# Patient Record
Sex: MALE | Race: ASIAN | NOT HISPANIC OR LATINO | URBAN - METROPOLITAN AREA
[De-identification: names, ages, dates, MRNs, and addresses within clinical notes are randomized per-mention and may not be internally consistent; named-entity substitution may affect disease eponyms.]

---

## 2022-07-15 ENCOUNTER — EMERGENCY (EMERGENCY)
Facility: HOSPITAL | Age: 67
LOS: 1 days | Discharge: HOME | End: 2022-07-15
Attending: STUDENT IN AN ORGANIZED HEALTH CARE EDUCATION/TRAINING PROGRAM | Admitting: EMERGENCY MEDICINE

## 2022-07-15 VITALS
TEMPERATURE: 98 F | OXYGEN SATURATION: 100 % | RESPIRATION RATE: 20 BRPM | SYSTOLIC BLOOD PRESSURE: 141 MMHG | DIASTOLIC BLOOD PRESSURE: 69 MMHG | HEART RATE: 87 BPM

## 2022-07-15 LAB
ALBUMIN SERPL ELPH-MCNC: 4.7 G/DL — SIGNIFICANT CHANGE UP (ref 3.3–5)
ALP SERPL-CCNC: 83 U/L — SIGNIFICANT CHANGE UP (ref 40–120)
ALT FLD-CCNC: 24 U/L — SIGNIFICANT CHANGE UP (ref 4–41)
ANION GAP SERPL CALC-SCNC: 15 MMOL/L — HIGH (ref 7–14)
APTT BLD: 28.5 SEC — SIGNIFICANT CHANGE UP (ref 27–36.3)
AST SERPL-CCNC: 27 U/L — SIGNIFICANT CHANGE UP (ref 4–40)
BASOPHILS # BLD AUTO: 0.03 K/UL — SIGNIFICANT CHANGE UP (ref 0–0.2)
BASOPHILS NFR BLD AUTO: 0.3 % — SIGNIFICANT CHANGE UP (ref 0–2)
BILIRUB SERPL-MCNC: 0.6 MG/DL — SIGNIFICANT CHANGE UP (ref 0.2–1.2)
BUN SERPL-MCNC: 18 MG/DL — SIGNIFICANT CHANGE UP (ref 7–23)
CALCIUM SERPL-MCNC: 9.2 MG/DL — SIGNIFICANT CHANGE UP (ref 8.4–10.5)
CHLORIDE SERPL-SCNC: 95 MMOL/L — LOW (ref 98–107)
CO2 SERPL-SCNC: 24 MMOL/L — SIGNIFICANT CHANGE UP (ref 22–31)
CREAT SERPL-MCNC: 0.91 MG/DL — SIGNIFICANT CHANGE UP (ref 0.5–1.3)
EGFR: 91 ML/MIN/1.73M2 — SIGNIFICANT CHANGE UP
EOSINOPHIL # BLD AUTO: 0.18 K/UL — SIGNIFICANT CHANGE UP (ref 0–0.5)
EOSINOPHIL NFR BLD AUTO: 2.1 % — SIGNIFICANT CHANGE UP (ref 0–6)
GLUCOSE SERPL-MCNC: 328 MG/DL — HIGH (ref 70–99)
HCT VFR BLD CALC: 27.3 % — LOW (ref 39–50)
HGB BLD-MCNC: 9 G/DL — LOW (ref 13–17)
IANC: 5.06 K/UL — SIGNIFICANT CHANGE UP (ref 1.8–7.4)
IMM GRANULOCYTES NFR BLD AUTO: 0.5 % — SIGNIFICANT CHANGE UP (ref 0–1.5)
INR BLD: 0.96 RATIO — SIGNIFICANT CHANGE UP (ref 0.88–1.16)
LYMPHOCYTES # BLD AUTO: 2.43 K/UL — SIGNIFICANT CHANGE UP (ref 1–3.3)
LYMPHOCYTES # BLD AUTO: 27.8 % — SIGNIFICANT CHANGE UP (ref 13–44)
MCHC RBC-ENTMCNC: 29 PG — SIGNIFICANT CHANGE UP (ref 27–34)
MCHC RBC-ENTMCNC: 33 GM/DL — SIGNIFICANT CHANGE UP (ref 32–36)
MCV RBC AUTO: 88.1 FL — SIGNIFICANT CHANGE UP (ref 80–100)
MONOCYTES # BLD AUTO: 1 K/UL — HIGH (ref 0–0.9)
MONOCYTES NFR BLD AUTO: 11.4 % — SIGNIFICANT CHANGE UP (ref 2–14)
NEUTROPHILS # BLD AUTO: 5.06 K/UL — SIGNIFICANT CHANGE UP (ref 1.8–7.4)
NEUTROPHILS NFR BLD AUTO: 57.9 % — SIGNIFICANT CHANGE UP (ref 43–77)
NRBC # BLD: 0 /100 WBCS — SIGNIFICANT CHANGE UP
NRBC # FLD: 0 K/UL — SIGNIFICANT CHANGE UP
PLATELET # BLD AUTO: 239 K/UL — SIGNIFICANT CHANGE UP (ref 150–400)
POTASSIUM SERPL-MCNC: 3.2 MMOL/L — LOW (ref 3.5–5.3)
POTASSIUM SERPL-SCNC: 3.2 MMOL/L — LOW (ref 3.5–5.3)
PROT SERPL-MCNC: 7.4 G/DL — SIGNIFICANT CHANGE UP (ref 6–8.3)
PROTHROM AB SERPL-ACNC: 11.1 SEC — SIGNIFICANT CHANGE UP (ref 10.5–13.4)
RBC # BLD: 3.1 M/UL — LOW (ref 4.2–5.8)
RBC # FLD: 14 % — SIGNIFICANT CHANGE UP (ref 10.3–14.5)
SODIUM SERPL-SCNC: 134 MMOL/L — LOW (ref 135–145)
TROPONIN T, HIGH SENSITIVITY RESULT: 24 NG/L — SIGNIFICANT CHANGE UP
WBC # BLD: 8.74 K/UL — SIGNIFICANT CHANGE UP (ref 3.8–10.5)
WBC # FLD AUTO: 8.74 K/UL — SIGNIFICANT CHANGE UP (ref 3.8–10.5)

## 2022-07-15 PROCEDURE — 99285 EMERGENCY DEPT VISIT HI MDM: CPT

## 2022-07-15 RX ORDER — SODIUM CHLORIDE 9 MG/ML
500 INJECTION INTRAMUSCULAR; INTRAVENOUS; SUBCUTANEOUS ONCE
Refills: 0 | Status: COMPLETED | OUTPATIENT
Start: 2022-07-15 | End: 2022-07-15

## 2022-07-15 RX ORDER — POTASSIUM CHLORIDE 20 MEQ
40 PACKET (EA) ORAL ONCE
Refills: 0 | Status: COMPLETED | OUTPATIENT
Start: 2022-07-15 | End: 2022-07-15

## 2022-07-15 RX ADMIN — SODIUM CHLORIDE 500 MILLILITER(S): 9 INJECTION INTRAMUSCULAR; INTRAVENOUS; SUBCUTANEOUS at 22:27

## 2022-07-15 RX ADMIN — Medication 40 MILLIEQUIVALENT(S): at 23:51

## 2022-07-15 NOTE — ED PROVIDER NOTE - MUSCULOSKELETAL, MLM
Spine appears normal, range of motion is not limited, no muscle or joint tenderness. Pulses intact in the right lower extremity.

## 2022-07-15 NOTE — ED ADULT NURSE NOTE - OBJECTIVE STATEMENT
Patient A&Ox4 primarily Occitan speaking, able to speak english, ambulatory c/o pain to the right groin s/p an angiogram procedure for PVD. Patient states that he developed swelling to the scrotum area that was not there prior to the procedure, which was done on 7/12/22. Surgical site intact, no drainage, or redness observed. Respirations even and unlabored. Patient denies numbness, tingling, weakness, fevers/chills, hematuria, or any other symptoms at this time. 18G IV placed to right antecubital, labs collected and sent. Stretcher in lowest position, wheels locked, appropriate side rails in place, call bell in reach.

## 2022-07-15 NOTE — ED ADULT TRIAGE NOTE - CHIEF COMPLAINT QUOTE
Pt had angiogram to check circulation left lower extremity. Pt states that the following day the nursed "pushed" the area and now he has pain. Denies any bleeding. Also c.o dizziness.

## 2022-07-15 NOTE — ED ADULT NURSE NOTE - NSFALLRSKHARMRISK_ED_ALL_ED
Agree with the barium swallow but if it does not show anything I would recommend GI evaluation with a Bravo capsule to look for LPR.   Thanks, Jennifer Smyht
yes

## 2022-07-15 NOTE — ED PROVIDER NOTE - OBJECTIVE STATEMENT
66 y/o M w/ PMHx of HTN, HLD, DM2 c/o pain to the right groin s/p an angiogram procedure for PVD. Pt states that there is currently a hernia to that side which was not there prior to the procedure, which was done on 7/12/22. Denies numbness, tingling, weakness, hematuria, or any other symptoms at this time.

## 2022-07-15 NOTE — ED PROVIDER NOTE - CLINICAL SUMMARY MEDICAL DECISION MAKING FREE TEXT BOX
66 yo M presenting with groin pain/swelling and ecchymosis s/p pvd angioplasty-- plan for labs, CTA LE r/o pseudoaneurysm. DIspo per findings

## 2022-07-15 NOTE — ED ADULT NURSE NOTE - NSICDXPASTMEDICALHX_GEN_ALL_CORE_FT
PAST MEDICAL HISTORY:  DM2 (diabetes mellitus, type 2)     HLD (hyperlipidemia)     HTN (hypertension)      Never

## 2022-07-15 NOTE — ED PROVIDER NOTE - DR. NAME
Requesting refill Levetiracetam  Last filled on 8/22/19  by Maricarmen Danielle  Quantity 60  Refills 2    Last appt 10/28/19 with Dr. Juan Manuel العلي  Next appt NA with MICHAEL     Tere Granados

## 2022-07-15 NOTE — ED PROVIDER NOTE - GENITOURINARY, MLM
Ecchymosis to the right groin and right upper thigh with dressing in place. Tenderness noted to the groin. Bilateral testicular swelling noted.

## 2022-07-15 NOTE — ED ADULT NURSE NOTE - INTERVENTIONS DEFINITIONS
[place] to call system/Call bell, personal items and telephone within reach/Instruct patient to call for assistance/Non-slip footwear when patient is off stretcher/Physically safe environment: no spills, clutter or unnecessary equipment/Stretcher in lowest position, wheels locked, appropriate side rails in place/Monitor gait and stability/Reinforce activity limits and safety measures with patient and family

## 2022-07-15 NOTE — ED PROVIDER NOTE - NSICDXPASTMEDICALHX_GEN_ALL_CORE_FT
PAST MEDICAL HISTORY:  DM2 (diabetes mellitus, type 2)     HLD (hyperlipidemia)     HTN (hypertension)

## 2022-07-15 NOTE — ED PROVIDER NOTE - PROGRESS NOTE DETAILS
ED Attending: Leo  Pt signed out to me from Dr. Granados to f/u imaging and reassess. No pseudoaneurysm noted on CTA. Does have mild scrotal edema/fluid which is apparent on exam too, but no significant TTP and pain is improving. No e/o local infection. Pt interested in DC, will f/u c PMD and .  All reviewed c pt and c his daughter at bedside.

## 2022-07-15 NOTE — ED PROVIDER NOTE - NSFOLLOWUPINSTRUCTIONS_ED_ALL_ED_FT
A cause for your symptoms was not found. You appear to have some swelling to the scrotum.  Please follow up with a UROLOGIST within 1 week, especially if pain doesn't go away.     You may take Tylenol 650 mg every 6 hours if needed.     Return to the ER if you have vomiting, worsening pain, fevers, or other concerning signs.

## 2022-07-16 VITALS
SYSTOLIC BLOOD PRESSURE: 124 MMHG | DIASTOLIC BLOOD PRESSURE: 52 MMHG | OXYGEN SATURATION: 100 % | RESPIRATION RATE: 16 BRPM | HEART RATE: 76 BPM

## 2022-07-16 LAB
APPEARANCE UR: CLEAR — SIGNIFICANT CHANGE UP
B-OH-BUTYR SERPL-SCNC: <0 MMOL/L — SIGNIFICANT CHANGE UP (ref 0–0.4)
BACTERIA # UR AUTO: NEGATIVE — SIGNIFICANT CHANGE UP
BASE EXCESS BLDV CALC-SCNC: 1.4 MMOL/L — SIGNIFICANT CHANGE UP (ref -2–3)
BILIRUB UR-MCNC: NEGATIVE — SIGNIFICANT CHANGE UP
BLD GP AB SCN SERPL QL: NEGATIVE — SIGNIFICANT CHANGE UP
BLOOD GAS VENOUS COMPREHENSIVE RESULT: SIGNIFICANT CHANGE UP
CHLORIDE BLDV-SCNC: 102 MMOL/L — SIGNIFICANT CHANGE UP (ref 96–108)
CO2 BLDV-SCNC: 28 MMOL/L — HIGH (ref 22–26)
COLOR SPEC: SIGNIFICANT CHANGE UP
DIFF PNL FLD: NEGATIVE — SIGNIFICANT CHANGE UP
GAS PNL BLDV: 137 MMOL/L — SIGNIFICANT CHANGE UP (ref 136–145)
GAS PNL BLDV: SIGNIFICANT CHANGE UP
GLUCOSE BLDV-MCNC: 297 MG/DL — HIGH (ref 70–99)
GLUCOSE UR QL: ABNORMAL
HCO3 BLDV-SCNC: 27 MMOL/L — SIGNIFICANT CHANGE UP (ref 22–29)
HCT VFR BLDA CALC: 25 % — LOW (ref 39–51)
HGB BLD CALC-MCNC: 8.2 G/DL — LOW (ref 13–17)
KETONES UR-MCNC: NEGATIVE — SIGNIFICANT CHANGE UP
LACTATE BLDV-MCNC: 1.8 MMOL/L — SIGNIFICANT CHANGE UP (ref 0.5–2)
LEUKOCYTE ESTERASE UR-ACNC: NEGATIVE — SIGNIFICANT CHANGE UP
NITRITE UR-MCNC: NEGATIVE — SIGNIFICANT CHANGE UP
PCO2 BLDV: 44 MMHG — SIGNIFICANT CHANGE UP (ref 42–55)
PH BLDV: 7.39 — SIGNIFICANT CHANGE UP (ref 7.32–7.43)
PH UR: 6.5 — SIGNIFICANT CHANGE UP (ref 5–8)
PO2 BLDV: 33 MMHG — SIGNIFICANT CHANGE UP
POTASSIUM BLDV-SCNC: 3 MMOL/L — LOW (ref 3.5–5.1)
PROT UR-MCNC: NEGATIVE — SIGNIFICANT CHANGE UP
RBC CASTS # UR COMP ASSIST: 0 /HPF — SIGNIFICANT CHANGE UP (ref 0–4)
RH IG SCN BLD-IMP: NEGATIVE — SIGNIFICANT CHANGE UP
SAO2 % BLDV: 57.4 % — SIGNIFICANT CHANGE UP
SP GR SPEC: 1.01 — SIGNIFICANT CHANGE UP (ref 1–1.05)
TROPONIN T, HIGH SENSITIVITY RESULT: 24 NG/L — SIGNIFICANT CHANGE UP
UROBILINOGEN FLD QL: SIGNIFICANT CHANGE UP
WBC UR QL: 1 /HPF — SIGNIFICANT CHANGE UP (ref 0–5)

## 2022-07-16 PROCEDURE — 74174 CTA ABD&PLVS W/CONTRAST: CPT | Mod: 26,MA

## 2022-07-16 NOTE — ED ADULT NURSE REASSESSMENT NOTE - NS ED NURSE REASSESS COMMENT FT1
Patient A&Ox4, resting in stretcher, respirations even and unlabored. Patient denies any complaints at this time, Vitals as noted. Awaiting CT. Stretcher in lowest position, wheels locked, appropriate side rails in place, call bell in reach. Daughter at bedside.

## 2022-07-20 PROBLEM — E78.5 HYPERLIPIDEMIA, UNSPECIFIED: Chronic | Status: ACTIVE | Noted: 2022-07-19

## 2022-07-20 PROBLEM — I10 ESSENTIAL (PRIMARY) HYPERTENSION: Chronic | Status: ACTIVE | Noted: 2022-07-19

## 2022-08-09 ENCOUNTER — APPOINTMENT (OUTPATIENT)
Dept: UROLOGY | Facility: CLINIC | Age: 67
End: 2022-08-09

## 2022-08-09 VITALS
BODY MASS INDEX: 25.07 KG/M2 | DIASTOLIC BLOOD PRESSURE: 69 MMHG | HEIGHT: 66 IN | TEMPERATURE: 98 F | SYSTOLIC BLOOD PRESSURE: 114 MMHG | RESPIRATION RATE: 15 BRPM | WEIGHT: 156 LBS | HEART RATE: 85 BPM

## 2022-08-09 DIAGNOSIS — E11.9 TYPE 2 DIABETES MELLITUS W/OUT COMPLICATIONS: ICD-10-CM

## 2022-08-09 DIAGNOSIS — Z86.79 PERSONAL HISTORY OF OTHER DISEASES OF THE CIRCULATORY SYSTEM: ICD-10-CM

## 2022-08-09 DIAGNOSIS — Z86.39 PERSONAL HISTORY OF OTHER ENDOCRINE, NUTRITIONAL AND METABOLIC DISEASE: ICD-10-CM

## 2022-08-09 DIAGNOSIS — Z87.891 PERSONAL HISTORY OF NICOTINE DEPENDENCE: ICD-10-CM

## 2022-08-09 DIAGNOSIS — I10 ESSENTIAL (PRIMARY) HYPERTENSION: ICD-10-CM

## 2022-08-09 DIAGNOSIS — Z78.9 OTHER SPECIFIED HEALTH STATUS: ICD-10-CM

## 2022-08-09 DIAGNOSIS — E78.00 PURE HYPERCHOLESTEROLEMIA, UNSPECIFIED: ICD-10-CM

## 2022-08-09 DIAGNOSIS — R10.30 LOWER ABDOMINAL PAIN, UNSPECIFIED: ICD-10-CM

## 2022-08-09 PROBLEM — Z00.00 ENCOUNTER FOR PREVENTIVE HEALTH EXAMINATION: Status: ACTIVE | Noted: 2022-08-09

## 2022-08-09 PROCEDURE — 99203 OFFICE O/P NEW LOW 30 MIN: CPT

## 2022-08-09 NOTE — PHYSICAL EXAM
[General Appearance - Well Developed] : well developed [General Appearance - Well Nourished] : well nourished [Normal Appearance] : normal appearance [Well Groomed] : well groomed [General Appearance - In No Acute Distress] : no acute distress [Edema] : no peripheral edema [Respiration, Rhythm And Depth] : normal respiratory rhythm and effort [Exaggerated Use Of Accessory Muscles For Inspiration] : no accessory muscle use [Abdomen Soft] : soft [Abdomen Tenderness] : non-tender [Costovertebral Angle Tenderness] : no ~M costovertebral angle tenderness [Urethral Meatus] : meatus normal [Urinary Bladder Findings] : the bladder was normal on palpation [Scrotum] : the scrotum was normal [Testes Mass (___cm)] : there were no testicular masses [No Prostate Nodules] : no prostate nodules [FreeTextEntry1] : no sweling mild induratin right groin [Normal Station and Gait] : the gait and station were normal for the patient's age [] : no rash [No Focal Deficits] : no focal deficits [Oriented To Time, Place, And Person] : oriented to person, place, and time [Affect] : the affect was normal [Mood] : the mood was normal [Not Anxious] : not anxious [No Palpable Adenopathy] : no palpable adenopathy

## 2022-08-09 NOTE — ASSESSMENT
[FreeTextEntry1] : er imaging reviewed\par resolving hematoma\par tylenol for pain \par f/u vascular

## 2022-08-09 NOTE — REVIEW OF SYSTEMS
[Eyesight Problems] : eyesight problems [Difficulty Walking] : difficulty walking [Negative] : Heme/Lymph

## 2022-08-09 NOTE — HISTORY OF PRESENT ILLNESS
[FreeTextEntry1] : \par - Chief Complaint: The patient is a 67y Male complaining of post op\par complication.\par - HPI Objective Statement: 68 y/o M w/ PMHx of HTN, HLD, DM2 c/o pain to the\par right groin s/p an angiogram procedure for PVD. Pt states that there is\par currently a hernia to that side which was not there prior to the procedure,\par which was done on 7/12/22. Denies numbness, tingling, weakness, hematuria, or\par any other symptoms at this time.\par ct show no aneurysm

## 2022-10-18 ENCOUNTER — INPATIENT (INPATIENT)
Facility: HOSPITAL | Age: 67
LOS: 6 days | Discharge: HOME HEALTH | End: 2022-10-25
Attending: STUDENT IN AN ORGANIZED HEALTH CARE EDUCATION/TRAINING PROGRAM | Admitting: STUDENT IN AN ORGANIZED HEALTH CARE EDUCATION/TRAINING PROGRAM

## 2022-10-18 VITALS
TEMPERATURE: 98 F | DIASTOLIC BLOOD PRESSURE: 49 MMHG | HEART RATE: 90 BPM | SYSTOLIC BLOOD PRESSURE: 146 MMHG | OXYGEN SATURATION: 99 % | RESPIRATION RATE: 18 BRPM

## 2022-10-18 LAB
ALBUMIN SERPL ELPH-MCNC: 4.4 G/DL — SIGNIFICANT CHANGE UP (ref 3.3–5)
ALP SERPL-CCNC: 86 U/L — SIGNIFICANT CHANGE UP (ref 40–120)
ALT FLD-CCNC: 24 U/L — SIGNIFICANT CHANGE UP (ref 4–41)
ANION GAP SERPL CALC-SCNC: 15 MMOL/L — HIGH (ref 7–14)
APTT BLD: 29.5 SEC — SIGNIFICANT CHANGE UP (ref 27–36.3)
AST SERPL-CCNC: 23 U/L — SIGNIFICANT CHANGE UP (ref 4–40)
BASOPHILS # BLD AUTO: 0.04 K/UL — SIGNIFICANT CHANGE UP (ref 0–0.2)
BASOPHILS NFR BLD AUTO: 0.5 % — SIGNIFICANT CHANGE UP (ref 0–2)
BILIRUB SERPL-MCNC: 0.2 MG/DL — SIGNIFICANT CHANGE UP (ref 0.2–1.2)
BLD GP AB SCN SERPL QL: NEGATIVE — SIGNIFICANT CHANGE UP
BUN SERPL-MCNC: 21 MG/DL — SIGNIFICANT CHANGE UP (ref 7–23)
CALCIUM SERPL-MCNC: 9.7 MG/DL — SIGNIFICANT CHANGE UP (ref 8.4–10.5)
CHLORIDE SERPL-SCNC: 97 MMOL/L — LOW (ref 98–107)
CO2 SERPL-SCNC: 23 MMOL/L — SIGNIFICANT CHANGE UP (ref 22–31)
CREAT SERPL-MCNC: 1.08 MG/DL — SIGNIFICANT CHANGE UP (ref 0.5–1.3)
EGFR: 74 ML/MIN/1.73M2 — SIGNIFICANT CHANGE UP
EOSINOPHIL # BLD AUTO: 0.23 K/UL — SIGNIFICANT CHANGE UP (ref 0–0.5)
EOSINOPHIL NFR BLD AUTO: 2.9 % — SIGNIFICANT CHANGE UP (ref 0–6)
FLUAV AG NPH QL: SIGNIFICANT CHANGE UP
FLUBV AG NPH QL: SIGNIFICANT CHANGE UP
GLUCOSE SERPL-MCNC: 364 MG/DL — HIGH (ref 70–99)
HCT VFR BLD CALC: 33.2 % — LOW (ref 39–50)
HGB BLD-MCNC: 10.6 G/DL — LOW (ref 13–17)
IANC: 4.31 K/UL — SIGNIFICANT CHANGE UP (ref 1.8–7.4)
IMM GRANULOCYTES NFR BLD AUTO: 0.4 % — SIGNIFICANT CHANGE UP (ref 0–0.9)
INR BLD: 0.95 RATIO — SIGNIFICANT CHANGE UP (ref 0.88–1.16)
LYMPHOCYTES # BLD AUTO: 2.41 K/UL — SIGNIFICANT CHANGE UP (ref 1–3.3)
LYMPHOCYTES # BLD AUTO: 30.5 % — SIGNIFICANT CHANGE UP (ref 13–44)
MCHC RBC-ENTMCNC: 26.4 PG — LOW (ref 27–34)
MCHC RBC-ENTMCNC: 31.9 GM/DL — LOW (ref 32–36)
MCV RBC AUTO: 82.6 FL — SIGNIFICANT CHANGE UP (ref 80–100)
MONOCYTES # BLD AUTO: 0.89 K/UL — SIGNIFICANT CHANGE UP (ref 0–0.9)
MONOCYTES NFR BLD AUTO: 11.3 % — SIGNIFICANT CHANGE UP (ref 2–14)
NEUTROPHILS # BLD AUTO: 4.31 K/UL — SIGNIFICANT CHANGE UP (ref 1.8–7.4)
NEUTROPHILS NFR BLD AUTO: 54.4 % — SIGNIFICANT CHANGE UP (ref 43–77)
NRBC # BLD: 0 /100 WBCS — SIGNIFICANT CHANGE UP (ref 0–0)
NRBC # FLD: 0 K/UL — SIGNIFICANT CHANGE UP (ref 0–0)
PLATELET # BLD AUTO: 324 K/UL — SIGNIFICANT CHANGE UP (ref 150–400)
POTASSIUM SERPL-MCNC: 3.5 MMOL/L — SIGNIFICANT CHANGE UP (ref 3.5–5.3)
POTASSIUM SERPL-SCNC: 3.5 MMOL/L — SIGNIFICANT CHANGE UP (ref 3.5–5.3)
PROT SERPL-MCNC: 7.1 G/DL — SIGNIFICANT CHANGE UP (ref 6–8.3)
PROTHROM AB SERPL-ACNC: 11 SEC — SIGNIFICANT CHANGE UP (ref 10.5–13.4)
RBC # BLD: 4.02 M/UL — LOW (ref 4.2–5.8)
RBC # FLD: 14.6 % — HIGH (ref 10.3–14.5)
RH IG SCN BLD-IMP: NEGATIVE — SIGNIFICANT CHANGE UP
RSV RNA NPH QL NAA+NON-PROBE: SIGNIFICANT CHANGE UP
SARS-COV-2 RNA SPEC QL NAA+PROBE: SIGNIFICANT CHANGE UP
SODIUM SERPL-SCNC: 135 MMOL/L — SIGNIFICANT CHANGE UP (ref 135–145)
WBC # BLD: 7.91 K/UL — SIGNIFICANT CHANGE UP (ref 3.8–10.5)
WBC # FLD AUTO: 7.91 K/UL — SIGNIFICANT CHANGE UP (ref 3.8–10.5)

## 2022-10-18 PROCEDURE — 99285 EMERGENCY DEPT VISIT HI MDM: CPT

## 2022-10-18 PROCEDURE — 93970 EXTREMITY STUDY: CPT | Mod: 26

## 2022-10-18 PROCEDURE — G1004: CPT

## 2022-10-18 PROCEDURE — 73706 CT ANGIO LWR EXTR W/O&W/DYE: CPT | Mod: 26,50,52,ME

## 2022-10-18 RX ORDER — ACETAMINOPHEN 500 MG
975 TABLET ORAL ONCE
Refills: 0 | Status: COMPLETED | OUTPATIENT
Start: 2022-10-18 | End: 2022-10-18

## 2022-10-18 RX ORDER — ACETAMINOPHEN 500 MG
650 TABLET ORAL ONCE
Refills: 0 | Status: COMPLETED | OUTPATIENT
Start: 2022-10-18 | End: 2022-10-18

## 2022-10-18 RX ORDER — INSULIN LISPRO 100/ML
3 VIAL (ML) SUBCUTANEOUS ONCE
Refills: 0 | Status: COMPLETED | OUTPATIENT
Start: 2022-10-18 | End: 2022-10-18

## 2022-10-18 RX ORDER — SODIUM CHLORIDE 9 MG/ML
1000 INJECTION INTRAMUSCULAR; INTRAVENOUS; SUBCUTANEOUS ONCE
Refills: 0 | Status: COMPLETED | OUTPATIENT
Start: 2022-10-18 | End: 2022-10-18

## 2022-10-18 RX ADMIN — Medication 650 MILLIGRAM(S): at 16:46

## 2022-10-18 RX ADMIN — Medication 975 MILLIGRAM(S): at 12:01

## 2022-10-18 RX ADMIN — Medication 3 UNIT(S): at 23:30

## 2022-10-18 RX ADMIN — SODIUM CHLORIDE 1000 MILLILITER(S): 9 INJECTION INTRAMUSCULAR; INTRAVENOUS; SUBCUTANEOUS at 12:02

## 2022-10-18 NOTE — ED PROVIDER NOTE - PHYSICAL EXAMINATION
CONSTITUTIONAL: well-appearing, in NAD  SKIN: Warm dry, normal skin turgor  HEAD: NCAT  EYES: EOMI, PERRLA, no scleral icterus, conjunctiva pink  ENT: normal pharynx with no erythema or exudates  NECK: Supple; non tender. Full ROM.  CARD: RRR, no murmurs.  RESP: clear to ausculation b/l. No crackles or wheezing.  ABD: soft, non-tender, non-distended, no rebound or guarding.  MSK: Full ROM, no bony tenderness, b/l le warm,   NEURO: normal motor. diminished le sensation R>L  PSYCH: Cooperative, appropriate. CONSTITUTIONAL: well-appearing, in NAD  SKIN: Warm dry, normal skin turgor  HEAD: NCAT  EYES: EOMI, PERRLA, no scleral icterus, conjunctiva pink  ENT: normal pharynx with no erythema or exudates  NECK: Supple; non tender. Full ROM.  CARD: RRR, no murmurs.  RESP: clear to ausculation b/l. No crackles or wheezing.  ABD: soft, non-tender, non-distended, no rebound or guarding.  MSK: Full ROM, no bony tenderness, b/l le warm, pulses diminished but faintly palpable  NEURO: normal motor. diminished le sensation R>L  PSYCH: Cooperative, appropriate.

## 2022-10-18 NOTE — ED PROVIDER NOTE - CLINICAL SUMMARY MEDICAL DECISION MAKING FREE TEXT BOX
b/l le pain w/ hx of pad, likely caludication but w/ recent instrumentation will get ct angiogram le b/l, pain control, labs, possible vascular consultation

## 2022-10-18 NOTE — ED PROVIDER NOTE - OBJECTIVE STATEMENT
Hx of poor circulation, htn presents to the ed for b/l le pain, had angiography and ?angioplasty done 3 months ago in [facility] presnts for b/l le pain worse w/ walking around, leg cramping. Pt states they had a hematoma which improved after last angiography, pt denies any trauma, no long travel, no cp or sob. Pt has been taking Tylenol last yesterday with minimal relief. Hx of poor circulation, htn presents to the ed for b/l le pain, had angiography and ?angioplasty done 3 months ago in [facility] presents for b/l le pain worse w/ walking around, leg cramping. Pt states they had a hematoma which improved after last angiography, pt denies any trauma, no long travel, no cp or sob. Pt has been taking Tylenol last yesterday with minimal relief.

## 2022-10-18 NOTE — CONSULT NOTE ADULT - ASSESSMENT
ASSESSMENT/PLAN:  67y M PMH HTN, HLD, DM2 PAD s/p prior BLE angiograms presents complaining of worsening bilateral lower extremity pain. CT with evidence of right external iliac stenosis and focal dissection. Vascular Surgery consulted for further evaluation. Patient with evidence of flow distal to area of concern. Ambulatory at baseline, without rest pain.     - Medicine admission for optimization prior to any potential procedure  - Endocrine consult for hyperglycemia  - Pain control  - Imaging to be reviewed by attending     Patient discussed with vascular fellows, on behalf of attending Dr. Trujillo.     Amanda Clayton MD  PGY3 Consult Resident  C Team Surgery (Vascular Surgery)  [id number]   ASSESSMENT/PLAN:  67y M PMH HTN, HLD, DM2 PAD s/p prior BLE angiograms presents complaining of worsening bilateral lower extremity pain. CT with evidence of right external iliac stenosis and focal dissection. Vascular Surgery consulted for further evaluation. Patient with evidence of flow distal to area of concern. Ambulatory at baseline, without rest pain.     - Medicine admission for optimization prior to any potential procedure  - Endocrine consult for hyperglycemia  - Pain control  - F/u BRITTNEY/PVRs, ordered  - Imaging to be reviewed by attending     Patient discussed with vascular fellows, on behalf of attending Dr. Trujillo.     Amanda Clayton MD  PGY3 Consult Resident  C Team Surgery (Vascular Surgery)  [id number]   ASSESSMENT/PLAN:  67y M PMH HTN, HLD, DM2 PAD s/p prior BLE angiograms presents complaining of worsening bilateral lower extremity pain, worse on the left. CT with evidence of right external iliac stenosis and focal dissection. Vascular Surgery consulted for further evaluation. Patient with evidence of flow distal to area of concern. Ambulatory at baseline, without rest pain.     - Medicine admission for optimization prior to any potential procedure  - Endocrine consult for hyperglycemia  - Pain control  - F/u BRITTNEY/PVRs, ordered  - Imaging to be reviewed by attending     Patient discussed with vascular fellows, on behalf of attending Dr. Trujillo.     Amanda Clayton MD  PGY3 Consult Resident  C Team Surgery (Vascular Surgery)  [id number]

## 2022-10-18 NOTE — ED ADULT TRIAGE NOTE - CHIEF COMPLAINT QUOTE
Pt c/o 2 weeks of bilateral toes numbness, as well as pain to calves and shins. Pt states he had 2 angiograms through groin, second in July. Pt also reports occasional chest pain, not at this time.

## 2022-10-18 NOTE — ED PROVIDER NOTE - NS ED ROS FT
Constitutional:  (-) fever, (-) chills, (-) lethargy  Eyes:  (-) eye pain (-) visual changes  ENMT: (-) nasal discharge, (-) sore throat. (-) neck pain or stiffness  Cardiac: (-) chest pain (-) palpitations  Respiratory:  (-) cough (-) respiratory distress.   GI:  (-) nausea (-) vomiting (-) diarrhea (-) abdominal pain.  :  (-) dysuria (-) frequency (-) burning.  MS:  (-) back pain (-) joint pain.  Neuro:  (-) headache (+) numbness (-) tingling (-) focal weakness + b/l le pain   Skin:  (-) rash  Except as documented in the HPI,  all other systems are negative

## 2022-10-18 NOTE — ED PROVIDER NOTE - ATTENDING CONTRIBUTION TO CARE
Dr. Rizo: 66 yo male with PVD and HTN, s/p LE ?angioplasty at [facility] 3 months ago, in ED with pain to both legs with ambulation, no pain at rest.  Pain in both legs, worse in calves, no associated CP/SOB or other new complaints.  On exam pt chronically-ill appearing but in NAD, heart RRR, lungs CTAB, abd NTND, extremities without swelling but both calves mild TTP, strength grossly intact in all extremities and skin without rash, but plantar surface of all toes on left foot with mild erythema.  Both LEs warm and with equally decreased, faintly palpable DP pulses.

## 2022-10-18 NOTE — ED PROVIDER NOTE - PROGRESS NOTE DETAILS
Gaurav Sauceda, DO PGY-3: CT findings showing multiple areas of near occlusion/occlusion of multiple arteries including focal L external iliac dissection, vascular consulted

## 2022-10-18 NOTE — ED ADULT NURSE NOTE - OBJECTIVE STATEMENT
Pt is a 68 y/o Male A&Ox4, ambulatory with a hx DM, HTN, HLD, possible Stroke and Angiogram through groin x 2. Pt presents to the ED with c/o increasing b/l leg pain post last angiogram in June. Pt reports chronic Pt is a 66 y/o Male A&Ox4, ambulatory with a hx DM, HTN, HLD, possible Stroke and Angiogram through groin x 2. Pt presents to the ED with c/o increasing b/l leg pain post last angiogram in June. Pt reports chronic b/l leg pain but it has been worsening over the past 2 weeks Pt is a 66 y/o Male A&Ox4, ambulatory with a hx DM, HTN, HLD, possible Stroke and Angiogram through groin x 2. Pt presents to the ED with c/o increasing b/l leg pain and toe numbness post last angiogram in June. Pt reports chronic b/l leg pain but it has been worsening over the past 2 weeks. Pt reports not being able to walk far distances without stopping as a result of the pain. Pt endorses chest pain occasionally. Pt's daughter at bedside states pt had a complication post 2nd angiogram resulting in swollen and painful genitals. Pt's daughter states she did not feel comfortable taking pt back to the doctor who performed the angiogram due to those complications. Pt denies abdominal pain, dizziness, SOB, fever, cough, chills or any other symptoms. Respirations even and unlabored, chest rise equal b/l. Skin warm, dry and intact. 20g IVL placed in LAC. Labs collected and sent. Medication administered as ordered. Safety maintained, will continue to monitor. Pt is a 68 y/o Male A&Ox4, ambulatory with a hx DM, HTN, HLD, possible Stroke and Angiogram through groin x 2. Pt presents to the ED with c/o increasing b/l leg pain and toe numbness post last angiogram in June. Pt reports chronic b/l leg pain but it has been worsening over the past 2 weeks. Pt reports not being able to walk far distances without stopping as a result of the pain. Pt endorses chest pain occasionally. Pt speaks English but is primarily Tamaz[place] speaking. Pt's daughter at bedside states pt had a complication post 2nd angiogram resulting in swollen and painful genitals. Pt's daughter states she did not feel comfortable taking pt back to the doctor who performed the angiogram due to those complications. Pt denies abdominal pain, dizziness, SOB, fever, cough, chills or any other symptoms. Respirations even and unlabored, chest rise equal b/l. Skin warm, dry and intact. 20g IVL placed in LAC. Labs collected and sent. Medication administered as ordered. Safety maintained, will continue to monitor.

## 2022-10-18 NOTE — CONSULT NOTE ADULT - SUBJECTIVE AND OBJECTIVE BOX
VASCULAR SURGERY CONSULT NOTE  HPI: 67y M PMH HTN, HLD, DM2 PAD s/p prior BLE angiograms presents complaining of worsening bilateral lower extremity pain. Patient endorses chronic LLE pain that has worsened and spread to R leg over the past 2 weeks. Also reports new numbness on bilateral toes over the same time period, L worse than R. Reports he is only able to stand for a few minutes and walk a couple steps before pain begins. Describes pain as a cramping of the "muscle and bone" below his knees. Endorses intermittent chest pain, denies rest pain. Patient states he had two LLE angiograms this year, in 04/2022 and 07/2022. He is unsure of the exact procedures but he believes he had a balloon angioplasty.     In ED, patient AFVSS. Labs notable for glucose 364. CT with evidence of right external iliac stenosis and focal dissection. Vascular Surgery consulted for further evaluation.       PAST MEDICAL & SURGICAL HISTORY:  HTN (hypertension)      HLD (hyperlipidemia)      DM2 (diabetes mellitus, type 2)    PAD    Home Medications: Losartan, Atenolol-chlorthalidone, ASA, Plavix, Pioglitazone, Metformin-sitagliptin, Atorvastatin      Allergies    Advil (Rash)    Intolerances      SOCIAL HISTORY:  Former smoke, endorses social EtOH      Physical Exam:  General: NAD, resting comfortably  HEENT: NC/AT  Pulmonary: normal resp effort on RA  Cardiovascular: RRR  Abdominal: soft, ND/NT  Extremities: WWP. Well healing scabs on dorsum of bilateral feet, no open wounds  Neuro: A/O x 3  Vasc: Palpable femoral pulses bilaterally. R palpable DP pulse and dopplerable R PT signal. L dopplerable DP signal, no L PT signal      Vital Signs Last 24 Hrs  T(C): 36.4 (18 Oct 2022 15:02), Max: 36.7 (18 Oct 2022 11:35)  T(F): 97.6 (18 Oct 2022 15:02), Max: 98 (18 Oct 2022 11:35)  HR: 76 (18 Oct 2022 15:02) (76 - 90)  BP: 153/69 (18 Oct 2022 15:02) (120/59 - 153/69)  BP(mean): --  RR: 18 (18 Oct 2022 15:02) (18 - 18)  SpO2: 100% (18 Oct 2022 15:02) (99% - 100%)    Parameters below as of 18 Oct 2022 15:02  Patient On (Oxygen Delivery Method): room air      I&O's Summary      LABS:                        10.6   7.91  )-----------( 324      ( 18 Oct 2022 12:00 )             33.2     10-18    135  |  97<L>  |  21  ----------------------------<  364<H>  3.5   |  23  |  1.08    Ca    9.7      18 Oct 2022 12:00    TPro  7.1  /  Alb  4.4  /  TBili  0.2  /  DBili  x   /  AST  23  /  ALT  24  /  AlkPhos  86  10-18    PT/INR - ( 18 Oct 2022 12:00 )   PT: 11.0 sec;   INR: 0.95 ratio         PTT - ( 18 Oct 2022 12:00 )  PTT:29.5 sec    CAPILLARY BLOOD GLUCOSE      POCT Blood Glucose.: 215 mg/dL (18 Oct 2022 16:56)  POCT Blood Glucose.: 373 mg/dL (18 Oct 2022 11:08)    LIVER FUNCTIONS - ( 18 Oct 2022 12:00 )  Alb: 4.4 g/dL / Pro: 7.1 g/dL / ALK PHOS: 86 U/L / ALT: 24 U/L / AST: 23 U/L / GGT: x               RADIOLOGY & ADDITIONAL STUDIES:  < from: CT Angio Lower Extremity w/ IV Cont, Bilateral (10.18.22 @ 14:40) >  FINDINGS:    CENTRAL ARTERIAL SYSTEM:    Atherosclerotic calcifications. No abdominal aortic aneurysm. Celiac   axis, SMA, and RICARDA are patent.    Moderate narrowing of the right common iliac artery.  Near occlusive narrowing of the right external iliac artery with   dissection flap along its distal portion. The left common iliac artery,   external iliac artery, and bilateral internal iliac arteries are patent.    RIGHT LOWER EXTREMITY:    Common femoral artery: No occlusion or significant stenosis.  Superficial femoral artery: Moderate to severe narrowing of the distal   right SFA..  Profunda femoris artery: No occlusion or significant stenosis.  Popliteal artery: Patent. No occlusion or significant stenosis.  Anterior tibial artery: No occlusion.  Visualized to the level of the   ankle.  Posterior tibial artery: No occlusion.  Visualized to the level of the   ankle.  Peroneal artery: No occlusion.  Visualized to the level of the ankle.    LEFT LOWER EXTREMITY:    Common femoral artery: No occlusion or significant stenosis.  Superficial femoral artery: Moderate to severe narrowing in the distal   SFA.  Profunda femoris artery: No occlusion or significant stenosis.  Poplitealartery: Occluded with distal reconstitution.  Anterior tibial artery: No occlusion.  Visualized to the level of the   ankle.  Posterior tibial artery: Nonopacified along its midportion with distal   reconstitution.  Peroneal artery: No occlusion.  Visualized to the level of the ankle.    ADDITIONAL FINDINGS: Cholelithiasis.    IMPRESSION:  *  Near occlusive narrowing of the right external iliac artery with focal   dissection in its distal portion.  *  Moderate to severe narrowing of the bilateralSFAs.  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  *  Two-vessel runoff to the left foot.    < end of copied text >    < from: US Duplex Venous Lower Ext Complete, Bilateral (10.18.22 @ 12:41) >  FINDINGS:    RIGHT:  Normal compressibility of the RIGHT common femoral, femoral and popliteal   veins.  Doppler examination shows normal spontaneous and phasic flow.  No RIGHT calf vein thrombosis is detected.    LEFT:  Normal compressibility of the LEFT common femoral, femoral and popliteal   veins.  Doppler examination shows normal spontaneous and phasic flow.  No LEFT calf vein thrombosis is detected.    IMPRESSION:  No evidence of deep venous thrombosis in either lower extremity.    < end of copied text >

## 2022-10-19 DIAGNOSIS — M79.606 PAIN IN LEG, UNSPECIFIED: ICD-10-CM

## 2022-10-19 DIAGNOSIS — E87.6 HYPOKALEMIA: ICD-10-CM

## 2022-10-19 DIAGNOSIS — R93.89 ABNORMAL FINDINGS ON DIAGNOSTIC IMAGING OF OTHER SPECIFIED BODY STRUCTURES: Chronic | ICD-10-CM

## 2022-10-19 DIAGNOSIS — I10 ESSENTIAL (PRIMARY) HYPERTENSION: ICD-10-CM

## 2022-10-19 DIAGNOSIS — E78.5 HYPERLIPIDEMIA, UNSPECIFIED: ICD-10-CM

## 2022-10-19 DIAGNOSIS — R63.8 OTHER SYMPTOMS AND SIGNS CONCERNING FOOD AND FLUID INTAKE: ICD-10-CM

## 2022-10-19 DIAGNOSIS — I73.9 PERIPHERAL VASCULAR DISEASE, UNSPECIFIED: ICD-10-CM

## 2022-10-19 DIAGNOSIS — E11.9 TYPE 2 DIABETES MELLITUS WITHOUT COMPLICATIONS: ICD-10-CM

## 2022-10-19 LAB
A1C WITH ESTIMATED AVERAGE GLUCOSE RESULT: 10.4 % — HIGH (ref 4–5.6)
ALBUMIN SERPL ELPH-MCNC: 4.4 G/DL — SIGNIFICANT CHANGE UP (ref 3.3–5)
ALP SERPL-CCNC: 72 U/L — SIGNIFICANT CHANGE UP (ref 40–120)
ALT FLD-CCNC: 23 U/L — SIGNIFICANT CHANGE UP (ref 4–41)
ANION GAP SERPL CALC-SCNC: 13 MMOL/L — SIGNIFICANT CHANGE UP (ref 7–14)
APTT BLD: 29.8 SEC — SIGNIFICANT CHANGE UP (ref 27–36.3)
AST SERPL-CCNC: 24 U/L — SIGNIFICANT CHANGE UP (ref 4–40)
BASOPHILS # BLD AUTO: 0.04 K/UL — SIGNIFICANT CHANGE UP (ref 0–0.2)
BASOPHILS NFR BLD AUTO: 0.5 % — SIGNIFICANT CHANGE UP (ref 0–2)
BILIRUB SERPL-MCNC: 0.3 MG/DL — SIGNIFICANT CHANGE UP (ref 0.2–1.2)
BUN SERPL-MCNC: 13 MG/DL — SIGNIFICANT CHANGE UP (ref 7–23)
CALCIUM SERPL-MCNC: 9.5 MG/DL — SIGNIFICANT CHANGE UP (ref 8.4–10.5)
CHLORIDE SERPL-SCNC: 99 MMOL/L — SIGNIFICANT CHANGE UP (ref 98–107)
CO2 SERPL-SCNC: 25 MMOL/L — SIGNIFICANT CHANGE UP (ref 22–31)
CREAT SERPL-MCNC: 0.83 MG/DL — SIGNIFICANT CHANGE UP (ref 0.5–1.3)
EGFR: 95 ML/MIN/1.73M2 — SIGNIFICANT CHANGE UP
EOSINOPHIL # BLD AUTO: 0.25 K/UL — SIGNIFICANT CHANGE UP (ref 0–0.5)
EOSINOPHIL NFR BLD AUTO: 3.1 % — SIGNIFICANT CHANGE UP (ref 0–6)
ESTIMATED AVERAGE GLUCOSE: 252 — SIGNIFICANT CHANGE UP
GLUCOSE BLDC GLUCOMTR-MCNC: 166 MG/DL — HIGH (ref 70–99)
GLUCOSE BLDC GLUCOMTR-MCNC: 176 MG/DL — HIGH (ref 70–99)
GLUCOSE BLDC GLUCOMTR-MCNC: 219 MG/DL — HIGH (ref 70–99)
GLUCOSE BLDC GLUCOMTR-MCNC: 238 MG/DL — HIGH (ref 70–99)
GLUCOSE BLDC GLUCOMTR-MCNC: 298 MG/DL — HIGH (ref 70–99)
GLUCOSE SERPL-MCNC: 193 MG/DL — HIGH (ref 70–99)
HCT VFR BLD CALC: 31.8 % — LOW (ref 39–50)
HGB BLD-MCNC: 10.3 G/DL — LOW (ref 13–17)
IANC: 4.89 K/UL — SIGNIFICANT CHANGE UP (ref 1.8–7.4)
IMM GRANULOCYTES NFR BLD AUTO: 0.4 % — SIGNIFICANT CHANGE UP (ref 0–0.9)
INR BLD: 1.03 RATIO — SIGNIFICANT CHANGE UP (ref 0.88–1.16)
LYMPHOCYTES # BLD AUTO: 2.14 K/UL — SIGNIFICANT CHANGE UP (ref 1–3.3)
LYMPHOCYTES # BLD AUTO: 26.5 % — SIGNIFICANT CHANGE UP (ref 13–44)
MAGNESIUM SERPL-MCNC: 1.6 MG/DL — SIGNIFICANT CHANGE UP (ref 1.6–2.6)
MCHC RBC-ENTMCNC: 26.1 PG — LOW (ref 27–34)
MCHC RBC-ENTMCNC: 32.4 GM/DL — SIGNIFICANT CHANGE UP (ref 32–36)
MCV RBC AUTO: 80.5 FL — SIGNIFICANT CHANGE UP (ref 80–100)
MONOCYTES # BLD AUTO: 0.74 K/UL — SIGNIFICANT CHANGE UP (ref 0–0.9)
MONOCYTES NFR BLD AUTO: 9.1 % — SIGNIFICANT CHANGE UP (ref 2–14)
NEUTROPHILS # BLD AUTO: 4.89 K/UL — SIGNIFICANT CHANGE UP (ref 1.8–7.4)
NEUTROPHILS NFR BLD AUTO: 60.4 % — SIGNIFICANT CHANGE UP (ref 43–77)
NRBC # BLD: 0 /100 WBCS — SIGNIFICANT CHANGE UP (ref 0–0)
NRBC # FLD: 0 K/UL — SIGNIFICANT CHANGE UP (ref 0–0)
PHOSPHATE SERPL-MCNC: 3.9 MG/DL — SIGNIFICANT CHANGE UP (ref 2.5–4.5)
PLATELET # BLD AUTO: 301 K/UL — SIGNIFICANT CHANGE UP (ref 150–400)
POTASSIUM SERPL-MCNC: 3.4 MMOL/L — LOW (ref 3.5–5.3)
POTASSIUM SERPL-SCNC: 3.4 MMOL/L — LOW (ref 3.5–5.3)
PROT SERPL-MCNC: 7.3 G/DL — SIGNIFICANT CHANGE UP (ref 6–8.3)
PROTHROM AB SERPL-ACNC: 12 SEC — SIGNIFICANT CHANGE UP (ref 10.5–13.4)
RBC # BLD: 3.95 M/UL — LOW (ref 4.2–5.8)
RBC # FLD: 14.4 % — SIGNIFICANT CHANGE UP (ref 10.3–14.5)
SODIUM SERPL-SCNC: 137 MMOL/L — SIGNIFICANT CHANGE UP (ref 135–145)
WBC # BLD: 8.09 K/UL — SIGNIFICANT CHANGE UP (ref 3.8–10.5)
WBC # FLD AUTO: 8.09 K/UL — SIGNIFICANT CHANGE UP (ref 3.8–10.5)

## 2022-10-19 PROCEDURE — 12345: CPT | Mod: NC

## 2022-10-19 PROCEDURE — 99223 1ST HOSP IP/OBS HIGH 75: CPT

## 2022-10-19 PROCEDURE — 99222 1ST HOSP IP/OBS MODERATE 55: CPT

## 2022-10-19 PROCEDURE — 93923 UPR/LXTR ART STDY 3+ LVLS: CPT | Mod: 26

## 2022-10-19 PROCEDURE — 99254 IP/OBS CNSLTJ NEW/EST MOD 60: CPT

## 2022-10-19 RX ORDER — INSULIN LISPRO 100/ML
VIAL (ML) SUBCUTANEOUS EVERY 6 HOURS
Refills: 0 | Status: DISCONTINUED | OUTPATIENT
Start: 2022-10-19 | End: 2022-10-19

## 2022-10-19 RX ORDER — DEXTROSE 50 % IN WATER 50 %
25 SYRINGE (ML) INTRAVENOUS ONCE
Refills: 0 | Status: DISCONTINUED | OUTPATIENT
Start: 2022-10-19 | End: 2022-10-25

## 2022-10-19 RX ORDER — ACETAMINOPHEN 500 MG
650 TABLET ORAL EVERY 6 HOURS
Refills: 0 | Status: DISCONTINUED | OUTPATIENT
Start: 2022-10-19 | End: 2022-10-25

## 2022-10-19 RX ORDER — DEXTROSE 50 % IN WATER 50 %
15 SYRINGE (ML) INTRAVENOUS ONCE
Refills: 0 | Status: DISCONTINUED | OUTPATIENT
Start: 2022-10-19 | End: 2022-10-25

## 2022-10-19 RX ORDER — ATORVASTATIN CALCIUM 80 MG/1
40 TABLET, FILM COATED ORAL AT BEDTIME
Refills: 0 | Status: DISCONTINUED | OUTPATIENT
Start: 2022-10-19 | End: 2022-10-25

## 2022-10-19 RX ORDER — INSULIN LISPRO 100/ML
VIAL (ML) SUBCUTANEOUS AT BEDTIME
Refills: 0 | Status: DISCONTINUED | OUTPATIENT
Start: 2022-10-19 | End: 2022-10-25

## 2022-10-19 RX ORDER — HEPARIN SODIUM 5000 [USP'U]/ML
1200 INJECTION INTRAVENOUS; SUBCUTANEOUS
Qty: 25000 | Refills: 0 | Status: DISCONTINUED | OUTPATIENT
Start: 2022-10-19 | End: 2022-10-20

## 2022-10-19 RX ORDER — TADALAFIL 10 MG/1
1 TABLET, FILM COATED ORAL
Qty: 0 | Refills: 0 | DISCHARGE

## 2022-10-19 RX ORDER — SODIUM CHLORIDE 9 MG/ML
1000 INJECTION, SOLUTION INTRAVENOUS
Refills: 0 | Status: DISCONTINUED | OUTPATIENT
Start: 2022-10-19 | End: 2022-10-25

## 2022-10-19 RX ORDER — INSULIN LISPRO 100/ML
3 VIAL (ML) SUBCUTANEOUS
Refills: 0 | Status: DISCONTINUED | OUTPATIENT
Start: 2022-10-19 | End: 2022-10-19

## 2022-10-19 RX ORDER — POTASSIUM CHLORIDE 20 MEQ
40 PACKET (EA) ORAL ONCE
Refills: 0 | Status: COMPLETED | OUTPATIENT
Start: 2022-10-19 | End: 2022-10-19

## 2022-10-19 RX ORDER — AMLODIPINE BESYLATE 2.5 MG/1
5 TABLET ORAL DAILY
Refills: 0 | Status: DISCONTINUED | OUTPATIENT
Start: 2022-10-19 | End: 2022-10-25

## 2022-10-19 RX ORDER — ASPIRIN/CALCIUM CARB/MAGNESIUM 324 MG
81 TABLET ORAL DAILY
Refills: 0 | Status: DISCONTINUED | OUTPATIENT
Start: 2022-10-19 | End: 2022-10-25

## 2022-10-19 RX ORDER — INSULIN LISPRO 100/ML
VIAL (ML) SUBCUTANEOUS
Refills: 0 | Status: DISCONTINUED | OUTPATIENT
Start: 2022-10-19 | End: 2022-10-25

## 2022-10-19 RX ORDER — PIOGLITAZONE HYDROCHLORIDE 15 MG/1
1 TABLET ORAL
Qty: 0 | Refills: 0 | DISCHARGE

## 2022-10-19 RX ORDER — HEPARIN SODIUM 5000 [USP'U]/ML
5000 INJECTION INTRAVENOUS; SUBCUTANEOUS EVERY 12 HOURS
Refills: 0 | Status: DISCONTINUED | OUTPATIENT
Start: 2022-10-19 | End: 2022-10-19

## 2022-10-19 RX ORDER — INSULIN GLARGINE 100 [IU]/ML
15 INJECTION, SOLUTION SUBCUTANEOUS EVERY MORNING
Refills: 0 | Status: DISCONTINUED | OUTPATIENT
Start: 2022-10-20 | End: 2022-10-20

## 2022-10-19 RX ORDER — DEXTROSE 50 % IN WATER 50 %
12.5 SYRINGE (ML) INTRAVENOUS ONCE
Refills: 0 | Status: DISCONTINUED | OUTPATIENT
Start: 2022-10-19 | End: 2022-10-25

## 2022-10-19 RX ORDER — INSULIN LISPRO 100/ML
5 VIAL (ML) SUBCUTANEOUS
Refills: 0 | Status: DISCONTINUED | OUTPATIENT
Start: 2022-10-19 | End: 2022-10-20

## 2022-10-19 RX ORDER — GLUCAGON INJECTION, SOLUTION 0.5 MG/.1ML
1 INJECTION, SOLUTION SUBCUTANEOUS ONCE
Refills: 0 | Status: DISCONTINUED | OUTPATIENT
Start: 2022-10-19 | End: 2022-10-25

## 2022-10-19 RX ORDER — ATENOLOL 25 MG/1
50 TABLET ORAL DAILY
Refills: 0 | Status: DISCONTINUED | OUTPATIENT
Start: 2022-10-19 | End: 2022-10-25

## 2022-10-19 RX ORDER — INSULIN GLARGINE 100 [IU]/ML
8 INJECTION, SOLUTION SUBCUTANEOUS EVERY MORNING
Refills: 0 | Status: DISCONTINUED | OUTPATIENT
Start: 2022-10-19 | End: 2022-10-19

## 2022-10-19 RX ORDER — CLOPIDOGREL BISULFATE 75 MG/1
75 TABLET, FILM COATED ORAL DAILY
Refills: 0 | Status: DISCONTINUED | OUTPATIENT
Start: 2022-10-19 | End: 2022-10-24

## 2022-10-19 RX ADMIN — ATORVASTATIN CALCIUM 40 MILLIGRAM(S): 80 TABLET, FILM COATED ORAL at 22:06

## 2022-10-19 RX ADMIN — Medication 4: at 09:13

## 2022-10-19 RX ADMIN — CLOPIDOGREL BISULFATE 75 MILLIGRAM(S): 75 TABLET, FILM COATED ORAL at 09:14

## 2022-10-19 RX ADMIN — Medication 40 MILLIEQUIVALENT(S): at 14:10

## 2022-10-19 RX ADMIN — Medication 2: at 18:00

## 2022-10-19 RX ADMIN — Medication 3 UNIT(S): at 09:13

## 2022-10-19 RX ADMIN — Medication 81 MILLIGRAM(S): at 10:46

## 2022-10-19 RX ADMIN — INSULIN GLARGINE 8 UNIT(S): 100 INJECTION, SOLUTION SUBCUTANEOUS at 09:16

## 2022-10-19 RX ADMIN — HEPARIN SODIUM 12 UNIT(S)/HR: 5000 INJECTION INTRAVENOUS; SUBCUTANEOUS at 17:20

## 2022-10-19 RX ADMIN — Medication 3 UNIT(S): at 13:49

## 2022-10-19 RX ADMIN — AMLODIPINE BESYLATE 5 MILLIGRAM(S): 2.5 TABLET ORAL at 06:13

## 2022-10-19 RX ADMIN — Medication 5 UNIT(S): at 18:37

## 2022-10-19 RX ADMIN — ATENOLOL 50 MILLIGRAM(S): 25 TABLET ORAL at 06:13

## 2022-10-19 RX ADMIN — HEPARIN SODIUM 5000 UNIT(S): 5000 INJECTION INTRAVENOUS; SUBCUTANEOUS at 06:13

## 2022-10-19 RX ADMIN — Medication 6: at 13:49

## 2022-10-19 RX ADMIN — HEPARIN SODIUM 12 UNIT(S)/HR: 5000 INJECTION INTRAVENOUS; SUBCUTANEOUS at 20:13

## 2022-10-19 NOTE — PATIENT PROFILE ADULT - ...
Name: Sunny Haddad  Date of visit: 5/5/2020    Chief Complaint   Patient presents with   • Nausea   • Follow-up     ER Follow up.        HPI: Sunny Haddad IS A 41 year old female who presents for a telephone visit today due to COVID 19 pandemic. She has verbally consented to a phone visit and is taking this call from her home. She got sick on 4/28/2020 with fever up to 101 °F.  She also had lot of body aches and pains and headaches.  Fever lasted for about 3 days.  She continues to feel sick so went to emergency room on 5/3/2020.  They did not test her for COVID-19 so she was referred to physicians immediate care on 5/3/2020.  Kovic test was done and results are pending.  She was prescribed meloxicam for her aches and pains.  It is upsetting her stomach and she has nausea.    At this time she does not have any shortness of breath.  She has mild dry cough, headaches on and off, body aches and pains, mild nausea.  She works in a factory.  She does not recall any particular exposure to COVID-19.    Past Medical History:   Diagnosis Date   • Allergic rhinitis, unspecified    • Hyperlipidemia    • Pain in unspecified wrist    • Premenstrual tension syndrome    • Vitamin D deficiency, unspecified        Outpatient Medications Marked as Taking for the 5/5/20 encounter (Office Visit) with Angela Arcinigea MD   Medication Sig Dispense Refill   • Acetaminophen (TYLENOL PO) TAKE 2 CAPS EVERY 6 HOURS         ALLERGIES:  No Known Allergies    PHYSICAL EXAM:  Pt is alert and oriented and does not appear to have any labored breathing. Speech is fluent.      ASSESSMENT/PLAN:  Her symptoms are highly suspicious for COVID-19 infection.  COVID-19 test is collected 2 days ago at urgent care locations, results are still pending.  I advised her to self quarantine at home through May 17, 2020.  She should stay hydrated.  She can take Tylenol as needed for body aches and pains.  She should stop her meloxicam as it is causing stomach  upset and nausea.  I will add omeprazole 20 mg daily.    Patient to call me if she develops high fever, respiratory distress, productive cough, dizziness.  All could be related questions answered.  Patient verbalized understanding of management plan.    Diagnoses and all orders for this visit:  Suspected COVID-19 virus infection  Nausea  Other orders  -     omeprazole (PRILOSEC) 20 MG capsule; Take 1 capsule by mouth daily.      Return in about 2 weeks (around 5/19/2020), or if symptoms worsen or fail to improve.    Time spent on visit: 20 min    Angela Arciniega MD   19-Oct-2022 20:12:08

## 2022-10-19 NOTE — PATIENT PROFILE ADULT - FALL HARM RISK - HARM RISK INTERVENTIONS

## 2022-10-19 NOTE — CONSULT NOTE ADULT - SUBJECTIVE AND OBJECTIVE BOX
Reason For Consult: DM    HPI:  Patient is a 67 year old male hx of HTN, HLD, DM2, and PAD s/p angiogram and stent in May 2022 who is presenting due to b/l LE pain. The pain is worse with ambulation. He states that is is on the calf towards the distal end. He has had this happen for several weeks and was planning on getting BRITTNEY checks b/l LE w/ his vascular.  He denies cp or palpitations, nausea, vomiting, diarrhea, headaches, visual changes. He is able to walk several blocks without cp or palpitations, though he does get LE pain, vascular surgery following given concern for dissection and PAD.      endocrine called for DM assitance  pt has a hx of DM  he is on Janumet and actos  he follows with his PCP   he was told he needed insulin but did not want to start it   does not check [facility]  he has no family hx of DM  diet is high in rice and roti  reports hx of stroke  a1c 10.4 with glucose in 300s             (19 Oct 2022 06:03)      PAST MEDICAL & SURGICAL HISTORY:  HTN (hypertension)      HLD (hyperlipidemia)      DM2 (diabetes mellitus, type 2)      Abnormal angiogram          FAMILY HISTORY:  No significant family history (Father)        Social History:  lives with family    Outpatient Medications:  Home Medications:   * Patient Currently Takes Medications as of 19-Oct-2022 05:51 documented in Structured Notes  · 	Aspirin Enteric Coated 81 mg oral delayed release tablet: Last Dose Taken:  , 1 tab(s) orally once a day  · 	atenolol-chlorthalidone 50 mg-25 mg oral tablet: Last Dose Taken:  , 1 tab(s) orally once a day  · 	Lipitor 20 mg oral tablet: Last Dose Taken:  , 1 tab(s) orally once a day  · 	Vitamin B12 1000 mcg oral tablet: Last Dose Taken:  , 1 tab(s) orally once a day  · 	Janumet 50 mg-1000 mg oral tablet: Last Dose Taken:  , 1 tab(s) orally 2 times a day  · 	losartan 50 mg oral tablet: Last Dose Taken:  , 1 tab(s) orally once a day  · 	Plavix 75 mg oral tablet: Last Dose Taken:  , 1 tab(s) orally once a day    MEDICATIONS  (STANDING):  amLODIPine   Tablet 5 milliGRAM(s) Oral daily  aspirin enteric coated 81 milliGRAM(s) Oral daily  ATENolol  Tablet 50 milliGRAM(s) Oral daily  atorvastatin 40 milliGRAM(s) Oral at bedtime  clopidogrel Tablet 75 milliGRAM(s) Oral daily  dextrose 5%. 1000 milliLiter(s) (100 mL/Hr) IV Continuous <Continuous>  dextrose 5%. 1000 milliLiter(s) (50 mL/Hr) IV Continuous <Continuous>  dextrose 50% Injectable 25 Gram(s) IV Push once  dextrose 50% Injectable 12.5 Gram(s) IV Push once  dextrose 50% Injectable 25 Gram(s) IV Push once  glucagon  Injectable 1 milliGRAM(s) IntraMuscular once  heparin  Infusion 1200 Unit(s)/Hr (12 mL/Hr) IV Continuous <Continuous>  insulin glargine Injectable (LANTUS) 8 Unit(s) SubCutaneous every morning  insulin lispro (ADMELOG) corrective regimen sliding scale   SubCutaneous three times a day before meals  insulin lispro (ADMELOG) corrective regimen sliding scale   SubCutaneous at bedtime  insulin lispro Injectable (ADMELOG) 3 Unit(s) SubCutaneous three times a day before meals    MEDICATIONS  (PRN):  acetaminophen     Tablet .. 650 milliGRAM(s) Oral every 6 hours PRN Temp greater or equal to 38C (100.4F), Mild Pain (1 - 3)  dextrose Oral Gel 15 Gram(s) Oral once PRN Blood Glucose LESS THAN 70 milliGRAM(s)/deciliter      Allergies    Advil (Rash)    Intolerances      Review of Systems:  Constitutional: No fever  Eyes: No blurry vision  Neuro: No tremors  HEENT: No pain  Cardiovascular: No chest pain, palpitations  Respiratory: No SOB, no cough  GI: No nausea, vomiting, abdominal pain  : No dysuria  Skin: no rash  Psych: no depression  Endocrine: no polyuria, polydipsia  Hem/lymph: no swelling  Osteoporosis: no fractures    ALL OTHER SYSTEMS REVIEWED AND NEGATIVE      PHYSICAL EXAM:  VITALS: T(C): 36.7 (10-19-22 @ 16:34)  T(F): 98 (10-19-22 @ 16:34), Max: 98.2 (10-18-22 @ 22:41)  HR: 84 (10-19-22 @ 16:34) (69 - 84)  BP: 147/59 (10-19-22 @ 16:34) (130/53 - 150/66)  RR:  (15 - 18)  SpO2:  (99% - 100%)  Wt(kg): --  GENERAL: NAD, well-groomed, well-developed  EYES: No proptosis, no lid lag, anicteric  HEENT:  Atraumatic, Normocephalic, moist mucous membranes  THYROID: Normal size, no palpable nodules  RESPIRATORY: Clear to auscultation bilaterally; No rales, rhonchi, wheezing, or rubs  CARDIOVASCULAR: Regular rate and rhythm; No murmurs; no peripheral edema  GI: Soft, nontender, non distended, normal bowel sounds  SKIN: Dry, intact, No rashes or lesions  NEURO: sensation intact, extraocular movements intact, no tremor, normal reflexes  PSYCH: Alert and oriented x 3, normal affect, normal mood    POCT Blood Glucose.: 298 mg/dL (10-19-22 @ 13:06)  POCT Blood Glucose.: 219 mg/dL (10-19-22 @ 09:09)  POCT Blood Glucose.: 238 mg/dL (10-19-22 @ 02:27)  POCT Blood Glucose.: 343 mg/dL (10-18-22 @ 23:30)  POCT Blood Glucose.: 349 mg/dL (10-18-22 @ 22:59)  POCT Blood Glucose.: 215 mg/dL (10-18-22 @ 16:56)  POCT Blood Glucose.: 373 mg/dL (10-18-22 @ 11:08)                            10.3   8.09  )-----------( 301      ( 19 Oct 2022 07:04 )             31.8       10-19    137  |  99  |  13  ----------------------------<  193<H>  3.4<L>   |  25  |  0.83    eGFR: 95    Ca    9.5      10-19  Mg     1.60     10-19  Phos  3.9     10-19    TPro  7.3  /  Alb  4.4  /  TBili  0.3  /  DBili  x   /  AST  24  /  ALT  23  /  AlkPhos  72  10-19      Thyroid Function Tests:              Radiology:

## 2022-10-19 NOTE — PROGRESS NOTE ADULT - PROBLEM SELECTOR PLAN 1
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  RCRI 2, ECG nonischemic, no previous complications with anesthesia in the past.   risk moderate to high based on factors  Patient is medically optimized for the procedure at this time       Management as per primary team  Continue Aspirin and statin. would continue Plavix   Pending BRITTNEY/PVRs

## 2022-10-19 NOTE — PROGRESS NOTE ADULT - PROBLEM SELECTOR PLAN 4
Held ARB and chlorthalidone in anticipation of procedure.   Started on Amlodipine on admission     Continue Amlodipine for now   Continue Atenolol   Monitor BP and uptitrate as needed

## 2022-10-19 NOTE — PROGRESS NOTE ADULT - SUBJECTIVE AND OBJECTIVE BOX
VASCULAR SURGERY PROGRESS NOTE  HPI: 67y M PMH HTN, HLD, DM2 PAD s/p prior BLE angiograms presents complaining of worsening bilateral lower extremity pain. Patient endorses chronic LLE pain that has worsened and spread to R leg over the past 2 weeks. Also reports new numbness on bilateral toes over the same time period, L worse than R. Reports he is only able to stand for a few minutes and walk a couple steps before pain begins. Describes pain as a cramping of the "muscle and bone" below his knees. Endorses intermittent chest pain, denies rest pain. Patient states he had two LLE angiograms this year, in 04/2022 and 07/2022. He is unsure of the exact procedures but he believes he had a balloon angioplasty.     In ED, patient AFVSS. Labs notable for glucose 364. CT with evidence of right external iliac stenosis and focal dissection. Vascular Surgery consulted for further evaluation.       PAST MEDICAL & SURGICAL HISTORY:  HTN (hypertension)      HLD (hyperlipidemia)      DM2 (diabetes mellitus, type 2)    PAD    Home Medications: Losartan, Atenolol-chlorthalidone, ASA, Plavix, Pioglitazone, Metformin-sitagliptin, Atorvastatin      Allergies    Advil (Rash)    Intolerances      SOCIAL HISTORY:  Former smoke, endorses social EtOH      Physical Exam:  General: NAD, resting comfortably  HEENT: NC/AT  Pulmonary: normal resp effort on RA  Cardiovascular: RRR  Abdominal: soft, ND/NT  Extremities: WWP. Well healing scabs on dorsum of bilateral feet, no open wounds  Neuro: A/O x 3  Vasc: Palpable femoral pulses bilaterally. R palpable DP pulse and dopplerable R PT signal. L dopplerable DP signal, no L PT signal      Vital Signs Last 24 Hrs  T(C): 36.4 (18 Oct 2022 15:02), Max: 36.7 (18 Oct 2022 11:35)  T(F): 97.6 (18 Oct 2022 15:02), Max: 98 (18 Oct 2022 11:35)  HR: 76 (18 Oct 2022 15:02) (76 - 90)  BP: 153/69 (18 Oct 2022 15:02) (120/59 - 153/69)  BP(mean): --  RR: 18 (18 Oct 2022 15:02) (18 - 18)  SpO2: 100% (18 Oct 2022 15:02) (99% - 100%)    Parameters below as of 18 Oct 2022 15:02  Patient On (Oxygen Delivery Method): room air      I&O's Summary      LABS:                        10.6   7.91  )-----------( 324      ( 18 Oct 2022 12:00 )             33.2     10-18    135  |  97<L>  |  21  ----------------------------<  364<H>  3.5   |  23  |  1.08    Ca    9.7      18 Oct 2022 12:00    TPro  7.1  /  Alb  4.4  /  TBili  0.2  /  DBili  x   /  AST  23  /  ALT  24  /  AlkPhos  86  10-18    PT/INR - ( 18 Oct 2022 12:00 )   PT: 11.0 sec;   INR: 0.95 ratio         PTT - ( 18 Oct 2022 12:00 )  PTT:29.5 sec    CAPILLARY BLOOD GLUCOSE      POCT Blood Glucose.: 215 mg/dL (18 Oct 2022 16:56)  POCT Blood Glucose.: 373 mg/dL (18 Oct 2022 11:08)    LIVER FUNCTIONS - ( 18 Oct 2022 12:00 )  Alb: 4.4 g/dL / Pro: 7.1 g/dL / ALK PHOS: 86 U/L / ALT: 24 U/L / AST: 23 U/L / GGT: x               RADIOLOGY & ADDITIONAL STUDIES:  < from: CT Angio Lower Extremity w/ IV Cont, Bilateral (10.18.22 @ 14:40) >  FINDINGS:    CENTRAL ARTERIAL SYSTEM:    Atherosclerotic calcifications. No abdominal aortic aneurysm. Celiac   axis, SMA, and RICARDA are patent.    Moderate narrowing of the right common iliac artery.  Near occlusive narrowing of the right external iliac artery with   dissection flap along its distal portion. The left common iliac artery,   external iliac artery, and bilateral internal iliac arteries are patent.    RIGHT LOWER EXTREMITY:    Common femoral artery: No occlusion or significant stenosis.  Superficial femoral artery: Moderate to severe narrowing of the distal   right SFA..  Profunda femoris artery: No occlusion or significant stenosis.  Popliteal artery: Patent. No occlusion or significant stenosis.  Anterior tibial artery: No occlusion.  Visualized to the level of the   ankle.  Posterior tibial artery: No occlusion.  Visualized to the level of the   ankle.  Peroneal artery: No occlusion.  Visualized to the level of the ankle.    LEFT LOWER EXTREMITY:    Common femoral artery: No occlusion or significant stenosis.  Superficial femoral artery: Moderate to severe narrowing in the distal   SFA.  Profunda femoris artery: No occlusion or significant stenosis.  Poplitealartery: Occluded with distal reconstitution.  Anterior tibial artery: No occlusion.  Visualized to the level of the   ankle.  Posterior tibial artery: Nonopacified along its midportion with distal   reconstitution.  Peroneal artery: No occlusion.  Visualized to the level of the ankle.    ADDITIONAL FINDINGS: Cholelithiasis.    IMPRESSION:  *  Near occlusive narrowing of the right external iliac artery with focal   dissection in its distal portion.  *  Moderate to severe narrowing of the bilateralSFAs.  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  *  Two-vessel runoff to the left foot.    < end of copied text >    < from: US Duplex Venous Lower Ext Complete, Bilateral (10.18.22 @ 12:41) >  FINDINGS:    RIGHT:  Normal compressibility of the RIGHT common femoral, femoral and popliteal   veins.  Doppler examination shows normal spontaneous and phasic flow.  No RIGHT calf vein thrombosis is detected.    LEFT:  Normal compressibility of the LEFT common femoral, femoral and popliteal   veins.  Doppler examination shows normal spontaneous and phasic flow.  No LEFT calf vein thrombosis is detected.    IMPRESSION:  No evidence of deep venous thrombosis in either lower extremity.    < end of copied text >

## 2022-10-19 NOTE — H&P ADULT - PROBLEM SELECTOR PLAN 1
-CT LE iv con shows near occlusive narrowing of right ext iliac, and focal dissection in distal pt, moderate/severe narrowing of b/l SFAs, 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  -may possibly need angiogram/stent/repair, vascular team is following  -reached out to vascular to see if any contraindications to asp/plavix will order if none  -increase lipitor to 40 mg  -obtain BRITTNEY/PVRs,  -RCRI 2  -EKG nonischemic, procedure risk moderate to high based on factors  -mets>4 no allergic rx to anesthetics prior   -pt is medically optimized for procedure  -okay for diet pending BRITTNEY/PVR for today per vascular

## 2022-10-19 NOTE — ED ADULT NURSE REASSESSMENT NOTE - NS ED NURSE REASSESS COMMENT FT1
Pt resting comfortably with no complaints at this time. Vascular surgery at bedside for evaluation. Plan of care TBD. Pt placed on tele monitor, VS as noted. Safety maintained, will continue to monitor.
Report received from day RNKeith. A&Ox4. Appears comfortable resting in bed. Denies CP, SOB, nausea, vomiting, headache, dizziness, lightheadedness, fever or chills. Respirations even and unlabored. No apparent distress noted. Pt speaking in full and complete sentences. Bed in lowest position, wheels locked, side rails up, safety maintained. Awaiting VA duplex.
Report given to ESSU2 MAC Olivia. Pt speaking in full and complete sentences. Respirations even and unlabored. No apparent distress noted. Denies CP, SOB, headache, lightheadedness, dizziness, nausea, vomiting, fever or chills. NSR on bedside cardiac monitor. Safety maintained.

## 2022-10-19 NOTE — H&P ADULT - NSHPLABSRESULTS_GEN_ALL_CORE
.  LABS:                         10.6   7.91  )-----------( 324      ( 18 Oct 2022 12:00 )             33.2     10-18    135  |  97<L>  |  21  ----------------------------<  364<H>  3.5   |  23  |  1.08    Ca    9.7      18 Oct 2022 12:00    TPro  7.1  /  Alb  4.4  /  TBili  0.2  /  DBili  x   /  AST  23  /  ALT  24  /  AlkPhos  86  10-18    PT/INR - ( 18 Oct 2022 12:00 )   PT: 11.0 sec;   INR: 0.95 ratio         PTT - ( 18 Oct 2022 12:00 )  PTT:29.5 sec              RADIOLOGY, EKG & ADDITIONAL TESTS: Reviewed.

## 2022-10-19 NOTE — PROGRESS NOTE ADULT - ASSESSMENT
ASSESSMENT/PLAN:  67y M PMH HTN, HLD, DM2 PAD s/p prior BLE angiograms presents complaining of worsening bilateral lower extremity pain. CT with evidence of right external iliac stenosis and focal dissection. Vascular Surgery consulted for further evaluation. Patient with evidence of flow distal to area of concern. Ambulatory at baseline, without rest pain.     - Medicine optimization prior to any potential procedure  - Endocrine consult for hyperglycemia  - Pain control  - F/u BRITTNEY/PVRs, ordered        Patient discussed with vascular fellows, on behalf of attending Dr. Trujillo.     C Team Surgery (Vascular Surgery)  [phone]

## 2022-10-19 NOTE — PROGRESS NOTE ADULT - ASSESSMENT
Patient is a 67 year old male hx of HTN, HLD, DM2, and PAD s/p angiogram and stent in May 2022 who is presenting due to b/l LE pain.   admitted for further evaluation

## 2022-10-19 NOTE — H&P ADULT - ASSESSMENT
Patient is a 67 year old male hx of HTN, HLD, DM2, and PAD s/p angiogram and stent in May 2022 who is presenting due to b/l LE pain.

## 2022-10-19 NOTE — H&P ADULT - NSHPPHYSICALEXAM_GEN_ALL_CORE
PHYSICAL EXAM:  GENERAL: NAD, well-developed  HEAD:  Atraumatic, Normocephalic  EYES: EOMI, PERRLA, conjunctiva and sclera clear  NECK: Supple, No JVD  CHEST/LUNG: Clear to auscultation bilaterally; No wheeze  HEART: Regular rate and rhythm; No murmurs, rubs, or gallops  ABDOMEN: Soft, Nontender, Nondistended; Bowel sounds present  EXTREMITIES:  2+ radial pulses, diminished DP pulses, No clubbing, cyanosis, or edema  PSYCH: AAOx3  NEUROLOGY: non-focal  SKIN: No rashes or lesions

## 2022-10-19 NOTE — PHARMACOTHERAPY INTERVENTION NOTE - COMMENTS
Medication history is complete. Medication list updated in Outpatient Medication Record (OMR) and confirmed with outpatient pharmacy (Dispensed in Sept/Oct 2022 x 90 day supply).   Pharmacy also noted following were dispensed to pt on 9/28/22:   - Topicals: Voltaren 1% Gel, Bacitracin ointment & Halobetasol 0.5% ointment  - Ofloxacin 0.3% and Prednisolone 1% ophthalmic solutions   Medication history is complete. Medication list updated in Outpatient Medication Record (OMR) and confirmed with outpatient pharmacy (Dispensed in Sept/Oct 2022 x 90 day supply).   Pharmacy also noted following were dispensed to pt on 9/28/22:   - Topicals: Voltaren 1% Gel, Bacitracin ointment & Halobetasol 0.05% ointment  - Ofloxacin 0.3% and Prednisolone 1% ophthalmic solutions

## 2022-10-19 NOTE — H&P ADULT - HISTORY OF PRESENT ILLNESS
INCOMPLETE Patient is a 67 year old male hx of HTN, HLD, DM2, and PAD s/p angiogram and stent in May 2022 who is presenting due to b/l LE pain. The pain is worse with ambulation. He states that is is on the calf towards the distal end. He has had this happen for several weeks and was planning on getting BRITTNEY checks b/l LE w/ his vascular.  He denies cp or palpitations, nausea, vomiting, diarrhea, headaches, visual changes. He is able to walk several blocks without cp or palpitations, though he does get LE pain.     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   EKG no acute ST elevation or pathologic Q waves. Qtc and IL intervals wnl  troponin wnl, vascular was consulted in ED.

## 2022-10-19 NOTE — H&P ADULT - PROBLEM SELECTOR PLAN 3
-c/w atenolol 50 mg daily  -hold losartan in case of procedure, switch to amlodipine 5 mg daily for now  -can uptitrate as needed  -pt not fully sure of medication list, email sent to clinical pharmacy.

## 2022-10-19 NOTE — H&P ADULT - PROBLEM SELECTOR PLAN 2
-A1C of 10.4 endocrine consulted  -start lantus 8 units in am, and lispro 3 units premeal  -moderate sliding scale  -CC diet

## 2022-10-19 NOTE — PROGRESS NOTE ADULT - SUBJECTIVE AND OBJECTIVE BOX
Patient is a 67y old  Male who presents with a chief complaint of LE pain (19 Oct 2022 17:30)      SUBJECTIVE / OVERNIGHT EVENTS:    No events overnight. This AM, patient without n/v/d/cp/sob.  Patient reports pain in his legs have worsened and he cannot walk as a result of pain. Otherwise denies any acute complaints at this time.     MEDICATIONS  (STANDING):  amLODIPine   Tablet 5 milliGRAM(s) Oral daily  aspirin enteric coated 81 milliGRAM(s) Oral daily  ATENolol  Tablet 50 milliGRAM(s) Oral daily  atorvastatin 40 milliGRAM(s) Oral at bedtime  clopidogrel Tablet 75 milliGRAM(s) Oral daily  dextrose 5%. 1000 milliLiter(s) (100 mL/Hr) IV Continuous <Continuous>  dextrose 5%. 1000 milliLiter(s) (50 mL/Hr) IV Continuous <Continuous>  dextrose 50% Injectable 25 Gram(s) IV Push once  dextrose 50% Injectable 12.5 Gram(s) IV Push once  dextrose 50% Injectable 25 Gram(s) IV Push once  glucagon  Injectable 1 milliGRAM(s) IntraMuscular once  heparin  Infusion 1200 Unit(s)/Hr (12 mL/Hr) IV Continuous <Continuous>  insulin glargine Injectable (LANTUS) 8 Unit(s) SubCutaneous every morning  insulin lispro (ADMELOG) corrective regimen sliding scale   SubCutaneous three times a day before meals  insulin lispro (ADMELOG) corrective regimen sliding scale   SubCutaneous at bedtime  insulin lispro Injectable (ADMELOG) 3 Unit(s) SubCutaneous three times a day before meals    MEDICATIONS  (PRN):  acetaminophen     Tablet .. 650 milliGRAM(s) Oral every 6 hours PRN Temp greater or equal to 38C (100.4F), Mild Pain (1 - 3)  dextrose Oral Gel 15 Gram(s) Oral once PRN Blood Glucose LESS THAN 70 milliGRAM(s)/deciliter      PHYSICAL EXAM:  T(C): 36.7 (10-19-22 @ 16:34), Max: 36.8 (10-18-22 @ 22:41)  HR: 84 (10-19-22 @ 16:34) (69 - 84)  BP: 147/59 (10-19-22 @ 16:34) (130/53 - 150/66)  RR: 16 (10-19-22 @ 16:34) (15 - 18)  SpO2: 99% (10-19-22 @ 16:34) (99% - 100%)  I&O's Summary    GENERAL: NAD, well-developed  HEAD:  Atraumatic, Normocephalic, MMM  CHEST/LUNG: No use of accessory muscles, CTAB, breathing non-labored  COR: RR, no mrcg  ABD: Soft, ND/NT, +BS  PSYCH: AAOx3  NEUROLOGY: CN II-XII grossly intact, moving all extremities  SKIN: No rashes or lesions  EXT:  diminished DP pulses, no edema noted B/L     LABS:  CAPILLARY BLOOD GLUCOSE      POCT Blood Glucose.: 166 mg/dL (19 Oct 2022 17:38)  POCT Blood Glucose.: 298 mg/dL (19 Oct 2022 13:06)  POCT Blood Glucose.: 219 mg/dL (19 Oct 2022 09:09)  POCT Blood Glucose.: 238 mg/dL (19 Oct 2022 02:27)  POCT Blood Glucose.: 343 mg/dL (18 Oct 2022 23:30)  POCT Blood Glucose.: 349 mg/dL (18 Oct 2022 22:59)                          10.3   8.09  )-----------( 301      ( 19 Oct 2022 07:04 )             31.8     10-19    137  |  99  |  13  ----------------------------<  193<H>  3.4<L>   |  25  |  0.83    Ca    9.5      19 Oct 2022 07:04  Phos  3.9     10-19  Mg     1.60     10-19    TPro  7.3  /  Alb  4.4  /  TBili  0.3  /  DBili  x   /  AST  24  /  ALT  23  /  AlkPhos  72  10-19    PT/INR - ( 19 Oct 2022 07:04 )   PT: 12.0 sec;   INR: 1.03 ratio         PTT - ( 19 Oct 2022 07:04 )  PTT:29.8 sec            RADIOLOGY & ADDITIONAL TESTS:    Imaging Reviewed -     Consultant(s) Notes Reviewed -       Care Discussed with Consultants/Other Providers -

## 2022-10-19 NOTE — PROGRESS NOTE ADULT - PROBLEM SELECTOR PLAN 3
A1C of 10.4  on admission    Continue Lantus and Lispro pre-meal with ISS   Endo consulted   carb controlled diet

## 2022-10-19 NOTE — CONSULT NOTE ADULT - ASSESSMENT
Patient is a 67 year old male hx of HTN, HLD, DM2, and PAD s/p angiogram and stent in May 2022 who is presenting due to b/l LE pain. vascular surgery following given concern for dissection and PAD.  endocrine called today for DM and a1c 10.4  home meds:  Janumet and actos       1. DM  pt has recieved Lantus 8 today and is still 300s  reccomend we can give NPH 5 units tonight and increase Lantus to 15  units tomorrow am   ademelog 5-5-5 premeals  Low dose ISS premeals and low dose scale bedtime    While inpatient  BG target 100-180 mg/dl, remains above goal  - Hypoglycemia protocol in place   - Carb Consistent Diet   - Nutrition consult   - Provider to RN for diabetes/insulin pen teaching       inform endocrine of hypoglycemia or persistent hyperglycemia episodes as changes in pts insulin regimen will need to be made.   notify endocrine if any plans to be NPO/diet changes as this will also affect insulin regimen.      dc planning  suspect pt will need atleast basal insulin vs basal bolus based on needs      2. HTN  goal BP in DM <130/80  on norvasc  outpt mc/cr ratio      3. HLD  on lipitor 40mg   outpt lipid panel          Page Faye MD  Attending Physician   Department of Endocrinology, Diabetes and Metabolism     Pager  [phone] [please provide 10 digit call back number]  SATHYA 9-5  [email or web address]  Nights and weekends: [phone]  Please note that this patient may be followed by a different provider tomorrow.   If no answer or after hours, please contact [phone].  For final dc reccomendations, please call [phone] or page the endocrine fellow on call. Patient is a 67 year old male hx of HTN, HLD, DM2, and PAD s/p angiogram and stent in May 2022 who is presenting due to b/l LE pain. vascular surgery following given concern for dissection and PAD.  endocrine called today for DM and a1c 10.4  home meds:  Janumet and actos       1. DM  pt has recieved Lantus 8 today and is still 300s  reccomend we can give NPH 5 units tonight and increase Lantus to 15  units tomorrow am   ademelog 5-5-5 premeals  Low dose ISS premeals and low dose scale bedtime        While inpatient  BG target 100-180 mg/dl, remains above goal  - Hypoglycemia protocol in place   - Carb Consistent Diet   - Nutrition consult   - Provider to RN for diabetes/insulin pen teaching       inform endocrine of hypoglycemia or persistent hyperglycemia episodes as changes in pts insulin regimen will need to be made.   notify endocrine if any plans to be NPO/diet changes as this will also affect insulin regimen.      dc planning  suspect pt will need atleast basal insulin vs basal bolus based on needs      2. HTN  goal BP in DM <130/80  on norvasc  outpt mc/cr ratio      3. HLD  on lipitor 40mg   outpt lipid panel      4. Low K  *K is low - make sure to follow labs* and replete K as needed  insulin can lower K so please replete and follow K accordingly         jeffw vascular team     Page Faye MD  Attending Physician   Department of Endocrinology, Diabetes and Metabolism     Pager  [phone] [please provide 10 digit call back number]  LICLOVER 9-5  [email or web address]  Nights and weekends: [phone]  Please note that this patient may be followed by a different provider tomorrow.   If no answer or after hours, please contact [phone].  For final dc reccomendations, please call [phone] or page the endocrine fellow on call.

## 2022-10-20 LAB
ANION GAP SERPL CALC-SCNC: 13 MMOL/L — SIGNIFICANT CHANGE UP (ref 7–14)
APTT BLD: 115.3 SEC — SIGNIFICANT CHANGE UP (ref 27–36.3)
APTT BLD: 79.8 SEC — HIGH (ref 27–36.3)
APTT BLD: 93.6 SEC — HIGH (ref 27–36.3)
APTT BLD: 99.6 SEC — HIGH (ref 27–36.3)
BUN SERPL-MCNC: 16 MG/DL — SIGNIFICANT CHANGE UP (ref 7–23)
CALCIUM SERPL-MCNC: 9.8 MG/DL — SIGNIFICANT CHANGE UP (ref 8.4–10.5)
CHLORIDE SERPL-SCNC: 99 MMOL/L — SIGNIFICANT CHANGE UP (ref 98–107)
CO2 SERPL-SCNC: 25 MMOL/L — SIGNIFICANT CHANGE UP (ref 22–31)
CREAT SERPL-MCNC: 0.79 MG/DL — SIGNIFICANT CHANGE UP (ref 0.5–1.3)
EGFR: 96 ML/MIN/1.73M2 — SIGNIFICANT CHANGE UP
GLUCOSE BLDC GLUCOMTR-MCNC: 205 MG/DL — HIGH (ref 70–99)
GLUCOSE BLDC GLUCOMTR-MCNC: 234 MG/DL — HIGH (ref 70–99)
GLUCOSE BLDC GLUCOMTR-MCNC: 242 MG/DL — HIGH (ref 70–99)
GLUCOSE BLDC GLUCOMTR-MCNC: 266 MG/DL — HIGH (ref 70–99)
GLUCOSE BLDC GLUCOMTR-MCNC: 294 MG/DL — HIGH (ref 70–99)
GLUCOSE SERPL-MCNC: 197 MG/DL — HIGH (ref 70–99)
HCT VFR BLD CALC: 32.9 % — LOW (ref 39–50)
HCV AB S/CO SERPL IA: 0.09 S/CO — SIGNIFICANT CHANGE UP (ref 0–0.99)
HCV AB SERPL-IMP: SIGNIFICANT CHANGE UP
HGB BLD-MCNC: 10.5 G/DL — LOW (ref 13–17)
MCHC RBC-ENTMCNC: 25.9 PG — LOW (ref 27–34)
MCHC RBC-ENTMCNC: 31.9 GM/DL — LOW (ref 32–36)
MCV RBC AUTO: 81 FL — SIGNIFICANT CHANGE UP (ref 80–100)
NRBC # BLD: 0 /100 WBCS — SIGNIFICANT CHANGE UP (ref 0–0)
NRBC # FLD: 0 K/UL — SIGNIFICANT CHANGE UP (ref 0–0)
PLATELET # BLD AUTO: 344 K/UL — SIGNIFICANT CHANGE UP (ref 150–400)
POTASSIUM SERPL-MCNC: 3.5 MMOL/L — SIGNIFICANT CHANGE UP (ref 3.5–5.3)
POTASSIUM SERPL-SCNC: 3.5 MMOL/L — SIGNIFICANT CHANGE UP (ref 3.5–5.3)
RBC # BLD: 4.06 M/UL — LOW (ref 4.2–5.8)
RBC # FLD: 14.6 % — HIGH (ref 10.3–14.5)
SODIUM SERPL-SCNC: 137 MMOL/L — SIGNIFICANT CHANGE UP (ref 135–145)
WBC # BLD: 8.73 K/UL — SIGNIFICANT CHANGE UP (ref 3.8–10.5)
WBC # FLD AUTO: 8.73 K/UL — SIGNIFICANT CHANGE UP (ref 3.8–10.5)

## 2022-10-20 PROCEDURE — 99232 SBSQ HOSP IP/OBS MODERATE 35: CPT

## 2022-10-20 PROCEDURE — 99233 SBSQ HOSP IP/OBS HIGH 50: CPT

## 2022-10-20 RX ORDER — INSULIN GLARGINE 100 [IU]/ML
20 INJECTION, SOLUTION SUBCUTANEOUS
Refills: 0 | Status: DISCONTINUED | OUTPATIENT
Start: 2022-10-21 | End: 2022-10-21

## 2022-10-20 RX ORDER — INSULIN LISPRO 100/ML
7 VIAL (ML) SUBCUTANEOUS
Refills: 0 | Status: DISCONTINUED | OUTPATIENT
Start: 2022-10-20 | End: 2022-10-22

## 2022-10-20 RX ORDER — HEPARIN SODIUM 5000 [USP'U]/ML
1100 INJECTION INTRAVENOUS; SUBCUTANEOUS
Qty: 25000 | Refills: 0 | Status: DISCONTINUED | OUTPATIENT
Start: 2022-10-20 | End: 2022-10-25

## 2022-10-20 RX ORDER — PETROLATUM,WHITE
1 JELLY (GRAM) TOPICAL
Refills: 0 | Status: DISCONTINUED | OUTPATIENT
Start: 2022-10-20 | End: 2022-10-25

## 2022-10-20 RX ADMIN — Medication 81 MILLIGRAM(S): at 11:55

## 2022-10-20 RX ADMIN — CLOPIDOGREL BISULFATE 75 MILLIGRAM(S): 75 TABLET, FILM COATED ORAL at 11:56

## 2022-10-20 RX ADMIN — HEPARIN SODIUM 11 UNIT(S)/HR: 5000 INJECTION INTRAVENOUS; SUBCUTANEOUS at 20:08

## 2022-10-20 RX ADMIN — Medication 6: at 13:17

## 2022-10-20 RX ADMIN — HEPARIN SODIUM 12 UNIT(S)/HR: 5000 INJECTION INTRAVENOUS; SUBCUTANEOUS at 00:29

## 2022-10-20 RX ADMIN — AMLODIPINE BESYLATE 5 MILLIGRAM(S): 2.5 TABLET ORAL at 05:55

## 2022-10-20 RX ADMIN — Medication 5 UNIT(S): at 13:16

## 2022-10-20 RX ADMIN — HEPARIN SODIUM 11 UNIT(S)/HR: 5000 INJECTION INTRAVENOUS; SUBCUTANEOUS at 09:02

## 2022-10-20 RX ADMIN — INSULIN GLARGINE 15 UNIT(S): 100 INJECTION, SOLUTION SUBCUTANEOUS at 08:22

## 2022-10-20 RX ADMIN — HEPARIN SODIUM 12 UNIT(S)/HR: 5000 INJECTION INTRAVENOUS; SUBCUTANEOUS at 08:15

## 2022-10-20 RX ADMIN — Medication 0: at 21:37

## 2022-10-20 RX ADMIN — Medication 4: at 08:22

## 2022-10-20 RX ADMIN — Medication 4: at 17:54

## 2022-10-20 RX ADMIN — Medication 7 UNIT(S): at 17:54

## 2022-10-20 RX ADMIN — Medication 5 UNIT(S): at 08:21

## 2022-10-20 RX ADMIN — ATENOLOL 50 MILLIGRAM(S): 25 TABLET ORAL at 05:55

## 2022-10-20 RX ADMIN — ATORVASTATIN CALCIUM 40 MILLIGRAM(S): 80 TABLET, FILM COATED ORAL at 21:37

## 2022-10-20 RX ADMIN — HEPARIN SODIUM 11 UNIT(S)/HR: 5000 INJECTION INTRAVENOUS; SUBCUTANEOUS at 23:04

## 2022-10-20 RX ADMIN — HEPARIN SODIUM 11 UNIT(S)/HR: 5000 INJECTION INTRAVENOUS; SUBCUTANEOUS at 14:48

## 2022-10-20 NOTE — PROGRESS NOTE ADULT - ASSESSMENT
Patient is a 67 year old male hx of HTN, HLD, DM2, and PAD s/p angiogram and stent in May 2022 who is presenting due to b/l LE pain. vascular surgery following given concern for dissection and PAD.  endocrine called today for DM and a1c 10.4  home meds:  Janumet and actos? (for second med using sporadically and unsure what name is to have family bring in)    1. DM2 - HbA1c 10.4%  Inpatient plan:  BG target 100-180 mg/dl  above goal  Increase Lantus to 20 units qAM  Increase Ademelog to 7/7/7 units premeal TID  moderate dose correction premeals and moderate dose scale bedtime  - Carb Consistent Diet   - Nutrition consult   - Provider to RN for diabetes/insulin pen teaching   [place] planning:  Add basal insulin pen dose TBD, check insurance preference plus oral agents Janumet and conform other home med.  New to insulin pen at [place].  BD carol pen needles  ensure has glucometer and supplies  Follow up at [facility], will contact office for appointments [phone].      2. HTN  goal BP in DM <130/80  on norvasc  outpt mc/cr ratio      3. HLD  on lipitor 40mg   outpt lipid panel      4. Hypokalemia  insulin can lower K so please replete and follow K accordingly     Mary Beht Mccarthy MD  Division of Endocrinology  Pager: [phone]    If after 6PM or before 9AM, or on weekends/holidays, please call endocrine answering service for assistance ([phone]).  For nonurgent matters email [email or web address] for assistance. Patient is a 67 year old male hx of HTN, HLD, DM2, and PAD s/p angiogram and stent in May 2022 who is presenting due to b/l LE pain. vascular surgery following given concern for dissection and PAD.  endocrine called today for DM and a1c 10.4  home meds:  Janumet 50-1000mg BID and actos? (for second med using sporadically and unsure what name is to have family bring in)    1. DM2 - HbA1c 10.4%  Inpatient plan:  BG target 100-180 mg/dl  above goal  Increase Lantus to 20 units qAM  Increase Ademelog to 7/7/7 units premeal TID  moderate dose correction premeals and moderate dose scale bedtime  - Carb Consistent Diet   - Nutrition consult   - Provider to RN for diabetes/insulin pen teaching   [place] planning:  Add basal insulin pen dose TBD, check insurance preference plus oral agents Janumet 50-1000mg BID and conform other home med.  New to insulin pen at [place].  BD carol pen needles  ensure has glucometer and supplies  Follow up at [facility], will contact office for appointments [phone].      2. HTN  goal BP in DM <130/80  on norvasc  outpt mc/cr ratio      3. HLD  on lipitor 40mg   outpt lipid panel      4. Hypokalemia  insulin can lower K so please replete and follow K accordingly     Mary Beth Mccarthy MD  Division of Endocrinology  Pager: [phone]    If after 6PM or before 9AM, or on weekends/holidays, please call endocrine answering service for assistance ([phone]).  For nonurgent matters email [email or web address] for assistance.

## 2022-10-20 NOTE — PROGRESS NOTE ADULT - SUBJECTIVE AND OBJECTIVE BOX
Patient is a 67y old  Male who presents with a chief complaint of LE pain (20 Oct 2022 16:19)      SUBJECTIVE / OVERNIGHT EVENTS:    No events overnight. This AM, patient without n/v/d/cp/sob.      MEDICATIONS  (STANDING):  amLODIPine   Tablet 5 milliGRAM(s) Oral daily  aspirin enteric coated 81 milliGRAM(s) Oral daily  ATENolol  Tablet 50 milliGRAM(s) Oral daily  atorvastatin 40 milliGRAM(s) Oral at bedtime  clopidogrel Tablet 75 milliGRAM(s) Oral daily  dextrose 5%. 1000 milliLiter(s) (100 mL/Hr) IV Continuous <Continuous>  dextrose 5%. 1000 milliLiter(s) (50 mL/Hr) IV Continuous <Continuous>  dextrose 50% Injectable 25 Gram(s) IV Push once  dextrose 50% Injectable 12.5 Gram(s) IV Push once  dextrose 50% Injectable 25 Gram(s) IV Push once  glucagon  Injectable 1 milliGRAM(s) IntraMuscular once  heparin  Infusion 1100 Unit(s)/Hr (11 mL/Hr) IV Continuous <Continuous>  insulin glargine Injectable (LANTUS) 15 Unit(s) SubCutaneous every morning  insulin lispro (ADMELOG) corrective regimen sliding scale   SubCutaneous three times a day before meals  insulin lispro (ADMELOG) corrective regimen sliding scale   SubCutaneous at bedtime  insulin lispro Injectable (ADMELOG) 5 Unit(s) SubCutaneous three times a day before meals    MEDICATIONS  (PRN):  acetaminophen     Tablet .. 650 milliGRAM(s) Oral every 6 hours PRN Temp greater or equal to 38C (100.4F), Mild Pain (1 - 3)  dextrose Oral Gel 15 Gram(s) Oral once PRN Blood Glucose LESS THAN 70 milliGRAM(s)/deciliter      PHYSICAL EXAM:  T(C): 36.9 (10-20-22 @ 15:07), Max: 37 (10-20-22 @ 05:54)  HR: 80 (10-20-22 @ 15:07) (75 - 84)  BP: 134/60 (10-20-22 @ 15:07) (134/60 - 150/60)  RR: 17 (10-20-22 @ 15:07) (16 - 18)  SpO2: 98% (10-20-22 @ 15:07) (98% - 100%)  I&O's Summary    GENERAL: NAD, well-developed  HEAD:  Atraumatic, Normocephalic, MMM  CHEST/LUNG: No use of accessory muscles, CTAB, breathing non-labored  COR: RR, no mrcg  ABD: Soft, ND/NT, +BS  PSYCH: AAOx3  NEUROLOGY: CN II-XII grossly intact, moving all extremities  SKIN: No rashes or lesions  EXT: wwp, no cce    LABS:  CAPILLARY BLOOD GLUCOSE      POCT Blood Glucose.: 294 mg/dL (20 Oct 2022 13:13)  POCT Blood Glucose.: 266 mg/dL (20 Oct 2022 12:07)  POCT Blood Glucose.: 234 mg/dL (20 Oct 2022 07:49)  POCT Blood Glucose.: 176 mg/dL (19 Oct 2022 21:58)  POCT Blood Glucose.: 166 mg/dL (19 Oct 2022 17:38)                          10.5   8.73  )-----------( 344      ( 20 Oct 2022 06:30 )             32.9     10-20    137  |  99  |  16  ----------------------------<  197<H>  3.5   |  25  |  0.79    Ca    9.8      20 Oct 2022 06:30  Phos  3.9     10-19  Mg     1.60     10-19    TPro  7.3  /  Alb  4.4  /  TBili  0.3  /  DBili  x   /  AST  24  /  ALT  23  /  AlkPhos  72  10-19    PT/INR - ( 19 Oct 2022 07:04 )   PT: 12.0 sec;   INR: 1.03 ratio         PTT - ( 20 Oct 2022 15:52 )  PTT:93.6 sec            RADIOLOGY & ADDITIONAL TESTS:    Telemetry Personally Reviewed -     Imaging Personally Reviewed -     Imaging Reviewed -     Consultant(s) Notes Reviewed -       Care Discussed with Consultants/Other Providers -  Patient is a 67y old  Male who presents with a chief complaint of LE pain (20 Oct 2022 16:19)      SUBJECTIVE / OVERNIGHT EVENTS:    No events overnight. This AM, patient without n/v/d/cp/sob.  He reports feeling well and states he has no pain as long as he stays in bed. denies any other complaints at this time       MEDICATIONS  (STANDING):  amLODIPine   Tablet 5 milliGRAM(s) Oral daily  aspirin enteric coated 81 milliGRAM(s) Oral daily  ATENolol  Tablet 50 milliGRAM(s) Oral daily  atorvastatin 40 milliGRAM(s) Oral at bedtime  clopidogrel Tablet 75 milliGRAM(s) Oral daily  dextrose 5%. 1000 milliLiter(s) (100 mL/Hr) IV Continuous <Continuous>  dextrose 5%. 1000 milliLiter(s) (50 mL/Hr) IV Continuous <Continuous>  dextrose 50% Injectable 25 Gram(s) IV Push once  dextrose 50% Injectable 12.5 Gram(s) IV Push once  dextrose 50% Injectable 25 Gram(s) IV Push once  glucagon  Injectable 1 milliGRAM(s) IntraMuscular once  heparin  Infusion 1100 Unit(s)/Hr (11 mL/Hr) IV Continuous <Continuous>  insulin glargine Injectable (LANTUS) 15 Unit(s) SubCutaneous every morning  insulin lispro (ADMELOG) corrective regimen sliding scale   SubCutaneous three times a day before meals  insulin lispro (ADMELOG) corrective regimen sliding scale   SubCutaneous at bedtime  insulin lispro Injectable (ADMELOG) 5 Unit(s) SubCutaneous three times a day before meals    MEDICATIONS  (PRN):  acetaminophen     Tablet .. 650 milliGRAM(s) Oral every 6 hours PRN Temp greater or equal to 38C (100.4F), Mild Pain (1 - 3)  dextrose Oral Gel 15 Gram(s) Oral once PRN Blood Glucose LESS THAN 70 milliGRAM(s)/deciliter      PHYSICAL EXAM:  T(C): 36.9 (10-20-22 @ 15:07), Max: 37 (10-20-22 @ 05:54)  HR: 80 (10-20-22 @ 15:07) (75 - 84)  BP: 134/60 (10-20-22 @ 15:07) (134/60 - 150/60)  RR: 17 (10-20-22 @ 15:07) (16 - 18)  SpO2: 98% (10-20-22 @ 15:07) (98% - 100%)  I&O's Summary    GENERAL: NAD, well-developed  HEAD:  Atraumatic, Normocephalic, MMM  CHEST/LUNG: No use of accessory muscles, CTAB, breathing non-labored  COR: RR, no mrcg  ABD: Soft, ND/NT, +BS  PSYCH: AAOx3  NEUROLOGY: CN II-XII grossly intact, moving all extremities  SKIN: No rashes or lesions  EXT:  diminished DP pulses, no edema noted B/L     LABS:  CAPILLARY BLOOD GLUCOSE      POCT Blood Glucose.: 294 mg/dL (20 Oct 2022 13:13)  POCT Blood Glucose.: 266 mg/dL (20 Oct 2022 12:07)  POCT Blood Glucose.: 234 mg/dL (20 Oct 2022 07:49)  POCT Blood Glucose.: 176 mg/dL (19 Oct 2022 21:58)  POCT Blood Glucose.: 166 mg/dL (19 Oct 2022 17:38)                          10.5   8.73  )-----------( 344      ( 20 Oct 2022 06:30 )             32.9     10-20    137  |  99  |  16  ----------------------------<  197<H>  3.5   |  25  |  0.79    Ca    9.8      20 Oct 2022 06:30  Phos  3.9     10-19  Mg     1.60     10-19    TPro  7.3  /  Alb  4.4  /  TBili  0.3  /  DBili  x   /  AST  24  /  ALT  23  /  AlkPhos  72  10-19    PT/INR - ( 19 Oct 2022 07:04 )   PT: 12.0 sec;   INR: 1.03 ratio         PTT - ( 20 Oct 2022 15:52 )  PTT:93.6 sec            RADIOLOGY & ADDITIONAL TESTS:    Telemetry Personally Reviewed -     Imaging Personally Reviewed -     Imaging Reviewed -     Consultant(s) Notes Reviewed -       Care Discussed with Consultants/Other Providers -

## 2022-10-20 NOTE — PROVIDER CONTACT NOTE (CHANGE IN STATUS NOTIFICATION) - ACTION/TREATMENT ORDERED:
will order aquaphor, continue heparin drip at 11cc/hr, next aptt with AM labs
per MD continue infusion at rate based off aptt

## 2022-10-20 NOTE — PROVIDER CONTACT NOTE (CHANGE IN STATUS NOTIFICATION) - SITUATION
pts aptt 99.6, called MD to report result, pts heparin drip is patient specific and titrated by MD
pt having psorasis flareup above eyebrows, pts aptt resulted at 73.6

## 2022-10-20 NOTE — PROGRESS NOTE ADULT - SUBJECTIVE AND OBJECTIVE BOX
Chief Complaint: DM2    History: tolerating po  no hypoglycemia events or symptoms    MEDICATIONS  (STANDING):  amLODIPine   Tablet 5 milliGRAM(s) Oral daily  aspirin enteric coated 81 milliGRAM(s) Oral daily  ATENolol  Tablet 50 milliGRAM(s) Oral daily  atorvastatin 40 milliGRAM(s) Oral at bedtime  clopidogrel Tablet 75 milliGRAM(s) Oral daily  dextrose 5%. 1000 milliLiter(s) (100 mL/Hr) IV Continuous <Continuous>  dextrose 5%. 1000 milliLiter(s) (50 mL/Hr) IV Continuous <Continuous>  dextrose 50% Injectable 25 Gram(s) IV Push once  dextrose 50% Injectable 12.5 Gram(s) IV Push once  dextrose 50% Injectable 25 Gram(s) IV Push once  glucagon  Injectable 1 milliGRAM(s) IntraMuscular once  heparin  Infusion 1100 Unit(s)/Hr (11 mL/Hr) IV Continuous <Continuous>  insulin glargine Injectable (LANTUS) 15 Unit(s) SubCutaneous every morning  insulin lispro (ADMELOG) corrective regimen sliding scale   SubCutaneous three times a day before meals  insulin lispro (ADMELOG) corrective regimen sliding scale   SubCutaneous at bedtime  insulin lispro Injectable (ADMELOG) 5 Unit(s) SubCutaneous three times a day before meals    MEDICATIONS  (PRN):  acetaminophen     Tablet .. 650 milliGRAM(s) Oral every 6 hours PRN Temp greater or equal to 38C (100.4F), Mild Pain (1 - 3)  dextrose Oral Gel 15 Gram(s) Oral once PRN Blood Glucose LESS THAN 70 milliGRAM(s)/deciliter      Allergies    Advil (Rash)    Intolerances      Review of Systems:  ALL OTHER SYSTEMS REVIEWED AND NEGATIVE      PHYSICAL EXAM:  VITALS: T(C): 36.9 (10-20-22 @ 15:07)  T(F): 98.5 (10-20-22 @ 15:07), Max: 98.6 (10-20-22 @ 05:54)  HR: 80 (10-20-22 @ 15:07) (75 - 84)  BP: 134/60 (10-20-22 @ 15:07) (134/60 - 150/60)  RR:  (16 - 18)  SpO2:  (98% - 100%)  Wt(kg): --  GENERAL: NAD, well-groomed, well-developed  EYES: No proptosis, no lid lag, anicteric  HEENT:  Atraumatic, Normocephalic, moist mucous membranes  RESPIRATORY: nonlabored respirations, no wheezing  PSYCH: Alert and oriented x 3, normal affect, normal mood    CAPILLARY BLOOD GLUCOSE      POCT Blood Glucose.: 294 mg/dL (20 Oct 2022 13:13)  POCT Blood Glucose.: 266 mg/dL (20 Oct 2022 12:07)  POCT Blood Glucose.: 234 mg/dL (20 Oct 2022 07:49)  POCT Blood Glucose.: 176 mg/dL (19 Oct 2022 21:58)  POCT Blood Glucose.: 166 mg/dL (19 Oct 2022 17:38)      10-20    137  |  99  |  16  ----------------------------<  197<H>  3.5   |  25  |  0.79    eGFR: 96    Ca    9.8      10-20  Mg     1.60     10-19  Phos  3.9     10-19    TPro  7.3  /  Alb  4.4  /  TBili  0.3  /  DBili  x   /  AST  24  /  ALT  23  /  AlkPhos  72  10-19      A1C with Estimated Average Glucose Result: 10.4 % (10-19-22 @ 03:40)      Thyroid Function Tests:

## 2022-10-20 NOTE — PROGRESS NOTE ADULT - ASSESSMENT
67M w/ PMH of HTN, HLD, DM2, and PAD s/p angiogram and stent in May 2022 who is presenting due to b/l LE pain.       - pain control PRN   - await BRITTNEY/PVRs,  - angiogram planning   - continue home meds   - ISS   - DVT ppx         C Team Surgery   [id number]

## 2022-10-20 NOTE — PROGRESS NOTE ADULT - PROBLEM SELECTOR PLAN 3
A1C of 10.4  on admission    Continue Lantus and Lispro pre-meal with ISS   Endo consulted   carb controlled diet A1C of 10.4  on admission    Increased Lantus 20 u and Lispro pre-meal 7 with ISS   Endo following  carb controlled diet

## 2022-10-20 NOTE — PROGRESS NOTE ADULT - SUBJECTIVE AND OBJECTIVE BOX
VASCULAR SURGERY PROGRESS NOTE    CC:   Hospital Day #  Post-Op Day #    Procedure:    Events of past 24 hours:    ROS otherwise negative except per subjective and HPI      Vital Signs Last 24 Hrs  T(C): 36.8 (19 Oct 2022 21:05), Max: 36.8 (19 Oct 2022 03:55)  T(F): 98.2 (19 Oct 2022 21:05), Max: 98.2 (19 Oct 2022 03:55)  HR: 75 (19 Oct 2022 21:05) (69 - 84)  BP: 147/59 (19 Oct 2022 21:05) (130/53 - 150/66)  BP(mean): 114 (19 Oct 2022 16:34) (114 - 114)  RR: 17 (19 Oct 2022 21:05) (15 - 18)  SpO2: 100% (19 Oct 2022 21:05) (99% - 100%)    Parameters below as of 19 Oct 2022 21:05  Patient On (Oxygen Delivery Method): room air        Pain (0-10):            Pain Control Adequate: [] YES [] N    I&O's Detail      PHYSICAL EXAM    Appearance: Normal	  HEENT:   Normal oral mucosa, PERRL, EOMI	  Neck: Supple, - JVD; Carotid Bruit   Cardiovascular: Normal S1 S2, No JVD, No murmurs,   Respiratory: Lungs clear to auscultation/Decreased Breath Sounds/No Rales, Rhonchi, Wheezing	  Gastrointestinal:  Soft, Non-tender, + BS	  Skin: No rashes, No ecchymoses, No cyanosis  Extremities: Normal range of motion, No clubbing, cyanosis or edema  Neurologic: Non-focal  Psychiatry: A & O x 3, Mood & affect appropriate    PULSES:  Femoral:  Popliteal:  Dorsal Pedal:  Posterior Tibial:  Capillary:      MEDICATIONS:   MEDICATIONS  (STANDING):  amLODIPine   Tablet 5 milliGRAM(s) Oral daily  aspirin enteric coated 81 milliGRAM(s) Oral daily  ATENolol  Tablet 50 milliGRAM(s) Oral daily  atorvastatin 40 milliGRAM(s) Oral at bedtime  clopidogrel Tablet 75 milliGRAM(s) Oral daily  dextrose 5%. 1000 milliLiter(s) (100 mL/Hr) IV Continuous <Continuous>  dextrose 5%. 1000 milliLiter(s) (50 mL/Hr) IV Continuous <Continuous>  dextrose 50% Injectable 25 Gram(s) IV Push once  dextrose 50% Injectable 12.5 Gram(s) IV Push once  dextrose 50% Injectable 25 Gram(s) IV Push once  glucagon  Injectable 1 milliGRAM(s) IntraMuscular once  heparin  Infusion 1200 Unit(s)/Hr (12 mL/Hr) IV Continuous <Continuous>  insulin glargine Injectable (LANTUS) 15 Unit(s) SubCutaneous every morning  insulin lispro (ADMELOG) corrective regimen sliding scale   SubCutaneous three times a day before meals  insulin lispro (ADMELOG) corrective regimen sliding scale   SubCutaneous at bedtime  insulin lispro Injectable (ADMELOG) 5 Unit(s) SubCutaneous three times a day before meals    MEDICATIONS  (PRN):  acetaminophen     Tablet .. 650 milliGRAM(s) Oral every 6 hours PRN Temp greater or equal to 38C (100.4F), Mild Pain (1 - 3)  dextrose Oral Gel 15 Gram(s) Oral once PRN Blood Glucose LESS THAN 70 milliGRAM(s)/deciliter      LAB/STUDIES:                        10.3   8.09  )-----------( 301      ( 19 Oct 2022 07:04 )             31.8     10-19    137  |  99  |  13  ----------------------------<  193<H>  3.4<L>   |  25  |  0.83    Ca    9.5      19 Oct 2022 07:04  Phos  3.9     10-19  Mg     1.60     10-19    TPro  7.3  /  Alb  4.4  /  TBili  0.3  /  DBili  x   /  AST  24  /  ALT  23  /  AlkPhos  72  10-19    PT/INR - ( 19 Oct 2022 07:04 )   PT: 12.0 sec;   INR: 1.03 ratio         PTT - ( 19 Oct 2022 23:50 )  PTT:99.6 sec  LIVER FUNCTIONS - ( 19 Oct 2022 07:04 )  Alb: 4.4 g/dL / Pro: 7.3 g/dL / ALK PHOS: 72 U/L / ALT: 23 U/L / AST: 24 U/L / GGT: x                             IMAGING:       VASCULAR SURGERY PROGRESS NOTE    Patient seen and examined on am rounds. Pain unchanged since admission. No new symptoms, no overnight events.     ROS otherwise negative except per subjective and HPI      Vital Signs Last 24 Hrs  T(C): 37 (20 Oct 2022 05:54), Max: 37 (20 Oct 2022 05:54)  T(F): 98.6 (20 Oct 2022 05:54), Max: 98.6 (20 Oct 2022 05:54)  HR: 82 (20 Oct 2022 05:54) (75 - 84)  BP: 150/60 (20 Oct 2022 05:54) (137/67 - 150/60)  BP(mean): 114 (19 Oct 2022 16:34) (114 - 114)  RR: 18 (20 Oct 2022 05:54) (15 - 18)  SpO2: 100% (20 Oct 2022 05:54) (99% - 100%)    Parameters below as of 20 Oct 2022 05:54  Patient On (Oxygen Delivery Method): room air    I&O's Summary      PHYSICAL EXAM    Appearance: NAD  Cardiovascular: Normal S1 S2, No JVD, No murmurs,   Respiratory: Lungs clear to auscultation/Decreased Breath Sounds/No Rales, Rhonchi, Wheezing	  Gastrointestinal:  Soft, Non-tender, + BS	  Skin: No rashes, No ecchymoses, No cyanosis  Extremities: Normal range of motion, No clubbing, cyanosis or edema.  Neurologic: Non-focal, sensation intact.     PULSES:  Dorsal Pedal: LEFT - signal, RIGHT - palpable.   Posterior Tibial: No signal       MEDICATIONS:   MEDICATIONS  (STANDING):  amLODIPine   Tablet 5 milliGRAM(s) Oral daily  aspirin enteric coated 81 milliGRAM(s) Oral daily  ATENolol  Tablet 50 milliGRAM(s) Oral daily  atorvastatin 40 milliGRAM(s) Oral at bedtime  clopidogrel Tablet 75 milliGRAM(s) Oral daily  dextrose 5%. 1000 milliLiter(s) (100 mL/Hr) IV Continuous <Continuous>  dextrose 5%. 1000 milliLiter(s) (50 mL/Hr) IV Continuous <Continuous>  dextrose 50% Injectable 25 Gram(s) IV Push once  dextrose 50% Injectable 12.5 Gram(s) IV Push once  dextrose 50% Injectable 25 Gram(s) IV Push once  glucagon  Injectable 1 milliGRAM(s) IntraMuscular once  heparin  Infusion 1200 Unit(s)/Hr (12 mL/Hr) IV Continuous <Continuous>  insulin glargine Injectable (LANTUS) 15 Unit(s) SubCutaneous every morning  insulin lispro (ADMELOG) corrective regimen sliding scale   SubCutaneous three times a day before meals  insulin lispro (ADMELOG) corrective regimen sliding scale   SubCutaneous at bedtime  insulin lispro Injectable (ADMELOG) 5 Unit(s) SubCutaneous three times a day before meals    MEDICATIONS  (PRN):  acetaminophen     Tablet .. 650 milliGRAM(s) Oral every 6 hours PRN Temp greater or equal to 38C (100.4F), Mild Pain (1 - 3)  dextrose Oral Gel 15 Gram(s) Oral once PRN Blood Glucose LESS THAN 70 milliGRAM(s)/deciliter      LAB/STUDIES:                                   10.5   8.73  )-----------( 344      ( 20 Oct 2022 06:30 )             32.9     10-20    137  |  99  |  16  ----------------------------<  197<H>  3.5   |  25  |  0.79    Ca    9.8      20 Oct 2022 06:30  Phos  3.9     10-19  Mg     1.60     10-19    TPro  7.3  /  Alb  4.4  /  TBili  0.3  /  DBili  x   /  AST  24  /  ALT  23  /  AlkPhos  72  10-19          IMAGING:    CT LE iv con shows near occlusive narrowing of right ext iliac, and focal dissection in distal pt, moderate/severe narrowing of b/l SFAs, 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

## 2022-10-20 NOTE — PROGRESS NOTE ADULT - PROBLEM SELECTOR PLAN 1
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  RCRI 2, ECG nonischemic, no previous complications with anesthesia in the past.   risk moderate to high based on factors  Patient is medically optimized for the procedure at this time       Management as per primary team  Continue Aspirin and statin. 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  severe, disabling claudication in LLE, moderate claudication in RLE with nearly occluded R EIA    RCRI 2, ECG nonischemic, no previous complications with anesthesia in the past.   risk moderate to high based on factors  Patient is medically optimized for the procedure at this time       Management as per primary team  Continue Aspirin and statin. would continue Plavix   Started on heparin gtt  As per Vascular team: plan for bilateral lower extremity angiogram, possible revascularization next week

## 2022-10-21 LAB
ANION GAP SERPL CALC-SCNC: 12 MMOL/L — SIGNIFICANT CHANGE UP (ref 7–14)
APTT BLD: 76.3 SEC — HIGH (ref 27–36.3)
APTT BLD: 86.2 SEC — HIGH (ref 27–36.3)
BUN SERPL-MCNC: 15 MG/DL — SIGNIFICANT CHANGE UP (ref 7–23)
CALCIUM SERPL-MCNC: 9.2 MG/DL — SIGNIFICANT CHANGE UP (ref 8.4–10.5)
CHLORIDE SERPL-SCNC: 100 MMOL/L — SIGNIFICANT CHANGE UP (ref 98–107)
CO2 SERPL-SCNC: 25 MMOL/L — SIGNIFICANT CHANGE UP (ref 22–31)
CREAT SERPL-MCNC: 0.76 MG/DL — SIGNIFICANT CHANGE UP (ref 0.5–1.3)
EGFR: 97 ML/MIN/1.73M2 — SIGNIFICANT CHANGE UP
GLUCOSE BLDC GLUCOMTR-MCNC: 150 MG/DL — HIGH (ref 70–99)
GLUCOSE BLDC GLUCOMTR-MCNC: 219 MG/DL — HIGH (ref 70–99)
GLUCOSE BLDC GLUCOMTR-MCNC: 224 MG/DL — HIGH (ref 70–99)
GLUCOSE BLDC GLUCOMTR-MCNC: 291 MG/DL — HIGH (ref 70–99)
GLUCOSE SERPL-MCNC: 217 MG/DL — HIGH (ref 70–99)
HCT VFR BLD CALC: 32.8 % — LOW (ref 39–50)
HGB BLD-MCNC: 10.4 G/DL — LOW (ref 13–17)
MAGNESIUM SERPL-MCNC: 1.9 MG/DL — SIGNIFICANT CHANGE UP (ref 1.6–2.6)
MCHC RBC-ENTMCNC: 26.1 PG — LOW (ref 27–34)
MCHC RBC-ENTMCNC: 31.7 GM/DL — LOW (ref 32–36)
MCV RBC AUTO: 82.2 FL — SIGNIFICANT CHANGE UP (ref 80–100)
NRBC # BLD: 0 /100 WBCS — SIGNIFICANT CHANGE UP (ref 0–0)
NRBC # FLD: 0 K/UL — SIGNIFICANT CHANGE UP (ref 0–0)
PHOSPHATE SERPL-MCNC: 3.2 MG/DL — SIGNIFICANT CHANGE UP (ref 2.5–4.5)
PLATELET # BLD AUTO: 331 K/UL — SIGNIFICANT CHANGE UP (ref 150–400)
POTASSIUM SERPL-MCNC: 3.5 MMOL/L — SIGNIFICANT CHANGE UP (ref 3.5–5.3)
POTASSIUM SERPL-SCNC: 3.5 MMOL/L — SIGNIFICANT CHANGE UP (ref 3.5–5.3)
RBC # BLD: 3.99 M/UL — LOW (ref 4.2–5.8)
RBC # FLD: 14.7 % — HIGH (ref 10.3–14.5)
SODIUM SERPL-SCNC: 137 MMOL/L — SIGNIFICANT CHANGE UP (ref 135–145)
WBC # BLD: 7.76 K/UL — SIGNIFICANT CHANGE UP (ref 3.8–10.5)
WBC # FLD AUTO: 7.76 K/UL — SIGNIFICANT CHANGE UP (ref 3.8–10.5)

## 2022-10-21 PROCEDURE — 99232 SBSQ HOSP IP/OBS MODERATE 35: CPT

## 2022-10-21 PROCEDURE — 99233 SBSQ HOSP IP/OBS HIGH 50: CPT

## 2022-10-21 RX ORDER — INSULIN GLARGINE 100 [IU]/ML
24 INJECTION, SOLUTION SUBCUTANEOUS
Refills: 0 | Status: DISCONTINUED | OUTPATIENT
Start: 2022-10-22 | End: 2022-10-23

## 2022-10-21 RX ADMIN — Medication 2: at 22:08

## 2022-10-21 RX ADMIN — HEPARIN SODIUM 11 UNIT(S)/HR: 5000 INJECTION INTRAVENOUS; SUBCUTANEOUS at 20:23

## 2022-10-21 RX ADMIN — Medication 7 UNIT(S): at 08:36

## 2022-10-21 RX ADMIN — Medication 7 UNIT(S): at 12:47

## 2022-10-21 RX ADMIN — Medication 1 APPLICATION(S): at 05:29

## 2022-10-21 RX ADMIN — CLOPIDOGREL BISULFATE 75 MILLIGRAM(S): 75 TABLET, FILM COATED ORAL at 12:46

## 2022-10-21 RX ADMIN — ATORVASTATIN CALCIUM 40 MILLIGRAM(S): 80 TABLET, FILM COATED ORAL at 22:09

## 2022-10-21 RX ADMIN — AMLODIPINE BESYLATE 5 MILLIGRAM(S): 2.5 TABLET ORAL at 05:29

## 2022-10-21 RX ADMIN — HEPARIN SODIUM 11 UNIT(S)/HR: 5000 INJECTION INTRAVENOUS; SUBCUTANEOUS at 08:29

## 2022-10-21 RX ADMIN — HEPARIN SODIUM 11 UNIT(S)/HR: 5000 INJECTION INTRAVENOUS; SUBCUTANEOUS at 19:59

## 2022-10-21 RX ADMIN — Medication 4: at 08:35

## 2022-10-21 RX ADMIN — Medication 81 MILLIGRAM(S): at 12:46

## 2022-10-21 RX ADMIN — ATENOLOL 50 MILLIGRAM(S): 25 TABLET ORAL at 05:29

## 2022-10-21 RX ADMIN — HEPARIN SODIUM 11 UNIT(S)/HR: 5000 INJECTION INTRAVENOUS; SUBCUTANEOUS at 09:11

## 2022-10-21 RX ADMIN — INSULIN GLARGINE 20 UNIT(S): 100 INJECTION, SOLUTION SUBCUTANEOUS at 08:36

## 2022-10-21 RX ADMIN — Medication 4: at 12:46

## 2022-10-21 RX ADMIN — Medication 7 UNIT(S): at 18:09

## 2022-10-21 NOTE — DIETITIAN INITIAL EVALUATION ADULT - NS FNS DIET ORDER
Diet, DASH/TLC:   Sodium & Cholesterol Restricted  Consistent Carbohydrate {No Snacks} (CSTCHO) (10-19-22 @ 05:56)

## 2022-10-21 NOTE — PROGRESS NOTE ADULT - SUBJECTIVE AND OBJECTIVE BOX
Chief Complaint: DM2    History: tolerating po  no hypoglycemia events or symptoms    MEDICATIONS  (STANDING):  amLODIPine   Tablet 5 milliGRAM(s) Oral daily  aspirin enteric coated 81 milliGRAM(s) Oral daily  ATENolol  Tablet 50 milliGRAM(s) Oral daily  atorvastatin 40 milliGRAM(s) Oral at bedtime  clopidogrel Tablet 75 milliGRAM(s) Oral daily  dextrose 5%. 1000 milliLiter(s) (100 mL/Hr) IV Continuous <Continuous>  dextrose 5%. 1000 milliLiter(s) (50 mL/Hr) IV Continuous <Continuous>  dextrose 50% Injectable 25 Gram(s) IV Push once  dextrose 50% Injectable 12.5 Gram(s) IV Push once  dextrose 50% Injectable 25 Gram(s) IV Push once  glucagon  Injectable 1 milliGRAM(s) IntraMuscular once  heparin  Infusion 1100 Unit(s)/Hr (11 mL/Hr) IV Continuous <Continuous>  insulin glargine Injectable (LANTUS) 20 Unit(s) SubCutaneous <User Schedule>  insulin lispro (ADMELOG) corrective regimen sliding scale   SubCutaneous three times a day before meals  insulin lispro (ADMELOG) corrective regimen sliding scale   SubCutaneous at bedtime  insulin lispro Injectable (ADMELOG) 7 Unit(s) SubCutaneous three times a day before meals    MEDICATIONS  (PRN):  acetaminophen     Tablet .. 650 milliGRAM(s) Oral every 6 hours PRN Temp greater or equal to 38C (100.4F), Mild Pain (1 - 3)  AQUAPHOR (petrolatum Ointment) 1 Application(s) Topical two times a day PRN for dry skin  dextrose Oral Gel 15 Gram(s) Oral once PRN Blood Glucose LESS THAN 70 milliGRAM(s)/deciliter      Allergies    Advil (Rash)    Intolerances      Review of Systems:    ALL OTHER SYSTEMS REVIEWED AND NEGATIVE      PHYSICAL EXAM:  VITALS: T(C): 36.8 (10-21-22 @ 14:33)  T(F): 98.2 (10-21-22 @ 14:33), Max: 98.6 (10-21-22 @ 05:28)  HR: 74 (10-21-22 @ 14:33) (74 - 84)  BP: 124/61 (10-21-22 @ 14:33) (118/76 - 156/82)  RR:  (16 - 18)  SpO2:  (98% - 100%)  Wt(kg): --  GENERAL: NAD, well-groomed, well-developed  EYES: No proptosis, no lid lag, anicteric  HEENT:  Atraumatic, Normocephalic, moist mucous membranes  RESPIRATORY: nonlabored respirations, no wheezing  PSYCH: Alert and oriented x 3, normal affect, normal mood    CAPILLARY BLOOD GLUCOSE      POCT Blood Glucose.: 150 mg/dL (21 Oct 2022 17:19)  POCT Blood Glucose.: 224 mg/dL (21 Oct 2022 12:31)  POCT Blood Glucose.: 219 mg/dL (21 Oct 2022 07:47)  POCT Blood Glucose.: 242 mg/dL (20 Oct 2022 21:32)      10-21    137  |  100  |  15  ----------------------------<  217<H>  3.5   |  25  |  0.76    eGFR: 97    Ca    9.2      10-21  Mg     1.90     10-21  Phos  3.2     10-21    TPro  7.3  /  Alb  4.4  /  TBili  0.3  /  DBili  x   /  AST  24  /  ALT  23  /  AlkPhos  72  10-19      A1C with Estimated Average Glucose Result: 10.4 % (10-19-22 @ 03:40)      Thyroid Function Tests:

## 2022-10-21 NOTE — PROGRESS NOTE ADULT - SUBJECTIVE AND OBJECTIVE BOX
Patient is a 67y old  Male who presents with a chief complaint of LE pain (21 Oct 2022 17:42)      SUBJECTIVE / OVERNIGHT EVENTS:    No events overnight. This AM, patient without n/v/d/cp/sob.      MEDICATIONS  (STANDING):  amLODIPine   Tablet 5 milliGRAM(s) Oral daily  aspirin enteric coated 81 milliGRAM(s) Oral daily  ATENolol  Tablet 50 milliGRAM(s) Oral daily  atorvastatin 40 milliGRAM(s) Oral at bedtime  clopidogrel Tablet 75 milliGRAM(s) Oral daily  dextrose 5%. 1000 milliLiter(s) (100 mL/Hr) IV Continuous <Continuous>  dextrose 5%. 1000 milliLiter(s) (50 mL/Hr) IV Continuous <Continuous>  dextrose 50% Injectable 25 Gram(s) IV Push once  dextrose 50% Injectable 12.5 Gram(s) IV Push once  dextrose 50% Injectable 25 Gram(s) IV Push once  glucagon  Injectable 1 milliGRAM(s) IntraMuscular once  heparin  Infusion 1100 Unit(s)/Hr (11 mL/Hr) IV Continuous <Continuous>  insulin lispro (ADMELOG) corrective regimen sliding scale   SubCutaneous three times a day before meals  insulin lispro (ADMELOG) corrective regimen sliding scale   SubCutaneous at bedtime  insulin lispro Injectable (ADMELOG) 7 Unit(s) SubCutaneous three times a day before meals    MEDICATIONS  (PRN):  acetaminophen     Tablet .. 650 milliGRAM(s) Oral every 6 hours PRN Temp greater or equal to 38C (100.4F), Mild Pain (1 - 3)  AQUAPHOR (petrolatum Ointment) 1 Application(s) Topical two times a day PRN for dry skin  dextrose Oral Gel 15 Gram(s) Oral once PRN Blood Glucose LESS THAN 70 milliGRAM(s)/deciliter      PHYSICAL EXAM:  T(C): 36.8 (10-21-22 @ 14:33), Max: 37 (10-21-22 @ 05:28)  HR: 74 (10-21-22 @ 14:33) (74 - 84)  BP: 124/61 (10-21-22 @ 14:33) (118/76 - 156/82)  RR: 16 (10-21-22 @ 14:33) (16 - 18)  SpO2: 100% (10-21-22 @ 14:33) (98% - 100%)  I&O's Summary    GENERAL: NAD, well-developed  HEAD:  Atraumatic, Normocephalic, MMM  CHEST/LUNG: No use of accessory muscles, CTAB, breathing non-labored  COR: RR, no mrcg  ABD: Soft, ND/NT, +BS  PSYCH: AAOx3  NEUROLOGY: CN II-XII grossly intact, moving all extremities  SKIN: No rashes or lesions  EXT: wwp, no cce    LABS:  CAPILLARY BLOOD GLUCOSE      POCT Blood Glucose.: 150 mg/dL (21 Oct 2022 17:19)  POCT Blood Glucose.: 224 mg/dL (21 Oct 2022 12:31)  POCT Blood Glucose.: 219 mg/dL (21 Oct 2022 07:47)  POCT Blood Glucose.: 242 mg/dL (20 Oct 2022 21:32)                          10.4   7.76  )-----------( 331      ( 21 Oct 2022 06:08 )             32.8     10-21    137  |  100  |  15  ----------------------------<  217<H>  3.5   |  25  |  0.76    Ca    9.2      21 Oct 2022 06:08  Phos  3.2     10-21  Mg     1.90     10-21      PTT - ( 21 Oct 2022 06:08 )  PTT:86.2 sec            RADIOLOGY & ADDITIONAL TESTS:    Telemetry Personally Reviewed -     Imaging Personally Reviewed -     Imaging Reviewed -     Consultant(s) Notes Reviewed -       Care Discussed with Consultants/Other Providers -  Patient is a 67y old  Male who presents with a chief complaint of LE pain (21 Oct 2022 17:42)      SUBJECTIVE / OVERNIGHT EVENTS:    No events overnight. This AM, patient without n/v/d/cp/sob.  Patient reports feeling well and states he only has pain with activity.   No other concerns at this time.     MEDICATIONS  (STANDING):  amLODIPine   Tablet 5 milliGRAM(s) Oral daily  aspirin enteric coated 81 milliGRAM(s) Oral daily  ATENolol  Tablet 50 milliGRAM(s) Oral daily  atorvastatin 40 milliGRAM(s) Oral at bedtime  clopidogrel Tablet 75 milliGRAM(s) Oral daily  dextrose 5%. 1000 milliLiter(s) (100 mL/Hr) IV Continuous <Continuous>  dextrose 5%. 1000 milliLiter(s) (50 mL/Hr) IV Continuous <Continuous>  dextrose 50% Injectable 25 Gram(s) IV Push once  dextrose 50% Injectable 12.5 Gram(s) IV Push once  dextrose 50% Injectable 25 Gram(s) IV Push once  glucagon  Injectable 1 milliGRAM(s) IntraMuscular once  heparin  Infusion 1100 Unit(s)/Hr (11 mL/Hr) IV Continuous <Continuous>  insulin lispro (ADMELOG) corrective regimen sliding scale   SubCutaneous three times a day before meals  insulin lispro (ADMELOG) corrective regimen sliding scale   SubCutaneous at bedtime  insulin lispro Injectable (ADMELOG) 7 Unit(s) SubCutaneous three times a day before meals    MEDICATIONS  (PRN):  acetaminophen     Tablet .. 650 milliGRAM(s) Oral every 6 hours PRN Temp greater or equal to 38C (100.4F), Mild Pain (1 - 3)  AQUAPHOR (petrolatum Ointment) 1 Application(s) Topical two times a day PRN for dry skin  dextrose Oral Gel 15 Gram(s) Oral once PRN Blood Glucose LESS THAN 70 milliGRAM(s)/deciliter      PHYSICAL EXAM:  T(C): 36.8 (10-21-22 @ 14:33), Max: 37 (10-21-22 @ 05:28)  HR: 74 (10-21-22 @ 14:33) (74 - 84)  BP: 124/61 (10-21-22 @ 14:33) (118/76 - 156/82)  RR: 16 (10-21-22 @ 14:33) (16 - 18)  SpO2: 100% (10-21-22 @ 14:33) (98% - 100%)  I&O's Summary    GENERAL: NAD, well-developed  HEAD:  Atraumatic, Normocephalic, MMM  CHEST/LUNG: No use of accessory muscles, CTAB, breathing non-labored  COR: RR, no mrcg  ABD: Soft, ND/NT, +BS  PSYCH: AAOx3  NEUROLOGY: CN II-XII grossly intact, moving all extremities  SKIN: No rashes or lesions  EXT:  diminished DP pulses, no edema noted B/L     LABS:  CAPILLARY BLOOD GLUCOSE      POCT Blood Glucose.: 150 mg/dL (21 Oct 2022 17:19)  POCT Blood Glucose.: 224 mg/dL (21 Oct 2022 12:31)  POCT Blood Glucose.: 219 mg/dL (21 Oct 2022 07:47)  POCT Blood Glucose.: 242 mg/dL (20 Oct 2022 21:32)                          10.4   7.76  )-----------( 331      ( 21 Oct 2022 06:08 )             32.8     10-21    137  |  100  |  15  ----------------------------<  217<H>  3.5   |  25  |  0.76    Ca    9.2      21 Oct 2022 06:08  Phos  3.2     10-21  Mg     1.90     10-21      PTT - ( 21 Oct 2022 06:08 )  PTT:86.2 sec            RADIOLOGY & ADDITIONAL TESTS:    Telemetry Personally Reviewed - NSR with HR 70s    Imaging Reviewed -     Consultant(s) Notes Reviewed -       Care Discussed with Consultants/Other Providers -

## 2022-10-21 NOTE — PROGRESS NOTE ADULT - SUBJECTIVE AND OBJECTIVE BOX
VASCULAR SURGERY PROGRESS NOTE    CC:   Hospital Day #  Post-Op Day #    Procedure:    Events of past 24 hours:    ROS otherwise negative except per subjective and HPI      Vital Signs Last 24 Hrs  T(C): 36.9 (20 Oct 2022 21:35), Max: 37 (20 Oct 2022 05:54)  T(F): 98.4 (20 Oct 2022 21:35), Max: 98.6 (20 Oct 2022 05:54)  HR: 84 (20 Oct 2022 21:35) (80 - 84)  BP: 118/76 (20 Oct 2022 21:35) (118/76 - 150/60)  BP(mean): --  RR: 18 (20 Oct 2022 21:35) (17 - 18)  SpO2: 100% (20 Oct 2022 21:35) (98% - 100%)    Parameters below as of 20 Oct 2022 21:35  Patient On (Oxygen Delivery Method): room air        Pain (0-10):            Pain Control Adequate: [] YES [] N    I&O's Detail      PHYSICAL EXAM    Appearance: Normal	  HEENT:   Normal oral mucosa, PERRL, EOMI	  Neck: Supple, - JVD; Carotid Bruit   Cardiovascular: Normal S1 S2, No JVD, No murmurs,   Respiratory: Lungs clear to auscultation/Decreased Breath Sounds/No Rales, Rhonchi, Wheezing	  Gastrointestinal:  Soft, Non-tender, + BS	  Skin: No rashes, No ecchymoses, No cyanosis  Extremities: Normal range of motion, No clubbing, cyanosis or edema  Neurologic: Non-focal  Psychiatry: A & O x 3, Mood & affect appropriate    PULSES:  Femoral:  Popliteal:  Dorsal Pedal:  Posterior Tibial:  Capillary:      MEDICATIONS:   MEDICATIONS  (STANDING):  amLODIPine   Tablet 5 milliGRAM(s) Oral daily  aspirin enteric coated 81 milliGRAM(s) Oral daily  ATENolol  Tablet 50 milliGRAM(s) Oral daily  atorvastatin 40 milliGRAM(s) Oral at bedtime  clopidogrel Tablet 75 milliGRAM(s) Oral daily  dextrose 5%. 1000 milliLiter(s) (100 mL/Hr) IV Continuous <Continuous>  dextrose 5%. 1000 milliLiter(s) (50 mL/Hr) IV Continuous <Continuous>  dextrose 50% Injectable 25 Gram(s) IV Push once  dextrose 50% Injectable 12.5 Gram(s) IV Push once  dextrose 50% Injectable 25 Gram(s) IV Push once  glucagon  Injectable 1 milliGRAM(s) IntraMuscular once  heparin  Infusion 1100 Unit(s)/Hr (11 mL/Hr) IV Continuous <Continuous>  insulin glargine Injectable (LANTUS) 20 Unit(s) SubCutaneous <User Schedule>  insulin lispro (ADMELOG) corrective regimen sliding scale   SubCutaneous three times a day before meals  insulin lispro (ADMELOG) corrective regimen sliding scale   SubCutaneous at bedtime  insulin lispro Injectable (ADMELOG) 7 Unit(s) SubCutaneous three times a day before meals    MEDICATIONS  (PRN):  acetaminophen     Tablet .. 650 milliGRAM(s) Oral every 6 hours PRN Temp greater or equal to 38C (100.4F), Mild Pain (1 - 3)  AQUAPHOR (petrolatum Ointment) 1 Application(s) Topical two times a day PRN for dry skin  dextrose Oral Gel 15 Gram(s) Oral once PRN Blood Glucose LESS THAN 70 milliGRAM(s)/deciliter      LAB/STUDIES:                        10.5   8.73  )-----------( 344      ( 20 Oct 2022 06:30 )             32.9     10-20    137  |  99  |  16  ----------------------------<  197<H>  3.5   |  25  |  0.79    Ca    9.8      20 Oct 2022 06:30  Phos  3.9     10-19  Mg     1.60     10-19    TPro  7.3  /  Alb  4.4  /  TBili  0.3  /  DBili  x   /  AST  24  /  ALT  23  /  AlkPhos  72  10-19    PT/INR - ( 19 Oct 2022 07:04 )   PT: 12.0 sec;   INR: 1.03 ratio         PTT - ( 20 Oct 2022 22:00 )  PTT:79.8 sec  LIVER FUNCTIONS - ( 19 Oct 2022 07:04 )  Alb: 4.4 g/dL / Pro: 7.3 g/dL / ALK PHOS: 72 U/L / ALT: 23 U/L / AST: 24 U/L / GGT: x                             IMAGING:       VASCULAR SURGERY PROGRESS NOTE    Patient seen and examined on am rounds. Pain somewhat improved since admission. No new symptoms, no overnight events.     ROS otherwise negative except per subjective and HPI      Vital Signs Last 24 Hrs  T(C): 36.7 (21 Oct 2022 09:57), Max: 37 (21 Oct 2022 05:28)  T(F): 98 (21 Oct 2022 09:57), Max: 98.6 (21 Oct 2022 05:28)  HR: 78 (21 Oct 2022 09:57) (76 - 84)  BP: 133/66 (21 Oct 2022 09:57) (118/76 - 156/82)  BP(mean): --  RR: 18 (21 Oct 2022 09:57) (17 - 18)  SpO2: 98% (21 Oct 2022 09:57) (98% - 100%)    Parameters below as of 21 Oct 2022 09:57  Patient On (Oxygen Delivery Method): room air    I&O's Summary      PHYSICAL EXAM    Appearance: NAD  Cardiovascular: Normal S1 S2, No JVD, No murmurs,   Respiratory: Lungs clear to auscultation/Decreased Breath Sounds/No Rales, Rhonchi, Wheezing	  Gastrointestinal:  Soft, Non-tender, + BS	  Skin: No rashes, No ecchymoses, No cyanosis  Extremities: Normal range of motion, No clubbing, cyanosis or edema.  Neurologic: Non-focal, sensation intact.     PULSES:  Dorsal Pedal: LEFT - signal, RIGHT - palpable.   Posterior Tibial: No signal       MEDICATIONS:   MEDICATIONS  (STANDING):  amLODIPine   Tablet 5 milliGRAM(s) Oral daily  aspirin enteric coated 81 milliGRAM(s) Oral daily  ATENolol  Tablet 50 milliGRAM(s) Oral daily  atorvastatin 40 milliGRAM(s) Oral at bedtime  clopidogrel Tablet 75 milliGRAM(s) Oral daily  dextrose 5%. 1000 milliLiter(s) (100 mL/Hr) IV Continuous <Continuous>  dextrose 5%. 1000 milliLiter(s) (50 mL/Hr) IV Continuous <Continuous>  dextrose 50% Injectable 25 Gram(s) IV Push once  dextrose 50% Injectable 12.5 Gram(s) IV Push once  dextrose 50% Injectable 25 Gram(s) IV Push once  glucagon  Injectable 1 milliGRAM(s) IntraMuscular once  heparin  Infusion 1200 Unit(s)/Hr (12 mL/Hr) IV Continuous <Continuous>  insulin glargine Injectable (LANTUS) 15 Unit(s) SubCutaneous every morning  insulin lispro (ADMELOG) corrective regimen sliding scale   SubCutaneous three times a day before meals  insulin lispro (ADMELOG) corrective regimen sliding scale   SubCutaneous at bedtime  insulin lispro Injectable (ADMELOG) 5 Unit(s) SubCutaneous three times a day before meals    MEDICATIONS  (PRN):  acetaminophen     Tablet .. 650 milliGRAM(s) Oral every 6 hours PRN Temp greater or equal to 38C (100.4F), Mild Pain (1 - 3)  dextrose Oral Gel 15 Gram(s) Oral once PRN Blood Glucose LESS THAN 70 milliGRAM(s)/deciliter      LAB/STUDIES:                          10.4   7.76  )-----------( 331      ( 21 Oct 2022 06:08 )             32.8     10-21    137  |  100  |  15  ----------------------------<  217<H>  3.5   |  25  |  0.76    Ca    9.2      21 Oct 2022 06:08  Phos  3.2     10-21  Mg     1.90     10-21                          IMAGING:    CT LE iv con shows near occlusive narrowing of right ext iliac, and focal dissection in distal pt, moderate/severe narrowing of b/l SFAs, 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

## 2022-10-21 NOTE — DIETITIAN INITIAL EVALUATION ADULT - PERTINENT MEDS FT
MEDICATIONS  (STANDING):  amLODIPine   Tablet 5 milliGRAM(s) Oral daily  aspirin enteric coated 81 milliGRAM(s) Oral daily  ATENolol  Tablet 50 milliGRAM(s) Oral daily  atorvastatin 40 milliGRAM(s) Oral at bedtime  clopidogrel Tablet 75 milliGRAM(s) Oral daily  dextrose 5%. 1000 milliLiter(s) (100 mL/Hr) IV Continuous <Continuous>  dextrose 5%. 1000 milliLiter(s) (50 mL/Hr) IV Continuous <Continuous>  dextrose 50% Injectable 25 Gram(s) IV Push once  dextrose 50% Injectable 12.5 Gram(s) IV Push once  dextrose 50% Injectable 25 Gram(s) IV Push once  glucagon  Injectable 1 milliGRAM(s) IntraMuscular once  heparin  Infusion 1100 Unit(s)/Hr (11 mL/Hr) IV Continuous <Continuous>  insulin glargine Injectable (LANTUS) 20 Unit(s) SubCutaneous <User Schedule>  insulin lispro (ADMELOG) corrective regimen sliding scale   SubCutaneous three times a day before meals  insulin lispro (ADMELOG) corrective regimen sliding scale   SubCutaneous at bedtime  insulin lispro Injectable (ADMELOG) 7 Unit(s) SubCutaneous three times a day before meals    MEDICATIONS  (PRN):  acetaminophen     Tablet .. 650 milliGRAM(s) Oral every 6 hours PRN Temp greater or equal to 38C (100.4F), Mild Pain (1 - 3)  AQUAPHOR (petrolatum Ointment) 1 Application(s) Topical two times a day PRN for dry skin  dextrose Oral Gel 15 Gram(s) Oral once PRN Blood Glucose LESS THAN 70 milliGRAM(s)/deciliter

## 2022-10-21 NOTE — PROGRESS NOTE ADULT - ASSESSMENT
67M w/ PMH of HTN, HLD, DM2, and PAD s/p angiogram and stent in May 2022 who is presenting due to b/l LE pain.       - pain control PRN   - angiogram planning for Monday  - continue Hep gtt.   - continue home meds   - ISS   - DVT ppx         C Team Surgery   [id number]

## 2022-10-21 NOTE — PROGRESS NOTE ADULT - PROBLEM SELECTOR PLAN 2
K 3.4 this morning     Replete as needed to keep K>4 and Mg>2 K 3.4 yesterday-> 3.5 this am    Replete to keep K>4 and Mg>2

## 2022-10-21 NOTE — PROGRESS NOTE ADULT - ASSESSMENT
Patient is a 67 year old male hx of HTN, HLD, DM2, and PAD s/p angiogram and stent in May 2022 who is presenting due to b/l LE pain. vascular surgery following given concern for dissection and PAD.  endocrine called today for DM and a1c 10.4  home meds:  Janumet 50-1000mg BID and pioglitazone 30mg daily    1. DM2 - HbA1c 10.4%  Inpatient plan:  BG target 100-180 mg/dl  above goal  Increase Lantus to 24 units q8AM  Continue Ademelog 7/7/7 units premeal TID  moderate dose correction premeals and moderate dose scale bedtime  - Carb Consistent Diet   - Nutrition consult   - Provider to RN for diabetes/insulin pen teaching   RI planning:  Add basal insulin pen dose TBD, check insurance preference plus resume oral agents Janumet 50-1000mg BID and Pioglitazone 30mg daily  New to insulin pen at ID.  BD carol pen needles  ensure has glucometer and supplies  Follow up at [facility], appointments are set [phone].  diabetes educator 12/23/22 at 11:30AM at [address] [facility] [address]  Dr. Stacey Mejia 2/8/23 at 11:20AM at [address]      2. HTN  goal BP in DM <130/80  on norvasc  outpt mc/cr ratio      3. HLD  on lipitor 40mg   outpt lipid panel      4. Hypokalemia  insulin can lower K so please replete and follow K accordingly     Mary Beth Mccarthy MD  Division of Endocrinology  Pager: [phone]    If after 6PM or before 9AM, or on weekends/holidays, please call endocrine answering service for assistance ([phone]).  For nonurgent matters email [email or web address] for assistance.

## 2022-10-21 NOTE — DIETITIAN INITIAL EVALUATION ADULT - OTHER INFO
67 year old male hx of HTN, HLD, DM2, and PAD s/p angiogram and stent in May 2022 who is presenting due to b/l LE pain.     Pt seen and reports 75% intake of meals with No GI distress (nausea/vomiting/diarrhea/constipation.) at present. reports stable weights in past 3 months. Encouraged pt to fill out menu to Ensure Plus 350 kcal/ 13 gm protein per 237ml. food preferences. Noted skin intact, no edema per nursing flow sheet. Labs reviewed. A1c- 10.4% (10/19/22). RD provided the patient with extensive verbal and written DM diet education; including, carb counting, label reading, meal planning, pre-prandial and post-prandial finger stick goals, and HbA1c goal. Patient was also made aware of the physiological implications of poor glycemic control. Also discussed Heart Healthy diet recommendations. Importance of having consistent carbohydrate with protein at each meals emphasized.

## 2022-10-21 NOTE — PROGRESS NOTE ADULT - PROBLEM SELECTOR PLAN 1
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  severe, disabling claudication in LLE, moderate claudication in RLE with nearly occluded R EIA    RCRI 2, ECG nonischemic, no previous complications with anesthesia in the past.   risk moderate to high based on factors  Patient is medically optimized for the procedure at this time       Management as per primary team  Continue Aspirin and statin. 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  severe, disabling claudication in LLE, moderate claudication in RLE with nearly occluded R EIA    RCRI 2, ECG nonischemic, no previous complications with anesthesia in the past.   louie periop: 2%, mets>4   procedure risk moderate to high based on factors    Patient is medically optimized for the procedure on Monday      Primary management as per vascular team  Continue Aspirin and statin. would continue Plavix   Continue heparin gtt  As per Vascular team: plan for bilateral lower extremity angiogram, possible revascularization on Monday

## 2022-10-21 NOTE — DIETITIAN INITIAL EVALUATION ADULT - PERTINENT LABORATORY DATA
10-21    137  |  100  |  15  ----------------------------<  217<H>  3.5   |  25  |  0.76    Ca    9.2      21 Oct 2022 06:08  Phos  3.2     10-21  Mg     1.90     10-21    POCT Blood Glucose.: 224 mg/dL (10-21-22 @ 12:31)  A1C with Estimated Average Glucose Result: 10.4 % (10-19-22 @ 03:40)

## 2022-10-21 NOTE — PROGRESS NOTE ADULT - PROBLEM SELECTOR PLAN 4
Held ARB and chlorthalidone in anticipation of procedure.   Started on Amlodipine on admission     Continue Amlodipine for now   Continue Atenolol   Monitor BP and uptitrate as needed Held ARB and chlorthalidone in anticipation of procedure.   Started on Amlodipine on admission   BP stable    Continue Amlodipine   Continue Atenolol   Monitor BP and up-titrate as needed

## 2022-10-21 NOTE — PROGRESS NOTE ADULT - PROBLEM SELECTOR PLAN 3
A1C of 10.4  on admission    Increased Lantus 20 u and Lispro pre-meal 7 with ISS   Endo following  carb controlled diet A1C of 10.4  on admission    Increased Lantus 24u in am, and Lispro pre-meal 7 with ISS   Endo following  carb controlled diet

## 2022-10-21 NOTE — DIETITIAN INITIAL EVALUATION ADULT - CONTINUE CURRENT NUTRITION CARE PLAN
yes Implemented All Universal Safety Interventions:  [place] to call system. Call bell, personal items and telephone within reach. Instruct patient to call for assistance. Room bathroom lighting operational. Non-slip footwear when patient is off stretcher. Physically safe environment: no spills, clutter or unnecessary equipment. Stretcher in lowest position, wheels locked, appropriate side rails in place.

## 2022-10-22 LAB
ANION GAP SERPL CALC-SCNC: 12 MMOL/L — SIGNIFICANT CHANGE UP (ref 7–14)
APTT BLD: 91.3 SEC — HIGH (ref 27–36.3)
BLD GP AB SCN SERPL QL: NEGATIVE — SIGNIFICANT CHANGE UP
BUN SERPL-MCNC: 15 MG/DL — SIGNIFICANT CHANGE UP (ref 7–23)
CALCIUM SERPL-MCNC: 9.2 MG/DL — SIGNIFICANT CHANGE UP (ref 8.4–10.5)
CHLORIDE SERPL-SCNC: 104 MMOL/L — SIGNIFICANT CHANGE UP (ref 98–107)
CO2 SERPL-SCNC: 23 MMOL/L — SIGNIFICANT CHANGE UP (ref 22–31)
CREAT SERPL-MCNC: 0.8 MG/DL — SIGNIFICANT CHANGE UP (ref 0.5–1.3)
EGFR: 96 ML/MIN/1.73M2 — SIGNIFICANT CHANGE UP
GLUCOSE BLDC GLUCOMTR-MCNC: 166 MG/DL — HIGH (ref 70–99)
GLUCOSE BLDC GLUCOMTR-MCNC: 199 MG/DL — HIGH (ref 70–99)
GLUCOSE BLDC GLUCOMTR-MCNC: 199 MG/DL — HIGH (ref 70–99)
GLUCOSE BLDC GLUCOMTR-MCNC: 247 MG/DL — HIGH (ref 70–99)
GLUCOSE BLDC GLUCOMTR-MCNC: 317 MG/DL — HIGH (ref 70–99)
GLUCOSE SERPL-MCNC: 155 MG/DL — HIGH (ref 70–99)
HCT VFR BLD CALC: 32.7 % — LOW (ref 39–50)
HGB BLD-MCNC: 10.5 G/DL — LOW (ref 13–17)
INR BLD: 1.05 RATIO — SIGNIFICANT CHANGE UP (ref 0.88–1.16)
MAGNESIUM SERPL-MCNC: 2 MG/DL — SIGNIFICANT CHANGE UP (ref 1.6–2.6)
MCHC RBC-ENTMCNC: 26.2 PG — LOW (ref 27–34)
MCHC RBC-ENTMCNC: 32.1 GM/DL — SIGNIFICANT CHANGE UP (ref 32–36)
MCV RBC AUTO: 81.5 FL — SIGNIFICANT CHANGE UP (ref 80–100)
NRBC # BLD: 0 /100 WBCS — SIGNIFICANT CHANGE UP (ref 0–0)
NRBC # FLD: 0 K/UL — SIGNIFICANT CHANGE UP (ref 0–0)
PHOSPHATE SERPL-MCNC: 3.5 MG/DL — SIGNIFICANT CHANGE UP (ref 2.5–4.5)
PLATELET # BLD AUTO: 339 K/UL — SIGNIFICANT CHANGE UP (ref 150–400)
POTASSIUM SERPL-MCNC: 3.5 MMOL/L — SIGNIFICANT CHANGE UP (ref 3.5–5.3)
POTASSIUM SERPL-SCNC: 3.5 MMOL/L — SIGNIFICANT CHANGE UP (ref 3.5–5.3)
PROTHROM AB SERPL-ACNC: 12.2 SEC — SIGNIFICANT CHANGE UP (ref 10.5–13.4)
RBC # BLD: 4.01 M/UL — LOW (ref 4.2–5.8)
RBC # FLD: 14.7 % — HIGH (ref 10.3–14.5)
RH IG SCN BLD-IMP: NEGATIVE — SIGNIFICANT CHANGE UP
SODIUM SERPL-SCNC: 139 MMOL/L — SIGNIFICANT CHANGE UP (ref 135–145)
WBC # BLD: 8.57 K/UL — SIGNIFICANT CHANGE UP (ref 3.8–10.5)
WBC # FLD AUTO: 8.57 K/UL — SIGNIFICANT CHANGE UP (ref 3.8–10.5)

## 2022-10-22 PROCEDURE — 93306 TTE W/DOPPLER COMPLETE: CPT | Mod: 26

## 2022-10-22 PROCEDURE — 99232 SBSQ HOSP IP/OBS MODERATE 35: CPT

## 2022-10-22 PROCEDURE — 99232 SBSQ HOSP IP/OBS MODERATE 35: CPT | Mod: 57

## 2022-10-22 RX ORDER — INSULIN LISPRO 100/ML
9 VIAL (ML) SUBCUTANEOUS
Refills: 0 | Status: DISCONTINUED | OUTPATIENT
Start: 2022-10-22 | End: 2022-10-23

## 2022-10-22 RX ORDER — POTASSIUM CHLORIDE 20 MEQ
20 PACKET (EA) ORAL
Refills: 0 | Status: COMPLETED | OUTPATIENT
Start: 2022-10-22 | End: 2022-10-22

## 2022-10-22 RX ADMIN — Medication 81 MILLIGRAM(S): at 12:11

## 2022-10-22 RX ADMIN — Medication 2: at 17:57

## 2022-10-22 RX ADMIN — HEPARIN SODIUM 11 UNIT(S)/HR: 5000 INJECTION INTRAVENOUS; SUBCUTANEOUS at 09:22

## 2022-10-22 RX ADMIN — Medication 20 MILLIEQUIVALENT(S): at 17:54

## 2022-10-22 RX ADMIN — Medication 7 UNIT(S): at 12:12

## 2022-10-22 RX ADMIN — AMLODIPINE BESYLATE 5 MILLIGRAM(S): 2.5 TABLET ORAL at 06:09

## 2022-10-22 RX ADMIN — ATORVASTATIN CALCIUM 40 MILLIGRAM(S): 80 TABLET, FILM COATED ORAL at 21:19

## 2022-10-22 RX ADMIN — Medication 9 UNIT(S): at 17:57

## 2022-10-22 RX ADMIN — Medication 4: at 12:11

## 2022-10-22 RX ADMIN — Medication 4: at 22:07

## 2022-10-22 RX ADMIN — ATENOLOL 50 MILLIGRAM(S): 25 TABLET ORAL at 06:09

## 2022-10-22 RX ADMIN — Medication 20 MILLIEQUIVALENT(S): at 16:21

## 2022-10-22 RX ADMIN — HEPARIN SODIUM 11 UNIT(S)/HR: 5000 INJECTION INTRAVENOUS; SUBCUTANEOUS at 20:46

## 2022-10-22 RX ADMIN — CLOPIDOGREL BISULFATE 75 MILLIGRAM(S): 75 TABLET, FILM COATED ORAL at 12:11

## 2022-10-22 RX ADMIN — Medication 2: at 09:16

## 2022-10-22 RX ADMIN — INSULIN GLARGINE 24 UNIT(S): 100 INJECTION, SOLUTION SUBCUTANEOUS at 09:16

## 2022-10-22 RX ADMIN — Medication 7 UNIT(S): at 09:17

## 2022-10-22 NOTE — PROGRESS NOTE ADULT - SUBJECTIVE AND OBJECTIVE BOX
PROGRESS NOTE:     Patient is a 67y old  Male who presents with a chief complaint of LE pain (22 Oct 2022 01:51)      SUBJECTIVE / OVERNIGHT EVENTS: Still c/o leg pain.     ADDITIONAL REVIEW OF SYSTEMS:    MEDICATIONS  (STANDING):  amLODIPine   Tablet 5 milliGRAM(s) Oral daily  aspirin enteric coated 81 milliGRAM(s) Oral daily  ATENolol  Tablet 50 milliGRAM(s) Oral daily  atorvastatin 40 milliGRAM(s) Oral at bedtime  clopidogrel Tablet 75 milliGRAM(s) Oral daily  dextrose 5%. 1000 milliLiter(s) (100 mL/Hr) IV Continuous <Continuous>  dextrose 5%. 1000 milliLiter(s) (50 mL/Hr) IV Continuous <Continuous>  dextrose 50% Injectable 25 Gram(s) IV Push once  dextrose 50% Injectable 12.5 Gram(s) IV Push once  dextrose 50% Injectable 25 Gram(s) IV Push once  glucagon  Injectable 1 milliGRAM(s) IntraMuscular once  heparin  Infusion 1100 Unit(s)/Hr (11 mL/Hr) IV Continuous <Continuous>  insulin glargine Injectable (LANTUS) 24 Unit(s) SubCutaneous <User Schedule>  insulin lispro (ADMELOG) corrective regimen sliding scale   SubCutaneous three times a day before meals  insulin lispro (ADMELOG) corrective regimen sliding scale   SubCutaneous at bedtime  insulin lispro Injectable (ADMELOG) 7 Unit(s) SubCutaneous three times a day before meals    MEDICATIONS  (PRN):  acetaminophen     Tablet .. 650 milliGRAM(s) Oral every 6 hours PRN Temp greater or equal to 38C (100.4F), Mild Pain (1 - 3)  AQUAPHOR (petrolatum Ointment) 1 Application(s) Topical two times a day PRN for dry skin  dextrose Oral Gel 15 Gram(s) Oral once PRN Blood Glucose LESS THAN 70 milliGRAM(s)/deciliter      CAPILLARY BLOOD GLUCOSE      POCT Blood Glucose.: 247 mg/dL (22 Oct 2022 11:34)  POCT Blood Glucose.: 199 mg/dL (22 Oct 2022 09:07)  POCT Blood Glucose.: 166 mg/dL (22 Oct 2022 07:58)  POCT Blood Glucose.: 291 mg/dL (21 Oct 2022 21:26)  POCT Blood Glucose.: 150 mg/dL (21 Oct 2022 17:19)  POCT Blood Glucose.: 224 mg/dL (21 Oct 2022 12:31)    I&O's Summary      PHYSICAL EXAM:  Vital Signs Last 24 Hrs  T(C): 36.9 (22 Oct 2022 05:59), Max: 36.9 (22 Oct 2022 05:59)  T(F): 98.5 (22 Oct 2022 05:59), Max: 98.5 (22 Oct 2022 05:59)  HR: 82 (22 Oct 2022 05:59) (72 - 82)  BP: 120/60 (22 Oct 2022 05:59) (120/60 - 132/68)  BP(mean): --  RR: 18 (22 Oct 2022 05:59) (16 - 18)  SpO2: 99% (22 Oct 2022 05:59) (97% - 100%)    Parameters below as of 22 Oct 2022 05:59  Patient On (Oxygen Delivery Method): room air      GENERAL: NAD, well-developed  HEAD:  Atraumatic, Normocephalic, MMM  CHEST/LUNG: No use of accessory muscles, CTAB, breathing non-labored  COR: RR, no mrcg  ABD: Soft, ND/NT, +BS  PSYCH: AAOx3  NEUROLOGY: CN II-XII grossly intact, moving all extremities  SKIN: No rashes or lesions  EXT:  diminished DP pulses, no edema noted B/L     LABS:                        10.5   8.57  )-----------( 339      ( 22 Oct 2022 06:00 )             32.7     10-22    139  |  104  |  15  ----------------------------<  155<H>  3.5   |  23  |  0.80    Ca    9.2      22 Oct 2022 06:00  Phos  3.5     10-22  Mg     2.00     10-22      PT/INR - ( 22 Oct 2022 06:00 )   PT: 12.2 sec;   INR: 1.05 ratio         PTT - ( 22 Oct 2022 06:00 )  PTT:91.3 sec            RADIOLOGY & ADDITIONAL TESTS:  Results Reviewed:   Imaging Personally Reviewed:  Electrocardiogram Personally Reviewed:    COORDINATION OF CARE:  Care Discussed with Consultants/Other Providers [Y/N]:  Prior or Outpatient Records Reviewed [Y/N]:

## 2022-10-22 NOTE — PROGRESS NOTE ADULT - SUBJECTIVE AND OBJECTIVE BOX
Chief Complaint: Type 2 DM     History: Pt seen at bedside. Pt tolerating oral diet. Pt denies nausea and vomiting/any signs of hypoglycemia. Pt reports an adequate appetite.     MEDICATIONS  (STANDING):  amLODIPine   Tablet 5 milliGRAM(s) Oral daily  aspirin enteric coated 81 milliGRAM(s) Oral daily  ATENolol  Tablet 50 milliGRAM(s) Oral daily  atorvastatin 40 milliGRAM(s) Oral at bedtime  clopidogrel Tablet 75 milliGRAM(s) Oral daily  dextrose 5%. 1000 milliLiter(s) (100 mL/Hr) IV Continuous <Continuous>  dextrose 5%. 1000 milliLiter(s) (50 mL/Hr) IV Continuous <Continuous>  dextrose 50% Injectable 25 Gram(s) IV Push once  dextrose 50% Injectable 12.5 Gram(s) IV Push once  dextrose 50% Injectable 25 Gram(s) IV Push once  glucagon  Injectable 1 milliGRAM(s) IntraMuscular once  heparin  Infusion 1100 Unit(s)/Hr (11 mL/Hr) IV Continuous <Continuous>  insulin glargine Injectable (LANTUS) 24 Unit(s) SubCutaneous <User Schedule>  insulin lispro (ADMELOG) corrective regimen sliding scale   SubCutaneous three times a day before meals  insulin lispro (ADMELOG) corrective regimen sliding scale   SubCutaneous at bedtime  insulin lispro Injectable (ADMELOG) 9 Unit(s) SubCutaneous three times a day before meals    MEDICATIONS  (PRN):  acetaminophen     Tablet .. 650 milliGRAM(s) Oral every 6 hours PRN Temp greater or equal to 38C (100.4F), Mild Pain (1 - 3)  AQUAPHOR (petrolatum Ointment) 1 Application(s) Topical two times a day PRN for dry skin  dextrose Oral Gel 15 Gram(s) Oral once PRN Blood Glucose LESS THAN 70 milliGRAM(s)/deciliter      Allergies: Advil (Rash)    Review of Systems:  Respiratory: No SOB, no cough  GI: No nausea, vomiting, abdominal pain  Endocrine: no polyuria, polydipsia      PHYSICAL EXAM:  VITALS: T(C): 36.8 (10-22-22 @ 17:12)  T(F): 98.2 (10-22-22 @ 17:12), Max: 98.7 (10-22-22 @ 10:05)  HR: 74 (10-22-22 @ 17:12) (74 - 82)  BP: 135/74 (10-22-22 @ 17:12) (117/67 - 141/66)  RR:  (16 - 21)  SpO2:  (96% - 100%)  Wt(kg): --  GENERAL: NAD, well-groomed, well-developed  RESPIRATORY: No labored breathing   GI: Soft, nontender, non distended  PSYCH: Alert and oriented x 3, normal affect, normal mood      CAPILLARY BLOOD GLUCOSE  POCT Blood Glucose.: 199 mg/dL (22 Oct 2022 16:51)  POCT Blood Glucose.: 247 mg/dL (22 Oct 2022 11:34)  POCT Blood Glucose.: 199 mg/dL (22 Oct 2022 09:07)  POCT Blood Glucose.: 166 mg/dL (22 Oct 2022 07:58)  POCT Blood Glucose.: 291 mg/dL (21 Oct 2022 21:26)    A1C with Estimated Average Glucose (10.19.22 @ 03:40)    A1C with Estimated Average Glucose Result: 10.4      10-22    139  |  104  |  15  ----------------------------<  155<H>  3.5   |  23  |  0.80    eGFR: 96    Ca    9.2      10-22  Mg     2.00     10-22  Phos  3.5     10-22      Diet, DASH/TLC:   Sodium & Cholesterol Restricted  Consistent Carbohydrate No Snacks (CSTCHO) (10-19-22 @ 05:56) [Active]

## 2022-10-22 NOTE — PROGRESS NOTE ADULT - PROBLEM SELECTOR PLAN 3
Held ARB and chlorthalidone in anticipation of procedure.   Started on Amlodipine on admission     Continue Amlodipine and Atenolol   Monitor BP and up-titrate as needed

## 2022-10-22 NOTE — PROGRESS NOTE ADULT - PROBLEM SELECTOR PLAN 2
Uncontrolled DM2   A1C of 10.4  on admission  Continue Lantus 24 units in AM   Continue Lispro pre-meal 7 with ISS   Endo following  carb controlled diet

## 2022-10-22 NOTE — PROGRESS NOTE ADULT - SUBJECTIVE AND OBJECTIVE BOX
VASCULAR SURGERY PROGRESS NOTE    CC:   Hospital Day #  Post-Op Day #    Procedure:    Events of past 24 hours:    ROS otherwise negative except per subjective and HPI      Vital Signs Last 24 Hrs  T(C): 36.8 (21 Oct 2022 22:11), Max: 37 (21 Oct 2022 05:28)  T(F): 98.2 (21 Oct 2022 22:11), Max: 98.6 (21 Oct 2022 05:28)  HR: 78 (21 Oct 2022 22:11) (72 - 78)  BP: 129/72 (21 Oct 2022 22:11) (124/61 - 156/82)  BP(mean): --  RR: 17 (21 Oct 2022 22:11) (16 - 18)  SpO2: 98% (21 Oct 2022 22:11) (97% - 100%)    Parameters below as of 21 Oct 2022 22:11  Patient On (Oxygen Delivery Method): room air        Pain (0-10):            Pain Control Adequate: [] YES [] N    I&O's Detail      PHYSICAL EXAM    Appearance: Normal	  HEENT:   Normal oral mucosa, PERRL, EOMI	  Neck: Supple, - JVD; Carotid Bruit   Cardiovascular: Normal S1 S2, No JVD, No murmurs,   Respiratory: Lungs clear to auscultation/Decreased Breath Sounds/No Rales, Rhonchi, Wheezing	  Gastrointestinal:  Soft, Non-tender, + BS	  Skin: No rashes, No ecchymoses, No cyanosis  Extremities: Normal range of motion, No clubbing, cyanosis or edema  Neurologic: Non-focal  Psychiatry: A & O x 3, Mood & affect appropriate    PULSES:  Femoral:  Popliteal:  Dorsal Pedal:  Posterior Tibial:  Capillary:      MEDICATIONS:   MEDICATIONS  (STANDING):  amLODIPine   Tablet 5 milliGRAM(s) Oral daily  aspirin enteric coated 81 milliGRAM(s) Oral daily  ATENolol  Tablet 50 milliGRAM(s) Oral daily  atorvastatin 40 milliGRAM(s) Oral at bedtime  clopidogrel Tablet 75 milliGRAM(s) Oral daily  dextrose 5%. 1000 milliLiter(s) (100 mL/Hr) IV Continuous <Continuous>  dextrose 5%. 1000 milliLiter(s) (50 mL/Hr) IV Continuous <Continuous>  dextrose 50% Injectable 25 Gram(s) IV Push once  dextrose 50% Injectable 12.5 Gram(s) IV Push once  dextrose 50% Injectable 25 Gram(s) IV Push once  glucagon  Injectable 1 milliGRAM(s) IntraMuscular once  heparin  Infusion 1100 Unit(s)/Hr (11 mL/Hr) IV Continuous <Continuous>  insulin glargine Injectable (LANTUS) 24 Unit(s) SubCutaneous <User Schedule>  insulin lispro (ADMELOG) corrective regimen sliding scale   SubCutaneous three times a day before meals  insulin lispro (ADMELOG) corrective regimen sliding scale   SubCutaneous at bedtime  insulin lispro Injectable (ADMELOG) 7 Unit(s) SubCutaneous three times a day before meals    MEDICATIONS  (PRN):  acetaminophen     Tablet .. 650 milliGRAM(s) Oral every 6 hours PRN Temp greater or equal to 38C (100.4F), Mild Pain (1 - 3)  AQUAPHOR (petrolatum Ointment) 1 Application(s) Topical two times a day PRN for dry skin  dextrose Oral Gel 15 Gram(s) Oral once PRN Blood Glucose LESS THAN 70 milliGRAM(s)/deciliter      LAB/STUDIES:                        10.4   7.76  )-----------( 331      ( 21 Oct 2022 06:08 )             32.8     10-21    137  |  100  |  15  ----------------------------<  217<H>  3.5   |  25  |  0.76    Ca    9.2      21 Oct 2022 06:08  Phos  3.2     10-21  Mg     1.90     10-21      PTT - ( 21 Oct 2022 18:51 )  PTT:76.3 sec                      IMAGING:       VASCULAR SURGERY PROGRESS NOTE        Procedure: Plan for LLE angio monday    Patient seen and evaluated on morning rounds. Pain controlled, only hurts in the leg when standing or moving. Denies fevers, chills, chest pain, dyspnea, headache, nausea, emesis.      Events of past 24 hours:    ROS otherwise negative except per subjective and HPI      Vital Signs Last 24 Hrs  T(C): 36.8 (21 Oct 2022 22:11), Max: 37 (21 Oct 2022 05:28)  T(F): 98.2 (21 Oct 2022 22:11), Max: 98.6 (21 Oct 2022 05:28)  HR: 78 (21 Oct 2022 22:11) (72 - 78)  BP: 129/72 (21 Oct 2022 22:11) (124/61 - 156/82)  BP(mean): --  RR: 17 (21 Oct 2022 22:11) (16 - 18)  SpO2: 98% (21 Oct 2022 22:11) (97% - 100%)    Parameters below as of 21 Oct 2022 22:11  Patient On (Oxygen Delivery Method): room air        Pain (0-10):            Pain Control Adequate: [] YES [] N    I&O's Detail      PHYSICAL EXAM  Appearance: NAD  Cardiovascular: Normal S1 S2, No JVD, No murmurs,   Respiratory: Lungs clear to auscultation/Decreased Breath Sounds/No Rales, Rhonchi, Wheezing	  Gastrointestinal:  Soft, Non-tender, + BS	  Skin: No rashes, No ecchymoses, No cyanosis  Extremities: Normal range of motion, No clubbing, cyanosis or edema.  Neurologic: Non-focal, sensation intact.     PULSES:  Dorsal Pedal: LEFT - signal, RIGHT - palpable.   Posterior Tibial: No signal     MEDICATIONS:   MEDICATIONS  (STANDING):  amLODIPine   Tablet 5 milliGRAM(s) Oral daily  aspirin enteric coated 81 milliGRAM(s) Oral daily  ATENolol  Tablet 50 milliGRAM(s) Oral daily  atorvastatin 40 milliGRAM(s) Oral at bedtime  clopidogrel Tablet 75 milliGRAM(s) Oral daily  dextrose 5%. 1000 milliLiter(s) (100 mL/Hr) IV Continuous <Continuous>  dextrose 5%. 1000 milliLiter(s) (50 mL/Hr) IV Continuous <Continuous>  dextrose 50% Injectable 25 Gram(s) IV Push once  dextrose 50% Injectable 12.5 Gram(s) IV Push once  dextrose 50% Injectable 25 Gram(s) IV Push once  glucagon  Injectable 1 milliGRAM(s) IntraMuscular once  heparin  Infusion 1100 Unit(s)/Hr (11 mL/Hr) IV Continuous <Continuous>  insulin glargine Injectable (LANTUS) 24 Unit(s) SubCutaneous <User Schedule>  insulin lispro (ADMELOG) corrective regimen sliding scale   SubCutaneous three times a day before meals  insulin lispro (ADMELOG) corrective regimen sliding scale   SubCutaneous at bedtime  insulin lispro Injectable (ADMELOG) 7 Unit(s) SubCutaneous three times a day before meals    MEDICATIONS  (PRN):  acetaminophen     Tablet .. 650 milliGRAM(s) Oral every 6 hours PRN Temp greater or equal to 38C (100.4F), Mild Pain (1 - 3)  AQUAPHOR (petrolatum Ointment) 1 Application(s) Topical two times a day PRN for dry skin  dextrose Oral Gel 15 Gram(s) Oral once PRN Blood Glucose LESS THAN 70 milliGRAM(s)/deciliter      LAB/STUDIES:                        10.4   7.76  )-----------( 331      ( 21 Oct 2022 06:08 )             32.8     10-21    137  |  100  |  15  ----------------------------<  217<H>  3.5   |  25  |  0.76    Ca    9.2      21 Oct 2022 06:08  Phos  3.2     10-21  Mg     1.90     10-21      PTT - ( 21 Oct 2022 18:51 )  PTT:76.3 sec                      IMAGING:

## 2022-10-22 NOTE — PROGRESS NOTE ADULT - PROBLEM SELECTOR PLAN 1
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 Severe, disabling claudication in LLE, moderate claudication in RLE with nearly occluded R EIA  Continue Aspirin and statin, would continue Plavix   Continue heparin gtt and monitor PTT  As per Vascular team: plan for bilateral lower extremity angiogram, possible revascularization on Monday

## 2022-10-22 NOTE — PROGRESS NOTE ADULT - ASSESSMENT
67M w/ PMH of HTN, HLD, DM2, and PAD s/p angiogram and stent in May 2022 who is presenting due to b/l LE pain.       - pain control PRN   - angiogram planning for Monday  - Patient consent obtained for procedure  - F/u echo results today  - Medicine documented clearance for procedure  - continue Hep gtt.   - continue home meds   - ISS   - DVT ppx         C Team Surgery   [id number]

## 2022-10-22 NOTE — PROGRESS NOTE ADULT - ASSESSMENT
Patient is a 67 year old male hx of HTN, HLD, DM2, and PAD s/p angiogram and stent in May 2022 who is presenting due to b/l LE pain. vascular surgery following given concern for dissection and PAD.  endocrine called today for DM and a1c 10.4  home meds:  Janumet 50-1000mg BID and pioglitazone 30mg daily    1. DM2 - HbA1c 10.4%  Inpatient plan:  BG target 100-180 mg/dl; fasting stable; above goal pre-prandial   above goal  Increase Lantus to 24 units q8AM  Increase Admelog 9 units premeal TID (please hold if npo/not eating_  Continue moderate dose correction premeals and moderate dose scale bedtime  - Carb Consistent Diet   - Nutrition consult   - Provider to RN for diabetes/insulin pen teaching   [place] planning:  Add basal insulin pen dose TBD, check insurance preference plus resume oral agents Janumet 50-1000mg BID and Pioglitazone 30mg daily  New to insulin pen at [place].  BD carol pen needles  ensure has glucometer and supplies  Please send Lantus solostar pen as test script to check insurance coverage.  Ok to send with current doses and update prior to d/c    If Lantus not covered- can try alternating with one of following   tresiba/basaglar/toujeo/Levemir  Ensure patient has working glucometer, test strips, lancets, alcohol pads, and BD carol pen needles  Please also prescribe glucose tabs, Baqsimi nasal spray or glucagon emergency kit for hypoglycemia risk   Follow up at [facility], appointments are set [phone].  diabetes educator 12/23/22 at 11:30AM at [address]  Dr. Stacey Mejia 2/8/23 at 11:20AM at [address]      2. HTN  goal BP in DM <130/80  on norvasc  outpt mc/cr ratio      3. HLD  on lipitor 40mg   outpt lipid panel      4. Hypokalemia  insulin can lower K so please replete and follow K accordingly     Tati Baeza  Nurse Practitioner  Division of Endocrinology & Diabetes  In house pager #[phone]    If before 9AM or after 6PM, or on weekends/holidays, please call endocrine answering service for assistance ([phone]).For nonurgent matters email [email or web address] for assistance.

## 2022-10-23 ENCOUNTER — TRANSCRIPTION ENCOUNTER (OUTPATIENT)
Age: 67
End: 2022-10-23

## 2022-10-23 LAB
ANION GAP SERPL CALC-SCNC: 12 MMOL/L — SIGNIFICANT CHANGE UP (ref 7–14)
BUN SERPL-MCNC: 12 MG/DL — SIGNIFICANT CHANGE UP (ref 7–23)
CALCIUM SERPL-MCNC: 9.2 MG/DL — SIGNIFICANT CHANGE UP (ref 8.4–10.5)
CHLORIDE SERPL-SCNC: 101 MMOL/L — SIGNIFICANT CHANGE UP (ref 98–107)
CO2 SERPL-SCNC: 22 MMOL/L — SIGNIFICANT CHANGE UP (ref 22–31)
CREAT SERPL-MCNC: 0.84 MG/DL — SIGNIFICANT CHANGE UP (ref 0.5–1.3)
EGFR: 94 ML/MIN/1.73M2 — SIGNIFICANT CHANGE UP
GLUCOSE BLDC GLUCOMTR-MCNC: 175 MG/DL — HIGH (ref 70–99)
GLUCOSE BLDC GLUCOMTR-MCNC: 179 MG/DL — HIGH (ref 70–99)
GLUCOSE BLDC GLUCOMTR-MCNC: 271 MG/DL — HIGH (ref 70–99)
GLUCOSE BLDC GLUCOMTR-MCNC: 314 MG/DL — HIGH (ref 70–99)
GLUCOSE SERPL-MCNC: 159 MG/DL — HIGH (ref 70–99)
HCT VFR BLD CALC: 31.2 % — LOW (ref 39–50)
HGB BLD-MCNC: 9.9 G/DL — LOW (ref 13–17)
MAGNESIUM SERPL-MCNC: 2.2 MG/DL — SIGNIFICANT CHANGE UP (ref 1.6–2.6)
MCHC RBC-ENTMCNC: 26.1 PG — LOW (ref 27–34)
MCHC RBC-ENTMCNC: 31.7 GM/DL — LOW (ref 32–36)
MCV RBC AUTO: 82.1 FL — SIGNIFICANT CHANGE UP (ref 80–100)
NRBC # BLD: 0 /100 WBCS — SIGNIFICANT CHANGE UP (ref 0–0)
NRBC # FLD: 0 K/UL — SIGNIFICANT CHANGE UP (ref 0–0)
PHOSPHATE SERPL-MCNC: 3.3 MG/DL — SIGNIFICANT CHANGE UP (ref 2.5–4.5)
PLATELET # BLD AUTO: 311 K/UL — SIGNIFICANT CHANGE UP (ref 150–400)
POTASSIUM SERPL-MCNC: 3.7 MMOL/L — SIGNIFICANT CHANGE UP (ref 3.5–5.3)
POTASSIUM SERPL-SCNC: 3.7 MMOL/L — SIGNIFICANT CHANGE UP (ref 3.5–5.3)
RBC # BLD: 3.8 M/UL — LOW (ref 4.2–5.8)
RBC # FLD: 14.9 % — HIGH (ref 10.3–14.5)
SARS-COV-2 RNA SPEC QL NAA+PROBE: SIGNIFICANT CHANGE UP
SODIUM SERPL-SCNC: 135 MMOL/L — SIGNIFICANT CHANGE UP (ref 135–145)
WBC # BLD: 9.17 K/UL — SIGNIFICANT CHANGE UP (ref 3.8–10.5)
WBC # FLD AUTO: 9.17 K/UL — SIGNIFICANT CHANGE UP (ref 3.8–10.5)

## 2022-10-23 PROCEDURE — 99233 SBSQ HOSP IP/OBS HIGH 50: CPT

## 2022-10-23 PROCEDURE — 99232 SBSQ HOSP IP/OBS MODERATE 35: CPT | Mod: 57

## 2022-10-23 PROCEDURE — 99223 1ST HOSP IP/OBS HIGH 75: CPT

## 2022-10-23 PROCEDURE — 99232 SBSQ HOSP IP/OBS MODERATE 35: CPT

## 2022-10-23 RX ORDER — INSULIN GLARGINE 100 [IU]/ML
20 INJECTION, SOLUTION SUBCUTANEOUS
Refills: 0 | Status: DISCONTINUED | OUTPATIENT
Start: 2022-10-24 | End: 2022-10-24

## 2022-10-23 RX ORDER — INSULIN LISPRO 100/ML
11 VIAL (ML) SUBCUTANEOUS
Refills: 0 | Status: DISCONTINUED | OUTPATIENT
Start: 2022-10-23 | End: 2022-10-25

## 2022-10-23 RX ORDER — POTASSIUM CHLORIDE 20 MEQ
20 PACKET (EA) ORAL ONCE
Refills: 0 | Status: COMPLETED | OUTPATIENT
Start: 2022-10-23 | End: 2022-10-23

## 2022-10-23 RX ORDER — SODIUM CHLORIDE 9 MG/ML
1000 INJECTION, SOLUTION INTRAVENOUS
Refills: 0 | Status: DISCONTINUED | OUTPATIENT
Start: 2022-10-23 | End: 2022-10-25

## 2022-10-23 RX ADMIN — Medication 6: at 12:05

## 2022-10-23 RX ADMIN — Medication 11 UNIT(S): at 17:10

## 2022-10-23 RX ADMIN — HEPARIN SODIUM 11 UNIT(S)/HR: 5000 INJECTION INTRAVENOUS; SUBCUTANEOUS at 20:15

## 2022-10-23 RX ADMIN — AMLODIPINE BESYLATE 5 MILLIGRAM(S): 2.5 TABLET ORAL at 05:33

## 2022-10-23 RX ADMIN — Medication 650 MILLIGRAM(S): at 18:21

## 2022-10-23 RX ADMIN — HEPARIN SODIUM 11 UNIT(S)/HR: 5000 INJECTION INTRAVENOUS; SUBCUTANEOUS at 08:27

## 2022-10-23 RX ADMIN — Medication 2: at 17:10

## 2022-10-23 RX ADMIN — CLOPIDOGREL BISULFATE 75 MILLIGRAM(S): 75 TABLET, FILM COATED ORAL at 11:41

## 2022-10-23 RX ADMIN — Medication 650 MILLIGRAM(S): at 19:21

## 2022-10-23 RX ADMIN — HEPARIN SODIUM 11 UNIT(S)/HR: 5000 INJECTION INTRAVENOUS; SUBCUTANEOUS at 18:20

## 2022-10-23 RX ADMIN — Medication 81 MILLIGRAM(S): at 11:40

## 2022-10-23 RX ADMIN — INSULIN GLARGINE 24 UNIT(S): 100 INJECTION, SOLUTION SUBCUTANEOUS at 08:05

## 2022-10-23 RX ADMIN — Medication 650 MILLIGRAM(S): at 05:33

## 2022-10-23 RX ADMIN — ATENOLOL 50 MILLIGRAM(S): 25 TABLET ORAL at 05:33

## 2022-10-23 RX ADMIN — Medication 9 UNIT(S): at 12:06

## 2022-10-23 RX ADMIN — Medication 9 UNIT(S): at 08:04

## 2022-10-23 RX ADMIN — ATORVASTATIN CALCIUM 40 MILLIGRAM(S): 80 TABLET, FILM COATED ORAL at 21:59

## 2022-10-23 RX ADMIN — Medication 2: at 08:04

## 2022-10-23 RX ADMIN — Medication 4: at 21:59

## 2022-10-23 RX ADMIN — Medication 20 MILLIEQUIVALENT(S): at 13:56

## 2022-10-23 NOTE — PROGRESS NOTE ADULT - SUBJECTIVE AND OBJECTIVE BOX
Chief Complaint:     History:    MEDICATIONS  (STANDING):  amLODIPine   Tablet 5 milliGRAM(s) Oral daily  aspirin enteric coated 81 milliGRAM(s) Oral daily  ATENolol  Tablet 50 milliGRAM(s) Oral daily  atorvastatin 40 milliGRAM(s) Oral at bedtime  clopidogrel Tablet 75 milliGRAM(s) Oral daily  dextrose 5% + sodium chloride 0.9%. 1000 milliLiter(s) (110 mL/Hr) IV Continuous <Continuous>  dextrose 5%. 1000 milliLiter(s) (100 mL/Hr) IV Continuous <Continuous>  dextrose 5%. 1000 milliLiter(s) (50 mL/Hr) IV Continuous <Continuous>  dextrose 50% Injectable 25 Gram(s) IV Push once  dextrose 50% Injectable 12.5 Gram(s) IV Push once  dextrose 50% Injectable 25 Gram(s) IV Push once  glucagon  Injectable 1 milliGRAM(s) IntraMuscular once  heparin  Infusion 1100 Unit(s)/Hr (11 mL/Hr) IV Continuous <Continuous>  insulin glargine Injectable (LANTUS) 24 Unit(s) SubCutaneous <User Schedule>  insulin lispro (ADMELOG) corrective regimen sliding scale   SubCutaneous three times a day before meals  insulin lispro (ADMELOG) corrective regimen sliding scale   SubCutaneous at bedtime  insulin lispro Injectable (ADMELOG) 11 Unit(s) SubCutaneous three times a day before meals    MEDICATIONS  (PRN):  acetaminophen     Tablet .. 650 milliGRAM(s) Oral every 6 hours PRN Temp greater or equal to 38C (100.4F), Mild Pain (1 - 3)  AQUAPHOR (petrolatum Ointment) 1 Application(s) Topical two times a day PRN for dry skin  dextrose Oral Gel 15 Gram(s) Oral once PRN Blood Glucose LESS THAN 70 milliGRAM(s)/deciliter      Allergies    Advil (Rash)    Intolerances      Review of Systems:  Constitutional: No fever  Eyes: No blurry vision  Neuro: No tremors  HEENT: No pain  Cardiovascular: No chest pain, palpitations  Respiratory: No SOB, no cough  GI: No nausea, vomiting, abdominal pain  : No dysuria  Skin: no rash  Psych: no depression  Endocrine: no polyuria, polydipsia  Hem/lymph: no swelling  Osteoporosis: no fractures    ALL OTHER SYSTEMS REVIEWED AND NEGATIVE    UNABLE TO OBTAIN    PHYSICAL EXAM:  VITALS: T(C): 37 (10-23-22 @ 09:30)  T(F): 98.6 (10-23-22 @ 09:30), Max: 98.6 (10-23-22 @ 09:30)  HR: 93 (10-23-22 @ 09:30) (77 - 93)  BP: 127/87 (10-23-22 @ 09:30) (127/87 - 148/66)  RR:  (16 - 20)  SpO2:  (98% - 100%)  Wt(kg): --  GENERAL: NAD, well-groomed, well-developed  EYES: No proptosis, no lid lag, anicteric  HEENT:  Atraumatic, Normocephalic, moist mucous membranes  THYROID: Normal size, no palpable nodules  RESPIRATORY: Clear to auscultation bilaterally; No rales, rhonchi, wheezing  CARDIOVASCULAR: Regular rate and rhythm; No murmurs; no peripheral edema  GI: Soft, nontender, non distended  SKIN: Dry, intact, No rashes or lesions  MUSCULOSKELETAL: Full range of motion, normal strength  NEURO: extraocular movements intact, no tremor  PSYCH: Alert and oriented x 3, normal affect, normal mood      CAPILLARY BLOOD GLUCOSE  POCT Blood Glucose.: 175 mg/dL (23 Oct 2022 17:02)  POCT Blood Glucose.: 271 mg/dL (23 Oct 2022 11:50)  POCT Blood Glucose.: 179 mg/dL (23 Oct 2022 07:58)  POCT Blood Glucose.: 317 mg/dL (22 Oct 2022 21:20)      10-23    135  |  101  |  12  ----------------------------<  159<H>  3.7   |  22  |  0.84    eGFR: 94    Ca    9.2      10-23  Mg     2.20     10-23  Phos  3.3     10-23            Thyroid Function Tests:                       Chief Complaint: Type 2 DM     History: Pt seen at bedside. Pt tolerating oral diet. Pt denies nausea and vomiting/any signs of hypoglycemia. Pt reports an adequate appetite.     MEDICATIONS  (STANDING):  amLODIPine   Tablet 5 milliGRAM(s) Oral daily  aspirin enteric coated 81 milliGRAM(s) Oral daily  ATENolol  Tablet 50 milliGRAM(s) Oral daily  atorvastatin 40 milliGRAM(s) Oral at bedtime  clopidogrel Tablet 75 milliGRAM(s) Oral daily  dextrose 5% + sodium chloride 0.9%. 1000 milliLiter(s) (110 mL/Hr) IV Continuous <Continuous>  dextrose 5%. 1000 milliLiter(s) (100 mL/Hr) IV Continuous <Continuous>  dextrose 5%. 1000 milliLiter(s) (50 mL/Hr) IV Continuous <Continuous>  dextrose 50% Injectable 25 Gram(s) IV Push once  dextrose 50% Injectable 12.5 Gram(s) IV Push once  dextrose 50% Injectable 25 Gram(s) IV Push once  glucagon  Injectable 1 milliGRAM(s) IntraMuscular once  heparin  Infusion 1100 Unit(s)/Hr (11 mL/Hr) IV Continuous <Continuous>  insulin glargine Injectable (LANTUS) 24 Unit(s) SubCutaneous <User Schedule>  insulin lispro (ADMELOG) corrective regimen sliding scale   SubCutaneous three times a day before meals  insulin lispro (ADMELOG) corrective regimen sliding scale   SubCutaneous at bedtime  insulin lispro Injectable (ADMELOG) 11 Unit(s) SubCutaneous three times a day before meals    MEDICATIONS  (PRN):  acetaminophen     Tablet .. 650 milliGRAM(s) Oral every 6 hours PRN Temp greater or equal to 38C (100.4F), Mild Pain (1 - 3)  AQUAPHOR (petrolatum Ointment) 1 Application(s) Topical two times a day PRN for dry skin  dextrose Oral Gel 15 Gram(s) Oral once PRN Blood Glucose LESS THAN 70 milliGRAM(s)/deciliter      Allergies: Advil (Rash)      Review of Systems:  Respiratory: No SOB, no cough  GI: No nausea, vomiting, abdominal pain  Endocrine: no polyuria, polydipsia    PHYSICAL EXAM:  VITALS: T(C): 37 (10-23-22 @ 09:30)  T(F): 98.6 (10-23-22 @ 09:30), Max: 98.6 (10-23-22 @ 09:30)  HR: 93 (10-23-22 @ 09:30) (77 - 93)  BP: 127/87 (10-23-22 @ 09:30) (127/87 - 148/66)  RR:  (16 - 20)  SpO2:  (98% - 100%)  Wt(kg): --  GENERAL: NAD, well-groomed, well-developed  RESPIRATORY: No labored breathing   PSYCH: Alert and oriented x 3, normal affect, normal mood      CAPILLARY BLOOD GLUCOSE  POCT Blood Glucose.: 175 mg/dL (23 Oct 2022 17:02)  POCT Blood Glucose.: 271 mg/dL (23 Oct 2022 11:50)  POCT Blood Glucose.: 179 mg/dL (23 Oct 2022 07:58)  POCT Blood Glucose.: 317 mg/dL (22 Oct 2022 21:20)      10-23    135  |  101  |  12  ----------------------------<  159<H>  3.7   |  22  |  0.84    eGFR: 94    Ca    9.2      10-23  Mg     2.20     10-23  Phos  3.3     10-23        Diet, NPO after Midnight:      NPO Start Date: 23-Oct-2022,   NPO Start Time: 23:59 (10-23-22 @ 06:55) [Active]  Diet, DASH/TLC:   Sodium & Cholesterol Restricted  Consistent Carbohydrate No Snacks (CSTCHO) (10-19-22 @ 05:56) [Active]

## 2022-10-23 NOTE — PROGRESS NOTE ADULT - ASSESSMENT
67M w/ PMH of HTN, HLD, DM2, and PAD s/p angiogram and stent in May 2022 who is presenting due to b/l LE pain.       - pain control PRN   - angiogram planning for Monday  - Patient consent obtained for procedure  - F/u echo results today  - Medicine documented clearance for procedure  - continue Hep gtt.   - continue home meds   - ISS   - DVT ppx         C Team Surgery   [id number] 67M w/ PMH of HTN, HLD, DM2, and PAD s/p angiogram and stent in May 2022 who is presenting due to b/l LE pain.       - Pre-op for Monday 10/24  - pain control PRN   - angiogram planning for Monday  - Patient consent obtained for procedure  - Echo results normal   - Medicine documented clearance for procedure  - continue Hep gtt, hold on call to OR  - continue home meds   - ISS   - DVT ppx         C Team Surgery   [id number] 67M w/ PMH of HTN, HLD, DM2, and PAD s/p angiogram and stent in May 2022 who is presenting due to b/l LE pain.       - Pre-op for Monday 10/24  - pain control PRN   - angiogram planning for Monday  - Patient consent obtained for procedure  - Echo results normal   - Medicine documented clearance for procedure  - Cardiology contacted 10/23 confirmed will evaluate patient today  - continue Hep gtt, hold on call to OR  - continue home meds   - ISS   - DVT ppx         C Team Surgery   [id number] 67M w/ PMH of HTN, HLD, DM2, and PAD s/p angiogram and stent in May 2022 who is presenting due to b/l LE pain.       - Pre-op for Monday 10/24  - pain control PRN   - Patient consent obtained for procedure  - Medicine documented clearance for procedure  - Cardiology contacted 10/23 confirmed will evaluate patient today, echo results nonsignificant  - continue Hep gtt, hold on call to OR  - continue home meds   - ISS   - DVT ppx         C Team Surgery   [id number]

## 2022-10-23 NOTE — PROGRESS NOTE ADULT - PROBLEM SELECTOR PLAN 2
Uncontrolled DM2   A1C of 10.4  on admission  Continue Lantus 24 units in AM   Continue Lispro pre-meal 9 with ISS   Endo following  carb controlled diet

## 2022-10-23 NOTE — CONSULT NOTE ADULT - SUBJECTIVE AND OBJECTIVE BOX
Patient seen and evaluated at bedside    Reason for consult:    HPI:  Patient is a 67 year old male hx of HTN, HLD, DM2, and PAD s/p angiogram and stent in May 2022 who is presenting due to b/l LE pain. The pain is worse with ambulation. He states that is is on the calf towards the distal end. He has had this happen for several weeks and was planning on getting BRITTNEY checks b/l LE w/ his vascular.  He denies cp or palpitations, nausea, vomiting, diarrhea, headaches, visual changes. He is able to walk several blocks without cp or palpitations, though he does get LE pain.     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   EKG no acute ST elevation or pathologic Q waves. Qtc and HI intervals wnl  troponin wnl, vascular was consulted in ED.   (19 Oct 2022 06:03)    cardiology consulted for preop risk stratification/optimization prior to planned angiogram +/- stent.     PMHx:   HTN (hypertension)    HLD (hyperlipidemia)    DM2 (diabetes mellitus, type 2)        PSHx:   Abnormal angiogram        Allergies:  Advil (Rash)      Home Meds:    Current Medications:   acetaminophen     Tablet .. 650 milliGRAM(s) Oral every 6 hours PRN  amLODIPine   Tablet 5 milliGRAM(s) Oral daily  AQUAPHOR (petrolatum Ointment) 1 Application(s) Topical two times a day PRN  aspirin enteric coated 81 milliGRAM(s) Oral daily  ATENolol  Tablet 50 milliGRAM(s) Oral daily  atorvastatin 40 milliGRAM(s) Oral at bedtime  clopidogrel Tablet 75 milliGRAM(s) Oral daily  dextrose 5% + sodium chloride 0.9%. 1000 milliLiter(s) IV Continuous <Continuous>  dextrose 5%. 1000 milliLiter(s) IV Continuous <Continuous>  dextrose 5%. 1000 milliLiter(s) IV Continuous <Continuous>  dextrose 50% Injectable 25 Gram(s) IV Push once  dextrose 50% Injectable 12.5 Gram(s) IV Push once  dextrose 50% Injectable 25 Gram(s) IV Push once  dextrose Oral Gel 15 Gram(s) Oral once PRN  glucagon  Injectable 1 milliGRAM(s) IntraMuscular once  heparin  Infusion 1100 Unit(s)/Hr IV Continuous <Continuous>  insulin glargine Injectable (LANTUS) 24 Unit(s) SubCutaneous <User Schedule>  insulin lispro (ADMELOG) corrective regimen sliding scale   SubCutaneous three times a day before meals  insulin lispro (ADMELOG) corrective regimen sliding scale   SubCutaneous at bedtime  insulin lispro Injectable (ADMELOG) 9 Unit(s) SubCutaneous three times a day before meals      FAMILY HISTORY:  No significant family history (Father)        Social History:  Smoking History:  Alcohol Use:  Drug Use:    Review of Systems:  REVIEW OF SYSTEMS:  CONSTITUTIONAL: No weakness, fevers or chills  EYES/ENT: No visual changes;  No dysphagia  NECK: No pain or stiffness  RESPIRATORY: No cough, wheezing, hemoptysis; No shortness of breath  CARDIOVASCULAR: No chest pain or palpitations; No lower extremity edema  GASTROINTESTINAL: No abdominal or epigastric pain. No nausea, vomiting, or hematemesis; No diarrhea or constipation. No melena or hematochezia.  BACK: No back pain  GENITOURINARY: No dysuria, frequency or hematuria  NEUROLOGICAL: No numbness or weakness  SKIN: No itching, burning, rashes, or lesions   All other review of systems is negative unless indicated above.    [ ] All other systems negative  [ ] Unable to assess ROS due to    Physical Exam:  T(F): 98.1 (10-23), Max: 98.2 (10-22)  HR: 78 (10-23) (74 - 81)  BP: 133/65 (10-23) (129/63 - 148/66)  RR: 18 (10-23)  SpO2: 100% (10-23)  GENERAL: No acute distress, well-developed  HEAD:  Atraumatic, Normocephalic  ENT: EOMI, PERRLA, conjunctiva and sclera clear, Neck supple, No JVD, moist mucosa  CHEST/LUNG: Clear to auscultation bilaterally; No wheeze, equal breath sounds bilaterally   BACK: No spinal tenderness  HEART: Regular rate and rhythm; No murmurs, rubs, or gallops  ABDOMEN: Soft, Nontender, Nondistended; Bowel sounds present  EXTREMITIES:  No clubbing, cyanosis, or edema  PSYCH: Nl behavior, nl affect  NEUROLOGY: AAOx3, non-focal, cranial nerves intact  SKIN: Normal color, No rashes or lesions  LINES:    Cardiovascular Diagnostic Testing:    CXR: Personally reviewed    Labs: Personally reviewed                        9.9 9.17  )-----------( 311      ( 23 Oct 2022 06:40 )             31.2     10-23    135  |  101  |  12  ----------------------------<  159<H>  3.7   |  22  |  0.84    Ca    9.2      23 Oct 2022 06:40  Phos  3.3     10-23  Mg     2.20     10-23      PT/INR - ( 22 Oct 2022 06:00 )   PT: 12.2 sec;   INR: 1.05 ratio         PTT - ( 22 Oct 2022 06:00 )  PTT:91.3 sec         Patient seen and evaluated at bedside    Reason for consult:    HPI:  Patient is a 67 year old male hx of HTN, HLD, DM2, and PAD s/p angiogram and stent in May 2022 who is presenting due to b/l LE pain. The pain is worse with ambulation. He states that is is on the calf towards the distal end. He has had this happen for several weeks and was planning on getting BRITTNEY checks b/l LE w/ his vascular.  He denies cp or palpitations, nausea, vomiting, diarrhea, headaches, visual changes. He is able to walk several blocks without cp or palpitations, though he does get LE pain.     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   EKG no acute ST elevation or pathologic Q waves. Qtc and [place] intervals wnl  troponin wnl, vascular was consulted in ED.   (19 Oct 2022 06:03)    cardiology consulted for preop risk stratification/optimization prior to planned angiogram +/- stent. At bedside on interview, denies fevers, chills, chest pain, dyspnea, headache, nausea, emesis.     PMHx:   HTN (hypertension)    HLD (hyperlipidemia)    DM2 (diabetes mellitus, type 2)        PSHx:   Abnormal angiogram        Allergies:  Advil (Rash)      Home Meds:    Current Medications:   acetaminophen     Tablet .. 650 milliGRAM(s) Oral every 6 hours PRN  amLODIPine   Tablet 5 milliGRAM(s) Oral daily  AQUAPHOR (petrolatum Ointment) 1 Application(s) Topical two times a day PRN  aspirin enteric coated 81 milliGRAM(s) Oral daily  ATENolol  Tablet 50 milliGRAM(s) Oral daily  atorvastatin 40 milliGRAM(s) Oral at bedtime  clopidogrel Tablet 75 milliGRAM(s) Oral daily  dextrose 5% + sodium chloride 0.9%. 1000 milliLiter(s) IV Continuous <Continuous>  dextrose 5%. 1000 milliLiter(s) IV Continuous <Continuous>  dextrose 5%. 1000 milliLiter(s) IV Continuous <Continuous>  dextrose 50% Injectable 25 Gram(s) IV Push once  dextrose 50% Injectable 12.5 Gram(s) IV Push once  dextrose 50% Injectable 25 Gram(s) IV Push once  dextrose Oral Gel 15 Gram(s) Oral once PRN  glucagon  Injectable 1 milliGRAM(s) IntraMuscular once  heparin  Infusion 1100 Unit(s)/Hr IV Continuous <Continuous>  insulin glargine Injectable (LANTUS) 24 Unit(s) SubCutaneous <User Schedule>  insulin lispro (ADMELOG) corrective regimen sliding scale   SubCutaneous three times a day before meals  insulin lispro (ADMELOG) corrective regimen sliding scale   SubCutaneous at bedtime  insulin lispro Injectable (ADMELOG) 9 Unit(s) SubCutaneous three times a day before meals      FAMILY HISTORY:  No significant family history (Father)        Social History:  Smoking History:  Alcohol Use:  Drug Use:    Review of Systems:  REVIEW OF SYSTEMS:  CONSTITUTIONAL: No weakness, fevers or chills  EYES/ENT: No visual changes;  No dysphagia  NECK: No pain or stiffness  RESPIRATORY: No cough, wheezing, hemoptysis; No shortness of breath  CARDIOVASCULAR: No chest pain or palpitations; No lower extremity edema  GASTROINTESTINAL: No abdominal or epigastric pain. No nausea, vomiting, or hematemesis; No diarrhea or constipation. No melena or hematochezia.  BACK: No back pain  GENITOURINARY: No dysuria, frequency or hematuria  NEUROLOGICAL: No numbness or weakness  SKIN: No itching, burning, rashes, or lesions   All other review of systems is negative unless indicated above.    [ ] All other systems negative  [ ] Unable to assess ROS due to    Physical Exam:  T(F): 98.1 (10-23), Max: 98.2 (10-22)  HR: 78 (10-23) (74 - 81)  BP: 133/65 (10-23) (129/63 - 148/66)  RR: 18 (10-23)  SpO2: 100% (10-23)  GENERAL: No acute distress, well-developed  HEAD:  Atraumatic, Normocephalic  ENT: EOMI, PERRLA, conjunctiva and sclera clear, Neck supple, No JVD, moist mucosa  CHEST/LUNG: Clear to auscultation bilaterally; No wheeze, equal breath sounds bilaterally   BACK: No spinal tenderness  HEART: Regular rate and rhythm; No murmurs, rubs, or gallops  ABDOMEN: Soft, Nontender, Nondistended; Bowel sounds present  EXTREMITIES:  No clubbing, cyanosis, or edema  PSYCH: Nl behavior, nl affect  NEUROLOGY: AAOx3, non-focal, cranial nerves intact  SKIN: Normal color, No rashes or lesions  LINES:    Cardiovascular Diagnostic Testing:    CXR: Personally reviewed    Labs: Personally reviewed                        9.9    9.17  )-----------( 311      ( 23 Oct 2022 06:40 )             31.2     10-23    135  |  101  |  12  ----------------------------<  159<H>  3.7   |  22  |  0.84    Ca    9.2      23 Oct 2022 06:40  Phos  3.3     10-23  Mg     2.20     10-23      PT/INR - ( 22 Oct 2022 06:00 )   PT: 12.2 sec;   INR: 1.05 ratio         PTT - ( 22 Oct 2022 06:00 )  PTT:91.3 sec    DIMENSIONS:  Dimensions:     Normal Values:  LA:     3.6 cm    2.0 - 4.0 cm  Ao:     2.8 cm    2.0 - 3.8 cm  SEPTUM: 1.2 cm    0.6 - 1.2 cm  PWT:    1.1 cm    0.6 - 1.1 cm  LVIDd:  4.7 cm    3.0 - 5.6 cm  LVIDs:  3.4 cm    1.8 - 4.0 cm  Derived Variables:  LVMI: 114 g/m2  RWT: 0.46  Fractional short: 28 %  Ejection Fraction (Modified Ward Rule): 53 %  Peak Velocity (m/sec): AoV=1.1  ------------------------------------------------------------------------  OBSERVATIONS:  Mitral Valve: Mitral annular calcification. Minimal mitral  regurgitation.  Aortic Root: Normal aortic root.  Aortic Valve: Normal trileaflet aortic valve. Peak  transaorticvalve gradient equals 5 mm Hg. Peak left  ventricular outflow tract gradient equals 2.3 mm Hg.  Left Atrium: Normal left atrium.  LA volume index = 19  cc/m2.  Left Ventricle: Normal left ventricular systolic function.  No segmental wall motion abnormalities. Normal left  ventricular internal dimensions and wall thicknesses. Mild  diastolic dysfunction (Stage I).  Right Heart: Normal right atrium. Normal right ventricular  size and function. Normal tricuspid valve. Mild tricuspid  regurgitation. Normal pulmonic valve.  Pericardium/PleuraNormal pericardium with trace  circumferential pericardial effusion.  Hemodynamic: Estimated right ventricular systolic pressure  equals 32 mm Hg, assuming right atrial pressure equals 10  mm Hg, consistent with normal pulmonary pressures.  ------------------------------------------------------------------------  CONCLUSIONS:  1. Normal left ventricular systolic function. No segmental  wall motion abnormalities.  2. Mild diastolic dysfunction (Stage I).  3. Normal right ventricular size and function.  4. Normal tricuspid valve. Mild tricuspid regurgitation.  5. Estimated pulmonary artery systolic pressure equals 32  mm Hg, assuming right atrial pressure equals 10  mm Hg,  consistent with normal pulmonary pressures.  *** No previous Echo exam.

## 2022-10-23 NOTE — PROGRESS NOTE ADULT - SUBJECTIVE AND OBJECTIVE BOX
PROGRESS NOTE:     Patient is a 67y old  Male who presents with a chief complaint of LE pain (23 Oct 2022 12:14)      SUBJECTIVE / OVERNIGHT EVENTS: Pain in legs, no other complaints.     ADDITIONAL REVIEW OF SYSTEMS:    MEDICATIONS  (STANDING):  amLODIPine   Tablet 5 milliGRAM(s) Oral daily  aspirin enteric coated 81 milliGRAM(s) Oral daily  ATENolol  Tablet 50 milliGRAM(s) Oral daily  atorvastatin 40 milliGRAM(s) Oral at bedtime  clopidogrel Tablet 75 milliGRAM(s) Oral daily  dextrose 5% + sodium chloride 0.9%. 1000 milliLiter(s) (110 mL/Hr) IV Continuous <Continuous>  dextrose 5%. 1000 milliLiter(s) (100 mL/Hr) IV Continuous <Continuous>  dextrose 5%. 1000 milliLiter(s) (50 mL/Hr) IV Continuous <Continuous>  dextrose 50% Injectable 25 Gram(s) IV Push once  dextrose 50% Injectable 12.5 Gram(s) IV Push once  dextrose 50% Injectable 25 Gram(s) IV Push once  glucagon  Injectable 1 milliGRAM(s) IntraMuscular once  heparin  Infusion 1100 Unit(s)/Hr (11 mL/Hr) IV Continuous <Continuous>  insulin glargine Injectable (LANTUS) 24 Unit(s) SubCutaneous <User Schedule>  insulin lispro (ADMELOG) corrective regimen sliding scale   SubCutaneous three times a day before meals  insulin lispro (ADMELOG) corrective regimen sliding scale   SubCutaneous at bedtime  insulin lispro Injectable (ADMELOG) 9 Unit(s) SubCutaneous three times a day before meals    MEDICATIONS  (PRN):  acetaminophen     Tablet .. 650 milliGRAM(s) Oral every 6 hours PRN Temp greater or equal to 38C (100.4F), Mild Pain (1 - 3)  AQUAPHOR (petrolatum Ointment) 1 Application(s) Topical two times a day PRN for dry skin  dextrose Oral Gel 15 Gram(s) Oral once PRN Blood Glucose LESS THAN 70 milliGRAM(s)/deciliter      CAPILLARY BLOOD GLUCOSE      POCT Blood Glucose.: 271 mg/dL (23 Oct 2022 11:50)  POCT Blood Glucose.: 179 mg/dL (23 Oct 2022 07:58)  POCT Blood Glucose.: 317 mg/dL (22 Oct 2022 21:20)  POCT Blood Glucose.: 199 mg/dL (22 Oct 2022 16:51)    I&O's Summary      PHYSICAL EXAM:  Vital Signs Last 24 Hrs  T(C): 37 (23 Oct 2022 09:30), Max: 37 (23 Oct 2022 09:30)  T(F): 98.6 (23 Oct 2022 09:30), Max: 98.6 (23 Oct 2022 09:30)  HR: 93 (23 Oct 2022 09:30) (74 - 93)  BP: 127/87 (23 Oct 2022 09:30) (127/87 - 148/66)  BP(mean): --  RR: 18 (23 Oct 2022 09:30) (16 - 21)  SpO2: 100% (23 Oct 2022 09:30) (96% - 100%)    Parameters below as of 23 Oct 2022 09:30  Patient On (Oxygen Delivery Method): room air      GENERAL: NAD, well-developed  HEAD:  Atraumatic, Normocephalic, MMM  CHEST/LUNG: No use of accessory muscles, CTAB, breathing non-labored  COR: RR, no mrcg  ABD: Soft, ND/NT, +BS  PSYCH: AAOx3  NEUROLOGY: CN II-XII grossly intact, moving all extremities  SKIN: No rashes or lesions  EXT:  diminished DP pulses, no edema noted B/L       LABS:                        9.9    9.17  )-----------( 311      ( 23 Oct 2022 06:40 )             31.2     10-23    135  |  101  |  12  ----------------------------<  159<H>  3.7   |  22  |  0.84    Ca    9.2      23 Oct 2022 06:40  Phos  3.3     10-23  Mg     2.20     10-23      PT/INR - ( 22 Oct 2022 06:00 )   PT: 12.2 sec;   INR: 1.05 ratio         PTT - ( 22 Oct 2022 06:00 )  PTT:91.3 sec            RADIOLOGY & ADDITIONAL TESTS:  Results Reviewed:   Imaging Personally Reviewed:  Electrocardiogram Personally Reviewed:  TTE:  1. Normal left ventricular systolic function. No segmental  wall motion abnormalities.  2. Mild diastolic dysfunction (Stage I).  3. Normal right ventricular size and function.  4. Normal tricuspid valve. Mild tricuspid regurgitation.  5. Estimated pulmonary artery systolic pressure equals 32  mm Hg, assuming right atrial pressure equals 10  mm Hg,  consistent with normal pulmonary pressures.    COORDINATION OF CARE:  Care Discussed with Consultants/Other Providers [Y/N]:  Prior or Outpatient Records Reviewed [Y/N]:

## 2022-10-23 NOTE — PROGRESS NOTE ADULT - ASSESSMENT
Patient is a 67 year old male hx of HTN, HLD, DM2, and PAD s/p angiogram and stent in May 2022 who is presenting due to b/l LE pain. vascular surgery following given concern for dissection and PAD.  endocrine called today for DM and a1c 10.4  home meds:  Janumet 50-1000mg BID and pioglitazone 30mg daily    1. DM2 - HbA1c 10.4%  Inpatient plan:  BG target 100-180 mg/dl; fasting stable; above goal pre-prandial   above goal  Increase Lantus to 24 units q8AM  Increase Admelog 9 units premeal TID (please hold if npo/not eating_  Continue moderate dose correction premeals and moderate dose scale bedtime  - Carb Consistent Diet   - Nutrition consult   - Provider to RN for diabetes/insulin pen teaching   [place] planning:  Add basal insulin pen dose TBD, check insurance preference plus resume oral agents Janumet 50-1000mg BID and Pioglitazone 30mg daily  New to insulin pen at [place].  BD carol pen needles  ensure has glucometer and supplies  Please send Lantus solostar pen as test script to check insurance coverage.  Ok to send with current doses and update prior to d/c    If Lantus not covered- can try alternating with one of following   tresiba/basaglar/toujeo/Levemir  Ensure patient has working glucometer, test strips, lancets, alcohol pads, and BD carol pen needles  Please also prescribe glucose tabs, Baqsimi nasal spray or glucagon emergency kit for hypoglycemia risk   Follow up at [facility], appointments are set [phone].  diabetes educator 12/23/22 at 11:30AM at [address]  Dr. Stacey Mejia 2/8/23 at 11:20AM at [address] [facility] [address]      2. HTN  goal BP in DM <130/80  on norvasc  outpt mc/cr ratio      3. HLD  on lipitor 40mg   outpt lipid panel      4. Hypokalemia  insulin can lower K so please replete and follow K accordingly     Tati Baeza  Nurse Practitioner  Division of Endocrinology & Diabetes  In house pager #[phone]    If before 9AM or after 6PM, or on weekends/holidays, please call endocrine answering service for assistance ([phone]).For nonurgent matters email [email or web address] for assistance.  Patient is a 67 year old male hx of HTN, HLD, DM2, and PAD s/p angiogram and stent in May 2022 who is presenting due to b/l LE pain. vascular surgery following given concern for dissection and PAD.  endocrine called today for DM and a1c 10.4  home meds:  Janumet 50-1000mg BID and pioglitazone 30mg daily    1. DM2 - HbA1c 10.4%  Inpatient plan:  BG target 100-180 mg/dl; fasting stable; above goal pre-prandial   Decrease Lantus 20 units q8AM (this dose was reduced for NPO); 1st dose on 10/24  Increase Admelog to 11 units premeal TID (please hold if npo/not eating_  Continue moderate dose correction premeals and moderate dose scale bedtime  - Carb Consistent Diet   - Nutrition consult   - Provider to RN for diabetes/insulin pen teaching   MD planning:  Add basal insulin pen dose TBD, check insurance preference plus resume oral agents Janumet 50-1000mg BID and Pioglitazone 30mg daily  New to insulin pen at [place].  BD carol pen needles  ensure has glucometer and supplies  Please send Lantus solostar pen as test script to check insurance coverage.  Ok to send with current doses and update prior to d/c    If Lantus not covered- can try alternating with one of following   tresiba/basaglar/toujeo/Levemir  Ensure patient has working glucometer, test strips, lancets, alcohol pads, and BD carol pen needles  Please also prescribe glucose tabs, Baqsimi nasal spray or glucagon emergency kit for hypoglycemia risk   Follow up at [facility], appointments are set [phone].  diabetes educator 12/23/22 at 11:30AM at [address] [facility] [address]  Dr. Stacey Mejia 2/8/23 at 11:20AM at [address] [facility] [address]      2. HTN  goal BP in DM <130/80  on norvasc  outpt mc/cr ratio      3. HLD  on lipitor 40mg   outpt lipid panel      4. Hypokalemia  insulin can lower K so please replete and follow K accordingly     Tati Baeza  Nurse Practitioner  Division of Endocrinology & Diabetes  In house pager #[phone]    If before 9AM or after 6PM, or on weekends/holidays, please call endocrine answering service for assistance ([phone]).For nonurgent matters email [email or web address] for assistance.  Patient is a 67 year old male hx of HTN, HLD, DM2, and PAD s/p angiogram and stent in May 2022 who is presenting due to b/l LE pain. vascular surgery following given concern for dissection and PAD.  endocrine called today for DM and a1c 10.4  home meds:  Janumet 50-1000mg BID and pioglitazone 30mg daily    1. DM2 - HbA1c 10.4%  Inpatient plan:  BG target 100-180 mg/dl; fasting stable; above goal pre-prandial ; pt states he hd a biscuit and coffee at prior to bedtime FS. Advised pt to avoid high carb snacks.  May need bedtime snack coverage will monitor glucose pattern   Decrease Lantus 20 units q8AM (this dose was reduced for NPO); 1st dose on 10/24  Increase Admelog to 11 units premeal TID (please hold if npo/not eating  Continue moderate dose correction premeals and moderate dose scale bedtime  - Carb Consistent Diet   - Nutrition consult   - Provider to RN for diabetes/insulin pen teaching   [place] planning:  Add basal insulin pen dose TBD, check insurance preference plus resume oral agents Janumet 50-1000mg BID and Pioglitazone 30mg daily  New to insulin pen at [place].  BD carol pen needles  ensure has glucometer and supplies  Please send Lantus solostar pen as test script to check insurance coverage.  Ok to send with current doses and update prior to d/c    If Lantus not covered- can try alternating with one of following   tresiba/basaglar/toujeo/Levemir  Ensure patient has working glucometer, test strips, lancets, alcohol pads, and BD carol pen needles  Please also prescribe glucose tabs, Baqsimi nasal spray or glucagon emergency kit for hypoglycemia risk   Follow up at [facility], appointments are set [phone].  diabetes educator 12/23/22 at 11:30AM at [address] [facility] [address]  Dr. Stacey Mejia 2/8/23 at 11:20AM at [address] [facility] [address]      2. HTN  goal BP in DM <130/80  on norvasc  outpt mc/cr ratio      3. HLD  on lipitor 40mg   outpt lipid panel      4. Hypokalemia  insulin can lower K so please replete and follow K accordingly     Tati Baeza  Nurse Practitioner  Division of Endocrinology & Diabetes  In house pager #[phone]    If before 9AM or after 6PM, or on weekends/holidays, please call endocrine answering service for assistance ([phone]).For nonurgent matters email [email or web address] for assistance.

## 2022-10-23 NOTE — PROGRESS NOTE ADULT - ATTENDING COMMENTS
PAD  LLE disabling claudication  RLE moderate claudication  acute on chronic progression of disease bilaterally  continue therapeutic heparin infusion  OR Monday for bilateral lower extremity angiogram, possible intervention
bilateral claudication  severe, disabling claudication in LLE, moderate claudication in RLE with nearly occluded R EIA  will plan for bilateral lower extremity angiogram, possible revascularization next week
pain improved on heparin infusion  bilateral lower extremity angiograms, possible revascularization on Monday in OR  appreciate medical and cardiac risk stratification and optimization
PAD  CLTI of LLE  bilateral LE angiogram tomorrow, possible revascularization  NPO at midnight

## 2022-10-23 NOTE — CONSULT NOTE ADULT - ATTENDING COMMENTS
66 yo M with PAD  RLE moderate claudication, [place] 2; LLE severe, disabling claudication, [place] 3  recent bilateral lower extremity angiograms - will obtain reports  CTA reviewed - 99% right external iliac artery stenosis, chronic disease in LLE  transfer to vascular surgery service  will plan for bilateral lower extremity angiogram, possible revascularization early next week
Patient seen and examined during afternoon rounds.  Assessment and recommendations were reviewed with the Cardiology Fellow, and as outlined above.  From the cardiac perspective, the patient has been deemed to be medically optimized at this time and can proceed with vascular procedures as outlined above, without the need for further cardiac testing or risk stratification.

## 2022-10-23 NOTE — PROGRESS NOTE ADULT - SUBJECTIVE AND OBJECTIVE BOX
Overnight:    Subjective:    amLODIPine   Tablet 5  aspirin enteric coated 81  ATENolol  Tablet 50  clopidogrel Tablet 75  heparin  Infusion 1100      Allergies    Advil (Rash)    Intolerances        Vital Signs Last 24 Hrs  T(C): 36.7 (23 Oct 2022 01:40), Max: 37.1 (22 Oct 2022 10:05)  T(F): 98 (23 Oct 2022 01:40), Max: 98.7 (22 Oct 2022 10:05)  HR: 77 (23 Oct 2022 01:40) (74 - 82)  BP: 148/66 (23 Oct 2022 01:40) (117/67 - 148/66)  BP(mean): --  RR: 16 (23 Oct 2022 01:40) (16 - 21)  SpO2: 98% (23 Oct 2022 01:40) (96% - 100%)    Parameters below as of 23 Oct 2022 01:40  Patient On (Oxygen Delivery Method): room air      I&O's Summary      Physical Exam:  General:  Pulmonary:  Cardiovascular:  Abdominal:  Extremities:  Pulses:   Right:                                                                          Left:  FEM [ ]2+ [ ]1+ [ ]doppler                                             FEM [ ]2+ [ ]1+ [ ]doppler    POP [ ]2+ [ ]1+ [ ]doppler                                             POP [ ]2+ [ ]1+ [ ]doppler    DP [ ]2+ [ ]1+ [ ]doppler                                                DP [ ]2+ [ ]1+ [ ]doppler  PT[ ]2+ [ ]1+ [ ]doppler                                                  PT [ ]2+ [ ]1+ [ ]doppler      LABS:                        10.5   8.57  )-----------( 339      ( 22 Oct 2022 06:00 )             32.7     10-22    139  |  104  |  15  ----------------------------<  155<H>  3.5   |  23  |  0.80    Ca    9.2      22 Oct 2022 06:00  Phos  3.5     10-22  Mg     2.00     10-22      PT/INR - ( 22 Oct 2022 06:00 )   PT: 12.2 sec;   INR: 1.05 ratio         PTT - ( 22 Oct 2022 06:00 )  PTT:91.3 sec    Radiology and Additional Studies: Overnight: NAEON    Subjective: PAtient feeling well, awaiting procedure on monday. Denies fevers, chills, chest pain, dyspnea, headache, nausea, emesis.     amLODIPine   Tablet 5  aspirin enteric coated 81  ATENolol  Tablet 50  clopidogrel Tablet 75  heparin  Infusion 1100      Allergies    Advil (Rash)    Intolerances        Vital Signs Last 24 Hrs  T(C): 36.7 (23 Oct 2022 01:40), Max: 37.1 (22 Oct 2022 10:05)  T(F): 98 (23 Oct 2022 01:40), Max: 98.7 (22 Oct 2022 10:05)  HR: 77 (23 Oct 2022 01:40) (74 - 82)  BP: 148/66 (23 Oct 2022 01:40) (117/67 - 148/66)  BP(mean): --  RR: 16 (23 Oct 2022 01:40) (16 - 21)  SpO2: 98% (23 Oct 2022 01:40) (96% - 100%)    Parameters below as of 23 Oct 2022 01:40  Patient On (Oxygen Delivery Method): room air      I&O's Summary      PHYSICAL EXAM  Appearance: NAD  Cardiovascular: Normal S1 S2, No JVD, No murmurs,   Respiratory: Lungs clear to auscultation  Gastrointestinal:  Soft, Non-tender, + BS	  Skin: No rashes, No ecchymoses, No cyanosis  Extremities: Normal range of motion, No clubbing, cyanosis or edema.  Neurologic: Non-focal, sensation intact.    PULSES:  Dorsal Pedal: LEFT - signal, RIGHT - palpable.   Posterior Tibial: No signal       LABS:                        10.5   8.57  )-----------( 339      ( 22 Oct 2022 06:00 )             32.7     10-22    139  |  104  |  15  ----------------------------<  155<H>  3.5   |  23  |  0.80    Ca    9.2      22 Oct 2022 06:00  Phos  3.5     10-22  Mg     2.00     10-22      PT/INR - ( 22 Oct 2022 06:00 )   PT: 12.2 sec;   INR: 1.05 ratio         PTT - ( 22 Oct 2022 06:00 )  PTT:91.3 sec    Radiology and Additional Studies:

## 2022-10-23 NOTE — CONSULT NOTE ADULT - ASSESSMENT
Patient is a 67 year old male hx of HTN, HLD, DM2, and PAD s/p angiogram and stent in May 2022 who is presenting due to b/l LE pain. 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 Severe, disabling claudication in LLE, moderate claudication in RLE with nearly occluded R EIA    Recommendations:  - Continue Aspirin and statin, would continue Plavix   - Continue heparin gtt and monitor PTT  - As per Vascular team: plan for bilateral lower extremity angiogram, possible revascularization on Monday. Patient is a 67 year old male hx of HTN, HLD, DM2, and PAD s/p angiogram and stent in May 2022 who is presenting due to b/l LE pain. 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 Severe, disabling claudication in LLE, moderate claudication in RLE with nearly occluded R EIA. Per Vascular team: plan for bilateral lower extremity angiogram, possible revascularization on Monday, cardiology asked to comment on preop risk stratification. TTE wnl as above, no other significant CAD or other history other than noted above.    Recommendations:  - Continue Aspirin and statin, would continue Plavix   - Continue heparin gtt and monitor PTT  - No signs of volume overload, uncontrolled arrhythmias, valvular disease, or acute ischemic heart disease requiring further workup or medical/interventional management at this time  - Patient is optimized from a cardiac perspective for OR and may proceed as planned.

## 2022-10-24 LAB
ANION GAP SERPL CALC-SCNC: 10 MMOL/L — SIGNIFICANT CHANGE UP (ref 7–14)
APTT BLD: 200 SEC — CRITICAL HIGH (ref 27–36.3)
APTT BLD: 68.7 SEC — HIGH (ref 27–36.3)
APTT BLD: 99 SEC — HIGH (ref 27–36.3)
BLD GP AB SCN SERPL QL: NEGATIVE — SIGNIFICANT CHANGE UP
BUN SERPL-MCNC: 11 MG/DL — SIGNIFICANT CHANGE UP (ref 7–23)
CALCIUM SERPL-MCNC: 9.1 MG/DL — SIGNIFICANT CHANGE UP (ref 8.4–10.5)
CHLORIDE SERPL-SCNC: 104 MMOL/L — SIGNIFICANT CHANGE UP (ref 98–107)
CO2 SERPL-SCNC: 22 MMOL/L — SIGNIFICANT CHANGE UP (ref 22–31)
CREAT SERPL-MCNC: 0.76 MG/DL — SIGNIFICANT CHANGE UP (ref 0.5–1.3)
EGFR: 97 ML/MIN/1.73M2 — SIGNIFICANT CHANGE UP
GLUCOSE BLDC GLUCOMTR-MCNC: 100 MG/DL — HIGH (ref 70–99)
GLUCOSE BLDC GLUCOMTR-MCNC: 118 MG/DL — HIGH (ref 70–99)
GLUCOSE BLDC GLUCOMTR-MCNC: 135 MG/DL — HIGH (ref 70–99)
GLUCOSE BLDC GLUCOMTR-MCNC: 150 MG/DL — HIGH (ref 70–99)
GLUCOSE BLDC GLUCOMTR-MCNC: 159 MG/DL — HIGH (ref 70–99)
GLUCOSE BLDC GLUCOMTR-MCNC: 181 MG/DL — HIGH (ref 70–99)
GLUCOSE BLDC GLUCOMTR-MCNC: 182 MG/DL — HIGH (ref 70–99)
GLUCOSE BLDC GLUCOMTR-MCNC: 96 MG/DL — SIGNIFICANT CHANGE UP (ref 70–99)
GLUCOSE SERPL-MCNC: 224 MG/DL — HIGH (ref 70–99)
HCT VFR BLD CALC: 29.9 % — LOW (ref 39–50)
HGB BLD-MCNC: 9.5 G/DL — LOW (ref 13–17)
INR BLD: 1.16 RATIO — SIGNIFICANT CHANGE UP (ref 0.88–1.16)
MAGNESIUM SERPL-MCNC: 2 MG/DL — SIGNIFICANT CHANGE UP (ref 1.6–2.6)
MCHC RBC-ENTMCNC: 26.2 PG — LOW (ref 27–34)
MCHC RBC-ENTMCNC: 31.8 GM/DL — LOW (ref 32–36)
MCV RBC AUTO: 82.6 FL — SIGNIFICANT CHANGE UP (ref 80–100)
NRBC # BLD: 0 /100 WBCS — SIGNIFICANT CHANGE UP (ref 0–0)
NRBC # FLD: 0 K/UL — SIGNIFICANT CHANGE UP (ref 0–0)
PHOSPHATE SERPL-MCNC: 3.1 MG/DL — SIGNIFICANT CHANGE UP (ref 2.5–4.5)
PLATELET # BLD AUTO: 309 K/UL — SIGNIFICANT CHANGE UP (ref 150–400)
POTASSIUM SERPL-MCNC: 3.8 MMOL/L — SIGNIFICANT CHANGE UP (ref 3.5–5.3)
POTASSIUM SERPL-SCNC: 3.8 MMOL/L — SIGNIFICANT CHANGE UP (ref 3.5–5.3)
PROTHROM AB SERPL-ACNC: 13.5 SEC — HIGH (ref 10.5–13.4)
RBC # BLD: 3.62 M/UL — LOW (ref 4.2–5.8)
RBC # FLD: 15 % — HIGH (ref 10.3–14.5)
RH IG SCN BLD-IMP: NEGATIVE — SIGNIFICANT CHANGE UP
SODIUM SERPL-SCNC: 136 MMOL/L — SIGNIFICANT CHANGE UP (ref 135–145)
WBC # BLD: 7.37 K/UL — SIGNIFICANT CHANGE UP (ref 3.8–10.5)
WBC # FLD AUTO: 7.37 K/UL — SIGNIFICANT CHANGE UP (ref 3.8–10.5)

## 2022-10-24 PROCEDURE — 99232 SBSQ HOSP IP/OBS MODERATE 35: CPT

## 2022-10-24 PROCEDURE — 76937 US GUIDE VASCULAR ACCESS: CPT | Mod: 26,RT

## 2022-10-24 PROCEDURE — 99233 SBSQ HOSP IP/OBS HIGH 50: CPT

## 2022-10-24 PROCEDURE — 75625 CONTRAST EXAM ABDOMINL AORTA: CPT | Mod: 26

## 2022-10-24 PROCEDURE — 36245 INS CATH ABD/L-EXT ART 1ST: CPT | Mod: RT

## 2022-10-24 PROCEDURE — 75710 ARTERY X-RAYS ARM/LEG: CPT | Mod: 26,RT

## 2022-10-24 DEVICE — KIT STIFFEN MICRO INTRODUCER: Type: IMPLANTABLE DEVICE | Site: BILATERAL | Status: FUNCTIONAL

## 2022-10-24 DEVICE — GUIDEWIRE RADIFOCUS GLIDEWIRE STANDARD ANGLED TIP 0.035" X 260CM: Type: IMPLANTABLE DEVICE | Site: BILATERAL | Status: FUNCTIONAL

## 2022-10-24 DEVICE — SHEATH INTRODUCER TERUMO PINNACLE PERIPHERAL 5FR X 10CM X 0.035" MINI WIRE: Type: IMPLANTABLE DEVICE | Site: BILATERAL | Status: FUNCTIONAL

## 2022-10-24 DEVICE — GUIDEWIRE GLIDEWIRE ANGLED TIP 0.035" X 180CM STIFF: Type: IMPLANTABLE DEVICE | Site: BILATERAL | Status: FUNCTIONAL

## 2022-10-24 DEVICE — IMPLANTABLE DEVICE: Type: IMPLANTABLE DEVICE | Site: BILATERAL | Status: FUNCTIONAL

## 2022-10-24 DEVICE — CATH QUICK CROSS .035X135CM: Type: IMPLANTABLE DEVICE | Site: BILATERAL | Status: FUNCTIONAL

## 2022-10-24 RX ORDER — INSULIN GLARGINE 100 [IU]/ML
24 INJECTION, SOLUTION SUBCUTANEOUS
Refills: 0 | Status: DISCONTINUED | OUTPATIENT
Start: 2022-10-25 | End: 2022-10-25

## 2022-10-24 RX ADMIN — AMLODIPINE BESYLATE 5 MILLIGRAM(S): 2.5 TABLET ORAL at 05:51

## 2022-10-24 RX ADMIN — HEPARIN SODIUM 11 UNIT(S)/HR: 5000 INJECTION INTRAVENOUS; SUBCUTANEOUS at 21:35

## 2022-10-24 RX ADMIN — Medication 650 MILLIGRAM(S): at 22:16

## 2022-10-24 RX ADMIN — Medication 81 MILLIGRAM(S): at 11:57

## 2022-10-24 RX ADMIN — ATORVASTATIN CALCIUM 40 MILLIGRAM(S): 80 TABLET, FILM COATED ORAL at 21:37

## 2022-10-24 RX ADMIN — Medication 2: at 07:34

## 2022-10-24 RX ADMIN — SODIUM CHLORIDE 110 MILLILITER(S): 9 INJECTION, SOLUTION INTRAVENOUS at 00:00

## 2022-10-24 RX ADMIN — SODIUM CHLORIDE 110 MILLILITER(S): 9 INJECTION, SOLUTION INTRAVENOUS at 22:21

## 2022-10-24 RX ADMIN — INSULIN GLARGINE 20 UNIT(S): 100 INJECTION, SOLUTION SUBCUTANEOUS at 07:34

## 2022-10-24 RX ADMIN — ATENOLOL 50 MILLIGRAM(S): 25 TABLET ORAL at 05:52

## 2022-10-24 NOTE — CHART NOTE - NSCHARTNOTEFT_GEN_A_CORE
Surgery Post op Note    Procedure: RLE angiogram    Subjective: Patient seen and evaluated at the bedside. Not complaining of pain. No longer flat. Restarted on heparin drip. L access site is soft, no sign of hematoma.    Objective:   Vital Signs Last 24 Hrs  T(C): 36.6 (24 Oct 2022 20:24), Max: 36.6 (24 Oct 2022 05:32)  T(F): 97.8 (24 Oct 2022 20:24), Max: 97.9 (24 Oct 2022 10:40)  HR: 76 (24 Oct 2022 20:24) (68 - 85)  BP: 149/70 (24 Oct 2022 20:24) (104/60 - 153/78)  BP(mean): 84 (24 Oct 2022 20:00) (56 - 84)  RR: 17 (24 Oct 2022 20:24) (10 - 20)  SpO2: 98% (24 Oct 2022 20:24) (93% - 100%)    Parameters below as of 24 Oct 2022 20:24  Patient On (Oxygen Delivery Method): room air      I&O's Summary    23 Oct 2022 07:01  -  24 Oct 2022 07:00  --------------------------------------------------------  IN: 0 mL / OUT: 850 mL / NET: -850 mL    24 Oct 2022 07:01  -  24 Oct 2022 23:52  --------------------------------------------------------  IN: 270 mL / OUT: 0 mL / NET: 270 mL      I&O's Detail    23 Oct 2022 07:01  -  24 Oct 2022 07:00  --------------------------------------------------------  IN:  Total IN: 0 mL    OUT:    Voided (mL): 850 mL  Total OUT: 850 mL    Total NET: -850 mL      24 Oct 2022 07:01  -  24 Oct 2022 23:52  --------------------------------------------------------  IN:    dextrose 5% + sodium chloride 0.9%: 220 mL    Oral Fluid: 50 mL  Total IN: 270 mL    OUT:  Total OUT: 0 mL    Total NET: 270 mL            Labs:                        9.5    7.37  )-----------( 309      ( 24 Oct 2022 02:05 )             29.9     10-24    136  |  104  |  11  ----------------------------<  224<H>  3.8   |  22  |  0.76    Ca    9.1      24 Oct 2022 02:05  Phos  3.1     10-24  Mg     2.00     10-24      PT/INR - ( 24 Oct 2022 02:05 )   PT: 13.5 sec;   INR: 1.16 ratio         PTT - ( 24 Oct 2022 10:20 )  PTT:99.0 sec      MEDICATIONS  (STANDING):  amLODIPine   Tablet 5 milliGRAM(s) Oral daily  aspirin enteric coated 81 milliGRAM(s) Oral daily  ATENolol  Tablet 50 milliGRAM(s) Oral daily  atorvastatin 40 milliGRAM(s) Oral at bedtime  dextrose 5% + sodium chloride 0.9%. 1000 milliLiter(s) (110 mL/Hr) IV Continuous <Continuous>  dextrose 5%. 1000 milliLiter(s) (100 mL/Hr) IV Continuous <Continuous>  dextrose 5%. 1000 milliLiter(s) (50 mL/Hr) IV Continuous <Continuous>  dextrose 50% Injectable 25 Gram(s) IV Push once  dextrose 50% Injectable 12.5 Gram(s) IV Push once  dextrose 50% Injectable 25 Gram(s) IV Push once  glucagon  Injectable 1 milliGRAM(s) IntraMuscular once  heparin  Infusion 1100 Unit(s)/Hr (11 mL/Hr) IV Continuous <Continuous>  insulin lispro (ADMELOG) corrective regimen sliding scale   SubCutaneous three times a day before meals  insulin lispro (ADMELOG) corrective regimen sliding scale   SubCutaneous at bedtime  insulin lispro Injectable (ADMELOG) 11 Unit(s) SubCutaneous three times a day before meals    MEDICATIONS  (PRN):  acetaminophen     Tablet .. 650 milliGRAM(s) Oral every 6 hours PRN Temp greater or equal to 38C (100.4F), Mild Pain (1 - 3)  AQUAPHOR (petrolatum Ointment) 1 Application(s) Topical two times a day PRN for dry skin  dextrose Oral Gel 15 Gram(s) Oral once PRN Blood Glucose LESS THAN 70 milliGRAM(s)/deciliter        Physical Exam:  General: well developed, well nourished, NAD  HEENT: ncat, trachea midline  Respiratory: respirations non labored  Gastrointestinal: soft, nontender, nondistended  Extremities: FROM, warm.  Neurological: non-focal    Assessment/Plan:   -recheck PTT in 6 hours

## 2022-10-24 NOTE — PROGRESS NOTE ADULT - SUBJECTIVE AND OBJECTIVE BOX
· Subjective and Objective:   Overnight: NAEON    Subjective: PAtient feeling well, awaiting procedure on monday. Denies fevers, chills, chest pain, dyspnea, headache, nausea, emesis.     amLODIPine   Tablet 5  aspirin enteric coated 81  ATENolol  Tablet 50  clopidogrel Tablet 75  heparin  Infusion 1100      Allergies    Advil (Rash)    Intolerances        Vital Signs Last 24 Hrs  T(C): 36.7 (23 Oct 2022 01:40), Max: 37.1 (22 Oct 2022 10:05)  T(F): 98 (23 Oct 2022 01:40), Max: 98.7 (22 Oct 2022 10:05)  HR: 77 (23 Oct 2022 01:40) (74 - 82)  BP: 148/66 (23 Oct 2022 01:40) (117/67 - 148/66)  BP(mean): --  RR: 16 (23 Oct 2022 01:40) (16 - 21)  SpO2: 98% (23 Oct 2022 01:40) (96% - 100%)    Parameters below as of 23 Oct 2022 01:40  Patient On (Oxygen Delivery Method): room air      I&O's Summary      PHYSICAL EXAM  Appearance: NAD  Cardiovascular: Normal S1 S2, No JVD, No murmurs,   Respiratory: Lungs clear to auscultation  Gastrointestinal:  Soft, Non-tender, + BS	  Skin: No rashes, No ecchymoses, No cyanosis  Extremities: Normal range of motion, No clubbing, cyanosis or edema.  Neurologic: Non-focal, sensation intact.    PULSES:  Dorsal Pedal: LEFT - signal, RIGHT - palpable.   Posterior Tibial: No signal       LABS:                        10.5   8.57  )-----------( 339      ( 22 Oct 2022 06:00 )             32.7     10-22    139  |  104  |  15  ----------------------------<  155<H>  3.5   |  23  |  0.80    Ca    9.2      22 Oct 2022 06:00  Phos  3.5     10-22  Mg     2.00     10-22      PT/INR - ( 22 Oct 2022 06:00 )   PT: 12.2 sec;   INR: 1.05 ratio         PTT - ( 22 Oct 2022 06:00 )  PTT:91.3 sec    Radiology and Additional Studies:    Assessment and Plan:   · Assessment	  67M w/ PMH of HTN, HLD, DM2, and PAD s/p angiogram and stent in May 2022 who is presenting due to b/l LE pain.       - Pre-op for Monday 10/24  - pain control PRN   - Patient consent obtained for procedure  - Medicine documented clearance for procedure  - Cardiology contacted 10/23 confirmed will evaluate patient today, echo results nonsignificant  - continue Hep gtt, hold on call to OR  - continue home meds   - ISS   - DVT ppx         C Team Surgery   [id number]   Subjective: Patient feeling well, awaiting procedure on monday. Admits to mild pain well controlled by medications.       amLODIPine   Tablet 5  aspirin enteric coated 81  ATENolol  Tablet 50  clopidogrel Tablet 75  heparin  Infusion 1100      Allergies    Advil (Rash)    Intolerances        Vital Signs Last 24 Hrs  T(C): 36.7 (23 Oct 2022 01:40), Max: 37.1 (22 Oct 2022 10:05)  T(F): 98 (23 Oct 2022 01:40), Max: 98.7 (22 Oct 2022 10:05)  HR: 77 (23 Oct 2022 01:40) (74 - 82)  BP: 148/66 (23 Oct 2022 01:40) (117/67 - 148/66)  BP(mean): --  RR: 16 (23 Oct 2022 01:40) (16 - 21)  SpO2: 98% (23 Oct 2022 01:40) (96% - 100%)    Parameters below as of 23 Oct 2022 01:40  Patient On (Oxygen Delivery Method): room air      I&O's Summary      PHYSICAL EXAM  Appearance: NAD  Respiratory: nonlabored respirations   Skin: No rashes, No ecchymoses, No cyanosis  Extremities: Normal range of motion, No clubbing, cyanosis or edema.  Neurologic: Non-focal, sensation intact.    PULSES:  Dorsal Pedal: LEFT - signal, RIGHT - palpable.   Posterior Tibial: No signal         LABS:  cret                        9.5    7.37  )-----------( 309      ( 24 Oct 2022 02:05 )             29.9     10-24    136  |  104  |  11  ----------------------------<  224<H>  3.8   |  22  |  0.76    Ca    9.1      24 Oct 2022 02:05  Phos  3.1     10-24  Mg     2.00     10-24      PT/INR - ( 24 Oct 2022 02:05 )   PT: 13.5 sec;   INR: 1.16 ratio         PTT - ( 24 Oct 2022 10:20 )  PTT:99.0 sec

## 2022-10-24 NOTE — PROGRESS NOTE ADULT - ASSESSMENT
67M w/ PMH of HTN, HLD, DM2, and PAD s/p angiogram and stent in May 2022 who is presenting due to b/l LE pain.       Plan:  - planning for OR today   - pain control PRN   - continue Hep gtt, hold on call to OR  - continue home meds   - ISS   - DVT ppx         C Team Surgery   [id number]

## 2022-10-24 NOTE — PROGRESS NOTE ADULT - SUBJECTIVE AND OBJECTIVE BOX
Chief Complaint: DM2 - HbA1c 10.4%    History: Pt seen at bedside. Pt tolerating oral diet. Pt denies nausea and vomiting/any signs of hypoglycemia. Pt denies nausea and vomiting/any signs of hypoglycemia    MEDICATIONS  (STANDING):  amLODIPine   Tablet 5 milliGRAM(s) Oral daily  aspirin enteric coated 81 milliGRAM(s) Oral daily  ATENolol  Tablet 50 milliGRAM(s) Oral daily  atorvastatin 40 milliGRAM(s) Oral at bedtime  dextrose 5% + sodium chloride 0.9%. 1000 milliLiter(s) (110 mL/Hr) IV Continuous <Continuous>  dextrose 5%. 1000 milliLiter(s) (100 mL/Hr) IV Continuous <Continuous>  dextrose 5%. 1000 milliLiter(s) (50 mL/Hr) IV Continuous <Continuous>  dextrose 50% Injectable 25 Gram(s) IV Push once  dextrose 50% Injectable 12.5 Gram(s) IV Push once  dextrose 50% Injectable 25 Gram(s) IV Push once  glucagon  Injectable 1 milliGRAM(s) IntraMuscular once  heparin  Infusion 1100 Unit(s)/Hr (11 mL/Hr) IV Continuous <Continuous>  insulin glargine Injectable (LANTUS) 20 Unit(s) SubCutaneous <User Schedule>  insulin lispro (ADMELOG) corrective regimen sliding scale   SubCutaneous three times a day before meals  insulin lispro (ADMELOG) corrective regimen sliding scale   SubCutaneous at bedtime  insulin lispro Injectable (ADMELOG) 11 Unit(s) SubCutaneous three times a day before meals    MEDICATIONS  (PRN):  acetaminophen     Tablet .. 650 milliGRAM(s) Oral every 6 hours PRN Temp greater or equal to 38C (100.4F), Mild Pain (1 - 3)  AQUAPHOR (petrolatum Ointment) 1 Application(s) Topical two times a day PRN for dry skin  dextrose Oral Gel 15 Gram(s) Oral once PRN Blood Glucose LESS THAN 70 milliGRAM(s)/deciliter      Allergies: Advil (Rash)      Review of Systems:  HEENT: No pain  Cardiovascular: No chest pain, palpitations  Respiratory: No SOB, no cough  GI: No nausea, vomiting, abdominal pain  Endocrine: no polyuria, polydipsia      PHYSICAL EXAM:  VITALS: T(C): 36.3 (10-24-22 @ 18:20)  T(F): 97.4 (10-24-22 @ 18:20), Max: 98.3 (10-23-22 @ 21:41)  HR: 82 (10-24-22 @ 18:45) (68 - 85)  BP: 114/51 (10-24-22 @ 18:45) (104/60 - 153/78)  RR:  (15 - 20)  SpO2:  (93% - 100%)  Wt(kg): --  GENERAL: NAD, well-groomed, well-developed  RESPIRATORY: No labored breathing   GI: Soft, nontender, non distended  PSYCH: Alert and oriented x 3, normal affect, normal mood      CAPILLARY BLOOD GLUCOSE  POCT Blood Glucose.: 118 mg/dL (24 Oct 2022 18:28)  POCT Blood Glucose.: 135 mg/dL (24 Oct 2022 11:33)  POCT Blood Glucose.: 182 mg/dL (24 Oct 2022 08:20)  POCT Blood Glucose.: 181 mg/dL (24 Oct 2022 06:59)  POCT Blood Glucose.: 150 mg/dL (24 Oct 2022 02:49)  POCT Blood Glucose.: 314 mg/dL (23 Oct 2022 21:41)    A1C with Estimated Average Glucose (10.19.22 @ 03:40)    A1C with Estimated Average Glucose Result: 10.4      10-24    136  |  104  |  11  ----------------------------<  224<H>  3.8   |  22  |  0.76    eGFR: 97    Ca    9.1      10-24  Mg     2.00     10-24  Phos  3.1     10-24      Diet, DASH/TLC:   Sodium & Cholesterol Restricted  Consistent Carbohydrate No Snacks (CSTCHO) (10-19-22 @ 05:56) [Active]

## 2022-10-24 NOTE — PROGRESS NOTE ADULT - PROBLEM SELECTOR PLAN 2
Uncontrolled DM2   A1C of 10.4  on admission  Continue Lantus 24 units in AM   Continue Lispro pre-meal 9 with ISS   Endo following  carb controlled diet Uncontrolled DM2   A1C of 10.4  on admission  Continue Lantus 24 units in AM (decreased to 20U qhs as NPO)  Lispro increased to 11U TID  C/w MISS [place] following  C/w carb controlled diet

## 2022-10-24 NOTE — PROGRESS NOTE ADULT - PROBLEM SELECTOR PLAN 3
Held ARB and chlorthalidone in anticipation of procedure.   Started on Amlodipine on admission     Continue Amlodipine and Atenolol   Monitor BP and up-titrate as needed Held ARB and chlorthalidone in anticipation of procedure  C/w Amlodipine 5mg QD (started on this admission)  C/w atenolol 50mg QD (to be continued mele-operatively)  BP well controlled

## 2022-10-24 NOTE — PROVIDER CONTACT NOTE (CRITICAL VALUE NOTIFICATION) - SITUATION
Notifcation from lab for critical lab result: aptt greater than 200 Notification from lab for critical lab result: aptt greater than 200

## 2022-10-24 NOTE — PROGRESS NOTE ADULT - PROBLEM SELECTOR PLAN 1
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 Severe, disabling claudication in LLE, moderate claudication in RLE with nearly occluded R EIA  Continue Aspirin and statin, would continue Plavix   Continue heparin gtt and monitor PTT  As per Vascular team: plan for bilateral lower extremity angiogram, possible revascularization on Monday CT LE: near occlusive narrowing of right ext iliac, and focal dissection in distal pt, moderate/severe narrowing of b/l SFAs, 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 Severe, disabling claudication in LLE, moderate claudication in RLE with nearly occluded R EIA  Continue Aspirin and statin, would continue Plavix   Continue heparin gtt and monitor PTT  As per Vascular team: plan for bilateral lower extremity angiogram and possible revascularization today

## 2022-10-24 NOTE — PROVIDER CONTACT NOTE (OTHER) - BACKGROUND
Heparin drip running at 11ml/hr. Received critical value notification from lab aptt greater than 200. Provider ordered to hold heparin drip and redraw aptt.

## 2022-10-24 NOTE — PROGRESS NOTE ADULT - ASSESSMENT
Patient is a 67 year old male hx of HTN, HLD, DM2, and PAD s/p angiogram and stent in May 2022 who is presenting due to b/l LE pain. vascular surgery following given concern for dissection and PAD.  endocrine called today for DM and a1c 10.4  home meds:  Janumet 50-1000mg BID and pioglitazone 30mg daily    1. DM2 - HbA1c 10.4%  Inpatient plan:  BG target 100-180 mg/dl; fasting stable; above goal pre-prandial ; pt states he hd a biscuit and coffee at prior to bedtime FS. Advised pt to avoid high carb snacks.  May need bedtime snack coverage will monitor glucose pattern   Continue Lantus 24 units q8AM on 10/25 (ordered); received Lantus 20 units sq am today for NPO)  Contineu Admelog 11 units premeal TID (please hold if npo/not eating  Continue moderate dose correction premeals and moderate dose scale bedtime  - Carb Consistent Diet   - Nutrition consult   - Provider to RN for diabetes/insulin pen teaching   [place] planning:  Add basal insulin pen dose TBD, check insurance preference plus resume oral agents Janumet 50-1000mg BID and Pioglitazone 30mg daily  New to insulin pen at [place].  BD carol pen needles  ensure has glucometer and supplies  Please send Lantus solostar pen as test script to check insurance coverage.  Ok to send with current doses and update prior to d/c    If Lantus not covered- can try alternating with one of following   tresiba/basaglar/toujeo/Levemir  Ensure patient has working glucometer, test strips, lancets, alcohol pads, and BD carol pen needles  Please also prescribe glucose tabs, Baqsimi nasal spray or glucagon emergency kit for hypoglycemia risk   Follow up at [facility] Endocrin[facility], appointments are set [phone].  diabetes educator 12/23/22 at 11:30AM at [address] [facility] [address]  Dr. Stacey Mejia 2/8/23 at 11:20AM at [address] [facility] [address]  Reviewed HbA1c, BG targets, hypoglycemia recognition and treatment, (basal/bolus) insulin pen plan for discharge, consistent carbohydrate diet and importance of followup. Patient performed successful insulin PEN return demonstration with NP using sample insulin PEN/injection pad      2. HTN  goal BP in DM <130/80  on norvasc  outpt mc/cr ratio      3. HLD  on lipitor 40mg   outpt lipid panel      4. Hypokalemia  insulin can lower K so please replete and follow K accordingly     Tati Baeza  Nurse Practitioner  Division of Endocrinology & Diabetes  In house pager #[phone]    If before 9AM or after 6PM, or on weekends/holidays, please call endocrine answering service for assistance ([phone]).For nonurgent matters email [email or web address] for assistance.  Patient is a 67 year old male hx of HTN, HLD, DM2, and PAD s/p angiogram and stent in May 2022 who is presenting due to b/l LE pain. vascular surgery following given concern for dissection and PAD.  endocrine called today for DM and a1c 10.4  home meds:  Janumet 50-1000mg BID and pioglitazone 30mg daily    1. DM2 - HbA1c 10.4%  Inpatient plan:  BG target 100-180 mg/dl; glucose stable today. Pt  having food from home prior to bedtime. Advised pt to avoid high carb snacks.  Pt NPO today. May need bedtime snack coverage once eating  will monitor glucose pattern.  Continue Lantus 24 units q8AM on 10/25 (ordered); received Lantus 20 units sq am today for NPO)  Continue Admelog 11 units premeal TID (please hold if npo/not eating  Continue moderate dose correction premeals and moderate dose scale bedtime  - Carb Consistent Diet   - Nutrition consult   - Provider to RN for diabetes/insulin pen teaching   -Dextrose IVF ordered while NPO. Please dc once pt is eating  DC planning:  Add basal insulin pen dose TBD, check insurance preference plus resume oral agents Janumet 50-1000mg BID and Pioglitazone 30mg daily  New to insulin pen at [place].  BD carol pen needles  ensure has glucometer and supplies  Please send Lantus solostar pen as test script to check insurance coverage.  Ok to send with current doses and update prior to d/c    If Lantus not covered- can try alternating with one of following   tresiba/basaglar/toujeo/Levemir  Ensure patient has working glucometer, test strips, lancets, alcohol pads, and BD carol pen needles  Please also prescribe glucose tabs, Baqsimi nasal spray or glucagon emergency kit for hypoglycemia risk   Follow up at [facility] Endocrinology, appointments are set [phone].  diabetes educator 12/23/22 at 11:30AM at [address] [facility] [address]  Dr. Stacey Mejia 2/8/23 at 11:20AM at [address] [facility] [address]  Reviewed HbA1c, BG targets, hypoglycemia recognition and treatment, (basal/bolus) insulin pen plan for discharge, consistent carbohydrate diet and importance of followup. Patient performed successful insulin PEN return demonstration with NP using sample insulin PEN/injection pad      2. HTN  goal BP in DM <130/80  on norvasc  outpt mc/cr ratio      3. HLD  on lipitor 40mg   outpt lipid panel      4. Hypokalemia  insulin can lower K so please replete and follow K accordingly     Tati Baeza  Nurse Practitioner  Division of Endocrinology & Diabetes  In house pager #[phone]    If before 9AM or after 6PM, or on weekends/holidays, please call endocrine answering service for assistance ([phone]).For nonurgent matters email [email or web address] for assistance.

## 2022-10-24 NOTE — PROGRESS NOTE ADULT - SUBJECTIVE AND OBJECTIVE BOX
Patient is a 67y old  Male who presents with a chief complaint of LE pain (24 Oct 2022 02:57)      INTERVAL HPI/OVERNIGHT EVENTS:  Seen by me this morning, doing ok, awaiting surgery today. Pain on L>R unchanged.    Review of Systems: 12 point review of systems otherwise negative    MEDICATIONS  (STANDING):  amLODIPine   Tablet 5 milliGRAM(s) Oral daily  aspirin enteric coated 81 milliGRAM(s) Oral daily  ATENolol  Tablet 50 milliGRAM(s) Oral daily  atorvastatin 40 milliGRAM(s) Oral at bedtime  dextrose 5% + sodium chloride 0.9%. 1000 milliLiter(s) (110 mL/Hr) IV Continuous <Continuous>  dextrose 5%. 1000 milliLiter(s) (100 mL/Hr) IV Continuous <Continuous>  dextrose 5%. 1000 milliLiter(s) (50 mL/Hr) IV Continuous <Continuous>  dextrose 50% Injectable 25 Gram(s) IV Push once  dextrose 50% Injectable 12.5 Gram(s) IV Push once  dextrose 50% Injectable 25 Gram(s) IV Push once  glucagon  Injectable 1 milliGRAM(s) IntraMuscular once  heparin  Infusion 1100 Unit(s)/Hr (11 mL/Hr) IV Continuous <Continuous>  insulin glargine Injectable (LANTUS) 20 Unit(s) SubCutaneous <User Schedule>  insulin lispro (ADMELOG) corrective regimen sliding scale   SubCutaneous three times a day before meals  insulin lispro (ADMELOG) corrective regimen sliding scale   SubCutaneous at bedtime  insulin lispro Injectable (ADMELOG) 11 Unit(s) SubCutaneous three times a day before meals    MEDICATIONS  (PRN):  acetaminophen     Tablet .. 650 milliGRAM(s) Oral every 6 hours PRN Temp greater or equal to 38C (100.4F), Mild Pain (1 - 3)  AQUAPHOR (petrolatum Ointment) 1 Application(s) Topical two times a day PRN for dry skin  dextrose Oral Gel 15 Gram(s) Oral once PRN Blood Glucose LESS THAN 70 milliGRAM(s)/deciliter      Allergies    Advil (Rash)    Intolerances          Vital Signs Last 24 Hrs  T(C): 36.6 (24 Oct 2022 15:59), Max: 36.8 (23 Oct 2022 17:24)  T(F): 97.9 (24 Oct 2022 15:59), Max: 98.3 (23 Oct 2022 21:41)  HR: 69 (24 Oct 2022 15:59) (68 - 77)  BP: 138/48 (24 Oct 2022 15:59) (116/61 - 153/78)  BP(mean): --  RR: 16 (24 Oct 2022 15:59) (16 - 19)  SpO2: 99% (24 Oct 2022 15:59) (98% - 100%)    Parameters below as of 24 Oct 2022 10:40  Patient On (Oxygen Delivery Method): room air      CAPILLARY BLOOD GLUCOSE      POCT Blood Glucose.: 135 mg/dL (24 Oct 2022 11:33)  POCT Blood Glucose.: 182 mg/dL (24 Oct 2022 08:20)  POCT Blood Glucose.: 181 mg/dL (24 Oct 2022 06:59)  POCT Blood Glucose.: 150 mg/dL (24 Oct 2022 02:49)  POCT Blood Glucose.: 314 mg/dL (23 Oct 2022 21:41)  POCT Blood Glucose.: 175 mg/dL (23 Oct 2022 17:02)      10-23 @ 07:01  -  10-24 @ 07:00  --------------------------------------------------------  IN: 0 mL / OUT: 850 mL / NET: -850 mL        Physical Exam:    Daily Height in cm: 167.64 (24 Oct 2022 15:59)    Daily   General:  Well appearing, NAD, not cachetic  HEENT:  Nonicteric, PERRLA  CV:  RRR, no murmur, no JVD  Lungs:  CTA B/L, no wheezes, rales, rhonchi  Abdomen:  Soft, non-tender, no distended, positive BS  Extremities:  2+ pulses, no c/c, no edema  Skin:  Warm and dry, no rashes  :  No melendez  Neuro:  AAOx3, non-focal, CN II-XII grossly intact  No Restraints    LABS:                        9.5    7.37  )-----------( 309      ( 24 Oct 2022 02:05 )             29.9     10-24    136  |  104  |  11  ----------------------------<  224<H>  3.8   |  22  |  0.76    Ca    9.1      24 Oct 2022 02:05  Phos  3.1     10-24  Mg     2.00     10-24      PT/INR - ( 24 Oct 2022 02:05 )   PT: 13.5 sec;   INR: 1.16 ratio         PTT - ( 24 Oct 2022 10:20 )  PTT:99.0 sec        RADIOLOGY & ADDITIONAL TESTS:  Reviewed by me       Patient is a 67y old  Male who presents with a chief complaint of LE pain (24 Oct 2022 02:57)      INTERVAL HPI/OVERNIGHT EVENTS:  Seen by me this morning, doing ok, awaiting surgery today. Pain on L>R unchanged.    Review of Systems: 12 point review of systems otherwise negative    MEDICATIONS  (STANDING):  amLODIPine   Tablet 5 milliGRAM(s) Oral daily  aspirin enteric coated 81 milliGRAM(s) Oral daily  ATENolol  Tablet 50 milliGRAM(s) Oral daily  atorvastatin 40 milliGRAM(s) Oral at bedtime  dextrose 5% + sodium chloride 0.9%. 1000 milliLiter(s) (110 mL/Hr) IV Continuous <Continuous>  dextrose 5%. 1000 milliLiter(s) (100 mL/Hr) IV Continuous <Continuous>  dextrose 5%. 1000 milliLiter(s) (50 mL/Hr) IV Continuous <Continuous>  dextrose 50% Injectable 25 Gram(s) IV Push once  dextrose 50% Injectable 12.5 Gram(s) IV Push once  dextrose 50% Injectable 25 Gram(s) IV Push once  glucagon  Injectable 1 milliGRAM(s) IntraMuscular once  heparin  Infusion 1100 Unit(s)/Hr (11 mL/Hr) IV Continuous <Continuous>  insulin glargine Injectable (LANTUS) 20 Unit(s) SubCutaneous <User Schedule>  insulin lispro (ADMELOG) corrective regimen sliding scale   SubCutaneous three times a day before meals  insulin lispro (ADMELOG) corrective regimen sliding scale   SubCutaneous at bedtime  insulin lispro Injectable (ADMELOG) 11 Unit(s) SubCutaneous three times a day before meals    MEDICATIONS  (PRN):  acetaminophen     Tablet .. 650 milliGRAM(s) Oral every 6 hours PRN Temp greater or equal to 38C (100.4F), Mild Pain (1 - 3)  AQUAPHOR (petrolatum Ointment) 1 Application(s) Topical two times a day PRN for dry skin  dextrose Oral Gel 15 Gram(s) Oral once PRN Blood Glucose LESS THAN 70 milliGRAM(s)/deciliter      Allergies    Advil (Rash)    Intolerances          Vital Signs Last 24 Hrs  T(C): 36.6 (24 Oct 2022 15:59), Max: 36.8 (23 Oct 2022 17:24)  T(F): 97.9 (24 Oct 2022 15:59), Max: 98.3 (23 Oct 2022 21:41)  HR: 69 (24 Oct 2022 15:59) (68 - 77)  BP: 138/48 (24 Oct 2022 15:59) (116/61 - 153/78)  BP(mean): --  RR: 16 (24 Oct 2022 15:59) (16 - 19)  SpO2: 99% (24 Oct 2022 15:59) (98% - 100%)    Parameters below as of 24 Oct 2022 10:40  Patient On (Oxygen Delivery Method): room air      CAPILLARY BLOOD GLUCOSE      POCT Blood Glucose.: 135 mg/dL (24 Oct 2022 11:33)  POCT Blood Glucose.: 182 mg/dL (24 Oct 2022 08:20)  POCT Blood Glucose.: 181 mg/dL (24 Oct 2022 06:59)  POCT Blood Glucose.: 150 mg/dL (24 Oct 2022 02:49)  POCT Blood Glucose.: 314 mg/dL (23 Oct 2022 21:41)  POCT Blood Glucose.: 175 mg/dL (23 Oct 2022 17:02)      10-23 @ 07:01  -  10-24 @ 07:00  --------------------------------------------------------  IN: 0 mL / OUT: 850 mL / NET: -850 mL        Physical Exam:    Daily Height in cm: 167.64 (24 Oct 2022 15:59)    Daily   General:  Well appearing, NAD, not cachetic  HEENT:  Nonicteric, PERRLA  CV:  RRR, no murmur, no JVD  Lungs:  CTA B/L, no wheezes, rales, rhonchi  Abdomen:  Soft, non-tender, no distended, positive BS  Extremities:  no c/c, no edema  Skin:  Warm and dry, no rashes  :  No melendez  Neuro:  AAOx3, non-focal, CN II-XII grossly intact  No Restraints    LABS:                        9.5    7.37  )-----------( 309      ( 24 Oct 2022 02:05 )             29.9     10-24    136  |  104  |  11  ----------------------------<  224<H>  3.8   |  22  |  0.76    Ca    9.1      24 Oct 2022 02:05  Phos  3.1     10-24  Mg     2.00     10-24      PT/INR - ( 24 Oct 2022 02:05 )   PT: 13.5 sec;   INR: 1.16 ratio         PTT - ( 24 Oct 2022 10:20 )  PTT:99.0 sec        RADIOLOGY & ADDITIONAL TESTS:  Reviewed by me

## 2022-10-25 ENCOUNTER — TRANSCRIPTION ENCOUNTER (OUTPATIENT)
Age: 67
End: 2022-10-25

## 2022-10-25 VITALS
HEART RATE: 78 BPM | RESPIRATION RATE: 18 BRPM | DIASTOLIC BLOOD PRESSURE: 59 MMHG | SYSTOLIC BLOOD PRESSURE: 130 MMHG | OXYGEN SATURATION: 99 % | TEMPERATURE: 98 F

## 2022-10-25 LAB
ANION GAP SERPL CALC-SCNC: 12 MMOL/L — SIGNIFICANT CHANGE UP (ref 7–14)
APTT BLD: 102.9 SEC — HIGH (ref 27–36.3)
APTT BLD: 65.2 SEC — HIGH (ref 27–36.3)
BUN SERPL-MCNC: 10 MG/DL — SIGNIFICANT CHANGE UP (ref 7–23)
CALCIUM SERPL-MCNC: 9.2 MG/DL — SIGNIFICANT CHANGE UP (ref 8.4–10.5)
CHLORIDE SERPL-SCNC: 103 MMOL/L — SIGNIFICANT CHANGE UP (ref 98–107)
CO2 SERPL-SCNC: 21 MMOL/L — LOW (ref 22–31)
CREAT SERPL-MCNC: 0.77 MG/DL — SIGNIFICANT CHANGE UP (ref 0.5–1.3)
EGFR: 97 ML/MIN/1.73M2 — SIGNIFICANT CHANGE UP
GLUCOSE BLDC GLUCOMTR-MCNC: 149 MG/DL — HIGH (ref 70–99)
GLUCOSE BLDC GLUCOMTR-MCNC: 222 MG/DL — HIGH (ref 70–99)
GLUCOSE BLDC GLUCOMTR-MCNC: 269 MG/DL — HIGH (ref 70–99)
GLUCOSE SERPL-MCNC: 222 MG/DL — HIGH (ref 70–99)
HCT VFR BLD CALC: 28.4 % — LOW (ref 39–50)
HGB BLD-MCNC: 9 G/DL — LOW (ref 13–17)
MAGNESIUM SERPL-MCNC: 1.9 MG/DL — SIGNIFICANT CHANGE UP (ref 1.6–2.6)
MCHC RBC-ENTMCNC: 25.9 PG — LOW (ref 27–34)
MCHC RBC-ENTMCNC: 31.7 GM/DL — LOW (ref 32–36)
MCV RBC AUTO: 81.6 FL — SIGNIFICANT CHANGE UP (ref 80–100)
NRBC # BLD: 0 /100 WBCS — SIGNIFICANT CHANGE UP (ref 0–0)
NRBC # FLD: 0 K/UL — SIGNIFICANT CHANGE UP (ref 0–0)
PHOSPHATE SERPL-MCNC: 3.2 MG/DL — SIGNIFICANT CHANGE UP (ref 2.5–4.5)
PLATELET # BLD AUTO: 288 K/UL — SIGNIFICANT CHANGE UP (ref 150–400)
POTASSIUM SERPL-MCNC: 4 MMOL/L — SIGNIFICANT CHANGE UP (ref 3.5–5.3)
POTASSIUM SERPL-SCNC: 4 MMOL/L — SIGNIFICANT CHANGE UP (ref 3.5–5.3)
RBC # BLD: 3.48 M/UL — LOW (ref 4.2–5.8)
RBC # FLD: 15 % — HIGH (ref 10.3–14.5)
SODIUM SERPL-SCNC: 136 MMOL/L — SIGNIFICANT CHANGE UP (ref 135–145)
WBC # BLD: 11.75 K/UL — HIGH (ref 3.8–10.5)
WBC # FLD AUTO: 11.75 K/UL — HIGH (ref 3.8–10.5)

## 2022-10-25 PROCEDURE — 99232 SBSQ HOSP IP/OBS MODERATE 35: CPT

## 2022-10-25 PROCEDURE — 99233 SBSQ HOSP IP/OBS HIGH 50: CPT

## 2022-10-25 RX ORDER — HEPARIN SODIUM 5000 [USP'U]/ML
1000 INJECTION INTRAVENOUS; SUBCUTANEOUS
Qty: 25000 | Refills: 0 | Status: DISCONTINUED | OUTPATIENT
Start: 2022-10-25 | End: 2022-10-25

## 2022-10-25 RX ORDER — CLOPIDOGREL BISULFATE 75 MG/1
1 TABLET, FILM COATED ORAL
Qty: 0 | Refills: 0 | DISCHARGE

## 2022-10-25 RX ORDER — APIXABAN 2.5 MG/1
1 TABLET, FILM COATED ORAL
Qty: 60 | Refills: 0
Start: 2022-10-25 | End: 2022-11-23

## 2022-10-25 RX ORDER — ENOXAPARIN SODIUM 100 MG/ML
1 INJECTION SUBCUTANEOUS
Qty: 1 | Refills: 0
Start: 2022-10-25 | End: 2022-10-25

## 2022-10-25 RX ORDER — INSULIN GLARGINE 100 [IU]/ML
24 INJECTION, SOLUTION SUBCUTANEOUS
Qty: 140 | Refills: 0
Start: 2022-10-25 | End: 2022-11-21

## 2022-10-25 RX ORDER — APIXABAN 2.5 MG/1
10 TABLET, FILM COATED ORAL ONCE
Refills: 0 | Status: COMPLETED | OUTPATIENT
Start: 2022-10-25 | End: 2022-10-25

## 2022-10-25 RX ORDER — ISOPROPYL ALCOHOL, BENZOCAINE .7; .06 ML/ML; ML/ML
1 SWAB TOPICAL
Qty: 100 | Refills: 1
Start: 2022-10-25 | End: 2022-12-13

## 2022-10-25 RX ORDER — AMLODIPINE BESYLATE 2.5 MG/1
1 TABLET ORAL
Qty: 30 | Refills: 0
Start: 2022-10-25 | End: 2022-11-23

## 2022-10-25 RX ORDER — ACETAMINOPHEN 500 MG
2 TABLET ORAL
Qty: 0 | Refills: 0 | DISCHARGE
Start: 2022-10-25 | End: 2022-11-01

## 2022-10-25 RX ADMIN — HEPARIN SODIUM 10 UNIT(S)/HR: 5000 INJECTION INTRAVENOUS; SUBCUTANEOUS at 08:18

## 2022-10-25 RX ADMIN — Medication 6: at 12:07

## 2022-10-25 RX ADMIN — AMLODIPINE BESYLATE 5 MILLIGRAM(S): 2.5 TABLET ORAL at 05:23

## 2022-10-25 RX ADMIN — Medication 4: at 08:25

## 2022-10-25 RX ADMIN — Medication 81 MILLIGRAM(S): at 12:07

## 2022-10-25 RX ADMIN — HEPARIN SODIUM 10 UNIT(S)/HR: 5000 INJECTION INTRAVENOUS; SUBCUTANEOUS at 04:35

## 2022-10-25 RX ADMIN — ATENOLOL 50 MILLIGRAM(S): 25 TABLET ORAL at 05:23

## 2022-10-25 RX ADMIN — Medication 11 UNIT(S): at 12:07

## 2022-10-25 RX ADMIN — Medication 11 UNIT(S): at 08:25

## 2022-10-25 RX ADMIN — APIXABAN 10 MILLIGRAM(S): 2.5 TABLET, FILM COATED ORAL at 17:26

## 2022-10-25 RX ADMIN — INSULIN GLARGINE 24 UNIT(S): 100 INJECTION, SOLUTION SUBCUTANEOUS at 08:26

## 2022-10-25 NOTE — PROGRESS NOTE ADULT - PROBLEM SELECTOR PLAN 5
DVT ppx: heparin gtt-to be changed to eliquis      Discussed with Vascular  Planned for discharge home today  Vascular, Endocrine and PMD f/up as outpatient

## 2022-10-25 NOTE — PROGRESS NOTE ADULT - SUBJECTIVE AND OBJECTIVE BOX
Chief Complaint: DM2 - HbA1c 10.4%    History: Pt seen at bedside. Pt tolerating oral diet. Pt denies nausea and vomiting/any signs of hypoglycemia. Pt denies nausea and vomiting/any signs of hypoglycemia    MEDICATIONS  (STANDING):  amLODIPine   Tablet 5 milliGRAM(s) Oral daily  aspirin enteric coated 81 milliGRAM(s) Oral daily  ATENolol  Tablet 50 milliGRAM(s) Oral daily  atorvastatin 40 milliGRAM(s) Oral at bedtime  dextrose 5% + sodium chloride 0.9%. 1000 milliLiter(s) (110 mL/Hr) IV Continuous <Continuous>  dextrose 5%. 1000 milliLiter(s) (100 mL/Hr) IV Continuous <Continuous>  dextrose 5%. 1000 milliLiter(s) (50 mL/Hr) IV Continuous <Continuous>  dextrose 50% Injectable 25 Gram(s) IV Push once  dextrose 50% Injectable 12.5 Gram(s) IV Push once  dextrose 50% Injectable 25 Gram(s) IV Push once  glucagon  Injectable 1 milliGRAM(s) IntraMuscular once  heparin  Infusion 1100 Unit(s)/Hr (11 mL/Hr) IV Continuous <Continuous>  insulin glargine Injectable (LANTUS) 20 Unit(s) SubCutaneous <User Schedule>  insulin lispro (ADMELOG) corrective regimen sliding scale   SubCutaneous three times a day before meals  insulin lispro (ADMELOG) corrective regimen sliding scale   SubCutaneous at bedtime  insulin lispro Injectable (ADMELOG) 11 Unit(s) SubCutaneous three times a day before meals    MEDICATIONS  (PRN):  acetaminophen     Tablet .. 650 milliGRAM(s) Oral every 6 hours PRN Temp greater or equal to 38C (100.4F), Mild Pain (1 - 3)  AQUAPHOR (petrolatum Ointment) 1 Application(s) Topical two times a day PRN for dry skin  dextrose Oral Gel 15 Gram(s) Oral once PRN Blood Glucose LESS THAN 70 milliGRAM(s)/deciliter      Allergies: Advil (Rash)      Review of Systems:  HEENT: No pain  Cardiovascular: No chest pain, palpitations  Respiratory: No SOB, no cough  GI: No nausea, vomiting, abdominal pain  Endocrine: no polyuria, polydipsia      PHYSICAL EXAM:  GENERAL: NAD, well-groomed, well-developed  RESPIRATORY: No labored breathing   GI: Soft, nontender, non distended  PSYCH: Alert and oriented x 3, normal affect, normal mood      CAPILLARY BLOOD GLUCOSE      POCT Blood Glucose.: 269 mg/dL (25 Oct 2022 11:57)  POCT Blood Glucose.: 222 mg/dL (25 Oct 2022 07:52)  POCT Blood Glucose.: 159 mg/dL (24 Oct 2022 22:52)  POCT Blood Glucose.: 96 mg/dL (24 Oct 2022 21:12)  POCT Blood Glucose.: 100 mg/dL (24 Oct 2022 19:53)  POCT Blood Glucose.: 118 mg/dL (24 Oct 2022 18:28)    LABS:                        9.0    11.75 )-----------( 288      ( 25 Oct 2022 03:01 )             28.4     25 Oct 2022 03:01    136    |  103    |  10     ----------------------------<  222    4.0     |  21     |  0.77     Ca    9.2        25 Oct 2022 03:01  Phos  3.2       25 Oct 2022 03:01  Mg     1.90      25 Oct 2022 03:01      PT/INR - ( 24 Oct 2022 02:05 )   PT: 13.5 sec;   INR: 1.16 ratio         PTT - ( 25 Oct 2022 10:03 )  PTT:65.2 sec

## 2022-10-25 NOTE — PROGRESS NOTE ADULT - SUBJECTIVE AND OBJECTIVE BOX
Subjective: Patient feeling well, awaiting procedure on monday. Admits to mild pain well controlled by medications.       amLODIPine   Tablet 5  aspirin enteric coated 81  ATENolol  Tablet 50  clopidogrel Tablet 75  heparin  Infusion 1100      Allergies    Advil (Rash)    Intolerances        Vital Signs Last 24 Hrs  T(C): 36.7 (23 Oct 2022 01:40), Max: 37.1 (22 Oct 2022 10:05)  T(F): 98 (23 Oct 2022 01:40), Max: 98.7 (22 Oct 2022 10:05)  HR: 77 (23 Oct 2022 01:40) (74 - 82)  BP: 148/66 (23 Oct 2022 01:40) (117/67 - 148/66)  BP(mean): --  RR: 16 (23 Oct 2022 01:40) (16 - 21)  SpO2: 98% (23 Oct 2022 01:40) (96% - 100%)    Parameters below as of 23 Oct 2022 01:40  Patient On (Oxygen Delivery Method): room air      I&O's Summary      PHYSICAL EXAM  Appearance: NAD  Respiratory: nonlabored respirations   Skin: No rashes, No ecchymoses, No cyanosis  Extremities: Normal range of motion, No clubbing, cyanosis or edema.  Neurologic: Non-focal, sensation intact.    PULSES:  Dorsal Pedal: LEFT - signal, RIGHT - palpable.   Posterior Tibial: No signal         LABS:  cret                        9.5    7.37  )-----------( 309      ( 24 Oct 2022 02:05 )             29.9     10-24    136  |  104  |  11  ----------------------------<  224<H>  3.8   |  22  |  0.76    Ca    9.1      24 Oct 2022 02:05  Phos  3.1     10-24  Mg     2.00     10-24      PT/INR - ( 24 Oct 2022 02:05 )   PT: 13.5 sec;   INR: 1.16 ratio         PTT - ( 24 Oct 2022 10:20 )  PTT:99.0 sec         Subjective: Patient feeling well, awaiting procedure on monday. Admits to mild pain well controlled by medications.     OBJECTIVE:  Vital Signs Last 24 Hrs  T(C): 36.8 (25 Oct 2022 05:15), Max: 36.8 (25 Oct 2022 05:15)  T(F): 98.2 (25 Oct 2022 05:15), Max: 98.2 (25 Oct 2022 05:15)  HR: 80 (25 Oct 2022 05:15) (68 - 85)  BP: 137/56 (25 Oct 2022 05:15) (104/60 - 149/70)  BP(mean): 84 (24 Oct 2022 20:00) (56 - 84)  RR: 16 (25 Oct 2022 05:15) (10 - 20)  SpO2: 97% (25 Oct 2022 05:15) (93% - 100%)    Parameters below as of 25 Oct 2022 05:15  Patient On (Oxygen Delivery Method): room air      10-24-22 @ 07:01  -  10-25-22 @ 07:00  --------------------------------------------------------  IN: 270 mL / OUT: 500 mL / NET: -230 mL      PHYSICAL EXAM  Appearance: NAD  Respiratory: nonlabored respirations   Skin: No rashes, No ecchymoses, No cyanosis  Extremities: Normal range of motion, No clubbing, cyanosis or edema.  Groin access C/d/i  Neurologic: Non-focal, sensation intact.    PULSES:  Dorsal Pedal: LEFT - signal, RIGHT - palpable.   Posterior Tibial: No signal     LABS:                        9.0    11.75 )-----------( 288      ( 25 Oct 2022 03:01 )             28.4       10-25    136  |  103  |  10  ----------------------------<  222<H>  4.0   |  21<L>  |  0.77    Ca    9.2      25 Oct 2022 03:01  Phos  3.2     10-25  Mg     1.90     10-25

## 2022-10-25 NOTE — PROGRESS NOTE ADULT - ASSESSMENT
Patient is a 67 year old male hx of HTN, HLD, DM2, and PAD s/p angiogram and stent in May 2022 who is presenting due to b/l LE pain. vascular surgery following given concern for dissection and PAD.  endocrine called today for DM and a1c 10.4  home meds:  Janumet 50-1000mg BID and pioglitazone 30mg daily    1. DM2 - HbA1c 10.4%  Inpatient plan:  BG target 100-180 mg/dl; glucose stable today. Pt  having food from home prior to bedtime. Advised pt to avoid high carb snacks.    BG noted to be in the 200s this AM, likely elevated in the setting of getting decreased Lantus dose the day prior as he was NPO.   Resume patient's regimen- Lantus 24 units q8AM  Continue Admelog 11 units premeal TID (please hold if npo/not eating  Continue moderate dose correction premeals and moderate dose scale bedtime  - Carb Consistent Diet   - Nutrition consult   - Provider to RN for diabetes/insulin pen teaching   -Dextrose IVF ordered while NPO. Please dc once pt is eating  DC planning:  Add basal insulin pen dose TBD, check insurance preference plus resume oral agents Janumet 50-1000mg BID and Pioglitazone 30mg daily  New to insulin pen at [place].  BD carol pen needles  ensure has glucometer and supplies  Please send Lantus solostar pen as test script to check insurance coverage.  Ok to send with current doses and update prior to d/c    If Lantus not covered- can try alternating with one of following   tresiba/basaglar/toujeo/Levemir  Ensure patient has working glucometer, test strips, lancets, alcohol pads, and BD carol pen needles  Please also prescribe glucose tabs, Baqsimi nasal spray or glucagon emergency kit for hypoglycemia risk   Follow up at [facility] Endocrinology, appointments are set [phone].  diabetes educator 12/23/22 at 11:30AM at [address] [facility] [address]  Dr. Stacey Mejia 2/8/23 at 11:20AM at [address] [facility] [address]  Reviewed HbA1c, BG targets, hypoglycemia recognition and treatment, (basal/bolus) insulin pen plan for discharge, consistent carbohydrate diet and importance of followup. Patient performed successful insulin PEN return demonstration with NP using sample insulin PEN/injection pad      2. HTN  goal BP in DM <130/80  on norvasc  outpt /cr ratio      3. HLD  on lipitor 40mg   outpt lipid panel    Stacey Mejia,    Attending Physician   Department of Endocrinology, Diabetes and Metabolism     If before 9AM or after 5PM, or on weekends/holidays, please call the Endocrine answering service for assistance ([phone]).  For nonurgent matters, please email [email or web address] for assistance.

## 2022-10-25 NOTE — PROGRESS NOTE ADULT - PROBLEM SELECTOR PROBLEM 4
HLD (hyperlipidemia)
Hypokalemia
HLD (hyperlipidemia)
Hypokalemia
Hypokalemia
HTN (hypertension)
Hypokalemia
Hypokalemia
HLD (hyperlipidemia)
HLD (hyperlipidemia)
Hypokalemia
HTN (hypertension)
HTN (hypertension)

## 2022-10-25 NOTE — DISCHARGE NOTE PROVIDER - NSDCMRMEDTOKEN_GEN_ALL_CORE_FT
Aspirin Enteric Coated 81 mg oral delayed release tablet: 1 tab(s) orally once a day  atenolol-chlorthalidone 50 mg-25 mg oral tablet: 1 tab(s) orally once a day  Eliquis 2.5 mg oral tablet: 1 tab(s) orally 2 times a day   Janumet 50 mg-1000 mg oral tablet: 1 tab(s) orally 2 times a day  Lipitor 20 mg oral tablet: 1 tab(s) orally once a day  losartan 50 mg oral tablet: 1 tab(s) orally once a day  pioglitazone 30 mg oral tablet: 1 tab(s) orally once a day  Plavix 75 mg oral tablet: 1 tab(s) orally once a day  Vitamin B12 1000 mcg oral tablet: 1 tab(s) orally once a day   acetaminophen 325 mg oral tablet: 2 tab(s) orally every 6 hours as needed for pain.   amLODIPine 5 mg oral tablet: 1 tab(s) orally once a day  Aspirin Enteric Coated 81 mg oral delayed release tablet: 1 tab(s) orally once a day  atenolol-chlorthalidone 50 mg-25 mg oral tablet: 1 tab(s) orally once a day  Eliquis 2.5 mg oral tablet: 1 tab(s) orally 2 times a day   Janumet 50 mg-1000 mg oral tablet: 1 tab(s) orally 2 times a day  Lipitor 20 mg oral tablet: 1 tab(s) orally once a day  losartan 50 mg oral tablet: 1 tab(s) orally once a day  pioglitazone 30 mg oral tablet: 1 tab(s) orally once a day  Vitamin B12 1000 mcg oral tablet: 1 tab(s) orally once a day   acetaminophen 325 mg oral tablet: 2 tab(s) orally every 6 hours as needed for pain.   amLODIPine 5 mg oral tablet: 1 tab(s) orally once a day  Aspirin Enteric Coated 81 mg oral delayed release tablet: 1 tab(s) orally once a day  atenolol-chlorthalidone 50 mg-25 mg oral tablet: 1 tab(s) orally once a day  Eliquis Starter Pack for Treatment of DVT and PE 5 mg oral tablet: Take as directed on package  Janumet 50 mg-1000 mg oral tablet: 1 tab(s) orally 2 times a day  Lipitor 20 mg oral tablet: 1 tab(s) orally once a day  losartan 50 mg oral tablet: 1 tab(s) orally once a day  pioglitazone 30 mg oral tablet: 1 tab(s) orally once a day  Vitamin B12 1000 mcg oral tablet: 1 tab(s) orally once a day   acetaminophen 325 mg oral tablet: 2 tab(s) orally every 6 hours as needed for pain.   alcohol swabs : Apply topically to affected area 4 times a day   amLODIPine 5 mg oral tablet: 1 tab(s) orally once a day  Aspirin Enteric Coated 81 mg oral delayed release tablet: 1 tab(s) orally once a day  atenolol-chlorthalidone 50 mg-25 mg oral tablet: 1 tab(s) orally once a day  Eliquis Starter Pack for Treatment of DVT and PE 5 mg oral tablet: Take as directed on package  glucometer (per patient&#x27;s insurance): Test blood sugars four times a day. Dispense #1 glucometer.  glucose tablets: Follow instructions on bottle when sugar is low.  Insulin Pen Needles, 4mm: 1 application subcutaneously 4 times a day. ** Use with insulin pen **   Janumet 50 mg-1000 mg oral tablet: 1 tab(s) orally 2 times a day  lancets: 1 application subcutaneously 4 times a day   Lantus Solostar Pen 100 units/mL subcutaneous solution: 1 each subcutaneous once a day   Lipitor 20 mg oral tablet: 1 tab(s) orally once a day  losartan 50 mg oral tablet: 1 tab(s) orally once a day  pioglitazone 30 mg oral tablet: 1 tab(s) orally once a day  test strips (per patient&#x27;s insurance): 1 application subcutaneously 4 times a day. ** Compatible with patient&#x27;s glucometer **  Vitamin B12 1000 mcg oral tablet: 1 tab(s) orally once a day   acetaminophen 325 mg oral tablet: 2 tab(s) orally every 6 hours as needed for pain.   alcohol swabs : Apply topically to affected area 4 times a day   amLODIPine 5 mg oral tablet: 1 tab(s) orally once a day  Aspirin Enteric Coated 81 mg oral delayed release tablet: 1 tab(s) orally once a day  atenolol-chlorthalidone 50 mg-25 mg oral tablet: 1 tab(s) orally once a day  Eliquis Starter Pack for Treatment of DVT and PE 5 mg oral tablet: Take as directed on package  glucometer (per patient&#x27;s insurance): Test blood sugars four times a day. Dispense #1 glucometer.  glucose tablets: Follow instructions on bottle when sugar is low.  insulin glargine 100 units/mL subcutaneous solution: 24 unit(s) subcutaneous once a day at 8:00 in the morning  Insulin Pen Needles, 4mm: 1 application subcutaneously 4 times a day. ** Use with insulin pen **   Janumet 50 mg-1000 mg oral tablet: 1 tab(s) orally 2 times a day  lancets: 1 application subcutaneously 4 times a day   Lipitor 20 mg oral tablet: 1 tab(s) orally once a day  losartan 50 mg oral tablet: 1 tab(s) orally once a day  pioglitazone 30 mg oral tablet: 1 tab(s) orally once a day  test strips (per patient&#x27;s insurance): 1 application subcutaneously 4 times a day. ** Compatible with patient&#x27;s glucometer **  Vitamin B12 1000 mcg oral tablet: 1 tab(s) orally once a day

## 2022-10-25 NOTE — PROGRESS NOTE ADULT - ASSESSMENT
67M w/ PMH of HTN, HLD, DM2, and PAD s/p angiogram and stent in May 2022 who is presenting due to b/l LE pain.       Plan:  - planning for OR today   - pain control PRN   - continue Hep gtt, hold on call to OR  - continue home meds   - ISS   - DVT ppx         C Team Surgery   [id number]  67M w/ PMH of HTN, HLD, DM2, and PAD s/p angiogram and stent in May 2022 who is POD 1 from RLE diagnostic iliofemoral angiogram    Plan:  - FU repeat aPTT at 1030am  - on heparin gtt, will discharge on DOAC  - Cont ASA  - pain control PRN   - continue home meds   - ISS   - Dispo planning: follow up with Dr. Trujillo outpatient      C Team Surgery   [id number]

## 2022-10-25 NOTE — DISCHARGE NOTE PROVIDER - NSDCFUSCHEDAPPT_GEN_ALL_CORE_FT
[facility]  [facility] [address] [facility]  Scheduled Appointment: 12/23/2022

## 2022-10-25 NOTE — PROGRESS NOTE ADULT - PROBLEM SELECTOR PROBLEM 5
HLD (hyperlipidemia)
HLD (hyperlipidemia)
Nutrition, metabolism, and development symptoms
HLD (hyperlipidemia)

## 2022-10-25 NOTE — PROGRESS NOTE ADULT - SUBJECTIVE AND OBJECTIVE BOX
Patient is a 67y old  Male who presents with a chief complaint of LE pain (25 Oct 2022 12:15)      INTERVAL HPI/OVERNIGHT EVENTS:  Diagnostic iliofemoral angiogram was done yesterday with no signals at the end of the case  Seen by me this morning, doing well, no complaints besides some pain on LE's L>R.     Review of Systems: 12 point review of systems otherwise negative    MEDICATIONS  (STANDING):  amLODIPine   Tablet 5 milliGRAM(s) Oral daily  apixaban 10 milliGRAM(s) Oral once  aspirin enteric coated 81 milliGRAM(s) Oral daily  ATENolol  Tablet 50 milliGRAM(s) Oral daily  atorvastatin 40 milliGRAM(s) Oral at bedtime  dextrose 5%. 1000 milliLiter(s) (100 mL/Hr) IV Continuous <Continuous>  dextrose 5%. 1000 milliLiter(s) (50 mL/Hr) IV Continuous <Continuous>  dextrose 50% Injectable 25 Gram(s) IV Push once  dextrose 50% Injectable 12.5 Gram(s) IV Push once  dextrose 50% Injectable 25 Gram(s) IV Push once  glucagon  Injectable 1 milliGRAM(s) IntraMuscular once  insulin glargine Injectable (LANTUS) 24 Unit(s) SubCutaneous <User Schedule>  insulin lispro (ADMELOG) corrective regimen sliding scale   SubCutaneous three times a day before meals  insulin lispro (ADMELOG) corrective regimen sliding scale   SubCutaneous at bedtime  insulin lispro Injectable (ADMELOG) 11 Unit(s) SubCutaneous three times a day before meals    MEDICATIONS  (PRN):  acetaminophen     Tablet .. 650 milliGRAM(s) Oral every 6 hours PRN Temp greater or equal to 38C (100.4F), Mild Pain (1 - 3)  AQUAPHOR (petrolatum Ointment) 1 Application(s) Topical two times a day PRN for dry skin  dextrose Oral Gel 15 Gram(s) Oral once PRN Blood Glucose LESS THAN 70 milliGRAM(s)/deciliter      Allergies    Advil (Rash)    Intolerances          Vital Signs Last 24 Hrs  T(C): 37.1 (25 Oct 2022 13:15), Max: 37.1 (25 Oct 2022 13:15)  T(F): 98.8 (25 Oct 2022 13:15), Max: 98.8 (25 Oct 2022 13:15)  HR: 87 (25 Oct 2022 13:15) (71 - 87)  BP: 114/53 (25 Oct 2022 13:15) (104/60 - 149/70)  BP(mean): 71 (25 Oct 2022 13:15) (56 - 84)  RR: 18 (25 Oct 2022 13:15) (10 - 20)  SpO2: 100% (25 Oct 2022 13:15) (93% - 100%)    Parameters below as of 25 Oct 2022 13:15  Patient On (Oxygen Delivery Method): room air      CAPILLARY BLOOD GLUCOSE      POCT Blood Glucose.: 269 mg/dL (25 Oct 2022 11:57)  POCT Blood Glucose.: 222 mg/dL (25 Oct 2022 07:52)  POCT Blood Glucose.: 159 mg/dL (24 Oct 2022 22:52)  POCT Blood Glucose.: 96 mg/dL (24 Oct 2022 21:12)  POCT Blood Glucose.: 100 mg/dL (24 Oct 2022 19:53)  POCT Blood Glucose.: 118 mg/dL (24 Oct 2022 18:28)      10-24 @ 07:01  -  10-25 @ 07:00  --------------------------------------------------------  IN: 270 mL / OUT: 500 mL / NET: -230 mL    10-25 @ 07:01  -  10-25 @ 17:02  --------------------------------------------------------  IN: 0 mL / OUT: 900 mL / NET: -900 mL        Physical Exam:    Daily     Daily   General:  Well appearing, NAD, not cachetic  HEENT:  Nonicteric, PERRLA  CV:  RRR, no murmur, no JVD  Lungs:  CTA B/L, no wheezes, rales, rhonchi  Abdomen:  Soft, non-tender, no distended, positive BS  Extremities:  no c/c, no edema  Skin:  Warm and dry, no rashes  :  No melendez  Neuro:  AAOx3, non-focal, CN II-XII grossly intact  No Restraints    LABS:                        9.0    11.75 )-----------( 288      ( 25 Oct 2022 03:01 )             28.4     10-25    136  |  103  |  10  ----------------------------<  222<H>  4.0   |  21<L>  |  0.77    Ca    9.2      25 Oct 2022 03:01  Phos  3.2     10-25  Mg     1.90     10-25      PT/INR - ( 24 Oct 2022 02:05 )   PT: 13.5 sec;   INR: 1.16 ratio         PTT - ( 25 Oct 2022 10:03 )  PTT:65.2 sec        RADIOLOGY & ADDITIONAL TESTS:  Reviewed by me

## 2022-10-25 NOTE — PROGRESS NOTE ADULT - PROBLEM SELECTOR PROBLEM 3
DM2 (diabetes mellitus, type 2)
HLD (hyperlipidemia)
HTN (hypertension)
HTN (hypertension)
DM2 (diabetes mellitus, type 2)
HTN (hypertension)
HTN (hypertension)
DM2 (diabetes mellitus, type 2)

## 2022-10-25 NOTE — PROGRESS NOTE ADULT - PROBLEM SELECTOR PLAN 3
Held ARB and chlorthalidone in anticipation of procedure (ok to hold them on discharge)  C/w Amlodipine 5mg QD (started on this admission)-can be continued on discharge  C/w atenolol 50mg QD (to be continued mele-operatively)  BP well controlled

## 2022-10-25 NOTE — DISCHARGE NOTE NURSING/CASE MANAGEMENT/SOCIAL WORK - PATIENT PORTAL LINK FT
You can access the FollowMyHealth Patient Portal offered by [facility] by registering at the following website: [email or web address]. By joining [employer]’s FollowMyHealth portal, you will also be able to view your health information using other applications (apps) compatible with our system.

## 2022-10-25 NOTE — PROGRESS NOTE ADULT - REASON FOR ADMISSION
LE pain

## 2022-10-25 NOTE — DISCHARGE NOTE PROVIDER - HOSPITAL COURSE
67y M PMH HTN, HLD, DM2 PAD s/p prior BLE angiograms presented to [facility] on 10/18 complaining of worsening bilateral lower extremity pain. Patient endorsed chronic LLE pain that has worsened and spread to R leg over the past 2 weeks. Also reports new numbness on bilateral toes over the same time period, L worse than R. Reports he is only able to stand for a few minutes and walk a couple steps before pain begins. Describes pain as a cramping of the "muscle and bone" below his knees. Endorses intermittent chest pain, denies rest pain. Patient states he had two LLE angiograms this year, in 04/2022 and 07/2022. He is unsure of the exact procedures but he believes he had a balloon angioplasty.     In ED, patient AFVSS. Labs notable for glucose 364. CT with evidence of right external iliac stenosis and focal dissection. Vascular Surgery consulted for further evaluation.    67y M PMH HTN, HLD, DM2 PAD s/p prior BLE angiograms presented to [facility] on 10/18 complaining of worsening bilateral lower extremity pain. Patient endorsed chronic LLE pain that has worsened and spread to R leg over the past 2 weeks. Also reports new numbness on bilateral toes over the same time period, L worse than R.   CT with evidence of right external iliac stenosis and focal dissection. Vascular Surgery consulted for further evaluation.    67y M PMH HTN, HLD, DM2 PAD s/p prior BLE angiograms presented to [facility] on 10/18 complaining of worsening bilateral lower extremity pain. Patient endorsed chronic LLE pain that has worsened and spread to R leg over the past 2 weeks. Also reports new numbness on bilateral toes over the same time period, L worse than R.   CT with evidence of right external iliac stenosis and focal dissection. Vascular Surgery consulted for further evaluation.   Patient was seen and medically optimized by cardiology and hospitalist for angiogram.  10/24, patient underwent RUE diagnostic iliofemoral angiogram with no signals at the end of the case. Patient's pain improved from the prior to angiogram.  Decision made by attending to start patient on Eliquis, discharge, and follow up outpatient for further intervention planning. 67y M PMH HTN, HLD, DM2 PAD s/p prior BLE angiograms presented to [facility] on 10/18 complaining of worsening bilateral lower extremity pain. Patient endorsed chronic LLE pain that has worsened and spread to R leg over the past 2 weeks. Also reports new numbness on bilateral toes over the same time period, L worse than R.   CT with evidence of right external iliac stenosis and focal dissection. Vascular Surgery consulted for further evaluation.   Patient was seen and medically optimized by cardiology and hospitalist for angiogram.  10/24, patient underwent RUE diagnostic iliofemoral angiogram with no signals at the end of the case. Patient's pain improved from the prior to angiogram.  Decision made by attending to start patient on Eliquis (full anticoagulation), discharge, and follow up outpatient for further intervention planning. 67y M PMH HTN, HLD, DM2 PAD s/p prior BLE angiograms presented to [facility] on 10/18 complaining of worsening bilateral lower extremity pain. Patient endorsed chronic LLE pain that has worsened and spread to R leg over the past 2 weeks. Also reports new numbness on bilateral toes over the same time period, L worse than R.   CT with evidence of right external iliac stenosis and focal dissection. Vascular Surgery consulted for further evaluation.   Patient was seen and medically optimized by cardiology and hospitalist for angiogram. Patient seen by endocrine and to be discharged with insulin.  10/24, patient underwent RUE diagnostic iliofemoral angiogram with no signals at the end of the case. Patient's pain improved from the prior to angiogram.  Decision made by attending to start patient on Eliquis (full anticoagulation), discharge, and follow up outpatient for further intervention planning. 67y M PMH HTN, HLD, DM2 PAD s/p prior BLE angiograms presented to [facility] on 10/18 complaining of worsening bilateral lower extremity pain. Patient endorsed chronic LLE pain that has worsened and spread to R leg over the past 2 weeks. Also reports new numbness on bilateral toes over the same time period, L worse than R.   CT with evidence of right external iliac stenosis and focal dissection. Vascular Surgery consulted for further evaluation.   Patient was seen and medically optimized by cardiology and hospitalist for angiogram. Patient seen by endocrine and recommended to be discharged with insulin. Patient and family received insulin teaching from nurse.   10/24, patient underwent RUE diagnostic iliofemoral angiogram with no signals at the end of the case. Patient's pain improved from the prior to angiogram.  Decision made by attending to start patient on Eliquis (full anticoagulation), discharge, and follow up outpatient for further intervention planning.

## 2022-10-25 NOTE — DISCHARGE NOTE PROVIDER - CARE PROVIDER_API CALL
Mahad Trujillo (MD)  Surgery  Vascular  [address], [address] [place] [address]  Phone: [phone]  Fax: [phone]  Follow Up Time: 1 week

## 2022-10-25 NOTE — PROGRESS NOTE ADULT - PROBLEM SELECTOR PROBLEM 2
HTN (hypertension)
DM2 (diabetes mellitus, type 2)
HTN (hypertension)
DM2 (diabetes mellitus, type 2)
Hypokalemia
HTN (hypertension)
Hypokalemia
DM2 (diabetes mellitus, type 2)
DM2 (diabetes mellitus, type 2)
Hypokalemia

## 2022-10-25 NOTE — PROGRESS NOTE ADULT - PROBLEM SELECTOR PROBLEM 1
Peripheral arterial disease
DM2 (diabetes mellitus, type 2)
Peripheral arterial disease
Peripheral arterial disease
DM2 (diabetes mellitus, type 2)
DM2 (diabetes mellitus, type 2)
Peripheral arterial disease

## 2022-10-25 NOTE — PROGRESS NOTE ADULT - PROBLEM SELECTOR PLAN 2
Uncontrolled DM2   A1C of 10.4  on admission  Continue Lantus 24 units in AM  C/w Lispro 11U TID  C/w MISS Herron following  C/w carb controlled diet  Outpatient follow up

## 2022-10-25 NOTE — DISCHARGE NOTE PROVIDER - NSDCFUADDINST_GEN_ALL_CORE_FT
WOUND CARE:  Keep wound clean and dry. Do not scrub or rub incisions. Do not use lotion or powder on inscisions.  BATHING: Please do not submerge wound underwater. You may shower and/or sponge bathe.  ACTIVITY: No heavy lifting or straining. Otherwise, you may return to your usual level of physical activity. If you are taking narcotic pain medication (such as Percocet) DO NOT drive a car, operate machinery or make important decisions.  DIET: Return to your usual diet.  NOTIFY YOUR SURGEON IF: You have any bleeding that does not stop, any pus draining from your wound(s), any fever (over 100.4 F) or chills, persistent nausea/vomiting, persistent diarrhea, or if your pain is not controlled on your discharge pain medications.  FOLLOW-UP: Please follow up with your primary care physician in one week regarding your hospitalization

## 2022-11-09 ENCOUNTER — APPOINTMENT (OUTPATIENT)
Dept: VASCULAR SURGERY | Facility: CLINIC | Age: 67
End: 2022-11-09

## 2022-11-09 VITALS
SYSTOLIC BLOOD PRESSURE: 129 MMHG | BODY MASS INDEX: 24.75 KG/M2 | DIASTOLIC BLOOD PRESSURE: 70 MMHG | TEMPERATURE: 96.5 F | WEIGHT: 154 LBS | HEART RATE: 87 BPM | HEIGHT: 66 IN

## 2022-11-09 PROCEDURE — 99213 OFFICE O/P EST LOW 20 MIN: CPT

## 2022-11-09 PROCEDURE — 99072 ADDL SUPL MATRL&STAF TM PHE: CPT

## 2022-11-09 RX ORDER — BLOOD-GLUCOSE METER
W/DEVICE KIT MISCELLANEOUS
Qty: 1 | Refills: 0 | Status: ACTIVE | COMMUNITY
Start: 2022-10-25

## 2022-11-09 RX ORDER — PREDNISOLONE ACETATE 10 MG/ML
1 SUSPENSION/ DROPS OPHTHALMIC
Qty: 15 | Refills: 0 | Status: ACTIVE | COMMUNITY
Start: 2022-09-28

## 2022-11-09 RX ORDER — ATENOLOL AND CHLORTHALIDONE 50; 25 MG/1; MG/1
50-25 TABLET ORAL
Qty: 30 | Refills: 0 | Status: ACTIVE | COMMUNITY
Start: 2022-10-10

## 2022-11-09 RX ORDER — BACITRACIN 500 [IU]/G
500 OINTMENT TOPICAL
Qty: 30 | Refills: 0 | Status: ACTIVE | COMMUNITY
Start: 2022-09-28

## 2022-11-09 RX ORDER — INSULIN GLARGINE-YFGN 100 [IU]/ML
100 INJECTION, SOLUTION SUBCUTANEOUS
Qty: 15 | Refills: 0 | Status: ACTIVE | COMMUNITY
Start: 2022-10-25

## 2022-11-09 RX ORDER — LANCETS 28 GAUGE
EACH MISCELLANEOUS
Qty: 100 | Refills: 0 | Status: ACTIVE | COMMUNITY
Start: 2022-10-25

## 2022-11-09 RX ORDER — PEN NEEDLE, DIABETIC 29 G X1/2"
32G X 4 MM NEEDLE, DISPOSABLE MISCELLANEOUS
Qty: 100 | Refills: 0 | Status: ACTIVE | COMMUNITY
Start: 2022-10-25

## 2022-11-09 RX ORDER — OFLOXACIN 3 MG/ML
0.3 SOLUTION/ DROPS OPHTHALMIC
Qty: 10 | Refills: 0 | Status: ACTIVE | COMMUNITY
Start: 2022-09-28

## 2022-11-09 RX ORDER — PIOGLITAZONE HYDROCHLORIDE 30 MG/1
30 TABLET ORAL
Qty: 30 | Refills: 0 | Status: ACTIVE | COMMUNITY
Start: 2022-10-10

## 2022-11-09 RX ORDER — ISOPROPYL ALCOHOL 70 ML/100ML
70 SWAB TOPICAL
Qty: 100 | Refills: 0 | Status: ACTIVE | COMMUNITY
Start: 2022-10-25

## 2022-11-09 RX ORDER — BLOOD SUGAR DIAGNOSTIC
STRIP MISCELLANEOUS
Qty: 100 | Refills: 0 | Status: ACTIVE | COMMUNITY
Start: 2022-10-25

## 2022-11-09 RX ORDER — HALOBETASOL PROPIONATE 0.5 MG/G
0.05 OINTMENT TOPICAL
Qty: 50 | Refills: 0 | Status: ACTIVE | COMMUNITY
Start: 2022-09-28

## 2022-11-09 RX ORDER — ATORVASTATIN CALCIUM 20 MG/1
20 TABLET, FILM COATED ORAL
Qty: 90 | Refills: 0 | Status: ACTIVE | COMMUNITY
Start: 2022-09-08

## 2022-11-09 RX ORDER — AMLODIPINE BESYLATE 5 MG/1
5 TABLET ORAL
Qty: 30 | Refills: 0 | Status: ACTIVE | COMMUNITY
Start: 2022-10-25

## 2022-11-09 RX ORDER — APIXABAN 5 MG (74)
5 KIT ORAL
Qty: 74 | Refills: 0 | Status: ACTIVE | COMMUNITY
Start: 2022-10-25

## 2022-11-09 RX ORDER — DEXTROSE 4 G
4 TABLET,CHEWABLE ORAL
Qty: 30 | Refills: 0 | Status: ACTIVE | COMMUNITY
Start: 2022-10-25

## 2022-11-09 RX ORDER — DICLOFENAC SODIUM 1% 10 MG/G
1 GEL TOPICAL
Qty: 100 | Refills: 0 | Status: ACTIVE | COMMUNITY
Start: 2022-09-28

## 2022-11-09 RX ORDER — KETOROLAC TROMETHAMINE 5 MG/ML
0.5 SOLUTION OPHTHALMIC
Qty: 5 | Refills: 0 | Status: ACTIVE | COMMUNITY
Start: 2022-09-08

## 2022-11-09 RX ORDER — SITAGLIPTIN AND METFORMIN HYDROCHLORIDE 50; 1000 MG/1; MG/1
50-1000 TABLET, FILM COATED ORAL
Qty: 180 | Refills: 0 | Status: ACTIVE | COMMUNITY
Start: 2022-09-08

## 2022-11-09 RX ORDER — LOSARTAN POTASSIUM 50 MG/1
50 TABLET, FILM COATED ORAL
Qty: 30 | Refills: 0 | Status: ACTIVE | COMMUNITY
Start: 2022-10-10

## 2022-11-09 NOTE — HISTORY OF PRESENT ILLNESS
[FreeTextEntry1] : 67 year old male with history of HTN, HLD, DM, PAD, and aortoiliac occlusive disease who presents s/p diagnostic right pelvic angiography. He previously underwent outpatient angiography complicated by right iliac artery dissection.  [de-identified] : He has worsening rest pain of bilateral lower extremities. He denies any other complaints.

## 2022-11-09 NOTE — ASSESSMENT
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

## 2022-11-09 NOTE — PHYSICAL EXAM
[Respiratory Effort] : normal respiratory effort [Normal Rate and Rhythm] : normal rate and rhythm [1+] : right 1+ [2+] : left 2+ [0] : left 0 [Ankle Swelling (On Exam)] : not present [Varicose Veins Of Lower Extremities] : not present [] : not present [Abdomen Tenderness] : ~T ~M No abdominal tenderness [Skin Ulcer] : no ulcer [Alert] : alert [Oriented to Person] : oriented to person [Oriented to Place] : oriented to place [Oriented to Time] : oriented to time [Calm] : calm [de-identified] : appears stated age [de-identified] : normocephalic, atraumatic [de-identified] : supple

## 2022-11-28 ENCOUNTER — OUTPATIENT (OUTPATIENT)
Dept: OUTPATIENT SERVICES | Facility: HOSPITAL | Age: 67
LOS: 1 days | Discharge: HOME | End: 2022-11-28

## 2022-11-28 ENCOUNTER — APPOINTMENT (OUTPATIENT)
Dept: VASCULAR SURGERY | Facility: HOSPITAL | Age: 67
End: 2022-11-28

## 2022-11-28 VITALS — HEIGHT: 66 IN | WEIGHT: 154.1 LBS

## 2022-11-28 DIAGNOSIS — I73.9 PERIPHERAL VASCULAR DISEASE, UNSPECIFIED: ICD-10-CM

## 2022-11-28 DIAGNOSIS — Z90.49 ACQUIRED ABSENCE OF OTHER SPECIFIED PARTS OF DIGESTIVE TRACT: Chronic | ICD-10-CM

## 2022-11-28 DIAGNOSIS — R93.89 ABNORMAL FINDINGS ON DIAGNOSTIC IMAGING OF OTHER SPECIFIED BODY STRUCTURES: Chronic | ICD-10-CM

## 2022-11-28 LAB
ALBUMIN SERPL ELPH-MCNC: 5 G/DL — SIGNIFICANT CHANGE UP (ref 3.3–5)
ALP SERPL-CCNC: 83 U/L — SIGNIFICANT CHANGE UP (ref 40–120)
ALT FLD-CCNC: 16 U/L — SIGNIFICANT CHANGE UP (ref 4–41)
ANION GAP SERPL CALC-SCNC: 14 MMOL/L — SIGNIFICANT CHANGE UP (ref 7–14)
AST SERPL-CCNC: 19 U/L — SIGNIFICANT CHANGE UP (ref 4–40)
BILIRUB SERPL-MCNC: 0.2 MG/DL — SIGNIFICANT CHANGE UP (ref 0.2–1.2)
BUN SERPL-MCNC: 14 MG/DL — SIGNIFICANT CHANGE UP (ref 7–23)
CALCIUM SERPL-MCNC: 9.9 MG/DL — SIGNIFICANT CHANGE UP (ref 8.4–10.5)
CHLORIDE SERPL-SCNC: 97 MMOL/L — LOW (ref 98–107)
CO2 SERPL-SCNC: 24 MMOL/L — SIGNIFICANT CHANGE UP (ref 22–31)
CREAT SERPL-MCNC: 0.95 MG/DL — SIGNIFICANT CHANGE UP (ref 0.5–1.3)
EGFR: 87 ML/MIN/1.73M2 — SIGNIFICANT CHANGE UP
GLUCOSE BLDC GLUCOMTR-MCNC: 144 MG/DL — HIGH (ref 70–99)
GLUCOSE SERPL-MCNC: 163 MG/DL — HIGH (ref 70–99)
HCT VFR BLD CALC: 31.9 % — LOW (ref 39–50)
HGB BLD-MCNC: 9.9 G/DL — LOW (ref 13–17)
MAGNESIUM SERPL-MCNC: 1.7 MG/DL — SIGNIFICANT CHANGE UP (ref 1.6–2.6)
MCHC RBC-ENTMCNC: 24.8 PG — LOW (ref 27–34)
MCHC RBC-ENTMCNC: 31 GM/DL — LOW (ref 32–36)
MCV RBC AUTO: 79.9 FL — LOW (ref 80–100)
NRBC # BLD: 0 /100 WBCS — SIGNIFICANT CHANGE UP (ref 0–0)
NRBC # FLD: 0 K/UL — SIGNIFICANT CHANGE UP (ref 0–0)
PHOSPHATE SERPL-MCNC: 3.4 MG/DL — SIGNIFICANT CHANGE UP (ref 2.5–4.5)
PLATELET # BLD AUTO: 362 K/UL — SIGNIFICANT CHANGE UP (ref 150–400)
POTASSIUM SERPL-MCNC: 3.3 MMOL/L — LOW (ref 3.5–5.3)
POTASSIUM SERPL-SCNC: 3.3 MMOL/L — LOW (ref 3.5–5.3)
PROT SERPL-MCNC: 8.1 G/DL — SIGNIFICANT CHANGE UP (ref 6–8.3)
RBC # BLD: 3.99 M/UL — LOW (ref 4.2–5.8)
RBC # FLD: 15.9 % — HIGH (ref 10.3–14.5)
SODIUM SERPL-SCNC: 135 MMOL/L — SIGNIFICANT CHANGE UP (ref 135–145)
WBC # BLD: 11.64 K/UL — HIGH (ref 3.8–10.5)
WBC # FLD AUTO: 11.64 K/UL — HIGH (ref 3.8–10.5)

## 2022-11-28 PROCEDURE — 37211 THROMBOLYTIC ART THERAPY: CPT | Mod: RT

## 2022-11-28 PROCEDURE — 76937 US GUIDE VASCULAR ACCESS: CPT | Mod: 26,LT

## 2022-11-28 PROCEDURE — 99152 MOD SED SAME PHYS/QHP 5/>YRS: CPT

## 2022-11-28 PROCEDURE — 75630 X-RAY AORTA LEG ARTERIES: CPT | Mod: 26

## 2022-11-28 PROCEDURE — 37221: CPT | Mod: RT

## 2022-11-28 RX ORDER — SODIUM CHLORIDE 9 MG/ML
1000 INJECTION INTRAMUSCULAR; INTRAVENOUS; SUBCUTANEOUS
Refills: 0 | Status: DISCONTINUED | OUTPATIENT
Start: 2022-11-28 | End: 2022-12-12

## 2022-11-28 RX ORDER — SITAGLIPTIN AND METFORMIN HYDROCHLORIDE 500; 50 MG/1; MG/1
1 TABLET, FILM COATED ORAL
Qty: 0 | Refills: 0 | DISCHARGE

## 2022-11-28 RX ORDER — LOSARTAN POTASSIUM 100 MG/1
50 TABLET, FILM COATED ORAL ONCE
Refills: 0 | Status: COMPLETED | OUTPATIENT
Start: 2022-11-28 | End: 2022-11-28

## 2022-11-28 RX ORDER — PIOGLITAZONE HYDROCHLORIDE 15 MG/1
1 TABLET ORAL
Qty: 0 | Refills: 0 | DISCHARGE

## 2022-11-28 RX ORDER — SODIUM CHLORIDE 9 MG/ML
250 INJECTION INTRAMUSCULAR; INTRAVENOUS; SUBCUTANEOUS ONCE
Refills: 0 | Status: COMPLETED | OUTPATIENT
Start: 2022-11-28 | End: 2022-11-28

## 2022-11-28 RX ORDER — ACETAMINOPHEN 500 MG
650 TABLET ORAL ONCE
Refills: 0 | Status: COMPLETED | OUTPATIENT
Start: 2022-11-28 | End: 2022-11-28

## 2022-11-28 RX ORDER — ATENOLOL 25 MG/1
50 TABLET ORAL ONCE
Refills: 0 | Status: COMPLETED | OUTPATIENT
Start: 2022-11-28 | End: 2022-11-28

## 2022-11-28 RX ORDER — OXYCODONE HYDROCHLORIDE 5 MG/1
5 TABLET ORAL ONCE
Refills: 0 | Status: DISCONTINUED | OUTPATIENT
Start: 2022-11-28 | End: 2022-11-28

## 2022-11-28 RX ORDER — ASPIRIN/CALCIUM CARB/MAGNESIUM 324 MG
1 TABLET ORAL
Qty: 0 | Refills: 0 | DISCHARGE

## 2022-11-28 RX ORDER — ATENOLOL AND CHLORTHALIDONE 50; 25 MG/1; MG/1
1 TABLET ORAL
Qty: 0 | Refills: 0 | DISCHARGE

## 2022-11-28 RX ORDER — SODIUM CHLORIDE 9 MG/ML
500 INJECTION INTRAMUSCULAR; INTRAVENOUS; SUBCUTANEOUS
Refills: 0 | Status: DISCONTINUED | OUTPATIENT
Start: 2022-11-28 | End: 2022-12-12

## 2022-11-28 RX ORDER — LOSARTAN POTASSIUM 100 MG/1
1 TABLET, FILM COATED ORAL
Qty: 0 | Refills: 0 | DISCHARGE

## 2022-11-28 RX ORDER — POTASSIUM CHLORIDE 20 MEQ
40 PACKET (EA) ORAL ONCE
Refills: 0 | Status: COMPLETED | OUTPATIENT
Start: 2022-11-28 | End: 2022-11-28

## 2022-11-28 RX ORDER — ATORVASTATIN CALCIUM 80 MG/1
1 TABLET, FILM COATED ORAL
Qty: 0 | Refills: 0 | DISCHARGE

## 2022-11-28 RX ORDER — SODIUM CHLORIDE 9 MG/ML
3 INJECTION INTRAMUSCULAR; INTRAVENOUS; SUBCUTANEOUS EVERY 8 HOURS
Refills: 0 | Status: DISCONTINUED | OUTPATIENT
Start: 2022-11-28 | End: 2022-12-12

## 2022-11-28 RX ORDER — APIXABAN 2.5 MG/1
1 TABLET, FILM COATED ORAL
Qty: 0 | Refills: 0 | DISCHARGE

## 2022-11-28 RX ORDER — PREGABALIN 225 MG/1
1 CAPSULE ORAL
Qty: 0 | Refills: 0 | DISCHARGE

## 2022-11-28 RX ADMIN — OXYCODONE HYDROCHLORIDE 5 MILLIGRAM(S): 5 TABLET ORAL at 14:25

## 2022-11-28 RX ADMIN — SODIUM CHLORIDE 125 MILLILITER(S): 9 INJECTION INTRAMUSCULAR; INTRAVENOUS; SUBCUTANEOUS at 10:57

## 2022-11-28 RX ADMIN — SODIUM CHLORIDE 750 MILLILITER(S): 9 INJECTION INTRAMUSCULAR; INTRAVENOUS; SUBCUTANEOUS at 07:10

## 2022-11-28 RX ADMIN — LOSARTAN POTASSIUM 50 MILLIGRAM(S): 100 TABLET, FILM COATED ORAL at 15:55

## 2022-11-28 RX ADMIN — Medication 650 MILLIGRAM(S): at 12:28

## 2022-11-28 RX ADMIN — SODIUM CHLORIDE 3 MILLILITER(S): 9 INJECTION INTRAMUSCULAR; INTRAVENOUS; SUBCUTANEOUS at 13:36

## 2022-11-28 RX ADMIN — Medication 650 MILLIGRAM(S): at 13:00

## 2022-11-28 RX ADMIN — ATENOLOL 50 MILLIGRAM(S): 25 TABLET ORAL at 15:55

## 2022-11-28 RX ADMIN — SODIUM CHLORIDE 75 MILLILITER(S): 9 INJECTION INTRAMUSCULAR; INTRAVENOUS; SUBCUTANEOUS at 07:38

## 2022-11-28 RX ADMIN — OXYCODONE HYDROCHLORIDE 5 MILLIGRAM(S): 5 TABLET ORAL at 13:40

## 2022-11-28 RX ADMIN — Medication 40 MILLIEQUIVALENT(S): at 11:32

## 2022-11-28 NOTE — H&P CARDIOLOGY - MS EXT PE MLT D E PC
Casarez


   PA 











Macario Murcia May 20, 2020 18:24 no clubbing/no cyanosis/no pedal edema

## 2022-11-28 NOTE — H&P CARDIOLOGY - HISTORY OF PRESENT ILLNESS
66 y/o male with a PMHx of HTN, HLD, DM and PAD presents for bilateral peripheral angiogram. Pt has been experiencing intermittent claudication in bilateral lower extremities for over six months. Pt describes his pain as a "cramping" and "squeezing" muscular pain radiating from his knees to his toes. Pt states the pain is made worse with 2-3 minutes of any type of minimal activity, even just standing up, with symptoms improved at rest. Pt rates the pain as a 9/10 at its worst. Pt takes Tylenol occasionally for the pain which provides mild relief. Pt does not have any open wounds. Pt underwent a peripheral angiogram six months ago at [facility] but no interventions were performed. Pt came to [facility] in October 2022 for claudication and was seen by vascular. Pt underwent CT angiogram of the lower extremities, which revealed near occlusive narrowing of the right external iliac artery with focal dissection in its distal portion, moderate to severe narrowing of the bilateral SFAs, left popliteal artery occlusion with distal reconstitution and two-vessel runoff to the left foot. Pt subsequently underwent diagnostic peripheral catheterization and was then started on Eliquis and referred for outpatient follow up. Pt now presents for elective peripheral angiogram with possible intervention. Pt denies fever, chills, recent travel, headache, dizziness, visual deficits, chest pain, shortness of breath, orthopnea, palpitations, abdominal pain, N/V/D/C, hematochezia, melena, dysuria, hematuria, LOC, syncope, peripheral edema.     Vascular surgeon: Dr. Mahad CHIU status: PCR negative 11/23/2022 66 y/o male with a PMHx of HTN, HLD, DM and PAD on Eliquis (last dose 11/26 PM) presents for bilateral peripheral angiogram. Pt has been experiencing intermittent claudication in bilateral lower extremities for over six months. Pt describes his pain as a "cramping" and "squeezing" muscular pain radiating from his knees to his toes. Pt states the pain is made worse with 2-3 minutes of any type of minimal activity, even just standing up, with symptoms improved at rest. Pt rates the pain as a 9/10 at its worst. Pt takes Tylenol occasionally for the pain which provides mild relief. Pt does not have any open wounds. Pt underwent a peripheral angiogram six months ago at [facility] but no interventions were performed. Pt came to [facility] in October 2022 for claudication and was seen by vascular. Pt underwent CT angiogram of the lower extremities, which revealed near occlusive narrowing of the right external iliac artery with focal dissection in its distal portion, moderate to severe narrowing of the bilateral SFAs, left popliteal artery occlusion with distal reconstitution and two-vessel runoff to the left foot. Pt subsequently underwent diagnostic peripheral catheterization and was then started on Eliquis and referred for outpatient follow up. Pt now presents for elective peripheral angiogram with possible intervention. Pt denies fever, chills, recent travel, headache, dizziness, visual deficits, chest pain, shortness of breath, orthopnea, palpitations, abdominal pain, N/V/D/C, hematochezia, melena, dysuria, hematuria, LOC, syncope, peripheral edema.     Vascular surgeon: Dr. Mahad CHIU status: PCR negative 11/23/2022

## 2022-11-28 NOTE — H&P CARDIOLOGY - NSICDXPASTMEDICALHX_GEN_ALL_CORE_FT
PAST MEDICAL HISTORY:  DM (diabetes mellitus)     HLD (hyperlipidemia)     HTN (hypertension)     PAD (peripheral artery disease)     Tobacco abuse Former

## 2022-11-28 NOTE — H&P CARDIOLOGY - OTHER
October 2022 - CT peripheral angiogram: Near occlusive narrowing of the right external iliac artery with focal dissection in its distal portion. Moderate to severe narrowing of the bilateral SFAs. Left popliteal artery occlusion with distal reconstitution. Two-vessel runoff to the left foot.

## 2022-11-28 NOTE — CHART NOTE - NSCHARTNOTEFT_GEN_A_CORE
Procedure: Left radial access angio with right external iliac artery stent  Surgeon: Ronnie    S: Pt has no complaints. Denies CP, SOB, BETH, calf tenderness. Pain controlled with medication. Patient reports persistent left anterior shin pain, which he has experienced since his first vascular surgery at an OSH.       O:  T(C): --  T(F): --  HR: --  BP: --  RR: --  SpO2: --  Wt(kg): --                        9.9    11.64 )-----------( 362      ( 28 Nov 2022 07:00 )             31.9     11-28    135  |  97<L>  |  14  ----------------------------<  163<H>  3.3<L>   |  24  |  0.95    Ca    9.9      28 Nov 2022 07:00  Phos  3.4     11-28  Mg     1.70     11-28    TPro  8.1  /  Alb  5.0  /  TBili  0.2  /  DBili  x   /  AST  19  /  ALT  16  /  AlkPhos  83  11-28      Gen: NAD, resting comfortably in bed  Pulm: Nonlabored breathing, no respiratory distress  Abd: soft, NT/ND  Extrem: WWP, no calf edema, SCDs in place DP/PT easily dopplerable on the right, more difficult to hear on the left but still able to be heard in very specific locations      A/P: 67yMale s/p above procedure  Diet: regular  IVF: for 4 hours post procedure  DVT ppx: Can restart AC tonight   Dispo plan: PACU to home

## 2022-11-29 PROBLEM — Z72.0 TOBACCO USE: Chronic | Status: ACTIVE | Noted: 2022-11-28

## 2022-11-29 PROBLEM — E11.9 TYPE 2 DIABETES MELLITUS WITHOUT COMPLICATIONS: Chronic | Status: ACTIVE | Noted: 2022-11-28

## 2022-11-29 PROBLEM — I73.9 PERIPHERAL VASCULAR DISEASE, UNSPECIFIED: Chronic | Status: ACTIVE | Noted: 2022-11-28

## 2022-12-12 ENCOUNTER — OUTPATIENT (OUTPATIENT)
Dept: OUTPATIENT SERVICES | Facility: HOSPITAL | Age: 67
LOS: 1 days | Discharge: HOME | End: 2022-12-12

## 2022-12-12 ENCOUNTER — APPOINTMENT (OUTPATIENT)
Dept: VASCULAR SURGERY | Facility: HOSPITAL | Age: 67
End: 2022-12-12

## 2022-12-12 DIAGNOSIS — Z90.49 ACQUIRED ABSENCE OF OTHER SPECIFIED PARTS OF DIGESTIVE TRACT: Chronic | ICD-10-CM

## 2022-12-12 DIAGNOSIS — Z95.820 PERIPHERAL VASCULAR ANGIOPLASTY STATUS WITH IMPLANTS AND GRAFTS: Chronic | ICD-10-CM

## 2022-12-12 DIAGNOSIS — I73.9 PERIPHERAL VASCULAR DISEASE, UNSPECIFIED: ICD-10-CM

## 2022-12-12 LAB
ANION GAP SERPL CALC-SCNC: 17 MMOL/L — HIGH (ref 7–14)
BUN SERPL-MCNC: 17 MG/DL — SIGNIFICANT CHANGE UP (ref 7–23)
CALCIUM SERPL-MCNC: 9.9 MG/DL — SIGNIFICANT CHANGE UP (ref 8.4–10.5)
CHLORIDE SERPL-SCNC: 88 MMOL/L — LOW (ref 98–107)
CO2 SERPL-SCNC: 22 MMOL/L — SIGNIFICANT CHANGE UP (ref 22–31)
CREAT SERPL-MCNC: 1.07 MG/DL — SIGNIFICANT CHANGE UP (ref 0.5–1.3)
EGFR: 75 ML/MIN/1.73M2 — SIGNIFICANT CHANGE UP
GLUCOSE BLDC GLUCOMTR-MCNC: 137 MG/DL — HIGH (ref 70–99)
GLUCOSE BLDC GLUCOMTR-MCNC: 166 MG/DL — HIGH (ref 70–99)
GLUCOSE SERPL-MCNC: 141 MG/DL — HIGH (ref 70–99)
HCT VFR BLD CALC: 26.1 % — LOW (ref 39–50)
HGB BLD-MCNC: 8.3 G/DL — LOW (ref 13–17)
MCHC RBC-ENTMCNC: 24.1 PG — LOW (ref 27–34)
MCHC RBC-ENTMCNC: 31.8 GM/DL — LOW (ref 32–36)
MCV RBC AUTO: 75.9 FL — LOW (ref 80–100)
NRBC # BLD: 0 /100 WBCS — SIGNIFICANT CHANGE UP (ref 0–0)
NRBC # FLD: 0 K/UL — SIGNIFICANT CHANGE UP (ref 0–0)
PLATELET # BLD AUTO: 529 K/UL — HIGH (ref 150–400)
POTASSIUM SERPL-MCNC: 3.2 MMOL/L — LOW (ref 3.5–5.3)
POTASSIUM SERPL-SCNC: 3.2 MMOL/L — LOW (ref 3.5–5.3)
RBC # BLD: 3.44 M/UL — LOW (ref 4.2–5.8)
RBC # FLD: 15.9 % — HIGH (ref 10.3–14.5)
SODIUM SERPL-SCNC: 127 MMOL/L — LOW (ref 135–145)
WBC # BLD: 11.94 K/UL — HIGH (ref 3.8–10.5)
WBC # FLD AUTO: 11.94 K/UL — HIGH (ref 3.8–10.5)

## 2022-12-12 PROCEDURE — 75710 ARTERY X-RAYS ARM/LEG: CPT | Mod: 26,59

## 2022-12-12 PROCEDURE — 37225: CPT | Mod: LT

## 2022-12-12 PROCEDURE — 99152 MOD SED SAME PHYS/QHP 5/>YRS: CPT

## 2022-12-12 PROCEDURE — 37186 SEC ART THROMBECTOMY ADD-ON: CPT | Mod: LT,59

## 2022-12-12 RX ORDER — SODIUM CHLORIDE 9 MG/ML
1000 INJECTION INTRAMUSCULAR; INTRAVENOUS; SUBCUTANEOUS
Refills: 0 | Status: DISCONTINUED | OUTPATIENT
Start: 2022-12-12 | End: 2022-12-26

## 2022-12-12 RX ORDER — SODIUM CHLORIDE 9 MG/ML
3 INJECTION INTRAMUSCULAR; INTRAVENOUS; SUBCUTANEOUS EVERY 8 HOURS
Refills: 0 | Status: DISCONTINUED | OUTPATIENT
Start: 2022-12-12 | End: 2022-12-26

## 2022-12-12 RX ORDER — ASPIRIN/CALCIUM CARB/MAGNESIUM 324 MG
81 TABLET ORAL ONCE
Refills: 0 | Status: DISCONTINUED | OUTPATIENT
Start: 2022-12-12 | End: 2022-12-12

## 2022-12-12 RX ORDER — ACETAMINOPHEN 500 MG
650 TABLET ORAL ONCE
Refills: 0 | Status: COMPLETED | OUTPATIENT
Start: 2022-12-12 | End: 2022-12-12

## 2022-12-12 RX ORDER — ASPIRIN/CALCIUM CARB/MAGNESIUM 324 MG
81 TABLET ORAL DAILY
Refills: 0 | Status: DISCONTINUED | OUTPATIENT
Start: 2022-12-12 | End: 2022-12-12

## 2022-12-12 RX ADMIN — Medication 650 MILLIGRAM(S): at 16:27

## 2022-12-12 RX ADMIN — SODIUM CHLORIDE 75 MILLILITER(S): 9 INJECTION INTRAMUSCULAR; INTRAVENOUS; SUBCUTANEOUS at 13:29

## 2022-12-12 NOTE — H&P CARDIOLOGY - REVIEW OF SYSTEMS
The patient denies chest pain, SOB, palpitations, dizziness, presyncope,  headache, visual disturbances, CVA, PE, DVT, RENE, abdominal pain, N/V/D/C, hematochezia, melena, dysuria, hematuria, fever, chills, ulcers, b/l lower extremities edema.

## 2022-12-12 NOTE — H&P CARDIOLOGY - NSICDXPASTSURGICALHX_GEN_ALL_CORE_FT
PAST SURGICAL HISTORY:  S/P appendectomy     S/P peripheral artery angioplasty with stent placement

## 2022-12-12 NOTE — H&P CARDIOLOGY - HISTORY OF PRESENT ILLNESS
66 y/o male with a PMHx of HTN, HLD, DM and PAD on Eliquis (last dose 11/26 PM) presented for left LE angiogram. Pt has been experiencing intermittent claudication in bilateral lower extremities for over six months. Pt describes his pain as a "cramping" and "squeezing" muscular pain radiating from his knees to his toes. Pt states the pain is made worse with 2-3 minutes of any type of minimal activity, even just standing up, with symptoms improved at rest. Pt underwent a peripheral angiogram 2 weeks ago and Right External Iliac Balloon angioplasty was performed with improving in symptoms.  Pt now presents for elective left peripheral angiogram with possible intervention. Pt denies fever, chills, recent travel, headache, dizziness, visual deficits, chest pain, shortness of breath, orthopnea, palpitations, abdominal pain, N/V/D/C, hematochezia, melena, dysuria, hematuria, LOC, syncope, peripheral edema.

## 2022-12-13 RX ORDER — CILOSTAZOL 100 MG/1
100 TABLET ORAL TWICE DAILY
Qty: 180 | Refills: 3 | Status: ACTIVE | COMMUNITY
Start: 2022-12-13 | End: 1900-01-01

## 2022-12-23 ENCOUNTER — APPOINTMENT (OUTPATIENT)
Dept: ENDOCRINOLOGY | Facility: CLINIC | Age: 67
End: 2022-12-23

## 2022-12-28 ENCOUNTER — APPOINTMENT (OUTPATIENT)
Dept: VASCULAR SURGERY | Facility: CLINIC | Age: 67
End: 2022-12-28
Payer: COMMERCIAL

## 2022-12-28 VITALS
BODY MASS INDEX: 24.75 KG/M2 | HEIGHT: 66 IN | SYSTOLIC BLOOD PRESSURE: 112 MMHG | WEIGHT: 154 LBS | HEART RATE: 106 BPM | DIASTOLIC BLOOD PRESSURE: 69 MMHG | TEMPERATURE: 98.2 F

## 2022-12-28 PROCEDURE — 99072 ADDL SUPL MATRL&STAF TM PHE: CPT

## 2022-12-28 PROCEDURE — 99213 OFFICE O/P EST LOW 20 MIN: CPT

## 2022-12-28 PROCEDURE — 93923 UPR/LXTR ART STDY 3+ LVLS: CPT

## 2022-12-30 RX ORDER — INSULIN GLARGINE 100 [IU]/ML
100 INJECTION, SOLUTION SUBCUTANEOUS
Qty: 9 | Refills: 0 | Status: ACTIVE | COMMUNITY
Start: 2022-12-26

## 2022-12-30 NOTE — ASSESSMENT
[FreeTextEntry1] : Problem #1 peripheral arterial disease\par - s/p revascularization with excellent outcome\par - recommend podiatry/neurology for peripheral diabetic neuropathy\par - follow up in 3 months for PAD surveillance

## 2022-12-30 NOTE — HISTORY OF PRESENT ILLNESS
[FreeTextEntry1] : 67 year old male with history of HTN, HLD, DM, PAD, and aortoiliac occlusive disease who presents s/p diagnostic right pelvic angiography. He previously underwent outpatient angiography complicated by right iliac artery dissection.  [de-identified] : s/p bilateral lower extremity angiogram with revascularization\par denies right leg pain\par complains of burning pain in plantar aspect of left foot

## 2022-12-30 NOTE — PHYSICAL EXAM
[Respiratory Effort] : normal respiratory effort [Normal Rate and Rhythm] : normal rate and rhythm [2+] : left 2+ [1+] : left 1+ [Ankle Swelling (On Exam)] : not present [Varicose Veins Of Lower Extremities] : not present [] : not present [Abdomen Tenderness] : ~T ~M No abdominal tenderness [Skin Ulcer] : no ulcer [Alert] : alert [Oriented to Person] : oriented to person [Oriented to Place] : oriented to place [Oriented to Time] : oriented to time [Calm] : calm [de-identified] : appears stated age [de-identified] : normocephalic, atraumatic [de-identified] : supple

## 2023-02-08 ENCOUNTER — APPOINTMENT (OUTPATIENT)
Dept: ENDOCRINOLOGY | Facility: CLINIC | Age: 68
End: 2023-02-08

## 2023-04-04 ENCOUNTER — EMERGENCY (EMERGENCY)
Facility: HOSPITAL | Age: 68
LOS: 1 days | Discharge: HOME | End: 2023-04-04
Attending: EMERGENCY MEDICINE | Admitting: EMERGENCY MEDICINE
Payer: COMMERCIAL

## 2023-04-04 VITALS
TEMPERATURE: 99 F | SYSTOLIC BLOOD PRESSURE: 122 MMHG | RESPIRATION RATE: 17 BRPM | OXYGEN SATURATION: 100 % | DIASTOLIC BLOOD PRESSURE: 55 MMHG | HEART RATE: 82 BPM

## 2023-04-04 DIAGNOSIS — Z90.49 ACQUIRED ABSENCE OF OTHER SPECIFIED PARTS OF DIGESTIVE TRACT: Chronic | ICD-10-CM

## 2023-04-04 DIAGNOSIS — Z95.820 PERIPHERAL VASCULAR ANGIOPLASTY STATUS WITH IMPLANTS AND GRAFTS: Chronic | ICD-10-CM

## 2023-04-04 LAB
ALBUMIN SERPL ELPH-MCNC: 4.4 G/DL — SIGNIFICANT CHANGE UP (ref 3.3–5)
ALP SERPL-CCNC: 75 U/L — SIGNIFICANT CHANGE UP (ref 40–120)
ALT FLD-CCNC: 8 U/L — SIGNIFICANT CHANGE UP (ref 4–41)
ANION GAP SERPL CALC-SCNC: 13 MMOL/L — SIGNIFICANT CHANGE UP (ref 7–14)
APTT BLD: 32.5 SEC — SIGNIFICANT CHANGE UP (ref 27–36.3)
AST SERPL-CCNC: 15 U/L — SIGNIFICANT CHANGE UP (ref 4–40)
BASOPHILS # BLD AUTO: 0.04 K/UL — SIGNIFICANT CHANGE UP (ref 0–0.2)
BASOPHILS NFR BLD AUTO: 0.4 % — SIGNIFICANT CHANGE UP (ref 0–2)
BILIRUB SERPL-MCNC: <0.2 MG/DL — SIGNIFICANT CHANGE UP (ref 0.2–1.2)
BLD GP AB SCN SERPL QL: NEGATIVE — SIGNIFICANT CHANGE UP
BLOOD GAS VENOUS COMPREHENSIVE RESULT: SIGNIFICANT CHANGE UP
BUN SERPL-MCNC: 20 MG/DL — SIGNIFICANT CHANGE UP (ref 7–23)
CALCIUM SERPL-MCNC: 9.4 MG/DL — SIGNIFICANT CHANGE UP (ref 8.4–10.5)
CHLORIDE SERPL-SCNC: 99 MMOL/L — SIGNIFICANT CHANGE UP (ref 98–107)
CO2 SERPL-SCNC: 24 MMOL/L — SIGNIFICANT CHANGE UP (ref 22–31)
CREAT SERPL-MCNC: 0.83 MG/DL — SIGNIFICANT CHANGE UP (ref 0.5–1.3)
EGFR: 94 ML/MIN/1.73M2 — SIGNIFICANT CHANGE UP
EOSINOPHIL # BLD AUTO: 0.17 K/UL — SIGNIFICANT CHANGE UP (ref 0–0.5)
EOSINOPHIL NFR BLD AUTO: 1.9 % — SIGNIFICANT CHANGE UP (ref 0–6)
FLUAV AG NPH QL: SIGNIFICANT CHANGE UP
FLUBV AG NPH QL: SIGNIFICANT CHANGE UP
GLUCOSE SERPL-MCNC: 208 MG/DL — HIGH (ref 70–99)
HCT VFR BLD CALC: 24 % — LOW (ref 39–50)
HGB BLD-MCNC: 6.3 G/DL — CRITICAL LOW (ref 13–17)
IANC: 5.88 K/UL — SIGNIFICANT CHANGE UP (ref 1.8–7.4)
IMM GRANULOCYTES NFR BLD AUTO: 0.2 % — SIGNIFICANT CHANGE UP (ref 0–0.9)
INR BLD: 1.16 RATIO — SIGNIFICANT CHANGE UP (ref 0.88–1.16)
IRON SATN MFR SERPL: 14 UG/DL — LOW (ref 45–165)
IRON SATN MFR SERPL: 3 % — LOW (ref 14–50)
LYMPHOCYTES # BLD AUTO: 2.04 K/UL — SIGNIFICANT CHANGE UP (ref 1–3.3)
LYMPHOCYTES # BLD AUTO: 22.7 % — SIGNIFICANT CHANGE UP (ref 13–44)
MCHC RBC-ENTMCNC: 18.3 PG — LOW (ref 27–34)
MCHC RBC-ENTMCNC: 26.3 GM/DL — LOW (ref 32–36)
MCV RBC AUTO: 69.8 FL — LOW (ref 80–100)
MONOCYTES # BLD AUTO: 0.82 K/UL — SIGNIFICANT CHANGE UP (ref 0–0.9)
MONOCYTES NFR BLD AUTO: 9.1 % — SIGNIFICANT CHANGE UP (ref 2–14)
NEUTROPHILS # BLD AUTO: 5.88 K/UL — SIGNIFICANT CHANGE UP (ref 1.8–7.4)
NEUTROPHILS NFR BLD AUTO: 65.7 % — SIGNIFICANT CHANGE UP (ref 43–77)
NRBC # BLD: 0 /100 WBCS — SIGNIFICANT CHANGE UP (ref 0–0)
NRBC # FLD: 0 K/UL — SIGNIFICANT CHANGE UP (ref 0–0)
OB PNL STL: NEGATIVE — SIGNIFICANT CHANGE UP
PLATELET # BLD AUTO: 341 K/UL — SIGNIFICANT CHANGE UP (ref 150–400)
POTASSIUM SERPL-MCNC: 3.4 MMOL/L — LOW (ref 3.5–5.3)
POTASSIUM SERPL-SCNC: 3.4 MMOL/L — LOW (ref 3.5–5.3)
PROT SERPL-MCNC: 7.3 G/DL — SIGNIFICANT CHANGE UP (ref 6–8.3)
PROTHROM AB SERPL-ACNC: 13.5 SEC — HIGH (ref 10.5–13.4)
RBC # BLD: 3.44 M/UL — LOW (ref 4.2–5.8)
RBC # FLD: 20 % — HIGH (ref 10.3–14.5)
RH IG SCN BLD-IMP: NEGATIVE — SIGNIFICANT CHANGE UP
RSV RNA NPH QL NAA+NON-PROBE: SIGNIFICANT CHANGE UP
SARS-COV-2 RNA SPEC QL NAA+PROBE: SIGNIFICANT CHANGE UP
SODIUM SERPL-SCNC: 136 MMOL/L — SIGNIFICANT CHANGE UP (ref 135–145)
TIBC SERPL-MCNC: 451 UG/DL — HIGH (ref 220–430)
UIBC SERPL-MCNC: 437 UG/DL — HIGH (ref 110–370)
VIT B12 SERPL-MCNC: 683 PG/ML — SIGNIFICANT CHANGE UP (ref 200–900)
WBC # BLD: 8.97 K/UL — SIGNIFICANT CHANGE UP (ref 3.8–10.5)
WBC # FLD AUTO: 8.97 K/UL — SIGNIFICANT CHANGE UP (ref 3.8–10.5)

## 2023-04-04 PROCEDURE — 99223 1ST HOSP IP/OBS HIGH 75: CPT

## 2023-04-04 RX ORDER — IRON SUCROSE 20 MG/ML
300 INJECTION, SOLUTION INTRAVENOUS ONCE
Refills: 0 | Status: COMPLETED | OUTPATIENT
Start: 2023-04-04 | End: 2023-04-05

## 2023-04-04 NOTE — ED ADULT TRIAGE NOTE - CHIEF COMPLAINT QUOTE
Pt had blood work done last week sent in today by his primary MD for low hemoglobin. reports intermittent dizziness x 1 week and constipation x 2 weeks. pt denies any other complaints. in no distress. hx. DM2, HTN, HTN, stroke

## 2023-04-04 NOTE — ED ADULT NURSE NOTE - OBJECTIVE STATEMENT
pt to room 16, a&ox4 - reports being sent by PCP for further eval of low hemoglobin of 7.4. pt presents pale in color, endorsing dizziness. denies any noticeable bleeding, recent falls/traumas, dark or bloody stool, hematuria. takes Eliquis and aspirin daily. denies chest pain, sob, n/v/d. neuro intact. respirations even and nonlabored. safety and comfort maintained. 18g IV placed in LAC, labs drawn and sent. NAD at this time.

## 2023-04-04 NOTE — ED PROVIDER NOTE - CLINICAL SUMMARY MEDICAL DECISION MAKING FREE TEXT BOX
69 Y/O M PMH DM HTN CVA on Eliquis and ASA was referred to the ED by his PCP MD Cheikh De Los Santos  for anemia. Pt had a CBC from 3/31 and was found to have a Hg of 7.4. Pt denies fever, chills, nightsweats, dark or bloody stool, recent trauma or hematuria. Plan is labs to confirm anemia and eval for iron deficiency, B12 deficiency or other acute abnormality, Occult blood performed to eval for GI bleeding.

## 2023-04-04 NOTE — ED PROVIDER NOTE - OBJECTIVE STATEMENT
69 Y/O M PMH DM HTN CVA on Eliquis and ASA was referred to the ED by his PCP MD Cheikh De Los Santos  for anemia. Pt had a CBC from 3/31 and was found to have a Hg of 7.4. Pt denies fever, chills, nightsweats, dark or bloody stool, recent trauma or hematuria. Pt states he has been somewhat constipated for the past 2 weeks. Pt denies any other sx or acute complaints.

## 2023-04-04 NOTE — ED PROVIDER NOTE - ATTENDING APP SHARED VISIT CONTRIBUTION OF CARE
Patient from PMD referred for anemia unknown etiology in setting of AC   plan  cbc  guiac   if confirmed will tranfuse and admit CDU for repeat CBC and further consultant input/admission if warranted at that time

## 2023-04-04 NOTE — ED PROVIDER NOTE - PHYSICAL EXAMINATION
HEENT: + Conjunctival pallor bilaterally. Sclera is not injected bilaterally.    Rectal: BOBBI Guajardo chaperoned by MAC Loera. No blood, melena or hemorrhoids noted, no stool in the vault.

## 2023-04-05 VITALS
DIASTOLIC BLOOD PRESSURE: 63 MMHG | HEART RATE: 80 BPM | SYSTOLIC BLOOD PRESSURE: 124 MMHG | OXYGEN SATURATION: 100 % | TEMPERATURE: 99 F | RESPIRATION RATE: 17 BRPM

## 2023-04-05 LAB
HCT VFR BLD CALC: 28.5 % — LOW (ref 39–50)
HGB BLD-MCNC: 8.5 G/DL — LOW (ref 13–17)
MCHC RBC-ENTMCNC: 21.5 PG — LOW (ref 27–34)
MCHC RBC-ENTMCNC: 29.8 GM/DL — LOW (ref 32–36)
MCV RBC AUTO: 72.2 FL — LOW (ref 80–100)
NRBC # BLD: 0 /100 WBCS — SIGNIFICANT CHANGE UP (ref 0–0)
NRBC # FLD: 0 K/UL — SIGNIFICANT CHANGE UP (ref 0–0)
PLATELET # BLD AUTO: 279 K/UL — SIGNIFICANT CHANGE UP (ref 150–400)
RBC # BLD: 3.95 M/UL — LOW (ref 4.2–5.8)
RBC # FLD: 21.2 % — HIGH (ref 10.3–14.5)
WBC # BLD: 8.15 K/UL — SIGNIFICANT CHANGE UP (ref 3.8–10.5)
WBC # FLD AUTO: 8.15 K/UL — SIGNIFICANT CHANGE UP (ref 3.8–10.5)

## 2023-04-05 PROCEDURE — 99238 HOSP IP/OBS DSCHRG MGMT 30/<: CPT

## 2023-04-05 RX ORDER — INSULIN GLARGINE 100 [IU]/ML
24 INJECTION, SOLUTION SUBCUTANEOUS ONCE
Refills: 0 | Status: COMPLETED | OUTPATIENT
Start: 2023-04-05 | End: 2023-04-05

## 2023-04-05 RX ORDER — LOSARTAN POTASSIUM 100 MG/1
50 TABLET, FILM COATED ORAL DAILY
Refills: 0 | Status: DISCONTINUED | OUTPATIENT
Start: 2023-04-05 | End: 2023-04-08

## 2023-04-05 RX ORDER — DEXTROSE 50 % IN WATER 50 %
25 SYRINGE (ML) INTRAVENOUS ONCE
Refills: 0 | Status: DISCONTINUED | OUTPATIENT
Start: 2023-04-05 | End: 2023-04-08

## 2023-04-05 RX ORDER — ATORVASTATIN CALCIUM 80 MG/1
20 TABLET, FILM COATED ORAL AT BEDTIME
Refills: 0 | Status: DISCONTINUED | OUTPATIENT
Start: 2023-04-05 | End: 2023-04-08

## 2023-04-05 RX ORDER — DEXTROSE 50 % IN WATER 50 %
12.5 SYRINGE (ML) INTRAVENOUS ONCE
Refills: 0 | Status: DISCONTINUED | OUTPATIENT
Start: 2023-04-05 | End: 2023-04-08

## 2023-04-05 RX ORDER — ASPIRIN/CALCIUM CARB/MAGNESIUM 324 MG
81 TABLET ORAL DAILY
Refills: 0 | Status: DISCONTINUED | OUTPATIENT
Start: 2023-04-05 | End: 2023-04-08

## 2023-04-05 RX ORDER — APIXABAN 2.5 MG/1
5 TABLET, FILM COATED ORAL EVERY 12 HOURS
Refills: 0 | Status: DISCONTINUED | OUTPATIENT
Start: 2023-04-05 | End: 2023-04-08

## 2023-04-05 RX ORDER — INSULIN LISPRO 100/ML
VIAL (ML) SUBCUTANEOUS AT BEDTIME
Refills: 0 | Status: DISCONTINUED | OUTPATIENT
Start: 2023-04-05 | End: 2023-04-08

## 2023-04-05 RX ORDER — SODIUM CHLORIDE 9 MG/ML
1000 INJECTION, SOLUTION INTRAVENOUS
Refills: 0 | Status: DISCONTINUED | OUTPATIENT
Start: 2023-04-05 | End: 2023-04-08

## 2023-04-05 RX ORDER — ATENOLOL 25 MG/1
50 TABLET ORAL DAILY
Refills: 0 | Status: DISCONTINUED | OUTPATIENT
Start: 2023-04-05 | End: 2023-04-08

## 2023-04-05 RX ORDER — ATENOLOL 25 MG/1
50 TABLET ORAL DAILY
Refills: 0 | Status: DISCONTINUED | OUTPATIENT
Start: 2023-04-05 | End: 2023-04-05

## 2023-04-05 RX ORDER — GLUCAGON INJECTION, SOLUTION 0.5 MG/.1ML
1 INJECTION, SOLUTION SUBCUTANEOUS ONCE
Refills: 0 | Status: DISCONTINUED | OUTPATIENT
Start: 2023-04-05 | End: 2023-04-08

## 2023-04-05 RX ORDER — DEXTROSE 50 % IN WATER 50 %
15 SYRINGE (ML) INTRAVENOUS ONCE
Refills: 0 | Status: DISCONTINUED | OUTPATIENT
Start: 2023-04-05 | End: 2023-04-08

## 2023-04-05 RX ORDER — INSULIN LISPRO 100/ML
VIAL (ML) SUBCUTANEOUS
Refills: 0 | Status: DISCONTINUED | OUTPATIENT
Start: 2023-04-05 | End: 2023-04-08

## 2023-04-05 RX ADMIN — ATENOLOL 50 MILLIGRAM(S): 25 TABLET ORAL at 06:46

## 2023-04-05 RX ADMIN — APIXABAN 5 MILLIGRAM(S): 2.5 TABLET, FILM COATED ORAL at 06:47

## 2023-04-05 RX ADMIN — Medication 81 MILLIGRAM(S): at 12:02

## 2023-04-05 RX ADMIN — LOSARTAN POTASSIUM 50 MILLIGRAM(S): 100 TABLET, FILM COATED ORAL at 06:46

## 2023-04-05 RX ADMIN — IRON SUCROSE 176.67 MILLIGRAM(S): 20 INJECTION, SOLUTION INTRAVENOUS at 03:38

## 2023-04-05 RX ADMIN — INSULIN GLARGINE 24 UNIT(S): 100 INJECTION, SOLUTION SUBCUTANEOUS at 03:38

## 2023-04-05 NOTE — ED ADULT NURSE REASSESSMENT NOTE - NS ED NURSE REASSESS COMMENT FT1
Break RN note- Patient resting quietly in bed, breathing even and nonlabored. No acute distress. No s/sx of blood transfusion reaction present. No complaints at this time. Patient appears comfortable. Safety maintained. Patient stable upon exiting the room.

## 2023-04-05 NOTE — ED CDU PROVIDER SUBSEQUENT DAY NOTE - CLINICAL SUMMARY MEDICAL DECISION MAKING FREE TEXT BOX
69 yo M with PMH of IDDM T2, HTN, CVA on Eliquis and Aspirin, p/w anemia w/ Hb of 7.4 outpatient. In ED, Hb 6.3, Low iron. occult negative. EKG nonischemic. Pt consented for blood transfusion. Pt ordered for pRBC 2units, Iron sucrose IVPB. Plan: CDU for blood transfusion, repeat hb post transfusion and reassessment.

## 2023-04-05 NOTE — ED CDU PROVIDER DISPOSITION NOTE - NSFOLLOWUPINSTRUCTIONS_ED_ALL_ED_FT
Advance activity as tolerated.  Continue all previously prescribed medications as directed unless otherwise instructed.  Follow up with your primary care physician in 48-72 hours- bring copies of your results.  Return to the ER for worsening or persistent symptoms, and/or ANY NEW OR CONCERNING SYMPTOMS. If you have issues obtaining follow up, please call: [phone] to obtain a doctor or specialist who takes your insurance in your area.  You may call [phone] to make an appointment with the internal medicine clinic.

## 2023-04-05 NOTE — ED CDU PROVIDER DISPOSITION NOTE - PATIENT PORTAL LINK FT
You can access the FollowMyHealth Patient Portal offered by [facility] by registering at the following website: [email or web address]. By joining [employer]’s FollowMyHealth portal, you will also be able to view your health information using other applications (apps) compatible with our system.

## 2023-04-05 NOTE — ED CDU PROVIDER SUBSEQUENT DAY NOTE - HISTORY
69 yo M with PMH of IDDM T2, HTN, CVA on Eliquis and Aspirin, p/w anemia w/ Hb of 7.4 outpatient. In ED, Hb 6.3, Low iron. occult negative. EKG nonischemic. Pt consented for blood transfusion. Pt ordered for pRBC 2units, Iron sucrose IVPB. Pt sent to CDU for blood transfusion, repeat hb and reassessment.

## 2023-04-05 NOTE — ED CDU PROVIDER INITIAL DAY NOTE - OBJECTIVE STATEMENT
69 Y/O M PMH DM HTN CVA on Eliquis and ASA was referred to the ED by his PCP MD Cheikh De Los Santos  for anemia. Pt had a CBC from 3/31 and was found to have a Hg of 7.4. Pt denies fever, chills, nightsweats, dark or bloody stool, recent trauma or hematuria. Pt states he has been somewhat constipated for the past 2 weeks. Pt denies any other sx or acute complaints.    CDU PA Rahel: agrees w/ above. 67 yo M with PMH of IDDM T2, HTN, CVA on Eliquis and Aspirin, p/w anemia w/ Hb of 7.4 outpatient.

## 2023-04-05 NOTE — ED CDU PROVIDER INITIAL DAY NOTE - CLINICAL SUMMARY MEDICAL DECISION MAKING FREE TEXT BOX
67 yo M with PMH of IDDM T2, HTN, CVA on Eliquis and Aspirin, p/w anemia w/ Hb of 7.4 outpatient. In ED, Hb 6.3, Low iron. occult negative. EKG nonischemic. Pt consented for blood transfusion. Pt ordered for pRBC 2units, Iron sucrose IVPB. Pt sent to CDU for blood transfusion, repeat hb and reassessment.

## 2023-04-05 NOTE — ED CDU PROVIDER DISPOSITION NOTE - CLINICAL COURSE
69 Y/O M PMH DM HTN CVA on Eliquis and ASA was referred to the ED by his PCP MD Cheikh De Los Santos  for anemia. Pt had a CBC from 3/31 and was found to have a Hg of 7.4. Pt denies fever, chills, nightsweats, dark or bloody stool, recent trauma or hematuria.   In ED, Hb 6.3, Low iron. occult negative. EKG nonischemic. Pt consented for blood transfusion. Pt ordered for pRBC 2units, Iron sucrose IVPB. Pt sent to CDU for blood transfusion, repeat hb and reassessment.   in the morning, pt feels fine with no complaint. repeat cbc showed hg 8.5. pt stable for discharge.

## 2023-04-05 NOTE — ED CDU PROVIDER SUBSEQUENT DAY NOTE - ATTENDING APP SHARED VISIT CONTRIBUTION OF CARE
CDU MD VELASQUEZ:  I performed a face to face bedside interview with patient regarding history of present illness, review of symptoms and past medical history. I completed an independent physical exam.  I have discussed patient's plan of care with PA.   I agree with note as stated above, having amended the EMR as needed to reflect my findings. I have discussed the assessment and plan of care.  This includes during the time I functioned as the attending physician for this patient.

## 2023-04-05 NOTE — ED CDU PROVIDER INITIAL DAY NOTE - ATTENDING APP SHARED VISIT CONTRIBUTION OF CARE
I have made the decision to admit this patient to the CDU as documented in my Provider note I have reviewed the note  written by the CDU Physician Assistant, on that visit day. I have supervised and participated as necessary in the performance of procedures indicated for patient management and was available at all phases of the patient´s visit when needed.    Please see the  provider note for the details of the decision to admit  Vital Signs Stable     PE unchanged at time of admission  Patient admitted for iron deficiency unclear focus on AC requiring PRBcs and iron infusion   Will reassess

## 2023-04-12 ENCOUNTER — APPOINTMENT (OUTPATIENT)
Dept: VASCULAR SURGERY | Facility: CLINIC | Age: 68
End: 2023-04-12
Payer: COMMERCIAL

## 2023-04-12 VITALS — DIASTOLIC BLOOD PRESSURE: 68 MMHG | HEART RATE: 75 BPM | TEMPERATURE: 97.7 F | SYSTOLIC BLOOD PRESSURE: 104 MMHG

## 2023-04-12 DIAGNOSIS — E11.42 TYPE 2 DIABETES MELLITUS WITH DIABETIC POLYNEUROPATHY: ICD-10-CM

## 2023-04-12 PROCEDURE — 99213 OFFICE O/P EST LOW 20 MIN: CPT

## 2023-04-12 PROCEDURE — 99072 ADDL SUPL MATRL&STAF TM PHE: CPT

## 2023-04-12 NOTE — PHYSICAL EXAM
[Respiratory Effort] : normal respiratory effort [Normal Rate and Rhythm] : normal rate and rhythm [2+] : left 2+ [1+] : right 1+ [0] : left 0 [Ankle Swelling (On Exam)] : not present [Varicose Veins Of Lower Extremities] : not present [] : not present [Abdomen Tenderness] : ~T ~M No abdominal tenderness [Skin Ulcer] : no ulcer [Alert] : alert [Oriented to Person] : oriented to person [Oriented to Place] : oriented to place [Oriented to Time] : oriented to time [Calm] : calm [de-identified] : appears stated age [de-identified] : normocephalic, atraumatic [de-identified] : supple

## 2023-04-12 NOTE — HISTORY OF PRESENT ILLNESS
[FreeTextEntry1] : 67 year old male with history of HTN, HLD, DM, PAD, and aortoiliac occlusive disease who presents s/p diagnostic right pelvic angiography. He previously underwent outpatient angiography complicated by right iliac artery dissection.  [de-identified] : s/p bilateral lower extremity angiogram with revascularization\par denies rest pain, complains of one block intermittent claudication\par complains of burning pain in plantar aspect of both feet from time to time

## 2023-04-12 NOTE — ASSESSMENT
[FreeTextEntry1] : Problem #1 peripheral arterial disease\par - 1 block intermittent claudication of left lower extremity\par - continue cilostazol 100mg PO BID\par - encourage daily walking for at least 30 minutes\par - worsening ABIs but no rest pain\par - will follow up in 3 months for ongoing PAD surveillance\par - instructed to call me sooner if claudication becomes disabling or he develops rest pain

## 2023-07-19 ENCOUNTER — APPOINTMENT (OUTPATIENT)
Dept: VASCULAR SURGERY | Facility: CLINIC | Age: 68
End: 2023-07-19
Payer: COMMERCIAL

## 2023-07-19 ENCOUNTER — EMERGENCY (EMERGENCY)
Facility: HOSPITAL | Age: 68
LOS: 1 days | Discharge: HOME | End: 2023-07-19
Attending: EMERGENCY MEDICINE | Admitting: EMERGENCY MEDICINE
Payer: COMMERCIAL

## 2023-07-19 VITALS
WEIGHT: 154 LBS | SYSTOLIC BLOOD PRESSURE: 127 MMHG | HEIGHT: 66 IN | BODY MASS INDEX: 24.75 KG/M2 | HEART RATE: 80 BPM | DIASTOLIC BLOOD PRESSURE: 69 MMHG | TEMPERATURE: 98 F

## 2023-07-19 VITALS
DIASTOLIC BLOOD PRESSURE: 67 MMHG | RESPIRATION RATE: 18 BRPM | OXYGEN SATURATION: 100 % | SYSTOLIC BLOOD PRESSURE: 119 MMHG | TEMPERATURE: 98 F | HEART RATE: 87 BPM

## 2023-07-19 VITALS
OXYGEN SATURATION: 100 % | RESPIRATION RATE: 20 BRPM | DIASTOLIC BLOOD PRESSURE: 63 MMHG | HEART RATE: 81 BPM | SYSTOLIC BLOOD PRESSURE: 145 MMHG | TEMPERATURE: 98 F

## 2023-07-19 DIAGNOSIS — Z90.49 ACQUIRED ABSENCE OF OTHER SPECIFIED PARTS OF DIGESTIVE TRACT: Chronic | ICD-10-CM

## 2023-07-19 DIAGNOSIS — Z95.820 PERIPHERAL VASCULAR ANGIOPLASTY STATUS WITH IMPLANTS AND GRAFTS: Chronic | ICD-10-CM

## 2023-07-19 LAB
ALBUMIN SERPL ELPH-MCNC: 4.6 G/DL — SIGNIFICANT CHANGE UP (ref 3.3–5)
ALP SERPL-CCNC: 62 U/L — SIGNIFICANT CHANGE UP (ref 40–120)
ALT FLD-CCNC: 8 U/L — SIGNIFICANT CHANGE UP (ref 4–41)
ANION GAP SERPL CALC-SCNC: 14 MMOL/L — SIGNIFICANT CHANGE UP (ref 7–14)
AST SERPL-CCNC: 13 U/L — SIGNIFICANT CHANGE UP (ref 4–40)
BASOPHILS # BLD AUTO: 0.03 K/UL — SIGNIFICANT CHANGE UP (ref 0–0.2)
BASOPHILS NFR BLD AUTO: 0.5 % — SIGNIFICANT CHANGE UP (ref 0–2)
BILIRUB SERPL-MCNC: 0.2 MG/DL — SIGNIFICANT CHANGE UP (ref 0.2–1.2)
BLD GP AB SCN SERPL QL: NEGATIVE — SIGNIFICANT CHANGE UP
BUN SERPL-MCNC: 14 MG/DL — SIGNIFICANT CHANGE UP (ref 7–23)
CALCIUM SERPL-MCNC: 9.8 MG/DL — SIGNIFICANT CHANGE UP (ref 8.4–10.5)
CHLORIDE SERPL-SCNC: 97 MMOL/L — LOW (ref 98–107)
CO2 SERPL-SCNC: 25 MMOL/L — SIGNIFICANT CHANGE UP (ref 22–31)
CREAT SERPL-MCNC: 0.97 MG/DL — SIGNIFICANT CHANGE UP (ref 0.5–1.3)
EGFR: 84 ML/MIN/1.73M2 — SIGNIFICANT CHANGE UP
EOSINOPHIL # BLD AUTO: 0.11 K/UL — SIGNIFICANT CHANGE UP (ref 0–0.5)
EOSINOPHIL NFR BLD AUTO: 1.8 % — SIGNIFICANT CHANGE UP (ref 0–6)
GLUCOSE SERPL-MCNC: 119 MG/DL — HIGH (ref 70–99)
HCT VFR BLD CALC: 24.7 % — LOW (ref 39–50)
HGB BLD-MCNC: 7.1 G/DL — LOW (ref 13–17)
IANC: 3.74 K/UL — SIGNIFICANT CHANGE UP (ref 1.8–7.4)
IMM GRANULOCYTES NFR BLD AUTO: 0.2 % — SIGNIFICANT CHANGE UP (ref 0–0.9)
LYMPHOCYTES # BLD AUTO: 1.71 K/UL — SIGNIFICANT CHANGE UP (ref 1–3.3)
LYMPHOCYTES # BLD AUTO: 27.2 % — SIGNIFICANT CHANGE UP (ref 13–44)
MCHC RBC-ENTMCNC: 20.8 PG — LOW (ref 27–34)
MCHC RBC-ENTMCNC: 28.7 GM/DL — LOW (ref 32–36)
MCV RBC AUTO: 72.2 FL — LOW (ref 80–100)
MONOCYTES # BLD AUTO: 0.68 K/UL — SIGNIFICANT CHANGE UP (ref 0–0.9)
MONOCYTES NFR BLD AUTO: 10.8 % — SIGNIFICANT CHANGE UP (ref 2–14)
NEUTROPHILS # BLD AUTO: 3.74 K/UL — SIGNIFICANT CHANGE UP (ref 1.8–7.4)
NEUTROPHILS NFR BLD AUTO: 59.5 % — SIGNIFICANT CHANGE UP (ref 43–77)
NRBC # BLD: 0 /100 WBCS — SIGNIFICANT CHANGE UP (ref 0–0)
NRBC # FLD: 0 K/UL — SIGNIFICANT CHANGE UP (ref 0–0)
PLATELET # BLD AUTO: 329 K/UL — SIGNIFICANT CHANGE UP (ref 150–400)
POTASSIUM SERPL-MCNC: 3.7 MMOL/L — SIGNIFICANT CHANGE UP (ref 3.5–5.3)
POTASSIUM SERPL-SCNC: 3.7 MMOL/L — SIGNIFICANT CHANGE UP (ref 3.5–5.3)
PROT SERPL-MCNC: 7.5 G/DL — SIGNIFICANT CHANGE UP (ref 6–8.3)
RBC # BLD: 3.42 M/UL — LOW (ref 4.2–5.8)
RBC # FLD: 18.6 % — HIGH (ref 10.3–14.5)
RH IG SCN BLD-IMP: NEGATIVE — SIGNIFICANT CHANGE UP
SODIUM SERPL-SCNC: 136 MMOL/L — SIGNIFICANT CHANGE UP (ref 135–145)
WBC # BLD: 6.28 K/UL — SIGNIFICANT CHANGE UP (ref 3.8–10.5)
WBC # FLD AUTO: 6.28 K/UL — SIGNIFICANT CHANGE UP (ref 3.8–10.5)

## 2023-07-19 PROCEDURE — 99285 EMERGENCY DEPT VISIT HI MDM: CPT

## 2023-07-19 PROCEDURE — 99213 OFFICE O/P EST LOW 20 MIN: CPT

## 2023-07-19 PROCEDURE — 93923 UPR/LXTR ART STDY 3+ LVLS: CPT

## 2023-07-19 PROCEDURE — 93010 ELECTROCARDIOGRAM REPORT: CPT

## 2023-07-19 NOTE — ED PROVIDER NOTE - ATTENDING CONTRIBUTION TO CARE
show 67 y/o M with history diabetes, hypertension, PAD, iron deficiency anemia presents from PCP with anemia. He denies any symptoms including lightheadedness, shortness of breath, abdominal pain, chest pain, n/v/d, blood in stool/black tarry stool, leg pain or swelling, dysuria, polyuria. He presented to the hospital on 04/05 for the same complaint, hgb was 6.3 with low iron. He received two units of RBCs and hgb improved to 8.5.

## 2023-07-19 NOTE — PHYSICAL EXAM
[Respiratory Effort] : normal respiratory effort [Normal Rate and Rhythm] : normal rate and rhythm [2+] : left 2+ [1+] : right 1+ [0] : left 0 [Ankle Swelling (On Exam)] : not present [Varicose Veins Of Lower Extremities] : not present [] : not present [Abdomen Tenderness] : ~T ~M No abdominal tenderness [Skin Ulcer] : no ulcer [Alert] : alert [Oriented to Person] : oriented to person [Oriented to Place] : oriented to place [Oriented to Time] : oriented to time [Calm] : calm [de-identified] : appears stated age [de-identified] : normocephalic, atraumatic [de-identified] : supple

## 2023-07-19 NOTE — ED PROVIDER NOTE - PHYSICAL EXAMINATION
General: Alert and Orientated x 3. No apparent distress.  Head: Normocephalic and atraumatic.  Eyes: PERRLA with EOMI, + conjunctival pallor  Neck: Supple. Trachea midline.   Cardiac: Palpable pulses in all extremities, Normal S1 and S2 w/ RRR. No murmurs appreciated.   Pulmonary: CTA bilaterally. No increased WOB. No wheezes or crackles.  Abdominal: Soft, non-tender. (+) bowel sounds appreciated in all 4 quadrants.   Neurologic: Numbness/tingling in bilateral LE, decreased sensation in BL feet.   Musculoskeletal: Strength appropriate in all 4 extremities for age with no limited ROM.  Skin: Color appropriate for race. Intact, warm, and well-perfused. No ulcers, redness  Psychiatric: Appropriate mood and affect. No apparent risk to self or others.

## 2023-07-19 NOTE — ED PROVIDER NOTE - CARE PROVIDER_API CALL
MD Magali [facility]  Internal Medicine  [address] [facility], [place] [address]  Phone: [phone]  Fax: [phone]  Established Patient  Follow Up Time: 1-3 Days

## 2023-07-19 NOTE — HISTORY OF PRESENT ILLNESS
[FreeTextEntry1] : 67 year old male with history of HTN, HLD, DM, PAD, and aortoiliac occlusive disease who presents s/p diagnostic right pelvic angiography. He previously underwent outpatient angiography complicated by right iliac artery dissection. \par \par 04/12/23 - s/p bilateral lower extremity angiogram with revascularization\par denies rest pain, complains of one block intermittent claudication\par complains of burning pain in plantar aspect of both feet from time to time [de-identified] : Stable intermittent one block claudication of left lower extremity\par no symptoms in right leg

## 2023-07-19 NOTE — ED PROVIDER NOTE - CLINICAL SUMMARY MEDICAL DECISION MAKING FREE TEXT BOX
69 y/o male with hx diabetes, HTN, PAD, iron deficiency anemia presents from PCP for anemia. He is asymptomatic, physical exam remarkable for conjunctival pallor. We will draw labs to see level of anemia, consider pRBC transfusion. He had same presentation 3 months ago, occult stool negative for blood.     Source of anemia currently unknown.    CT? Rectal? where is blood coming from? 69 y/o male with hx diabetes, HTN, PAD, iron deficiency anemia presents from PCP for anemia. He is asymptomatic, physical exam remarkable for conjunctival pallor. We will draw labs to see level of anemia, consider pRBC transfusion. He had same presentation 3 months ago, occult stool negative for blood. Source of anemia remains unknown, differential includes anemia chronic disease, occult GI bleed, nutritional deficiency.        CT? Rectal? where is blood coming from? 69 y/o male with hx diabetes, HTN, PAD, iron deficiency anemia presents from PCP for anemia. He is asymptomatic, physical exam remarkable for conjunctival pallor. Denies n/v/d, blood in stool, tarry stools, or hematuria. Will draw labs to see degree of anemia, consider pRBC transfusion. He had same presentation 3 months ago, occult stool negative for blood. Source of anemia remains unknown, likely differential includes anemia chronic disease vs occult GI bleed.     Hgb 7.1, down from 8.5 3 months ago. Due to lack of symptoms, minor drop over several months, no concern for any active significant source of bleeding. Will transfuse 1u pRBC, recommend follow up with primary care physician for workup of cause.

## 2023-07-19 NOTE — ED PROVIDER NOTE - NSICDXPASTMEDICALHX_GEN_ALL_CORE_FT
PAST MEDICAL HISTORY:  DM (diabetes mellitus)     H/O iron deficiency     HLD (hyperlipidemia)     HTN (hypertension)     PAD (peripheral artery disease)     Tobacco abuse Former

## 2023-07-19 NOTE — ED ADULT NURSE NOTE - OBJECTIVE STATEMENT
Patient received 6A, a&ox4, ambulatory. Pt sent by PCP for low hgb/hct (7.3- 27.1). hx anemia (with transfusions), HTN, DM, PAD. Pt states "I got every 3 months for blood checks." Pt denies cp, sob, n/v, headache, dizziness, fever/chills. Breathing even, unlabored. Skin color within define limits of race and confirmed by pt and pts wife. Pulses equal bilateral. Pending Md order. Safety maintained.

## 2023-07-19 NOTE — ED ADULT NURSE REASSESSMENT NOTE - NS ED NURSE REASSESS COMMENT FT1
Blood transfusion started at this time. Patient RR equal and unlabored. Placed on cardiac monitor. NSR noted on monitor. VS stable. Educated about signs and symptoms of blood transfusion reaction. Consent in chart. 2 Type and Screens in MAR. 2 RN check complete. Care plan continued. Comfort measures provided. Safety Maintained.

## 2023-07-19 NOTE — ED ADULT TRIAGE NOTE - CHIEF COMPLAINT QUOTE
Pt was sent in for possible blood transfusion low hemoglobin 7.3- 27.1. Pt denies sob or chest pain. Pt co feeling fatigue

## 2023-07-19 NOTE — ED ADULT NURSE REASSESSMENT NOTE - NS ED NURSE REASSESS COMMENT FT1
Report received from Break MAC Pederson. Pt a&ox4, resting comfortably in stretcher. Pt denies cp, sob, n/v, headache, dizziness. Breathing even, unlabored. 1 unit of PRBC running to 20g IV to right ac with no signs of redness, swelling, or irritation. Safety maintained.

## 2023-07-19 NOTE — ED PROVIDER NOTE - OBJECTIVE STATEMENT
67 y/o M with history diabetes, hypertension, PAD, anemia presents from PCP with anemia. He denies any symptoms including lightheadedness, shortness of breath, abdominal pain, chest pain, n/v/d, blood in stool/black tarry stool, leg pain or swelling, dysuria, polyuria. 69 y/o M with history diabetes, hypertension, PAD, iron deficiency anemia presents from PCP with anemia. He denies any symptoms including lightheadedness, shortness of breath, abdominal pain, chest pain, n/v/d, blood in stool/black tarry stool, leg pain or swelling, dysuria, polyuria. He presented to the hospital on 04/05 for the same complaint, hgb was 6.3 with low iron. He received two units of RBCs and hgb improved to 8.5.

## 2023-07-19 NOTE — ED ADULT NURSE NOTE - NSFALLUNIVINTERV_ED_ALL_ED
Bed/Stretcher in lowest position, wheels locked, appropriate side rails in place/Call bell, personal items and telephone in reach/Instruct patient to call for assistance before getting out of bed/chair/stretcher/Non-slip footwear applied when patient is off stretcher/[place] to call system/Physically safe environment - no spills, clutter or unnecessary equipment/Purposeful proactive rounding/Room/bathroom lighting operational, light cord in reach

## 2023-07-19 NOTE — ED PROVIDER NOTE - PATIENT PORTAL LINK FT
You can access the FollowMyHealth Patient Portal offered by [facility] by registering at the following website: [email or web address]. By joining [facility]’s FollowMyHealth portal, you will also be able to view your health information using other applications (apps) compatible with our system.

## 2023-07-19 NOTE — ASSESSMENT
[FreeTextEntry1] : Problem #1 peripheral arterial disease\par - 1 block intermittent claudication of left lower extremity\par - continue cilostazol 100mg PO BID\par - encourage daily walking for at least 30 minutes\par - worsening ABIs but no rest pain\par - will follow up in 3 months for ongoing PAD surveillance\par - instructed to call me sooner if claudication becomes disabling or he develops rest pain. \par

## 2023-07-19 NOTE — ED ADULT NURSE REASSESSMENT NOTE - NS ED NURSE REASSESS COMMENT FT1
1 unit of PRBC complete. Pt a&ox4, denying cp, sob, n/v, headache, dizziness, fever/chills. Breathing even, unlabored. Safety maintained. 1 unit of PRBC completd over 3hrs, MD Bettencourt aware. Pt a&ox4, denying cp, sob, n/v, headache, dizziness, fever/chills. Breathing even, unlabored. Safety maintained. Plan to dc home.

## 2023-07-19 NOTE — DATA REVIEWED
[FreeTextEntry1] : R BRITTNEY .82\par R TBI .50 (toe pressure 60)\par \par L BRITTNEY 0.00\par L TBI 0.00

## 2023-07-19 NOTE — ED PROVIDER NOTE - NSFOLLOWUPINSTRUCTIONS_ED_ALL_ED_FT
Anemia is a condition in which the concentration of red blood cells or hemoglobin in the blood is below normal. Hemoglobin is a substance in red blood cells that carries oxygen to the tissues of the body. Anemia results in not enough oxygen reaching these tissues which can cause symptoms such as weakness, dizziness/lightheadedness, shortness of breath, chest pain, paleness, or nausea. The cause of your anemia may or may not be determined immediately. If your hemoglobin was dangerously low, you may have received a blood transfusion. Usually reactions to transfusions occur immediately but monitor yourself for any fevers, rash, or shortness of breath.    SEEK IMMEDIATE MEDICAL CARE IF YOU HAVE ANY OF THE FOLLOWING SYMPTOMS: extreme weakness/chest pain/shortness of breath, black or bloody stools, vomiting blood, fainting, fever, or any signs of dehydration.    Follow up with primary care physician regarding treatment of iron deficiency anemia.

## 2023-10-22 ENCOUNTER — INPATIENT (INPATIENT)
Facility: HOSPITAL | Age: 68
LOS: 7 days | Discharge: HOME HEALTH | End: 2023-10-30
Attending: INTERNAL MEDICINE | Admitting: INTERNAL MEDICINE
Payer: COMMERCIAL

## 2023-10-22 VITALS
HEART RATE: 136 BPM | TEMPERATURE: 101 F | OXYGEN SATURATION: 100 % | SYSTOLIC BLOOD PRESSURE: 164 MMHG | DIASTOLIC BLOOD PRESSURE: 87 MMHG | RESPIRATION RATE: 18 BRPM

## 2023-10-22 DIAGNOSIS — A41.9 SEPSIS, UNSPECIFIED ORGANISM: ICD-10-CM

## 2023-10-22 DIAGNOSIS — I73.9 PERIPHERAL VASCULAR DISEASE, UNSPECIFIED: ICD-10-CM

## 2023-10-22 DIAGNOSIS — I31.39 OTHER PERICARDIAL EFFUSION (NONINFLAMMATORY): ICD-10-CM

## 2023-10-22 DIAGNOSIS — Z95.820 PERIPHERAL VASCULAR ANGIOPLASTY STATUS WITH IMPLANTS AND GRAFTS: Chronic | ICD-10-CM

## 2023-10-22 DIAGNOSIS — E11.9 TYPE 2 DIABETES MELLITUS WITHOUT COMPLICATIONS: ICD-10-CM

## 2023-10-22 DIAGNOSIS — E78.5 HYPERLIPIDEMIA, UNSPECIFIED: ICD-10-CM

## 2023-10-22 DIAGNOSIS — I10 ESSENTIAL (PRIMARY) HYPERTENSION: ICD-10-CM

## 2023-10-22 DIAGNOSIS — Z90.49 ACQUIRED ABSENCE OF OTHER SPECIFIED PARTS OF DIGESTIVE TRACT: Chronic | ICD-10-CM

## 2023-10-22 DIAGNOSIS — Z29.9 ENCOUNTER FOR PROPHYLACTIC MEASURES, UNSPECIFIED: ICD-10-CM

## 2023-10-22 PROBLEM — Z86.39 PERSONAL HISTORY OF OTHER ENDOCRINE, NUTRITIONAL AND METABOLIC DISEASE: Chronic | Status: ACTIVE | Noted: 2023-07-19

## 2023-10-22 LAB
ALBUMIN SERPL ELPH-MCNC: 4.3 G/DL — SIGNIFICANT CHANGE UP (ref 3.3–5)
ALBUMIN SERPL ELPH-MCNC: 4.3 G/DL — SIGNIFICANT CHANGE UP (ref 3.3–5)
ALBUMIN SERPL ELPH-MCNC: 4.5 G/DL — SIGNIFICANT CHANGE UP (ref 3.3–5)
ALBUMIN SERPL ELPH-MCNC: 4.5 G/DL — SIGNIFICANT CHANGE UP (ref 3.3–5)
ALP SERPL-CCNC: 112 U/L — SIGNIFICANT CHANGE UP (ref 40–120)
ALP SERPL-CCNC: 112 U/L — SIGNIFICANT CHANGE UP (ref 40–120)
ALP SERPL-CCNC: 96 U/L — SIGNIFICANT CHANGE UP (ref 40–120)
ALP SERPL-CCNC: 96 U/L — SIGNIFICANT CHANGE UP (ref 40–120)
ALT FLD-CCNC: 11 U/L — SIGNIFICANT CHANGE UP (ref 4–41)
ALT FLD-CCNC: 11 U/L — SIGNIFICANT CHANGE UP (ref 4–41)
ALT FLD-CCNC: 12 U/L — SIGNIFICANT CHANGE UP (ref 4–41)
ALT FLD-CCNC: 12 U/L — SIGNIFICANT CHANGE UP (ref 4–41)
ANION GAP SERPL CALC-SCNC: 13 MMOL/L — SIGNIFICANT CHANGE UP (ref 7–14)
ANION GAP SERPL CALC-SCNC: 13 MMOL/L — SIGNIFICANT CHANGE UP (ref 7–14)
ANION GAP SERPL CALC-SCNC: 15 MMOL/L — HIGH (ref 7–14)
ANION GAP SERPL CALC-SCNC: 15 MMOL/L — HIGH (ref 7–14)
APPEARANCE UR: ABNORMAL
APPEARANCE UR: ABNORMAL
APTT BLD: 31.8 SEC — SIGNIFICANT CHANGE UP (ref 24.5–35.6)
APTT BLD: 31.8 SEC — SIGNIFICANT CHANGE UP (ref 24.5–35.6)
AST SERPL-CCNC: 15 U/L — SIGNIFICANT CHANGE UP (ref 4–40)
AST SERPL-CCNC: 15 U/L — SIGNIFICANT CHANGE UP (ref 4–40)
AST SERPL-CCNC: 16 U/L — SIGNIFICANT CHANGE UP (ref 4–40)
AST SERPL-CCNC: 16 U/L — SIGNIFICANT CHANGE UP (ref 4–40)
BACTERIA # UR AUTO: ABNORMAL /HPF
BACTERIA # UR AUTO: ABNORMAL /HPF
BASE EXCESS BLDV CALC-SCNC: -0.5 MMOL/L — SIGNIFICANT CHANGE UP (ref -2–3)
BASE EXCESS BLDV CALC-SCNC: -0.5 MMOL/L — SIGNIFICANT CHANGE UP (ref -2–3)
BASOPHILS # BLD AUTO: 0.03 K/UL — SIGNIFICANT CHANGE UP (ref 0–0.2)
BASOPHILS # BLD AUTO: 0.03 K/UL — SIGNIFICANT CHANGE UP (ref 0–0.2)
BASOPHILS # BLD AUTO: 0.04 K/UL — SIGNIFICANT CHANGE UP (ref 0–0.2)
BASOPHILS # BLD AUTO: 0.04 K/UL — SIGNIFICANT CHANGE UP (ref 0–0.2)
BASOPHILS NFR BLD AUTO: 0.1 % — SIGNIFICANT CHANGE UP (ref 0–2)
BASOPHILS NFR BLD AUTO: 0.1 % — SIGNIFICANT CHANGE UP (ref 0–2)
BASOPHILS NFR BLD AUTO: 0.2 % — SIGNIFICANT CHANGE UP (ref 0–2)
BASOPHILS NFR BLD AUTO: 0.2 % — SIGNIFICANT CHANGE UP (ref 0–2)
BILIRUB SERPL-MCNC: 0.6 MG/DL — SIGNIFICANT CHANGE UP (ref 0.2–1.2)
BILIRUB SERPL-MCNC: 0.6 MG/DL — SIGNIFICANT CHANGE UP (ref 0.2–1.2)
BILIRUB SERPL-MCNC: 0.7 MG/DL — SIGNIFICANT CHANGE UP (ref 0.2–1.2)
BILIRUB SERPL-MCNC: 0.7 MG/DL — SIGNIFICANT CHANGE UP (ref 0.2–1.2)
BILIRUB UR-MCNC: NEGATIVE — SIGNIFICANT CHANGE UP
BILIRUB UR-MCNC: NEGATIVE — SIGNIFICANT CHANGE UP
BLOOD GAS VENOUS COMPREHENSIVE RESULT: SIGNIFICANT CHANGE UP
BLOOD GAS VENOUS COMPREHENSIVE RESULT: SIGNIFICANT CHANGE UP
BUN SERPL-MCNC: 10 MG/DL — SIGNIFICANT CHANGE UP (ref 7–23)
CALCIUM SERPL-MCNC: 9.7 MG/DL — SIGNIFICANT CHANGE UP (ref 8.4–10.5)
CALCIUM SERPL-MCNC: 9.7 MG/DL — SIGNIFICANT CHANGE UP (ref 8.4–10.5)
CALCIUM SERPL-MCNC: 9.8 MG/DL — SIGNIFICANT CHANGE UP (ref 8.4–10.5)
CALCIUM SERPL-MCNC: 9.8 MG/DL — SIGNIFICANT CHANGE UP (ref 8.4–10.5)
CAST: 10 /LPF — HIGH (ref 0–4)
CAST: 10 /LPF — HIGH (ref 0–4)
CHLORIDE BLDV-SCNC: 99 MMOL/L — SIGNIFICANT CHANGE UP (ref 96–108)
CHLORIDE BLDV-SCNC: 99 MMOL/L — SIGNIFICANT CHANGE UP (ref 96–108)
CHLORIDE SERPL-SCNC: 97 MMOL/L — LOW (ref 98–107)
CHLORIDE SERPL-SCNC: 97 MMOL/L — LOW (ref 98–107)
CHLORIDE SERPL-SCNC: 98 MMOL/L — SIGNIFICANT CHANGE UP (ref 98–107)
CHLORIDE SERPL-SCNC: 98 MMOL/L — SIGNIFICANT CHANGE UP (ref 98–107)
CK MB BLD-MCNC: 1.7 % — SIGNIFICANT CHANGE UP (ref 0–2.5)
CK MB BLD-MCNC: 1.7 % — SIGNIFICANT CHANGE UP (ref 0–2.5)
CK MB CFR SERPL CALC: 1.9 NG/ML — SIGNIFICANT CHANGE UP
CK MB CFR SERPL CALC: 1.9 NG/ML — SIGNIFICANT CHANGE UP
CK SERPL-CCNC: 111 U/L — SIGNIFICANT CHANGE UP (ref 30–200)
CK SERPL-CCNC: 111 U/L — SIGNIFICANT CHANGE UP (ref 30–200)
CO2 BLDV-SCNC: 25.4 MMOL/L — SIGNIFICANT CHANGE UP (ref 22–26)
CO2 BLDV-SCNC: 25.4 MMOL/L — SIGNIFICANT CHANGE UP (ref 22–26)
CO2 SERPL-SCNC: 22 MMOL/L — SIGNIFICANT CHANGE UP (ref 22–31)
COARSE GRAN CASTS #/AREA URNS AUTO: PRESENT
COARSE GRAN CASTS #/AREA URNS AUTO: PRESENT
COLOR SPEC: YELLOW — SIGNIFICANT CHANGE UP
COLOR SPEC: YELLOW — SIGNIFICANT CHANGE UP
CREAT SERPL-MCNC: 0.91 MG/DL — SIGNIFICANT CHANGE UP (ref 0.5–1.3)
CREAT SERPL-MCNC: 0.91 MG/DL — SIGNIFICANT CHANGE UP (ref 0.5–1.3)
CREAT SERPL-MCNC: 1.11 MG/DL — SIGNIFICANT CHANGE UP (ref 0.5–1.3)
CREAT SERPL-MCNC: 1.11 MG/DL — SIGNIFICANT CHANGE UP (ref 0.5–1.3)
DIFF PNL FLD: ABNORMAL
DIFF PNL FLD: ABNORMAL
EGFR: 71 ML/MIN/1.73M2 — SIGNIFICANT CHANGE UP
EGFR: 71 ML/MIN/1.73M2 — SIGNIFICANT CHANGE UP
EGFR: 91 ML/MIN/1.73M2 — SIGNIFICANT CHANGE UP
EGFR: 91 ML/MIN/1.73M2 — SIGNIFICANT CHANGE UP
EOSINOPHIL # BLD AUTO: 0 K/UL — SIGNIFICANT CHANGE UP (ref 0–0.5)
EOSINOPHIL # BLD AUTO: 0 K/UL — SIGNIFICANT CHANGE UP (ref 0–0.5)
EOSINOPHIL # BLD AUTO: 0.04 K/UL — SIGNIFICANT CHANGE UP (ref 0–0.5)
EOSINOPHIL # BLD AUTO: 0.04 K/UL — SIGNIFICANT CHANGE UP (ref 0–0.5)
EOSINOPHIL NFR BLD AUTO: 0 % — SIGNIFICANT CHANGE UP (ref 0–6)
EOSINOPHIL NFR BLD AUTO: 0 % — SIGNIFICANT CHANGE UP (ref 0–6)
EOSINOPHIL NFR BLD AUTO: 0.2 % — SIGNIFICANT CHANGE UP (ref 0–6)
EOSINOPHIL NFR BLD AUTO: 0.2 % — SIGNIFICANT CHANGE UP (ref 0–6)
GAS PNL BLDV: 132 MMOL/L — LOW (ref 136–145)
GAS PNL BLDV: 132 MMOL/L — LOW (ref 136–145)
GLUCOSE BLDC GLUCOMTR-MCNC: 284 MG/DL — HIGH (ref 70–99)
GLUCOSE BLDC GLUCOMTR-MCNC: 284 MG/DL — HIGH (ref 70–99)
GLUCOSE BLDV-MCNC: 264 MG/DL — HIGH (ref 70–99)
GLUCOSE BLDV-MCNC: 264 MG/DL — HIGH (ref 70–99)
GLUCOSE SERPL-MCNC: 252 MG/DL — HIGH (ref 70–99)
GLUCOSE SERPL-MCNC: 252 MG/DL — HIGH (ref 70–99)
GLUCOSE SERPL-MCNC: 291 MG/DL — HIGH (ref 70–99)
GLUCOSE SERPL-MCNC: 291 MG/DL — HIGH (ref 70–99)
GLUCOSE UR QL: 250 MG/DL
GLUCOSE UR QL: 250 MG/DL
HCO3 BLDV-SCNC: 24 MMOL/L — SIGNIFICANT CHANGE UP (ref 22–29)
HCO3 BLDV-SCNC: 24 MMOL/L — SIGNIFICANT CHANGE UP (ref 22–29)
HCT VFR BLD CALC: 32.6 % — LOW (ref 39–50)
HCT VFR BLD CALC: 32.6 % — LOW (ref 39–50)
HCT VFR BLD CALC: 33.3 % — LOW (ref 39–50)
HCT VFR BLD CALC: 33.3 % — LOW (ref 39–50)
HCT VFR BLDA CALC: 30 % — LOW (ref 39–51)
HCT VFR BLDA CALC: 30 % — LOW (ref 39–51)
HGB BLD CALC-MCNC: 10 G/DL — LOW (ref 12.6–17.4)
HGB BLD CALC-MCNC: 10 G/DL — LOW (ref 12.6–17.4)
HGB BLD-MCNC: 10.2 G/DL — LOW (ref 13–17)
HGB BLD-MCNC: 10.2 G/DL — LOW (ref 13–17)
HGB BLD-MCNC: 9.7 G/DL — LOW (ref 13–17)
HGB BLD-MCNC: 9.7 G/DL — LOW (ref 13–17)
IANC: 16.52 K/UL — HIGH (ref 1.8–7.4)
IANC: 16.52 K/UL — HIGH (ref 1.8–7.4)
IANC: 17.68 K/UL — HIGH (ref 1.8–7.4)
IANC: 17.68 K/UL — HIGH (ref 1.8–7.4)
IMM GRANULOCYTES NFR BLD AUTO: 0.6 % — SIGNIFICANT CHANGE UP (ref 0–0.9)
IMM GRANULOCYTES NFR BLD AUTO: 0.6 % — SIGNIFICANT CHANGE UP (ref 0–0.9)
IMM GRANULOCYTES NFR BLD AUTO: 0.8 % — SIGNIFICANT CHANGE UP (ref 0–0.9)
IMM GRANULOCYTES NFR BLD AUTO: 0.8 % — SIGNIFICANT CHANGE UP (ref 0–0.9)
INR BLD: 1.28 RATIO — HIGH (ref 0.85–1.18)
INR BLD: 1.28 RATIO — HIGH (ref 0.85–1.18)
KETONES UR-MCNC: ABNORMAL MG/DL
KETONES UR-MCNC: ABNORMAL MG/DL
LACTATE BLDV-MCNC: 1.8 MMOL/L — SIGNIFICANT CHANGE UP (ref 0.5–2)
LACTATE BLDV-MCNC: 1.8 MMOL/L — SIGNIFICANT CHANGE UP (ref 0.5–2)
LEUKOCYTE ESTERASE UR-ACNC: ABNORMAL
LEUKOCYTE ESTERASE UR-ACNC: ABNORMAL
LYMPHOCYTES # BLD AUTO: 0.65 K/UL — LOW (ref 1–3.3)
LYMPHOCYTES # BLD AUTO: 0.65 K/UL — LOW (ref 1–3.3)
LYMPHOCYTES # BLD AUTO: 0.95 K/UL — LOW (ref 1–3.3)
LYMPHOCYTES # BLD AUTO: 0.95 K/UL — LOW (ref 1–3.3)
LYMPHOCYTES # BLD AUTO: 3.5 % — LOW (ref 13–44)
LYMPHOCYTES # BLD AUTO: 3.5 % — LOW (ref 13–44)
LYMPHOCYTES # BLD AUTO: 4.7 % — LOW (ref 13–44)
LYMPHOCYTES # BLD AUTO: 4.7 % — LOW (ref 13–44)
MAGNESIUM SERPL-MCNC: 1.9 MG/DL — SIGNIFICANT CHANGE UP (ref 1.6–2.6)
MAGNESIUM SERPL-MCNC: 1.9 MG/DL — SIGNIFICANT CHANGE UP (ref 1.6–2.6)
MCHC RBC-ENTMCNC: 21.8 PG — LOW (ref 27–34)
MCHC RBC-ENTMCNC: 21.8 PG — LOW (ref 27–34)
MCHC RBC-ENTMCNC: 22.3 PG — LOW (ref 27–34)
MCHC RBC-ENTMCNC: 22.3 PG — LOW (ref 27–34)
MCHC RBC-ENTMCNC: 29.8 GM/DL — LOW (ref 32–36)
MCHC RBC-ENTMCNC: 29.8 GM/DL — LOW (ref 32–36)
MCHC RBC-ENTMCNC: 30.6 GM/DL — LOW (ref 32–36)
MCHC RBC-ENTMCNC: 30.6 GM/DL — LOW (ref 32–36)
MCV RBC AUTO: 72.7 FL — LOW (ref 80–100)
MCV RBC AUTO: 72.7 FL — LOW (ref 80–100)
MCV RBC AUTO: 73.4 FL — LOW (ref 80–100)
MCV RBC AUTO: 73.4 FL — LOW (ref 80–100)
MONOCYTES # BLD AUTO: 1.19 K/UL — HIGH (ref 0–0.9)
MONOCYTES # BLD AUTO: 1.19 K/UL — HIGH (ref 0–0.9)
MONOCYTES # BLD AUTO: 1.39 K/UL — HIGH (ref 0–0.9)
MONOCYTES # BLD AUTO: 1.39 K/UL — HIGH (ref 0–0.9)
MONOCYTES NFR BLD AUTO: 6.4 % — SIGNIFICANT CHANGE UP (ref 2–14)
MONOCYTES NFR BLD AUTO: 6.4 % — SIGNIFICANT CHANGE UP (ref 2–14)
MONOCYTES NFR BLD AUTO: 6.9 % — SIGNIFICANT CHANGE UP (ref 2–14)
MONOCYTES NFR BLD AUTO: 6.9 % — SIGNIFICANT CHANGE UP (ref 2–14)
NEUTROPHILS # BLD AUTO: 16.52 K/UL — HIGH (ref 1.8–7.4)
NEUTROPHILS # BLD AUTO: 16.52 K/UL — HIGH (ref 1.8–7.4)
NEUTROPHILS # BLD AUTO: 17.68 K/UL — HIGH (ref 1.8–7.4)
NEUTROPHILS # BLD AUTO: 17.68 K/UL — HIGH (ref 1.8–7.4)
NEUTROPHILS NFR BLD AUTO: 87.7 % — HIGH (ref 43–77)
NEUTROPHILS NFR BLD AUTO: 87.7 % — HIGH (ref 43–77)
NEUTROPHILS NFR BLD AUTO: 88.9 % — HIGH (ref 43–77)
NEUTROPHILS NFR BLD AUTO: 88.9 % — HIGH (ref 43–77)
NITRITE UR-MCNC: NEGATIVE — SIGNIFICANT CHANGE UP
NITRITE UR-MCNC: NEGATIVE — SIGNIFICANT CHANGE UP
NRBC # BLD: 0 /100 WBCS — SIGNIFICANT CHANGE UP (ref 0–0)
NRBC # FLD: 0 K/UL — SIGNIFICANT CHANGE UP (ref 0–0)
PCO2 BLDV: 39 MMHG — LOW (ref 42–55)
PCO2 BLDV: 39 MMHG — LOW (ref 42–55)
PH BLDV: 7.4 — SIGNIFICANT CHANGE UP (ref 7.32–7.43)
PH BLDV: 7.4 — SIGNIFICANT CHANGE UP (ref 7.32–7.43)
PH UR: 5.5 — SIGNIFICANT CHANGE UP (ref 5–8)
PH UR: 5.5 — SIGNIFICANT CHANGE UP (ref 5–8)
PLATELET # BLD AUTO: 276 K/UL — SIGNIFICANT CHANGE UP (ref 150–400)
PLATELET # BLD AUTO: 276 K/UL — SIGNIFICANT CHANGE UP (ref 150–400)
PLATELET # BLD AUTO: 293 K/UL — SIGNIFICANT CHANGE UP (ref 150–400)
PLATELET # BLD AUTO: 293 K/UL — SIGNIFICANT CHANGE UP (ref 150–400)
PO2 BLDV: 27 MMHG — SIGNIFICANT CHANGE UP (ref 25–45)
PO2 BLDV: 27 MMHG — SIGNIFICANT CHANGE UP (ref 25–45)
POTASSIUM BLDV-SCNC: 3.9 MMOL/L — SIGNIFICANT CHANGE UP (ref 3.5–5.1)
POTASSIUM BLDV-SCNC: 3.9 MMOL/L — SIGNIFICANT CHANGE UP (ref 3.5–5.1)
POTASSIUM SERPL-MCNC: 3.7 MMOL/L — SIGNIFICANT CHANGE UP (ref 3.5–5.3)
POTASSIUM SERPL-MCNC: 3.7 MMOL/L — SIGNIFICANT CHANGE UP (ref 3.5–5.3)
POTASSIUM SERPL-MCNC: 4 MMOL/L — SIGNIFICANT CHANGE UP (ref 3.5–5.3)
POTASSIUM SERPL-MCNC: 4 MMOL/L — SIGNIFICANT CHANGE UP (ref 3.5–5.3)
POTASSIUM SERPL-SCNC: 3.7 MMOL/L — SIGNIFICANT CHANGE UP (ref 3.5–5.3)
POTASSIUM SERPL-SCNC: 3.7 MMOL/L — SIGNIFICANT CHANGE UP (ref 3.5–5.3)
POTASSIUM SERPL-SCNC: 4 MMOL/L — SIGNIFICANT CHANGE UP (ref 3.5–5.3)
POTASSIUM SERPL-SCNC: 4 MMOL/L — SIGNIFICANT CHANGE UP (ref 3.5–5.3)
PROT SERPL-MCNC: 7.9 G/DL — SIGNIFICANT CHANGE UP (ref 6–8.3)
PROT SERPL-MCNC: 7.9 G/DL — SIGNIFICANT CHANGE UP (ref 6–8.3)
PROT SERPL-MCNC: 8.5 G/DL — HIGH (ref 6–8.3)
PROT SERPL-MCNC: 8.5 G/DL — HIGH (ref 6–8.3)
PROT UR-MCNC: 300 MG/DL
PROT UR-MCNC: 300 MG/DL
PROTHROM AB SERPL-ACNC: 14.2 SEC — HIGH (ref 9.5–13)
PROTHROM AB SERPL-ACNC: 14.2 SEC — HIGH (ref 9.5–13)
RBC # BLD: 4.44 M/UL — SIGNIFICANT CHANGE UP (ref 4.2–5.8)
RBC # BLD: 4.44 M/UL — SIGNIFICANT CHANGE UP (ref 4.2–5.8)
RBC # BLD: 4.58 M/UL — SIGNIFICANT CHANGE UP (ref 4.2–5.8)
RBC # BLD: 4.58 M/UL — SIGNIFICANT CHANGE UP (ref 4.2–5.8)
RBC # FLD: 19.9 % — HIGH (ref 10.3–14.5)
RBC # FLD: 19.9 % — HIGH (ref 10.3–14.5)
RBC # FLD: 20 % — HIGH (ref 10.3–14.5)
RBC # FLD: 20 % — HIGH (ref 10.3–14.5)
RBC CASTS # UR COMP ASSIST: 4 /HPF — SIGNIFICANT CHANGE UP (ref 0–4)
RBC CASTS # UR COMP ASSIST: 4 /HPF — SIGNIFICANT CHANGE UP (ref 0–4)
REVIEW: SIGNIFICANT CHANGE UP
REVIEW: SIGNIFICANT CHANGE UP
SAO2 % BLDV: 37.5 % — LOW (ref 67–88)
SAO2 % BLDV: 37.5 % — LOW (ref 67–88)
SODIUM SERPL-SCNC: 133 MMOL/L — LOW (ref 135–145)
SODIUM SERPL-SCNC: 133 MMOL/L — LOW (ref 135–145)
SODIUM SERPL-SCNC: 134 MMOL/L — LOW (ref 135–145)
SODIUM SERPL-SCNC: 134 MMOL/L — LOW (ref 135–145)
SP GR SPEC: 1.03 — SIGNIFICANT CHANGE UP (ref 1–1.03)
SP GR SPEC: 1.03 — SIGNIFICANT CHANGE UP (ref 1–1.03)
SQUAMOUS # UR AUTO: 2 /HPF — SIGNIFICANT CHANGE UP (ref 0–5)
SQUAMOUS # UR AUTO: 2 /HPF — SIGNIFICANT CHANGE UP (ref 0–5)
TROPONIN T, HIGH SENSITIVITY RESULT: 72 NG/L — CRITICAL HIGH
TROPONIN T, HIGH SENSITIVITY RESULT: 72 NG/L — CRITICAL HIGH
UROBILINOGEN FLD QL: 0.2 MG/DL — SIGNIFICANT CHANGE UP (ref 0.2–1)
UROBILINOGEN FLD QL: 0.2 MG/DL — SIGNIFICANT CHANGE UP (ref 0.2–1)
WBC # BLD: 18.58 K/UL — HIGH (ref 3.8–10.5)
WBC # BLD: 18.58 K/UL — HIGH (ref 3.8–10.5)
WBC # BLD: 20.18 K/UL — HIGH (ref 3.8–10.5)
WBC # BLD: 20.18 K/UL — HIGH (ref 3.8–10.5)
WBC # FLD AUTO: 18.58 K/UL — HIGH (ref 3.8–10.5)
WBC # FLD AUTO: 18.58 K/UL — HIGH (ref 3.8–10.5)
WBC # FLD AUTO: 20.18 K/UL — HIGH (ref 3.8–10.5)
WBC # FLD AUTO: 20.18 K/UL — HIGH (ref 3.8–10.5)
WBC UR QL: 560 /HPF — HIGH (ref 0–5)
WBC UR QL: 560 /HPF — HIGH (ref 0–5)

## 2023-10-22 PROCEDURE — 76775 US EXAM ABDO BACK WALL LIM: CPT | Mod: 26

## 2023-10-22 PROCEDURE — 99223 1ST HOSP IP/OBS HIGH 75: CPT | Mod: GC

## 2023-10-22 PROCEDURE — 74176 CT ABD & PELVIS W/O CONTRAST: CPT | Mod: 26,MA

## 2023-10-22 PROCEDURE — 99254 IP/OBS CNSLTJ NEW/EST MOD 60: CPT

## 2023-10-22 PROCEDURE — 99285 EMERGENCY DEPT VISIT HI MDM: CPT

## 2023-10-22 PROCEDURE — 71046 X-RAY EXAM CHEST 2 VIEWS: CPT | Mod: 26

## 2023-10-22 RX ORDER — INSULIN GLARGINE 100 [IU]/ML
24 INJECTION, SOLUTION SUBCUTANEOUS
Qty: 0 | Refills: 0 | DISCHARGE

## 2023-10-22 RX ORDER — PIOGLITAZONE HYDROCHLORIDE 15 MG/1
1 TABLET ORAL
Qty: 0 | Refills: 0 | DISCHARGE

## 2023-10-22 RX ORDER — ACETAMINOPHEN 500 MG
650 TABLET ORAL EVERY 6 HOURS
Refills: 0 | Status: DISCONTINUED | OUTPATIENT
Start: 2023-10-22 | End: 2023-10-30

## 2023-10-22 RX ORDER — INSULIN LISPRO 100/ML
VIAL (ML) SUBCUTANEOUS
Refills: 0 | Status: DISCONTINUED | OUTPATIENT
Start: 2023-10-22 | End: 2023-10-30

## 2023-10-22 RX ORDER — ASPIRIN/CALCIUM CARB/MAGNESIUM 324 MG
1 TABLET ORAL
Qty: 0 | Refills: 0 | DISCHARGE

## 2023-10-22 RX ORDER — LANOLIN ALCOHOL/MO/W.PET/CERES
3 CREAM (GRAM) TOPICAL AT BEDTIME
Refills: 0 | Status: DISCONTINUED | OUTPATIENT
Start: 2023-10-22 | End: 2023-10-30

## 2023-10-22 RX ORDER — SITAGLIPTIN AND METFORMIN HYDROCHLORIDE 500; 50 MG/1; MG/1
1 TABLET, FILM COATED ORAL
Qty: 0 | Refills: 0 | DISCHARGE

## 2023-10-22 RX ORDER — SODIUM CHLORIDE 9 MG/ML
1000 INJECTION, SOLUTION INTRAVENOUS ONCE
Refills: 0 | Status: COMPLETED | OUTPATIENT
Start: 2023-10-22 | End: 2023-10-22

## 2023-10-22 RX ORDER — PIPERACILLIN AND TAZOBACTAM 4; .5 G/20ML; G/20ML
3.38 INJECTION, POWDER, LYOPHILIZED, FOR SOLUTION INTRAVENOUS EVERY 8 HOURS
Refills: 0 | Status: DISCONTINUED | OUTPATIENT
Start: 2023-10-22 | End: 2023-10-23

## 2023-10-22 RX ORDER — LOSARTAN POTASSIUM 100 MG/1
1 TABLET, FILM COATED ORAL
Qty: 0 | Refills: 0 | DISCHARGE

## 2023-10-22 RX ORDER — ATENOLOL AND CHLORTHALIDONE 50; 25 MG/1; MG/1
1 TABLET ORAL
Qty: 0 | Refills: 0 | DISCHARGE

## 2023-10-22 RX ORDER — DEXTROSE 50 % IN WATER 50 %
25 SYRINGE (ML) INTRAVENOUS ONCE
Refills: 0 | Status: DISCONTINUED | OUTPATIENT
Start: 2023-10-22 | End: 2023-10-30

## 2023-10-22 RX ORDER — DEXTROSE 50 % IN WATER 50 %
15 SYRINGE (ML) INTRAVENOUS ONCE
Refills: 0 | Status: DISCONTINUED | OUTPATIENT
Start: 2023-10-22 | End: 2023-10-30

## 2023-10-22 RX ORDER — DEXTROSE 50 % IN WATER 50 %
12.5 SYRINGE (ML) INTRAVENOUS ONCE
Refills: 0 | Status: DISCONTINUED | OUTPATIENT
Start: 2023-10-22 | End: 2023-10-30

## 2023-10-22 RX ORDER — INSULIN LISPRO 100/ML
VIAL (ML) SUBCUTANEOUS AT BEDTIME
Refills: 0 | Status: DISCONTINUED | OUTPATIENT
Start: 2023-10-22 | End: 2023-10-30

## 2023-10-22 RX ORDER — PIPERACILLIN AND TAZOBACTAM 4; .5 G/20ML; G/20ML
3.38 INJECTION, POWDER, LYOPHILIZED, FOR SOLUTION INTRAVENOUS ONCE
Refills: 0 | Status: COMPLETED | OUTPATIENT
Start: 2023-10-22 | End: 2023-10-22

## 2023-10-22 RX ORDER — GLUCAGON INJECTION, SOLUTION 0.5 MG/.1ML
1 INJECTION, SOLUTION SUBCUTANEOUS ONCE
Refills: 0 | Status: DISCONTINUED | OUTPATIENT
Start: 2023-10-22 | End: 2023-10-30

## 2023-10-22 RX ORDER — APIXABAN 2.5 MG/1
1 TABLET, FILM COATED ORAL
Qty: 0 | Refills: 0 | DISCHARGE

## 2023-10-22 RX ORDER — SODIUM CHLORIDE 9 MG/ML
1000 INJECTION, SOLUTION INTRAVENOUS
Refills: 0 | Status: DISCONTINUED | OUTPATIENT
Start: 2023-10-22 | End: 2023-10-30

## 2023-10-22 RX ORDER — ACETAMINOPHEN 500 MG
1000 TABLET ORAL ONCE
Refills: 0 | Status: COMPLETED | OUTPATIENT
Start: 2023-10-22 | End: 2023-10-22

## 2023-10-22 RX ORDER — AMLODIPINE BESYLATE 2.5 MG/1
1 TABLET ORAL
Qty: 0 | Refills: 0 | DISCHARGE

## 2023-10-22 RX ADMIN — Medication 400 MILLIGRAM(S): at 14:39

## 2023-10-22 RX ADMIN — PIPERACILLIN AND TAZOBACTAM 25 GRAM(S): 4; .5 INJECTION, POWDER, LYOPHILIZED, FOR SOLUTION INTRAVENOUS at 22:18

## 2023-10-22 RX ADMIN — PIPERACILLIN AND TAZOBACTAM 200 GRAM(S): 4; .5 INJECTION, POWDER, LYOPHILIZED, FOR SOLUTION INTRAVENOUS at 13:20

## 2023-10-22 RX ADMIN — SODIUM CHLORIDE 1000 MILLILITER(S): 9 INJECTION, SOLUTION INTRAVENOUS at 14:00

## 2023-10-22 RX ADMIN — Medication 1000 MILLIGRAM(S): at 16:59

## 2023-10-22 RX ADMIN — Medication 1: at 22:18

## 2023-10-22 NOTE — H&P ADULT - NSHPLABSRESULTS_GEN_ALL_CORE
10.2   20.18 )-----------( 293      ( 22 Oct 2023 22:52 )             33.3       10-22    134<L>  |  97<L>  |  10  ----------------------------<  291<H>  3.7   |  22  |  1.11    Ca    9.8      22 Oct 2023 22:52  Mg     1.90     10-22    TPro  8.5<H>  /  Alb  4.5  /  TBili  0.6  /  DBili  x   /  AST  16  /  ALT  12  /  AlkPhos  112  10-22              Urinalysis Basic - ( 22 Oct 2023 22:52 )    Color: x / Appearance: x / SG: x / pH: x  Gluc: 291 mg/dL / Ketone: x  / Bili: x / Urobili: x   Blood: x / Protein: x / Nitrite: x   Leuk Esterase: x / RBC: x / WBC x   Sq Epi: x / Non Sq Epi: x / Bacteria: x        PT/INR - ( 22 Oct 2023 11:57 )   PT: 14.2 sec;   INR: 1.28 ratio         PTT - ( 22 Oct 2023 11:57 )  PTT:31.8 sec    Lactate Trend      CARDIAC MARKERS ( 22 Oct 2023 22:52 )  x     / x     / 111 U/L / x     / 1.9 ng/mL  CARDIAC MARKERS ( 22 Oct 2023 11:57 )  x     / x     / 113 U/L / x     / 1.9 ng/mL        CAPILLARY BLOOD GLUCOSE      POCT Blood Glucose.: 284 mg/dL (22 Oct 2023 22:07) Labs reviewed by me:    10.2   20.18 )-----------( 293      ( 22 Oct 2023 22:52 )             33.3       10-22    134<L>  |  97<L>  |  10  ----------------------------<  291<H>  3.7   |  22  |  1.11    Ca    9.8      22 Oct 2023 22:52  Mg     1.90     10-22    TPro  8.5<H>  /  Alb  4.5  /  TBili  0.6  /  DBili  x   /  AST  16  /  ALT  12  /  AlkPhos  112  10-22              Urinalysis Basic - ( 22 Oct 2023 22:52 )    Color: x / Appearance: x / SG: x / pH: x  Gluc: 291 mg/dL / Ketone: x  / Bili: x / Urobili: x   Blood: x / Protein: x / Nitrite: x   Leuk Esterase: x / RBC: x / WBC x   Sq Epi: x / Non Sq Epi: x / Bacteria: x        PT/INR - ( 22 Oct 2023 11:57 )   PT: 14.2 sec;   INR: 1.28 ratio         PTT - ( 22 Oct 2023 11:57 )  PTT:31.8 sec    Lactate Trend      CARDIAC MARKERS ( 22 Oct 2023 22:52 )  x     / x     / 111 U/L / x     / 1.9 ng/mL  CARDIAC MARKERS ( 22 Oct 2023 11:57 )  x     / x     / 113 U/L / x     / 1.9 ng/mL        CAPILLARY BLOOD GLUCOSE      POCT Blood Glucose.: 284 mg/dL (22 Oct 2023 22:07)    Imaging reviewed by me:  CT A/P:  IMPRESSION:    Moderate volume pericardial effusion    Bladder wall thickening may represent cystitis versus changes of chronic   bladder outlet obstruction given the enlarged prostate. Correlate with   urinalysis. Lower pelvic inflammation may be due to cystitis versus   prostatitis.    Punctate nonobstructing left upper pole calculi. No hydronephrosis    CXR: no focal consolidations    EKG my interpretation: sinus tachycardia @ 126bpm, no STD or GIULIA, incomplete RBBB

## 2023-10-22 NOTE — CONSULT NOTE ADULT - ASSESSMENT
Patient is a 68 year old male with PMH of PVD on eliquis, diabetes, HTN, HLD, hemorrhoidal banding surgery 3 days ago presents to the ER with dysuria, uriary frequency, and concern for urine retention after the procedure. Patient reports the pain is moderate to severe.  Cardiology called to evaluate moderate pericardial effusion found on CT scan of abd and pelvis.    PLAN:  Moderate Pericardial effusion:  -Patient without signs of tamponade on physical exam, he is 98% on room air, no complaints of chest pain or shortness of breath, respirations are non-labored  -SIRS criteria: leukocytosis, fever, tachycardia  -possible sepsis with urinary source vs. recent hemorrhoidal banding as source??  -would monitor on telemetry  -EKG no evidence of ischemia  -ECHO in 10/2022 reviewed, normal LV function  -CPK is normal 113, troponin is 65, mildly elevated   -hemodynamically, his blood pressure is currently stable and HR is 120s in the setting of fever  - obtain an ECHO in the am to further evaluate pericardial effusion  -can continue eliquis 5 BID for his Peripheral artery disease with  lower extremity stent  -infectious work up in progress, UA is positive  -given antibiotics in the ER and IVF Lactate RIngers bolus    HTN:  BP is currently stable  takes amlodopine, losartan, atenolol at home  -would be cautious with resuming medications in setting of acute infection and possible sepsis    This consult has been reviewed with Dr. Ramirez Attending and cardiology fellow Dr. Andrew Merlos

## 2023-10-22 NOTE — H&P ADULT - TIME BILLING
I had a face to face encounter with this patient. I spent 80 total minutes on the bedside interview and examination, collateral from daughter and wife, coordination of care, counseling, chart review, order placement and documentation for this patient.

## 2023-10-22 NOTE — CONSULT NOTE ADULT - NS ATTEND AMEND GEN_ALL_CORE FT
Follow up TTE to evaluate size and hemodynamic effects of incidental pericardial effusion   Differential includes: autoimmune vs infectious vs malignancy  TSH normal  Consider CT chest for lung cancer screening - longstanding daily smoking history.   Consider sending ESR, CRP, anti-RA, QuantiFeron Follow up TTE to evaluate size and hemodynamic effects of incidental pericardial effusion   Differential includes: autoimmune vs infectious vs malignancy vs hemorrhagic  TSH normal  Consider CT chest for lung cancer screening - longstanding daily smoking history.   Consider sending ESR, CRP, anti-RA, QuantiFeron

## 2023-10-22 NOTE — H&P ADULT - BIRTH SEX
Weigh yourself daily: contact your healthcare provider with weight gain of 3 lbs in a day or 5 lbs in a week.  Monitor for signs of fluid overload such as shortness of breath or swelling.   
Male

## 2023-10-22 NOTE — H&P ADULT - PROBLEM SELECTOR PLAN 3
Can C/W Eliquis 5mg BID Can C/W Eliquis 2.5mg BID Can C/W Eliquis 2.5mg BID, Cilostazol 50 BID Can C/W Cilostazol 50 BID    Hold Eliquis; iso recent GI banding  Discuss with outpatient GI regarding When to restart AC Trace pericardial effusion on TTE 10/22/22  Mildly elevated trop but EKG nonischemic and no CP. Not likely ACS due to acute plaque rupture. More likely type 2 demand ischemia. CKMB normal  No Tamponade Physiology appreciated; Hypertensive, normal heart sounds, no JVD appreciated    Plan  Patient evaluated by cards; no tamponade physiology  Ordered TTE for the AM

## 2023-10-22 NOTE — ED PROVIDER NOTE - CLINICAL SUMMARY MEDICAL DECISION MAKING FREE TEXT BOX
Impression:  68-year-old male pmhx of peripheral vascular disease on Eliquis, diabetes, hypertension, hyperlipidemia, recent hemmorhoid banding procedure comes to ED w/ concerns for dysuria, urinary frequency, and reported concerns for urinary retention. Their symptoms and exam findings are concerning for UTI, dehydration.    Ordered labs, imaging, medications for diagnosis, management, and treatment.

## 2023-10-22 NOTE — ED ADULT NURSE REASSESSMENT NOTE - NS ED NURSE REASSESS COMMENT FT1
Report received by day RN Shira. Appears resting comfortably in stretcher HOB elevated. O2 saturation is 99% on room air. No acute distress noted. NSR on bedside cardiac monitor. Family at bedside. Bed in lowest position, in vision of nurses station in spot 1B, wheels locked, fall precautions in effect, safety maintained. Awaiting bed assignment as per admission order.
patient at baseline mental status, appears to be resting in bed comfortably, no complaints noted at this time. Breathing even and unlabored, pallor/diaphoresis not noted. IV site clean dry and intact.
Pt awake and c/o mild rectal discomfort. Vital signs obtained and charted in flowsheet. Elevated blood pressure, heart rate and temperature noted. Blaze Vivar at bedside and made aware of pt's pain and vital signs. Sinus tachy on bedside cardiac monitor. Respirations even and unlabored. No acute distress noted. Denies CP, SOB, nausea, vomiting, headache, lightheadedness, dizziness. Family at bedside. Bed in lowest position, safety maintained.

## 2023-10-22 NOTE — ED PROVIDER NOTE - OBJECTIVE STATEMENT
68-year-old male pmhx of peripheral vascular disease on Eliquis, diabetes, hypertension, hyperlipidemia, recent hemorrhoid banding procedure comes to ED w/ concerns for dysuria, urinary frequency, and reported concern for urinary retention after procedure. Their pain/symptom is moderate to severe, constant, non mediating with rest. Denies trauma, falls, fever, headache, shortness of breath, cough, rhinorrhea, congestion, chest pain, abdominal pain, nausea, vomiting, diarrhea, melena, hematochezia, dysuria, hematuria, urinary frequency, skin changes, p.o. issues, issues stooling, issues with ambulation.    Prescribed Amoxicillin TID 500mg.    Dr. Ian Bearden, GI doc [phone]  Dr. Cheikh De Los Santos, PCP private

## 2023-10-22 NOTE — H&P ADULT - PROBLEM SELECTOR PLAN 2
Trace pericardial effusion on TTE 10/22/22  Mildly elevated trop; consider in the setting of sepsis; T2    Plan  Patient evaluated by cards; no tamponade physiology  Ordered TTE for the AM Trace pericardial effusion on TTE 10/22/22  Mildly elevated trop; consider in the setting of sepsis; T2  No Tamponade Physiology appreciated; Hypertensive, normal heart sounds, no JVD appreciated    Plan  Patient evaluated by cards; no tamponade physiology  Ordered TTE for the AM Leukocytosis; Tachycardic; Febrile  Patient reported complaints of dysuria and polyuria;   Mildly elevated Trop 65--72  S/P Zosyn in the ED  1L NS in the ED  Concern for proastatitis. Pelvic inflammation on CT A/P. EDDY deferred due to in hallway and recent banding procedure.  Given sepsis; start broad; may transition to Levaquin pending cultures    Plan  BCx 10/22 -   UCx 10/22 -   Vanc 10/23- (considering recent procedure)  Zosyn 10/22 -  F/U MRSA Swab

## 2023-10-22 NOTE — H&P ADULT - ASSESSMENT
68M with PVD ( eliquis ),  68M with PVD (eliquis), T2DM, HTN, HLD presents with 1 day history of Dysuria found to be septic likely in the setting of UTI with incidental finding of moderate pericardial effusion with stable vital signs, pending TTE.

## 2023-10-22 NOTE — CONSULT NOTE ADULT - SUBJECTIVE AND OBJECTIVE BOX
Date of Admission:  10/22/23  CHIEF COMPLAINT:  sent from PCP office s/p hemorrhoidal banding surgery 3 days ago and urinary retention    HISTORY OF PRESENT ILLNESS:  Patient is a 68 year old male with PMH of PVD on eliquis, diabetes, HTN, HLD, hemorrhoidal banding surgery 3 days ago presents to the ER with dysuria, uriary frequency, and concern for urine retention after the procedure Patient reports the pain is moderate to severe     Allergy to   Advil (Rash)    Home Medications:  amLODIPine 5 mg oral tablet: 1 tab(s) orally once a day (12 Dec 2022 08:03)  Aspirin Enteric Coated 81 mg oral delayed release tablet: 1 tab(s) orally once a day   (12 Dec 2022 08:03)  atenolol-chlorthalidone 50 mg-25 mg oral tablet: 1 tab(s) orally once a day (12 Dec 2022 08:03)  atorvastatin 20 mg oral tablet: 1 tab(s) orally once a day (at bedtime) (12 Dec 2022 08:03)  Eliquis 5 mg oral tablet: 1 tab(s) orally 2 times a day  - You may resume Eliquis this evening, November 28, 2022, at 8:00PM. (12 Dec 2022 08:03)  Janumet 50 mg-1000 mg oral tablet: 1 tab(s) orally 2 times a day  - Do not take Metformin for 3 days. You may resume Metformin on Thursday, December, 15  2022. (12 Dec 2022 08:03)  Lantus Solostar Pen 100 units/mL subcutaneous solution: 24 unit(s) subcutaneous once a day (at bedtime) (12 Dec 2022 08:03)  losartan 50 mg oral tablet: 1 tab(s) orally once a day (at bedtime) (12 Dec 2022 08:03)  pioglitazone 30 mg oral tablet: 1 tab(s) orally once a day (12 Dec 2022 08:03)  Vitamin B12 1000 mcg oral tablet: 1 tab(s) orally once a day (12 Dec 2022 08:03)    MEDICATIONS:                  PAST MEDICAL & SURGICAL HISTORY:  HTN (hypertension)  HLD (hyperlipidemia)  DM (diabetes mellitus)  PAD (peripheral artery disease)  Tobacco abuse  Former  H/O iron deficiency  S/P appendectomy  S/P peripheral artery angioplasty with stent placement    Social History:    REVIEW OF SYSTEMS:    CONSTITUTIONAL: endorses fever and chills  EYES/ENT: No visual changes;  No vertigo or throat pain   NECK: No pain or stiffness  RESPIRATORY: No cough, wheezing, hemoptysis; No shortness of breath  CARDIOVASCULAR: No chest pain or palpitations  GASTROINTESTINAL: No abdominal or epigastric pain. No nausea, vomiting, or hematemesis; No diarrhea or constipation. No melena or hematochezia.  GENITOURINARY: endorese dysuria and urine retention  NEUROLOGICAL: No numbness or weakness  SKIN: No itching, rashes        T(C): 39.4 (10-22-23 @ 14:26), Max: 39.4 (10-22-23 @ 14:26)  HR: 132 (10-22-23 @ 14:26) (126 - 136)  BP: 167/105 (10-22-23 @ 14:26) (155/75 - 167/105)  RR: 20 (10-22-23 @ 14:26) (18 - 20)  SpO2: 100% (10-22-23 @ 14:26) (100% - 100%)  Wt(kg): --  I&O's Summary      Physical Exam:  General: NAD  Cardiovascular: Normal S1 S2, No JVD, No murmurs, No edema  Respiratory: Lungs clear to auscultation	  Gastrointestinal:  Soft, Non-tender, + BS	  Skin: warm and dry, No rashes, No ecchymoses, No cyanosis	  Extremities:  No clubbing, cyanosis or edema  Vascular: Peripheral pulses palpable 2+ bilaterally    LABS:	   	    CBC Full  -  ( 22 Oct 2023 11:57 )  WBC Count : 18.58 K/uL  Hemoglobin : 9.7 g/dL  Hematocrit : 32.6 %  Platelet Count - Automated : 276 K/uL  Mean Cell Volume : 73.4 fL  Mean Cell Hemoglobin : 21.8 pg  Mean Cell Hemoglobin Concentration : 29.8 gm/dL  Auto Neutrophil # : 16.52 K/uL  Auto Lymphocyte # : 0.65 K/uL  Auto Monocyte # : 1.19 K/uL  Auto Eosinophil # : 0.04 K/uL  Auto Basophil # : 0.04 K/uL  Auto Neutrophil % : 88.9 %  Auto Lymphocyte % : 3.5 %  Auto Monocyte % : 6.4 %  Auto Eosinophil % : 0.2 %  Auto Basophil % : 0.2 %    10-22    133<L>  |  98  |  10  ----------------------------<  252<H>  4.0   |  22  |  0.91    Ca    9.7      22 Oct 2023 11:57  Mg     1.90     10-22    TPro  7.9  /  Alb  4.3  /  TBili  0.7  /  DBili  x   /  AST  15  /  ALT  11  /  AlkPhos  96  10-22  < from: CT Abdomen and Pelvis No Cont (10.22.23 @ 13:52) >    ACC: [id number] EXAM:  CT ABDOMEN AND PELVIS   ORDERED BY: KATTY BLACKWELL     PROCEDURE DATE:  10/22/2023          INTERPRETATION:  CLINICAL INFORMATION: Kidney stone. Dysuria, urinary   frequency.    COMPARISON: CT abdomen and pelvis from 7/15/2022.    CONTRAST/COMPLICATIONS:  IV Contrast: None.  Oral Contrast: None.  Complications: None reported.    PROCEDURE:  CT of the Abdomen and Pelvis was performed.  Sagittal and coronal reformats were performed.    FINDINGS: Evaluation is less sensitive without IV contrast.  LOWER CHEST: Moderate volume pericardial effusion. Coronary artery   calcification.  Mild dependent atelectasis.    LIVER: Unremarkable as imaged  BILE DUCTS: Normal caliber.  GALLBLADDER: Decompressed. Cholelithiasis.  SPLEEN: Within normal size limits.  PANCREAS: Unremarkable as imaged  ADRENALS: Within normal limits.  KIDNEYS/URETERS: Vascular calcifications at both renal italia. Punctate   nonobstructing left upper pole calculi. No hydronephrosis    BLADDER: Minimallydistended. Diffuse wall thickening. Lower pelvic   inflammation  REPRODUCTIVE ORGANS: Prostate is enlarged.    BOWEL: No bowel obstruction. Appendectomy  PERITONEUM: No ascites.  VESSELS: Atherosclerotic changes. Right external iliac artery stent.  RETROPERITONEUM/LYMPH NODES: No lymphadenopathy.  ABDOMINAL WALL: No significant acute abnormality.  BONES: Multilevel degenerative changes throughout the spine.    IMPRESSION:    Moderate volume pericardial effusion    Bladder wall thickening may represent cystitis versus changes of chronic   bladder outlet obstruction given the enlarged prostate. Correlate with   urinalysis. Lower pelvic inflammation may be due to cystitis versus   prostatitis.    Punctate nonobstructing left upper pole calculi. No hydronephrosis        --- End of Report ---          MARTINE TAYLOR MD; Resident Radiologist  This document has been electronically signed.  LYNNE LANTIGUA MD; Attending Radiologist  This document has been electronically signed. Oct 22 2023  2:52PM    < end of copied text >       Date of Admission:  10/22/23  CHIEF COMPLAINT:  sent from PCP office s/p hemorrhoidal banding surgery 3 days ago and urinary retention    HISTORY OF PRESENT ILLNESS:  Patient is a 68 year old male with PMH of PVD on eliquis, diabetes, HTN, HLD, hemorrhoidal banding surgery 3 days ago presents to the ER with dysuria, uriary frequency, and concern for urine retention after the procedure. Patient reports the pain is moderate to severe. In the ER, CT scan of abdomen and pelvis done showing moderate volume pericardial effusion. Bladder wall thickening may represent cystitis versus changes of chronic bladder outlet obstruction given the enlarged prostate. Correlate with urinalysis. Lower pelvic inflammation may be due to cystitis versus prostatitis. Punctate nonobstructing left upper pole calculi. No hydronephrosis.    Cardiology called for moderate pericardial effusion on CT scan of abd/pelvis.        Allergy to   Advil (Rash)    Home Medications:  amLODIPine 5 mg oral tablet: 1 tab(s) orally once a day  Aspirin Enteric Coated 81 mg oral delayed release tablet: 1 tab(s) orally once a day  atenolol-chlorthalidone 50 mg-25 mg oral tablet: 1 tab(s) orally once a day   atorvastatin 20 mg oral tablet: 1 tab(s) orally once a day   Eliquis 5 mg oral tablet: 1 tab(s) orally 2 times a day  Janumet 50 mg-1000 mg oral tablet: 1 tab(s) orally 2 times a day  Lantus Solostar Pen 100 units/mL subcutaneous solution: 24 unit(s) subcutaneous once a day (at bedtime) (12 Dec 2022 08:03)  losartan 50 mg oral tablet: 1 tab(s) orally once a day (at bedtime) (12 Dec 2022 08:03)  pioglitazone 30 mg oral tablet: 1 tab(s) orally once a day (12 Dec 2022 08:03)  Vitamin B12 1000 mcg oral tablet: 1 tab(s) orally once a day (12 Dec 2022 08:03)  Metformin      PAST MEDICAL & SURGICAL HISTORY:  HTN (hypertension)  HLD (hyperlipidemia)  DM (diabetes mellitus)  PAD (peripheral artery disease)  Tobacco abuse  Former  H/O iron deficiency  S/P appendectomy  S/P peripheral artery angioplasty with stent placement    Social History:    REVIEW OF SYSTEMS:    CONSTITUTIONAL: endorses fever and chills  EYES/ENT: No visual changes;  No vertigo or throat pain   NECK: No pain or stiffness  RESPIRATORY: No cough, wheezing, hemoptysis; No shortness of breath  CARDIOVASCULAR: No chest pain or palpitations  GASTROINTESTINAL: No abdominal or epigastric pain. No nausea, vomiting, or hematemesis; No diarrhea or constipation. No melena or hematochezia.  GENITOURINARY: endorese dysuria and urine retention  NEUROLOGICAL: No numbness or weakness  SKIN: No itching, rashes        T(C): 39.4 (10-22-23 @ 14:26), Max: 39.4 (10-22-23 @ 14:26)  HR: 132 (10-22-23 @ 14:26) (126 - 136)  BP: 167/105 (10-22-23 @ 14:26) (155/75 - 167/105)  RR: 20 (10-22-23 @ 14:26) (18 - 20)  SpO2: 100% (10-22-23 @ 14:26) (100% - 100%)  Wt(kg): --  I&O's Summary      Physical Exam:  General: sick, unwell  Cardiovascular: +S1 +S2, rapid heart rate  Respiratory: Lungs clear to auscultation	  Gastrointestinal:  Soft, Non-tender, + BS	  Skin: warm and dry, No rashes, No ecchymoses, No cyanosis	  Extremities:  No clubbing, cyanosis or edema  Vascular: Peripheral pulses palpable 2+ bilaterally  SKIN: diaphoresis, palor    LABS:	   	    CBC Full  -  ( 22 Oct 2023 11:57 )  WBC Count : 18.58 K/uL  Hemoglobin : 9.7 g/dL  Hematocrit : 32.6 %  Platelet Count - Automated : 276 K/uL  Mean Cell Volume : 73.4 fL  Mean Cell Hemoglobin : 21.8 pg  Mean Cell Hemoglobin Concentration : 29.8 gm/dL  Auto Neutrophil # : 16.52 K/uL  Auto Lymphocyte # : 0.65 K/uL  Auto Monocyte # : 1.19 K/uL  Auto Eosinophil # : 0.04 K/uL  Auto Basophil # : 0.04 K/uL  Auto Neutrophil % : 88.9 %  Auto Lymphocyte % : 3.5 %  Auto Monocyte % : 6.4 %  Auto Eosinophil % : 0.2 %  Auto Basophil % : 0.2 %    10-22    133<L>  |  98  |  10  ----------------------------<  252<H>  4.0   |  22  |  0.91    Ca    9.7      22 Oct 2023 11:57  Mg     1.90     10-22    TPro  7.9  /  Alb  4.3  /  TBili  0.7  /  DBili  x   /  AST  15  /  ALT  11  /  AlkPhos  96  10-22  < from: CT Abdomen and Pelvis No Cont (10.22.23 @ 13:52) >    ACC: [id number] EXAM:  CT ABDOMEN AND PELVIS   ORDERED BY: KATTY BLACKWELL     PROCEDURE DATE:  10/22/2023          INTERPRETATION:  CLINICAL INFORMATION: Kidney stone. Dysuria, urinary   frequency.    COMPARISON: CT abdomen and pelvis from 7/15/2022.    CONTRAST/COMPLICATIONS:  IV Contrast: None.  Oral Contrast: None.  Complications: None reported.    PROCEDURE:  CT of the Abdomen and Pelvis was performed.  Sagittal and coronal reformats were performed.    FINDINGS: Evaluation is less sensitive without IV contrast.  LOWER CHEST: Moderate volume pericardial effusion. Coronary artery   calcification.  Mild dependent atelectasis.    LIVER: Unremarkable as imaged  BILE DUCTS: Normal caliber.  GALLBLADDER: Decompressed. Cholelithiasis.  SPLEEN: Within normal size limits.  PANCREAS: Unremarkable as imaged  ADRENALS: Within normal limits.  KIDNEYS/URETERS: Vascular calcifications at both renal it[place]. Punctate   nonobstructing left upper pole calculi. No hydronephrosis    BLADDER: Minimallydistended. Diffuse wall thickening. Lower pelvic   inflammation  REPRODUCTIVE ORGANS: Prostate is enlarged.    BOWEL: No bowel obstruction. Appendectomy  PERITONEUM: No ascites.  VESSELS: Atherosclerotic changes. Right external iliac artery stent.  RETROPERITONEUM/LYMPH NODES: No lymphadenopathy.  ABDOMINAL WALL: No significant acute abnormality.  BONES: Multilevel degenerative changes throughout the spine.    IMPRESSION:    Moderate volume pericardial effusion    Bladder wall thickening may represent cystitis versus changes of chronic   bladder outlet obstruction given the enlarged prostate. Correlate with   urinalysis. Lower pelvic inflammation may be due to cystitis versus   prostatitis.    Punctate nonobstructing left upper pole calculi. No hydronephrosis        --- End of Report ---          MARTINE TAYLOR MD; Resident Radiologist  This document has been electronically signed.  LYNNE LANTIGUA MD; Attending Radiologist  This document has been electronically signed. Oct 22 2023  2:52PM    < end of copied text >    < from: Transthoracic Echocardiogram (10.22.22 @ 09:56) >    Patient name: COLBY KENNEDY  YOB: 1955   Age: 67 (M)   MR#: [id number]  Study Date: 10/22/2022  [facility]  Study quality: Technically good  Referring Physician: Mahad Trujillo MD  Blood Pressure: 120/60 mmHg  Height: 168 cm  Weight: 66 kg  BSA: 1.8 m2  ------------------------------------------------------------------------  PROCEDURE: Transthoracic echocardiogram with 2-D, M-Mode  and complete spectral and color flow Doppler.  INDICATION: Edema, unspecified (R60.9)  ------------------------------------------------------------------------  DIMENSIONS:  Dimensions:     Normal Values:  LA:     3.6 cm    2.0 - 4.0 cm  Ao:     2.8 cm    2.0 - 3.8 cm  SEPTUM: 1.2 cm    0.6 - 1.2 cm  PWT:    1.1 cm    0.6 - 1.1 cm  LVIDd:  4.7 cm    3.0 - 5.6 cm  LVIDs:  3.4 cm    1.8 - 4.0 cm  Derived Variables:  LVMI: 114 g/m2  RWT: 0.46  Fractional short: 28 %  Ejection Fraction (Modified Ward Rule): 53 %  Peak Velocity (m/sec): AoV=1.1  ------------------------------------------------------------------------  OBSERVATIONS:  Mitral Valve: Mitral annular calcification. Minimal mitral  regurgitation.  Aortic Root: Normal aortic root.  Aortic Valve: Normal trileaflet aortic valve. Peak  transaorticvalve gradient equals 5 mm Hg. Peak left  ventricular outflow tract gradient equals 2.3 mm Hg.  Left Atrium: Normal left atrium.  LA volume index = 19  cc/m2.  Left Ventricle: Normal left ventricular systolic function.  No segmental wall motion abnormalities. Normal left  ventricular internal dimensions and wall thicknesses. Mild  diastolic dysfunction (Stage I).  Right Heart: Normal right atrium. Normal right ventricular  size and function. Normal tricuspid valve. Mild tricuspid  regurgitation. Normal pulmonic valve.  Pericardium/PleuraNormal pericardium with trace  circumferential pericardial effusion.  Hemodynamic: Estimated right ventricular systolic pressure  equals 32 mm Hg, assuming right atrial pressure equals 10  mm Hg, consistent with normal pulmonary pressures.  ------------------------------------------------------------------------  CONCLUSIONS:  1. Normal left ventricular systolic function. No segmental  wall motion abnormalities.  2. Mild diastolic dysfunction (Stage I).  3. Normal right ventricular size and function.  4. Normal tricuspid valve. Mild tricuspid regurgitation.  5. Estimated pulmonary artery systolic pressure equals 32  mm Hg, assuming right atrial pressure equals 10  mm Hg,  consistent with normal pulmonary pressures.  *** No previous Echo exam.  ------------------------------------------------------------------------  Confirmed on  10/22/2022 - 16:56:40 by Sandra Ramirez MD  ------------------------------------------------------------------------    < end of copied text >     Date of Admission:  10/22/23  CHIEF COMPLAINT:  sent from PCP office s/p hemorrhoidal banding surgery 3 days ago and urinary retention    HISTORY OF PRESENT ILLNESS:  Patient is a 68 year old male with PMH of PVD on eliquis, diabetes, HTN, HLD, hemorrhoidal banding surgery 3 days ago presents to the ER with dysuria, uriary frequency, and concern for urine retention after the procedure. Patient reports the pain is moderate to severe. In the ER, CT scan of abdomen and pelvis done showing moderate volume pericardial effusion. Bladder wall thickening may represent cystitis versus changes of chronic bladder outlet obstruction given the enlarged prostate. Correlate with urinalysis. Lower pelvic inflammation may be due to cystitis versus prostatitis. Punctate nonobstructing left upper pole calculi. No hydronephrosis.    Cardiology called for moderate pericardial effusion on CT scan of abd/pelvis.        Allergy to   Advil (Rash)    Home Medications:  amLODIPine 5 mg oral tablet: 1 tab(s) orally once a day  Aspirin Enteric Coated 81 mg oral delayed release tablet: 1 tab(s) orally once a day  atenolol-chlorthalidone 50 mg-25 mg oral tablet: 1 tab(s) orally once a day   atorvastatin 20 mg oral tablet: 1 tab(s) orally once a day   Eliquis 5 mg oral tablet: 1 tab(s) orally 2 times a day  Janumet 50 mg-1000 mg oral tablet: 1 tab(s) orally 2 times a day  Lantus Solostar Pen 100 units/mL subcutaneous solution: 24 unit(s) subcutaneous once a day (at bedtime) (12 Dec 2022 08:03)  losartan 50 mg oral tablet: 1 tab(s) orally once a day (at bedtime) (12 Dec 2022 08:03)  pioglitazone 30 mg oral tablet: 1 tab(s) orally once a day (12 Dec 2022 08:03)  Vitamin B12 1000 mcg oral tablet: 1 tab(s) orally once a day (12 Dec 2022 08:03)  Metformin      PAST MEDICAL & SURGICAL HISTORY:  HTN (hypertension)  HLD (hyperlipidemia)  DM (diabetes mellitus)  PAD (peripheral artery disease)  Tobacco abuse  Former  H/O iron deficiency  S/P appendectomy  S/P peripheral artery angioplasty with stent placement    Social History:    REVIEW OF SYSTEMS:    CONSTITUTIONAL: endorses fever and chills  EYES/ENT: No visual changes;  No vertigo or throat pain   NECK: No pain or stiffness  RESPIRATORY: No cough, wheezing, hemoptysis; No shortness of breath  CARDIOVASCULAR: No chest pain or palpitations  GASTROINTESTINAL: No abdominal or epigastric pain. No nausea, vomiting, or hematemesis; No diarrhea or constipation. No melena or hematochezia.  GENITOURINARY: endorese dysuria and urine retention  NEUROLOGICAL: No numbness or weakness  SKIN: No itching, rashes        T(C): 39.4 (10-22-23 @ 14:26), Max: 39.4 (10-22-23 @ 14:26)  HR: 132 (10-22-23 @ 14:26) (126 - 136)  BP: 167/105 (10-22-23 @ 14:26) (155/75 - 167/105)  RR: 20 (10-22-23 @ 14:26) (18 - 20)  SpO2: 100% (10-22-23 @ 14:26) (100% - 100%)  Wt(kg): --  I&O's Summary      Physical Exam:  General: sick, unwell  Cardiovascular: +S1 +S2, rapid heart rate  Respiratory: Lungs clear to auscultation	  Gastrointestinal:  Soft, Non-tender, + BS	  Skin: warm and dry, No rashes, No ecchymoses, No cyanosis	  Extremities:  No clubbing, cyanosis or edema  Vascular: Peripheral pulses palpable 2+ bilaterally  SKIN: diaphoresis, palor    LABS:	   	    CBC Full  -  ( 22 Oct 2023 11:57 )  WBC Count : 18.58 K/uL  Hemoglobin : 9.7 g/dL  Hematocrit : 32.6 %  Platelet Count - Automated : 276 K/uL  Mean Cell Volume : 73.4 fL  Mean Cell Hemoglobin : 21.8 pg  Mean Cell Hemoglobin Concentration : 29.8 gm/dL  Auto Neutrophil # : 16.52 K/uL  Auto Lymphocyte # : 0.65 K/uL  Auto Monocyte # : 1.19 K/uL  Auto Eosinophil # : 0.04 K/uL  Auto Basophil # : 0.04 K/uL  Auto Neutrophil % : 88.9 %  Auto Lymphocyte % : 3.5 %  Auto Monocyte % : 6.4 %  Auto Eosinophil % : 0.2 %  Auto Basophil % : 0.2 %    10-22    133<L>  |  98  |  10  ----------------------------<  252<H>  4.0   |  22  |  0.91    Ca    9.7      22 Oct 2023 11:57  Mg     1.90     10-22    TPro  7.9  /  Alb  4.3  /  TBili  0.7  /  DBili  x   /  AST  15  /  ALT  11  /  AlkPhos  96  10-22  < from: CT Abdomen and Pelvis No Cont (10.22.23 @ 13:52) >    ACC: [id number] EXAM:  CT ABDOMEN AND PELVIS   ORDERED BY: KATTY BLACKWELL     PROCEDURE DATE:  10/22/2023          INTERPRETATION:  CLINICAL INFORMATION: Kidney stone. Dysuria, urinary   frequency.    COMPARISON: CT abdomen and pelvis from 7/15/2022.    CONTRAST/COMPLICATIONS:  IV Contrast: None.  Oral Contrast: None.  Complications: None reported.    PROCEDURE:  CT of the Abdomen and Pelvis was performed.  Sagittal and coronal reformats were performed.    FINDINGS: Evaluation is less sensitive without IV contrast.  LOWER CHEST: Moderate volume pericardial effusion. Coronary artery   calcification.  Mild dependent atelectasis.    LIVER: Unremarkable as imaged  BILE DUCTS: Normal caliber.  GALLBLADDER: Decompressed. Cholelithiasis.  SPLEEN: Within normal size limits.  PANCREAS: Unremarkable as imaged  ADRENALS: Within normal limits.  KIDNEYS/URETERS: Vascular calcifications at both renal it[place]. Punctate   nonobstructing left upper pole calculi. No hydronephrosis    BLADDER: Minimallydistended. Diffuse wall thickening. Lower pelvic   inflammation  REPRODUCTIVE ORGANS: Prostate is enlarged.    BOWEL: No bowel obstruction. Appendectomy  PERITONEUM: No ascites.  VESSELS: Atherosclerotic changes. Right external iliac artery stent.  RETROPERITONEUM/LYMPH NODES: No lymphadenopathy.  ABDOMINAL WALL: No significant acute abnormality.  BONES: Multilevel degenerative changes throughout the spine.    IMPRESSION:    Moderate volume pericardial effusion    Bladder wall thickening may represent cystitis versus changes of chronic   bladder outlet obstruction given the enlarged prostate. Correlate with   urinalysis. Lower pelvic inflammation may be due to cystitis versus   prostatitis.    Punctate nonobstructing left upper pole calculi. No hydronephrosis        --- End of Report ---          MARTINE TAYLOR MD; Resident Radiologist  This document has been electronically signed.  LYNNE LANTIGUA MD; Attending Radiologist  This document has been electronically signed. Oct 22 2023  2:52PM    < end of copied text >    < from: Transthoracic Echocardiogram (10.22.22 @ 09:56) >    Patient name: COLBY KENNEDY  YOB: 1955   Age: 67 (M)   MR#: [id number]  Study Date: 10/22/2022  [facility]  Study quality: Technically good  Referring Physician: Mahad Trujillo MD  Blood Pressure: 120/60 mmHg  Height: 168 cm  Weight: 66 kg  BSA: 1.8 m2  ------------------------------------------------------------------------  PROCEDURE: Transthoracic echocardiogram with 2-D, M-Mode  and complete spectral and color flow Doppler.  INDICATION: Edema, unspecified (R60.9)  ------------------------------------------------------------------------  DIMENSIONS:  Dimensions:     Normal Values:  LA:     3.6 cm    2.0 - 4.0 cm  Ao:     2.8 cm    2.0 - 3.8 cm  SEPTUM: 1.2 cm    0.6 - 1.2 cm  PWT:    1.1 cm    0.6 - 1.1 cm  LVIDd:  4.7 cm    3.0 - 5.6 cm  LVIDs:  3.4 cm    1.8 - 4.0 cm  Derived Variables:  LVMI: 114 g/m2  RWT: 0.46  Fractional short: 28 %  Ejection Fraction (Modified Ward Rule): 53 %  Peak Velocity (m/sec): AoV=1.1  ------------------------------------------------------------------------  OBSERVATIONS:  Mitral Valve: Mitral annular calcification. Minimal mitral  regurgitation.  Aortic Root: Normal aortic root.  Aortic Valve: Normal trileaflet aortic valve. Peak  transaorticvalve gradient equals 5 mm Hg. Peak left  ventricular outflow tract gradient equals 2.3 mm Hg.  Left Atrium: Normal left atrium.  LA volume index = 19  cc/m2.  Left Ventricle: Normal left ventricular systolic function.  No segmental wall motion abnormalities. Normal left  ventricular internal dimensions and wall thicknesses. Mild  diastolic dysfunction (Stage I).  Right Heart: Normal right atrium. Normal right ventricular  size and function. Normal tricuspid valve. Mild tricuspid  regurgitation. Normal pulmonic valve.  Pericardium/PleuraNormal pericardium with trace  circumferential pericardial effusion.  Hemodynamic: Estimated right ventricular systolic pressure  equals 32 mm Hg, assuming right atrial pressure equals 10  mm Hg, consistent with normal pulmonary pressures.  ------------------------------------------------------------------------  CONCLUSIONS:  1. Normal left ventricular systolic function. No segmental  wall motion abnormalities.  2. Mild diastolic dysfunction (Stage I).  3. Normal right ventricular size and function.  4. Normal tricuspid valve. Mild tricuspid regurgitation.  5. Estimated pulmonary artery systolic pressure equals 32  mm Hg, assuming right atrial pressure equals 10  mm Hg,  consistent with normal pulmonary pressures.  *** No previous Echo exam.  ------------------------------------------------------------------------  Confirmed on  10/22/2022 - 16:56:40 by Sandra Ramirez MD  ------------------------------------------------------------------------    < end of copied text >

## 2023-10-22 NOTE — ED PROVIDER NOTE - PROGRESS NOTE DETAILS
Cardiology consulted to see for moderate volume pericardial effusion seen on CT imaging. Will evaluate as soon as possible.

## 2023-10-22 NOTE — H&P ADULT - HISTORY OF PRESENT ILLNESS
68M 68M with T2DM, HTN/HLD, PVD on eliquis, and 50 PY smoking history presents with 1 day history of dysuria/polyuria     Patient was recently seen at [facility] for hemorrhoid banding and reports the postop course was uncomplicated. He was comlaining for urinary retention immediately after the procedure. This morning at around 6am; he woke with new suprapubic pain. He went to the bathroom much more frequently and endorsed a burning pain whenever he urinated. He was started on IV Zosyn    Patient denies any recent sick contacts, any new sexual partners.     ED Course 100.7, 164/57, 136 BMP, 18/min, 100% RA  Ofirmev x1, 1U Lispro, ZOsyn, 1L LR  CT A/P showing Diffuse Bladder thickening with enlarged prostate, Mod. Pericardial effusion noted 68M with T2DM, HTN/HLD, PVD on eliquis, and 50 PY smoking history presents with 1 day history of dysuria/polyuria     Patient was recently seen at OSH for hemorrhoid banding on Thursday and went home the same day. He reports he was given a PPI/antacid medication, a 3 day course of amoxicillin, and some stool softener. He missed his last dose of amoxicillin the day prior to his symptoms onset, but otherwise, reports he had no acute events in the post-op period.  This morning at around 6am; he woke with new suprapubic pain. He went to the bathroom much more frequently and endorsed a burning pain whenever he urinated and his daughter took his temperature which came back at 102 and they called his surgeon who told him to go to the ED.    ED Course 100.7, 164/57, 136 BMP, 18/min, 100% RA  Ofirmev x1, 1U Lispro, ZOsyn, 1L LR  CT A/P showing Diffuse Bladder thickening with enlarged prostate, Mod. Pericardial effusion noted 68M with T2DM, HTN/HLD, PVD on eliquis, and 50 PY smoking history presents with 1 day history of dysuria/polyuria.    Patient was recently seen at OSH for hemorrhoid banding on Thursday by Dr. Ina Bearden ([phone]) and went home the same day. He reports he was given a PPI/antacid medication, a 3 day course of amoxicillin, and some stool softener. He missed his last dose of amoxicillin the day prior to his symptoms onset, but otherwise, reports he had no acute events in the post-op period.  This morning at around 6am; he woke with new suprapubic pain. He went to the bathroom much more frequently and endorsed a burning pain whenever he urinated and his daughter took his temperature which came back at 102 and they called his surgeon who told him to go to the ED. He denies CP, SOB, palpitations, LH, dizziness, LOC, abdominal pain, vomiting, diarrhea.    ED Course 100.7, 164/57, 136 BMP, 18/min, 100% RA  Ofirmev x1, 1U Lispro, Zosyn, 1L LR  CT A/P showing Diffuse Bladder thickening with enlarged prostate, Mod. Pericardial effusion noted

## 2023-10-22 NOTE — ED ADULT NURSE NOTE - NSFALLRISKINTERV_ED_ALL_ED
Assistance OOB with selected safe patient handling equipment if applicable/Assistance with ambulation/Communicate fall risk and risk factors to all staff, patient, and family/Monitor gait and stability/Provide patient with walking aids/Provide visual cue: yellow wristband, yellow gown, etc/Reinforce activity limits and safety measures with patient and family/Use of alarms - bed, stretcher, chair and/or video monitoring/Call bell, personal items and telephone in reach/Instruct patient to call for assistance before getting out of bed/chair/stretcher/Non-slip footwear applied when patient is off stretcher/[place] to call system/Physically safe environment - no spills, clutter or unnecessary equipment/Purposeful Proactive Rounding/Room/bathroom lighting operational, light cord in reach

## 2023-10-22 NOTE — H&P ADULT - PROBLEM SELECTOR PLAN 5
Amlodipine 5mg qd  Atenolol/Chlorthalidone  Losartan 50 qd    Plan Losartan 50 qd    Plan  Hold in the setting of sepsis Losartan 50 qd  Atenolol    Plan  Hold in the setting of sepsis Patient was told to stop Lantus at home; has been off for 1 week    Insulin LSS: monitor glucose levels; titrate regiment as needed.  Lantus 16U qhs

## 2023-10-22 NOTE — H&P ADULT - PROBLEM SELECTOR PLAN 4
Lantus 24 qd at home    Start with lantus 19qd Patient was told to stop Lantus at home    Insulin LSS: monitor glucose levels; titrate regiment as needed. Patient was told to stop Lantus at home; has been off for 1 week    Insulin LSS: monitor glucose levels; titrate regiment as needed. Patient was told to stop Lantus at home; has been off for 1 week    Insulin LSS: monitor glucose levels; titrate regiment as needed.  Lantus 16U qdaily Can C/W Cilostazol 50 BID    Hold Eliquis; iso recent GI banding  Discuss with outpatient GI Dr. Ian Bearden ([phone]) when to restart AC as discharge instructions state patient should contact him prior to resuming. Patient has been off eliquis for ~1 week

## 2023-10-22 NOTE — H&P ADULT - NSHPPHYSICALEXAM_GEN_ALL_CORE
T(C): 37.3 (10-22-23 @ 21:47), Max: 39.4 (10-22-23 @ 14:26)  HR: 106 (10-22-23 @ 21:47) (106 - 136)  BP: 174/69 (10-22-23 @ 21:47) (129/59 - 174/69)  RR: 17 (10-22-23 @ 21:47) (17 - 20)  SpO2: 100% (10-22-23 @ 21:47) (97% - 100%)    CONSTITUTIONAL: Well groomed, no apparent distress  EYES: PERRLA and symmetric, EOMI, No conjunctival or scleral injection, non-icteric  ENMT: Oral mucosa with moist membranes. Normal dentition; no pharyngeal injection or exudates             NECK: Supple, symmetric and without tracheal deviation   RESP: No respiratory distress, no use of accessory muscles; CTA b/l, no WRR  CV: RRR, +S1S2, no MRG; no JVD; no peripheral edema  GI: Soft, NT, ND, no rebound, no guarding; no palpable masses; no hepatosplenomegaly; no hernia palpated  LYMPH: No cervical LAD or tenderness; no axillary LAD or tenderness; no inguinal LAD or tenderness  MSK: Normal gait; No digital clubbing or cyanosis; examination of the (head/neck/spine/ribs/pelvis, RUE, LUE, RLE, LLE) without misalignment,            Normal ROM without pain, no spinal tenderness, normal muscle strength/tone  SKIN: No rashes or ulcers noted; no subcutaneous nodules or induration palpable  NEURO: CN II-XII intact; normal reflexes in upper and lower extremities, sensation intact in upper and lower extremities b/l to light touch   PSYCH: Appropriate insight/judgment; A+O x 3, mood and affect appropriate, recent/remote memory intact T(C): 37.3 (10-22-23 @ 21:47), Max: 39.4 (10-22-23 @ 14:26)  HR: 106 (10-22-23 @ 21:47) (106 - 136)  BP: 174/69 (10-22-23 @ 21:47) (129/59 - 174/69)  RR: 17 (10-22-23 @ 21:47) (17 - 20)  SpO2: 100% (10-22-23 @ 21:47) (97% - 100%)    CONSTITUTIONAL: Well groomed, no apparent distress  EYES: PERRLA and symmetric, EOMI, No conjunctival or scleral injection, non-icteric  ENMT: Oral mucosa with moist membranes. Normal dentition; no pharyngeal injection or exudates  NECK: Supple, symmetric and without tracheal deviation   RESP: No respiratory distress, no use of accessory muscles; CTA b/l, no WRR  CV: RRR, +S1S2, no MRG; no JVD; no peripheral edema  GI: Soft, NT, ND, no rebound, no guarding; no palpable masses; no hepatosplenomegaly; no hernia palpated  LYMPH: No cervical LAD or tenderness; no axillary LAD or tenderness; no inguinal LAD or tenderness  MSK: 5/5 UE STrength, LE Strength  SKIN: No rashes or ulcers noted; no subcutaneous nodules or induration palpable  NEURO: CN II-XII intact;  PSYCH: Appropriate insight/judgment; A+O x 3, mood and affect appropriate, recent/remote memory intact

## 2023-10-22 NOTE — ED ADULT TRIAGE NOTE - CHIEF COMPLAINT QUOTE
pt sent to ED by primary care doctor, pt s/p hemorroid banding surgery 3 days ago.  since this morning, pt has had urinary retention.  Hx:  DM, HTN, HLD, PVD, [place]

## 2023-10-22 NOTE — ED ADULT NURSE NOTE - AS PAIN REST
Review of Systems:  	•	CONSTITUTIONAL - no fever, no diaphoresis, no chills  	•	SKIN - no rash  	•	HEMATOLOGIC - no bleeding, no bruising  	•	EYES - no eye pain, no blurry vision  	•	ENT - no change in hearing, no sore throat, no ear pain or tinnitus  	•	RESPIRATORY - no shortness of breath, no cough  	•	CARDIAC - no chest pain, no palpitations  	•	GI - no abd pain, no nausea, no vomiting, no diarrhea, no constipation  	•	GENITO-URINARY - no discharge, no dysuria; no hematuria, no increased urinary frequency  	•	MUSCULOSKELETAL - no joint paint, no swelling, no redness  	•	NEUROLOGIC - no weakness, no headache, no paresthesias, no LOC  	•	PSYCH - no anxiety, non suicidal, non homicidal, no hallucination, no depression 0 (no pain/absence of nonverbal indicators of pain)

## 2023-10-22 NOTE — H&P ADULT - NSHPREVIEWOFSYSTEMS_GEN_ALL_CORE
REVIEW OF SYSTEMS:  CONSTITUTIONAL: No weakness, fevers or chills  EYES/ENT: No visual changes;  No vertigo or throat pain   NECK: No pain or stiffness  RESPIRATORY: No cough, wheezing, hemoptysis; No shortness of breath  CARDIOVASCULAR: No chest pain or palpitations  GASTROINTESTINAL: recent hemorrhoid surgery; no acute pain/discomfort  GENITOURINARY: Frequent urination; painful; resolved in the ED after the antibiotics  NEUROLOGICAL: No numbness or weakness  SKIN: No itching, rashes

## 2023-10-22 NOTE — H&P ADULT - PROBLEM SELECTOR PLAN 1
Leukocytosis; Tachycardic; Febrile  Patient reported complaints of dysuria and polyuria;   Mildly elevated Trop 65  S/P Vanc/Zosyn in the ED    Plan  BCx 10/22 -   UCx 10/22 -   Vanc 10/22-   Zosyn 10/22 - Leukocytosis; Tachycardic; Febrile  Patient reported complaints of dysuria and polyuria;   Mildly elevated Trop 65  S/P Vanc/Zosyn in the ED    Plan  BCx 10/22 -   UCx 10/22 -   Vanc 10/23-   Zosyn 10/22 - Leukocytosis; Tachycardic; Febrile  Patient reported complaints of dysuria and polyuria;   Mildly elevated Trop 65  S/P Zosyn in the ED  1L NS in the ED    Plan  BCx 10/22 -   UCx 10/22 -   Vanc 10/23- (considering recent procedure)  Zosyn 10/22 -  F/U MRSA Swab Leukocytosis; Tachycardic; Febrile  Patient reported complaints of dysuria and polyuria;   Mildly elevated Trop 65  S/P Zosyn in the ED  1L NS in the ED  Concern for proastatitis, however unable to assess given recent banding procedure  Given sepsis; start broad; may transition to Levaquin pending cultures    Plan  BCx 10/22 -   UCx 10/22 -   Vanc 10/23- (considering recent procedure)  Zosyn 10/22 -  F/U MRSA Swab UA positive, dysuria, polyuria. Septic with tachycardia, fever, leukocytosis. Possible bacterial prostatitis   -zosyn  -lactate normal  -f/u BCx x2, UCx to tailor abx

## 2023-10-22 NOTE — H&P ADULT - ATTENDING COMMENTS
I have personally seen, examined, and participated in the care of this patient.  I have reviewed all pertinent clinical information, including history, physical exam, plan, and the resident's note and agree except as noted.    68M with PMHx PVD (eliquis) hx iliac, T2DM, HTN, HLD presents with 1 day history of Dysuria found to be septic likely in the setting of UTI vs prostatitis with incidental finding of moderate pericardial effusion with stable vital signs, pending TTE.    #Acute UTI  #Sepsis  UA positive, dysuria, polyuria and sepsis. Possible bacterial prostatitis. Pelvic inflammation on CT  -s/p 1L NS in ED, additional 500cc ordered  -vanc (given recent procedure) for now pending culture results  -zosyn  -f/u BCx x2 and UCx. May transition to levaquin pending cultures    #Pericardial effusion  Moderate pericardial effusion without signs of tamponade and stable VS. No hypotension  -hold anti-HTNs for now as septic and normotensive  -cards consulted, echo ordered   -tele    #Elevated troponin  trop 65--72. No CP and EKG nonischemic. not likely ACS 2/2 acute plaque rupture but rather type 2 demand ischemia  -patient will need ischemic eval when more clinically stable with cardiology at least as outpatient  -echo  -tele  -trend troponin to peak  -cards consulted  -CKMB normal x2    #DM2  Used to take lantus 24u qhs and discontinued 1 week ago? Unclear reasoning but states he was told to do so. FSG 200s here. AG 13--15, BHB 0. Not in DKA  -will give lantus 16u tonight and continue 16u qhs, uptitrate as needed  -ISS  -A1C 7.0    #PVD  Will need to reach out to outpatient GI in AM if ok to resume eliquis as it has only been 3 days since hemorrhoid banding procedure  -resume cilostazol for now    #HTN  Hold losartan and atenolol as /55 in setting of sepsis    #HLD  Resume statin

## 2023-10-22 NOTE — ED ADULT NURSE NOTE - OBJECTIVE STATEMENT
patient alert and oriented x3 ambulatory with a cane arrives to the ER s/p hemorrhoid banding surgery 3 days ago. patient states since this morning, he has had urinary retention. states he constantly feels like he has to urinate but then when he tries, not a lot comes out. patient is well appearing at this time, no acute distress noted. 20G IV placed in R forearm, labs sent. patient pending results at this time. patient placed on cardiac monitor.

## 2023-10-22 NOTE — ED ADULT NURSE NOTE - CHIEF COMPLAINT QUOTE
pt sent to ED by primary care doctor, pt s/p hemorroid banding surgery 3 days ago.  since this morning, pt has had urinary retention.  Hx:  DM, HTN, HLD, PVD, [place]

## 2023-10-22 NOTE — H&P ADULT - PROBLEM SELECTOR PLAN 7
Diet: DASH Diet  Psych/Sleep: None DEDRA  GI: None DEDRA  DVT ppx: Eliquis  Code Status:   Dispo: Diet: DASH Diet  Psych/Sleep: None DEDRA  GI: None DEDRA  DVT ppx: SCD  Code Status: Pending Further Discussion with family  Dispo: C/W Atorvastatin 20 qd

## 2023-10-22 NOTE — ED PROVIDER NOTE - ATTENDING CONTRIBUTION TO CARE
68M p/w unable to urinate x 1 day.  Pt had hemorrhoid banding surgery 3 days ago.  Pt had fever at home 102.  Poor PO intake, pt was rx'ed amoxicillin TID but didn't take it yesterday due to sleeping all day.  Pt has had BM since procedure, reports it was brown.  PMD advised pt to come to ED.  PMHX DM HTN HLD PVD [place].  PSHX vascular surgery including stent RLE.  VS fever tachycardia.  Initially c/f urinary retention.  Bladder US PVR was 0.  Likely infection and volume depletion.  Rx fluids.  Pt febrile and had mild R side hydro - check CT eval for kidney stone.  Admit.    Update - pt found to have moderate PCE.  No clinical sign of tamponade aside from persistent tachycardia however pt developed fever to nearly 103.  BP adequate, no SOB or chest pain.  Cards eval - no indication for emergent echo or pericardiocentesis.    VS:  fever tachycardia    GEN - malaise, mild distress lower abd discomfort;   A+O x3   HEAD - NC/AT     ENT - PEERL, EOMI, mucous membranes   dry , no discharge      NECK: Neck supple, non-tender without lymphadenopathy, no masses, no JVD  PULM - CTA b/l,  symmetric breath sounds  COR -  normal heart sounds    ABD - , ND, lower abd ttp, soft,  Rectum - chap Dr O - normal.  No bleeding or induration or tenderness.  EDDY deferred due to recent procedure.    BACK - no CVA tenderness, nontender spine     EXTREMS - no edema, no deformity, warm and well perfused    SKIN - no rash    or bruising      NEUROLOGIC - alert, face symmetric, speech fluent, sensation nl, motor no focal deficit.

## 2023-10-23 DIAGNOSIS — N39.0 URINARY TRACT INFECTION, SITE NOT SPECIFIED: ICD-10-CM

## 2023-10-23 LAB
A1C WITH ESTIMATED AVERAGE GLUCOSE RESULT: 7 % — HIGH (ref 4–5.6)
A1C WITH ESTIMATED AVERAGE GLUCOSE RESULT: 7 % — HIGH (ref 4–5.6)
ALBUMIN SERPL ELPH-MCNC: 3.8 G/DL — SIGNIFICANT CHANGE UP (ref 3.3–5)
ALBUMIN SERPL ELPH-MCNC: 3.8 G/DL — SIGNIFICANT CHANGE UP (ref 3.3–5)
ALP SERPL-CCNC: 103 U/L — SIGNIFICANT CHANGE UP (ref 40–120)
ALP SERPL-CCNC: 103 U/L — SIGNIFICANT CHANGE UP (ref 40–120)
ALT FLD-CCNC: 10 U/L — SIGNIFICANT CHANGE UP (ref 4–41)
ALT FLD-CCNC: 10 U/L — SIGNIFICANT CHANGE UP (ref 4–41)
ANION GAP SERPL CALC-SCNC: 12 MMOL/L — SIGNIFICANT CHANGE UP (ref 7–14)
ANION GAP SERPL CALC-SCNC: 12 MMOL/L — SIGNIFICANT CHANGE UP (ref 7–14)
AST SERPL-CCNC: 14 U/L — SIGNIFICANT CHANGE UP (ref 4–40)
AST SERPL-CCNC: 14 U/L — SIGNIFICANT CHANGE UP (ref 4–40)
B-OH-BUTYR SERPL-SCNC: <0 MMOL/L — SIGNIFICANT CHANGE UP (ref 0–0.4)
B-OH-BUTYR SERPL-SCNC: <0 MMOL/L — SIGNIFICANT CHANGE UP (ref 0–0.4)
BILIRUB SERPL-MCNC: 0.5 MG/DL — SIGNIFICANT CHANGE UP (ref 0.2–1.2)
BILIRUB SERPL-MCNC: 0.5 MG/DL — SIGNIFICANT CHANGE UP (ref 0.2–1.2)
BUN SERPL-MCNC: 12 MG/DL — SIGNIFICANT CHANGE UP (ref 7–23)
BUN SERPL-MCNC: 12 MG/DL — SIGNIFICANT CHANGE UP (ref 7–23)
CALCIUM SERPL-MCNC: 9 MG/DL — SIGNIFICANT CHANGE UP (ref 8.4–10.5)
CALCIUM SERPL-MCNC: 9 MG/DL — SIGNIFICANT CHANGE UP (ref 8.4–10.5)
CHLORIDE SERPL-SCNC: 99 MMOL/L — SIGNIFICANT CHANGE UP (ref 98–107)
CHLORIDE SERPL-SCNC: 99 MMOL/L — SIGNIFICANT CHANGE UP (ref 98–107)
CO2 SERPL-SCNC: 21 MMOL/L — LOW (ref 22–31)
CO2 SERPL-SCNC: 21 MMOL/L — LOW (ref 22–31)
CREAT SERPL-MCNC: 1.06 MG/DL — SIGNIFICANT CHANGE UP (ref 0.5–1.3)
CREAT SERPL-MCNC: 1.06 MG/DL — SIGNIFICANT CHANGE UP (ref 0.5–1.3)
EGFR: 76 ML/MIN/1.73M2 — SIGNIFICANT CHANGE UP
EGFR: 76 ML/MIN/1.73M2 — SIGNIFICANT CHANGE UP
ESTIMATED AVERAGE GLUCOSE: 154 — SIGNIFICANT CHANGE UP
ESTIMATED AVERAGE GLUCOSE: 154 — SIGNIFICANT CHANGE UP
GLUCOSE BLDC GLUCOMTR-MCNC: 179 MG/DL — HIGH (ref 70–99)
GLUCOSE BLDC GLUCOMTR-MCNC: 179 MG/DL — HIGH (ref 70–99)
GLUCOSE BLDC GLUCOMTR-MCNC: 199 MG/DL — HIGH (ref 70–99)
GLUCOSE BLDC GLUCOMTR-MCNC: 199 MG/DL — HIGH (ref 70–99)
GLUCOSE BLDC GLUCOMTR-MCNC: 228 MG/DL — HIGH (ref 70–99)
GLUCOSE BLDC GLUCOMTR-MCNC: 228 MG/DL — HIGH (ref 70–99)
GLUCOSE BLDC GLUCOMTR-MCNC: 245 MG/DL — HIGH (ref 70–99)
GLUCOSE BLDC GLUCOMTR-MCNC: 245 MG/DL — HIGH (ref 70–99)
GLUCOSE BLDC GLUCOMTR-MCNC: 273 MG/DL — HIGH (ref 70–99)
GLUCOSE BLDC GLUCOMTR-MCNC: 273 MG/DL — HIGH (ref 70–99)
GLUCOSE SERPL-MCNC: 253 MG/DL — HIGH (ref 70–99)
GLUCOSE SERPL-MCNC: 253 MG/DL — HIGH (ref 70–99)
HCT VFR BLD CALC: 27 % — LOW (ref 39–50)
HCT VFR BLD CALC: 27 % — LOW (ref 39–50)
HGB BLD-MCNC: 8.2 G/DL — LOW (ref 13–17)
HGB BLD-MCNC: 8.2 G/DL — LOW (ref 13–17)
MAGNESIUM SERPL-MCNC: 1.9 MG/DL — SIGNIFICANT CHANGE UP (ref 1.6–2.6)
MAGNESIUM SERPL-MCNC: 1.9 MG/DL — SIGNIFICANT CHANGE UP (ref 1.6–2.6)
MCHC RBC-ENTMCNC: 22.2 PG — LOW (ref 27–34)
MCHC RBC-ENTMCNC: 22.2 PG — LOW (ref 27–34)
MCHC RBC-ENTMCNC: 30.4 GM/DL — LOW (ref 32–36)
MCHC RBC-ENTMCNC: 30.4 GM/DL — LOW (ref 32–36)
MCV RBC AUTO: 73 FL — LOW (ref 80–100)
MCV RBC AUTO: 73 FL — LOW (ref 80–100)
NRBC # BLD: 0 /100 WBCS — SIGNIFICANT CHANGE UP (ref 0–0)
NRBC # BLD: 0 /100 WBCS — SIGNIFICANT CHANGE UP (ref 0–0)
NRBC # FLD: 0 K/UL — SIGNIFICANT CHANGE UP (ref 0–0)
NRBC # FLD: 0 K/UL — SIGNIFICANT CHANGE UP (ref 0–0)
OSMOLALITY UR: 351 MOSM/KG — SIGNIFICANT CHANGE UP (ref 50–1200)
OSMOLALITY UR: 351 MOSM/KG — SIGNIFICANT CHANGE UP (ref 50–1200)
PHOSPHATE SERPL-MCNC: 1.9 MG/DL — LOW (ref 2.5–4.5)
PHOSPHATE SERPL-MCNC: 1.9 MG/DL — LOW (ref 2.5–4.5)
PLATELET # BLD AUTO: 273 K/UL — SIGNIFICANT CHANGE UP (ref 150–400)
PLATELET # BLD AUTO: 273 K/UL — SIGNIFICANT CHANGE UP (ref 150–400)
POTASSIUM SERPL-MCNC: 3.6 MMOL/L — SIGNIFICANT CHANGE UP (ref 3.5–5.3)
POTASSIUM SERPL-MCNC: 3.6 MMOL/L — SIGNIFICANT CHANGE UP (ref 3.5–5.3)
POTASSIUM SERPL-SCNC: 3.6 MMOL/L — SIGNIFICANT CHANGE UP (ref 3.5–5.3)
POTASSIUM SERPL-SCNC: 3.6 MMOL/L — SIGNIFICANT CHANGE UP (ref 3.5–5.3)
PROT SERPL-MCNC: 7.1 G/DL — SIGNIFICANT CHANGE UP (ref 6–8.3)
PROT SERPL-MCNC: 7.1 G/DL — SIGNIFICANT CHANGE UP (ref 6–8.3)
RBC # BLD: 3.7 M/UL — LOW (ref 4.2–5.8)
RBC # BLD: 3.7 M/UL — LOW (ref 4.2–5.8)
RBC # FLD: 19.8 % — HIGH (ref 10.3–14.5)
RBC # FLD: 19.8 % — HIGH (ref 10.3–14.5)
SODIUM SERPL-SCNC: 132 MMOL/L — LOW (ref 135–145)
SODIUM SERPL-SCNC: 132 MMOL/L — LOW (ref 135–145)
SODIUM UR-SCNC: 30 MMOL/L — SIGNIFICANT CHANGE UP
SODIUM UR-SCNC: 30 MMOL/L — SIGNIFICANT CHANGE UP
WBC # BLD: 20.02 K/UL — HIGH (ref 3.8–10.5)
WBC # BLD: 20.02 K/UL — HIGH (ref 3.8–10.5)
WBC # FLD AUTO: 20.02 K/UL — HIGH (ref 3.8–10.5)
WBC # FLD AUTO: 20.02 K/UL — HIGH (ref 3.8–10.5)

## 2023-10-23 PROCEDURE — 99222 1ST HOSP IP/OBS MODERATE 55: CPT | Mod: GC

## 2023-10-23 PROCEDURE — 99232 SBSQ HOSP IP/OBS MODERATE 35: CPT

## 2023-10-23 PROCEDURE — 93306 TTE W/DOPPLER COMPLETE: CPT | Mod: 26

## 2023-10-23 RX ORDER — INFLUENZA VIRUS VACCINE 15; 15; 15; 15 UG/.5ML; UG/.5ML; UG/.5ML; UG/.5ML
0.7 SUSPENSION INTRAMUSCULAR ONCE
Refills: 0 | Status: DISCONTINUED | OUTPATIENT
Start: 2023-10-23 | End: 2023-10-30

## 2023-10-23 RX ORDER — SODIUM CHLORIDE 9 MG/ML
1000 INJECTION INTRAMUSCULAR; INTRAVENOUS; SUBCUTANEOUS
Refills: 0 | Status: DISCONTINUED | OUTPATIENT
Start: 2023-10-23 | End: 2023-10-24

## 2023-10-23 RX ORDER — ATORVASTATIN CALCIUM 80 MG/1
20 TABLET, FILM COATED ORAL AT BEDTIME
Refills: 0 | Status: DISCONTINUED | OUTPATIENT
Start: 2023-10-23 | End: 2023-10-30

## 2023-10-23 RX ORDER — VANCOMYCIN HCL 1 G
1000 VIAL (EA) INTRAVENOUS EVERY 12 HOURS
Refills: 0 | Status: DISCONTINUED | OUTPATIENT
Start: 2023-10-23 | End: 2023-10-23

## 2023-10-23 RX ORDER — PIPERACILLIN AND TAZOBACTAM 4; .5 G/20ML; G/20ML
3.38 INJECTION, POWDER, LYOPHILIZED, FOR SOLUTION INTRAVENOUS EVERY 8 HOURS
Refills: 0 | Status: DISCONTINUED | OUTPATIENT
Start: 2023-10-23 | End: 2023-10-25

## 2023-10-23 RX ORDER — INSULIN GLARGINE 100 [IU]/ML
16 INJECTION, SOLUTION SUBCUTANEOUS AT BEDTIME
Refills: 0 | Status: DISCONTINUED | OUTPATIENT
Start: 2023-10-23 | End: 2023-10-30

## 2023-10-23 RX ORDER — SODIUM,POTASSIUM PHOSPHATES 278-250MG
1 POWDER IN PACKET (EA) ORAL ONCE
Refills: 0 | Status: COMPLETED | OUTPATIENT
Start: 2023-10-23 | End: 2023-10-23

## 2023-10-23 RX ORDER — CILOSTAZOL 100 MG/1
50 TABLET ORAL
Refills: 0 | Status: DISCONTINUED | OUTPATIENT
Start: 2023-10-23 | End: 2023-10-30

## 2023-10-23 RX ORDER — ACETAMINOPHEN 500 MG
650 TABLET ORAL ONCE
Refills: 0 | Status: COMPLETED | OUTPATIENT
Start: 2023-10-23 | End: 2023-10-23

## 2023-10-23 RX ORDER — SODIUM,POTASSIUM PHOSPHATES 278-250MG
1 POWDER IN PACKET (EA) ORAL
Refills: 0 | Status: DISCONTINUED | OUTPATIENT
Start: 2023-10-23 | End: 2023-10-24

## 2023-10-23 RX ORDER — PREGABALIN 225 MG/1
1000 CAPSULE ORAL DAILY
Refills: 0 | Status: DISCONTINUED | OUTPATIENT
Start: 2023-10-23 | End: 2023-10-30

## 2023-10-23 RX ADMIN — SODIUM CHLORIDE 100 MILLILITER(S): 9 INJECTION INTRAMUSCULAR; INTRAVENOUS; SUBCUTANEOUS at 01:54

## 2023-10-23 RX ADMIN — ATORVASTATIN CALCIUM 20 MILLIGRAM(S): 80 TABLET, FILM COATED ORAL at 22:25

## 2023-10-23 RX ADMIN — Medication 1 PACKET(S): at 09:14

## 2023-10-23 RX ADMIN — Medication 1: at 17:34

## 2023-10-23 RX ADMIN — Medication 2: at 09:15

## 2023-10-23 RX ADMIN — Medication 1: at 12:59

## 2023-10-23 RX ADMIN — CILOSTAZOL 50 MILLIGRAM(S): 100 TABLET ORAL at 05:35

## 2023-10-23 RX ADMIN — CILOSTAZOL 50 MILLIGRAM(S): 100 TABLET ORAL at 17:34

## 2023-10-23 RX ADMIN — Medication 3 MILLIGRAM(S): at 22:25

## 2023-10-23 RX ADMIN — Medication 250 MILLIGRAM(S): at 03:47

## 2023-10-23 RX ADMIN — INSULIN GLARGINE 16 UNIT(S): 100 INJECTION, SOLUTION SUBCUTANEOUS at 22:26

## 2023-10-23 RX ADMIN — PIPERACILLIN AND TAZOBACTAM 62.5 GRAM(S): 4; .5 INJECTION, POWDER, LYOPHILIZED, FOR SOLUTION INTRAVENOUS at 15:35

## 2023-10-23 RX ADMIN — Medication 650 MILLIGRAM(S): at 22:26

## 2023-10-23 RX ADMIN — PREGABALIN 1000 MICROGRAM(S): 225 CAPSULE ORAL at 11:59

## 2023-10-23 RX ADMIN — PIPERACILLIN AND TAZOBACTAM 25 GRAM(S): 4; .5 INJECTION, POWDER, LYOPHILIZED, FOR SOLUTION INTRAVENOUS at 05:34

## 2023-10-23 RX ADMIN — PIPERACILLIN AND TAZOBACTAM 25 GRAM(S): 4; .5 INJECTION, POWDER, LYOPHILIZED, FOR SOLUTION INTRAVENOUS at 22:51

## 2023-10-23 RX ADMIN — Medication 650 MILLIGRAM(S): at 18:08

## 2023-10-23 RX ADMIN — INSULIN GLARGINE 16 UNIT(S): 100 INJECTION, SOLUTION SUBCUTANEOUS at 04:22

## 2023-10-23 RX ADMIN — Medication 1 TABLET(S): at 17:35

## 2023-10-23 RX ADMIN — Medication 650 MILLIGRAM(S): at 23:26

## 2023-10-23 NOTE — CONSULT NOTE ADULT - ASSESSMENT
68y Male with history of HTN presents with dysuria. Nephrology consulted for hyponatremia.    1) Hyponatremia: likely due to SIADH in setting of infection. Check hyponatremia work up. In interim, start 1L FR and Glucerna with meals. Monitor serum Na.    2) HTN: BP controlled. Monitor off medications.    3) Acute cystitis without hematuria: On empiric Zosyn. Change abx to NS base given hyponatremia. ID and Urology consulted with concerns for prostatitis.     4) Pericardial effusion: Pending TTE. Cardiology following.      [facility] NEPHROLOGY  Hardik Oliveira M.D.  Joaquin Bauman D.O.  Svitlana Deutsch M.D.  MD Yvette Bales, MSN, ANP-C    Telephone: [phone]  Facsimile: [phone] [address] [place] [address], [address]  [place] [id number]

## 2023-10-23 NOTE — CONSULT NOTE ADULT - ASSESSMENT
WORK UP:      ANTIBIOTIC:      DIAGNOSIS and IMPRESSION:        RECOMMENDATIONS:        Above recommendations are Preliminary until Attending's Addendum which includes Final Recommendations      Gus Garcia DO, PGY-5   ID fellow  [employer] Preferred  After 5pm/weekends call [phone]   68M with T2DM, HTN/HLD, PVD on eliquis, presents 10/22 with 1 day of dysuria and urinary frequency, found to be febrile 102F, WBC 18, UA grossly positive, CTAP without contrast showing concerns for cystitis or proctitis, with incidental finding of moderate pericardial effusion ID consulted for assistance.    Patient recently had hemorrhoid banding on Thursday by Dr. Ian Bearden ([phone]) at GI clinic office and went home the same day.   He reports he was given a PPI/antacid medication, a 3 day course of amoxicillin, and some stool softener.   At home, developed new suprapubic pain. He went to the bathroom much more frequently and endorsed a burning pain whenever he urinated and his daughter took his temperature which came back at 102 and they called his surgeon who told him to go to the ED.   He denies CP, SOB, palpitations, LH, dizziness, LOC, abdominal pain, vomiting, diarrhea.  Does not think he had melendez in place    ANTIBIOTIC:  s/p Vancomycin and Zosyn x1    DIAGNOSIS and IMPRESSION:  #Fever, Leukocytosis  #UTI  #Pericardial Effusion    - symptoms and history appears to be in line with urinary tract infection  - unclear if related to recent hemorrhoid band surgery though does not think he had melendez placed  - unclear etiology of Pericardial effusion, Cardio following    RECOMMENDATIONS:  - discontinue vancomycin  - continue zosyn for now  - monitor temperature curve  - trend WBC  - follow up BCx x2 and UCx  - follow up Cardiology  - would obtain screening RVP      Above recommendations are Preliminary until Attending's Addendum which includes Final Recommendations      Gus Garcia DO, PGY-5   ID fellow  [employer] Teams Preferred  After 5pm/weekends call [phone]   68M with T2DM, HTN/HLD, PVD on eliquis, presents 10/22 with 1 day of dysuria and urinary frequency, found to be febrile 102F, WBC 18, UA grossly positive, CTAP without contrast showing concerns for cystitis or proctitis, with incidental finding of moderate pericardial effusion ID consulted for assistance.    Patient recently had hemorrhoid banding on Thursday by Dr. Ian Bearden ([phone]) at GI clinic office and went home the same day.   He reports he was given a PPI/antacid medication, a 3 day course of amoxicillin, and some stool softener.   At home, developed new suprapubic pain. He went to the bathroom much more frequently and endorsed a burning pain whenever he urinated and his daughter took his temperature which came back at 102 and they called his surgeon who told him to go to the ED.   He also reported intermittent urinary retention, and was worse on Saturday  He denies CP, SOB, palpitations, LH, dizziness, LOC, abdominal pain, vomiting, diarrhea.  Does not think he had melendez in place    ANTIBIOTIC:  s/p Vancomycin and Zosyn x1    DIAGNOSIS and IMPRESSION:  #Fever, Leukocytosis  #UTI  #Pericardial Effusion    - symptoms and history appears to be in line with urinary tract infection  - unclear if related to recent hemorrhoid band surgery though does not think he had melendez placed, possible due to ?urinary retention  - unclear etiology of Pericardial effusion, Cardio following    RECOMMENDATIONS:  - discontinue vancomycin  - continue zosyn for now  - monitor temperature curve  - trend WBC  - follow up BCx x2 and UCx  - follow up Cardiology  - would obtain screening RVP      Above recommendations are Preliminary until Attending's Addendum which includes Final Recommendations      Gus Garcia DO, PGY-5   ID fellow  [employer] Preferred  After 5pm/weekends call [phone]   68M with T2DM, HTN/HLD, PVD on eliquis, presents 10/22 with 1 day of dysuria and urinary frequency, found to be febrile 102F, WBC 18, UA grossly positive, CTAP without contrast showing concerns for cystitis or proctitis, with incidental finding of moderate pericardial effusion ID consulted for assistance.    Patient recently had hemorrhoid banding on Thursday by Dr. Ian Bearden ([phone]) at GI clinic office and went home the same day.   He reports he was given a PPI/antacid medication, a 3 day course of amoxicillin, and some stool softener.   At home, developed new suprapubic pain. He went to the bathroom much more frequently and endorsed a burning pain whenever he urinated and his daughter took his temperature which came back at 102 and they called his surgeon who told him to go to the ED.   He also reported intermittent urinary retention, and was worse on Saturday  He denies CP, SOB, palpitations, LH, dizziness, LOC, abdominal pain, vomiting, diarrhea.  Does not think he had melendez in place    ANTIBIOTIC:  s/p Vancomycin and Zosyn x1    DIAGNOSIS and IMPRESSION:  #Fever, Leukocytosis  #UTI  #Pericardial Effusion    - symptoms and history appears to be in line with urinary tract infection  - unclear if related to recent hemorrhoid band surgery though does not think he had melendez placed, possible due to ?urinary retention  - unclear etiology of Pericardial effusion, Cardio following    RECOMMENDATIONS:  - discontinue vancomycin  - continue zosyn for now  - monitor temperature curve  - trend WBC  - follow up BCx x2 and UCx  - follow up Cardiology    Case d/w attending and primary team      Gus Garcia DO, PGY-5   ID fellow  [employer] Teams Preferred  After 5pm/weekends call [phone]

## 2023-10-23 NOTE — PATIENT PROFILE ADULT - VISION (WITH CORRECTIVE LENSES IF THE PATIENT USUALLY WEARS THEM):
Verified Results  COMP METABOLIC PANEL WITH CBCA, LIPID PANEL AND TSH (CMP,CBCA,LIPFA,TSH) [id number] 08:57AM ARLINE AVILA   [Oni 3, 2018 10:06AM ARLINE AVILA]  Vitamin D level is much better than previously but is still a little low so patient should continue taking whenever vitamin D supplement she has been taking. Blood sugar was 116 which is elevated. Patient should be on a low sugar diet and try to lose weight. Other labs were essentially normal.     Test Name Result Flag Reference   SODIUM 144 mmol/L  135-145   POTASSIUM 4.3 mmol/L  3.4-5.1   CHLORIDE 107 mmol/L     CARBON DIOXIDE 28 mmol/L  21-32   ANION GAP 13 mmol/L  10-20   GLUCOSE 116 mg/dl H 65-99   BUN 16 mg/dl  6-20   CREATININE 0.71 mg/dl  0.51-0.95   GFR EST.AFRICAN AMER >90     eGFR results = or >90 mL/min/1.73m2 = Normal kidney function.   GFR EST.NONAFRI AMER >90     eGFR results = or >90 mL/min/1.73m2 = Normal kidney function.   BUN/CREATININE RATIO 23  7-25   BILIRUBIN TOTAL 0.3 mg/dl  0.2-1.0   GOT/AST 17 Units/L  <38   ALKALINE PHOSPHATASE 101 Units/L     ALBUMIN 3.5 g/dl L 3.6-5.1   TOTAL PROTEIN 6.7 g/dl  6.4-8.2   GLOBULIN (CALCULATED) 3.2 g/dl  2.0-4.0   A/G RATIO 1.1  1.0-2.4   CALCIUM 8.9 mg/dl  8.4-10.2   GPT/ALT 21 Units/L  <79   FASTING STATUS 12 hrs     CHOLESTEROL 213 mg/dl H <200   Desirable            <200  Borderline High      200 to 239  High                 >=240   HDL CHOLESTEROL 72 mg/dl  >49   Low            <40  Borderline Low 40 to 49  Near Optimal   50 to 59  Optimal        >=60   TRIGLYCERIDES 135 mg/dl  <150   Normal                   <150  Borderline High          150 to 199  High                     200 to 499  Very High                >=500   LDL CHOLESTEROL (CALCULATED) 114 mg/dl  <130   OPTIMAL               <100  NEAR OPTIMAL          100-129  BORDERLINE HIGH       130-159  HIGH                  160-189  VERY HIGH             >=190   NON-HDL CHOLESTEROL 141 mg/dl     Therapeutic Target:  CHD and risk  equivalents <130  Multiple risk factors    <160  0 to 1 risk factors      <190   CHOLESTEROL/HDL RATIO 3.0  <4.5   TSH 1.532 mcUnits/mL  0.350-5.000   FASTING STATUS 12 hrs       VITAMIN D,25 HYDROXY [id number] 08:57AM ARLINE AVILA   [Oni 3, 2018 10:06AM MARGARITA ARLINE]  Vitamin D level is much better than previously but is still a little low so patient should continue taking whenever vitamin D supplement she has been taking. Blood sugar was 116 which is elevated. Patient should be on a low sugar diet and try to lose weight. Other labs were essentially normal.     Test Name Result Flag Reference   VIT D,25 HYDROXY 23.4 ng/ml L 30.0-100.0   <20  ng/mL=Vitamin D deficiency  20-29  ng/mL=Vitamin D insufficiency   ng/mL=Optimal Vitamin D  >150 ng/mL=Possible toxicity     CBC WITH AUTOMATED DIFFERENTIAL [id number] 08:57AM ARLINE AVILA   [Oni 3, 2018 10:06AM ARLINE AVILA]  Vitamin D level is much better than previously but is still a little low so patient should continue taking whenever vitamin D supplement she has been taking. Blood sugar was 116 which is elevated. Patient should be on a low sugar diet and try to lose weight. Other labs were essentially normal.     Test Name Result Flag Reference   WHITE BLOOD COUNT 6.0 K/mcL  4.2-11.0   RED CELL COUNT 4.49 mil/mcL  4.00-5.20   HEMOGLOBIN 14.1 g/dl  12.0-15.5   HEMATOCRIT 42.8 %  36.0-46.5   MEAN CORPUSCULAR VOLUME 95.3 fL  78.0-100.0   MEAN CORPUSCULAR HEMOGLOBIN 31.4 pg  26.0-34.0   MEAN CORPUSCULAR HGB CONC 32.9 g/dl  32.0-36.5   RDW-CV 12.7 %  11.0-15.0   PLATELET COUNT 182 K/mcL  140-450   AYSE% 59 %     LYM% 26 %     MON% 11 %     EOS% 4 %     BASO% 0 %     AYSE ABS 3.6 K/mcL  1.8-7.7   LYM ABS 1.6 K/mcL  1.0-4.0   MON ABS 0.6 K/mcL  0.3-0.9   EOS ABS 0.2 K/mcL  0.1-0.5   BASO ABS 0.0 K/mcL  0.0-0.3   DIFF TYPE      AUTOMATED DIFFERENTIAL       Message   Vitamin D level is much better than previously but is still a little low so patient should continue taking  whenever vitamin D supplement she has been taking.  Blood sugar was 116 which is elevated.  Patient should be on a low sugar diet and try to lose weight.  Other labs were essentially normal.      Normal vision: sees adequately in most situations; can see medication labels, newsprint

## 2023-10-23 NOTE — CONSULT NOTE ADULT - SUBJECTIVE AND OBJECTIVE BOX
Patient is a 68y old  Male who presents with a chief complaint of Sepsis, Mod Pericardial Effusion (23 Oct 2023 11:38)    HPI:  68M with T2DM, HTN/HLD, PVD on eliquis, presents 10/22 with 1 day of dysuria and urinary frequency, found to be febrile 102F, WBC 18, UA grossly positive, CTAP without contrast showing concerns for cystitis or proctitis, with incidental finding of moderate pericardial effusion ID consulted for assistance.    Patient recently had hemorrhoid banding on Thursday by Dr. Ian Bearden ([phone]) and went home the same day. He reports he was given a PPI/antacid medication, a 3 day course of amoxicillin, and some stool softener.   At home, developed new suprapubic pain. He went to the bathroom much more frequently and endorsed a burning pain whenever he urinated and his daughter took his temperature which came back at 102 and they called his surgeon who told him to go to the ED.   He denies CP, SOB, palpitations, LH, dizziness, LOC, abdominal pain, vomiting, diarrhea.    prior hospital charts reviewed [  ]  primary team notes reviewed [  ]  other consultant notes reviewed [  ]    PAST MEDICAL & SURGICAL HISTORY:  HTN (hypertension)      HLD (hyperlipidemia)      DM (diabetes mellitus)      PAD (peripheral artery disease)      Tobacco abuse  Former      H/O iron deficiency      S/P appendectomy      S/P peripheral artery angioplasty with stent placement          Allergies  Advil (Rash)    ANTIMICROBIALS (past 90 days)  MEDICATIONS  (STANDING):  piperacillin/tazobactam IVPB..   25 mL/Hr IV Intermittent (10-23-23 @ 05:34)   25 mL/Hr IV Intermittent (10-22-23 @ 22:18)    piperacillin/tazobactam IVPB...   200 mL/Hr IV Intermittent (10-22-23 @ 13:20)    vancomycin  IVPB   250 mL/Hr IV Intermittent (10-23-23 @ 03:47)        piperacillin/tazobactam IVPB.. 3.375 every 8 hours  vancomycin  IVPB 1000 every 12 hours    MEDICATIONS  (STANDING):  acetaminophen     Tablet .. 650 every 6 hours PRN  atorvastatin 20 at bedtime  cilostazol 50 two times a day  dextrose 50% Injectable 25 once  dextrose 50% Injectable 12.5 once  dextrose 50% Injectable 25 once  dextrose Oral Gel 15 once PRN  glucagon  Injectable 1 once  influenza  Vaccine (HIGH DOSE) 0.7 once  insulin glargine Injectable (LANTUS) 16 at bedtime  insulin lispro (ADMELOG) corrective regimen sliding scale  three times a day before meals  insulin lispro (ADMELOG) corrective regimen sliding scale  at bedtime  melatonin 3 at bedtime PRN    SOCIAL HISTORY:   former smoker and ETOH drinker, denies IVDU    FAMILY HISTORY:  No pertinent family history in first degree relatives      REVIEW OF SYSTEMS  [  ] ROS unobtainable because:    [  ] All other systems negative except as noted below:	    Constitutional:  [ ] fever [ ] chills  [ ] weight loss  [ ] weakness  Skin:  [ ] rash [ ] phlebitis	  Eyes: [ ] icterus [ ] pain  [ ] discharge	  ENMT: [ ] sore throat  [ ] thrush [ ] ulcers [ ] exudates  Respiratory: [ ] dyspnea [ ] hemoptysis [ ] cough [ ] sputum	  Cardiovascular:  [ ] chest pain [ ] palpitations [ ] edema	  Gastrointestinal:  [ ] nausea [ ] vomiting [ ] diarrhea [ ] constipation [ ] pain	  Genitourinary:  [ ] dysuria [ ] frequency [ ] hematuria [ ] discharge [ ] flank pain  [ ] incontinence  Musculoskeletal:  [ ] myalgias [ ] arthralgias [ ] arthritis  [ ] back pain  Neurological:  [ ] headache [ ] seizures  [ ] confusion/altered mental status  Psychiatric:  [ ] anxiety [ ] depression	  Hematology/Lymphatics:  [ ] lymphadenopathy  Endocrine:  [ ] adrenal [ ] thyroid  Allergic/Immunologic:	 [ ] transplant [ ] seasonal    Vital Signs Last 24 Hrs  T(F): 98.7 (10-23-23 @ 10:27), Max: 102.9 (10-22-23 @ 14:26)  Vital Signs Last 24 Hrs  HR: 110 (10-23-23 @ 10:27) (106 - 132)  BP: 126/54 (10-23-23 @ 10:27) (126/54 - 174/69)  RR: 16 (10-23-23 @ 10:27)  SpO2: 99% (10-23-23 @ 10:27) (97% - 100%)  Wt(kg): --    PHYSICAL EXAM:                              8.2    20.02 )-----------( 273      ( 23 Oct 2023 06:00 )             27.0   10-23    132<L>  |  99  |  12  ----------------------------<  253<H>  3.6   |  21<L>  |  1.06    Ca    9.0      23 Oct 2023 06:00  Phos  1.9     10-23  Mg     1.90     10-23    TPro  7.1  /  Alb  3.8  /  TBili  0.5  /  DBili  x   /  AST  14  /  ALT  10  /  AlkPhos  103  10-23    Urinalysis Basic - ( 23 Oct 2023 06:00 )    Color: x / Appearance: x / SG: x / pH: x  Gluc: 253 mg/dL / Ketone: x  / Bili: x / Urobili: x   Blood: x / Protein: x / Nitrite: x   Leuk Esterase: x / RBC: x / WBC x   Sq Epi: x / Non Sq Epi: x / Bacteria: x    MICROBIOLOGY:        RADIOLOGY:  imaging below personally reviewed and agree with findings  < from: CT Abdomen and Pelvis No Cont (10.22.23 @ 13:52) >  IMPRESSION:    Moderate volume pericardial effusion    Bladder wall thickening may represent cystitis versus changes of chronic   bladder outlet obstruction given the enlarged prostate. Correlate with   urinalysis. Lower pelvic inflammation may be due to cystitis versus   prostatitis.    Punctate nonobstructing left upper pole calculi. No hydronephrosis    < end of copied text >  < from: Xray Chest 2 Views PA/Lat (10.22.23 @ 13:48) >  IMPRESSION: Clear lungs.    < end of copied text >   Patient is a 68y old  Male who presents with a chief complaint of Sepsis, Mod Pericardial Effusion (23 Oct 2023 11:38)    HPI:  68M with T2DM, HTN/HLD, PVD on eliquis, presents 10/22 with 1 day of dysuria and urinary frequency, found to be febrile 102F, WBC 18, UA grossly positive, CTAP without contrast showing concerns for cystitis or proctitis, with incidental finding of moderate pericardial effusion ID consulted for assistance.    Patient recently had hemorrhoid banding on Thursday by Dr. Ian Bearden ([phone]) at GI clinic office and went home the same day.   He reports he was given a PPI/antacid medication, a 3 day course of amoxicillin, and some stool softener.   At home, developed new suprapubic pain. He went to the bathroom much more frequently and endorsed a burning pain whenever he urinated and his daughter took his temperature which came back at 102 and they called his surgeon who told him to go to the ED.   He denies CP, SOB, palpitations, LH, dizziness, LOC, abdominal pain, vomiting, diarrhea.  Does not think he had melendez in place    prior hospital charts reviewed [ x ]  primary team notes reviewed [ x ]  other consultant notes reviewed [ x ]    PAST MEDICAL & SURGICAL HISTORY:  HTN (hypertension)      HLD (hyperlipidemia)      DM (diabetes mellitus)      PAD (peripheral artery disease)      Tobacco abuse  Former      H/O iron deficiency      S/P appendectomy      S/P peripheral artery angioplasty with stent placement          Allergies  Advil (Rash)    ANTIMICROBIALS (past 90 days)  MEDICATIONS  (STANDING):  piperacillin/tazobactam IVPB..   25 mL/Hr IV Intermittent (10-23-23 @ 05:34)   25 mL/Hr IV Intermittent (10-22-23 @ 22:18)    piperacillin/tazobactam IVPB...   200 mL/Hr IV Intermittent (10-22-23 @ 13:20)    vancomycin  IVPB   250 mL/Hr IV Intermittent (10-23-23 @ 03:47)        piperacillin/tazobactam IVPB.. 3.375 every 8 hours  vancomycin  IVPB 1000 every 12 hours    MEDICATIONS  (STANDING):  acetaminophen     Tablet .. 650 every 6 hours PRN  atorvastatin 20 at bedtime  cilostazol 50 two times a day  dextrose 50% Injectable 25 once  dextrose 50% Injectable 12.5 once  dextrose 50% Injectable 25 once  dextrose Oral Gel 15 once PRN  glucagon  Injectable 1 once  influenza  Vaccine (HIGH DOSE) 0.7 once  insulin glargine Injectable (LANTUS) 16 at bedtime  insulin lispro (ADMELOG) corrective regimen sliding scale  three times a day before meals  insulin lispro (ADMELOG) corrective regimen sliding scale  at bedtime  melatonin 3 at bedtime PRN    SOCIAL HISTORY:   former smoker and ETOH drinker, denies IVDU    FAMILY HISTORY:  No pertinent family history in first degree relatives      REVIEW OF SYSTEMS  [  ] ROS unobtainable because:    [x  ] All other systems negative except as noted below:	    Constitutional:  [ ] fever [ ] chills  [ ] weight loss  [ ] weakness  Skin:  [ ] rash [ ] phlebitis	  Eyes: [ ] icterus [ ] pain  [ ] discharge	  ENMT: [ ] sore throat  [ ] thrush [ ] ulcers [ ] exudates  Respiratory: [ ] dyspnea [ ] hemoptysis [ ] cough [ ] sputum	  Cardiovascular:  [ ] chest pain [ ] palpitations [ ] edema	  Gastrointestinal:  [ ] nausea [ ] vomiting [ ] diarrhea [ ] constipation [ ] pain	  Genitourinary:  [ ] dysuria [ ] frequency [ ] hematuria [ ] discharge [ ] flank pain  [ ] incontinence  Musculoskeletal:  [ ] myalgias [ ] arthralgias [ ] arthritis  [ ] back pain  Neurological:  [ ] headache [ ] seizures  [ ] confusion/altered mental status  Psychiatric:  [ ] anxiety [ ] depression	  Hematology/Lymphatics:  [ ] lymphadenopathy  Endocrine:  [ ] adrenal [ ] thyroid  Allergic/Immunologic:	 [ ] transplant [ ] seasonal    Vital Signs Last 24 Hrs  T(F): 98.7 (10-23-23 @ 10:27), Max: 102.9 (10-22-23 @ 14:26)  Vital Signs Last 24 Hrs  HR: 110 (10-23-23 @ 10:27) (106 - 132)  BP: 126/54 (10-23-23 @ 10:27) (126/54 - 174/69)  RR: 16 (10-23-23 @ 10:27)  SpO2: 99% (10-23-23 @ 10:27) (97% - 100%)  Wt(kg): --    Physical Exam:  Constitutional:  well preserved, comfortable  Head/Eyes: no icterus  ENT:  supple, no cervical lymphadenopathy   LUNGS:  CTA  CVS:  regular rhythm, no murmur  Abd:  soft, non-tender; non-distended  Ext:  no edema  Vascular:  IV site no erythema tenderness or discharge  MSK:  joints without swelling  Neuro: AAO X 3, non- focal                                8.2    20.02 )-----------( 273      ( 23 Oct 2023 06:00 )             27.0   10-23    132<L>  |  99  |  12  ----------------------------<  253<H>  3.6   |  21<L>  |  1.06    Ca    9.0      23 Oct 2023 06:00  Phos  1.9     10-23  Mg     1.90     10-23    TPro  7.1  /  Alb  3.8  /  TBili  0.5  /  DBili  x   /  AST  14  /  ALT  10  /  AlkPhos  103  10-23    Urinalysis Basic - ( 23 Oct 2023 06:00 )    Color: x / Appearance: x / SG: x / pH: x  Gluc: 253 mg/dL / Ketone: x  / Bili: x / Urobili: x   Blood: x / Protein: x / Nitrite: x   Leuk Esterase: x / RBC: x / WBC x   Sq Epi: x / Non Sq Epi: x / Bacteria: x    MICROBIOLOGY:        RADIOLOGY:  imaging below personally reviewed and agree with findings  < from: CT Abdomen and Pelvis No Cont (10.22.23 @ 13:52) >  IMPRESSION:    Moderate volume pericardial effusion    Bladder wall thickening may represent cystitis versus changes of chronic   bladder outlet obstruction given the enlarged prostate. Correlate with   urinalysis. Lower pelvic inflammation may be due to cystitis versus   prostatitis.    Punctate nonobstructing left upper pole calculi. No hydronephrosis    < end of copied text >  < from: Xray Chest 2 Views PA/Lat (10.22.23 @ 13:48) >  IMPRESSION: Clear lungs.    < end of copied text >

## 2023-10-23 NOTE — CHART NOTE - NSCHARTNOTEFT_GEN_A_CORE
Urology consult requested by attending and nephrology. Urology deferred consult for prostatitis. Attg made aware.

## 2023-10-23 NOTE — PROGRESS NOTE ADULT - SUBJECTIVE AND OBJECTIVE BOX
Patient is a 68y old  Male who presents with a chief complaint of Sepsis, Mod Pericardial Effusion (23 Oct 2023 13:27)    Date of servie : 10-23-23 @ 17:34  INTERVAL HPI/OVERNIGHT EVENTS:  T(C): 37.9 (10-23-23 @ 17:32), Max: 37.9 (10-23-23 @ 17:32)  HR: 120 (10-23-23 @ 17:32) (106 - 120)  BP: 132/52 (10-23-23 @ 17:32) (126/54 - 174/69)  RR: 16 (10-23-23 @ 17:32) (14 - 18)  SpO2: 100% (10-23-23 @ 17:32) (98% - 100%)  Wt(kg): --  I&O's Summary      LABS:                        8.2    20.02 )-----------( 273      ( 23 Oct 2023 06:00 )             27.0     10-23    132<L>  |  99  |  12  ----------------------------<  253<H>  3.6   |  21<L>  |  1.06    Ca    9.0      23 Oct 2023 06:00  Phos  1.9     10-23  Mg     1.90     10-23    TPro  7.1  /  Alb  3.8  /  TBili  0.5  /  DBili  x   /  AST  14  /  ALT  10  /  AlkPhos  103  10-23    PT/INR - ( 22 Oct 2023 11:57 )   PT: 14.2 sec;   INR: 1.28 ratio         PTT - ( 22 Oct 2023 11:57 )  PTT:31.8 sec  Urinalysis Basic - ( 23 Oct 2023 06:00 )    Color: x / Appearance: x / SG: x / pH: x  Gluc: 253 mg/dL / Ketone: x  / Bili: x / Urobili: x   Blood: x / Protein: x / Nitrite: x   Leuk Esterase: x / RBC: x / WBC x   Sq Epi: x / Non Sq Epi: x / Bacteria: x      CAPILLARY BLOOD GLUCOSE      POCT Blood Glucose.: 179 mg/dL (23 Oct 2023 17:26)  POCT Blood Glucose.: 199 mg/dL (23 Oct 2023 12:55)  POCT Blood Glucose.: 245 mg/dL (23 Oct 2023 08:46)  POCT Blood Glucose.: 273 mg/dL (23 Oct 2023 04:20)  POCT Blood Glucose.: 284 mg/dL (22 Oct 2023 22:07)        Urinalysis Basic - ( 23 Oct 2023 06:00 )    Color: x / Appearance: x / SG: x / pH: x  Gluc: 253 mg/dL / Ketone: x  / Bili: x / Urobili: x   Blood: x / Protein: x / Nitrite: x   Leuk Esterase: x / RBC: x / WBC x   Sq Epi: x / Non Sq Epi: x / Bacteria: x        MEDICATIONS  (STANDING):  atorvastatin 20 milliGRAM(s) Oral at bedtime  cilostazol 50 milliGRAM(s) Oral two times a day  cyanocobalamin 1000 MICROGram(s) Oral daily  dextrose 5%. 1000 milliLiter(s) (100 mL/Hr) IV Continuous <Continuous>  dextrose 5%. 1000 milliLiter(s) (50 mL/Hr) IV Continuous <Continuous>  dextrose 50% Injectable 25 Gram(s) IV Push once  dextrose 50% Injectable 25 Gram(s) IV Push once  dextrose 50% Injectable 12.5 Gram(s) IV Push once  glucagon  Injectable 1 milliGRAM(s) IntraMuscular once  influenza  Vaccine (HIGH DOSE) 0.7 milliLiter(s) IntraMuscular once  insulin glargine Injectable (LANTUS) 16 Unit(s) SubCutaneous at bedtime  insulin lispro (ADMELOG) corrective regimen sliding scale   SubCutaneous three times a day before meals  insulin lispro (ADMELOG) corrective regimen sliding scale   SubCutaneous at bedtime  piperacillin/tazobactam IVPB.. 3.375 Gram(s) IV Intermittent every 8 hours  potassium phosphate / sodium phosphate Tablet (K-PHOS No. 2) 1 Tablet(s) Oral three times a day with meals  sodium chloride 0.9%. 1000 milliLiter(s) (100 mL/Hr) IV Continuous <Continuous>  vancomycin  IVPB 1000 milliGRAM(s) IV Intermittent every 12 hours    MEDICATIONS  (PRN):  acetaminophen     Tablet .. 650 milliGRAM(s) Oral every 6 hours PRN Temp greater or equal to 38C (100.4F), Mild Pain (1 - 3)  dextrose Oral Gel 15 Gram(s) Oral once PRN Blood Glucose LESS THAN 70 milliGRAM(s)/deciliter  melatonin 3 milliGRAM(s) Oral at bedtime PRN Insomnia          PHYSICAL EXAM:  GENERAL: NAD, well-groomed, well-developed  HEAD:  Atraumatic, Normocephalic  CHEST/LUNG: Clear to percussion bilaterally; No rales, rhonchi, wheezing, or rubs  HEART: Regular rate and rhythm; No murmurs, rubs, or gallops  ABDOMEN: Soft, Nontender, Nondistended; Bowel sounds present  EXTREMITIES:  2+ Peripheral Pulses, No clubbing, cyanosis, or edema  LYMPH: No lymphadenopathy noted  SKIN: No rashes or lesions    Care Discussed with Consultants/Other Providers [ ] YES  [ ] NO

## 2023-10-23 NOTE — CONSULT NOTE ADULT - SUBJECTIVE AND OBJECTIVE BOX
[facility] NEPHROLOGY- CONSULTATION NOTE    68y Male with history of below presents with dysuria. Nephrology consulted for hyponatremia.    Patient with recent hemorrhoidal banding procedure 3 days PTA with complaints of dysuria since that time. Patient on admission found to have UTI with concerns for prostatitis. Patient also found to have hyponatremia on admission. Patient without any nausea, vomiting or diarrhea. Patient also without any poor solute intake or polydipsia. Patient not on any SSRI, diuretics or anti-seizure medications.    REVIEW OF SYSTEMS:  Gen: no fevers  HEENT: no rhinorrhea  Neck: no sore throat  Cards: no chest pain  Resp: no dyspnea  GI: no nausea or vomiting or diarrhea  : + dysuria with urinary hesitancy  Vascular: no LE edema  Derm: no rashes  Neuro: no numbness/tingling    Advil (Rash)      Home Medications Reviewed  Hospital Medications:   MEDICATIONS  (STANDING):  atorvastatin 20 milliGRAM(s) Oral at bedtime  cilostazol 50 milliGRAM(s) Oral two times a day  cyanocobalamin 1000 MICROGram(s) Oral daily  dextrose 5%. 1000 milliLiter(s) (100 mL/Hr) IV Continuous <Continuous>  dextrose 5%. 1000 milliLiter(s) (50 mL/Hr) IV Continuous <Continuous>  dextrose 50% Injectable 25 Gram(s) IV Push once  dextrose 50% Injectable 25 Gram(s) IV Push once  dextrose 50% Injectable 12.5 Gram(s) IV Push once  glucagon  Injectable 1 milliGRAM(s) IntraMuscular once  influenza  Vaccine (HIGH DOSE) 0.7 milliLiter(s) IntraMuscular once  insulin glargine Injectable (LANTUS) 16 Unit(s) SubCutaneous at bedtime  insulin lispro (ADMELOG) corrective regimen sliding scale   SubCutaneous three times a day before meals  insulin lispro (ADMELOG) corrective regimen sliding scale   SubCutaneous at bedtime  piperacillin/tazobactam IVPB.. 3.375 Gram(s) IV Intermittent every 8 hours  sodium chloride 0.9%. 1000 milliLiter(s) (100 mL/Hr) IV Continuous <Continuous>  vancomycin  IVPB 1000 milliGRAM(s) IV Intermittent every 12 hours      PAST MEDICAL & SURGICAL HISTORY:  HTN (hypertension)      HLD (hyperlipidemia)      DM (diabetes mellitus)      PAD (peripheral artery disease)      Tobacco abuse  Former      H/O iron deficiency      S/P appendectomy      S/P peripheral artery angioplasty with stent placement          FAMILY HISTORY:  No pertinent family history in first degree relatives        SOCIAL HISTORY:  Denies toxic substance use     VITALS:  T(F): 98.7 (10-23-23 @ 10:27), Max: 102.9 (10-22-23 @ 14:26)  HR: 110 (10-23-23 @ 10:27)  BP: 126/54 (10-23-23 @ 10:27)  RR: 16 (10-23-23 @ 10:27)  SpO2: 99% (10-23-23 @ 10:27)  Wt(kg): --      Weight (kg): 63.2 (10-22 @ 23:15)    PHYSICAL EXAM:  Gen: NAD, calm  HEENT: MMM  Neck: no JVD  Cards: RRR, +S1/S2, no M/G/R  Resp: CTA B/L  GI: soft, NT, + ab distention, NABS  : no CVA tenderness  Vascular: no LE edema B/L  Derm: no rashes  Neuro: non-focal    LABS:  10-23    132<L>  |  99  |  12  ----------------------------<  253<H>  3.6   |  21<L>  |  1.06    Ca    9.0      23 Oct 2023 06:00  Phos  1.9     10-23  Mg     1.90     10-23    TPro  7.1  /  Alb  3.8  /  TBili  0.5  /  DBili      /  AST  14  /  ALT  10  /  AlkPhos  103  10-23    Creatinine Trend: 1.06 <--, 1.11 <--, 0.91 <--                        8.2    20.02 )-----------( 273      ( 23 Oct 2023 06:00 )             27.0     Urine Studies:  Urinalysis Basic - ( 23 Oct 2023 06:00 )    Color:  / Appearance:  / SG:  / pH:   Gluc: 253 mg/dL / Ketone:   / Bili:  / Urobili:    Blood:  / Protein:  / Nitrite:    Leuk Esterase:  / RBC:  / WBC    Sq Epi:  / Non Sq Epi:  / Bacteria:           RADIOLOGY & ADDITIONAL STUDIES:  < from: CT Abdomen and Pelvis No Cont (10.22.23 @ 13:52) >  IMPRESSION:    Moderate volume pericardial effusion    Bladder wall thickening may represent cystitis versus changes of chronic   bladder outlet obstruction given the enlarged prostate. Correlate with   urinalysis. Lower pelvic inflammation may be due to cystitis versus   prostatitis.    Punctate nonobstructing left upper pole calculi. No hydronephrosis    < end of copied text >      < from: Xray Chest 2 Views PA/Lat (10.22.23 @ 13:48) >  IMPRESSION: Clear lungs.    --- End of Report ---    < end of copied text >

## 2023-10-23 NOTE — CONSULT NOTE ADULT - ATTENDING COMMENTS
67 yo year old with DM with recent hemorrhoid procedure  He presented with urinary retention with fever  He had dysuria  Associated fever and leukocytosis    Is urine retention a complication of hemorrhoid banding?  I would clarify this with the physician who performed the procedure.     Pyuria with dysuria and fever  Suspect acute cystitis  Doubt pyelonpehritis    can stop vancomycin  Continue zosyn pending culture data      Cardiology evaluation of pericardial

## 2023-10-23 NOTE — PROGRESS NOTE ADULT - SUBJECTIVE AND OBJECTIVE BOX
Cardiology Progress Note  ------------------------------------------------------------------------------------------        SUBJECTIVE:   No events overnight. Denies CP, SOB or Palpitations.   -------------------------------------------------------------------------------------------  ROS:  CV: chest pain (-), palpitation (-), orthopnea (-), PND (-), edema (-)  PULM: SOB (-), cough (-), wheezing (-), hemoptysis (-).   CONST: fever (-), chills (-) or fatigue (-)  GI: abdominal distension (-), abdominal pain (-) , nausea/vomiting (-), hematemesis, (-), melena (-), hematochezia (-)  : dysuria (-), frequency (-), hematuria (-).   NEURO: numbness (-), weakness (-), dizziness (-)  MSK: myalgia (-), joint pain (-)   SKIN: itching (-), rash (-)  HEENT:  visual changes (-); vertigo or throat pain (-);  neck stiffness (-)   Psych: change in mood (-), anxiety (-), depression (-)     All other review of systems is negative unless indicated above.   -------------------------------------------------------------------------------------------  VS:  T(F): 98.7 (10-23), Max: 102.9 (10-22)  HR: 110 (10-23) (106 - 132)  BP: 126/54 (10-23) (126/54 - 174/69)  RR: 16 (10-23)  SpO2: 99% (10-23)  I&O's Summary    ------------------------------------------------------------------------------------------  PHYSICAL EXAM:  GENERAL: NAD  HEAD:  Atraumatic, Normocephalic.  EYES: EOMI, PERRLA, conjunctiva and sclera clear.  ENT: Moist mucous membranes.  NECK: Supple, No JVD.  CHEST/LUNG: Clear to auscultation bilaterally; No rales, rhonchi, wheezing, or rubs. Unlabored respirations.  HEART: Regular rate and rhythm; No murmurs, rubs, or gallops.  ABDOMEN: Bowel sounds present; Soft, Nontender, Nondistended.   EXTREMITIES: No edema. 2+ Peripheral Pulses, brisk capillary refill. No clubbing or cyanosis.   PSYCH: Normal affect.  SKIN: No rashes or lesions.  -------------------------------------------------------------------------------------------  LABS:                          8.2    20.02 )-----------( 273      ( 23 Oct 2023 06:00 )             27.0     10-23    132<L>  |  99  |  12  ----------------------------<  253<H>  3.6   |  21<L>  |  1.06    Ca    9.0      23 Oct 2023 06:00  Phos  1.9     10-23  Mg     1.90     10-23    TPro  7.1  /  Alb  3.8  /  TBili  0.5  /  DBili  x   /  AST  14  /  ALT  10  /  AlkPhos  103  10-23    PT/INR - ( 22 Oct 2023 11:57 )   PT: 14.2 sec;   INR: 1.28 ratio         PTT - ( 22 Oct 2023 11:57 )  PTT:31.8 sec  CARDIAC MARKERS ( 22 Oct 2023 22:52 )  72 ng/L / x     / x     / 111 U/L / x     / 1.9 ng/mL  CARDIAC MARKERS ( 22 Oct 2023 11:57 )  65 ng/L / x     / x     / 113 U/L / x     / 1.9 ng/mL            -------------------------------------------------------------------------------------------  Meds:  acetaminophen     Tablet .. 650 milliGRAM(s) Oral every 6 hours PRN  atorvastatin 20 milliGRAM(s) Oral at bedtime  cilostazol 50 milliGRAM(s) Oral two times a day  cyanocobalamin 1000 MICROGram(s) Oral daily  dextrose 5%. 1000 milliLiter(s) IV Continuous <Continuous>  dextrose 5%. 1000 milliLiter(s) IV Continuous <Continuous>  dextrose 50% Injectable 25 Gram(s) IV Push once  dextrose 50% Injectable 25 Gram(s) IV Push once  dextrose 50% Injectable 12.5 Gram(s) IV Push once  dextrose Oral Gel 15 Gram(s) Oral once PRN  glucagon  Injectable 1 milliGRAM(s) IntraMuscular once  influenza  Vaccine (HIGH DOSE) 0.7 milliLiter(s) IntraMuscular once  insulin glargine Injectable (LANTUS) 16 Unit(s) SubCutaneous at bedtime  insulin lispro (ADMELOG) corrective regimen sliding scale   SubCutaneous three times a day before meals  insulin lispro (ADMELOG) corrective regimen sliding scale   SubCutaneous at bedtime  melatonin 3 milliGRAM(s) Oral at bedtime PRN  piperacillin/tazobactam IVPB.. 3.375 Gram(s) IV Intermittent every 8 hours  sodium chloride 0.9%. 1000 milliLiter(s) IV Continuous <Continuous>  vancomycin  IVPB 1000 milliGRAM(s) IV Intermittent every 12 hours    -------------------------------------------------------------------------------------------  Cardiovascular Diagnostic Testing:    ECG:     Echo:     Stress Testing:    Cath:    Imaging:    CXR:  reviewed  -------------------------------------------------------------------------------------------     Cardiology Progress Note  ------------------------------------------------------------------------------------------        SUBJECTIVE:   No events overnight. Denies CP, SOB or Palpitations.   -------------------------------------------------------------------------------------------  ROS:  CV: chest pain (-), palpitation (-), orthopnea (-), PND (-), edema (-)  PULM: SOB (-), cough (-), wheezing (-), hemoptysis (-).   CONST: fever (-), chills (-) or fatigue (-)  GI: abdominal distension (-), abdominal pain (-) , nausea/vomiting (-), hematemesis, (-), melena (-), hematochezia (-)  : dysuria (-), frequency (-), hematuria (-).   NEURO: numbness (-), weakness (-), dizziness (-)  MSK: myalgia (-), joint pain (-)   SKIN: itching (-), rash (-)  HEENT:  visual changes (-); vertigo or throat pain (-);  neck stiffness (-)   Psych: change in mood (-), anxiety (-), depression (-)     All other review of systems is negative unless indicated above.   -------------------------------------------------------------------------------------------  VS:  T(F): 98.7 (10-23), Max: 102.9 (10-22)  HR: 110 (10-23) (106 - 132)  BP: 126/54 (10-23) (126/54 - 174/69)  RR: 16 (10-23)  SpO2: 99% (10-23)  I&O's Summary    ------------------------------------------------------------------------------------------  PHYSICAL EXAM:  GENERAL: NAD  HEAD:  Atraumatic, Normocephalic.  EYES: EOMI, PERRLA, conjunctiva and sclera clear.  ENT: Moist mucous membranes.  NECK: Supple, No JVD.  CHEST/LUNG: Clear to auscultation bilaterally; No rales, rhonchi, wheezing, or rubs. Unlabored respirations.  HEART: Regular rate and rhythm; No murmurs, rubs, or gallops.  ABDOMEN: Bowel sounds present; Soft, Nontender, Nondistended.   EXTREMITIES: No edema. 2+ Peripheral Pulses, brisk capillary refill. No clubbing or cyanosis.   PSYCH: Normal affect.  SKIN: No rashes or lesions.  -------------------------------------------------------------------------------------------  LABS:                          8.2    20.02 )-----------( 273      ( 23 Oct 2023 06:00 )             27.0     10-23    132<L>  |  99  |  12  ----------------------------<  253<H>  3.6   |  21<L>  |  1.06    Ca    9.0      23 Oct 2023 06:00  Phos  1.9     10-23  Mg     1.90     10-23    TPro  7.1  /  Alb  3.8  /  TBili  0.5  /  DBili  x   /  AST  14  /  ALT  10  /  AlkPhos  103  10-23    PT/INR - ( 22 Oct 2023 11:57 )   PT: 14.2 sec;   INR: 1.28 ratio         PTT - ( 22 Oct 2023 11:57 )  PTT:31.8 sec  CARDIAC MARKERS ( 22 Oct 2023 22:52 )  72 ng/L / x     / x     / 111 U/L / x     / 1.9 ng/mL  CARDIAC MARKERS ( 22 Oct 2023 11:57 )  65 ng/L / x     / x     / 113 U/L / x     / 1.9 ng/mL            -------------------------------------------------------------------------------------------  Meds:  acetaminophen     Tablet .. 650 milliGRAM(s) Oral every 6 hours PRN  atorvastatin 20 milliGRAM(s) Oral at bedtime  cilostazol 50 milliGRAM(s) Oral two times a day  cyanocobalamin 1000 MICROGram(s) Oral daily  dextrose 5%. 1000 milliLiter(s) IV Continuous <Continuous>  dextrose 5%. 1000 milliLiter(s) IV Continuous <Continuous>  dextrose 50% Injectable 25 Gram(s) IV Push once  dextrose 50% Injectable 25 Gram(s) IV Push once  dextrose 50% Injectable 12.5 Gram(s) IV Push once  dextrose Oral Gel 15 Gram(s) Oral once PRN  glucagon  Injectable 1 milliGRAM(s) IntraMuscular once  influenza  Vaccine (HIGH DOSE) 0.7 milliLiter(s) IntraMuscular once  insulin glargine Injectable (LANTUS) 16 Unit(s) SubCutaneous at bedtime  insulin lispro (ADMELOG) corrective regimen sliding scale   SubCutaneous three times a day before meals  insulin lispro (ADMELOG) corrective regimen sliding scale   SubCutaneous at bedtime  melatonin 3 milliGRAM(s) Oral at bedtime PRN  piperacillin/tazobactam IVPB.. 3.375 Gram(s) IV Intermittent every 8 hours  sodium chloride 0.9%. 1000 milliLiter(s) IV Continuous <Continuous>  vancomycin  IVPB 1000 milliGRAM(s) IV Intermittent every 12 hours

## 2023-10-23 NOTE — PATIENT PROFILE ADULT - FALL HARM RISK - HARM RISK INTERVENTIONS

## 2023-10-24 LAB
ALBUMIN SERPL ELPH-MCNC: 3.6 G/DL — SIGNIFICANT CHANGE UP (ref 3.3–5)
ALBUMIN SERPL ELPH-MCNC: 3.6 G/DL — SIGNIFICANT CHANGE UP (ref 3.3–5)
ALP SERPL-CCNC: 95 U/L — SIGNIFICANT CHANGE UP (ref 40–120)
ALP SERPL-CCNC: 95 U/L — SIGNIFICANT CHANGE UP (ref 40–120)
ALT FLD-CCNC: 12 U/L — SIGNIFICANT CHANGE UP (ref 4–41)
ALT FLD-CCNC: 12 U/L — SIGNIFICANT CHANGE UP (ref 4–41)
ANION GAP SERPL CALC-SCNC: 14 MMOL/L — SIGNIFICANT CHANGE UP (ref 7–14)
ANION GAP SERPL CALC-SCNC: 14 MMOL/L — SIGNIFICANT CHANGE UP (ref 7–14)
AST SERPL-CCNC: 15 U/L — SIGNIFICANT CHANGE UP (ref 4–40)
AST SERPL-CCNC: 15 U/L — SIGNIFICANT CHANGE UP (ref 4–40)
BASOPHILS # BLD AUTO: 0.04 K/UL — SIGNIFICANT CHANGE UP (ref 0–0.2)
BASOPHILS # BLD AUTO: 0.04 K/UL — SIGNIFICANT CHANGE UP (ref 0–0.2)
BASOPHILS NFR BLD AUTO: 0.3 % — SIGNIFICANT CHANGE UP (ref 0–2)
BASOPHILS NFR BLD AUTO: 0.3 % — SIGNIFICANT CHANGE UP (ref 0–2)
BILIRUB SERPL-MCNC: 0.4 MG/DL — SIGNIFICANT CHANGE UP (ref 0.2–1.2)
BILIRUB SERPL-MCNC: 0.4 MG/DL — SIGNIFICANT CHANGE UP (ref 0.2–1.2)
BUN SERPL-MCNC: 12 MG/DL — SIGNIFICANT CHANGE UP (ref 7–23)
BUN SERPL-MCNC: 12 MG/DL — SIGNIFICANT CHANGE UP (ref 7–23)
CALCIUM SERPL-MCNC: 9 MG/DL — SIGNIFICANT CHANGE UP (ref 8.4–10.5)
CALCIUM SERPL-MCNC: 9 MG/DL — SIGNIFICANT CHANGE UP (ref 8.4–10.5)
CHLORIDE SERPL-SCNC: 99 MMOL/L — SIGNIFICANT CHANGE UP (ref 98–107)
CHLORIDE SERPL-SCNC: 99 MMOL/L — SIGNIFICANT CHANGE UP (ref 98–107)
CO2 SERPL-SCNC: 21 MMOL/L — LOW (ref 22–31)
CO2 SERPL-SCNC: 21 MMOL/L — LOW (ref 22–31)
CORTIS AM PEAK SERPL-MCNC: 14.4 UG/DL — SIGNIFICANT CHANGE UP (ref 6–18.4)
CORTIS AM PEAK SERPL-MCNC: 14.4 UG/DL — SIGNIFICANT CHANGE UP (ref 6–18.4)
CREAT SERPL-MCNC: 1.06 MG/DL — SIGNIFICANT CHANGE UP (ref 0.5–1.3)
CREAT SERPL-MCNC: 1.06 MG/DL — SIGNIFICANT CHANGE UP (ref 0.5–1.3)
EGFR: 76 ML/MIN/1.73M2 — SIGNIFICANT CHANGE UP
EGFR: 76 ML/MIN/1.73M2 — SIGNIFICANT CHANGE UP
EOSINOPHIL # BLD AUTO: 0.21 K/UL — SIGNIFICANT CHANGE UP (ref 0–0.5)
EOSINOPHIL # BLD AUTO: 0.21 K/UL — SIGNIFICANT CHANGE UP (ref 0–0.5)
EOSINOPHIL NFR BLD AUTO: 1.3 % — SIGNIFICANT CHANGE UP (ref 0–6)
EOSINOPHIL NFR BLD AUTO: 1.3 % — SIGNIFICANT CHANGE UP (ref 0–6)
GLUCOSE BLDC GLUCOMTR-MCNC: 183 MG/DL — HIGH (ref 70–99)
GLUCOSE BLDC GLUCOMTR-MCNC: 183 MG/DL — HIGH (ref 70–99)
GLUCOSE BLDC GLUCOMTR-MCNC: 256 MG/DL — HIGH (ref 70–99)
GLUCOSE BLDC GLUCOMTR-MCNC: 256 MG/DL — HIGH (ref 70–99)
GLUCOSE BLDC GLUCOMTR-MCNC: 288 MG/DL — HIGH (ref 70–99)
GLUCOSE BLDC GLUCOMTR-MCNC: 288 MG/DL — HIGH (ref 70–99)
GLUCOSE BLDC GLUCOMTR-MCNC: 298 MG/DL — HIGH (ref 70–99)
GLUCOSE BLDC GLUCOMTR-MCNC: 298 MG/DL — HIGH (ref 70–99)
GLUCOSE SERPL-MCNC: 192 MG/DL — HIGH (ref 70–99)
GLUCOSE SERPL-MCNC: 192 MG/DL — HIGH (ref 70–99)
HCT VFR BLD CALC: 26.7 % — LOW (ref 39–50)
HCT VFR BLD CALC: 26.7 % — LOW (ref 39–50)
HGB BLD-MCNC: 8.1 G/DL — LOW (ref 13–17)
HGB BLD-MCNC: 8.1 G/DL — LOW (ref 13–17)
IANC: 12.92 K/UL — HIGH (ref 1.8–7.4)
IANC: 12.92 K/UL — HIGH (ref 1.8–7.4)
IMM GRANULOCYTES NFR BLD AUTO: 0.8 % — SIGNIFICANT CHANGE UP (ref 0–0.9)
IMM GRANULOCYTES NFR BLD AUTO: 0.8 % — SIGNIFICANT CHANGE UP (ref 0–0.9)
LYMPHOCYTES # BLD AUTO: 0.95 K/UL — LOW (ref 1–3.3)
LYMPHOCYTES # BLD AUTO: 0.95 K/UL — LOW (ref 1–3.3)
LYMPHOCYTES # BLD AUTO: 6.1 % — LOW (ref 13–44)
LYMPHOCYTES # BLD AUTO: 6.1 % — LOW (ref 13–44)
MAGNESIUM SERPL-MCNC: 2.2 MG/DL — SIGNIFICANT CHANGE UP (ref 1.6–2.6)
MAGNESIUM SERPL-MCNC: 2.2 MG/DL — SIGNIFICANT CHANGE UP (ref 1.6–2.6)
MCHC RBC-ENTMCNC: 21.7 PG — LOW (ref 27–34)
MCHC RBC-ENTMCNC: 21.7 PG — LOW (ref 27–34)
MCHC RBC-ENTMCNC: 30.3 GM/DL — LOW (ref 32–36)
MCHC RBC-ENTMCNC: 30.3 GM/DL — LOW (ref 32–36)
MCV RBC AUTO: 71.6 FL — LOW (ref 80–100)
MCV RBC AUTO: 71.6 FL — LOW (ref 80–100)
MONOCYTES # BLD AUTO: 1.35 K/UL — HIGH (ref 0–0.9)
MONOCYTES # BLD AUTO: 1.35 K/UL — HIGH (ref 0–0.9)
MONOCYTES NFR BLD AUTO: 8.7 % — SIGNIFICANT CHANGE UP (ref 2–14)
MONOCYTES NFR BLD AUTO: 8.7 % — SIGNIFICANT CHANGE UP (ref 2–14)
MRSA PCR RESULT.: SIGNIFICANT CHANGE UP
MRSA PCR RESULT.: SIGNIFICANT CHANGE UP
NEUTROPHILS # BLD AUTO: 12.92 K/UL — HIGH (ref 1.8–7.4)
NEUTROPHILS # BLD AUTO: 12.92 K/UL — HIGH (ref 1.8–7.4)
NEUTROPHILS NFR BLD AUTO: 82.8 % — HIGH (ref 43–77)
NEUTROPHILS NFR BLD AUTO: 82.8 % — HIGH (ref 43–77)
NRBC # BLD: 0 /100 WBCS — SIGNIFICANT CHANGE UP (ref 0–0)
NRBC # BLD: 0 /100 WBCS — SIGNIFICANT CHANGE UP (ref 0–0)
NRBC # FLD: 0 K/UL — SIGNIFICANT CHANGE UP (ref 0–0)
NRBC # FLD: 0 K/UL — SIGNIFICANT CHANGE UP (ref 0–0)
OSMOLALITY SERPL: 291 MOSM/KG — SIGNIFICANT CHANGE UP (ref 275–295)
OSMOLALITY SERPL: 291 MOSM/KG — SIGNIFICANT CHANGE UP (ref 275–295)
PHOSPHATE SERPL-MCNC: 3 MG/DL — SIGNIFICANT CHANGE UP (ref 2.5–4.5)
PHOSPHATE SERPL-MCNC: 3 MG/DL — SIGNIFICANT CHANGE UP (ref 2.5–4.5)
PLATELET # BLD AUTO: 263 K/UL — SIGNIFICANT CHANGE UP (ref 150–400)
PLATELET # BLD AUTO: 263 K/UL — SIGNIFICANT CHANGE UP (ref 150–400)
POTASSIUM SERPL-MCNC: 3.2 MMOL/L — LOW (ref 3.5–5.3)
POTASSIUM SERPL-MCNC: 3.2 MMOL/L — LOW (ref 3.5–5.3)
POTASSIUM SERPL-SCNC: 3.2 MMOL/L — LOW (ref 3.5–5.3)
POTASSIUM SERPL-SCNC: 3.2 MMOL/L — LOW (ref 3.5–5.3)
PROT SERPL-MCNC: 7.1 G/DL — SIGNIFICANT CHANGE UP (ref 6–8.3)
PROT SERPL-MCNC: 7.1 G/DL — SIGNIFICANT CHANGE UP (ref 6–8.3)
RBC # BLD: 3.73 M/UL — LOW (ref 4.2–5.8)
RBC # BLD: 3.73 M/UL — LOW (ref 4.2–5.8)
RBC # FLD: 19.9 % — HIGH (ref 10.3–14.5)
RBC # FLD: 19.9 % — HIGH (ref 10.3–14.5)
S AUREUS DNA NOSE QL NAA+PROBE: SIGNIFICANT CHANGE UP
S AUREUS DNA NOSE QL NAA+PROBE: SIGNIFICANT CHANGE UP
SODIUM SERPL-SCNC: 134 MMOL/L — LOW (ref 135–145)
SODIUM SERPL-SCNC: 134 MMOL/L — LOW (ref 135–145)
TSH SERPL-MCNC: 0.38 UIU/ML — SIGNIFICANT CHANGE UP (ref 0.27–4.2)
TSH SERPL-MCNC: 0.38 UIU/ML — SIGNIFICANT CHANGE UP (ref 0.27–4.2)
URATE SERPL-MCNC: 3.2 MG/DL — LOW (ref 3.4–8.8)
URATE SERPL-MCNC: 3.2 MG/DL — LOW (ref 3.4–8.8)
WBC # BLD: 15.59 K/UL — HIGH (ref 3.8–10.5)
WBC # BLD: 15.59 K/UL — HIGH (ref 3.8–10.5)
WBC # FLD AUTO: 15.59 K/UL — HIGH (ref 3.8–10.5)
WBC # FLD AUTO: 15.59 K/UL — HIGH (ref 3.8–10.5)

## 2023-10-24 PROCEDURE — 71260 CT THORAX DX C+: CPT | Mod: 26

## 2023-10-24 PROCEDURE — 99232 SBSQ HOSP IP/OBS MODERATE 35: CPT

## 2023-10-24 RX ORDER — POTASSIUM CHLORIDE 20 MEQ
40 PACKET (EA) ORAL EVERY 4 HOURS
Refills: 0 | Status: COMPLETED | OUTPATIENT
Start: 2023-10-24 | End: 2023-10-24

## 2023-10-24 RX ORDER — SODIUM CHLORIDE 9 MG/ML
500 INJECTION INTRAMUSCULAR; INTRAVENOUS; SUBCUTANEOUS ONCE
Refills: 0 | Status: COMPLETED | OUTPATIENT
Start: 2023-10-24 | End: 2023-10-24

## 2023-10-24 RX ORDER — POTASSIUM CHLORIDE 20 MEQ
40 PACKET (EA) ORAL EVERY 4 HOURS
Refills: 0 | Status: DISCONTINUED | OUTPATIENT
Start: 2023-10-24 | End: 2023-10-24

## 2023-10-24 RX ADMIN — Medication 1: at 08:57

## 2023-10-24 RX ADMIN — Medication 3: at 13:33

## 2023-10-24 RX ADMIN — ATORVASTATIN CALCIUM 20 MILLIGRAM(S): 80 TABLET, FILM COATED ORAL at 22:19

## 2023-10-24 RX ADMIN — Medication 1 TABLET(S): at 08:57

## 2023-10-24 RX ADMIN — CILOSTAZOL 50 MILLIGRAM(S): 100 TABLET ORAL at 05:37

## 2023-10-24 RX ADMIN — PIPERACILLIN AND TAZOBACTAM 25 GRAM(S): 4; .5 INJECTION, POWDER, LYOPHILIZED, FOR SOLUTION INTRAVENOUS at 22:19

## 2023-10-24 RX ADMIN — PIPERACILLIN AND TAZOBACTAM 25 GRAM(S): 4; .5 INJECTION, POWDER, LYOPHILIZED, FOR SOLUTION INTRAVENOUS at 05:37

## 2023-10-24 RX ADMIN — Medication 40 MILLIEQUIVALENT(S): at 14:21

## 2023-10-24 RX ADMIN — SODIUM CHLORIDE 500 MILLILITER(S): 9 INJECTION INTRAMUSCULAR; INTRAVENOUS; SUBCUTANEOUS at 13:59

## 2023-10-24 RX ADMIN — PIPERACILLIN AND TAZOBACTAM 25 GRAM(S): 4; .5 INJECTION, POWDER, LYOPHILIZED, FOR SOLUTION INTRAVENOUS at 13:59

## 2023-10-24 RX ADMIN — Medication 1: at 22:19

## 2023-10-24 RX ADMIN — PREGABALIN 1000 MICROGRAM(S): 225 CAPSULE ORAL at 13:34

## 2023-10-24 RX ADMIN — Medication 40 MILLIEQUIVALENT(S): at 17:55

## 2023-10-24 RX ADMIN — INSULIN GLARGINE 16 UNIT(S): 100 INJECTION, SOLUTION SUBCUTANEOUS at 22:19

## 2023-10-24 RX ADMIN — Medication 3: at 18:02

## 2023-10-24 RX ADMIN — CILOSTAZOL 50 MILLIGRAM(S): 100 TABLET ORAL at 17:54

## 2023-10-24 NOTE — PROGRESS NOTE ADULT - SUBJECTIVE AND OBJECTIVE BOX
Cardiology Progress Note  ------------------------------------------------------------------------------------------  SUBJECTIVE:   No events overnight. Denies CP, SOB or Palpitations.     -------------------------------------------------------------------------------------------  ROS:  CV: chest pain (-), palpitation (-), orthopnea (-), PND (-), edema (-)  PULM: SOB (-), cough (-), wheezing (-), hemoptysis (-).   CONST: fever (-), chills (-) or fatigue (-)  GI: abdominal distension (-), abdominal pain (-) , nausea/vomiting (-), hematemesis, (-), melena (-), hematochezia (-)  : dysuria (-), frequency (-), hematuria (-).   NEURO: numbness (-), weakness (-), dizziness (-)  MSK: myalgia (-), joint pain (-)   SKIN: itching (-), rash (-)  HEENT:  visual changes (-); vertigo or throat pain (-);  neck stiffness (-)   Psych: change in mood (-), anxiety (-), depression (-)     All other review of systems is negative unless indicated above.   -------------------------------------------------------------------------------------------  VS:  T(F): 98.7 (10-24), Max: 100.4 (10-23)  HR: 102 (10-24) (98 - 120)  BP: 135/69 (10-24) (114/53 - 150/63)  RR: 18 (10-24)  SpO2: 100% (10-24)  I&O's Summary    ------------------------------------------------------------------------------------------  PHYSICAL EXAM:  General: No acute distress. Awake and conversant.   Eyes: Normal conjunctiva, anicteric. Round symmetric pupils.   ENT: Hearing grossly intact. No nasal discharge.   Neck: Neck is supple. No masses or thyromegaly.   Respiratory: Respirations are non-labored. No wheezing.   Skin: Warm. No rashes or ulcers.   Psych: Alert and oriented. Cooperative, Appropriate mood and affect, Normal judgment.   CV: No lower extremity edema.   MSK: Normal ambulation. No clubbing or cyanosis.   Neuro: Sensation and CN II-XII grossly normal.  -------------------------------------------------------------------------------------------  LABS:                          8.1    15.59 )-----------( 263      ( 24 Oct 2023 07:05 )             26.7     10-24    134<L>  |  99  |  12  ----------------------------<  192<H>  3.2<L>   |  21<L>  |  1.06    Ca    9.0      24 Oct 2023 07:05  Phos  3.0     10-24  Mg     2.20     10-24    TPro  7.1  /  Alb  3.6  /  TBili  0.4  /  DBili  x   /  AST  15  /  ALT  12  /  AlkPhos  95  10-24      CARDIAC MARKERS ( 22 Oct 2023 22:52 )  72 ng/L / x     / x     / 111 U/L / x     / 1.9 ng/mL  CARDIAC MARKERS ( 22 Oct 2023 11:57 )  65 ng/L / x     / x     / 113 U/L / x     / 1.9 ng/mL            -------------------------------------------------------------------------------------------  Meds:  acetaminophen     Tablet .. 650 milliGRAM(s) Oral every 6 hours PRN  atorvastatin 20 milliGRAM(s) Oral at bedtime  cilostazol 50 milliGRAM(s) Oral two times a day  cyanocobalamin 1000 MICROGram(s) Oral daily  dextrose 5%. 1000 milliLiter(s) IV Continuous <Continuous>  dextrose 5%. 1000 milliLiter(s) IV Continuous <Continuous>  dextrose 50% Injectable 25 Gram(s) IV Push once  dextrose 50% Injectable 25 Gram(s) IV Push once  dextrose 50% Injectable 12.5 Gram(s) IV Push once  dextrose Oral Gel 15 Gram(s) Oral once PRN  glucagon  Injectable 1 milliGRAM(s) IntraMuscular once  influenza  Vaccine (HIGH DOSE) 0.7 milliLiter(s) IntraMuscular once  insulin glargine Injectable (LANTUS) 16 Unit(s) SubCutaneous at bedtime  insulin lispro (ADMELOG) corrective regimen sliding scale   SubCutaneous three times a day before meals  insulin lispro (ADMELOG) corrective regimen sliding scale   SubCutaneous at bedtime  melatonin 3 milliGRAM(s) Oral at bedtime PRN  piperacillin/tazobactam IVPB.. 3.375 Gram(s) IV Intermittent every 8 hours  potassium chloride   Powder 40 milliEquivalent(s) Oral every 4 hours  sodium chloride 0.9% Bolus 500 milliLiter(s) IV Bolus once    -------------------------------------------------------------------------------------------  Cardiovascular Diagnostic Testing:    ECG:     Echo:   < from: TTE W or WO Ultrasound Enhancing Agent (10.23.23 @ 15:46) >      1. Left ventricular systolic function is normal with an ejection fraction of 60 % by 3D.   2. Normal right ventricular cavity size, wall thickness, and probably normal systolic function.   3. The left atrium is normal in size.   4. The right atrium is normal in size.   5. No significant valvular disease.   6. Moderate pericardial effusion noted adjacent to the posterior left ventricle and small pericardial effusion noted adjacent tothe right ventricle with no evidence of hemodynamic compromise.   7. Right pleural effusion noted.   8. The inferior vena cava is normal in size (normal <2.1cm) with abnormal inspiratory collapse (abnormal <50%) consistent with mildly elevated right atrial pressure (~8, range 5-10mmHg).      < end of copied text >    Stress Testing:    Cath:    Imaging:    CXR:  reviewed  -------------------------------------------------------------------------------------------  Assessment and Plan:   1.  2.  3.  4.    Jose Vivas MD  Attending Physician   Cardiology Inpatient Consult Service     [facility] [place]   [address], [address]  [place] [address]   Tel: [phone]  Fax: [phone]    Please check [email or web address] for cardiology service schedule and contact information.

## 2023-10-24 NOTE — PROGRESS NOTE ADULT - SUBJECTIVE AND OBJECTIVE BOX
Follow Up:      [place] History/ROS:Patient is a 68y old  Male who presents with a chief complaint of Sepsis, Mod Pericardial Effusion (23 Oct 2023 17:34)    T max 38  Denies dysuria      Allergies    Advil (Rash)    Intolerances        ANTIMICROBIALS:  piperacillin/tazobactam IVPB.. 3.375 every 8 hours      OTHER MEDS:  acetaminophen     Tablet .. 650 milliGRAM(s) Oral every 6 hours PRN  atorvastatin 20 milliGRAM(s) Oral at bedtime  cilostazol 50 milliGRAM(s) Oral two times a day  cyanocobalamin 1000 MICROGram(s) Oral daily  dextrose 5%. 1000 milliLiter(s) IV Continuous <Continuous>  dextrose 5%. 1000 milliLiter(s) IV Continuous <Continuous>  dextrose 50% Injectable 25 Gram(s) IV Push once  dextrose 50% Injectable 12.5 Gram(s) IV Push once  dextrose 50% Injectable 25 Gram(s) IV Push once  dextrose Oral Gel 15 Gram(s) Oral once PRN  glucagon  Injectable 1 milliGRAM(s) IntraMuscular once  influenza  Vaccine (HIGH DOSE) 0.7 milliLiter(s) IntraMuscular once  insulin glargine Injectable (LANTUS) 16 Unit(s) SubCutaneous at bedtime  insulin lispro (ADMELOG) corrective regimen sliding scale   SubCutaneous at bedtime  insulin lispro (ADMELOG) corrective regimen sliding scale   SubCutaneous three times a day before meals  melatonin 3 milliGRAM(s) Oral at bedtime PRN  potassium phosphate / sodium phosphate Tablet (K-PHOS No. 2) 1 Tablet(s) Oral three times a day with meals  sodium chloride 0.9%. 1000 milliLiter(s) IV Continuous <Continuous>      Vital Signs Last 24 Hrs  T(C): 36.9 (24 Oct 2023 05:27), Max: 38 (23 Oct 2023 17:32)  T(F): 98.4 (24 Oct 2023 05:27), Max: 100.4 (23 Oct 2023 17:32)  HR: 110 (24 Oct 2023 09:30) (98 - 120)  BP: 150/63 (24 Oct 2023 09:30) (114/53 - 150/63)  BP(mean): --  RR: 17 (24 Oct 2023 09:30) (14 - 17)  SpO2: 100% (24 Oct 2023 09:30) (98% - 100%)    Parameters below as of 24 Oct 2023 09:30  Patient On (Oxygen Delivery Method): room air        PHYSICAL EXAM:  General: [ ] non-toxic  HEAD/EYES: [ ] PERRL [x ] white sclera [ ] icterus  ENT:  [ ] normal x[ ] supple [ ] thrush [ ] pharyngeal exudate  Cardiovascular:   [ ] murmur [x ] normal [ ] PPM/AICD  Respiratory:  [x ] clear to ausculation bilaterally  GI:  [ x] soft, non-tender, normal bowel sounds  :  [ ] melendez [x ] no CVA tenderness   Musculoskeletal:  [ ] no synovitis  Neurologic:  [ ] non-focal exam   Skin:  [ x] no rash  Lymph: [ ] no lymphadenopathy  Psychiatric:  [ ] appropriate affect [x ] alert & oriented  Lines:  [ x] no phlebitis [ ] central line                                8.1    15.59 )-----------( 263      ( 24 Oct 2023 07:05 )             26.7       10-24    134<L>  |  99  |  12  ----------------------------<  192<H>  3.2<L>   |  21<L>  |  1.06    Ca    9.0      24 Oct 2023 07:05  Phos  3.0     10-24  Mg     2.20     10-24    TPro  7.1  /  Alb  3.6  /  TBili  0.4  /  DBili  x   /  AST  15  /  ALT  12  /  AlkPhos  95  10-24      Urinalysis Basic - ( 24 Oct 2023 07:05 )    Color: x / Appearance: x / SG: x / pH: x  Gluc: 192 mg/dL / Ketone: x  / Bili: x / Urobili: x   Blood: x / Protein: x / Nitrite: x   Leuk Esterase: x / RBC: x / WBC x   Sq Epi: x / Non Sq Epi: x / Bacteria: x        MICROBIOLOGY:Culture Results:   No growth at 24 hours (10-22-23 @ 14:37)  Culture Results:   No growth at 24 hours (10-22-23 @ 14:00)  Culture Results:   >100,000 CFU/ml Gram Negative Rods (10-22-23 @ 11:50)      RADIOLOGY:

## 2023-10-24 NOTE — PROGRESS NOTE ADULT - SUBJECTIVE AND OBJECTIVE BOX
Cardiology Progress Note  ------------------------------------------------------------------------------------------        SUBJECTIVE:   No events overnight. Denies CP, SOB or Palpitations.   -------------------------------------------------------------------------------------------  ROS:  CV: chest pain (-), palpitation (-), orthopnea (-), PND (-), edema (-)  PULM: SOB (-), cough (-), wheezing (-), hemoptysis (-).   CONST: fever (-), chills (-) or fatigue (-)  GI: abdominal distension (-), abdominal pain (-) , nausea/vomiting (-), hematemesis, (-), melena (-), hematochezia (-)  : dysuria (-), frequency (-), hematuria (-).   NEURO: numbness (-), weakness (-), dizziness (-)  MSK: myalgia (-), joint pain (-)   SKIN: itching (-), rash (-)  HEENT:  visual changes (-); vertigo or throat pain (-);  neck stiffness (-)   Psych: change in mood (-), anxiety (-), depression (-)     All other review of systems is negative unless indicated above.   -------------------------------------------------------------------------------------------  VS:  T(F): 98.7 (10-24), Max: 100.4 (10-23)  HR: 102 (10-24) (98 - 120)  BP: 135/69 (10-24) (114/53 - 150/63)  RR: 18 (10-24)  SpO2: 100% (10-24)  I&O's Summary    ------------------------------------------------------------------------------------------  PHYSICAL EXAM:  GENERAL: NAD  HEAD:  Atraumatic, Normocephalic.  EYES: EOMI, PERRLA, conjunctiva and sclera clear.  ENT: Moist mucous membranes.  NECK: Supple, No JVD.  CHEST/LUNG: Clear to auscultation bilaterally; No rales, rhonchi, wheezing, or rubs. Unlabored respirations.  HEART: Regular rate and rhythm; No murmurs, rubs, or gallops.  ABDOMEN: Bowel sounds present; Soft, Nontender, Nondistended.   EXTREMITIES: No edema. 2+ Peripheral Pulses, brisk capillary refill. No clubbing or cyanosis.   PSYCH: Normal affect.  SKIN: No rashes or lesions.  -------------------------------------------------------------------------------------------  LABS:                          8.1    15.59 )-----------( 263      ( 24 Oct 2023 07:05 )             26.7     10-24    134<L>  |  99  |  12  ----------------------------<  192<H>  3.2<L>   |  21<L>  |  1.06    Ca    9.0      24 Oct 2023 07:05  Phos  3.0     10-24  Mg     2.20     10-24    TPro  7.1  /  Alb  3.6  /  TBili  0.4  /  DBili  x   /  AST  15  /  ALT  12  /  AlkPhos  95  10-24      CARDIAC MARKERS ( 22 Oct 2023 22:52 )  72 ng/L / x     / x     / 111 U/L / x     / 1.9 ng/mL  CARDIAC MARKERS ( 22 Oct 2023 11:57 )  65 ng/L / x     / x     / 113 U/L / x     / 1.9 ng/mL            -------------------------------------------------------------------------------------------  Meds:  acetaminophen     Tablet .. 650 milliGRAM(s) Oral every 6 hours PRN  atorvastatin 20 milliGRAM(s) Oral at bedtime  cilostazol 50 milliGRAM(s) Oral two times a day  cyanocobalamin 1000 MICROGram(s) Oral daily  dextrose 5%. 1000 milliLiter(s) IV Continuous <Continuous>  dextrose 5%. 1000 milliLiter(s) IV Continuous <Continuous>  dextrose 50% Injectable 25 Gram(s) IV Push once  dextrose 50% Injectable 25 Gram(s) IV Push once  dextrose 50% Injectable 12.5 Gram(s) IV Push once  dextrose Oral Gel 15 Gram(s) Oral once PRN  glucagon  Injectable 1 milliGRAM(s) IntraMuscular once  influenza  Vaccine (HIGH DOSE) 0.7 milliLiter(s) IntraMuscular once  insulin glargine Injectable (LANTUS) 16 Unit(s) SubCutaneous at bedtime  insulin lispro (ADMELOG) corrective regimen sliding scale   SubCutaneous three times a day before meals  insulin lispro (ADMELOG) corrective regimen sliding scale   SubCutaneous at bedtime  melatonin 3 milliGRAM(s) Oral at bedtime PRN  piperacillin/tazobactam IVPB.. 3.375 Gram(s) IV Intermittent every 8 hours  potassium chloride   Powder 40 milliEquivalent(s) Oral every 4 hours    -------------------------------------------------------------------------------------------  Cardiovascular Diagnostic Testing:    ECG:     Echo:     Stress Testing:    Cath:    Imaging:    CXR:  reviewed  -------------------------------------------------------------------------------------------

## 2023-10-24 NOTE — PROGRESS NOTE ADULT - SUBJECTIVE AND OBJECTIVE BOX
Patient is a 68y old  Male who presents with a chief complaint of Sepsis, Mod Pericardial Effusion (24 Oct 2023 13:48)    Date of servie : 10-24-23 @ 14:04  INTERVAL HPI/OVERNIGHT EVENTS:  T(C): 37.1 (10-24-23 @ 13:30), Max: 38 (10-23-23 @ 17:32)  HR: 102 (10-24-23 @ 13:30) (98 - 120)  BP: 135/69 (10-24-23 @ 13:30) (114/53 - 150/63)  RR: 18 (10-24-23 @ 13:30) (16 - 18)  SpO2: 100% (10-24-23 @ 13:30) (100% - 100%)  Wt(kg): --  I&O's Summary      LABS:                        8.1    15.59 )-----------( 263      ( 24 Oct 2023 07:05 )             26.7     10-24    134<L>  |  99  |  12  ----------------------------<  192<H>  3.2<L>   |  21<L>  |  1.06    Ca    9.0      24 Oct 2023 07:05  Phos  3.0     10-24  Mg     2.20     10-24    TPro  7.1  /  Alb  3.6  /  TBili  0.4  /  DBili  x   /  AST  15  /  ALT  12  /  AlkPhos  95  10-24      Urinalysis Basic - ( 24 Oct 2023 07:05 )    Color: x / Appearance: x / SG: x / pH: x  Gluc: 192 mg/dL / Ketone: x  / Bili: x / Urobili: x   Blood: x / Protein: x / Nitrite: x   Leuk Esterase: x / RBC: x / WBC x   Sq Epi: x / Non Sq Epi: x / Bacteria: x      CAPILLARY BLOOD GLUCOSE      POCT Blood Glucose.: 298 mg/dL (24 Oct 2023 12:49)  POCT Blood Glucose.: 183 mg/dL (24 Oct 2023 08:28)  POCT Blood Glucose.: 228 mg/dL (23 Oct 2023 22:19)  POCT Blood Glucose.: 179 mg/dL (23 Oct 2023 17:26)        Urinalysis Basic - ( 24 Oct 2023 07:05 )    Color: x / Appearance: x / SG: x / pH: x  Gluc: 192 mg/dL / Ketone: x  / Bili: x / Urobili: x   Blood: x / Protein: x / Nitrite: x   Leuk Esterase: x / RBC: x / WBC x   Sq Epi: x / Non Sq Epi: x / Bacteria: x        MEDICATIONS  (STANDING):  atorvastatin 20 milliGRAM(s) Oral at bedtime  cilostazol 50 milliGRAM(s) Oral two times a day  cyanocobalamin 1000 MICROGram(s) Oral daily  dextrose 5%. 1000 milliLiter(s) (50 mL/Hr) IV Continuous <Continuous>  dextrose 5%. 1000 milliLiter(s) (100 mL/Hr) IV Continuous <Continuous>  dextrose 50% Injectable 25 Gram(s) IV Push once  dextrose 50% Injectable 12.5 Gram(s) IV Push once  dextrose 50% Injectable 25 Gram(s) IV Push once  glucagon  Injectable 1 milliGRAM(s) IntraMuscular once  influenza  Vaccine (HIGH DOSE) 0.7 milliLiter(s) IntraMuscular once  insulin glargine Injectable (LANTUS) 16 Unit(s) SubCutaneous at bedtime  insulin lispro (ADMELOG) corrective regimen sliding scale   SubCutaneous at bedtime  insulin lispro (ADMELOG) corrective regimen sliding scale   SubCutaneous three times a day before meals  piperacillin/tazobactam IVPB.. 3.375 Gram(s) IV Intermittent every 8 hours  potassium chloride   Powder 40 milliEquivalent(s) Oral every 4 hours    MEDICATIONS  (PRN):  acetaminophen     Tablet .. 650 milliGRAM(s) Oral every 6 hours PRN Temp greater or equal to 38C (100.4F), Mild Pain (1 - 3)  dextrose Oral Gel 15 Gram(s) Oral once PRN Blood Glucose LESS THAN 70 milliGRAM(s)/deciliter  melatonin 3 milliGRAM(s) Oral at bedtime PRN Insomnia          PHYSICAL EXAM:  GENERAL: NAD, well-groomed, well-developed  HEAD:  Atraumatic, Normocephalic  CHEST/LUNG: Clear to percussion bilaterally; No rales, rhonchi, wheezing, or rubs  HEART: Regular rate and rhythm; No murmurs, rubs, or gallops  ABDOMEN: Soft, Nontender, Nondistended; Bowel sounds present  EXTREMITIES:  2+ Peripheral Pulses, No clubbing, cyanosis, or edema  LYMPH: No lymphadenopathy noted  SKIN: No rashes or lesions    Care Discussed with Consultants/Other Providers [ ] YES  [ ] NO

## 2023-10-24 NOTE — PROGRESS NOTE ADULT - SUBJECTIVE AND OBJECTIVE BOX
[facility] NEPHROLOGY- PROGRESS NOTE    68y Male with history of HTN presents with dysuria. Nephrology consulted for hyponatremia.    REVIEW OF SYSTEMS:  Gen: no fevers  Cards: no chest pain  Resp: no dyspnea  GI: no nausea or vomiting or diarrhea  Vascular: no LE edema    Advil (Rash)      Hospital Medications: Medications reviewed    VITALS:  T(F): 98.4 (10-24-23 @ 05:27), Max: 100.4 (10-23-23 @ 17:32)  HR: 110 (10-24-23 @ 09:30)  BP: 150/63 (10-24-23 @ 09:30)  RR: 17 (10-24-23 @ 09:30)  SpO2: 100% (10-24-23 @ 09:30)  Wt(kg): --    Weight (kg): 63.2 (10-22 @ 23:15)      PHYSICAL EXAM:    Gen: NAD, calm  Cards: RRR, +S1/S2, no M/G/R  Resp: CTA B/L  GI: soft, NT, + ab distention, NABS  Vascular: no LE edema B/L    LABS:  10-24    134<L>  |  99  |  12  ----------------------------<  192<H>  3.2<L>   |  21<L>  |  1.06    Ca    9.0      24 Oct 2023 07:05  Phos  3.0     10-24  Mg     2.20     10-24    TPro  7.1  /  Alb  3.6  /  TBili  0.4  /  DBili      /  AST  15  /  ALT  12  /  AlkPhos  95  10-24    Creatinine Trend: 1.06 <--, 1.06 <--, 1.11 <--, 0.91 <--                        8.1    15.59 )-----------( 263      ( 24 Oct 2023 07:05 )             26.7     Urine Studies:  Urinalysis Basic - ( 24 Oct 2023 07:05 )    Color:  / Appearance:  / SG:  / pH:   Gluc: 192 mg/dL / Ketone:   / Bili:  / Urobili:    Blood:  / Protein:  / Nitrite:    Leuk Esterase:  / RBC:  / WBC    Sq Epi:  / Non Sq Epi:  / Bacteria:       Sodium, Random Urine: 30 mmol/L (10-23 @ 13:42)  Osmolality, Random Urine: 351 mosm/kg (10-23 @ 13:42)      RADIOLOGY & ADDITIONAL STUDIES:

## 2023-10-25 LAB
-  AMIKACIN: SIGNIFICANT CHANGE UP
-  AMIKACIN: SIGNIFICANT CHANGE UP
-  AMOXICILLIN/CLAVULANIC ACID: SIGNIFICANT CHANGE UP
-  AMOXICILLIN/CLAVULANIC ACID: SIGNIFICANT CHANGE UP
-  AMPICILLIN/SULBACTAM: SIGNIFICANT CHANGE UP
-  AMPICILLIN/SULBACTAM: SIGNIFICANT CHANGE UP
-  AMPICILLIN: SIGNIFICANT CHANGE UP
-  AMPICILLIN: SIGNIFICANT CHANGE UP
-  AZTREONAM: SIGNIFICANT CHANGE UP
-  AZTREONAM: SIGNIFICANT CHANGE UP
-  CEFAZOLIN: SIGNIFICANT CHANGE UP
-  CEFAZOLIN: SIGNIFICANT CHANGE UP
-  CEFEPIME: SIGNIFICANT CHANGE UP
-  CEFEPIME: SIGNIFICANT CHANGE UP
-  CEFOXITIN: SIGNIFICANT CHANGE UP
-  CEFOXITIN: SIGNIFICANT CHANGE UP
-  CEFTRIAXONE: SIGNIFICANT CHANGE UP
-  CEFTRIAXONE: SIGNIFICANT CHANGE UP
-  CEFUROXIME: SIGNIFICANT CHANGE UP
-  CEFUROXIME: SIGNIFICANT CHANGE UP
-  CIPROFLOXACIN: SIGNIFICANT CHANGE UP
-  CIPROFLOXACIN: SIGNIFICANT CHANGE UP
-  ERTAPENEM: SIGNIFICANT CHANGE UP
-  ERTAPENEM: SIGNIFICANT CHANGE UP
-  GENTAMICIN: SIGNIFICANT CHANGE UP
-  GENTAMICIN: SIGNIFICANT CHANGE UP
-  IMIPENEM: SIGNIFICANT CHANGE UP
-  IMIPENEM: SIGNIFICANT CHANGE UP
-  LEVOFLOXACIN: SIGNIFICANT CHANGE UP
-  LEVOFLOXACIN: SIGNIFICANT CHANGE UP
-  MEROPENEM: SIGNIFICANT CHANGE UP
-  MEROPENEM: SIGNIFICANT CHANGE UP
-  NITROFURANTOIN: SIGNIFICANT CHANGE UP
-  NITROFURANTOIN: SIGNIFICANT CHANGE UP
-  PIPERACILLIN/TAZOBACTAM: SIGNIFICANT CHANGE UP
-  PIPERACILLIN/TAZOBACTAM: SIGNIFICANT CHANGE UP
-  TOBRAMYCIN: SIGNIFICANT CHANGE UP
-  TOBRAMYCIN: SIGNIFICANT CHANGE UP
-  TRIMETHOPRIM/SULFAMETHOXAZOLE: SIGNIFICANT CHANGE UP
-  TRIMETHOPRIM/SULFAMETHOXAZOLE: SIGNIFICANT CHANGE UP
ALBUMIN SERPL ELPH-MCNC: 3.2 G/DL — LOW (ref 3.3–5)
ALBUMIN SERPL ELPH-MCNC: 3.2 G/DL — LOW (ref 3.3–5)
ALP SERPL-CCNC: 105 U/L — SIGNIFICANT CHANGE UP (ref 40–120)
ALP SERPL-CCNC: 105 U/L — SIGNIFICANT CHANGE UP (ref 40–120)
ALT FLD-CCNC: 15 U/L — SIGNIFICANT CHANGE UP (ref 4–41)
ALT FLD-CCNC: 15 U/L — SIGNIFICANT CHANGE UP (ref 4–41)
ANION GAP SERPL CALC-SCNC: 14 MMOL/L — SIGNIFICANT CHANGE UP (ref 7–14)
ANION GAP SERPL CALC-SCNC: 14 MMOL/L — SIGNIFICANT CHANGE UP (ref 7–14)
ANISOCYTOSIS BLD QL: SLIGHT — SIGNIFICANT CHANGE UP
ANISOCYTOSIS BLD QL: SLIGHT — SIGNIFICANT CHANGE UP
AST SERPL-CCNC: 23 U/L — SIGNIFICANT CHANGE UP (ref 4–40)
AST SERPL-CCNC: 23 U/L — SIGNIFICANT CHANGE UP (ref 4–40)
BASOPHILS # BLD AUTO: 0 K/UL — SIGNIFICANT CHANGE UP (ref 0–0.2)
BASOPHILS # BLD AUTO: 0 K/UL — SIGNIFICANT CHANGE UP (ref 0–0.2)
BASOPHILS NFR BLD AUTO: 0 % — SIGNIFICANT CHANGE UP (ref 0–2)
BASOPHILS NFR BLD AUTO: 0 % — SIGNIFICANT CHANGE UP (ref 0–2)
BILIRUB SERPL-MCNC: 0.2 MG/DL — SIGNIFICANT CHANGE UP (ref 0.2–1.2)
BILIRUB SERPL-MCNC: 0.2 MG/DL — SIGNIFICANT CHANGE UP (ref 0.2–1.2)
BUN SERPL-MCNC: 10 MG/DL — SIGNIFICANT CHANGE UP (ref 7–23)
BUN SERPL-MCNC: 10 MG/DL — SIGNIFICANT CHANGE UP (ref 7–23)
CALCIUM SERPL-MCNC: 9 MG/DL — SIGNIFICANT CHANGE UP (ref 8.4–10.5)
CALCIUM SERPL-MCNC: 9 MG/DL — SIGNIFICANT CHANGE UP (ref 8.4–10.5)
CHLORIDE SERPL-SCNC: 103 MMOL/L — SIGNIFICANT CHANGE UP (ref 98–107)
CHLORIDE SERPL-SCNC: 103 MMOL/L — SIGNIFICANT CHANGE UP (ref 98–107)
CO2 SERPL-SCNC: 20 MMOL/L — LOW (ref 22–31)
CO2 SERPL-SCNC: 20 MMOL/L — LOW (ref 22–31)
CREAT SERPL-MCNC: 0.93 MG/DL — SIGNIFICANT CHANGE UP (ref 0.5–1.3)
CREAT SERPL-MCNC: 0.93 MG/DL — SIGNIFICANT CHANGE UP (ref 0.5–1.3)
CRP SERPL-MCNC: 190.6 MG/L — HIGH
CRP SERPL-MCNC: 190.6 MG/L — HIGH
CULTURE RESULTS: ABNORMAL
CULTURE RESULTS: ABNORMAL
EGFR: 88 ML/MIN/1.73M2 — SIGNIFICANT CHANGE UP
EGFR: 88 ML/MIN/1.73M2 — SIGNIFICANT CHANGE UP
ELLIPTOCYTES BLD QL SMEAR: SLIGHT — SIGNIFICANT CHANGE UP
ELLIPTOCYTES BLD QL SMEAR: SLIGHT — SIGNIFICANT CHANGE UP
EOSINOPHIL # BLD AUTO: 0.43 K/UL — SIGNIFICANT CHANGE UP (ref 0–0.5)
EOSINOPHIL # BLD AUTO: 0.43 K/UL — SIGNIFICANT CHANGE UP (ref 0–0.5)
EOSINOPHIL NFR BLD AUTO: 3.5 % — SIGNIFICANT CHANGE UP (ref 0–6)
EOSINOPHIL NFR BLD AUTO: 3.5 % — SIGNIFICANT CHANGE UP (ref 0–6)
ERYTHROCYTE [SEDIMENTATION RATE] IN BLOOD: 116 MM/HR — HIGH (ref 1–15)
ERYTHROCYTE [SEDIMENTATION RATE] IN BLOOD: 116 MM/HR — HIGH (ref 1–15)
GIANT PLATELETS BLD QL SMEAR: PRESENT — SIGNIFICANT CHANGE UP
GIANT PLATELETS BLD QL SMEAR: PRESENT — SIGNIFICANT CHANGE UP
GLUCOSE BLDC GLUCOMTR-MCNC: 167 MG/DL — HIGH (ref 70–99)
GLUCOSE BLDC GLUCOMTR-MCNC: 167 MG/DL — HIGH (ref 70–99)
GLUCOSE BLDC GLUCOMTR-MCNC: 225 MG/DL — HIGH (ref 70–99)
GLUCOSE BLDC GLUCOMTR-MCNC: 225 MG/DL — HIGH (ref 70–99)
GLUCOSE BLDC GLUCOMTR-MCNC: 261 MG/DL — HIGH (ref 70–99)
GLUCOSE BLDC GLUCOMTR-MCNC: 261 MG/DL — HIGH (ref 70–99)
GLUCOSE BLDC GLUCOMTR-MCNC: 288 MG/DL — HIGH (ref 70–99)
GLUCOSE BLDC GLUCOMTR-MCNC: 288 MG/DL — HIGH (ref 70–99)
GLUCOSE SERPL-MCNC: 152 MG/DL — HIGH (ref 70–99)
GLUCOSE SERPL-MCNC: 152 MG/DL — HIGH (ref 70–99)
HCT VFR BLD CALC: 25.1 % — LOW (ref 39–50)
HCT VFR BLD CALC: 25.1 % — LOW (ref 39–50)
HGB BLD-MCNC: 7.9 G/DL — LOW (ref 13–17)
HGB BLD-MCNC: 7.9 G/DL — LOW (ref 13–17)
IANC: 9.02 K/UL — HIGH (ref 1.8–7.4)
IANC: 9.02 K/UL — HIGH (ref 1.8–7.4)
LYMPHOCYTES # BLD AUTO: 1.39 K/UL — SIGNIFICANT CHANGE UP (ref 1–3.3)
LYMPHOCYTES # BLD AUTO: 1.39 K/UL — SIGNIFICANT CHANGE UP (ref 1–3.3)
LYMPHOCYTES # BLD AUTO: 11.3 % — LOW (ref 13–44)
LYMPHOCYTES # BLD AUTO: 11.3 % — LOW (ref 13–44)
MAGNESIUM SERPL-MCNC: 2.2 MG/DL — SIGNIFICANT CHANGE UP (ref 1.6–2.6)
MAGNESIUM SERPL-MCNC: 2.2 MG/DL — SIGNIFICANT CHANGE UP (ref 1.6–2.6)
MANUAL SMEAR VERIFICATION: SIGNIFICANT CHANGE UP
MANUAL SMEAR VERIFICATION: SIGNIFICANT CHANGE UP
MCHC RBC-ENTMCNC: 22.4 PG — LOW (ref 27–34)
MCHC RBC-ENTMCNC: 22.4 PG — LOW (ref 27–34)
MCHC RBC-ENTMCNC: 31.5 GM/DL — LOW (ref 32–36)
MCHC RBC-ENTMCNC: 31.5 GM/DL — LOW (ref 32–36)
MCV RBC AUTO: 71.3 FL — LOW (ref 80–100)
MCV RBC AUTO: 71.3 FL — LOW (ref 80–100)
METHOD TYPE: SIGNIFICANT CHANGE UP
METHOD TYPE: SIGNIFICANT CHANGE UP
MICROCYTES BLD QL: SLIGHT — SIGNIFICANT CHANGE UP
MICROCYTES BLD QL: SLIGHT — SIGNIFICANT CHANGE UP
MONOCYTES # BLD AUTO: 0.43 K/UL — SIGNIFICANT CHANGE UP (ref 0–0.9)
MONOCYTES # BLD AUTO: 0.43 K/UL — SIGNIFICANT CHANGE UP (ref 0–0.9)
MONOCYTES NFR BLD AUTO: 3.5 % — SIGNIFICANT CHANGE UP (ref 2–14)
MONOCYTES NFR BLD AUTO: 3.5 % — SIGNIFICANT CHANGE UP (ref 2–14)
NEUTROPHILS # BLD AUTO: 10.02 K/UL — HIGH (ref 1.8–7.4)
NEUTROPHILS # BLD AUTO: 10.02 K/UL — HIGH (ref 1.8–7.4)
NEUTROPHILS NFR BLD AUTO: 80.8 % — HIGH (ref 43–77)
NEUTROPHILS NFR BLD AUTO: 80.8 % — HIGH (ref 43–77)
NEUTS BAND # BLD: 0.9 % — SIGNIFICANT CHANGE UP (ref 0–6)
NEUTS BAND # BLD: 0.9 % — SIGNIFICANT CHANGE UP (ref 0–6)
ORGANISM # SPEC MICROSCOPIC CNT: ABNORMAL
PHOSPHATE SERPL-MCNC: 2.5 MG/DL — SIGNIFICANT CHANGE UP (ref 2.5–4.5)
PHOSPHATE SERPL-MCNC: 2.5 MG/DL — SIGNIFICANT CHANGE UP (ref 2.5–4.5)
PLAT MORPH BLD: NORMAL — SIGNIFICANT CHANGE UP
PLAT MORPH BLD: NORMAL — SIGNIFICANT CHANGE UP
PLATELET # BLD AUTO: 289 K/UL — SIGNIFICANT CHANGE UP (ref 150–400)
PLATELET # BLD AUTO: 289 K/UL — SIGNIFICANT CHANGE UP (ref 150–400)
PLATELET COUNT - ESTIMATE: NORMAL — SIGNIFICANT CHANGE UP
PLATELET COUNT - ESTIMATE: NORMAL — SIGNIFICANT CHANGE UP
POIKILOCYTOSIS BLD QL AUTO: SLIGHT — SIGNIFICANT CHANGE UP
POIKILOCYTOSIS BLD QL AUTO: SLIGHT — SIGNIFICANT CHANGE UP
POTASSIUM SERPL-MCNC: 3.6 MMOL/L — SIGNIFICANT CHANGE UP (ref 3.5–5.3)
POTASSIUM SERPL-MCNC: 3.6 MMOL/L — SIGNIFICANT CHANGE UP (ref 3.5–5.3)
POTASSIUM SERPL-SCNC: 3.6 MMOL/L — SIGNIFICANT CHANGE UP (ref 3.5–5.3)
POTASSIUM SERPL-SCNC: 3.6 MMOL/L — SIGNIFICANT CHANGE UP (ref 3.5–5.3)
PROT SERPL-MCNC: 6.8 G/DL — SIGNIFICANT CHANGE UP (ref 6–8.3)
PROT SERPL-MCNC: 6.8 G/DL — SIGNIFICANT CHANGE UP (ref 6–8.3)
RBC # BLD: 3.52 M/UL — LOW (ref 4.2–5.8)
RBC # BLD: 3.52 M/UL — LOW (ref 4.2–5.8)
RBC # FLD: 19.6 % — HIGH (ref 10.3–14.5)
RBC # FLD: 19.6 % — HIGH (ref 10.3–14.5)
RBC BLD AUTO: ABNORMAL
RBC BLD AUTO: ABNORMAL
SODIUM SERPL-SCNC: 137 MMOL/L — SIGNIFICANT CHANGE UP (ref 135–145)
SODIUM SERPL-SCNC: 137 MMOL/L — SIGNIFICANT CHANGE UP (ref 135–145)
SPECIMEN SOURCE: SIGNIFICANT CHANGE UP
SPECIMEN SOURCE: SIGNIFICANT CHANGE UP
TARGETS BLD QL SMEAR: SLIGHT — SIGNIFICANT CHANGE UP
TARGETS BLD QL SMEAR: SLIGHT — SIGNIFICANT CHANGE UP
WBC # BLD: 12.27 K/UL — HIGH (ref 3.8–10.5)
WBC # BLD: 12.27 K/UL — HIGH (ref 3.8–10.5)
WBC # FLD AUTO: 12.27 K/UL — HIGH (ref 3.8–10.5)
WBC # FLD AUTO: 12.27 K/UL — HIGH (ref 3.8–10.5)

## 2023-10-25 PROCEDURE — 99232 SBSQ HOSP IP/OBS MODERATE 35: CPT

## 2023-10-25 RX ORDER — LABETALOL HCL 100 MG
100 TABLET ORAL EVERY 12 HOURS
Refills: 0 | Status: DISCONTINUED | OUTPATIENT
Start: 2023-10-25 | End: 2023-10-30

## 2023-10-25 RX ORDER — CEFUROXIME AXETIL 250 MG
500 TABLET ORAL EVERY 12 HOURS
Refills: 0 | Status: COMPLETED | OUTPATIENT
Start: 2023-10-25 | End: 2023-10-30

## 2023-10-25 RX ADMIN — Medication 1: at 08:06

## 2023-10-25 RX ADMIN — CILOSTAZOL 50 MILLIGRAM(S): 100 TABLET ORAL at 05:29

## 2023-10-25 RX ADMIN — PIPERACILLIN AND TAZOBACTAM 25 GRAM(S): 4; .5 INJECTION, POWDER, LYOPHILIZED, FOR SOLUTION INTRAVENOUS at 05:29

## 2023-10-25 RX ADMIN — Medication 500 MILLIGRAM(S): at 17:54

## 2023-10-25 RX ADMIN — Medication 2: at 12:33

## 2023-10-25 RX ADMIN — CILOSTAZOL 50 MILLIGRAM(S): 100 TABLET ORAL at 16:37

## 2023-10-25 RX ADMIN — INSULIN GLARGINE 16 UNIT(S): 100 INJECTION, SOLUTION SUBCUTANEOUS at 21:31

## 2023-10-25 RX ADMIN — PREGABALIN 1000 MICROGRAM(S): 225 CAPSULE ORAL at 11:58

## 2023-10-25 RX ADMIN — Medication 3: at 17:32

## 2023-10-25 RX ADMIN — Medication 100 MILLIGRAM(S): at 17:54

## 2023-10-25 RX ADMIN — Medication 1: at 21:34

## 2023-10-25 RX ADMIN — ATORVASTATIN CALCIUM 20 MILLIGRAM(S): 80 TABLET, FILM COATED ORAL at 21:30

## 2023-10-25 RX ADMIN — PIPERACILLIN AND TAZOBACTAM 25 GRAM(S): 4; .5 INJECTION, POWDER, LYOPHILIZED, FOR SOLUTION INTRAVENOUS at 12:02

## 2023-10-25 NOTE — PROGRESS NOTE ADULT - SUBJECTIVE AND OBJECTIVE BOX
Follow Up:      Inverval History/ROS:Patient is a 68y old  Male who presents with a chief complaint of Sepsis, Mod Pericardial Effusion (25 Oct 2023 13:46)   Urinary symptoms have improved  No fever    Allergies    Advil (Rash)    Intolerances        ANTIMICROBIALS:  piperacillin/tazobactam IVPB.. 3.375 every 8 hours      OTHER MEDS:  acetaminophen     Tablet .. 650 milliGRAM(s) Oral every 6 hours PRN  atorvastatin 20 milliGRAM(s) Oral at bedtime  cilostazol 50 milliGRAM(s) Oral two times a day  cyanocobalamin 1000 MICROGram(s) Oral daily  dextrose 5%. 1000 milliLiter(s) IV Continuous <Continuous>  dextrose 5%. 1000 milliLiter(s) IV Continuous <Continuous>  dextrose 50% Injectable 25 Gram(s) IV Push once  dextrose 50% Injectable 12.5 Gram(s) IV Push once  dextrose 50% Injectable 25 Gram(s) IV Push once  dextrose Oral Gel 15 Gram(s) Oral once PRN  glucagon  Injectable 1 milliGRAM(s) IntraMuscular once  influenza  Vaccine (HIGH DOSE) 0.7 milliLiter(s) IntraMuscular once  insulin glargine Injectable (LANTUS) 16 Unit(s) SubCutaneous at bedtime  insulin lispro (ADMELOG) corrective regimen sliding scale   SubCutaneous at bedtime  insulin lispro (ADMELOG) corrective regimen sliding scale   SubCutaneous three times a day before meals  melatonin 3 milliGRAM(s) Oral at bedtime PRN      Vital Signs Last 24 Hrs  T(C): 37 (25 Oct 2023 12:03), Max: 37.3 (24 Oct 2023 21:59)  T(F): 98.6 (25 Oct 2023 12:03), Max: 99.1 (24 Oct 2023 21:59)  HR: 100 (25 Oct 2023 12:03) (91 - 108)  BP: 158/73 (25 Oct 2023 12:03) (138/74 - 159/64)  BP(mean): --  RR: 18 (25 Oct 2023 12:03) (17 - 18)  SpO2: 99% (25 Oct 2023 12:03) (98% - 100%)    Parameters below as of 25 Oct 2023 12:03  Patient On (Oxygen Delivery Method): room air        PHYSICAL EXAM:  General: [x] non-toxic  HEAD/EYES: [ ] PERRL [ ] white sclera [ ] icterus  ENT:  [ ] normal [ ] supple [ ] thrush [ ] pharyngeal exudate  Cardiovascular:   [ ] murmur [x ] normal [ ] PPM/AICD  Respiratory:  [ ] clear to ausculation bilaterally  GI:  [ ] soft, non-tender, normal bowel sounds  :  [ ] melendez [x ] no CVA tenderness   Musculoskeletal:  [ ] no synovitis  Neurologic:  [ ] non-focal exam   Skin:  [ x] no rash  Lymph: [x ] no lymphadenopathy  Psychiatric:  [ x] appropriate affect [ ] alert & oriented  Lines:  [x ] no phlebitis [ ] central line                                7.9    12.27 )-----------( 289      ( 25 Oct 2023 07:24 )             25.1       10-25    137  |  103  |  10  ----------------------------<  152<H>  3.6   |  20<L>  |  0.93    Ca    9.0      25 Oct 2023 07:24  Phos  2.5     10-25  Mg     2.20     10-25    TPro  6.8  /  Alb  3.2<L>  /  TBili  0.2  /  DBili  x   /  AST  23  /  ALT  15  /  AlkPhos  105  10-25      Urinalysis Basic - ( 25 Oct 2023 07:24 )    Color: x / Appearance: x / SG: x / pH: x  Gluc: 152 mg/dL / Ketone: x  / Bili: x / Urobili: x   Blood: x / Protein: x / Nitrite: x   Leuk Esterase: x / RBC: x / WBC x   Sq Epi: x / Non Sq Epi: x / Bacteria: x        MICROBIOLOGY:Culture Results:   No growth at 48 Hours (10-22-23 @ 14:37)  Culture Results:   No growth at 48 Hours (10-22-23 @ 14:00)  Culture Results:   >100,000 CFU/ml Klebsiella pneumoniae (10-22-23 @ 11:50)      RADIOLOGY:

## 2023-10-25 NOTE — PROGRESS NOTE ADULT - SUBJECTIVE AND OBJECTIVE BOX
[facility] NEPHROLOGY- PROGRESS NOTE    68y Male with history of HTN presents with dysuria. Nephrology consulted for hyponatremia.    REVIEW OF SYSTEMS:  Gen: no fevers  Cards: no chest pain  Resp: no dyspnea  GI: no nausea or vomiting or diarrhea  Vascular: no LE edema    Advil (Rash)      Hospital Medications: Medications reviewed      VITALS:  T(F): 98.6 (10-25-23 @ 12:03), Max: 99.1 (10-24-23 @ 21:59)  HR: 100 (10-25-23 @ 12:03)  BP: 158/73 (10-25-23 @ 12:03)  RR: 18 (10-25-23 @ 12:03)  SpO2: 99% (10-25-23 @ 12:03)  Wt(kg): --    Height (cm): 167.6 (10-24 @ 23:52)  Weight (kg): 66.2 (10-24 @ 23:52)  BMI (kg/m2): 23.6 (10-24 @ 23:52)  BSA (m2): 1.75 (10-24 @ 23:52)      PHYSICAL EXAM:    Gen: NAD, calm  Cards: RRR, +S1/S2, no M/G/R  Resp: CTA B/L  GI: soft, NT, + ab distention, NABS  Vascular: no LE edema B/L      LABS:  10-25    137  |  103  |  10  ----------------------------<  152<H>  3.6   |  20<L>  |  0.93    Ca    9.0      25 Oct 2023 07:24  Phos  2.5     10-25  Mg     2.20     10-25    TPro  6.8  /  Alb  3.2<L>  /  TBili  0.2  /  DBili      /  AST  23  /  ALT  15  /  AlkPhos  105  10-25    Creatinine Trend: 0.93 <--, 1.06 <--, 1.06 <--, 1.11 <--, 0.91 <--                        7.9    12.27 )-----------( 289      ( 25 Oct 2023 07:24 )             25.1     Urine Studies:  Urinalysis Basic - ( 25 Oct 2023 07:24 )    Color:  / Appearance:  / SG:  / pH:   Gluc: 152 mg/dL / Ketone:   / Bili:  / Urobili:    Blood:  / Protein:  / Nitrite:    Leuk Esterase:  / RBC:  / WBC    Sq Epi:  / Non Sq Epi:  / Bacteria:       Sodium, Random Urine: 30 mmol/L (10-23 @ 13:42)  Osmolality, Random Urine: 351 mosm/kg (10-23 @ 13:42)        < from: CT Chest w/ IV Cont (10.24.23 @ 20:00) >  INTERPRETATION:  Calcified and noncalcified plaque of the thoracic aorta,   there is focal outpouching of contrast in the descending thoracic aorta   (5:101, 2:87), most compatible with a small penetrating atherosclerotic   ulcer. This appears to be new when correlated with CT 7/16/2022.    Small to moderate pericardial effusion, similar when correlated with CT   abdomen and pelvis 10/22/2023.    Otherwise, no emergent findings.    Followup official report in AM.    < end of copied text >

## 2023-10-25 NOTE — CHART NOTE - NSCHARTNOTEFT_GEN_A_CORE
Discussed with Dr Chery who discussed with Dr Sergio brock from cards to start labetalol.   Repeat TTE in 1wk

## 2023-10-25 NOTE — CHART NOTE - NSCHARTNOTEFT_GEN_A_CORE
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 Continued surveillance is recommended.    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    Small-moderate pericardial effusion, unchanged since 10/22/2023 abdomen   CT.      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 Continued surveillance is recommended.    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    Small-moderate pericardial effusion, unchanged since 10/22/2023 abdomen   CT.      Above discussed with Dr Garcia  Cardiothraocic surgery called at [facility] ([phone])  will followup recs      --------------------------------------------  Spoke with CTS at [facility] with pre discussion with Dr Elizondo pt to repeat CTA chest with contrast in 1 yr and followup with Shabana Elizondo [phone]

## 2023-10-25 NOTE — PROGRESS NOTE ADULT - SUBJECTIVE AND OBJECTIVE BOX
Patient is a 68y old  Male who presents with a chief complaint of Sepsis, Mod Pericardial Effusion (25 Oct 2023 16:25)    Date of servie : 10-25-23 @ 18:33  INTERVAL HPI/OVERNIGHT EVENTS:  T(C): 36.9 (10-25-23 @ 16:46), Max: 37.3 (10-24-23 @ 21:59)  HR: 94 (10-25-23 @ 17:50) (91 - 105)  BP: 161/75 (10-25-23 @ 17:50) (138/74 - 161/75)  RR: 18 (10-25-23 @ 16:46) (18 - 18)  SpO2: 99% (10-25-23 @ 16:46) (98% - 100%)  Wt(kg): --  I&O's Summary      LABS:                        7.9    12.27 )-----------( 289      ( 25 Oct 2023 07:24 )             25.1     10-25    137  |  103  |  10  ----------------------------<  152<H>  3.6   |  20<L>  |  0.93    Ca    9.0      25 Oct 2023 07:24  Phos  2.5     10-25  Mg     2.20     10-25    TPro  6.8  /  Alb  3.2<L>  /  TBili  0.2  /  DBili  x   /  AST  23  /  ALT  15  /  AlkPhos  105  10-25      Urinalysis Basic - ( 25 Oct 2023 07:24 )    Color: x / Appearance: x / SG: x / pH: x  Gluc: 152 mg/dL / Ketone: x  / Bili: x / Urobili: x   Blood: x / Protein: x / Nitrite: x   Leuk Esterase: x / RBC: x / WBC x   Sq Epi: x / Non Sq Epi: x / Bacteria: x      CAPILLARY BLOOD GLUCOSE      POCT Blood Glucose.: 261 mg/dL (25 Oct 2023 17:30)  POCT Blood Glucose.: 225 mg/dL (25 Oct 2023 12:10)  POCT Blood Glucose.: 167 mg/dL (25 Oct 2023 07:14)  POCT Blood Glucose.: 256 mg/dL (24 Oct 2023 21:45)        Urinalysis Basic - ( 25 Oct 2023 07:24 )    Color: x / Appearance: x / SG: x / pH: x  Gluc: 152 mg/dL / Ketone: x  / Bili: x / Urobili: x   Blood: x / Protein: x / Nitrite: x   Leuk Esterase: x / RBC: x / WBC x   Sq Epi: x / Non Sq Epi: x / Bacteria: x        MEDICATIONS  (STANDING):  atorvastatin 20 milliGRAM(s) Oral at bedtime  cefuroxime   Tablet 500 milliGRAM(s) Oral every 12 hours  cilostazol 50 milliGRAM(s) Oral two times a day  cyanocobalamin 1000 MICROGram(s) Oral daily  dextrose 5%. 1000 milliLiter(s) (50 mL/Hr) IV Continuous <Continuous>  dextrose 5%. 1000 milliLiter(s) (100 mL/Hr) IV Continuous <Continuous>  dextrose 50% Injectable 25 Gram(s) IV Push once  dextrose 50% Injectable 25 Gram(s) IV Push once  dextrose 50% Injectable 12.5 Gram(s) IV Push once  glucagon  Injectable 1 milliGRAM(s) IntraMuscular once  influenza  Vaccine (HIGH DOSE) 0.7 milliLiter(s) IntraMuscular once  insulin glargine Injectable (LANTUS) 16 Unit(s) SubCutaneous at bedtime  insulin lispro (ADMELOG) corrective regimen sliding scale   SubCutaneous three times a day before meals  insulin lispro (ADMELOG) corrective regimen sliding scale   SubCutaneous at bedtime  labetalol 100 milliGRAM(s) Oral every 12 hours    MEDICATIONS  (PRN):  acetaminophen     Tablet .. 650 milliGRAM(s) Oral every 6 hours PRN Temp greater or equal to 38C (100.4F), Mild Pain (1 - 3)  dextrose Oral Gel 15 Gram(s) Oral once PRN Blood Glucose LESS THAN 70 milliGRAM(s)/deciliter  melatonin 3 milliGRAM(s) Oral at bedtime PRN Insomnia          PHYSICAL EXAM:  GENERAL: NAD, well-groomed, well-developed  HEAD:  Atraumatic, Normocephalic  CHEST/LUNG: Clear to percussion bilaterally; No rales, rhonchi, wheezing, or rubs  HEART: Regular rate and rhythm; No murmurs, rubs, or gallops  ABDOMEN: Soft, Nontender, Nondistended; Bowel sounds present  EXTREMITIES:  2+ Peripheral Pulses, No clubbing, cyanosis, or edema  LYMPH: No lymphadenopathy noted  SKIN: No rashes or lesions    Care Discussed with Consultants/Other Providers [ ] YES  [ ] NO

## 2023-10-26 LAB
ALBUMIN SERPL ELPH-MCNC: 3.7 G/DL — SIGNIFICANT CHANGE UP (ref 3.3–5)
ALBUMIN SERPL ELPH-MCNC: 3.7 G/DL — SIGNIFICANT CHANGE UP (ref 3.3–5)
ALP SERPL-CCNC: 98 U/L — SIGNIFICANT CHANGE UP (ref 40–120)
ALP SERPL-CCNC: 98 U/L — SIGNIFICANT CHANGE UP (ref 40–120)
ALT FLD-CCNC: 28 U/L — SIGNIFICANT CHANGE UP (ref 4–41)
ALT FLD-CCNC: 28 U/L — SIGNIFICANT CHANGE UP (ref 4–41)
ANION GAP SERPL CALC-SCNC: 14 MMOL/L — SIGNIFICANT CHANGE UP (ref 7–14)
ANION GAP SERPL CALC-SCNC: 14 MMOL/L — SIGNIFICANT CHANGE UP (ref 7–14)
AST SERPL-CCNC: 28 U/L — SIGNIFICANT CHANGE UP (ref 4–40)
AST SERPL-CCNC: 28 U/L — SIGNIFICANT CHANGE UP (ref 4–40)
BASOPHILS # BLD AUTO: 0.05 K/UL — SIGNIFICANT CHANGE UP (ref 0–0.2)
BASOPHILS # BLD AUTO: 0.05 K/UL — SIGNIFICANT CHANGE UP (ref 0–0.2)
BASOPHILS NFR BLD AUTO: 0.5 % — SIGNIFICANT CHANGE UP (ref 0–2)
BASOPHILS NFR BLD AUTO: 0.5 % — SIGNIFICANT CHANGE UP (ref 0–2)
BILIRUB SERPL-MCNC: <0.2 MG/DL — SIGNIFICANT CHANGE UP (ref 0.2–1.2)
BILIRUB SERPL-MCNC: <0.2 MG/DL — SIGNIFICANT CHANGE UP (ref 0.2–1.2)
BUN SERPL-MCNC: 10 MG/DL — SIGNIFICANT CHANGE UP (ref 7–23)
BUN SERPL-MCNC: 10 MG/DL — SIGNIFICANT CHANGE UP (ref 7–23)
CALCIUM SERPL-MCNC: 9.6 MG/DL — SIGNIFICANT CHANGE UP (ref 8.4–10.5)
CALCIUM SERPL-MCNC: 9.6 MG/DL — SIGNIFICANT CHANGE UP (ref 8.4–10.5)
CHLORIDE SERPL-SCNC: 100 MMOL/L — SIGNIFICANT CHANGE UP (ref 98–107)
CHLORIDE SERPL-SCNC: 100 MMOL/L — SIGNIFICANT CHANGE UP (ref 98–107)
CO2 SERPL-SCNC: 21 MMOL/L — LOW (ref 22–31)
CO2 SERPL-SCNC: 21 MMOL/L — LOW (ref 22–31)
CREAT SERPL-MCNC: 0.84 MG/DL — SIGNIFICANT CHANGE UP (ref 0.5–1.3)
CREAT SERPL-MCNC: 0.84 MG/DL — SIGNIFICANT CHANGE UP (ref 0.5–1.3)
EGFR: 94 ML/MIN/1.73M2 — SIGNIFICANT CHANGE UP
EGFR: 94 ML/MIN/1.73M2 — SIGNIFICANT CHANGE UP
EOSINOPHIL # BLD AUTO: 0.42 K/UL — SIGNIFICANT CHANGE UP (ref 0–0.5)
EOSINOPHIL # BLD AUTO: 0.42 K/UL — SIGNIFICANT CHANGE UP (ref 0–0.5)
EOSINOPHIL NFR BLD AUTO: 3.8 % — SIGNIFICANT CHANGE UP (ref 0–6)
EOSINOPHIL NFR BLD AUTO: 3.8 % — SIGNIFICANT CHANGE UP (ref 0–6)
GLUCOSE BLDC GLUCOMTR-MCNC: 156 MG/DL — HIGH (ref 70–99)
GLUCOSE BLDC GLUCOMTR-MCNC: 156 MG/DL — HIGH (ref 70–99)
GLUCOSE BLDC GLUCOMTR-MCNC: 201 MG/DL — HIGH (ref 70–99)
GLUCOSE BLDC GLUCOMTR-MCNC: 201 MG/DL — HIGH (ref 70–99)
GLUCOSE BLDC GLUCOMTR-MCNC: 249 MG/DL — HIGH (ref 70–99)
GLUCOSE BLDC GLUCOMTR-MCNC: 249 MG/DL — HIGH (ref 70–99)
GLUCOSE BLDC GLUCOMTR-MCNC: 311 MG/DL — HIGH (ref 70–99)
GLUCOSE BLDC GLUCOMTR-MCNC: 311 MG/DL — HIGH (ref 70–99)
GLUCOSE SERPL-MCNC: 171 MG/DL — HIGH (ref 70–99)
GLUCOSE SERPL-MCNC: 171 MG/DL — HIGH (ref 70–99)
HCT VFR BLD CALC: 27 % — LOW (ref 39–50)
HCT VFR BLD CALC: 27 % — LOW (ref 39–50)
HGB BLD-MCNC: 8 G/DL — LOW (ref 13–17)
HGB BLD-MCNC: 8 G/DL — LOW (ref 13–17)
IANC: 7.61 K/UL — HIGH (ref 1.8–7.4)
IANC: 7.61 K/UL — HIGH (ref 1.8–7.4)
IMM GRANULOCYTES NFR BLD AUTO: 1 % — HIGH (ref 0–0.9)
IMM GRANULOCYTES NFR BLD AUTO: 1 % — HIGH (ref 0–0.9)
LYMPHOCYTES # BLD AUTO: 1.62 K/UL — SIGNIFICANT CHANGE UP (ref 1–3.3)
LYMPHOCYTES # BLD AUTO: 1.62 K/UL — SIGNIFICANT CHANGE UP (ref 1–3.3)
LYMPHOCYTES # BLD AUTO: 14.6 % — SIGNIFICANT CHANGE UP (ref 13–44)
LYMPHOCYTES # BLD AUTO: 14.6 % — SIGNIFICANT CHANGE UP (ref 13–44)
MAGNESIUM SERPL-MCNC: 2.1 MG/DL — SIGNIFICANT CHANGE UP (ref 1.6–2.6)
MAGNESIUM SERPL-MCNC: 2.1 MG/DL — SIGNIFICANT CHANGE UP (ref 1.6–2.6)
MCHC RBC-ENTMCNC: 21.4 PG — LOW (ref 27–34)
MCHC RBC-ENTMCNC: 21.4 PG — LOW (ref 27–34)
MCHC RBC-ENTMCNC: 29.6 GM/DL — LOW (ref 32–36)
MCHC RBC-ENTMCNC: 29.6 GM/DL — LOW (ref 32–36)
MCV RBC AUTO: 72.4 FL — LOW (ref 80–100)
MCV RBC AUTO: 72.4 FL — LOW (ref 80–100)
MONOCYTES # BLD AUTO: 1.27 K/UL — HIGH (ref 0–0.9)
MONOCYTES # BLD AUTO: 1.27 K/UL — HIGH (ref 0–0.9)
MONOCYTES NFR BLD AUTO: 11.5 % — SIGNIFICANT CHANGE UP (ref 2–14)
MONOCYTES NFR BLD AUTO: 11.5 % — SIGNIFICANT CHANGE UP (ref 2–14)
NEUTROPHILS # BLD AUTO: 7.61 K/UL — HIGH (ref 1.8–7.4)
NEUTROPHILS # BLD AUTO: 7.61 K/UL — HIGH (ref 1.8–7.4)
NEUTROPHILS NFR BLD AUTO: 68.6 % — SIGNIFICANT CHANGE UP (ref 43–77)
NEUTROPHILS NFR BLD AUTO: 68.6 % — SIGNIFICANT CHANGE UP (ref 43–77)
NRBC # BLD: 0 /100 WBCS — SIGNIFICANT CHANGE UP (ref 0–0)
NRBC # BLD: 0 /100 WBCS — SIGNIFICANT CHANGE UP (ref 0–0)
NRBC # FLD: 0.02 K/UL — HIGH (ref 0–0)
NRBC # FLD: 0.02 K/UL — HIGH (ref 0–0)
PHOSPHATE SERPL-MCNC: 3.8 MG/DL — SIGNIFICANT CHANGE UP (ref 2.5–4.5)
PHOSPHATE SERPL-MCNC: 3.8 MG/DL — SIGNIFICANT CHANGE UP (ref 2.5–4.5)
PLATELET # BLD AUTO: 340 K/UL — SIGNIFICANT CHANGE UP (ref 150–400)
PLATELET # BLD AUTO: 340 K/UL — SIGNIFICANT CHANGE UP (ref 150–400)
POTASSIUM SERPL-MCNC: 3.4 MMOL/L — LOW (ref 3.5–5.3)
POTASSIUM SERPL-MCNC: 3.4 MMOL/L — LOW (ref 3.5–5.3)
POTASSIUM SERPL-SCNC: 3.4 MMOL/L — LOW (ref 3.5–5.3)
POTASSIUM SERPL-SCNC: 3.4 MMOL/L — LOW (ref 3.5–5.3)
PROT SERPL-MCNC: 7.6 G/DL — SIGNIFICANT CHANGE UP (ref 6–8.3)
PROT SERPL-MCNC: 7.6 G/DL — SIGNIFICANT CHANGE UP (ref 6–8.3)
RBC # BLD: 3.73 M/UL — LOW (ref 4.2–5.8)
RBC # BLD: 3.73 M/UL — LOW (ref 4.2–5.8)
RBC # FLD: 19.7 % — HIGH (ref 10.3–14.5)
RBC # FLD: 19.7 % — HIGH (ref 10.3–14.5)
RHEUMATOID FACT SERPL-ACNC: <10 IU/ML — SIGNIFICANT CHANGE UP (ref 0–13)
RHEUMATOID FACT SERPL-ACNC: <10 IU/ML — SIGNIFICANT CHANGE UP (ref 0–13)
SODIUM SERPL-SCNC: 135 MMOL/L — SIGNIFICANT CHANGE UP (ref 135–145)
SODIUM SERPL-SCNC: 135 MMOL/L — SIGNIFICANT CHANGE UP (ref 135–145)
WBC # BLD: 11.08 K/UL — HIGH (ref 3.8–10.5)
WBC # BLD: 11.08 K/UL — HIGH (ref 3.8–10.5)
WBC # FLD AUTO: 11.08 K/UL — HIGH (ref 3.8–10.5)
WBC # FLD AUTO: 11.08 K/UL — HIGH (ref 3.8–10.5)

## 2023-10-26 PROCEDURE — 99232 SBSQ HOSP IP/OBS MODERATE 35: CPT

## 2023-10-26 RX ORDER — POTASSIUM CHLORIDE 20 MEQ
40 PACKET (EA) ORAL ONCE
Refills: 0 | Status: COMPLETED | OUTPATIENT
Start: 2023-10-26 | End: 2023-10-26

## 2023-10-26 RX ADMIN — Medication 100 MILLIGRAM(S): at 05:10

## 2023-10-26 RX ADMIN — Medication 2: at 12:22

## 2023-10-26 RX ADMIN — ATORVASTATIN CALCIUM 20 MILLIGRAM(S): 80 TABLET, FILM COATED ORAL at 22:36

## 2023-10-26 RX ADMIN — Medication 500 MILLIGRAM(S): at 17:38

## 2023-10-26 RX ADMIN — CILOSTAZOL 50 MILLIGRAM(S): 100 TABLET ORAL at 17:38

## 2023-10-26 RX ADMIN — Medication 40 MILLIEQUIVALENT(S): at 09:28

## 2023-10-26 RX ADMIN — Medication 1: at 07:49

## 2023-10-26 RX ADMIN — Medication 2: at 17:37

## 2023-10-26 RX ADMIN — CILOSTAZOL 50 MILLIGRAM(S): 100 TABLET ORAL at 05:07

## 2023-10-26 RX ADMIN — PREGABALIN 1000 MICROGRAM(S): 225 CAPSULE ORAL at 11:22

## 2023-10-26 RX ADMIN — INSULIN GLARGINE 16 UNIT(S): 100 INJECTION, SOLUTION SUBCUTANEOUS at 22:36

## 2023-10-26 RX ADMIN — Medication 500 MILLIGRAM(S): at 05:06

## 2023-10-26 RX ADMIN — Medication 100 MILLIGRAM(S): at 17:43

## 2023-10-26 RX ADMIN — Medication 2: at 22:39

## 2023-10-26 NOTE — PROGRESS NOTE ADULT - SUBJECTIVE AND OBJECTIVE BOX
[facility] NEPHROLOGY- PROGRESS NOTE    68y Male with history of HTN presents with dysuria. Nephrology consulted for hyponatremia.    REVIEW OF SYSTEMS:  Gen: no fevers  Cards: no chest pain  Resp: no dyspnea  GI: no nausea or vomiting or diarrhea  Vascular: no LE edema    Advil (Rash)      Hospital Medications: Medications reviewed      VITALS:  T(F): 98.9 (10-26-23 @ 12:00), Max: 98.9 (10-26-23 @ 12:00)  HR: 103 (10-26-23 @ 12:00)  BP: 129/51 (10-26-23 @ 12:00)  RR: 18 (10-26-23 @ 12:00)  SpO2: 99% (10-26-23 @ 12:00)  Wt(kg): --    10-25 @ 07:01  -  10-26 @ 07:00  --------------------------------------------------------  IN: 880 mL / OUT: 500 mL / NET: 380 mL    10-26 @ 07:01  -  10-26 @ 13:54  --------------------------------------------------------  IN: 240 mL / OUT: 550 mL / NET: -310 mL        PHYSICAL EXAM:    Gen: NAD, calm  Cards: RRR, +S1/S2, no M/G/R  Resp: CTA B/L  GI: soft, NT, + ab distention, NABS  Vascular: no LE edema B/L      LABS:  10-26    135  |  100  |  10  ----------------------------<  171<H>  3.4<L>   |  21<L>  |  0.84    Ca    9.6      26 Oct 2023 04:28  Phos  3.8     10-26  Mg     2.10     10-26    TPro  7.6  /  Alb  3.7  /  TBili  <0.2  /  DBili      /  AST  28  /  ALT  28  /  AlkPhos  98  10-26    Creatinine Trend: 0.84 <--, 0.93 <--, 1.06 <--, 1.06 <--, 1.11 <--, 0.91 <--                        8.0    11.08 )-----------( 340      ( 26 Oct 2023 04:28 )             27.0     Urine Studies:  Urinalysis Basic - ( 26 Oct 2023 04:28 )    Color:  / Appearance:  / SG:  / pH:   Gluc: 171 mg/dL / Ketone:   / Bili:  / Urobili:    Blood:  / Protein:  / Nitrite:    Leuk Esterase:  / RBC:  / WBC    Sq Epi:  / Non Sq Epi:  / Bacteria:       Sodium, Random Urine: 30 mmol/L (10-23 @ 13:42)  Osmolality, Random Urine: 351 mosm/kg (10-23 @ 13:42)

## 2023-10-26 NOTE — CONSULT NOTE ADULT - ASSESSMENT
68M with T2DM, HTN/HLD, PVD on eliquis, and 50 PY smoking history presents with 1 day history of dysuria/polyuria.  Patient was recently seen at OSH for hemorrhoid banding on Thursday by Dr. Ian Bearden ([phone]) and went home the same day. He reports he was given a PPI/antacid medication, a 3 day course of amoxicillin, and some stool softener. He missed his last dose of amoxicillin the day prior to his symptoms onset, but otherwise, reports he had no acute events in the post-op period.  This morning at around 6am; he woke with new suprapubic pain. He went to the bathroom much more frequently and endorsed a burning pain whenever he urinated and his daughter took his temperature which came back at 102 and they called his surgeon who told him to go to the ED. He denies CP, SOB, palpitations, LH, dizziness, LOC, abdominal pain, vomiting, diarrhea.  ED Course 100.7, 164/57, 136 BMP, 18/min, 100% RA  Ofirmev x1, 1U Lispro, Zosyn, 1L LR  CT A/P showing Diffuse Bladder thickening with enlarged prostate, Mod. 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 Continued surveillance is recommended. it has already increased in size since 2013: need ct surgery to see:  needs pet scan  ; as it is possible that he might need vats surgery   UTI  -cont antibiotics  DM  -monitor and control   HTN/HLD  -controlled  PVD  -eliquis:     dw team

## 2023-10-26 NOTE — PHYSICAL THERAPY INITIAL EVALUATION ADULT - PERTINENT HX OF CURRENT PROBLEM, REHAB EVAL
Patient is 68 year old male with PVD (eliquis), T2DM, HTN, HLD presents with 1 day history of Dysuria found to be septic likely in the setting of UTI with incidental finding of moderate pericardial effusion.

## 2023-10-26 NOTE — CONSULT NOTE ADULT - SUBJECTIVE AND OBJECTIVE BOX
10-26-23 @ 10:02    Patient is a 68y old  Male who presents with a chief complaint of Sepsis, Mod Pericardial Effusion (25 Oct 2023 16:25)      HPI:  68M with T2DM, HTN/HLD, PVD on eliquis, and 50 PY smoking history presents with 1 day history of dysuria/polyuria.    Patient was recently seen at OSH for hemorrhoid banding on Thursday by Dr. Ian Bearden ([phone]) and went home the same day. He reports he was given a PPI/antacid medication, a 3 day course of amoxicillin, and some stool softener. He missed his last dose of amoxicillin the day prior to his symptoms onset, but otherwise, reports he had no acute events in the post-op period.  This morning at around 6am; he woke with new suprapubic pain. He went to the bathroom much more frequently and endorsed a burning pain whenever he urinated and his daughter took his temperature which came back at 102 and they called his surgeon who told him to go to the ED. He denies CP, SOB, palpitations, LH, dizziness, LOC, abdominal pain, vomiting, diarrhea.  ED Course 100.7, 164/57, 136 BMP, 18/min, 100% RA  Ofirmev x1, 1U Lispro, Zosyn, 1L LR  CT A/P showing Diffuse Bladder thickening with enlarged prostate, Mod. Pericardial effusion noted (22 Oct 2023 22:47):  now pulm called:   he has no cough : he has more then 50 yrs of smoking history  he has no sob:  at this time: no cough : he quit 50 yrs ago       ?FOLLOWING PRESENT  x[ ] Hx of PE/DVT, [ x] Hx COPD, [ x] Hx of Asthma, [y ] Hx of Hospitalization, [x ]  Hx of BiPAP/CPAP use, [ x] Hx of RENE    Allergies    Advil (Rash)    Intolerances        PAST MEDICAL & SURGICAL HISTORY:  HTN (hypertension)      HLD (hyperlipidemia)      DM (diabetes mellitus)      PAD (peripheral artery disease)      Tobacco abuse  Former      H/O iron deficiency      S/P appendectomy      S/P peripheral artery angioplasty with stent placement          FAMILY HISTORY:  No pertinent family history in first degree relatives        Social History: [  50 pk years] TOBACCO                  [ x ] ETOH                                 [x  ] IVDA/DRUGS    REVIEW OF SYSTEMS      General:x	    Skin/Breast:x  	  Ophthalmologic:x  	  ENMT:	x    Respiratory and Thorax: no sob:  no cough ; no phlegm    	  Cardiovascular:	x    Gastrointestinal:	x    Genitourinary:	x    Musculoskeletal:	x    Neurological:	  x  Psychiatric:	x    Hematology/Lymphatics:	x    Endocrine:	x    Allergic/Immunologic:	x    MEDICATIONS  (STANDING):  atorvastatin 20 milliGRAM(s) Oral at bedtime  cefuroxime   Tablet 500 milliGRAM(s) Oral every 12 hours  cilostazol 50 milliGRAM(s) Oral two times a day  cyanocobalamin 1000 MICROGram(s) Oral daily  dextrose 5%. 1000 milliLiter(s) (50 mL/Hr) IV Continuous <Continuous>  dextrose 5%. 1000 milliLiter(s) (100 mL/Hr) IV Continuous <Continuous>  dextrose 50% Injectable 25 Gram(s) IV Push once  dextrose 50% Injectable 25 Gram(s) IV Push once  dextrose 50% Injectable 12.5 Gram(s) IV Push once  glucagon  Injectable 1 milliGRAM(s) IntraMuscular once  influenza  Vaccine (HIGH DOSE) 0.7 milliLiter(s) IntraMuscular once  insulin glargine Injectable (LANTUS) 16 Unit(s) SubCutaneous at bedtime  insulin lispro (ADMELOG) corrective regimen sliding scale   SubCutaneous three times a day before meals  insulin lispro (ADMELOG) corrective regimen sliding scale   SubCutaneous at bedtime  labetalol 100 milliGRAM(s) Oral every 12 hours    MEDICATIONS  (PRN):  acetaminophen     Tablet .. 650 milliGRAM(s) Oral every 6 hours PRN Temp greater or equal to 38C (100.4F), Mild Pain (1 - 3)  dextrose Oral Gel 15 Gram(s) Oral once PRN Blood Glucose LESS THAN 70 milliGRAM(s)/deciliter  melatonin 3 milliGRAM(s) Oral at bedtime PRN Insomnia       Vital Signs Last 24 Hrs  T(C): 37.1 (26 Oct 2023 05:04), Max: 37.1 (26 Oct 2023 05:04)  T(F): 98.8 (26 Oct 2023 05:04), Max: 98.8 (26 Oct 2023 05:04)  HR: 102 (26 Oct 2023 05:04) (94 - 109)  BP: 142/72 (26 Oct 2023 05:04) (129/70 - 161/75)  BP(mean): --  RR: 18 (26 Oct 2023 05:04) (17 - 18)  SpO2: 98% (26 Oct 2023 05:04) (98% - 100%)    Parameters below as of 26 Oct 2023 05:04  Patient On (Oxygen Delivery Method): room air    Orthostatic VS          I&O's Summary    25 Oct 2023 07:01  -  26 Oct 2023 07:00  --------------------------------------------------------  IN: 880 mL / OUT: 500 mL / NET: 380 mL        Physical Exam:   GENERAL: NAD, well-groomed, well-developed  HEENT: CORRINE/   Atraumatic, Normocephalic  ENMT: No tonsillar erythema, exudates, or enlargement; Moist mucous membranes, Good dentition, No lesions  NECK: Supple, No JVD, Normal thyroid  CHEST/LUNG: Clear to auscultation bilaterally- no wheezing  CVS: Regular rate and rhythm; No murmurs, rubs, or gallops  GI: : Soft, Nontender, Nondistended; Bowel sounds present  NERVOUS SYSTEM:  Alert & Oriented X3  EXTREMITIES:  - edema  LYMPH: No lymphadenopathy noted  SKIN: No rashes or lesions  ENDOCRINOLOGY: No Thyromegaly  PSYCH: Appropriate    Labs:  -0.5<39<4>>27<<7.405>>-0.5<<3><<4><<5<<279>>                            8.0    11.08 )-----------( 340      ( 26 Oct 2023 04:28 )             27.0                         7.9    12.27 )-----------( 289      ( 25 Oct 2023 07:24 )             25.1                         8.1    15.59 )-----------( 263      ( 24 Oct 2023 07:05 )             26.7                         8.2    20.02 )-----------( 273      ( 23 Oct 2023 06:00 )             27.0                         10.2   20.18 )-----------( 293      ( 22 Oct 2023 22:52 )             33.3                         9.7    18.58 )-----------( 276      ( 22 Oct 2023 11:57 )             32.6     10-26    135  |  100  |  10  ----------------------------<  171<H>  3.4<L>   |  21<L>  |  0.84  10-25    137  |  103  |  10  ----------------------------<  152<H>  3.6   |  20<L>  |  0.93  10-24    134<L>  |  99  |  12  ----------------------------<  192<H>  3.2<L>   |  21<L>  |  1.06  10-23    132<L>  |  99  |  12  ----------------------------<  253<H>  3.6   |  21<L>  |  1.06  10-22    134<L>  |  97<L>  |  10  ----------------------------<  291<H>  3.7   |  22  |  1.11  10-22    133<L>  |  98  |  10  ----------------------------<  252<H>  4.0   |  22  |  0.91    Ca    9.6      26 Oct 2023 04:28  Ca    9.0      25 Oct 2023 07:24  Phos  3.8     10-26  Phos  2.5     10-25  Mg     2.10     10-26  Mg     2.20     10-25    TPro  7.6  /  Alb  3.7  /  TBili  <0.2  /  DBili  x   /  AST  28  /  ALT  28  /  AlkPhos  98  10-26  TPro  6.8  /  Alb  3.2<L>  /  TBili  0.2  /  DBili  x   /  AST  23  /  ALT  15  /  AlkPhos  105  10-25  TPro  7.1  /  Alb  3.6  /  TBili  0.4  /  DBili  x   /  AST  15  /  ALT  12  /  AlkPhos  95  10-24  TPro  7.1  /  Alb  3.8  /  TBili  0.5  /  DBili  x   /  AST  14  /  ALT  10  /  AlkPhos  103  10-23  TPro  8.5<H>  /  Alb  4.5  /  TBili  0.6  /  DBili  x   /  AST  16  /  ALT  12  /  AlkPhos  112  10-22  TPro  7.9  /  Alb  4.3  /  TBili  0.7  /  DBili  x   /  AST  15  /  ALT  11  /  AlkPhos  96  10-22    CAPILLARY BLOOD GLUCOSE      POCT Blood Glucose.: 156 mg/dL (26 Oct 2023 06:57)  POCT Blood Glucose.: 288 mg/dL (25 Oct 2023 21:25)  POCT Blood Glucose.: 261 mg/dL (25 Oct 2023 17:30)  POCT Blood Glucose.: 225 mg/dL (25 Oct 2023 12:10)    LIVER FUNCTIONS - ( 26 Oct 2023 04:28 )  Alb: 3.7 g/dL / Pro: 7.6 g/dL / ALK PHOS: 98 U/L / ALT: 28 U/L / AST: 28 U/L / GGT: x             Urinalysis Basic - ( 26 Oct 2023 04:28 )    Color: x / Appearance: x / SG: x / pH: x  Gluc: 171 mg/dL / Ketone: x  / Bili: x / Urobili: x   Blood: x / Protein: x / Nitrite: x   Leuk Esterase: x / RBC: x / WBC x   Sq Epi: x / Non Sq Epi: x / Bacteria: x      D DImer      Studies  Chest X-RAY  CT SCAN Chest   CT Abdomen  Venous Dopplers: LE:   Others      rad< from: CT Chest w/ IV Cont (10.24.23 @ 20:00) >    HEART: Heart size is normal. Small-moderate pericardial effusion,   unchanged since 10/22/2023 abdomen CT. Coronary arterial calcification.    CHEST WALL AND LOWER NECK: Within normal limits.    VISUALIZED UPPER ABDOMEN: Within normal limits.    BONES: Within normal limits.    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 Continued surveillance is recommended.    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    Small-moderate pericardial effusion, unchanged since 10/22/2023 abdomen   CT.    < end of copied text >  < from: CT Chest w/ IV Cont (10.24.23 @ 20:00) >    HEART: Heart size is normal. Small-moderate pericardial effusion,   unchanged since 10/22/2023 abdomen CT. Coronary arterial calcification.    CHEST WALL AND LOWER NECK: Within normal limits.    VISUALIZED UPPER ABDOMEN: Within normal limits.    BONES: Within normal limits.    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 Continued surveillance is recommended.    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    Small-moderate pericardial effusion, unchanged since 10/22/2023 abdomen   CT.    < end of copied text >

## 2023-10-26 NOTE — PROGRESS NOTE ADULT - SUBJECTIVE AND OBJECTIVE BOX
Follow Up:      Inverval History/ROS: Patient is a 68y old  Male who presents with a chief complaint of Sepsis, Mod Pericardial Effusion (26 Oct 2023 13:53)    No fever    Allergies    Advil (Rash)    Intolerances        ANTIMICROBIALS:  cefuroxime   Tablet 500 every 12 hours      OTHER MEDS:  acetaminophen     Tablet .. 650 milliGRAM(s) Oral every 6 hours PRN  atorvastatin 20 milliGRAM(s) Oral at bedtime  cilostazol 50 milliGRAM(s) Oral two times a day  cyanocobalamin 1000 MICROGram(s) Oral daily  dextrose 5%. 1000 milliLiter(s) IV Continuous <Continuous>  dextrose 5%. 1000 milliLiter(s) IV Continuous <Continuous>  dextrose 50% Injectable 25 Gram(s) IV Push once  dextrose 50% Injectable 25 Gram(s) IV Push once  dextrose 50% Injectable 12.5 Gram(s) IV Push once  dextrose Oral Gel 15 Gram(s) Oral once PRN  glucagon  Injectable 1 milliGRAM(s) IntraMuscular once  influenza  Vaccine (HIGH DOSE) 0.7 milliLiter(s) IntraMuscular once  insulin glargine Injectable (LANTUS) 16 Unit(s) SubCutaneous at bedtime  insulin lispro (ADMELOG) corrective regimen sliding scale   SubCutaneous three times a day before meals  insulin lispro (ADMELOG) corrective regimen sliding scale   SubCutaneous at bedtime  labetalol 100 milliGRAM(s) Oral every 12 hours  melatonin 3 milliGRAM(s) Oral at bedtime PRN      Vital Signs Last 24 Hrs  T(C): 37.2 (26 Oct 2023 12:00), Max: 37.2 (26 Oct 2023 12:00)  T(F): 98.9 (26 Oct 2023 12:00), Max: 98.9 (26 Oct 2023 12:00)  HR: 103 (26 Oct 2023 12:00) (94 - 109)  BP: 129/51 (26 Oct 2023 12:00) (129/51 - 161/75)  BP(mean): --  RR: 18 (26 Oct 2023 12:00) (17 - 18)  SpO2: 99% (26 Oct 2023 12:00) (98% - 100%)    Parameters below as of 26 Oct 2023 12:00  Patient On (Oxygen Delivery Method): room air        PHYSICAL EXAM:  General: [ x] non-toxic  HEAD/EYES: [ ] PERRL [ x] white sclera [ ] icterus  ENT:  [ ] normal [ x] supple [ ] thrush [ ] pharyngeal exudate  Cardiovascular:   [ ] murmur [x ] normal [ ] PPM/AICD  Respiratory:  [x ] clear to ausculation bilaterally  GI:  [x ] soft, non-tender, normal bowel sounds  :  [ ] melendez [ ] no CVA tenderness   Musculoskeletal:  [ ] no synovitis  Neurologic:  [ ] non-focal exam   Skin:  [ x] no rash  Lymph: x[ ] no lymphadenopathy  Psychiatric:  [ ] appropriate affect [x ] alert & oriented  Lines:  [x ] no phlebitis [ ] central line                                8.0    11.08 )-----------( 340      ( 26 Oct 2023 04:28 )             27.0       10-26    135  |  100  |  10  ----------------------------<  171<H>  3.4<L>   |  21<L>  |  0.84    Ca    9.6      26 Oct 2023 04:28  Phos  3.8     10-26  Mg     2.10     10-26    TPro  7.6  /  Alb  3.7  /  TBili  <0.2  /  DBili  x   /  AST  28  /  ALT  28  /  AlkPhos  98  10-26      Urinalysis Basic - ( 26 Oct 2023 04:28 )    Color: x / Appearance: x / SG: x / pH: x  Gluc: 171 mg/dL / Ketone: x  / Bili: x / Urobili: x   Blood: x / Protein: x / Nitrite: x   Leuk Esterase: x / RBC: x / WBC x   Sq Epi: x / Non Sq Epi: x / Bacteria: x        MICROBIOLOGY:Culture Results:   No growth at 72 Hours (10-22-23 @ 14:37)  Culture Results:   No growth at 72 Hours (10-22-23 @ 14:00)  Culture Results:   >100,000 CFU/ml Klebsiella pneumoniae (10-22-23 @ 11:50)      RADIOLOGY:

## 2023-10-26 NOTE — PROGRESS NOTE ADULT - SUBJECTIVE AND OBJECTIVE BOX
Patient is a 68y old  Male who presents with a chief complaint of Sepsis, Mod Pericardial Effusion (26 Oct 2023 14:34)    Date of servie : 10-26-23 @ 15:25  INTERVAL HPI/OVERNIGHT EVENTS:  T(C): 37.2 (10-26-23 @ 12:00), Max: 37.2 (10-26-23 @ 12:00)  HR: 103 (10-26-23 @ 12:00) (94 - 109)  BP: 129/51 (10-26-23 @ 12:00) (129/51 - 161/75)  RR: 18 (10-26-23 @ 12:00) (17 - 18)  SpO2: 99% (10-26-23 @ 12:00) (98% - 100%)  Wt(kg): --  I&O's Summary    25 Oct 2023 07:01  -  26 Oct 2023 07:00  --------------------------------------------------------  IN: 880 mL / OUT: 500 mL / NET: 380 mL    26 Oct 2023 07:01  -  26 Oct 2023 15:25  --------------------------------------------------------  IN: 240 mL / OUT: 550 mL / NET: -310 mL        LABS:                        8.0    11.08 )-----------( 340      ( 26 Oct 2023 04:28 )             27.0     10-26    135  |  100  |  10  ----------------------------<  171<H>  3.4<L>   |  21<L>  |  0.84    Ca    9.6      26 Oct 2023 04:28  Phos  3.8     10-26  Mg     2.10     10-26    TPro  7.6  /  Alb  3.7  /  TBili  <0.2  /  DBili  x   /  AST  28  /  ALT  28  /  AlkPhos  98  10-26      Urinalysis Basic - ( 26 Oct 2023 04:28 )    Color: x / Appearance: x / SG: x / pH: x  Gluc: 171 mg/dL / Ketone: x  / Bili: x / Urobili: x   Blood: x / Protein: x / Nitrite: x   Leuk Esterase: x / RBC: x / WBC x   Sq Epi: x / Non Sq Epi: x / Bacteria: x      CAPILLARY BLOOD GLUCOSE      POCT Blood Glucose.: 249 mg/dL (26 Oct 2023 11:51)  POCT Blood Glucose.: 156 mg/dL (26 Oct 2023 06:57)  POCT Blood Glucose.: 288 mg/dL (25 Oct 2023 21:25)  POCT Blood Glucose.: 261 mg/dL (25 Oct 2023 17:30)        Urinalysis Basic - ( 26 Oct 2023 04:28 )    Color: x / Appearance: x / SG: x / pH: x  Gluc: 171 mg/dL / Ketone: x  / Bili: x / Urobili: x   Blood: x / Protein: x / Nitrite: x   Leuk Esterase: x / RBC: x / WBC x   Sq Epi: x / Non Sq Epi: x / Bacteria: x        MEDICATIONS  (STANDING):  atorvastatin 20 milliGRAM(s) Oral at bedtime  cefuroxime   Tablet 500 milliGRAM(s) Oral every 12 hours  cilostazol 50 milliGRAM(s) Oral two times a day  cyanocobalamin 1000 MICROGram(s) Oral daily  dextrose 5%. 1000 milliLiter(s) (100 mL/Hr) IV Continuous <Continuous>  dextrose 5%. 1000 milliLiter(s) (50 mL/Hr) IV Continuous <Continuous>  dextrose 50% Injectable 25 Gram(s) IV Push once  dextrose 50% Injectable 25 Gram(s) IV Push once  dextrose 50% Injectable 12.5 Gram(s) IV Push once  glucagon  Injectable 1 milliGRAM(s) IntraMuscular once  influenza  Vaccine (HIGH DOSE) 0.7 milliLiter(s) IntraMuscular once  insulin glargine Injectable (LANTUS) 16 Unit(s) SubCutaneous at bedtime  insulin lispro (ADMELOG) corrective regimen sliding scale   SubCutaneous at bedtime  insulin lispro (ADMELOG) corrective regimen sliding scale   SubCutaneous three times a day before meals  labetalol 100 milliGRAM(s) Oral every 12 hours    MEDICATIONS  (PRN):  acetaminophen     Tablet .. 650 milliGRAM(s) Oral every 6 hours PRN Temp greater or equal to 38C (100.4F), Mild Pain (1 - 3)  dextrose Oral Gel 15 Gram(s) Oral once PRN Blood Glucose LESS THAN 70 milliGRAM(s)/deciliter  melatonin 3 milliGRAM(s) Oral at bedtime PRN Insomnia          PHYSICAL EXAM:  GENERAL: NAD, well-groomed, well-developed  HEAD:  Atraumatic, Normocephalic  CHEST/LUNG: Clear to percussion bilaterally; No rales, rhonchi, wheezing, or rubs  HEART: Regular rate and rhythm; No murmurs, rubs, or gallops  ABDOMEN: Soft, Nontender, Nondistended; Bowel sounds present  EXTREMITIES:  2+ Peripheral Pulses, No clubbing, cyanosis, or edema  LYMPH: No lymphadenopathy noted  SKIN: No rashes or lesions    Care Discussed with Consultants/Other Providers [ ] YES  [ ] NO

## 2023-10-27 LAB
ANION GAP SERPL CALC-SCNC: 10 MMOL/L — SIGNIFICANT CHANGE UP (ref 7–14)
ANION GAP SERPL CALC-SCNC: 10 MMOL/L — SIGNIFICANT CHANGE UP (ref 7–14)
BUN SERPL-MCNC: 11 MG/DL — SIGNIFICANT CHANGE UP (ref 7–23)
BUN SERPL-MCNC: 11 MG/DL — SIGNIFICANT CHANGE UP (ref 7–23)
CALCIUM SERPL-MCNC: 9.4 MG/DL — SIGNIFICANT CHANGE UP (ref 8.4–10.5)
CALCIUM SERPL-MCNC: 9.4 MG/DL — SIGNIFICANT CHANGE UP (ref 8.4–10.5)
CHLORIDE SERPL-SCNC: 104 MMOL/L — SIGNIFICANT CHANGE UP (ref 98–107)
CHLORIDE SERPL-SCNC: 104 MMOL/L — SIGNIFICANT CHANGE UP (ref 98–107)
CO2 SERPL-SCNC: 24 MMOL/L — SIGNIFICANT CHANGE UP (ref 22–31)
CO2 SERPL-SCNC: 24 MMOL/L — SIGNIFICANT CHANGE UP (ref 22–31)
CREAT SERPL-MCNC: 0.8 MG/DL — SIGNIFICANT CHANGE UP (ref 0.5–1.3)
CREAT SERPL-MCNC: 0.8 MG/DL — SIGNIFICANT CHANGE UP (ref 0.5–1.3)
CULTURE RESULTS: SIGNIFICANT CHANGE UP
EGFR: 95 ML/MIN/1.73M2 — SIGNIFICANT CHANGE UP
EGFR: 95 ML/MIN/1.73M2 — SIGNIFICANT CHANGE UP
GLUCOSE BLDC GLUCOMTR-MCNC: 133 MG/DL — HIGH (ref 70–99)
GLUCOSE BLDC GLUCOMTR-MCNC: 133 MG/DL — HIGH (ref 70–99)
GLUCOSE BLDC GLUCOMTR-MCNC: 213 MG/DL — HIGH (ref 70–99)
GLUCOSE BLDC GLUCOMTR-MCNC: 213 MG/DL — HIGH (ref 70–99)
GLUCOSE BLDC GLUCOMTR-MCNC: 259 MG/DL — HIGH (ref 70–99)
GLUCOSE BLDC GLUCOMTR-MCNC: 259 MG/DL — HIGH (ref 70–99)
GLUCOSE BLDC GLUCOMTR-MCNC: 272 MG/DL — HIGH (ref 70–99)
GLUCOSE SERPL-MCNC: 135 MG/DL — HIGH (ref 70–99)
GLUCOSE SERPL-MCNC: 135 MG/DL — HIGH (ref 70–99)
HCT VFR BLD CALC: 24.9 % — LOW (ref 39–50)
HCT VFR BLD CALC: 24.9 % — LOW (ref 39–50)
HGB BLD-MCNC: 7.7 G/DL — LOW (ref 13–17)
HGB BLD-MCNC: 7.7 G/DL — LOW (ref 13–17)
MAGNESIUM SERPL-MCNC: 2.1 MG/DL — SIGNIFICANT CHANGE UP (ref 1.6–2.6)
MAGNESIUM SERPL-MCNC: 2.1 MG/DL — SIGNIFICANT CHANGE UP (ref 1.6–2.6)
MCHC RBC-ENTMCNC: 21.8 PG — LOW (ref 27–34)
MCHC RBC-ENTMCNC: 21.8 PG — LOW (ref 27–34)
MCHC RBC-ENTMCNC: 30.9 GM/DL — LOW (ref 32–36)
MCHC RBC-ENTMCNC: 30.9 GM/DL — LOW (ref 32–36)
MCV RBC AUTO: 70.5 FL — LOW (ref 80–100)
MCV RBC AUTO: 70.5 FL — LOW (ref 80–100)
NRBC # BLD: 0 /100 WBCS — SIGNIFICANT CHANGE UP (ref 0–0)
NRBC # BLD: 0 /100 WBCS — SIGNIFICANT CHANGE UP (ref 0–0)
NRBC # FLD: 0.02 K/UL — HIGH (ref 0–0)
NRBC # FLD: 0.02 K/UL — HIGH (ref 0–0)
PHOSPHATE SERPL-MCNC: 3.8 MG/DL — SIGNIFICANT CHANGE UP (ref 2.5–4.5)
PHOSPHATE SERPL-MCNC: 3.8 MG/DL — SIGNIFICANT CHANGE UP (ref 2.5–4.5)
PLATELET # BLD AUTO: 342 K/UL — SIGNIFICANT CHANGE UP (ref 150–400)
PLATELET # BLD AUTO: 342 K/UL — SIGNIFICANT CHANGE UP (ref 150–400)
POTASSIUM SERPL-MCNC: 3.7 MMOL/L — SIGNIFICANT CHANGE UP (ref 3.5–5.3)
POTASSIUM SERPL-MCNC: 3.7 MMOL/L — SIGNIFICANT CHANGE UP (ref 3.5–5.3)
POTASSIUM SERPL-SCNC: 3.7 MMOL/L — SIGNIFICANT CHANGE UP (ref 3.5–5.3)
POTASSIUM SERPL-SCNC: 3.7 MMOL/L — SIGNIFICANT CHANGE UP (ref 3.5–5.3)
RBC # BLD: 3.53 M/UL — LOW (ref 4.2–5.8)
RBC # BLD: 3.53 M/UL — LOW (ref 4.2–5.8)
RBC # FLD: 19.8 % — HIGH (ref 10.3–14.5)
RBC # FLD: 19.8 % — HIGH (ref 10.3–14.5)
SODIUM SERPL-SCNC: 138 MMOL/L — SIGNIFICANT CHANGE UP (ref 135–145)
SODIUM SERPL-SCNC: 138 MMOL/L — SIGNIFICANT CHANGE UP (ref 135–145)
SPECIMEN SOURCE: SIGNIFICANT CHANGE UP
WBC # BLD: 11.65 K/UL — HIGH (ref 3.8–10.5)
WBC # BLD: 11.65 K/UL — HIGH (ref 3.8–10.5)
WBC # FLD AUTO: 11.65 K/UL — HIGH (ref 3.8–10.5)
WBC # FLD AUTO: 11.65 K/UL — HIGH (ref 3.8–10.5)

## 2023-10-27 PROCEDURE — ZZZZZ: CPT

## 2023-10-27 RX ADMIN — PREGABALIN 1000 MICROGRAM(S): 225 CAPSULE ORAL at 11:38

## 2023-10-27 RX ADMIN — ATORVASTATIN CALCIUM 20 MILLIGRAM(S): 80 TABLET, FILM COATED ORAL at 21:10

## 2023-10-27 RX ADMIN — Medication 100 MILLIGRAM(S): at 06:21

## 2023-10-27 RX ADMIN — Medication 2: at 11:36

## 2023-10-27 RX ADMIN — CILOSTAZOL 50 MILLIGRAM(S): 100 TABLET ORAL at 06:21

## 2023-10-27 RX ADMIN — Medication 3: at 18:23

## 2023-10-27 RX ADMIN — Medication 650 MILLIGRAM(S): at 21:39

## 2023-10-27 RX ADMIN — Medication 1: at 21:07

## 2023-10-27 RX ADMIN — CILOSTAZOL 50 MILLIGRAM(S): 100 TABLET ORAL at 17:24

## 2023-10-27 RX ADMIN — Medication 500 MILLIGRAM(S): at 06:21

## 2023-10-27 RX ADMIN — Medication 650 MILLIGRAM(S): at 21:09

## 2023-10-27 RX ADMIN — INSULIN GLARGINE 16 UNIT(S): 100 INJECTION, SOLUTION SUBCUTANEOUS at 21:06

## 2023-10-27 RX ADMIN — Medication 100 MILLIGRAM(S): at 17:24

## 2023-10-27 RX ADMIN — Medication 500 MILLIGRAM(S): at 17:25

## 2023-10-27 NOTE — PROGRESS NOTE ADULT - SUBJECTIVE AND OBJECTIVE BOX
[facility] NEPHROLOGY- PROGRESS NOTE    68y Male with history of HTN presents with dysuria. Nephrology consulted for hyponatremia.    REVIEW OF SYSTEMS:  Gen: no fevers  Cards: no chest pain  Resp: no dyspnea  GI: no nausea or vomiting or diarrhea  Vascular: no LE edema    Advil (Rash)      Hospital Medications: Medications reviewed        VITALS:  T(F): 98.8 (10-27-23 @ 10:58), Max: 98.9 (10-27-23 @ 06:20)  HR: 101 (10-27-23 @ 10:58)  BP: 128/58 (10-27-23 @ 10:58)  RR: 17 (10-27-23 @ 10:58)  SpO2: 99% (10-27-23 @ 10:58)  Wt(kg): --    10-26 @ 07:01  -  10-27 @ 07:00  --------------------------------------------------------  IN: 460 mL / OUT: 1250 mL / NET: -790 mL    10-27 @ 07:01  -  10-27 @ 13:35  --------------------------------------------------------  IN: 236 mL / OUT: 0 mL / NET: 236 mL        PHYSICAL EXAM:    Gen: NAD, calm  Cards: RRR, +S1/S2, no M/G/R  Resp: CTA B/L  GI: soft, NT, + ab distention, NABS  Vascular: no LE edema B/L        LABS:  10-27    138  |  104  |  11  ----------------------------<  135<H>  3.7   |  24  |  0.80    Ca    9.4      27 Oct 2023 06:20  Phos  3.8     10-27  Mg     2.10     10-27    TPro  7.6  /  Alb  3.7  /  TBili  <0.2  /  DBili      /  AST  28  /  ALT  28  /  AlkPhos  98  10-26    Creatinine Trend: 0.80 <--, 0.84 <--, 0.93 <--, 1.06 <--, 1.06 <--, 1.11 <--, 0.91 <--                        7.7    11.65 )-----------( 342      ( 27 Oct 2023 06:20 )             24.9     Urine Studies:  Urinalysis Basic - ( 27 Oct 2023 06:20 )    Color:  / Appearance:  / SG:  / pH:   Gluc: 135 mg/dL / Ketone:   / Bili:  / Urobili:    Blood:  / Protein:  / Nitrite:    Leuk Esterase:  / RBC:  / WBC    Sq Epi:  / Non Sq Epi:  / Bacteria:       Sodium, Random Urine: 30 mmol/L (10-23 @ 13:42)  Osmolality, Random Urine: 351 mosm/kg (10-23 @ 13:42)

## 2023-10-27 NOTE — CONSULT NOTE ADULT - REASON FOR ADMISSION
Sepsis, Mod Pericardial Effusion
fever 102.9
Sepsis, Mod Pericardial Effusion

## 2023-10-27 NOTE — PROGRESS NOTE ADULT - SUBJECTIVE AND OBJECTIVE BOX
Patient is a 68y old  Male who presents with a chief complaint of Sepsis, Mod Pericardial Effusion (27 Oct 2023 13:35)    Date of servie : 10-27-23 @ 16:56  INTERVAL HPI/OVERNIGHT EVENTS:  T(C): 37.1 (10-27-23 @ 10:58), Max: 37.2 (10-27-23 @ 06:20)  HR: 101 (10-27-23 @ 10:58) (98 - 106)  BP: 128/58 (10-27-23 @ 10:58) (125/66 - 143/80)  RR: 17 (10-27-23 @ 10:58) (17 - 18)  SpO2: 99% (10-27-23 @ 10:58) (97% - 100%)  Wt(kg): --  I&O's Summary    26 Oct 2023 07:01  -  27 Oct 2023 07:00  --------------------------------------------------------  IN: 460 mL / OUT: 1250 mL / NET: -790 mL    27 Oct 2023 07:01  -  27 Oct 2023 16:56  --------------------------------------------------------  IN: 472 mL / OUT: 0 mL / NET: 472 mL        LABS:                        7.7    11.65 )-----------( 342      ( 27 Oct 2023 06:20 )             24.9     10-27    138  |  104  |  11  ----------------------------<  135<H>  3.7   |  24  |  0.80    Ca    9.4      27 Oct 2023 06:20  Phos  3.8     10-27  Mg     2.10     10-27    TPro  7.6  /  Alb  3.7  /  TBili  <0.2  /  DBili  x   /  AST  28  /  ALT  28  /  AlkPhos  98  10-26      Urinalysis Basic - ( 27 Oct 2023 06:20 )    Color: x / Appearance: x / SG: x / pH: x  Gluc: 135 mg/dL / Ketone: x  / Bili: x / Urobili: x   Blood: x / Protein: x / Nitrite: x   Leuk Esterase: x / RBC: x / WBC x   Sq Epi: x / Non Sq Epi: x / Bacteria: x      CAPILLARY BLOOD GLUCOSE      POCT Blood Glucose.: 213 mg/dL (27 Oct 2023 11:34)  POCT Blood Glucose.: 133 mg/dL (27 Oct 2023 07:26)  POCT Blood Glucose.: 311 mg/dL (26 Oct 2023 22:35)  POCT Blood Glucose.: 201 mg/dL (26 Oct 2023 17:35)        Urinalysis Basic - ( 27 Oct 2023 06:20 )    Color: x / Appearance: x / SG: x / pH: x  Gluc: 135 mg/dL / Ketone: x  / Bili: x / Urobili: x   Blood: x / Protein: x / Nitrite: x   Leuk Esterase: x / RBC: x / WBC x   Sq Epi: x / Non Sq Epi: x / Bacteria: x        MEDICATIONS  (STANDING):  atorvastatin 20 milliGRAM(s) Oral at bedtime  cefuroxime   Tablet 500 milliGRAM(s) Oral every 12 hours  cilostazol 50 milliGRAM(s) Oral two times a day  cyanocobalamin 1000 MICROGram(s) Oral daily  dextrose 5%. 1000 milliLiter(s) (50 mL/Hr) IV Continuous <Continuous>  dextrose 5%. 1000 milliLiter(s) (100 mL/Hr) IV Continuous <Continuous>  dextrose 50% Injectable 25 Gram(s) IV Push once  dextrose 50% Injectable 25 Gram(s) IV Push once  dextrose 50% Injectable 12.5 Gram(s) IV Push once  glucagon  Injectable 1 milliGRAM(s) IntraMuscular once  influenza  Vaccine (HIGH DOSE) 0.7 milliLiter(s) IntraMuscular once  insulin glargine Injectable (LANTUS) 16 Unit(s) SubCutaneous at bedtime  insulin lispro (ADMELOG) corrective regimen sliding scale   SubCutaneous three times a day before meals  insulin lispro (ADMELOG) corrective regimen sliding scale   SubCutaneous at bedtime  labetalol 100 milliGRAM(s) Oral every 12 hours    MEDICATIONS  (PRN):  acetaminophen     Tablet .. 650 milliGRAM(s) Oral every 6 hours PRN Temp greater or equal to 38C (100.4F), Mild Pain (1 - 3)  dextrose Oral Gel 15 Gram(s) Oral once PRN Blood Glucose LESS THAN 70 milliGRAM(s)/deciliter  melatonin 3 milliGRAM(s) Oral at bedtime PRN Insomnia          PHYSICAL EXAM:  GENERAL: NAD, well-groomed, well-developed  HEAD:  Atraumatic, Normocephalic  CHEST/LUNG: Clear to percussion bilaterally; No rales, rhonchi, wheezing, or rubs  HEART: Regular rate and rhythm; No murmurs, rubs, or gallops  ABDOMEN: Soft, Nontender, Nondistended; Bowel sounds present  EXTREMITIES:  2+ Peripheral Pulses, No clubbing, cyanosis, or edema  LYMPH: No lymphadenopathy noted  SKIN: No rashes or lesions    Care Discussed with Consultants/Other Providers [ ] YES  [ ] NO

## 2023-10-27 NOTE — CONSULT NOTE ADULT - CONSULT REASON
moderate pericardial effusion on CT abdomen and pelvis
Hyponatremia
Pyelo
sepsis:  pericardial effusion
Left upper lobe groundglass and mixed attenuation nodules

## 2023-10-27 NOTE — PROGRESS NOTE ADULT - SUBJECTIVE AND OBJECTIVE BOX
Date of Service: 10-27-23 @ 12:46    Patient is a 68y old  Male who presents with a chief complaint of Sepsis, Mod Pericardial Effusion (26 Oct 2023 15:25)      Any change in ROS: He is doing  ok :     MEDICATIONS  (STANDING):  atorvastatin 20 milliGRAM(s) Oral at bedtime  cefuroxime   Tablet 500 milliGRAM(s) Oral every 12 hours  cilostazol 50 milliGRAM(s) Oral two times a day  cyanocobalamin 1000 MICROGram(s) Oral daily  dextrose 5%. 1000 milliLiter(s) (50 mL/Hr) IV Continuous <Continuous>  dextrose 5%. 1000 milliLiter(s) (100 mL/Hr) IV Continuous <Continuous>  dextrose 50% Injectable 25 Gram(s) IV Push once  dextrose 50% Injectable 25 Gram(s) IV Push once  dextrose 50% Injectable 12.5 Gram(s) IV Push once  glucagon  Injectable 1 milliGRAM(s) IntraMuscular once  influenza  Vaccine (HIGH DOSE) 0.7 milliLiter(s) IntraMuscular once  insulin glargine Injectable (LANTUS) 16 Unit(s) SubCutaneous at bedtime  insulin lispro (ADMELOG) corrective regimen sliding scale   SubCutaneous at bedtime  insulin lispro (ADMELOG) corrective regimen sliding scale   SubCutaneous three times a day before meals  labetalol 100 milliGRAM(s) Oral every 12 hours    MEDICATIONS  (PRN):  acetaminophen     Tablet .. 650 milliGRAM(s) Oral every 6 hours PRN Temp greater or equal to 38C (100.4F), Mild Pain (1 - 3)  dextrose Oral Gel 15 Gram(s) Oral once PRN Blood Glucose LESS THAN 70 milliGRAM(s)/deciliter  melatonin 3 milliGRAM(s) Oral at bedtime PRN Insomnia    Vital Signs Last 24 Hrs  T(C): 37.1 (27 Oct 2023 10:58), Max: 37.2 (27 Oct 2023 06:20)  T(F): 98.8 (27 Oct 2023 10:58), Max: 98.9 (27 Oct 2023 06:20)  HR: 101 (27 Oct 2023 10:58) (98 - 106)  BP: 128/58 (27 Oct 2023 10:58) (125/66 - 143/80)  BP(mean): --  RR: 17 (27 Oct 2023 10:58) (17 - 18)  SpO2: 99% (27 Oct 2023 10:58) (97% - 100%)    Parameters below as of 27 Oct 2023 10:58  Patient On (Oxygen Delivery Method): room air        I&O's Summary    26 Oct 2023 07:01  -  27 Oct 2023 07:00  --------------------------------------------------------  IN: 460 mL / OUT: 1250 mL / NET: -790 mL    27 Oct 2023 07:01  -  27 Oct 2023 12:46  --------------------------------------------------------  IN: 236 mL / OUT: 0 mL / NET: 236 mL          Physical Exam:   GENERAL: NAD, well-groomed, well-developed  HEENT: CORRINE/   Atraumatic, Normocephalic  ENMT: No tonsillar erythema, exudates, or enlargement; Moist mucous membranes, Good dentition, No lesions  NECK: Supple, No JVD, Normal thyroid  CHEST/LUNG: Clear to auscultaion-  CVS: Regular rate and rhythm; No murmurs, rubs, or gallops  GI: : Soft, Nontender, Nondistended; Bowel sounds present  NERVOUS SYSTEM:  Alert & Oriented X3  EXTREMITIES:  2+ Peripheral Pulses, No clubbing, cyanosis, or edema  LYMPH: No lymphadenopathy noted  SKIN: No rashes or lesions  ENDOCRINOLOGY: No Thyromegaly  PSYCH: Appropriate    Labs:  24                            7.7    11.65 )-----------( 342      ( 27 Oct 2023 06:20 )             24.9                         8.0    11.08 )-----------( 340      ( 26 Oct 2023 04:28 )             27.0                         7.9    12.27 )-----------( 289      ( 25 Oct 2023 07:24 )             25.1                         8.1    15.59 )-----------( 263      ( 24 Oct 2023 07:05 )             26.7     10-27    138  |  104  |  11  ----------------------------<  135<H>  3.7   |  24  |  0.80  10-26    135  |  100  |  10  ----------------------------<  171<H>  3.4<L>   |  21<L>  |  0.84  10-25    137  |  103  |  10  ----------------------------<  152<H>  3.6   |  20<L>  |  0.93  10-24    134<L>  |  99  |  12  ----------------------------<  192<H>  3.2<L>   |  21<L>  |  1.06    Ca    9.4      27 Oct 2023 06:20  Ca    9.6      26 Oct 2023 04:28  Phos  3.8     10-27  Phos  3.8     10-26  Mg     2.10     10-27  Mg     2.10     10-26    TPro  7.6  /  Alb  3.7  /  TBili  <0.2  /  DBili  x   /  AST  28  /  ALT  28  /  AlkPhos  98  10-26  TPro  6.8  /  Alb  3.2<L>  /  TBili  0.2  /  DBili  x   /  AST  23  /  ALT  15  /  AlkPhos  105  10-25  TPro  7.1  /  Alb  3.6  /  TBili  0.4  /  DBili  x   /  AST  15  /  ALT  12  /  AlkPhos  95  10-24    CAPILLARY BLOOD GLUCOSE      POCT Blood Glucose.: 213 mg/dL (27 Oct 2023 11:34)  POCT Blood Glucose.: 133 mg/dL (27 Oct 2023 07:26)  POCT Blood Glucose.: 311 mg/dL (26 Oct 2023 22:35)  POCT Blood Glucose.: 201 mg/dL (26 Oct 2023 17:35)      LIVER FUNCTIONS - ( 26 Oct 2023 04:28 )  Alb: 3.7 g/dL / Pro: 7.6 g/dL / ALK PHOS: 98 U/L / ALT: 28 U/L / AST: 28 U/L / GGT: x             Urinalysis Basic - ( 27 Oct 2023 06:20 )    Color: x / Appearance: x / SG: x / pH: x  Gluc: 135 mg/dL / Ketone: x  / Bili: x / Urobili: x   Blood: x / Protein: x / Nitrite: x   Leuk Esterase: x / RBC: x / WBC x   Sq Epi: x / Non Sq Epi: x / Bacteria: x        rad< from: CT Chest w/ IV Cont (10.24.23 @ 20:00) >    VESSELS: There is extensive aortic calcified and noncalcified plaques,   with a focal anterior outpouching (2-87, 5:101) representing a small   penetrating atherosclerotic ulcer, new since 4/5/2013.    HEART: Heart size is normal. Small-moderate pericardial effusion,   unchanged since 10/22/2023 abdomen CT. Coronary arterial calcification.    CHEST WALL AND LOWER NECK: Within normal limits.    VISUALIZED UPPER ABDOMEN: Within normal limits.    BONES: Within normal limits.    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 Continued surveillance is recommended.    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    Small-moderate pericardial effusion, unchanged since 10/22/2023 abdomen   CT.    < end of copied text >      RECENT CULTURES:  10-22 @ 14:37 .Blood Blood-Venous                No growth at 4 days    10-22 @ 14:00 .Blood Blood-Peripheral                No growth at 4 days    10-22 @ 11:50 Clean Catch Clean Catch (Midstream)   HUBERT      Klebsiella pneumoniae  Klebsiella pneumoniae     >100,000 CFU/ml Klebsiella pneumoniae          RESPIRATORY CULTURES:          Studies  Chest X-RAY  CT SCAN Chest   Venous Dopplers: LE:   CT Abdomen  Others

## 2023-10-27 NOTE — CONSULT NOTE ADULT - SUBJECTIVE AND OBJECTIVE BOX
HPI:  68M with T2DM, HTN/HLD, PVD on eliquis, and 50 PY smoking history presents with 1 day history of dysuria/polyuria.    Patient was recently seen at OSH for hemorrhoid banding on Thursday by Dr. Ian Bearden ([phone]) and went home the same day. He reports he was given a PPI/antacid medication, a 3 day course of amoxicillin, and some stool softener. He missed his last dose of amoxicillin the day prior to his symptoms onset, but otherwise, reports he had no acute events in the post-op period.  This morning at around 6am; he woke with new suprapubic pain. He went to the bathroom much more frequently and endorsed a burning pain whenever he urinated and his daughter took his temperature which came back at 102 and they called his surgeon who told him to go to the ED. He denies CP, SOB, palpitations, LH, dizziness, LOC, abdominal pain, vomiting, diarrhea.    ED Course 100.7, 164/57, 136 BMP, 18/min, 100% RA  Ofirmev x1, 1U Lispro, Zosyn, 1L LR  CT A/P showing Diffuse Bladder thickening with enlarged prostate, Mod. Pericardial effusion noted (22 Oct 2023 22:47)  Cardiology consulted for pericardial effusion and plan for repeat echo on 10/31/23.    Thoracic surgery consulted for CT showing numerous groundglass and mixed attenuation nodules, new and increased since 2013.        PAST MEDICAL & SURGICAL HISTORY:  HTN (hypertension)      HLD (hyperlipidemia)      DM (diabetes mellitus)      PAD (peripheral artery disease)      Tobacco abuse  Former      H/O iron deficiency      S/P appendectomy      S/P peripheral artery angioplasty with stent placement          REVIEW OF SYSTEMS      General:+fever     Skin/Breast: No Rashes/ Lesions/ Masses  	  Ophthalmologic: No Blurry vision/ Glaucoma/ Blindness  	  ENMT: No Hearing loss/ Drainage/ Lesions	    Respiratory and Thorax: No Cough/ Wheezing/ SOB/ Hemoptysis/ Sputum production  	  Cardiovascular: No Chest pain/ Palpitations/ Diaphoresis	    Gastrointestinal: No Nausea/ Vomiting/ Constipation/ Appetite Change	    Genitourinary: + Dysuria/ Frequency change/     Musculoskeletal: No Pain/ Weakness/ Claudication	    Neurological: No Seizures/ TIA/CVA/ Parastesias	    Psychiatric: No Dementia/ Depression/ SI/HI	    Hematology/Lymphatics: No hx of bleeding/ Edema	    Endocrine:	No Hyperglycemia/ Hypoglycemia    Allergic/Immunologic:	 No Anaphylaxis/ Intolerance/ Recent illnesses    MEDICATIONS  (STANDING):  atorvastatin 20 milliGRAM(s) Oral at bedtime  cefuroxime   Tablet 500 milliGRAM(s) Oral every 12 hours  cilostazol 50 milliGRAM(s) Oral two times a day  cyanocobalamin 1000 MICROGram(s) Oral daily  dextrose 5%. 1000 milliLiter(s) (50 mL/Hr) IV Continuous <Continuous>  dextrose 5%. 1000 milliLiter(s) (100 mL/Hr) IV Continuous <Continuous>  dextrose 50% Injectable 25 Gram(s) IV Push once  dextrose 50% Injectable 25 Gram(s) IV Push once  dextrose 50% Injectable 12.5 Gram(s) IV Push once  glucagon  Injectable 1 milliGRAM(s) IntraMuscular once  influenza  Vaccine (HIGH DOSE) 0.7 milliLiter(s) IntraMuscular once  insulin glargine Injectable (LANTUS) 16 Unit(s) SubCutaneous at bedtime  insulin lispro (ADMELOG) corrective regimen sliding scale   SubCutaneous three times a day before meals  insulin lispro (ADMELOG) corrective regimen sliding scale   SubCutaneous at bedtime  labetalol 100 milliGRAM(s) Oral every 12 hours    MEDICATIONS  (PRN):  acetaminophen     Tablet .. 650 milliGRAM(s) Oral every 6 hours PRN Temp greater or equal to 38C (100.4F), Mild Pain (1 - 3)  dextrose Oral Gel 15 Gram(s) Oral once PRN Blood Glucose LESS THAN 70 milliGRAM(s)/deciliter  melatonin 3 milliGRAM(s) Oral at bedtime PRN Insomnia      Allergies    Advil (Rash)    Intolerances        SOCIAL HISTORY:  +Smoker     FAMILY HISTORY:  No pertinent family history in first degree relatives        Vital Signs Last 24 Hrs  T(C): 37.1 (27 Oct 2023 17:00), Max: 37.2 (27 Oct 2023 06:20)  T(F): 98.7 (27 Oct 2023 17:00), Max: 98.9 (27 Oct 2023 06:20)  HR: 96 (27 Oct 2023 17:00) (96 - 104)  BP: 146/67 (27 Oct 2023 17:00) (128/58 - 146/67)  BP(mean): --  RR: 17 (27 Oct 2023 17:00) (17 - 17)  SpO2: 99% (27 Oct 2023 17:00) (97% - 100%)    Parameters below as of 27 Oct 2023 17:00  Patient On (Oxygen Delivery Method): room air        General: WN/WD NAD  Neurology: Awake, nonfocal, WELLS x 4  Eyes: Scleras clear, PERRLA/ EOMI, Gross vision intact  ENT:Gross hearing intact, grossly patent pharynx, no stridor  Neck: Neck supple, trachea midline, No JVD,   Respiratory: CTA B/L, No wheezing, rales, rhonchi  CV: RRR, S1S2, no murmurs, rubs or gallops  Abdominal: Soft, NT, ND +BS,   Extremities: No edema, + peripheral pulses  Skin: No Rashes, Hematoma, Ecchymosis  Lymphatic: No Neck, axilla, groin LAD  Psych: Oriented x 3, normal affect      LABS:                        7.7    11.65 )-----------( 342      ( 27 Oct 2023 06:20 )             24.9     10-27    138  |  104  |  11  ----------------------------<  135<H>  3.7   |  24  |  0.80    Ca    9.4      27 Oct 2023 06:20  Phos  3.8     10-27  Mg     2.10     10-27    TPro  7.6  /  Alb  3.7  /  TBili  <0.2  /  DBili  x   /  AST  28  /  ALT  28  /  AlkPhos  98  10-26      Urinalysis Basic - ( 27 Oct 2023 06:20 )    Color: x / Appearance: x / SG: x / pH: x  Gluc: 135 mg/dL / Ketone: x  / Bili: x / Urobili: x   Blood: x / Protein: x / Nitrite: x   Leuk Esterase: x / RBC: x / WBC x   Sq Epi: x / Non Sq Epi: x / Bacteria: x        RADIOLOGY & ADDITIONAL STUDIES:    ASSESSMENT:   68yMalePAST MEDICAL & SURGICAL HISTORY:  HTN (hypertension)      HLD (hyperlipidemia)      DM (diabetes mellitus)      PAD (peripheral artery disease)      Tobacco abuse  Former      H/O iron deficiency      S/P appendectomy      S/P peripheral artery angioplasty with stent placement      HEALTH ISSUES - PROBLEM Dx:  Peripheral vascular disease    Diabetes    HTN (hypertension)    HLD (hyperlipidemia)    Need for prophylactic measure    Pericardial effusion    Sepsis    Acute UTI        HEALTH ISSUES - R/O PROBLEM Dx:  Lung nodules     PLAN:  Above discussed with Dr. Sun Garsia to see patient in am and full recommendations to follow  HPI:  68M with T2DM, HTN/HLD, PVD on eliquis, and 50 PY smoking history presents with 1 day history of dysuria/polyuria.    Patient was recently seen at OSH for hemorrhoid banding on Thursday by Dr. Ian Bearden ([phone]) and went home the same day. He reports he was given a PPI/antacid medication, a 3 day course of amoxicillin, and some stool softener. He missed his last dose of amoxicillin the day prior to his symptoms onset, but otherwise, reports he had no acute events in the post-op period.  This morning at around 6am; he woke with new suprapubic pain. He went to the bathroom much more frequently and endorsed a burning pain whenever he urinated and his daughter took his temperature which came back at 102 and they called his surgeon who told him to go to the ED. He denies CP, SOB, palpitations, LH, dizziness, LOC, abdominal pain, vomiting, diarrhea.    ED Course 100.7, 164/57, 136 BMP, 18/min, 100% RA  Ofirmev x1, 1U Lispro, Zosyn, 1L LR  CT A/P showing Diffuse Bladder thickening with enlarged prostate, Mod. Pericardial effusion noted (22 Oct 2023 22:47)  Cardiology consulted for pericardial effusion and plan for repeat echo on 10/31/23.    Thoracic surgery consulted for CT showing numerous groundglass and mixed attenuation nodules, new and increased since 2013.        PAST MEDICAL & SURGICAL HISTORY:  HTN (hypertension)      HLD (hyperlipidemia)      DM (diabetes mellitus)      PAD (peripheral artery disease)      Tobacco abuse  Former      H/O iron deficiency      S/P appendectomy      S/P peripheral artery angioplasty with stent placement          REVIEW OF SYSTEMS      General:+fever     Skin/Breast: No Rashes/ Lesions/ Masses  	  Ophthalmologic: No Blurry vision/ Glaucoma/ Blindness  	  ENMT: No Hearing loss/ Drainage/ Lesions	    Respiratory and Thorax: No Cough/ Wheezing/ SOB/ Hemoptysis/ Sputum production  	  Cardiovascular: No Chest pain/ Palpitations/ Diaphoresis	    Gastrointestinal: No Nausea/ Vomiting/ Constipation/ Appetite Change	    Genitourinary: + Dysuria/ Frequency change/     Musculoskeletal: No Pain/ Weakness/ Claudication	    Neurological: No Seizures/ TIA/CVA/ Parastesias	    Psychiatric: No Dementia/ Depression/ SI/HI	    Hematology/Lymphatics: No hx of bleeding/ Edema	    Endocrine:	No Hyperglycemia/ Hypoglycemia    Allergic/Immunologic:	 No Anaphylaxis/ Intolerance/ Recent illnesses    MEDICATIONS  (STANDING):  atorvastatin 20 milliGRAM(s) Oral at bedtime  cefuroxime   Tablet 500 milliGRAM(s) Oral every 12 hours  cilostazol 50 milliGRAM(s) Oral two times a day  cyanocobalamin 1000 MICROGram(s) Oral daily  dextrose 5%. 1000 milliLiter(s) (50 mL/Hr) IV Continuous <Continuous>  dextrose 5%. 1000 milliLiter(s) (100 mL/Hr) IV Continuous <Continuous>  dextrose 50% Injectable 25 Gram(s) IV Push once  dextrose 50% Injectable 25 Gram(s) IV Push once  dextrose 50% Injectable 12.5 Gram(s) IV Push once  glucagon  Injectable 1 milliGRAM(s) IntraMuscular once  influenza  Vaccine (HIGH DOSE) 0.7 milliLiter(s) IntraMuscular once  insulin glargine Injectable (LANTUS) 16 Unit(s) SubCutaneous at bedtime  insulin lispro (ADMELOG) corrective regimen sliding scale   SubCutaneous three times a day before meals  insulin lispro (ADMELOG) corrective regimen sliding scale   SubCutaneous at bedtime  labetalol 100 milliGRAM(s) Oral every 12 hours    MEDICATIONS  (PRN):  acetaminophen     Tablet .. 650 milliGRAM(s) Oral every 6 hours PRN Temp greater or equal to 38C (100.4F), Mild Pain (1 - 3)  dextrose Oral Gel 15 Gram(s) Oral once PRN Blood Glucose LESS THAN 70 milliGRAM(s)/deciliter  melatonin 3 milliGRAM(s) Oral at bedtime PRN Insomnia      Allergies    Advil (Rash)    Intolerances        SOCIAL HISTORY:  +Smoker     FAMILY HISTORY:  No pertinent family history in first degree relatives        Vital Signs Last 24 Hrs  T(C): 37.1 (27 Oct 2023 17:00), Max: 37.2 (27 Oct 2023 06:20)  T(F): 98.7 (27 Oct 2023 17:00), Max: 98.9 (27 Oct 2023 06:20)  HR: 96 (27 Oct 2023 17:00) (96 - 104)  BP: 146/67 (27 Oct 2023 17:00) (128/58 - 146/67)  BP(mean): --  RR: 17 (27 Oct 2023 17:00) (17 - 17)  SpO2: 99% (27 Oct 2023 17:00) (97% - 100%)    Parameters below as of 27 Oct 2023 17:00  Patient On (Oxygen Delivery Method): room air        General: WN/WD NAD  Neurology: Awake, nonfocal, WELLS x 4  Eyes: Scleras clear, PERRLA/ EOMI, Gross vision intact  ENT:Gross hearing intact, grossly patent pharynx, no stridor  Neck: Neck supple, trachea midline, No JVD,   Respiratory: CTA B/L, No wheezing, rales, rhonchi  CV: RRR, S1S2, no murmurs, rubs or gallops  Abdominal: Soft, NT, ND +BS,   Extremities: No edema, + peripheral pulses  Skin: No Rashes, Hematoma, Ecchymosis  Lymphatic: No Neck, axilla, groin LAD  Psych: Oriented x 3, normal affect      LABS:                        7.7    11.65 )-----------( 342      ( 27 Oct 2023 06:20 )             24.9     10-27    138  |  104  |  11  ----------------------------<  135<H>  3.7   |  24  |  0.80    Ca    9.4      27 Oct 2023 06:20  Phos  3.8     10-27  Mg     2.10     10-27    TPro  7.6  /  Alb  3.7  /  TBili  <0.2  /  DBili  x   /  AST  28  /  ALT  28  /  AlkPhos  98  10-26      Urinalysis Basic - ( 27 Oct 2023 06:20 )    Color: x / Appearance: x / SG: x / pH: x  Gluc: 135 mg/dL / Ketone: x  / Bili: x / Urobili: x   Blood: x / Protein: x / Nitrite: x   Leuk Esterase: x / RBC: x / WBC x   Sq Epi: x / Non Sq Epi: x / Bacteria: x        RADIOLOGY & ADDITIONAL STUDIES:    ASSESSMENT:   68yMalePAST MEDICAL & SURGICAL HISTORY:  HTN (hypertension)      HLD (hyperlipidemia)      DM (diabetes mellitus)      PAD (peripheral artery disease)      Tobacco abuse  Former      H/O iron deficiency      S/P appendectomy      S/P peripheral artery angioplasty with stent placement      HEALTH ISSUES - PROBLEM Dx:  Peripheral vascular disease    Diabetes    HTN (hypertension)    HLD (hyperlipidemia)    Need for prophylactic measure    Pericardial effusion    Sepsis    Acute UTI        HEALTH ISSUES - R/O PROBLEM Dx:  Lung nodules     PLAN:  Above discussed with Dr. Sun Garsia to see patient in am and full recommendations to follow  HPI:  68M with T2DM, HTN/HLD, PVD on eliquis, and 50 PY smoking history presents with 1 day history of dysuria/polyuria.    Patient was recently seen at OSH for hemorrhoid banding on Thursday by Dr. Ian Bearden ([phone]) and went home the same day. He reports he was given a PPI/antacid medication, a 3 day course of amoxicillin, and some stool softener. He missed his last dose of amoxicillin the day prior to his symptoms onset, but otherwise, reports he had no acute events in the post-op period.  This morning at around 6am; he woke with new suprapubic pain. He went to the bathroom much more frequently and endorsed a burning pain whenever he urinated and his daughter took his temperature which came back at 102 and they called his surgeon who told him to go to the ED. He denies CP, SOB, palpitations, LH, dizziness, LOC, abdominal pain, vomiting, diarrhea.    ED Course 100.7, 164/57, 136 BMP, 18/min, 100% RA  Ofirmev x1, 1U Lispro, Zosyn, 1L LR  CT A/P showing Diffuse Bladder thickening with enlarged prostate, Mod. Pericardial effusion noted (22 Oct 2023 22:47)  Cardiology consulted for pericardial effusion and plan for repeat echo on 10/31/23.    Thoracic surgery consulted for CT showing numerous groundglass and mixed attenuation nodules, new and increased since 2013.        PAST MEDICAL & SURGICAL HISTORY:  HTN (hypertension)      HLD (hyperlipidemia)      DM (diabetes mellitus)      PAD (peripheral artery disease)      Tobacco abuse  Former      H/O iron deficiency      S/P appendectomy      S/P peripheral artery angioplasty with stent placement          REVIEW OF SYSTEMS      General:+fever     Skin/Breast: No Rashes/ Lesions/ Masses  	  Ophthalmologic: No Blurry vision/ Glaucoma/ Blindness  	  ENMT: No Hearing loss/ Drainage/ Lesions	    Respiratory and Thorax: No Cough/ Wheezing/ SOB/ Hemoptysis/ Sputum production  	  Cardiovascular: No Chest pain/ Palpitations/ Diaphoresis	    Gastrointestinal: No Nausea/ Vomiting/ Constipation/ Appetite Change	    Genitourinary: + Dysuria/ Frequency change/     Musculoskeletal: No Pain/ Weakness/ Claudication	    Neurological: No Seizures/ TIA/CVA/ Parastesias	    Psychiatric: No Dementia/ Depression/ SI/HI	    Hematology/Lymphatics: No hx of bleeding/ Edema	    Endocrine:	No Hyperglycemia/ Hypoglycemia    Allergic/Immunologic:	 No Anaphylaxis/ Intolerance/ Recent illnesses    MEDICATIONS  (STANDING):  atorvastatin 20 milliGRAM(s) Oral at bedtime  cefuroxime   Tablet 500 milliGRAM(s) Oral every 12 hours  cilostazol 50 milliGRAM(s) Oral two times a day  cyanocobalamin 1000 MICROGram(s) Oral daily  dextrose 5%. 1000 milliLiter(s) (50 mL/Hr) IV Continuous <Continuous>  dextrose 5%. 1000 milliLiter(s) (100 mL/Hr) IV Continuous <Continuous>  dextrose 50% Injectable 25 Gram(s) IV Push once  dextrose 50% Injectable 25 Gram(s) IV Push once  dextrose 50% Injectable 12.5 Gram(s) IV Push once  glucagon  Injectable 1 milliGRAM(s) IntraMuscular once  influenza  Vaccine (HIGH DOSE) 0.7 milliLiter(s) IntraMuscular once  insulin glargine Injectable (LANTUS) 16 Unit(s) SubCutaneous at bedtime  insulin lispro (ADMELOG) corrective regimen sliding scale   SubCutaneous three times a day before meals  insulin lispro (ADMELOG) corrective regimen sliding scale   SubCutaneous at bedtime  labetalol 100 milliGRAM(s) Oral every 12 hours    MEDICATIONS  (PRN):  acetaminophen     Tablet .. 650 milliGRAM(s) Oral every 6 hours PRN Temp greater or equal to 38C (100.4F), Mild Pain (1 - 3)  dextrose Oral Gel 15 Gram(s) Oral once PRN Blood Glucose LESS THAN 70 milliGRAM(s)/deciliter  melatonin 3 milliGRAM(s) Oral at bedtime PRN Insomnia      Allergies    Advil (Rash)    Intolerances        SOCIAL HISTORY:  +Smoker     FAMILY HISTORY:  No pertinent family history in first degree relatives        Vital Signs Last 24 Hrs  T(C): 37.1 (27 Oct 2023 17:00), Max: 37.2 (27 Oct 2023 06:20)  T(F): 98.7 (27 Oct 2023 17:00), Max: 98.9 (27 Oct 2023 06:20)  HR: 96 (27 Oct 2023 17:00) (96 - 104)  BP: 146/67 (27 Oct 2023 17:00) (128/58 - 146/67)  BP(mean): --  RR: 17 (27 Oct 2023 17:00) (17 - 17)  SpO2: 99% (27 Oct 2023 17:00) (97% - 100%)    Parameters below as of 27 Oct 2023 17:00  Patient On (Oxygen Delivery Method): room air        General: WN/WD NAD  Neurology: Awake, nonfocal, WELLS x 4  Eyes: Scleras clear, PERRLA/ EOMI, Gross vision intact  ENT:Gross hearing intact, grossly patent pharynx, no stridor  Neck: Neck supple, trachea midline, No JVD,   Respiratory: CTA B/L, No wheezing, rales, rhonchi  CV: RRR, S1S2, no murmurs, rubs or gallops  Abdominal: Soft, NT, ND +BS,   Extremities: No edema, + peripheral pulses  Skin: No Rashes, Hematoma, Ecchymosis  Lymphatic: No Neck, axilla, groin LAD  Psych: Oriented x 3, normal affect      LABS:                        7.7    11.65 )-----------( 342      ( 27 Oct 2023 06:20 )             24.9     10-27    138  |  104  |  11  ----------------------------<  135<H>  3.7   |  24  |  0.80    Ca    9.4      27 Oct 2023 06:20  Phos  3.8     10-27  Mg     2.10     10-27    TPro  7.6  /  Alb  3.7  /  TBili  <0.2  /  DBili  x   /  AST  28  /  ALT  28  /  AlkPhos  98  10-26      Urinalysis Basic - ( 27 Oct 2023 06:20 )    Color: x / Appearance: x / SG: x / pH: x  Gluc: 135 mg/dL / Ketone: x  / Bili: x / Urobili: x   Blood: x / Protein: x / Nitrite: x   Leuk Esterase: x / RBC: x / WBC x   Sq Epi: x / Non Sq Epi: x / Bacteria: x        RADIOLOGY & ADDITIONAL STUDIES:    ASSESSMENT:   68yMalePAST MEDICAL & SURGICAL HISTORY:  HTN (hypertension)      HLD (hyperlipidemia)      DM (diabetes mellitus)      PAD (peripheral artery disease)      Tobacco abuse  Former      H/O iron deficiency      S/P appendectomy      S/P peripheral artery angioplasty with stent placement      HEALTH ISSUES - PROBLEM Dx:  Peripheral vascular disease    Diabetes    HTN (hypertension)    HLD (hyperlipidemia)    Need for prophylactic measure    Pericardial effusion    Sepsis    Acute UTI        HEALTH ISSUES - R/O PROBLEM Dx:  Lung nodules     PLAN:  Above discussed with Dr. Sun Garsia to see patient in am and full recommendations to follow

## 2023-10-28 LAB
ANION GAP SERPL CALC-SCNC: 12 MMOL/L — SIGNIFICANT CHANGE UP (ref 7–14)
ANION GAP SERPL CALC-SCNC: 12 MMOL/L — SIGNIFICANT CHANGE UP (ref 7–14)
BUN SERPL-MCNC: 12 MG/DL — SIGNIFICANT CHANGE UP (ref 7–23)
BUN SERPL-MCNC: 12 MG/DL — SIGNIFICANT CHANGE UP (ref 7–23)
CALCIUM SERPL-MCNC: 9.3 MG/DL — SIGNIFICANT CHANGE UP (ref 8.4–10.5)
CALCIUM SERPL-MCNC: 9.3 MG/DL — SIGNIFICANT CHANGE UP (ref 8.4–10.5)
CHLORIDE SERPL-SCNC: 99 MMOL/L — SIGNIFICANT CHANGE UP (ref 98–107)
CHLORIDE SERPL-SCNC: 99 MMOL/L — SIGNIFICANT CHANGE UP (ref 98–107)
CO2 SERPL-SCNC: 23 MMOL/L — SIGNIFICANT CHANGE UP (ref 22–31)
CO2 SERPL-SCNC: 23 MMOL/L — SIGNIFICANT CHANGE UP (ref 22–31)
CREAT SERPL-MCNC: 0.69 MG/DL — SIGNIFICANT CHANGE UP (ref 0.5–1.3)
CREAT SERPL-MCNC: 0.69 MG/DL — SIGNIFICANT CHANGE UP (ref 0.5–1.3)
EGFR: 100 ML/MIN/1.73M2 — SIGNIFICANT CHANGE UP
EGFR: 100 ML/MIN/1.73M2 — SIGNIFICANT CHANGE UP
GAMMA INTERFERON BACKGROUND BLD IA-ACNC: 0.05 IU/ML — SIGNIFICANT CHANGE UP
GAMMA INTERFERON BACKGROUND BLD IA-ACNC: 0.05 IU/ML — SIGNIFICANT CHANGE UP
GLUCOSE BLDC GLUCOMTR-MCNC: 167 MG/DL — HIGH (ref 70–99)
GLUCOSE BLDC GLUCOMTR-MCNC: 167 MG/DL — HIGH (ref 70–99)
GLUCOSE BLDC GLUCOMTR-MCNC: 221 MG/DL — HIGH (ref 70–99)
GLUCOSE BLDC GLUCOMTR-MCNC: 221 MG/DL — HIGH (ref 70–99)
GLUCOSE BLDC GLUCOMTR-MCNC: 229 MG/DL — HIGH (ref 70–99)
GLUCOSE BLDC GLUCOMTR-MCNC: 229 MG/DL — HIGH (ref 70–99)
GLUCOSE BLDC GLUCOMTR-MCNC: 251 MG/DL — HIGH (ref 70–99)
GLUCOSE BLDC GLUCOMTR-MCNC: 251 MG/DL — HIGH (ref 70–99)
GLUCOSE SERPL-MCNC: 160 MG/DL — HIGH (ref 70–99)
GLUCOSE SERPL-MCNC: 160 MG/DL — HIGH (ref 70–99)
HCT VFR BLD CALC: 25.9 % — LOW (ref 39–50)
HCT VFR BLD CALC: 25.9 % — LOW (ref 39–50)
HGB BLD-MCNC: 7.9 G/DL — LOW (ref 13–17)
HGB BLD-MCNC: 7.9 G/DL — LOW (ref 13–17)
M TB IFN-G BLD-IMP: NEGATIVE — SIGNIFICANT CHANGE UP
M TB IFN-G BLD-IMP: NEGATIVE — SIGNIFICANT CHANGE UP
M TB IFN-G CD4+ BCKGRND COR BLD-ACNC: 0.08 IU/ML — SIGNIFICANT CHANGE UP
M TB IFN-G CD4+ BCKGRND COR BLD-ACNC: 0.08 IU/ML — SIGNIFICANT CHANGE UP
M TB IFN-G CD4+CD8+ BCKGRND COR BLD-ACNC: 0.03 IU/ML — SIGNIFICANT CHANGE UP
M TB IFN-G CD4+CD8+ BCKGRND COR BLD-ACNC: 0.03 IU/ML — SIGNIFICANT CHANGE UP
MAGNESIUM SERPL-MCNC: 2 MG/DL — SIGNIFICANT CHANGE UP (ref 1.6–2.6)
MAGNESIUM SERPL-MCNC: 2 MG/DL — SIGNIFICANT CHANGE UP (ref 1.6–2.6)
MCHC RBC-ENTMCNC: 21.7 PG — LOW (ref 27–34)
MCHC RBC-ENTMCNC: 21.7 PG — LOW (ref 27–34)
MCHC RBC-ENTMCNC: 30.5 GM/DL — LOW (ref 32–36)
MCHC RBC-ENTMCNC: 30.5 GM/DL — LOW (ref 32–36)
MCV RBC AUTO: 71.2 FL — LOW (ref 80–100)
MCV RBC AUTO: 71.2 FL — LOW (ref 80–100)
NRBC # BLD: 0 /100 WBCS — SIGNIFICANT CHANGE UP (ref 0–0)
NRBC # BLD: 0 /100 WBCS — SIGNIFICANT CHANGE UP (ref 0–0)
NRBC # FLD: 0.02 K/UL — HIGH (ref 0–0)
NRBC # FLD: 0.02 K/UL — HIGH (ref 0–0)
PHOSPHATE SERPL-MCNC: 3.9 MG/DL — SIGNIFICANT CHANGE UP (ref 2.5–4.5)
PHOSPHATE SERPL-MCNC: 3.9 MG/DL — SIGNIFICANT CHANGE UP (ref 2.5–4.5)
PLATELET # BLD AUTO: 373 K/UL — SIGNIFICANT CHANGE UP (ref 150–400)
PLATELET # BLD AUTO: 373 K/UL — SIGNIFICANT CHANGE UP (ref 150–400)
POTASSIUM SERPL-MCNC: 3.4 MMOL/L — LOW (ref 3.5–5.3)
POTASSIUM SERPL-MCNC: 3.4 MMOL/L — LOW (ref 3.5–5.3)
POTASSIUM SERPL-SCNC: 3.4 MMOL/L — LOW (ref 3.5–5.3)
POTASSIUM SERPL-SCNC: 3.4 MMOL/L — LOW (ref 3.5–5.3)
QUANT TB PLUS MITOGEN MINUS NIL: 0.6 IU/ML — SIGNIFICANT CHANGE UP
QUANT TB PLUS MITOGEN MINUS NIL: 0.6 IU/ML — SIGNIFICANT CHANGE UP
RBC # BLD: 3.64 M/UL — LOW (ref 4.2–5.8)
RBC # BLD: 3.64 M/UL — LOW (ref 4.2–5.8)
RBC # FLD: 19.6 % — HIGH (ref 10.3–14.5)
RBC # FLD: 19.6 % — HIGH (ref 10.3–14.5)
SODIUM SERPL-SCNC: 134 MMOL/L — LOW (ref 135–145)
SODIUM SERPL-SCNC: 134 MMOL/L — LOW (ref 135–145)
WBC # BLD: 10.93 K/UL — HIGH (ref 3.8–10.5)
WBC # BLD: 10.93 K/UL — HIGH (ref 3.8–10.5)
WBC # FLD AUTO: 10.93 K/UL — HIGH (ref 3.8–10.5)
WBC # FLD AUTO: 10.93 K/UL — HIGH (ref 3.8–10.5)

## 2023-10-28 RX ADMIN — PREGABALIN 1000 MICROGRAM(S): 225 CAPSULE ORAL at 10:55

## 2023-10-28 RX ADMIN — Medication 500 MILLIGRAM(S): at 17:08

## 2023-10-28 RX ADMIN — INSULIN GLARGINE 16 UNIT(S): 100 INJECTION, SOLUTION SUBCUTANEOUS at 21:52

## 2023-10-28 RX ADMIN — CILOSTAZOL 50 MILLIGRAM(S): 100 TABLET ORAL at 05:12

## 2023-10-28 RX ADMIN — Medication 100 MILLIGRAM(S): at 17:07

## 2023-10-28 RX ADMIN — Medication 2: at 17:10

## 2023-10-28 RX ADMIN — Medication 100 MILLIGRAM(S): at 05:12

## 2023-10-28 RX ADMIN — CILOSTAZOL 50 MILLIGRAM(S): 100 TABLET ORAL at 17:08

## 2023-10-28 RX ADMIN — Medication 1: at 08:15

## 2023-10-28 RX ADMIN — Medication 3: at 12:41

## 2023-10-28 RX ADMIN — ATORVASTATIN CALCIUM 20 MILLIGRAM(S): 80 TABLET, FILM COATED ORAL at 21:52

## 2023-10-28 RX ADMIN — Medication 500 MILLIGRAM(S): at 05:12

## 2023-10-28 NOTE — CHART NOTE - NSCHARTNOTEFT_GEN_A_CORE
Thoracic Surgery Note    68y    Male    Diagnosis/Indication: Patient is a 68y old  Male who presents with a chief complaint of Sepsis, Mod Pericardial Effusion (28 Oct 2023 08:25)    Thoracic Attending Physician: Dr. Garsia    Subjective: Pt seen at bedside with Dr. Garsia, ground glass opacities seen on chest imaging explained to patient and need for outpatient follow up. Pt did not endorse active chest pain, SOB, fevers/chills.    PAST MEDICAL & SURGICAL HISTORY:  HTN (hypertension)      HLD (hyperlipidemia)      DM (diabetes mellitus)      PAD (peripheral artery disease)      Tobacco abuse  Former      H/O iron deficiency      S/P appendectomy      S/P peripheral artery angioplasty with stent placement           CBC Full  -  ( 28 Oct 2023 05:39 )  WBC Count : 10.93 K/uL  RBC Count : 3.64 M/uL  Hemoglobin : 7.9 g/dL  Hematocrit : 25.9 %  Platelet Count - Automated : 373 K/uL  Mean Cell Volume : 71.2 fL  Mean Cell Hemoglobin : 21.7 pg  Mean Cell Hemoglobin Concentration : 30.5 gm/dL  Auto Neutrophil # : x  Auto Lymphocyte # : x  Auto Monocyte # : x  Auto Eosinophil # : x  Auto Basophil # : x  Auto Neutrophil % : x  Auto Lymphocyte % : x  Auto Monocyte % : x  Auto Eosinophil % : x  Auto Basophil % : x    10-28    134<L>  |  99  |  12  ----------------------------<  160<H>  3.4<L>   |  23  |  0.69    Ca    9.3      28 Oct 2023 05:39  Phos  3.9     10-28  Mg     2.00     10-28    Assessment: 68M with T2DM, HTN/HLD, PVD on eliquis, and 50 PY smoking history found to have numerous ground glass opacities    PLAN:  - Please have patient follow up with Dr. Garsia as outpatient  - Rest of care per primary team

## 2023-10-28 NOTE — PROGRESS NOTE ADULT - SUBJECTIVE AND OBJECTIVE BOX
Date of Service: 10-28-23 @ 08:25    Patient is a 68y old  Male who presents with a chief complaint of Sepsis, Mod Pericardial Effusion (27 Oct 2023 20:12)      Any change in ROS: Seems OK: no sob:  no cough : on room air    MEDICATIONS  (STANDING):  atorvastatin 20 milliGRAM(s) Oral at bedtime  cefuroxime   Tablet 500 milliGRAM(s) Oral every 12 hours  cilostazol 50 milliGRAM(s) Oral two times a day  cyanocobalamin 1000 MICROGram(s) Oral daily  dextrose 5%. 1000 milliLiter(s) (50 mL/Hr) IV Continuous <Continuous>  dextrose 5%. 1000 milliLiter(s) (100 mL/Hr) IV Continuous <Continuous>  dextrose 50% Injectable 25 Gram(s) IV Push once  dextrose 50% Injectable 25 Gram(s) IV Push once  dextrose 50% Injectable 12.5 Gram(s) IV Push once  glucagon  Injectable 1 milliGRAM(s) IntraMuscular once  influenza  Vaccine (HIGH DOSE) 0.7 milliLiter(s) IntraMuscular once  insulin glargine Injectable (LANTUS) 16 Unit(s) SubCutaneous at bedtime  insulin lispro (ADMELOG) corrective regimen sliding scale   SubCutaneous at bedtime  insulin lispro (ADMELOG) corrective regimen sliding scale   SubCutaneous three times a day before meals  labetalol 100 milliGRAM(s) Oral every 12 hours    MEDICATIONS  (PRN):  acetaminophen     Tablet .. 650 milliGRAM(s) Oral every 6 hours PRN Temp greater or equal to 38C (100.4F), Mild Pain (1 - 3)  dextrose Oral Gel 15 Gram(s) Oral once PRN Blood Glucose LESS THAN 70 milliGRAM(s)/deciliter  melatonin 3 milliGRAM(s) Oral at bedtime PRN Insomnia    Vital Signs Last 24 Hrs  T(C): 36.8 (28 Oct 2023 05:10), Max: 37.1 (27 Oct 2023 10:58)  T(F): 98.2 (28 Oct 2023 05:10), Max: 98.8 (27 Oct 2023 10:58)  HR: 93 (28 Oct 2023 05:10) (93 - 101)  BP: 135/95 (28 Oct 2023 05:10) (123/84 - 146/67)  BP(mean): --  RR: 18 (28 Oct 2023 05:10) (17 - 18)  SpO2: 100% (28 Oct 2023 05:10) (99% - 100%)    Parameters below as of 28 Oct 2023 05:10  Patient On (Oxygen Delivery Method): room air        I&O's Summary    27 Oct 2023 07:01  -  28 Oct 2023 07:00  --------------------------------------------------------  IN: 472 mL / OUT: 0 mL / NET: 472 mL          Physical Exam:   GENERAL: NAD, well-groomed, well-developed  HEENT: CORRINE/   Atraumatic, Normocephalic  ENMT: No tonsillar erythema, exudates, or enlargement; Moist mucous membranes, Good dentition, No lesions  NECK: Supple, No JVD, Normal thyroid  CHEST/LUNG: Clear to auscultaion  CVS: Regular rate and rhythm; No murmurs, rubs, or gallops  GI: : Soft, Nontender, Nondistended; Bowel sounds present  NERVOUS SYSTEM:  Alert & Oriented X3  EXTREMITIES:  2+ Peripheral Pulses, No clubbing, cyanosis, or edema  LYMPH: No lymphadenopathy noted  SKIN: No rashes or lesions  ENDOCRINOLOGY: No Thyromegaly  PSYCH: Appropriate    Labs:  24                            7.9    10.93 )-----------( 373      ( 28 Oct 2023 05:39 )             25.9                         7.7    11.65 )-----------( 342      ( 27 Oct 2023 06:20 )             24.9                         8.0    11.08 )-----------( 340      ( 26 Oct 2023 04:28 )             27.0                         7.9    12.27 )-----------( 289      ( 25 Oct 2023 07:24 )             25.1     10-28    134<L>  |  99  |  12  ----------------------------<  160<H>  3.4<L>   |  23  |  0.69  10-27    138  |  104  |  11  ----------------------------<  135<H>  3.7   |  24  |  0.80  10-26    135  |  100  |  10  ----------------------------<  171<H>  3.4<L>   |  21<L>  |  0.84  10-25    137  |  103  |  10  ----------------------------<  152<H>  3.6   |  20<L>  |  0.93    Ca    9.3      28 Oct 2023 05:39  Ca    9.4      27 Oct 2023 06:20  Phos  3.9     10-28  Phos  3.8     10-27  Mg     2.00     10-28  Mg     2.10     10-27    TPro  7.6  /  Alb  3.7  /  TBili  <0.2  /  DBili  x   /  AST  28  /  ALT  28  /  AlkPhos  98  10-26  TPro  6.8  /  Alb  3.2<L>  /  TBili  0.2  /  DBili  x   /  AST  23  /  ALT  15  /  AlkPhos  105  10-25    CAPILLARY BLOOD GLUCOSE      POCT Blood Glucose.: 167 mg/dL (28 Oct 2023 08:14)  POCT Blood Glucose.: 272 mg/dL (27 Oct 2023 21:05)  POCT Blood Glucose.: 272 mg/dL (27 Oct 2023 17:27)  POCT Blood Glucose.: 259 mg/dL (27 Oct 2023 16:30)  POCT Blood Glucose.: 213 mg/dL (27 Oct 2023 11:34)          Urinalysis Basic - ( 28 Oct 2023 05:39 )    Color: x / Appearance: x / SG: x / pH: x  Gluc: 160 mg/dL / Ketone: x  / Bili: x / Urobili: x   Blood: x / Protein: x / Nitrite: x   Leuk Esterase: x / RBC: x / WBC x   Sq Epi: x / Non Sq Epi: x / Bacteria: x      rad< from: CT Chest w/ IV Cont (10.24.23 @ 20:00) >  HEART: Heart size is normal. Small-moderate pericardial effusion,   unchanged since 10/22/2023 abdomen CT. Coronary arterial calcification.    CHEST WALL AND LOWER NECK: Within normal limits.    VISUALIZED UPPER ABDOMEN: Within normal limits.    BONES: Within normal limits.    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 Continued surveillance is recommended.    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    Small-moderate pericardial effusion, unchanged since 10/22/2023 abdomen   CT.    < end of copied text >        RECENT CULTURES:  10-22 @ 14:37 .Blood Blood-Venous                No growth at 5 days    10-22 @ 14:00 .Blood Blood-Peripheral                No growth at 5 days    10-22 @ 11:50 Clean Catch Clean Catch (Midstream)   HUBERT      Klebsiella pneumoniae  Klebsiella pneumoniae     >100,000 CFU/ml Klebsiella pneumoniae          RESPIRATORY CULTURES:          Studies  Chest X-RAY  CT SCAN Chest   Venous Dopplers: LE:   CT Abdomen  Others

## 2023-10-28 NOTE — PROGRESS NOTE ADULT - SUBJECTIVE AND OBJECTIVE BOX
Patient is a 68y old  Male who presents with a chief complaint of Sepsis, Mod Pericardial Effusion (28 Oct 2023 08:25)    Date of servie : 10-28-23 @ 17:18  INTERVAL HPI/OVERNIGHT EVENTS:  T(C): 36.8 (10-28-23 @ 05:10), Max: 37.1 (10-27-23 @ 20:19)  HR: 93 (10-28-23 @ 05:10) (93 - 99)  BP: 135/95 (10-28-23 @ 05:10) (123/84 - 135/95)  RR: 18 (10-28-23 @ 05:10) (18 - 18)  SpO2: 100% (10-28-23 @ 05:10) (100% - 100%)  Wt(kg): --  I&O's Summary    27 Oct 2023 07:01  -  28 Oct 2023 07:00  --------------------------------------------------------  IN: 472 mL / OUT: 0 mL / NET: 472 mL        LABS:                        7.9    10.93 )-----------( 373      ( 28 Oct 2023 05:39 )             25.9     10-28    134<L>  |  99  |  12  ----------------------------<  160<H>  3.4<L>   |  23  |  0.69    Ca    9.3      28 Oct 2023 05:39  Phos  3.9     10-28  Mg     2.00     10-28        Urinalysis Basic - ( 28 Oct 2023 05:39 )    Color: x / Appearance: x / SG: x / pH: x  Gluc: 160 mg/dL / Ketone: x  / Bili: x / Urobili: x   Blood: x / Protein: x / Nitrite: x   Leuk Esterase: x / RBC: x / WBC x   Sq Epi: x / Non Sq Epi: x / Bacteria: x      CAPILLARY BLOOD GLUCOSE      POCT Blood Glucose.: 229 mg/dL (28 Oct 2023 17:06)  POCT Blood Glucose.: 251 mg/dL (28 Oct 2023 12:40)  POCT Blood Glucose.: 167 mg/dL (28 Oct 2023 08:14)  POCT Blood Glucose.: 272 mg/dL (27 Oct 2023 21:05)  POCT Blood Glucose.: 272 mg/dL (27 Oct 2023 17:27)        Urinalysis Basic - ( 28 Oct 2023 05:39 )    Color: x / Appearance: x / SG: x / pH: x  Gluc: 160 mg/dL / Ketone: x  / Bili: x / Urobili: x   Blood: x / Protein: x / Nitrite: x   Leuk Esterase: x / RBC: x / WBC x   Sq Epi: x / Non Sq Epi: x / Bacteria: x        MEDICATIONS  (STANDING):  atorvastatin 20 milliGRAM(s) Oral at bedtime  cefuroxime   Tablet 500 milliGRAM(s) Oral every 12 hours  cilostazol 50 milliGRAM(s) Oral two times a day  cyanocobalamin 1000 MICROGram(s) Oral daily  dextrose 5%. 1000 milliLiter(s) (50 mL/Hr) IV Continuous <Continuous>  dextrose 5%. 1000 milliLiter(s) (100 mL/Hr) IV Continuous <Continuous>  dextrose 50% Injectable 25 Gram(s) IV Push once  dextrose 50% Injectable 25 Gram(s) IV Push once  dextrose 50% Injectable 12.5 Gram(s) IV Push once  glucagon  Injectable 1 milliGRAM(s) IntraMuscular once  influenza  Vaccine (HIGH DOSE) 0.7 milliLiter(s) IntraMuscular once  insulin glargine Injectable (LANTUS) 16 Unit(s) SubCutaneous at bedtime  insulin lispro (ADMELOG) corrective regimen sliding scale   SubCutaneous three times a day before meals  insulin lispro (ADMELOG) corrective regimen sliding scale   SubCutaneous at bedtime  labetalol 100 milliGRAM(s) Oral every 12 hours    MEDICATIONS  (PRN):  acetaminophen     Tablet .. 650 milliGRAM(s) Oral every 6 hours PRN Temp greater or equal to 38C (100.4F), Mild Pain (1 - 3)  dextrose Oral Gel 15 Gram(s) Oral once PRN Blood Glucose LESS THAN 70 milliGRAM(s)/deciliter  melatonin 3 milliGRAM(s) Oral at bedtime PRN Insomnia          PHYSICAL EXAM:  GENERAL: NAD, well-groomed, well-developed  HEAD:  Atraumatic, Normocephalic  CHEST/LUNG: Clear to percussion bilaterally; No rales, rhonchi, wheezing, or rubs  HEART: Regular rate and rhythm; No murmurs, rubs, or gallops  ABDOMEN: Soft, Nontender, Nondistended; Bowel sounds present  EXTREMITIES:  2+ Peripheral Pulses, No clubbing, cyanosis, or edema  LYMPH: No lymphadenopathy noted  SKIN: No rashes or lesions    Care Discussed with Consultants/Other Providers [ ] YES  [ ] NO

## 2023-10-29 LAB
ANION GAP SERPL CALC-SCNC: 15 MMOL/L — HIGH (ref 7–14)
ANION GAP SERPL CALC-SCNC: 15 MMOL/L — HIGH (ref 7–14)
BUN SERPL-MCNC: 10 MG/DL — SIGNIFICANT CHANGE UP (ref 7–23)
BUN SERPL-MCNC: 10 MG/DL — SIGNIFICANT CHANGE UP (ref 7–23)
CALCIUM SERPL-MCNC: 9.3 MG/DL — SIGNIFICANT CHANGE UP (ref 8.4–10.5)
CALCIUM SERPL-MCNC: 9.3 MG/DL — SIGNIFICANT CHANGE UP (ref 8.4–10.5)
CHLORIDE SERPL-SCNC: 99 MMOL/L — SIGNIFICANT CHANGE UP (ref 98–107)
CHLORIDE SERPL-SCNC: 99 MMOL/L — SIGNIFICANT CHANGE UP (ref 98–107)
CO2 SERPL-SCNC: 22 MMOL/L — SIGNIFICANT CHANGE UP (ref 22–31)
CO2 SERPL-SCNC: 22 MMOL/L — SIGNIFICANT CHANGE UP (ref 22–31)
CREAT SERPL-MCNC: 0.72 MG/DL — SIGNIFICANT CHANGE UP (ref 0.5–1.3)
CREAT SERPL-MCNC: 0.72 MG/DL — SIGNIFICANT CHANGE UP (ref 0.5–1.3)
EGFR: 98 ML/MIN/1.73M2 — SIGNIFICANT CHANGE UP
EGFR: 98 ML/MIN/1.73M2 — SIGNIFICANT CHANGE UP
GLUCOSE BLDC GLUCOMTR-MCNC: 134 MG/DL — HIGH (ref 70–99)
GLUCOSE BLDC GLUCOMTR-MCNC: 134 MG/DL — HIGH (ref 70–99)
GLUCOSE BLDC GLUCOMTR-MCNC: 219 MG/DL — HIGH (ref 70–99)
GLUCOSE BLDC GLUCOMTR-MCNC: 219 MG/DL — HIGH (ref 70–99)
GLUCOSE BLDC GLUCOMTR-MCNC: 221 MG/DL — HIGH (ref 70–99)
GLUCOSE BLDC GLUCOMTR-MCNC: 221 MG/DL — HIGH (ref 70–99)
GLUCOSE BLDC GLUCOMTR-MCNC: 327 MG/DL — HIGH (ref 70–99)
GLUCOSE BLDC GLUCOMTR-MCNC: 327 MG/DL — HIGH (ref 70–99)
GLUCOSE BLDC GLUCOMTR-MCNC: 342 MG/DL — HIGH (ref 70–99)
GLUCOSE BLDC GLUCOMTR-MCNC: 342 MG/DL — HIGH (ref 70–99)
GLUCOSE SERPL-MCNC: 137 MG/DL — HIGH (ref 70–99)
GLUCOSE SERPL-MCNC: 137 MG/DL — HIGH (ref 70–99)
HCT VFR BLD CALC: 26.4 % — LOW (ref 39–50)
HCT VFR BLD CALC: 26.4 % — LOW (ref 39–50)
HGB BLD-MCNC: 7.8 G/DL — LOW (ref 13–17)
HGB BLD-MCNC: 7.8 G/DL — LOW (ref 13–17)
MAGNESIUM SERPL-MCNC: 2 MG/DL — SIGNIFICANT CHANGE UP (ref 1.6–2.6)
MAGNESIUM SERPL-MCNC: 2 MG/DL — SIGNIFICANT CHANGE UP (ref 1.6–2.6)
MCHC RBC-ENTMCNC: 21.4 PG — LOW (ref 27–34)
MCHC RBC-ENTMCNC: 21.4 PG — LOW (ref 27–34)
MCHC RBC-ENTMCNC: 29.5 GM/DL — LOW (ref 32–36)
MCHC RBC-ENTMCNC: 29.5 GM/DL — LOW (ref 32–36)
MCV RBC AUTO: 72.5 FL — LOW (ref 80–100)
MCV RBC AUTO: 72.5 FL — LOW (ref 80–100)
NRBC # BLD: 0 /100 WBCS — SIGNIFICANT CHANGE UP (ref 0–0)
NRBC # BLD: 0 /100 WBCS — SIGNIFICANT CHANGE UP (ref 0–0)
NRBC # FLD: 0.02 K/UL — HIGH (ref 0–0)
NRBC # FLD: 0.02 K/UL — HIGH (ref 0–0)
PHOSPHATE SERPL-MCNC: 3.8 MG/DL — SIGNIFICANT CHANGE UP (ref 2.5–4.5)
PHOSPHATE SERPL-MCNC: 3.8 MG/DL — SIGNIFICANT CHANGE UP (ref 2.5–4.5)
PLATELET # BLD AUTO: 417 K/UL — HIGH (ref 150–400)
PLATELET # BLD AUTO: 417 K/UL — HIGH (ref 150–400)
POTASSIUM SERPL-MCNC: 3.5 MMOL/L — SIGNIFICANT CHANGE UP (ref 3.5–5.3)
POTASSIUM SERPL-MCNC: 3.5 MMOL/L — SIGNIFICANT CHANGE UP (ref 3.5–5.3)
POTASSIUM SERPL-SCNC: 3.5 MMOL/L — SIGNIFICANT CHANGE UP (ref 3.5–5.3)
POTASSIUM SERPL-SCNC: 3.5 MMOL/L — SIGNIFICANT CHANGE UP (ref 3.5–5.3)
RBC # BLD: 3.64 M/UL — LOW (ref 4.2–5.8)
RBC # BLD: 3.64 M/UL — LOW (ref 4.2–5.8)
RBC # FLD: 19.9 % — HIGH (ref 10.3–14.5)
RBC # FLD: 19.9 % — HIGH (ref 10.3–14.5)
SODIUM SERPL-SCNC: 136 MMOL/L — SIGNIFICANT CHANGE UP (ref 135–145)
SODIUM SERPL-SCNC: 136 MMOL/L — SIGNIFICANT CHANGE UP (ref 135–145)
WBC # BLD: 11.94 K/UL — HIGH (ref 3.8–10.5)
WBC # BLD: 11.94 K/UL — HIGH (ref 3.8–10.5)
WBC # FLD AUTO: 11.94 K/UL — HIGH (ref 3.8–10.5)
WBC # FLD AUTO: 11.94 K/UL — HIGH (ref 3.8–10.5)

## 2023-10-29 PROCEDURE — 93321 DOPPLER ECHO F-UP/LMTD STD: CPT | Mod: 26

## 2023-10-29 PROCEDURE — 93308 TTE F-UP OR LMTD: CPT | Mod: 26,GC

## 2023-10-29 RX ORDER — POTASSIUM CHLORIDE 20 MEQ
40 PACKET (EA) ORAL EVERY 4 HOURS
Refills: 0 | Status: COMPLETED | OUTPATIENT
Start: 2023-10-29 | End: 2023-10-29

## 2023-10-29 RX ADMIN — CILOSTAZOL 50 MILLIGRAM(S): 100 TABLET ORAL at 18:19

## 2023-10-29 RX ADMIN — CILOSTAZOL 50 MILLIGRAM(S): 100 TABLET ORAL at 05:06

## 2023-10-29 RX ADMIN — ATORVASTATIN CALCIUM 20 MILLIGRAM(S): 80 TABLET, FILM COATED ORAL at 21:40

## 2023-10-29 RX ADMIN — Medication 40 MILLIEQUIVALENT(S): at 21:39

## 2023-10-29 RX ADMIN — Medication 500 MILLIGRAM(S): at 18:19

## 2023-10-29 RX ADMIN — Medication 40 MILLIEQUIVALENT(S): at 16:17

## 2023-10-29 RX ADMIN — Medication 2: at 11:39

## 2023-10-29 RX ADMIN — Medication 100 MILLIGRAM(S): at 17:29

## 2023-10-29 RX ADMIN — Medication 100 MILLIGRAM(S): at 05:06

## 2023-10-29 RX ADMIN — Medication 2: at 17:28

## 2023-10-29 RX ADMIN — PREGABALIN 1000 MICROGRAM(S): 225 CAPSULE ORAL at 13:59

## 2023-10-29 RX ADMIN — Medication 500 MILLIGRAM(S): at 05:06

## 2023-10-29 RX ADMIN — INSULIN GLARGINE 16 UNIT(S): 100 INJECTION, SOLUTION SUBCUTANEOUS at 22:06

## 2023-10-29 RX ADMIN — Medication 2: at 22:07

## 2023-10-29 NOTE — PROGRESS NOTE ADULT - SUBJECTIVE AND OBJECTIVE BOX
Patient is a 68y old  Male who presents with a chief complaint of Sepsis, Mod Pericardial Effusion (29 Oct 2023 13:56)    Date of servie : 10-29-23 @ 16:43  INTERVAL HPI/OVERNIGHT EVENTS:  T(C): 37.1 (10-29-23 @ 13:00), Max: 37.1 (10-29-23 @ 05:00)  HR: 96 (10-29-23 @ 13:00) (94 - 96)  BP: 139/71 (10-29-23 @ 13:00) (132/72 - 161/71)  RR: 16 (10-29-23 @ 13:00) (16 - 17)  SpO2: 100% (10-29-23 @ 13:00) (99% - 100%)  Wt(kg): --  I&O's Summary    29 Oct 2023 07:01  -  29 Oct 2023 16:43  --------------------------------------------------------  IN: 120 mL / OUT: 300 mL / NET: -180 mL        LABS:                        7.8    11.94 )-----------( 417      ( 29 Oct 2023 05:58 )             26.4     10-29    136  |  99  |  10  ----------------------------<  137<H>  3.5   |  22  |  0.72    Ca    9.3      29 Oct 2023 05:58  Phos  3.8     10-29  Mg     2.00     10-29        Urinalysis Basic - ( 29 Oct 2023 05:58 )    Color: x / Appearance: x / SG: x / pH: x  Gluc: 137 mg/dL / Ketone: x  / Bili: x / Urobili: x   Blood: x / Protein: x / Nitrite: x   Leuk Esterase: x / RBC: x / WBC x   Sq Epi: x / Non Sq Epi: x / Bacteria: x      CAPILLARY BLOOD GLUCOSE      POCT Blood Glucose.: 221 mg/dL (29 Oct 2023 11:37)  POCT Blood Glucose.: 134 mg/dL (29 Oct 2023 07:58)  POCT Blood Glucose.: 221 mg/dL (28 Oct 2023 21:45)  POCT Blood Glucose.: 229 mg/dL (28 Oct 2023 17:06)        Urinalysis Basic - ( 29 Oct 2023 05:58 )    Color: x / Appearance: x / SG: x / pH: x  Gluc: 137 mg/dL / Ketone: x  / Bili: x / Urobili: x   Blood: x / Protein: x / Nitrite: x   Leuk Esterase: x / RBC: x / WBC x   Sq Epi: x / Non Sq Epi: x / Bacteria: x        MEDICATIONS  (STANDING):  atorvastatin 20 milliGRAM(s) Oral at bedtime  cefuroxime   Tablet 500 milliGRAM(s) Oral every 12 hours  cilostazol 50 milliGRAM(s) Oral two times a day  cyanocobalamin 1000 MICROGram(s) Oral daily  dextrose 5%. 1000 milliLiter(s) (50 mL/Hr) IV Continuous <Continuous>  dextrose 5%. 1000 milliLiter(s) (100 mL/Hr) IV Continuous <Continuous>  dextrose 50% Injectable 12.5 Gram(s) IV Push once  dextrose 50% Injectable 25 Gram(s) IV Push once  dextrose 50% Injectable 25 Gram(s) IV Push once  glucagon  Injectable 1 milliGRAM(s) IntraMuscular once  influenza  Vaccine (HIGH DOSE) 0.7 milliLiter(s) IntraMuscular once  insulin glargine Injectable (LANTUS) 16 Unit(s) SubCutaneous at bedtime  insulin lispro (ADMELOG) corrective regimen sliding scale   SubCutaneous three times a day before meals  insulin lispro (ADMELOG) corrective regimen sliding scale   SubCutaneous at bedtime  labetalol 100 milliGRAM(s) Oral every 12 hours  potassium chloride    Tablet ER 40 milliEquivalent(s) Oral every 4 hours    MEDICATIONS  (PRN):  acetaminophen     Tablet .. 650 milliGRAM(s) Oral every 6 hours PRN Temp greater or equal to 38C (100.4F), Mild Pain (1 - 3)  dextrose Oral Gel 15 Gram(s) Oral once PRN Blood Glucose LESS THAN 70 milliGRAM(s)/deciliter  melatonin 3 milliGRAM(s) Oral at bedtime PRN Insomnia          PHYSICAL EXAM:  GENERAL: NAD, well-groomed, well-developed  HEAD:  Atraumatic, Normocephalic  CHEST/LUNG: Clear to percussion bilaterally; No rales, rhonchi, wheezing, or rubs  HEART: Regular rate and rhythm; No murmurs, rubs, or gallops  ABDOMEN: Soft, Nontender, Nondistended; Bowel sounds present  EXTREMITIES:  2+ Peripheral Pulses, No clubbing, cyanosis, or edema  LYMPH: No lymphadenopathy noted  SKIN: No rashes or lesions    Care Discussed with Consultants/Other Providers [ ] YES  [ ] NO

## 2023-10-29 NOTE — PROGRESS NOTE ADULT - SUBJECTIVE AND OBJECTIVE BOX
Date of Service: 10-29-23 @ 13:56    Patient is a 68y old  Male who presents with a chief complaint of Sepsis, Mod Pericardial Effusion (28 Oct 2023 17:17)      Any change in ROS: Pt seems to be doing good:  no sob:  noc ugh : no phlegm     MEDICATIONS  (STANDING):  atorvastatin 20 milliGRAM(s) Oral at bedtime  cefuroxime   Tablet 500 milliGRAM(s) Oral every 12 hours  cilostazol 50 milliGRAM(s) Oral two times a day  cyanocobalamin 1000 MICROGram(s) Oral daily  dextrose 5%. 1000 milliLiter(s) (100 mL/Hr) IV Continuous <Continuous>  dextrose 5%. 1000 milliLiter(s) (50 mL/Hr) IV Continuous <Continuous>  dextrose 50% Injectable 25 Gram(s) IV Push once  dextrose 50% Injectable 25 Gram(s) IV Push once  dextrose 50% Injectable 12.5 Gram(s) IV Push once  glucagon  Injectable 1 milliGRAM(s) IntraMuscular once  influenza  Vaccine (HIGH DOSE) 0.7 milliLiter(s) IntraMuscular once  insulin glargine Injectable (LANTUS) 16 Unit(s) SubCutaneous at bedtime  insulin lispro (ADMELOG) corrective regimen sliding scale   SubCutaneous three times a day before meals  insulin lispro (ADMELOG) corrective regimen sliding scale   SubCutaneous at bedtime  labetalol 100 milliGRAM(s) Oral every 12 hours    MEDICATIONS  (PRN):  acetaminophen     Tablet .. 650 milliGRAM(s) Oral every 6 hours PRN Temp greater or equal to 38C (100.4F), Mild Pain (1 - 3)  dextrose Oral Gel 15 Gram(s) Oral once PRN Blood Glucose LESS THAN 70 milliGRAM(s)/deciliter  melatonin 3 milliGRAM(s) Oral at bedtime PRN Insomnia    Vital Signs Last 24 Hrs  T(C): 37.1 (29 Oct 2023 05:00), Max: 37.1 (29 Oct 2023 05:00)  T(F): 98.8 (29 Oct 2023 05:00), Max: 98.8 (29 Oct 2023 05:00)  HR: 94 (29 Oct 2023 05:00) (94 - 96)  BP: 143/67 (29 Oct 2023 05:00) (132/72 - 161/71)  BP(mean): --  RR: 17 (29 Oct 2023 05:00) (16 - 17)  SpO2: 99% (29 Oct 2023 05:00) (99% - 100%)    Parameters below as of 29 Oct 2023 05:00  Patient On (Oxygen Delivery Method): room air        I&O's Summary    29 Oct 2023 07:01  -  29 Oct 2023 13:56  --------------------------------------------------------  IN: 120 mL / OUT: 300 mL / NET: -180 mL          Physical Exam:   GENERAL: NAD, well-groomed, well-developed  HEENT: CORRINE/   Atraumatic, Normocephalic  ENMT: No tonsillar erythema, exudates, or enlargement; Moist mucous membranes, Good dentition, No lesions  NECK: Supple, No JVD, Normal thyroid  CHEST/LUNG: Clear to auscultaion-no wheezing  CVS: Regular rate and rhythm; No murmurs, rubs, or gallops  GI: : Soft, Nontender, Nondistended; Bowel sounds present  NERVOUS SYSTEM:  Alert & Oriented X3-  EXTREMITIES:  2+ Peripheral Pulses, No clubbing, cyanosis, or edema  LYMPH: No lymphadenopathy noted  SKIN: No rashes or lesions  ENDOCRINOLOGY: No Thyromegaly  PSYCH: Appropriate    Labs:                              7.8    11.94 )-----------( 417      ( 29 Oct 2023 05:58 )             26.4                         7.9    10.93 )-----------( 373      ( 28 Oct 2023 05:39 )             25.9                         7.7    11.65 )-----------( 342      ( 27 Oct 2023 06:20 )             24.9                         8.0    11.08 )-----------( 340      ( 26 Oct 2023 04:28 )             27.0     10-29    136  |  99  |  10  ----------------------------<  137<H>  3.5   |  22  |  0.72  10-28    134<L>  |  99  |  12  ----------------------------<  160<H>  3.4<L>   |  23  |  0.69  10-27    138  |  104  |  11  ----------------------------<  135<H>  3.7   |  24  |  0.80  10-26    135  |  100  |  10  ----------------------------<  171<H>  3.4<L>   |  21<L>  |  0.84    Ca    9.3      29 Oct 2023 05:58  Ca    9.3      28 Oct 2023 05:39  Phos  3.8     10-29  Phos  3.9     10-28  Mg     2.00     10-29  Mg     2.00     10-28    TPro  7.6  /  Alb  3.7  /  TBili  <0.2  /  DBili  x   /  AST  28  /  ALT  28  /  AlkPhos  98  10-26    CAPILLARY BLOOD GLUCOSE      POCT Blood Glucose.: 221 mg/dL (29 Oct 2023 11:37)  POCT Blood Glucose.: 134 mg/dL (29 Oct 2023 07:58)  POCT Blood Glucose.: 221 mg/dL (28 Oct 2023 21:45)  POCT Blood Glucose.: 229 mg/dL (28 Oct 2023 17:06)          Urinalysis Basic - ( 29 Oct 2023 05:58 )    Color: x / Appearance: x / SG: x / pH: x  Gluc: 137 mg/dL / Ketone: x  / Bili: x / Urobili: x   Blood: x / Protein: x / Nitrite: x   Leuk Esterase: x / RBC: x / WBC x   Sq Epi: x / Non Sq Epi: x / Bacteria: x            RECENT CULTURES:  10-22 @ 14:37 .Blood Blood-Venous       < from: CT Chest w/ IV Cont (10.24.23 @ 20:00) >    PROCEDURE:  CT of the Chest was performed with IV contrast.  Sagittal and coronal reformats were performed.    FINDINGS:    LUNGS, AIRWAYS AND PLEURA: The central airways are patent. No pleural   effusion. Paraseptal and centrilobular emphysema is present. There are   numerous groundglass/mixed attenuation nodules throughout the left upper   lobe, both new and increased since April 2013. For example, a 2.5 x 1.2   cm part cystic and groundglass lesion in the posterior left upper lobe on   series 2, image 67 tethering the major fissure measured 4 mm in 2013, a   1.6 cm part cystic and groundglass nodule in the lingula on image 99   previously measured 1.1 cm, a 9 mm apical groundglass nodule on image 32   is new and a 1.7 cm part cystic and groundglass nodule anteriorly on   image 44 measured 6 mm. Other scattered sub-5 mm pulmonary nodules are   unchanged since 2013, including right upper lobe image 41.    MEDIASTINUM AND JUAN CARLOS: No lymphadenopathy.    VESSELS: There is extensive aortic calcified and noncalcified plaques,   with a focal anterior outpouching (2-87, 5:101) representing a small   penetrating atherosclerotic ulcer, new since 4/5/2013.    HEART: Heart size is normal. Small-moderate pericardial effusion,   unchanged since 10/22/2023 abdomen CT. Coronary arterial calcification.    CHEST WALL AND LOWER NECK: Within normal limits.    VISUALIZED UPPER ABDOMEN: Within normal limits.    BONES: Within normal limits.    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 Continued surveillance is recommended.    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    Small-moderate pericardial effusion, unchanged since 10/22/2023 abdomen   CT.    < end of copied text >           No growth at 5 days    10-22 @ 14:00 .Blood Blood-Peripheral                No growth at 5 days          RESPIRATORY CULTURES:          Studies  Chest X-RAY  CT SCAN Chest   Venous Dopplers: LE:   CT Abdomen  Others        rad< from: TTE W or WO Ultrasound Enhancing Agent (10.23.23 @ 15:46) >  ________________________________________________________________________________________  Referring Physician:    [id number] [facility]  Interpreting Physician: Carrie Stubbs MD  Primary Sonographer:    [facility]ren R. Finkelstein [facility]    CPT:               ECHO TTE WO CON COMP W DOPP - 94789.m  Indication(s):     Other pericardial effusion (noninflammatory) - I31.39  Procedure:    Transthoracic echocardiogram with 2-D, M-mode and complete                     spectral and color flow Doppler.  Ordering Location: [facility]  Study Information: Image quality for this study is adequate.    _______________________________________________________________________________________     CONCLUSIONS:      1. Left ventricular systolic function is normal with an ejection fraction of 60 % by 3D.   2. Normal right ventricular cavity size, wall thickness, and probably normal systolic function.   3. The left atrium is normal in size.   4. The right atrium is normal in size.   5. No significant valvular disease.   6. Moderate pericardial effusion noted adjacent to the posterior left ventricle and small pericardial effusion noted adjacent tothe right ventricle with no evidence of hemodynamic compromise.   7. Right pleural effusion noted.   8. The inferior vena cava is normal in size (normal <2.1cm) with abnormal inspiratory collapse (abnormal <50%) consistent with mildly elevated right atrial pressure (~8, range 5-10mmHg).    _____________________________________________________________________________    < end of copied text >

## 2023-10-30 ENCOUNTER — TRANSCRIPTION ENCOUNTER (OUTPATIENT)
Age: 68
End: 2023-10-30

## 2023-10-30 VITALS — WEIGHT: 145.95 LBS

## 2023-10-30 LAB
ANION GAP SERPL CALC-SCNC: 11 MMOL/L — SIGNIFICANT CHANGE UP (ref 7–14)
ANION GAP SERPL CALC-SCNC: 11 MMOL/L — SIGNIFICANT CHANGE UP (ref 7–14)
BUN SERPL-MCNC: 12 MG/DL — SIGNIFICANT CHANGE UP (ref 7–23)
BUN SERPL-MCNC: 12 MG/DL — SIGNIFICANT CHANGE UP (ref 7–23)
CALCIUM SERPL-MCNC: 9.6 MG/DL — SIGNIFICANT CHANGE UP (ref 8.4–10.5)
CALCIUM SERPL-MCNC: 9.6 MG/DL — SIGNIFICANT CHANGE UP (ref 8.4–10.5)
CHLORIDE SERPL-SCNC: 103 MMOL/L — SIGNIFICANT CHANGE UP (ref 98–107)
CHLORIDE SERPL-SCNC: 103 MMOL/L — SIGNIFICANT CHANGE UP (ref 98–107)
CO2 SERPL-SCNC: 23 MMOL/L — SIGNIFICANT CHANGE UP (ref 22–31)
CO2 SERPL-SCNC: 23 MMOL/L — SIGNIFICANT CHANGE UP (ref 22–31)
CREAT SERPL-MCNC: 0.76 MG/DL — SIGNIFICANT CHANGE UP (ref 0.5–1.3)
CREAT SERPL-MCNC: 0.76 MG/DL — SIGNIFICANT CHANGE UP (ref 0.5–1.3)
EGFR: 97 ML/MIN/1.73M2 — SIGNIFICANT CHANGE UP
EGFR: 97 ML/MIN/1.73M2 — SIGNIFICANT CHANGE UP
GLUCOSE BLDC GLUCOMTR-MCNC: 149 MG/DL — HIGH (ref 70–99)
GLUCOSE BLDC GLUCOMTR-MCNC: 149 MG/DL — HIGH (ref 70–99)
GLUCOSE BLDC GLUCOMTR-MCNC: 238 MG/DL — HIGH (ref 70–99)
GLUCOSE BLDC GLUCOMTR-MCNC: 238 MG/DL — HIGH (ref 70–99)
GLUCOSE SERPL-MCNC: 131 MG/DL — HIGH (ref 70–99)
GLUCOSE SERPL-MCNC: 131 MG/DL — HIGH (ref 70–99)
HCT VFR BLD CALC: 27 % — LOW (ref 39–50)
HCT VFR BLD CALC: 27 % — LOW (ref 39–50)
HGB BLD-MCNC: 8.2 G/DL — LOW (ref 13–17)
HGB BLD-MCNC: 8.2 G/DL — LOW (ref 13–17)
MAGNESIUM SERPL-MCNC: 2.1 MG/DL — SIGNIFICANT CHANGE UP (ref 1.6–2.6)
MAGNESIUM SERPL-MCNC: 2.1 MG/DL — SIGNIFICANT CHANGE UP (ref 1.6–2.6)
MCHC RBC-ENTMCNC: 21.7 PG — LOW (ref 27–34)
MCHC RBC-ENTMCNC: 21.7 PG — LOW (ref 27–34)
MCHC RBC-ENTMCNC: 30.4 GM/DL — LOW (ref 32–36)
MCHC RBC-ENTMCNC: 30.4 GM/DL — LOW (ref 32–36)
MCV RBC AUTO: 71.4 FL — LOW (ref 80–100)
MCV RBC AUTO: 71.4 FL — LOW (ref 80–100)
NRBC # BLD: 0 /100 WBCS — SIGNIFICANT CHANGE UP (ref 0–0)
NRBC # BLD: 0 /100 WBCS — SIGNIFICANT CHANGE UP (ref 0–0)
NRBC # FLD: 0.04 K/UL — HIGH (ref 0–0)
NRBC # FLD: 0.04 K/UL — HIGH (ref 0–0)
PHOSPHATE SERPL-MCNC: 3.3 MG/DL — SIGNIFICANT CHANGE UP (ref 2.5–4.5)
PHOSPHATE SERPL-MCNC: 3.3 MG/DL — SIGNIFICANT CHANGE UP (ref 2.5–4.5)
PLATELET # BLD AUTO: 480 K/UL — HIGH (ref 150–400)
PLATELET # BLD AUTO: 480 K/UL — HIGH (ref 150–400)
POTASSIUM SERPL-MCNC: 4.5 MMOL/L — SIGNIFICANT CHANGE UP (ref 3.5–5.3)
POTASSIUM SERPL-MCNC: 4.5 MMOL/L — SIGNIFICANT CHANGE UP (ref 3.5–5.3)
POTASSIUM SERPL-SCNC: 4.5 MMOL/L — SIGNIFICANT CHANGE UP (ref 3.5–5.3)
POTASSIUM SERPL-SCNC: 4.5 MMOL/L — SIGNIFICANT CHANGE UP (ref 3.5–5.3)
RBC # BLD: 3.78 M/UL — LOW (ref 4.2–5.8)
RBC # BLD: 3.78 M/UL — LOW (ref 4.2–5.8)
RBC # FLD: 20.1 % — HIGH (ref 10.3–14.5)
RBC # FLD: 20.1 % — HIGH (ref 10.3–14.5)
SODIUM SERPL-SCNC: 137 MMOL/L — SIGNIFICANT CHANGE UP (ref 135–145)
SODIUM SERPL-SCNC: 137 MMOL/L — SIGNIFICANT CHANGE UP (ref 135–145)
WBC # BLD: 12.68 K/UL — HIGH (ref 3.8–10.5)
WBC # BLD: 12.68 K/UL — HIGH (ref 3.8–10.5)
WBC # FLD AUTO: 12.68 K/UL — HIGH (ref 3.8–10.5)
WBC # FLD AUTO: 12.68 K/UL — HIGH (ref 3.8–10.5)

## 2023-10-30 RX ADMIN — CILOSTAZOL 50 MILLIGRAM(S): 100 TABLET ORAL at 05:25

## 2023-10-30 RX ADMIN — Medication 500 MILLIGRAM(S): at 05:25

## 2023-10-30 RX ADMIN — Medication 100 MILLIGRAM(S): at 05:25

## 2023-10-30 RX ADMIN — PREGABALIN 1000 MICROGRAM(S): 225 CAPSULE ORAL at 11:42

## 2023-10-30 NOTE — DISCHARGE NOTE PROVIDER - NSDCMRMEDTOKEN_GEN_ALL_CORE_FT
atenolol 25 mg oral tablet: 1 tab(s) orally once a day  atorvastatin 20 mg oral tablet: 1 tab(s) orally once a day (at bedtime)  cilostazol 50 mg oral tablet: 1 tab(s) orally 2 times a day  Eliquis 2.5 mg oral tablet: 1 tab(s) orally 2 times a day  Jentadueto 2.5 mg-1000 mg oral tablet: 1 tab(s) orally 2 times a day  Lantus 100 units/mL subcutaneous solution: 24 unit(s) subcutaneous once a day (at bedtime)  losartan 50 mg oral tablet: 1 tab(s) orally once a day  pregabalin 75 mg oral capsule: 1 cap(s) orally once a day  Vitamin B12 1000 mcg oral tablet: 1 tab(s) orally once a day

## 2023-10-30 NOTE — DIETITIAN INITIAL EVALUATION ADULT - PERTINENT MEDS FT
MEDICATIONS  (STANDING):  atorvastatin 20 milliGRAM(s) Oral at bedtime  cilostazol 50 milliGRAM(s) Oral two times a day  cyanocobalamin 1000 MICROGram(s) Oral daily  dextrose 5%. 1000 milliLiter(s) (100 mL/Hr) IV Continuous <Continuous>  dextrose 5%. 1000 milliLiter(s) (50 mL/Hr) IV Continuous <Continuous>  dextrose 50% Injectable 25 Gram(s) IV Push once  dextrose 50% Injectable 25 Gram(s) IV Push once  dextrose 50% Injectable 12.5 Gram(s) IV Push once  glucagon  Injectable 1 milliGRAM(s) IntraMuscular once  influenza  Vaccine (HIGH DOSE) 0.7 milliLiter(s) IntraMuscular once  insulin glargine Injectable (LANTUS) 16 Unit(s) SubCutaneous at bedtime  insulin lispro (ADMELOG) corrective regimen sliding scale   SubCutaneous three times a day before meals  insulin lispro (ADMELOG) corrective regimen sliding scale   SubCutaneous at bedtime  labetalol 100 milliGRAM(s) Oral every 12 hours    MEDICATIONS  (PRN):  acetaminophen     Tablet .. 650 milliGRAM(s) Oral every 6 hours PRN Temp greater or equal to 38C (100.4F), Mild Pain (1 - 3)  dextrose Oral Gel 15 Gram(s) Oral once PRN Blood Glucose LESS THAN 70 milliGRAM(s)/deciliter  melatonin 3 milliGRAM(s) Oral at bedtime PRN Insomnia

## 2023-10-30 NOTE — DISCHARGE NOTE PROVIDER - HOSPITAL COURSE
68 year old male with PMH of PVD on eliquis, diabetes, HTN, HLD, hemorrhoidal banding surgery 3 days ago who presented to the ER with dysuria, urinary frequency, and concern for urine retention after the procedure. Francescat underwent a CT A/P which showed a moderate volume pericardial effusion. Bladder wall thickening may represent cystitis vs chronic bladder outlet obstruction given the enlarged prostate. Punctuate nonobstructing L upper pole calculi. No hydronephrosis. Cardiology called to evaluate moderate pericardial effusion found on CT scan of abd and pelvis. Echo  showed EF 60%, nomral LV cavity size and function, normal LA/RA, moderate pericardial effusion noted adjacent to the posterior left ventricle and small pericardial effusion noted adjacent to the right ventricle with no evidence of hemodynamic compromise, right pleural effusion noted. Seen by cardiology who recommended repeat echo in 1 week. TSH normal. Repeat echo 1 week later showed small pericardial effusion. Per cardiology, outpatient serial monitoring.   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   Patient has longstanding smoking history, seen by pulmonary. Needs PFTs and PET scan as outpatient with Dr Garsia. Per thoracic team, outpatient follow up as stable with no SOB, cough or O2 needs.   Unclear why antiocoagulation was stopped. Will resume as per cardiology.       Patient seen and evaluated, no acute somatic complaints. Reviewed discharge medications with patient; All new medications requiring new prescriptions were sent to the pharmacy of patient's choice. Reviewed need for prescription for previous home medications and new prescriptions sent if requested. Medically cleared/stable for discharge as per Dr. GUY  with close outpatient follow up within 1-2 weeks of discharge. Patient understands and agrees with plan of care.

## 2023-10-30 NOTE — PROGRESS NOTE ADULT - SUBJECTIVE AND OBJECTIVE BOX
[facility] NEPHROLOGY- PROGRESS NOTE    68y Male with history of HTN presents with dysuria. Nephrology consulted for hyponatremia.    REVIEW OF SYSTEMS:  Gen: no fevers  Cards: no chest pain  Resp: no dyspnea  GI: no nausea or vomiting or diarrhea  Vascular: no LE edema    Advil (Rash)      Hospital Medications: Medications reviewed        VITALS:  T(F): 98.2 (10-30-23 @ 11:44), Max: 98.5 (10-29-23 @ 17:29)  HR: 84 (10-30-23 @ 11:44)  BP: 139/68 (10-30-23 @ 11:44)  RR: 18 (10-30-23 @ 11:44)  SpO2: 100% (10-30-23 @ 11:44)  Wt(kg): --    10-29 @ 07:01  -  10-30 @ 07:00  --------------------------------------------------------  IN: 120 mL / OUT: 300 mL / NET: -180 mL        PHYSICAL EXAM:    Gen: NAD, calm  Cards: RRR, +S1/S2, no M/G/R  Resp: CTA B/L  GI: soft, NT, + ab distention, NABS  Vascular: no LE edema B/L        LABS:  10-30    137  |  103  |  12  ----------------------------<  131<H>  4.5   |  23  |  0.76    Ca    9.6      30 Oct 2023 05:32  Phos  3.3     10-30  Mg     2.10     10-30      Creatinine Trend: 0.76 <--, 0.72 <--, 0.69 <--, 0.80 <--, 0.84 <--, 0.93 <--, 1.06 <--                        8.2    12.68 )-----------( 480      ( 30 Oct 2023 05:32 )             27.0     Urine Studies:  Urinalysis Basic - ( 30 Oct 2023 05:32 )    Color:  / Appearance:  / SG:  / pH:   Gluc: 131 mg/dL / Ketone:   / Bili:  / Urobili:    Blood:  / Protein:  / Nitrite:    Leuk Esterase:  / RBC:  / WBC    Sq Epi:  / Non Sq Epi:  / Bacteria:

## 2023-10-30 NOTE — DIETITIAN INITIAL EVALUATION ADULT - OTHER INFO
68M with T2DM, HTN/HLD, PVD on eliquis, and 50 PY smoking history presents with 1 day history of dysuria/polyuria.    Pt seen and reports 75% intake of meals with No GI distress (nausea/vomiting/diarrhea/constipation.) at present. UBW- 143# per pt and stable. Noted skin ecchymosis, no edema per nursing flow sheet. Labs reviewed A1c- 7.0% (H) (10/22/23).

## 2023-10-30 NOTE — DIETITIAN INITIAL EVALUATION ADULT - PROBLEM SELECTOR PLAN 5
Patient was told to stop Lantus at home; has been off for 1 week    Insulin LSS: monitor glucose levels; titrate regiment as needed.  Lantus 16U qhs

## 2023-10-30 NOTE — DIETITIAN INITIAL EVALUATION ADULT - PROBLEM SELECTOR PLAN 1
UA positive, dysuria, polyuria. Septic with tachycardia, fever, leukocytosis. Possible bacterial prostatitis   -zosyn  -lactate normal  -f/u BCx x2, UCx to tailor abx

## 2023-10-30 NOTE — CHART NOTE - NSCHARTNOTEFT_GEN_A_CORE
Repeat TTE showing pericardial effusion decreased in size. Should follow closely with outpatient cardiology for serial monitoring.     Keyonna Chery MD  PGY-4, Cardiology  Available on TEAMS    For all new consults  [email or web address]  Login: [email or web address]

## 2023-10-30 NOTE — DISCHARGE NOTE PROVIDER - NSDCCPCAREPLAN_GEN_ALL_CORE_FT
PRINCIPAL DISCHARGE DIAGNOSIS  Diagnosis: Pericardial effusion  Assessment and Plan of Treatment: You underwent a CT A/P which showed a moderate volume pericardial effusion (fluid around your heart). Cardiology was called. You underwent an echo which showed good heart pumping function and moderate size pericardial effusion. Your repeat echo showed smaller effusion. You will need to see cardiology in 2-4 weeks for serial monitoring.         SECONDARY DISCHARGE DIAGNOSES  Diagnosis: Acute UTI  Assessment and Plan of Treatment: You were treated for UTI.    Diagnosis: Ground glass opacity present on imaging of lung  Assessment and Plan of Treatment: You underwent a CT chest which showed: numerous left upper lobe groundglass and mixed attenuation nodules, new and increased since 2013, concerning for possible malignancy. Given your smoking history, you will need pulmonary function tests and PET scan as outpatient with Dr Garsia.

## 2023-10-30 NOTE — DIETITIAN INITIAL EVALUATION ADULT - NS FNS DIET ORDER
Diet, Regular:   Consistent Carbohydrate {Evening Snack} (CSTCHOSN)  1000mL Fluid Restriction ([id number])  No Pork  Supplement Feeding Modality:  Oral  Glucerna Shake Cans or Servings Per Day:  1       Frequency:  Three Times a day (10-23-23 @ 13:40)

## 2023-10-30 NOTE — DISCHARGE NOTE NURSING/CASE MANAGEMENT/SOCIAL WORK - NSDCFUADDAPPT_GEN_ALL_CORE_FT
Follow up with PCP within 1-2 weeks of discharge. If you are in need of a general medicine physician and post-discharge medical follow-up for further care/recommendations you may contact the [facility] for an appointment at [phone].     Follow up with cardiology within 1-2 weeks of discharge. If you are in need of a general cardiologist after discharge, you may contact the [facility] for an appointment at [phone].

## 2023-10-30 NOTE — PROGRESS NOTE ADULT - ASSESSMENT
67 yo year old with DM with recent hemorrhoid procedure  He presented with urinary retention with fever  He had dysuria  Associated fever and leukocytosis    Is urine retention a complication of hemorrhoid banding?  I would clarify this with the physician who performed the procedure.     Pyuria with dysuria and fever  Suspect acute cystitis  Doubt pyelonpehritis    Urine culture is growing Klebsiella    Change to ceftin 500 mg po q 12 for 5 additional days    Management of pericardial effusion per cardiology    Will sign off  
68M with T2DM, HTN/HLD, PVD on eliquis, and 50 PY smoking history presents with 1 day history of dysuria/polyuria.  Patient was recently seen at OSH for hemorrhoid banding on Thursday by Dr. Ian Bearden ([phone]) and went home the same day. He reports he was given a PPI/antacid medication, a 3 day course of amoxicillin, and some stool softener. He missed his last dose of amoxicillin the day prior to his symptoms onset, but otherwise, reports he had no acute events in the post-op period.  This morning at around 6am; he woke with new suprapubic pain. He went to the bathroom much more frequently and endorsed a burning pain whenever he urinated and his daughter took his temperature which came back at 102 and they called his surgeon who told him to go to the ED. He denies CP, SOB, palpitations, LH, dizziness, LOC, abdominal pain, vomiting, diarrhea.  ED Course 100.7, 164/57, 136 BMP, 18/min, 100% RA  Ofirmev x1, 1U Lispro, Zosyn, 1L LR  CT A/P showing Diffuse Bladder thickening with enlarged prostate, Mod. 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 Continued surveillance is recommended. it has already increased in size since 2013: need ct surgery to see:  needs pet scan  ; as it is possible that he might need vats surgery   10/27/2023  -dw pt about it no change in his symptoms today   10/28:   -called his daughter about the pulm nodules and chances of malignancy  :  but no response  will try again later : cant leave message on voice mail   however his  nodules in left  upper lobe has increased in size and there are new nodules also : concern for slow growing malignancy hence called thoracic  10/29:  -spoke to thoracic attending about the increasing GGO nodular opacity: and also discussed in detail with pts granddaughter: in detail about it : wouled need pft and pet scan   UTI  -cont antibiotics  DM  -monitor and control   HTN/HLD  -controlled  PVD  -eliquis:   10/29  -not on eliquis now from past week ? restart: had recent gi bLEED  pericardial effusion   -has mod pericardial effusion: rpt echo is PENDING    dw pt: 
68y Male with history of HTN presents with dysuria. Nephrology consulted for hyponatremia.    1) Hyponatremia: Resolved. Continue with 1L FR and Glucerna with meals. Monitor serum Na.    2) HTN: BP uncontrolled with tachycardia. Can start labetalol 100 mg PO twice daily if ok with cardiology given pericardial effusion. Monitor off medications.    3) Acute cystitis without hematuria: As per ID.     4) Pericardial effusion: As per cardiology.      [facility] NEPHROLOGY  Hardik Oliveira M.D.  Joaquin Bauman D.O.  Svitlana Deutsch M.D.  MD Yvette Bales, MSN, ANP-C    Telephone: [phone]  Facsimile: [phone] [place] [address], [address]  [place] [address]  
69 yo year old with DM with recent hemorrhoid procedure  He presented with urinary retention with fever  He had dysuria  Associated fever and leukocytosis    Is urine retention a complication of hemorrhoid banding?  I would clarify this with the physician who performed the procedure.     Pyuria with dysuria and fever  Suspect acute cystitis  Doubt pyelonpehritis    Urine culture is growing a gram negative chaparro  Continue zosyn pending culture data      Management of pericardial effusion per cardiology
Problem/Plan - 1:  ·  Problem: Acute UTI., prostatitis  ·  Plan: UA positive, dysuria, polyuria. Septic with tachycardia, fever, leukocytosis. Possible bacterial prostatitis   - fu cultures  - cw abx  - no role for urology as per consult service     Problem/Plan - 2:  ·  Problem: Sepsis.  ·  Plan: cw abx   Concern for proastatitis. Pelvic inflammation on CT A/P. EDDY deferred due to in hallway and recent banding procedure.  cw abx as per ID     Problem/Plan - 3:  ·  Problem: Pericardial effusion.  ·  Plan: Trace pericardial effusion on TTE 10/22/22  Mildly elevated trop but EKG nonischemic and no CP. Not likely ACS due to acute plaque rupture. More likely type 2 demand ischemia. CKMB normal  No Tamponade Physiology appreciated; Hypertensive, normal heart sounds, no JVD appreciated  cards fu     Problem/Plan - 4:  ·  Problem: Peripheral vascular disease.  ·  Plan: Can C/W Cilostazol 50 BID  Hold Eliquis; iso recent GIB      Problem/Plan - 5:  ·  Problem: Diabetes.  ·  Plan: monitor FS  - ISS     Problem/Plan - 6:  ·  Problem: HTN (hypertension).  ·  Plan: Losartan 50 qd  Atenolol    Problem/Plan - 7:  ·  Problem: HLD (hyperlipidemia).  ·  Plan: C/W Atorvastatin 20 qd.
-------------------------------------------------------------------------------------------  Cardiovascular Diagnostic Testing:    ECG:     Echo: 10/23/23   1. Left ventricular systolic function is normal with an ejection fraction of 60 % by 3D.  2. Normal right ventricular cavity size, wall thickness, and probably normal systolic function.  3. The left atrium is normal in size.  4. The right atrium is normal in size.  5. Moderate pericardial effusion noted adjacent to the posterior left ventricle and small pericardial effusion noted adjacent to the right ventricle with no evidence of hemodynamic compromise.    CXR:  reviewed  -------------------------------------------------------------------------------------------    68M PMH PVD on eliquis, DM, HTN, HLD, recent hemorrhoidal banding admitted w/ UTI, incidentally found to have moderate pericardial effusion on CT abd/pelvis. TTE with moderate effusion, no evidence of hemodynamic compromise on preliminary read. Patient asymptomatic from cardiac standpoing. TTE 1yo w/ trace pericardial effusion. No urgent intervention required, should undergo outpatient monitoring of effusion. Given +troponins may benefit from stress testing in outpatient setting to evaluate for CAD.     Recommendations  - can continue eliquis 5 BID for his Peripheral artery disease with  lower extremity stent  - follow up outpatient with cardiology, will need repeat TTE in 3mo  - outpatient stress testing   - send autoimmune screening for causes of pericardial effusion: ESR, CRP, RF, RANDY  - send QuantGold  - cont telemetry monitoring while admitted, closely monitor for hemodynamic changes, low threshold for IVF resuscitation  - ensure up to date on indicated malignancy screening including low-dose CT chest, daughter reports recent colonoscopy was wnl  - hold home antihypertensives      All recommendations pending attending attestation. We will continue to follow with you.     Keyonna Chery MD  PGY-4, Cardiology  Available on TEAMS    For all new consults  [email or web address]  Login: danny
68M with T2DM, HTN/HLD, PVD on eliquis, and 50 PY smoking history presents with 1 day history of dysuria/polyuria.  Patient was recently seen at OSH for hemorrhoid banding on Thursday by Dr. Ian Bearden ([phone]) and went home the same day. He reports he was given a PPI/antacid medication, a 3 day course of amoxicillin, and some stool softener. He missed his last dose of amoxicillin the day prior to his symptoms onset, but otherwise, reports he had no acute events in the post-op period.  This morning at around 6am; he woke with new suprapubic pain. He went to the bathroom much more frequently and endorsed a burning pain whenever he urinated and his daughter took his temperature which came back at 102 and they called his surgeon who told him to go to the ED. He denies CP, SOB, palpitations, LH, dizziness, LOC, abdominal pain, vomiting, diarrhea.  ED Course 100.7, 164/57, 136 BMP, 18/min, 100% RA  Ofirmev x1, 1U Lispro, Zosyn, 1L LR  CT A/P showing Diffuse Bladder thickening with enlarged prostate, Mod. 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 Continued surveillance is recommended. it has already increased in size since 2013: need ct surgery to see:  needs pet scan  ; as it is possible that he might need vats surgery   10/27/2023  -dw pt about it no change in his symptoms today   UTI  -cont antibiotics  DM  -monitor and control   HTN/HLD  -controlled  PVD  -eliquis:     dw team
68M with T2DM, HTN/HLD, PVD on eliquis, and 50 PY smoking history presents with 1 day history of dysuria/polyuria.  Patient was recently seen at OSH for hemorrhoid banding on Thursday by Dr. Ian Bearden ([phone]) and went home the same day. He reports he was given a PPI/antacid medication, a 3 day course of amoxicillin, and some stool softener. He missed his last dose of amoxicillin the day prior to his symptoms onset, but otherwise, reports he had no acute events in the post-op period.  This morning at around 6am; he woke with new suprapubic pain. He went to the bathroom much more frequently and endorsed a burning pain whenever he urinated and his daughter took his temperature which came back at 102 and they called his surgeon who told him to go to the ED. He denies CP, SOB, palpitations, LH, dizziness, LOC, abdominal pain, vomiting, diarrhea.  ED Course 100.7, 164/57, 136 BMP, 18/min, 100% RA  Ofirmev x1, 1U Lispro, Zosyn, 1L LR  CT A/P showing Diffuse Bladder thickening with enlarged prostate, Mod. 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 Continued surveillance is recommended. it has already increased in size since 2013: need ct surgery to see:  needs pet scan  ; as it is possible that he might need vats surgery   10/27/2023  -dw pt about it no change in his symptoms today   10/28:   -called his daughter about the pulm nodules and chances of malignancy  :  but no response  will try again later : cant leave message on voice mail   however his  nodules in left  upper lobe has increased in size and there are new nodules also : concern for slow growing malignancy hence called thoracic  10/29:  -spoke to thoracic attending about the increasing GGO nodular opacity: and also discussed in detail with pts granddaughter: in detail about it : wouled need pft and pet scan   10/30:  -pulm gaytan seems stable:  no sob:  no cough : o n room air;   UTI  -cont antibiotics  10/30;  -off antibiotics now   DM  -monitor and control   HTN/HLD  -controlled  PVD  -eliquis:   10/29  -not on eliquis now from past week ? restart: had recent gi bLEED  pericardial effusion   -has mod pericardial effusion: rpt echo is PENDING    dw pt: 
68M with T2DM, HTN/HLD, PVD on eliquis, and 50 PY smoking history presents with 1 day history of dysuria/polyuria.  Patient was recently seen at OSH for hemorrhoid banding on Thursday by Dr. Ian Bearden ([phone]) and went home the same day. He reports he was given a PPI/antacid medication, a 3 day course of amoxicillin, and some stool softener. He missed his last dose of amoxicillin the day prior to his symptoms onset, but otherwise, reports he had no acute events in the post-op period.  This morning at around 6am; he woke with new suprapubic pain. He went to the bathroom much more frequently and endorsed a burning pain whenever he urinated and his daughter took his temperature which came back at 102 and they called his surgeon who told him to go to the ED. He denies CP, SOB, palpitations, LH, dizziness, LOC, abdominal pain, vomiting, diarrhea.  ED Course 100.7, 164/57, 136 BMP, 18/min, 100% RA  Ofirmev x1, 1U Lispro, Zosyn, 1L LR  CT A/P showing Diffuse Bladder thickening with enlarged prostate, Mod. 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 Continued surveillance is recommended. it has already increased in size since 2013: need ct surgery to see:  needs pet scan  ; as it is possible that he might need vats surgery   10/27/2023  -dw pt about it no change in his symptoms today   10/28:   -called his daughter about the pulm nodules and chances of malignancy  :  but no response  will try again later : cant leave message on voice mail   however his  nodules in left  upper lobe has increased in size and there are new nodules also : concern for slow growing malignancy hence called thoracic  UTI  -cont antibiotics  DM  -monitor and control   HTN/HLD  -controlled  PVD  -eliquis:     kurt pt: 
68y Male with history of HTN presents with dysuria. Nephrology consulted for hyponatremia.    1) Hyponatremia: Resolved. Continue with 1L FR and Glucerna with meals. Monitor serum Na.    2) HTN: BP controlled. Continue with labetalol. Monitor BP.    3) Acute cystitis without hematuria: As per ID.     4) Pericardial effusion: As per cardiology.      [facility] NEPHROLOGY  Hardik Oliveira M.D.  Joaquin Bauman D.O.  Svitlana Deutsch M.D.  MD Yvette Bales, MSN, ANP-C    Telephone: [phone]  Facsimile: [phone] [place] [address], [address]  [place] [address]  
68y Male with history of HTN presents with dysuria. Nephrology consulted for hyponatremia.    1) Hyponatremia: Resolved. Continue with 1L FR and Glucerna with meals. Monitor serum Na.    2) HTN: BP improving. Continue with labetalol. Monitor BP.    3) Acute cystitis without hematuria: As per ID.     4) Pericardial effusion: As per cardiology.      [facility] NEPHROLOGY  Hardik Oliveira M.D.  Joaquin Bauman D.O.  Svitlana Deutsch M.D.  MD Yvette Bales, MSN, ANP-C    Telephone: [phone]  Facsimile: [phone] [place] [address], [address]  [place] [address]  
68y Male with history of HTN presents with dysuria. Nephrology consulted for hyponatremia.    1) Hyponatremia: Resolved. Continue with 1L FR and Glucerna with meals. Monitor serum Na.    2) HTN: BP improving. Continue with labetalol. Monitor BP.    3) Acute cystitis without hematuria: As per ID.     4) Pericardial effusion: As per cardiology.      [facility] NEPHROLOGY  Hardik Oliveira M.D.  Joaquin Bauman D.O.  Svitlana Deutsch M.D.  MD Yvette Bales, MSN, ANP-C    Telephone: [phone]  Facsimile: [phone] [place] [address], [address]  [place] [address]  
Problem/Plan - 1:  ·  Problem: Acute UTI., prostatitis  ·  Plan: UA positive, dysuria, polyuria. Septic with tachycardia, fever, leukocytosis. Possible bacterial prostatitis   - fu cultures  - cw abx  - no role for urology as per consult service     Problem/Plan - 2:  ·  Problem: Sepsis.  ·  Plan: cw abx   Concern for proastatitis. Pelvic inflammation on CT A/P. EDDY deferred due to in hallway and recent banding procedure.  cw abx as per ID     Problem/Plan - 3:  ·  Problem: Pericardial effusion.  ·  Plan: Trace pericardial effusion on TTE 10/22/22  Mildly elevated trop but EKG nonischemic and no CP. Not likely ACS due to acute plaque rupture. More likely type 2 demand ischemia. CKMB normal  No Tamponade Physiology appreciated; Hypertensive, normal heart sounds, no JVD appreciated  cards fu   Problem/Plan - 4:  ·  Problem: Peripheral vascular disease.  ·  Plan: Can C/W Cilostazol 50 BID  Hold Eliquis; iso recent GIB      Problem/Plan - 5:  ·  Problem: Diabetes.  ·  Plan: monitor FS  - ISS     Problem/Plan - 6:  ·  Problem: HTN (hypertension).  ·  Plan: Losartan 50 qd  Atenolol    Problem/Plan - 7:  ·  Problem: HLD (hyperlipidemia).  ·  Plan: C/W Atorvastatin 20 qd.  
{\rtf1\ulohum26792\ansi\fgznpdx6877\ftnbj\uc1\deff0  {\fonttbl{\f0 \fnil Segoe UI;}{\f1 \fnil \fcharset0 Segoe UI;}{\f2 \fnil Times New En;}}  {\colortbl ;\jij347\bdasy079\caky574 ;\red0\green0\blue0 ;\red0\green0\naoi134 ;\red0\green0\blue0 ;}  {\stylesheet{\f0\fs20 Normal;}{\cs1 Default Paragraph Font;}{\cs2\f0\fs16 Line Number;}{\cs3\f2\fs24\ul\cf3 Hyperlink;}}  {\*\revtbl{Unknown;}}  \ilfhgl55209\zijbzc89652\nqzrm1296\skprr8115\nhdqs1373\wxqqm7661\xjvromn924\awwfvho150\nogrowautofit\sqeprt939\formshade\nofeaturethrottle1\dntblnsbdb\fet4\aendnotes\aftnnrlc\pgbrdrhead\pgbrdrfoot  \sectd\wydpyq82050\ovucsm83157\guttersxn0\mjycinnp9327\aoxgsrmp3846\sebjxdff6149\igtsrjqx0827\pgvrzhy030\qobztrv333\sbkpage\pgncont\pgndec  \plain\plain\f0\fs24\ql\plain\f0\fs24\plain\f1\fs16\amic4766\hich\f1\dbch\f1\loch\f1\cf2\fs16 68M with PVD (eliquis), T2DM, HTN, HLD presents with 1 day history of Dysuria found to be septic likely in the setting of UTI with incidental finding of moderate   pericardial effusion with stable vital signs, pending TTE.\par  \par  \par  \plain\f1\fs16\btwe3162\hich\f1\dbch\f1\loch\f1\cf2\fs16\b\ul{\field{\*\fldinst HYPERLINK [id number] }{\fldrslt Problem/Plan - 1:}}\plain\f1\fs16\msci0890\hich\f1\dbch\f1\loch\f1\cf2\fs16\ql\par  \'b7  {\*\bkmkstart [id number]}{\*\bkmkend iz84044617212}Problem: {\*\bkmkstart ko29132893006}{\*\bkmkend wp37893437444}Acute UTI.\plain\f1\fs16\qmbw1977\hich\f1\dbch\f1\loch\f1\cf2\fs16\strike\plain\f1\fs16\wsxa0492\hich\f1\dbch\f1\loch\f1\cf2\fs16   , prostatitis\par  \'b7  {\*\bkmkstart fr07964252396}{\*\bkmkend pf66882041113}Plan: {\*\bkmkstart [id number]}{\*\bkmkend jd97495540415}UA positive, dysuria, polyuria. Septic with tachycardia, fever, leukocytosis. Possible bacterial prostatitis \par  \plain\f1\fs16\krvf4285\hich\f1\dbch\f1\loch\f1\cf2\fs16\strike\plain\f1\fs16\arhz2322\hich\f1\dbch\f1\loch\f1\cf2\fs16 - fu cultures\par  - cw abx\par  - no role for urology as per consult service \par  \par  \plain\f1\fs16\hwzo1278\hich\f1\dbch\f1\loch\f1\cf2\fs16\b\ul{\field{\*\fldinst HYPERLINK [id number] }{\fldrslt Problem/Plan - 2:}}\plain\f1\fs16\xjfo3985\hich\f1\dbch\f1\loch\f1\cf2\fs16\ql\par  \'b7  {\*\bkmkstart ir58152436876}{\*\bkmkend jw04108315135}Problem: {\*\bkmkstart dd07285631111}{\*\bkmkend ta72177208342}Sepsis.\plain\f1\fs16\vkwg7804\hich\f1\dbch\f1\loch\f1\cf2\fs16\strike\plain\f1\fs16\yamf5405\hich\f1\dbch\f1\loch\f1\cf2\fs16\par  \'b7  {\*\bkmkstart py20361990163}{\*\bkmkend bl67005401619}Plan: cw abx \par  Concern for proastatitis. Pelvic inflammation on CT A/P. EDDY deferred due to in hallway and recent banding procedure.\par  \plain\f1\fs16\tiyf9109\hich\f1\dbch\f1\loch\f1\cf2\fs16\strike\plain\f1\fs16\vsmi5825\hich\f1\dbch\f1\loch\f1\cf2\fs16 cw abx as per ID \par  \par  \plain\f1\fs16\tyhs2453\hich\f1\dbch\f1\loch\f1\cf2\fs16\b\ul{\field{\*\fldinst HYPERLINK [id number] }{\fldrslt Problem/Plan - 3:}}\plain\f1\fs16\hlcq1677\hich\f1\dbch\f1\loch\f1\cf2\fs16\ql\par  \'b7  {\*\bkmkstart rh08623608722}{\*\bkmkend nx30166555068}Problem: {\*\bkmkstart nt57735854409}{\*\bkmkend fw98545337248}Pericardial effusion.\plain\f1\fs16\riua2415\hich\f1\dbch\f1\loch\f1\cf2\fs16\strike\plain\f1\fs16\fwzk3689\hich\f1\dbch\f1\loch\f1\cf2\fs16\par  \'b7  {\*\bkmkstart lc00994645646}{\*\bkmkend et66573323367}Plan: {\*\bkmkstart mn06595947100}{\*\bkmkend fg29525718142}Trace pericardial effusion on TTE 10/22/22\par  Mildly elevated trop but EKG nonischemic and no CP. Not likely ACS due to acute plaque rupture. More likely type 2 demand ischemia. CKMB normal\par  No Tamponade Physiology appreciated; Hypertensive, normal heart sounds, no JVD appreciated\par  c\plain\f1\fs16\awnm9009\hich\f1\dbch\f1\loch\f1\cf2\fs16\strike\plain\f1\fs16\qcod0925\hich\f1\dbch\f1\loch\f1\cf2\fs16 ards fu \par  \par  \plain\f1\fs16\dwjq3296\hich\f1\dbch\f1\loch\f1\cf2\fs16\b\ul{\field{\*\fldinst HYPERLINK [id number] }{\fldrslt Problem/Plan - 4:}}\plain\f1\fs16\cory2956\hich\f1\dbch\f1\loch\f1\cf2\fs16\ql\par  \'b7  {\*\bkmkstart ui36051785373}{\*\bkmkend cs64048087360}Problem: {\*\bkmkstart hr02246035876}{\*\bkmkend lr08951843301}Peripheral vascular disease.\plain\f1\fs16\qmig4277\hich\f1\dbch\f1\loch\f1\cf2\fs16\strike\plain\f1\fs16\fqml1587\hich\f1\dbch\f1\loch\f1\cf2\fs16\par  \'b7  {\*\bkmkstart mx01906619839}{\*\bkmkend il36449708384}Plan: {\*\bkmkstart rb76830661973}{\*\bkmkend hh19746604010}Can C/W Cilostazol 50 BID\par  Hold Eliquis; iso recent GIB\par  \plain\f1\fs16\erov0745\hich\f1\dbch\f1\loch\f1\cf2\fs16\strike\plain\f1\fs16\xqtn9598\hich\f1\dbch\f1\loch\f1\cf2\fs16\par  \par  \plain\f1\fs16\nifr0668\hich\f1\dbch\f1\loch\f1\cf2\fs16\b\ul{\field{\*\fldinst HYPERLINK [id number] }{\fldrslt Problem/Plan - 5:}}\plain\f1\fs16\evqz3060\hich\f1\dbch\f1\loch\f1\cf2\fs16\ql\par  \'b7  {\*\bkmkstart eq82939394696}{\*\bkmkend gn67165843590}Problem: {\*\bkmkstart hw01664393151}{\*\bkmkend lh02380906936}Diabetes.\plain\f1\fs16\nxdw9541\hich\f1\dbch\f1\loch\f1\cf2\fs16\strike\plain\f1\fs16\qfsy7769\hich\f1\dbch\f1\loch\f1\cf2\fs16\par  \'b7  {\*\bkmkstart pz20693051761}{\*\bkmkend ww73654118064}Plan: \plain\f1\fs16\ejah5837\hich\f1\dbch\f1\loch\f1\cf2\fs16\strike\plain\f1\fs16\tciu1568\hich\f1\dbch\f1\loch\f1\cf2\fs16 monitor FS\par  - ISS \par  \par  \plain\f1\fs16\octu9131\hich\f1\dbch\f1\loch\f1\cf2\fs16\b\ul{\field{\*\fldinst HYPERLINK [id number] }{\fldrslt Problem/Plan - 6:}}\plain\f1\fs16\zvrh8593\hich\f1\dbch\f1\loch\f1\cf2\fs16\ql\par  \'b7  {\*\bkmkstart xc18231127622}{\*\bkmkend ol58815960717}Problem: {\*\bkmkstart xo19652592797}{\*\bkmkend yn37334534735}HTN (hypertension).\plain\f1\fs16\fdca1285\hich\f1\dbch\f1\loch\f1\cf2\fs16\strike\plain\f1\fs16\ubqq9501\hich\f1\dbch\f1\loch\f1\cf2\fs16\par  \'b7  {\*\bkmkstart hx67047033608}{\*\bkmkend am37782304173}Plan: {\*\bkmkstart rh95968551073}{\*\bkmkend ve19586344036}Losartan 50 qd\par  Atenolol\par  \par  \plain\f1\fs16\jecd7409\hich\f1\dbch\f1\loch\f1\cf2\fs16\b\ul{\field{\*\fldinst HYPERLINK [id number] }{\fldrslt Problem/Plan - 7:}}\plain\f1\fs16\qhus3618\hich\f1\dbch\f1\loch\f1\cf2\fs16\ql\par  \'b7  {\*\bkmkstart mz14169356891}{\*\bkmkend dh16622756764}Problem: {\*\bkmkstart pv84268193706}{\*\bkmkend be28664063043}HLD (hyperlipidemia).\plain\f1\fs16\wrnw1882\hich\f1\dbch\f1\loch\f1\cf2\fs16\strike\plain\f1\fs16\idfz0089\hich\f1\dbch\f1\loch\f1\cf2\fs16\par  \'b7  {\*\bkmkstart [id number]}{\*\bkmkend [id number]}Plan: {\*\bkmkstart [id number]}{\*\bkmkend [id number]}C/W Atorvastatin 20 qd.\plain\f1\fs16\lnoz1252\hich\f1\dbch\f1\loch\f1\cf2\fs16\strike\plain\f1\fs16\qtek7735\hich\f1\dbch\f1\loch\f1\cf2\fs16\par  \par  \plain\f1\fs16\yeqa8621\hich\f1\dbch\f1\loch\f1\cf2\fs16\b\ul{\field{\*\fldinst HYPERLINK [id number] }{\fldrslt Problem/Plan - 8:}}\plain\f1\fs16\dets0538\hich\f1\dbch\f1\loch\f1\cf2\fs16\ql\par  \'b7  {\*\bkmkstart [id number]}{\*\bkmkend [id number]}Problem: {\*\bkmkstart [id number]}{\*\bkmkend [id number]}Need for prophylactic measure. \par  \'b7  {\*\bkmkstart [id number]}{\*\bkmkend [id number]}Plan: {\*\bkmkstart [id number]}{\*\bkmkend [id number]}DVT ppx: SCD\par  Diet: DASH Diet.\plain\f0\fs20\saqy7938\hich\f0\dbch\f0\loch\f0\fs20\par  }  
68y Male with history of HTN presents with dysuria. Nephrology consulted for hyponatremia.    1) Hyponatremia: mild with indeterminate urine studies. Serum Na improving. Continue with 1L FR and Glucerna with meals. Monitor serum Na.    2) HTN: BP controlled with tachycardia in setting of sepsis. Can give 500 mL bolus over 1 hour. Monitor off medications.    3) Acute cystitis without hematuria: On empiric Zosyn in NS base. As per ID.     4) Pericardial effusion: As per cardiology.      [facility] NEPHROLOGY  Hardik Oliveira M.D.  Joaquin Bauman D.O.  Svitlana Deutsch M.D.  MD Yvette Bales, MSN, ANP-C    Telephone: [phone]  Facsimile: [phone] [place] [address], [address]  [place] [id number]  
69 yo year old with DM with recent hemorrhoid procedure  He presented with urinary retention with fever  He had dysuria  Associated fever and leukocytosis    Is urine retention a complication of hemorrhoid banding?  I would clarify this with the physician who performed the procedure.     Pyuria with dysuria and fever  Suspect acute cystitis  Doubt pyelonpehritis    Urine culture is growing Klebsiella    Change to ceftin 500 mg po q 12 for 4 additional days    Management of pericardial effusion per cardiology    Will sign off  
Pericardial effusion without tamponade.   - Echo reviewed. Repeat limited TTE in 1 week or prior to discharge to assess for stability.   - Differential includes: autoimmune vs infectious vs malignancy vs hemorrhagic  - TSH normal  - Consider CT chest for lung cancer screening - longstanding daily smoking history.   - Consider sending ESR, CRP, anti-RA, QuantiFeron.  - Currently on Eliquis for PVD, if repeat TTE shows growth of effusion, consider holding AC 
Problem/Plan - 1:  ·  Problem: Acute UTI., prostatitis  ·  Plan: UA positive, dysuria, polyuria. Septic with tachycardia, fever, leukocytosis. Possible bacterial prostatitis   - fu cultures  - cw abx  - no role for urology as per consult service     Problem/Plan - 2:  ·  Problem: ulcer of the aorta  ·  Plan: cts consulted  - no intervention  - fu CT in 1 yr     Problem/Plan - 3:  ·  Problem: Pericardial effusion.·  Plan: Trace pericardial effusion on TTE 10/22/22  Mildly elevated trop but EKG nonischemic and no CP. Not likely ACS due to acute plaque rupture. More likely type 2 demand ischemia. CKMB normal  No Tamponade Physiology appreciated; Hypertensive, normal heart sounds, no JVD appreciated  cards fu     Problem/Plan - 4:  ·  Problem: Peripheral vascular disease.  ·  Plan: Can C/W Cilostazol 50 BID  Hold Eliquis; iso recent GIB      Problem/Plan - 5:  ·  Problem: Diabetes.  ·  Plan: monitor FS  - ISS     Problem/Plan - 6:  ·  Problem: HTN (hypertension).  ·  Plan: Losartan 50 qd  Atenolol    Problem/Plan - 7:  ·  Problem: HLD (hyperlipidemia).  ·  Plan: C/W Atorvastatin 20 qd.
-------------------------------------------------------------------------------------------  Cardiovascular Diagnostic Testing:    ECG:     Echo: 10/23/23 Preliminary  1. Left ventricular systolic function is normal with an ejection fraction of 60 % by 3D.  2. Normal right ventricular cavity size, wall thickness, and probably normal systolic function.  3. The left atrium is normal in size.  4. The right atrium is normal in size.  5. Moderate pericardial effusion noted adjacent to the posterior left ventricle and small pericardial effusion noted adjacent to the right ventricle with no evidence of hemodynamic compromise.    CXR:  reviewed  -------------------------------------------------------------------------------------------    68M PMH PVD on eliquis, DM, HTN, HLD, recent hemorrhoidal banding admitted w/ UTI, incidentally found to have moderate pericardial effusion on CT abd/pelvis. TTE with moderate effusion, no evidence of hemodynamic compromise on preliminary read. Patient asymptomatic from cardiac standpoing. TTE 2yo w/ trace pericardial effusion. No urgent intervention required, should undergo outpatient monitoring of effusion    Recommendations  -can continue eliquis 5 BID for his Peripheral artery disease with  lower extremity stent  - cont telemetry monitoring, closely monitor for hemodynamic changes, low threshold for IVF resuscitation  - hold home antihypertensives      All recommendations pending attending attestation. We will continue to follow with you.     Keyonna Chery MD  PGY-4, Cardiology  Available on TEAMS    For all new consults  [email or web address]  Login: [employer]
Problem/Plan - 1:  ·  Problem: Acute UTI., prostatitis  ·  Plan: UA positive, dysuria, polyuria. Septic with tachycardia, fever, leukocytosis. Possible bacterial prostatitis   - fu cultures  - cw abx  - no role for urology as per consult service     Problem/Plan - 2:  ·  Problem: ulcer of the aorta  ·  Plan: cts consulted  - no intervention  - fu CT in 1 yr     Problem/Plan - 3:  ·  Problem: Pericardial effusion.·  Plan: Trace pericardial effusion on TTE 10/22/22  Mildly elevated trop but EKG nonischemic and no CP. Not likely ACS due to acute plaque rupture. More likely type 2 demand ischemia. CKMB normal  No Tamponade Physiology appreciated; Hypertensive, normal heart sounds, no JVD appreciated  cards fu     Problem/Plan - 4:  ·  Problem: Peripheral vascular disease.  ·  Plan: Can C/W Cilostazol 50 BID  Hold Eliquis; iso recent GIB      Problem/Plan - 5:  ·  Problem: Diabetes.  ·  Plan: monitor FS  - ISS     Problem/Plan - 6:  ·  Problem: HTN (hypertension).  ·  Plan: Losartan 50 qd  Atenolol    Problem/Plan - 7:  ·  Problem: HLD (hyperlipidemia).  ·  Plan: C/W Atorvastatin 20 qd.

## 2023-10-30 NOTE — PROGRESS NOTE ADULT - PROVIDER SPECIALTY LIST ADULT
Hospitalist
Hospitalist
Nephrology
Pulmonology
Pulmonology
Cardiology
Hospitalist
Infectious Disease
Pulmonology
Cardiology
Hospitalist
Infectious Disease
Nephrology
Cardiology
Hospitalist
Infectious Disease
Nephrology
Pulmonology

## 2023-10-30 NOTE — DIETITIAN INITIAL EVALUATION ADULT - PROBLEM SELECTOR PLAN 2
Leukocytosis; Tachycardic; Febrile  Patient reported complaints of dysuria and polyuria;   Mildly elevated Trop 65--72  S/P Zosyn in the ED  1L NS in the ED  Concern for proastatitis. Pelvic inflammation on CT A/P. EDDY deferred due to in hallway and recent banding procedure.  Given sepsis; start broad; may transition to Levaquin pending cultures    Plan  BCx 10/22 -   UCx 10/22 -   Vanc 10/23- (considering recent procedure)  Zosyn 10/22 -  F/U MRSA Swab

## 2023-10-30 NOTE — PROGRESS NOTE ADULT - REASON FOR ADMISSION
Sepsis, Mod Pericardial Effusion

## 2023-10-30 NOTE — DIETITIAN INITIAL EVALUATION ADULT - CALCULATED TO (G/KG)
Call and advise patient that the results showed arthritis and possible changes which can be associated with Rheumatoid arthritis. Please have patient see rheumatology. Okay for referral to any provider of her choice. If no preference then okay for referral to any provider in [facility] for this. [phone]

## 2023-10-30 NOTE — DIETITIAN INITIAL EVALUATION ADULT - PROBLEM SELECTOR PLAN 3
Trace pericardial effusion on TTE 10/22/22  Mildly elevated trop but EKG nonischemic and no CP. Not likely ACS due to acute plaque rupture. More likely type 2 demand ischemia. CKMB normal  No Tamponade Physiology appreciated; Hypertensive, normal heart sounds, no JVD appreciated    Plan  Patient evaluated by cards; no tamponade physiology  Ordered TTE for the AM

## 2023-10-30 NOTE — DISCHARGE NOTE NURSING/CASE MANAGEMENT/SOCIAL WORK - PATIENT PORTAL LINK FT
You can access the FollowMyHealth Patient Portal offered by [facility] by registering at the following website: [email or web address]. By joining [facility]’s FollowMyHealth portal, you will also be able to view your health information using other applications (apps) compatible with our system.

## 2023-10-30 NOTE — DIETITIAN INITIAL EVALUATION ADULT - PERTINENT LABORATORY DATA
10-30    137  |  103  |  12  ----------------------------<  131<H>  4.5   |  23  |  0.76    Ca    9.6      30 Oct 2023 05:32  Phos  3.3     10-30  Mg     2.10     10-30    POCT Blood Glucose.: 238 mg/dL (10-30-23 @ 12:04)  A1C with Estimated Average Glucose Result: 7.0 % (10-22-23 @ 22:52)

## 2023-10-30 NOTE — DISCHARGE NOTE PROVIDER - NSDCFUSCHEDAPPT_GEN_ALL_CORE_FT
[facility]  VASCULAR [address]  Scheduled Appointment: 11/08/2023    Mahad Trujillo  [facility]  VASCULAR [address]  Scheduled Appointment: 11/08/2023

## 2023-10-30 NOTE — PROGRESS NOTE ADULT - SUBJECTIVE AND OBJECTIVE BOX
Date of Service: 10-30-23 @ 10:39    Patient is a 68y old  Male who presents with a chief complaint of Sepsis, Mod Pericardial Effusion (30 Oct 2023 09:41)      Any change in ROS: seems to be doing  ok ; no sob:  noc ugh : no phlegm  on room air     MEDICATIONS  (STANDING):  atorvastatin 20 milliGRAM(s) Oral at bedtime  cilostazol 50 milliGRAM(s) Oral two times a day  cyanocobalamin 1000 MICROGram(s) Oral daily  dextrose 5%. 1000 milliLiter(s) (50 mL/Hr) IV Continuous <Continuous>  dextrose 5%. 1000 milliLiter(s) (100 mL/Hr) IV Continuous <Continuous>  dextrose 50% Injectable 25 Gram(s) IV Push once  dextrose 50% Injectable 25 Gram(s) IV Push once  dextrose 50% Injectable 12.5 Gram(s) IV Push once  glucagon  Injectable 1 milliGRAM(s) IntraMuscular once  influenza  Vaccine (HIGH DOSE) 0.7 milliLiter(s) IntraMuscular once  insulin glargine Injectable (LANTUS) 16 Unit(s) SubCutaneous at bedtime  insulin lispro (ADMELOG) corrective regimen sliding scale   SubCutaneous three times a day before meals  insulin lispro (ADMELOG) corrective regimen sliding scale   SubCutaneous at bedtime  labetalol 100 milliGRAM(s) Oral every 12 hours    MEDICATIONS  (PRN):  acetaminophen     Tablet .. 650 milliGRAM(s) Oral every 6 hours PRN Temp greater or equal to 38C (100.4F), Mild Pain (1 - 3)  dextrose Oral Gel 15 Gram(s) Oral once PRN Blood Glucose LESS THAN 70 milliGRAM(s)/deciliter  melatonin 3 milliGRAM(s) Oral at bedtime PRN Insomnia    Vital Signs Last 24 Hrs  T(C): 36.7 (30 Oct 2023 05:20), Max: 37.1 (29 Oct 2023 13:00)  T(F): 98.1 (30 Oct 2023 05:20), Max: 98.7 (29 Oct 2023 13:00)  HR: 96 (30 Oct 2023 05:20) (94 - 98)  BP: 137/67 (30 Oct 2023 05:20) (137/67 - 148/73)  BP(mean): --  RR: 18 (30 Oct 2023 05:20) (16 - 18)  SpO2: 99% (30 Oct 2023 05:20) (99% - 100%)    Parameters below as of 30 Oct 2023 05:20  Patient On (Oxygen Delivery Method): room air        I&O's Summary    29 Oct 2023 07:01  -  30 Oct 2023 07:00  --------------------------------------------------------  IN: 120 mL / OUT: 300 mL / NET: -180 mL          Physical Exam:   GENERAL: NAD, well-groomed, well-developed  HEENT: CORRINE/   Atraumatic, Normocephalic  ENMT: No tonsillar erythema, exudates, or enlargement; Moist mucous membranes, Good dentition, No lesions  NECK: Supple, No JVD, Normal thyroid  CHEST/LUNG: Clear to auscultaion  CVS: Regular rate and rhythm; No murmurs, rubs, or gallops  GI: : Soft, Nontender, Nondistended; Bowel sounds present  NERVOUS SYSTEM:  Alert & Oriented X3  EXTREMITIES:  - edema  LYMPH: No lymphadenopathy noted  SKIN: No rashes or lesions  ENDOCRINOLOGY: No Thyromegaly  PSYCH: calm     Labs:                              8.2    12.68 )-----------( 480      ( 30 Oct 2023 05:32 )             27.0                         7.8    11.94 )-----------( 417      ( 29 Oct 2023 05:58 )             26.4                         7.9    10.93 )-----------( 373      ( 28 Oct 2023 05:39 )             25.9                         7.7    11.65 )-----------( 342      ( 27 Oct 2023 06:20 )             24.9     10-30    137  |  103  |  12  ----------------------------<  131<H>  4.5   |  23  |  0.76  10-29    136  |  99  |  10  ----------------------------<  137<H>  3.5   |  22  |  0.72  10-28    134<L>  |  99  |  12  ----------------------------<  160<H>  3.4<L>   |  23  |  0.69  10-27    138  |  104  |  11  ----------------------------<  135<H>  3.7   |  24  |  0.80    Ca    9.6      30 Oct 2023 05:32  Ca    9.3      29 Oct 2023 05:58  Phos  3.3     10-30  Phos  3.8     10-29  Mg     2.10     10-30  Mg     2.00     10-29      CAPILLARY BLOOD GLUCOSE      POCT Blood Glucose.: 149 mg/dL (30 Oct 2023 06:55)  POCT Blood Glucose.: 342 mg/dL (29 Oct 2023 22:04)  POCT Blood Glucose.: 327 mg/dL (29 Oct 2023 22:02)  POCT Blood Glucose.: 219 mg/dL (29 Oct 2023 17:01)  POCT Blood Glucose.: 221 mg/dL (29 Oct 2023 11:37)          Urinalysis Basic - ( 30 Oct 2023 05:32 )    Color: x / Appearance: x / SG: x / pH: x  Gluc: 131 mg/dL / Ketone: x  / Bili: x / Urobili: x   Blood: x / Protein: x / Nitrite: x   Leuk Esterase: x / RBC: x / WBC x   Sq Epi: x / Non Sq Epi: x / Bacteria: x      < from: CT Chest w/ IV Cont (10.24.23 @ 20:00) >  penetrating atherosclerotic ulcer, new since 4/5/2013.    HEART: Heart size is normal. Small-moderate pericardial effusion,   unchanged since 10/22/2023 abdomen CT. Coronary arterial calcification.    CHEST WALL AND LOWER NECK: Within normal limits.    VISUALIZED UPPER ABDOMEN: Within normal limits.    BONES: Within normal limits.    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 Continued surveillance is recommended.    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    Small-moderate pericardial effusion, unchanged since 10/22/2023 abdomen   CT.    < end of copied text >        RECENT CULTURES:        RESPIRATORY CULTURES:          Studies  Chest X-RAY  CT SCAN Chest   Venous Dopplers: LE:   CT Abdomen  Others    < from: TTE W or WO Ultrasound Enhancing Agent (10.23.23 @ 15:46) >  < from: TTE W or WO Ultrasound Enhancing Agent (10.23.23 @ 15:46) >

## 2023-10-30 NOTE — DISCHARGE NOTE PROVIDER - NSDCFUADDAPPT_GEN_ALL_CORE_FT
Follow up with PCP within 1-2 weeks of discharge. If you are in need of a general medicine physician and post-discharge medical follow-up for further care/recommendations you may contact the [facility] for an appointment at [phone].     Follow up with cardiology within 1-2 weeks of discharge. If you are in need of a general cardiologist after discharge, you may contact the [facility] for an appointment at [phone].

## 2023-10-30 NOTE — DISCHARGE NOTE PROVIDER - CARE PROVIDER_API CALL
F/U WITH CARDIOLOGY AND PRIMARY CARE PROVIDER,   Phone: (   )    -  Fax: (   )    -  Follow Up Time:

## 2023-10-30 NOTE — DIETITIAN INITIAL EVALUATION ADULT - ADD RECOMMEND
Honor food and beverage preferences within diet restriction of patient in an effort to maximize level of nutrient intake]   Monitor PO intake, tolerance to nutrition supplement, weight trends, nutrition related lab values, BMs/GI distress, hydration status, skin integrity.

## 2023-11-03 LAB
14-3-3 ETA ANTIGEN: <0.2 NG/ML — SIGNIFICANT CHANGE UP
14-3-3 ETA ANTIGEN: <0.2 NG/ML — SIGNIFICANT CHANGE UP
CCP ANTIBODIES IGG/IGA: <20 UNITS — SIGNIFICANT CHANGE UP
CCP ANTIBODIES IGG/IGA: <20 UNITS — SIGNIFICANT CHANGE UP
RHEUMATOID FACTOR IDENTRA RESULT: <14 UNITS/ML — SIGNIFICANT CHANGE UP
RHEUMATOID FACTOR IDENTRA RESULT: <14 UNITS/ML — SIGNIFICANT CHANGE UP

## 2023-11-08 ENCOUNTER — APPOINTMENT (OUTPATIENT)
Dept: VASCULAR SURGERY | Facility: CLINIC | Age: 68
End: 2023-11-08
Payer: COMMERCIAL

## 2023-11-08 VITALS — SYSTOLIC BLOOD PRESSURE: 163 MMHG | HEART RATE: 101 BPM | DIASTOLIC BLOOD PRESSURE: 81 MMHG

## 2023-11-08 VITALS — HEIGHT: 66 IN | BODY MASS INDEX: 22.98 KG/M2 | WEIGHT: 143 LBS

## 2023-11-08 PROCEDURE — 93923 UPR/LXTR ART STDY 3+ LVLS: CPT

## 2023-11-08 PROCEDURE — 99213 OFFICE O/P EST LOW 20 MIN: CPT

## 2023-11-08 RX ORDER — CLOPIDOGREL BISULFATE 75 MG/1
75 TABLET, FILM COATED ORAL
Qty: 30 | Refills: 0 | Status: COMPLETED | COMMUNITY
Start: 2022-10-10 | End: 2023-11-08

## 2023-11-08 RX ORDER — APIXABAN 5 MG/1
5 TABLET, FILM COATED ORAL
Qty: 60 | Refills: 0 | Status: COMPLETED | COMMUNITY
Start: 2022-12-26 | End: 2023-11-08

## 2023-11-11 ENCOUNTER — INPATIENT (INPATIENT)
Facility: HOSPITAL | Age: 68
LOS: 5 days | Discharge: HOME | End: 2023-11-17
Attending: INTERNAL MEDICINE | Admitting: INTERNAL MEDICINE
Payer: COMMERCIAL

## 2023-11-11 VITALS
RESPIRATION RATE: 24 BRPM | DIASTOLIC BLOOD PRESSURE: 69 MMHG | TEMPERATURE: 99 F | SYSTOLIC BLOOD PRESSURE: 156 MMHG | HEIGHT: 66 IN | HEART RATE: 115 BPM | OXYGEN SATURATION: 100 %

## 2023-11-11 DIAGNOSIS — Z90.49 ACQUIRED ABSENCE OF OTHER SPECIFIED PARTS OF DIGESTIVE TRACT: Chronic | ICD-10-CM

## 2023-11-11 DIAGNOSIS — Z95.820 PERIPHERAL VASCULAR ANGIOPLASTY STATUS WITH IMPLANTS AND GRAFTS: Chronic | ICD-10-CM

## 2023-11-11 LAB
ALBUMIN SERPL ELPH-MCNC: 3.8 G/DL — SIGNIFICANT CHANGE UP (ref 3.3–5)
ALBUMIN SERPL ELPH-MCNC: 3.8 G/DL — SIGNIFICANT CHANGE UP (ref 3.3–5)
ALP SERPL-CCNC: 110 U/L — SIGNIFICANT CHANGE UP (ref 40–120)
ALP SERPL-CCNC: 110 U/L — SIGNIFICANT CHANGE UP (ref 40–120)
ALT FLD-CCNC: 11 U/L — SIGNIFICANT CHANGE UP (ref 4–41)
ALT FLD-CCNC: 11 U/L — SIGNIFICANT CHANGE UP (ref 4–41)
ANION GAP SERPL CALC-SCNC: 12 MMOL/L — SIGNIFICANT CHANGE UP (ref 7–14)
ANION GAP SERPL CALC-SCNC: 12 MMOL/L — SIGNIFICANT CHANGE UP (ref 7–14)
APPEARANCE UR: CLEAR — SIGNIFICANT CHANGE UP
APPEARANCE UR: CLEAR — SIGNIFICANT CHANGE UP
APTT BLD: 31.1 SEC — SIGNIFICANT CHANGE UP (ref 24.5–35.6)
APTT BLD: 31.1 SEC — SIGNIFICANT CHANGE UP (ref 24.5–35.6)
AST SERPL-CCNC: 13 U/L — SIGNIFICANT CHANGE UP (ref 4–40)
AST SERPL-CCNC: 13 U/L — SIGNIFICANT CHANGE UP (ref 4–40)
BASOPHILS # BLD AUTO: 0.05 K/UL — SIGNIFICANT CHANGE UP (ref 0–0.2)
BASOPHILS # BLD AUTO: 0.05 K/UL — SIGNIFICANT CHANGE UP (ref 0–0.2)
BASOPHILS NFR BLD AUTO: 0.3 % — SIGNIFICANT CHANGE UP (ref 0–2)
BASOPHILS NFR BLD AUTO: 0.3 % — SIGNIFICANT CHANGE UP (ref 0–2)
BILIRUB SERPL-MCNC: 0.3 MG/DL — SIGNIFICANT CHANGE UP (ref 0.2–1.2)
BILIRUB SERPL-MCNC: 0.3 MG/DL — SIGNIFICANT CHANGE UP (ref 0.2–1.2)
BILIRUB UR-MCNC: NEGATIVE — SIGNIFICANT CHANGE UP
BILIRUB UR-MCNC: NEGATIVE — SIGNIFICANT CHANGE UP
BLOOD GAS VENOUS COMPREHENSIVE RESULT: SIGNIFICANT CHANGE UP
BLOOD GAS VENOUS COMPREHENSIVE RESULT: SIGNIFICANT CHANGE UP
BUN SERPL-MCNC: 8 MG/DL — SIGNIFICANT CHANGE UP (ref 7–23)
BUN SERPL-MCNC: 8 MG/DL — SIGNIFICANT CHANGE UP (ref 7–23)
CALCIUM SERPL-MCNC: 9.7 MG/DL — SIGNIFICANT CHANGE UP (ref 8.4–10.5)
CALCIUM SERPL-MCNC: 9.7 MG/DL — SIGNIFICANT CHANGE UP (ref 8.4–10.5)
CHLORIDE SERPL-SCNC: 95 MMOL/L — LOW (ref 98–107)
CHLORIDE SERPL-SCNC: 95 MMOL/L — LOW (ref 98–107)
CO2 SERPL-SCNC: 24 MMOL/L — SIGNIFICANT CHANGE UP (ref 22–31)
CO2 SERPL-SCNC: 24 MMOL/L — SIGNIFICANT CHANGE UP (ref 22–31)
COLOR SPEC: YELLOW — SIGNIFICANT CHANGE UP
COLOR SPEC: YELLOW — SIGNIFICANT CHANGE UP
CREAT SERPL-MCNC: 0.72 MG/DL — SIGNIFICANT CHANGE UP (ref 0.5–1.3)
CREAT SERPL-MCNC: 0.72 MG/DL — SIGNIFICANT CHANGE UP (ref 0.5–1.3)
DIFF PNL FLD: ABNORMAL
DIFF PNL FLD: ABNORMAL
EGFR: 98 ML/MIN/1.73M2 — SIGNIFICANT CHANGE UP
EGFR: 98 ML/MIN/1.73M2 — SIGNIFICANT CHANGE UP
EOSINOPHIL # BLD AUTO: 0.05 K/UL — SIGNIFICANT CHANGE UP (ref 0–0.5)
EOSINOPHIL # BLD AUTO: 0.05 K/UL — SIGNIFICANT CHANGE UP (ref 0–0.5)
EOSINOPHIL NFR BLD AUTO: 0.3 % — SIGNIFICANT CHANGE UP (ref 0–6)
EOSINOPHIL NFR BLD AUTO: 0.3 % — SIGNIFICANT CHANGE UP (ref 0–6)
FLUAV AG NPH QL: SIGNIFICANT CHANGE UP
FLUAV AG NPH QL: SIGNIFICANT CHANGE UP
FLUBV AG NPH QL: SIGNIFICANT CHANGE UP
FLUBV AG NPH QL: SIGNIFICANT CHANGE UP
GLUCOSE SERPL-MCNC: 282 MG/DL — HIGH (ref 70–99)
GLUCOSE SERPL-MCNC: 282 MG/DL — HIGH (ref 70–99)
GLUCOSE UR QL: >=1000 MG/DL
GLUCOSE UR QL: >=1000 MG/DL
HCT VFR BLD CALC: 26.8 % — LOW (ref 39–50)
HCT VFR BLD CALC: 26.8 % — LOW (ref 39–50)
HGB BLD-MCNC: 8.2 G/DL — LOW (ref 13–17)
HGB BLD-MCNC: 8.2 G/DL — LOW (ref 13–17)
IANC: 12.96 K/UL — HIGH (ref 1.8–7.4)
IANC: 12.96 K/UL — HIGH (ref 1.8–7.4)
IMM GRANULOCYTES NFR BLD AUTO: 0.7 % — SIGNIFICANT CHANGE UP (ref 0–0.9)
IMM GRANULOCYTES NFR BLD AUTO: 0.7 % — SIGNIFICANT CHANGE UP (ref 0–0.9)
INR BLD: 1.12 RATIO — SIGNIFICANT CHANGE UP (ref 0.85–1.18)
INR BLD: 1.12 RATIO — SIGNIFICANT CHANGE UP (ref 0.85–1.18)
KETONES UR-MCNC: NEGATIVE MG/DL — SIGNIFICANT CHANGE UP
KETONES UR-MCNC: NEGATIVE MG/DL — SIGNIFICANT CHANGE UP
LEUKOCYTE ESTERASE UR-ACNC: ABNORMAL
LEUKOCYTE ESTERASE UR-ACNC: ABNORMAL
LYMPHOCYTES # BLD AUTO: 1.03 K/UL — SIGNIFICANT CHANGE UP (ref 1–3.3)
LYMPHOCYTES # BLD AUTO: 1.03 K/UL — SIGNIFICANT CHANGE UP (ref 1–3.3)
LYMPHOCYTES # BLD AUTO: 6.7 % — LOW (ref 13–44)
LYMPHOCYTES # BLD AUTO: 6.7 % — LOW (ref 13–44)
MCHC RBC-ENTMCNC: 21.5 PG — LOW (ref 27–34)
MCHC RBC-ENTMCNC: 21.5 PG — LOW (ref 27–34)
MCHC RBC-ENTMCNC: 30.6 GM/DL — LOW (ref 32–36)
MCHC RBC-ENTMCNC: 30.6 GM/DL — LOW (ref 32–36)
MCV RBC AUTO: 70.2 FL — LOW (ref 80–100)
MCV RBC AUTO: 70.2 FL — LOW (ref 80–100)
MONOCYTES # BLD AUTO: 1.12 K/UL — HIGH (ref 0–0.9)
MONOCYTES # BLD AUTO: 1.12 K/UL — HIGH (ref 0–0.9)
MONOCYTES NFR BLD AUTO: 7.3 % — SIGNIFICANT CHANGE UP (ref 2–14)
MONOCYTES NFR BLD AUTO: 7.3 % — SIGNIFICANT CHANGE UP (ref 2–14)
NEUTROPHILS # BLD AUTO: 12.96 K/UL — HIGH (ref 1.8–7.4)
NEUTROPHILS # BLD AUTO: 12.96 K/UL — HIGH (ref 1.8–7.4)
NEUTROPHILS NFR BLD AUTO: 84.7 % — HIGH (ref 43–77)
NEUTROPHILS NFR BLD AUTO: 84.7 % — HIGH (ref 43–77)
NITRITE UR-MCNC: NEGATIVE — SIGNIFICANT CHANGE UP
NITRITE UR-MCNC: NEGATIVE — SIGNIFICANT CHANGE UP
NRBC # BLD: 0 /100 WBCS — SIGNIFICANT CHANGE UP (ref 0–0)
NRBC # BLD: 0 /100 WBCS — SIGNIFICANT CHANGE UP (ref 0–0)
NRBC # FLD: 0 K/UL — SIGNIFICANT CHANGE UP (ref 0–0)
NRBC # FLD: 0 K/UL — SIGNIFICANT CHANGE UP (ref 0–0)
PH UR: 6.5 — SIGNIFICANT CHANGE UP (ref 5–8)
PH UR: 6.5 — SIGNIFICANT CHANGE UP (ref 5–8)
PLATELET # BLD AUTO: 383 K/UL — SIGNIFICANT CHANGE UP (ref 150–400)
PLATELET # BLD AUTO: 383 K/UL — SIGNIFICANT CHANGE UP (ref 150–400)
POTASSIUM SERPL-MCNC: 3.9 MMOL/L — SIGNIFICANT CHANGE UP (ref 3.5–5.3)
POTASSIUM SERPL-MCNC: 3.9 MMOL/L — SIGNIFICANT CHANGE UP (ref 3.5–5.3)
POTASSIUM SERPL-SCNC: 3.9 MMOL/L — SIGNIFICANT CHANGE UP (ref 3.5–5.3)
POTASSIUM SERPL-SCNC: 3.9 MMOL/L — SIGNIFICANT CHANGE UP (ref 3.5–5.3)
PROT SERPL-MCNC: 8 G/DL — SIGNIFICANT CHANGE UP (ref 6–8.3)
PROT SERPL-MCNC: 8 G/DL — SIGNIFICANT CHANGE UP (ref 6–8.3)
PROT UR-MCNC: 100 MG/DL
PROT UR-MCNC: 100 MG/DL
PROTHROM AB SERPL-ACNC: 12.6 SEC — SIGNIFICANT CHANGE UP (ref 9.5–13)
PROTHROM AB SERPL-ACNC: 12.6 SEC — SIGNIFICANT CHANGE UP (ref 9.5–13)
RBC # BLD: 3.82 M/UL — LOW (ref 4.2–5.8)
RBC # BLD: 3.82 M/UL — LOW (ref 4.2–5.8)
RBC # FLD: 19 % — HIGH (ref 10.3–14.5)
RBC # FLD: 19 % — HIGH (ref 10.3–14.5)
RSV RNA NPH QL NAA+NON-PROBE: SIGNIFICANT CHANGE UP
RSV RNA NPH QL NAA+NON-PROBE: SIGNIFICANT CHANGE UP
SARS-COV-2 RNA SPEC QL NAA+PROBE: SIGNIFICANT CHANGE UP
SARS-COV-2 RNA SPEC QL NAA+PROBE: SIGNIFICANT CHANGE UP
SODIUM SERPL-SCNC: 131 MMOL/L — LOW (ref 135–145)
SODIUM SERPL-SCNC: 131 MMOL/L — LOW (ref 135–145)
SP GR SPEC: 1.03 — SIGNIFICANT CHANGE UP (ref 1–1.03)
SP GR SPEC: 1.03 — SIGNIFICANT CHANGE UP (ref 1–1.03)
UROBILINOGEN FLD QL: 1 MG/DL — SIGNIFICANT CHANGE UP (ref 0.2–1)
UROBILINOGEN FLD QL: 1 MG/DL — SIGNIFICANT CHANGE UP (ref 0.2–1)
WBC # BLD: 15.32 K/UL — HIGH (ref 3.8–10.5)
WBC # BLD: 15.32 K/UL — HIGH (ref 3.8–10.5)
WBC # FLD AUTO: 15.32 K/UL — HIGH (ref 3.8–10.5)
WBC # FLD AUTO: 15.32 K/UL — HIGH (ref 3.8–10.5)

## 2023-11-11 PROCEDURE — 99285 EMERGENCY DEPT VISIT HI MDM: CPT

## 2023-11-11 PROCEDURE — 76870 US EXAM SCROTUM: CPT | Mod: 26

## 2023-11-11 RX ORDER — ACETAMINOPHEN 500 MG
1000 TABLET ORAL ONCE
Refills: 0 | Status: COMPLETED | OUTPATIENT
Start: 2023-11-11 | End: 2023-11-11

## 2023-11-11 RX ORDER — CEFTRIAXONE 500 MG/1
1000 INJECTION, POWDER, FOR SOLUTION INTRAMUSCULAR; INTRAVENOUS ONCE
Refills: 0 | Status: COMPLETED | OUTPATIENT
Start: 2023-11-11 | End: 2023-11-11

## 2023-11-11 RX ADMIN — Medication 400 MILLIGRAM(S): at 20:49

## 2023-11-11 RX ADMIN — CEFTRIAXONE 100 MILLIGRAM(S): 500 INJECTION, POWDER, FOR SOLUTION INTRAMUSCULAR; INTRAVENOUS at 19:39

## 2023-11-11 NOTE — ED ADULT TRIAGE NOTE - CHIEF COMPLAINT QUOTE
Patient c/o R testicle pain/swelling and fever x 4 days. Patient was discharged on 10/30 with UTI. Phx HTN, HLD, DM2, PAD

## 2023-11-11 NOTE — ED PROVIDER NOTE - ATTENDING CONTRIBUTION TO CARE
quynh: Patient comes in 68-year-old man gentleman has right-sided testicular pain.  He has a past history of hypertension diabetes high lipids and peripheral arterial disease.  The pain is occurred for the last 4 days and had fever 3 days ago.  He was seen by urology today and sent to the ED for further evaluation.    Blood pressure 162/68 heart rate 98 patient with temperature of 99.8 will need rectal temperature is 101.5.  Patient is awake and alert and appropriate.  Abdomen is soft no rebound no guarding.  Testicular exam left side is nontender no redness no concerns however the right side is tender through the entire testicle and has redness in the skin overlying it.  He has no perineal crepitus.    Concern for infection seen by  outside we will get ultrasound urology will be called here as well outside urologist is cyndie ty.  Antibiotics are started lab results so far include a white count of 15,000 hemoglobin of 8.2 which is consistent with end of October creatinine is 0.72 glucose 282 lactate 1.8    He was admitted in October treated for acute cystitis and hyponatremia.    I performed a history and physical exam of the patient and discussed their management with the resident and /or advanced care provider. I reviewed the resident and /or ACP's note and agree with the documented findings and plan of care. My medical decison making and observations are found above.

## 2023-11-11 NOTE — ED ADULT NURSE NOTE - OBJECTIVE STATEMENT
Pt arrived to room 17. Pt is A and Ox4. Pt is assistance x1. Hx: DM 2, HTN, HLD, anemia, stents in the leg. Pt comes to ED complaining of right testicular pain and swelling since Tuesday. Pt states that the pain is a 8 out of 10, and does not radiate anywhere. Pt states that the "stream is different". Pt had a fever two days ago, denies fever and chills at this time. Pt denies nausea and vomiting. Per daughter pt was admitted at [facility] for a UTI and was released on 10/30. Pt states MD told him to come in "because [facility] has my blood culture results." Pt denies chest pain, headache, dizziness, numbness and tingling. Airway is patent. respirations are even and unlabored. VS as noted in flow sheet. Pt denies GI symptoms. Abdomen is soft and nontender. Skin is clean, dry, and intact. Awaiting provider orders. Plan of care ongoing. Safety maintained.

## 2023-11-11 NOTE — ED ADULT NURSE REASSESSMENT NOTE - NS ED NURSE REASSESS COMMENT FT1
Labs sent per provider orders. Medicated per MAR. Pt denies new complaints at this time. Plan of care on going. Safety maintained.

## 2023-11-11 NOTE — ED PROVIDER NOTE - OBJECTIVE STATEMENT
68-year-old male with past medical history HTN, HLD, DM, PAD presents emergency department for 4 days of right testicular pain and swelling.  Patient also endorses fever 3 days ago.  Patient saw urologist today and was sent to the ED for further evaluation.  Patient denies headaches, vision changes, chest pain, trouble breathing, abdominal pain, nausea/vomiting, diarrhea/constipation, dysuria, hematuria.

## 2023-11-11 NOTE — CONSULT NOTE ADULT - ASSESSMENT
68-year-old male with past medical history HTN, HLD, DM, PAD presenting with right testicular pain.       Plan  **INCOMPLETE*** Thank you for coming to our Emergency Department today. It is important to remember that some problems are difficult to diagnose and may not be found during your first visit. Be sure to follow up with your primary care doctor and review any labs/imaging that was performed with them. If you do not have a primary care doctor, you may contact the one listed on your discharge paperwork or you may also call the [facility] Appointment Desk at [phone] to schedule an appointment with one.     All medications may potentially have side effects and it is impossible to predict which medications may give you side effects. If you feel that you are having a negative effect of any medication you should immediately stop taking them and seek medical attention.    Return to the ER with any questions/concerns, new/concerning symptoms, worsening or failure to improve. Do not drive or make any important decisions for 24 hours if you have received any pain medications, sedatives or mood altering drugs during your ER visit.     68-year-old male with past medical history HTN, HLD, DM, PAD presenting with right testicular pain. Patient with recent klebsiella UTI. febrile to 101.4. WBC 15.32. Given patients recent UTI and fever and exam are consistent with epididymoorchitis No palpable abscess. US images reviewed and consistent with epididymoorchitis official read pending         Plan  - No acute urologic intervention  - recommend levaquin for 10 days  - f/u urine culture   - Patient to follow-up with outpatient urologist    Discussed with Dr. Hoffman  68-year-old male with past medical history HTN, HLD, DM, PAD presenting with right testicular pain. Patient with recent klebsiella UTI. febrile to 101.4. WBC 15.32. Given patients recent klebsiella UTI and fever and exam are consistent with epididymoorchitis No palpable abscess. US images reviewed and consistent with epididymoorchitis official read pending         Plan  - No acute urologic intervention  - recommend levaquin (prior culture sensitive)   - f/u urine culture   - Patient to follow-up with outpatient urologist    Discussed with Dr. Hoffman

## 2023-11-11 NOTE — CONSULT NOTE ADULT - SUBJECTIVE AND OBJECTIVE BOX
HPI  68-year-old male with past medical history HTN, HLD, DM, PAD presents emergency department for 4 days of right testicular pain and swelling.  Patient also endorses fever 3 days ago.  Patient saw urologist today and was sent to the ED for further evaluation.  Patient denies headaches, vision changes, chest pain, trouble breathing, abdominal pain, nausea/vomiting, diarrhea/constipation, dysuria, hematuria.        PAST MEDICAL & SURGICAL HISTORY:  HTN (hypertension)      HLD (hyperlipidemia)      DM (diabetes mellitus)      PAD (peripheral artery disease)      Tobacco abuse  Former      H/O iron deficiency      S/P appendectomy      S/P peripheral artery angioplasty with stent placement          MEDICATIONS  (STANDING):    MEDICATIONS  (PRN):      FAMILY HISTORY:  No pertinent family history in first degree relatives        Allergies    Advil (Rash)    Intolerances        SOCIAL HISTORY:    REVIEW OF SYSTEMS: Otherwise negative as stated in HPI    Physical Exam  Vital signs  T(C): 37.7 (23 @ 17:31), Max: 37.7 (23 @ 17:31)  HR: 98 (23 @ 19:42)  BP: 162/68 (23 @ 19:42)  SpO2: 100% (23 @ 19:42)  Wt(kg): --    Output      Gen: NAD  Pulm: No respiratory distress	  CV: RRR  GI: S/ND/NT  :   MSK: moves all 4 limbs spontaneously    LABS:       @ 19:20    WBC 15.32 / Hct 26.8  / SCr 0.72         131<L>  |  95<L>  |  8   ----------------------------<  282<H>  3.9   |  24  |  0.72    Ca    9.7      2023 19:20    TPro  8.0  /  Alb  3.8  /  TBili  0.3  /  DBili  x   /  AST  13  /  ALT  11  /  AlkPhos  110      PT/INR - ( 2023 19:20 )   PT: 12.6 sec;   INR: 1.12 ratio         PTT - ( 2023 19:20 )  PTT:31.1 sec  Urinalysis Basic - ( 2023 19:20 )    Color: Yellow / Appearance: Clear / S.029 / pH: x  Gluc: 282 mg/dL / Ketone: Negative mg/dL  / Bili: Negative / Urobili: 1.0 mg/dL   Blood: x / Protein: 100 mg/dL / Nitrite: Negative   Leuk Esterase: Small / RBC: 2 /HPF / WBC 74 /HPF   Sq Epi: x / Non Sq Epi: 3 /HPF / Bacteria: Few /HPF            Urine Cx:    Blood Cx:    RADIOLOGY:     [facility]  68-year-old male with past medical history HTN, HLD, DM, PAD presents emergency department for 4 days of right testicular pain and swelling.  Patient also endorses fever 3 days ago.  Patient saw urologist today and was sent to the ED for further evaluation.  Patient denies headaches, vision changes, chest pain, trouble breathing, abdominal pain, nausea/vomiting, diarrhea/constipation, dysuria, hematuria.    Urology was consulted due to right testicular pain. Patient was recently admitted for pericardial effusion and UTI. Patient was found to be in retention at that time and received abx while inpatient. He was discharged on Oct 30. He was doing well until about 4 days ago when Pain began.. Reports a fever to 102 about 3 days ago as well. Since then, the pain had worsened and he saw his urologist Dr. Antonio who advised him to come to the er. Denied any dysuria, difficulty emptying, straining with urination, hematuria, or discharge.     PAST MEDICAL & SURGICAL HISTORY:  HTN (hypertension)      HLD (hyperlipidemia)      DM (diabetes mellitus)      PAD (peripheral artery disease)      Tobacco abuse  Former      H/O iron deficiency      S/P appendectomy      S/P peripheral artery angioplasty with stent placement          MEDICATIONS  (STANDING):    MEDICATIONS  (PRN):      FAMILY HISTORY:  No pertinent family history in first degree relatives        Allergies    Advil (Rash)    Intolerances        SOCIAL HISTORY:    REVIEW OF SYSTEMS: Otherwise negative as stated in HPI    Physical Exam  Vital signs  T(C): 37.7 (23 @ 17:31), Max: 37.7 (23 @ 17:31)  HR: 98 (23 @ 19:42)  BP: 162/68 (23 @ 19:42)  SpO2: 100% (23 @ 19:42)  Wt(kg): --    Output      Gen: NAD  Pulm: No respiratory distress	  CV: RRR  GI: S/ND/NT  : mild right testicular swelling, significant pain with palpation of the right testicle and epidiymis. No induration or fluctuance.  Left testicle smooth, no edema or tenderness.       LABS:       @ 19:20    WBC 15.32 / Hct 26.8  / SCr 0.72         131<L>  |  95<L>  |  8   ----------------------------<  282<H>  3.9   |  24  |  0.72    Ca    9.7      2023 19:20    TPro  8.0  /  Alb  3.8  /  TBili  0.3  /  DBili  x   /  AST  13  /  ALT  11  /  AlkPhos  110  11-11    PT/INR - ( 2023 19:20 )   PT: 12.6 sec;   INR: 1.12 ratio         PTT - ( 2023 19:20 )  PTT:31.1 sec  Urinalysis Basic - ( 2023 19:20 )    Color: Yellow / Appearance: Clear / S.029 / pH: x  Gluc: 282 mg/dL / Ketone: Negative mg/dL  / Bili: Negative / Urobili: 1.0 mg/dL   Blood: x / Protein: 100 mg/dL / Nitrite: Negative   Leuk Esterase: Small / RBC: 2 /HPF / WBC 74 /HPF   Sq Epi: x / Non Sq Epi: 3 /HPF / Bacteria: Few /HPF            RADIOLOGY:

## 2023-11-11 NOTE — ED PROVIDER NOTE - CLINICAL SUMMARY MEDICAL DECISION MAKING FREE TEXT BOX
68-year-old male with past medical history HTN, HLD, DM, PAD presents emergency department for 4 days of right testicular pain and swelling. Pt had fevers 3 days ago. Sent in by urologist for further eval. + right testicular swelling, erythema and TTP. No perineum involvement. Differentials include but not limited to orchitis, abscess, sepsis, UTI. Plan for labs, UA/UC, blood cultures, US testicles. Will give IV abx. Dispo likely admission for IV abx. 68-year-old male with past medical history HTN, HLD, DM, PAD presents emergency department for 4 days of right testicular pain and swelling. Pt had fevers 3 days ago. Sent in by urologist for further eval. + right testicular swelling, erythema and TTP. No perineum involvement. Differentials include but not limited to orchitis, abscess, sepsis, UTI. Plan for labs, UA/UC, blood cultures, US testicles. Will give IV abx. Dispo likely admission for IV abx.    quynh: Patient comes in 68-year-old man gentleman has right-sided testicular pain.  He has a past history of hypertension diabetes high lipids and peripheral arterial disease.  The pain is occurred for the last 4 days and had fever 3 days ago.  He was seen by urology today and sent to the ED for further evaluation.    Blood pressure 162/68 heart rate 98 patient with temperature of 99.8 will need rectal temperature is 101.5.  Patient is awake and alert and appropriate.  Abdomen is soft no rebound no guarding.  Testicular exam left side is nontender no redness no concerns however the right side is tender through the entire testicle and has redness in the skin overlying it.  He has no perineal crepitus.    Concern for infection seen by  outside we will get ultrasound urology will be called here as well outside urologist is cyndie ty.  Antibiotics are started lab results so far include a white count of 15,000 hemoglobin of 8.2 which is consistent with end of October creatinine is 0.72 glucose 282 lactate 1.8    He was admitted in October treated for acute cystitis and hyponatremia.

## 2023-11-11 NOTE — ED PROVIDER NOTE - PHYSICAL EXAMINATION
Constitutional: VS reviewed. Alert and orientedx3, well appearing, no apparent distress  HEENT: Atraumatic, EOMI, PERRL  CV: Tachycardic   Lungs: Clear and equal bilaterally, no wheezes, rales or crackles  Abdomen: Soft, nondistended, nontender  : (Chaperone RN Cong) Right testicular erythema and swelling with TTP. No TTP or swelling of perineum.   MSK: No deformities  Skin: Warm and dry. As visualized no other rashes, lesions, bruising or erythema  Neuro: Appears nonfocal  Lymph: No pitting edema in extremities.

## 2023-11-11 NOTE — ED ADULT NURSE NOTE - NSFALLHARMRISKINTERV_ED_ALL_ED

## 2023-11-11 NOTE — ED ADULT NURSE REASSESSMENT NOTE - NS ED NURSE REASSESS COMMENT FT1
received report from MAC Hernández . Pt is a/o x 3. no complaints of chest pain, headache, nausea, dizziness, vomiting, SOB, verbalized. Patent airway. RR even and unlabored, NAD noted. Pt 20g iv placed to right A.C with no redness or swelling noted. Patient endorses pain to right testicle, consistent with his chief complaint. Patient febrile 101.4 rectal, MD Oberly made aware. Patient medicated as per order. Safety measures in place, call bell within reach. Patient stable upon exiting the room.

## 2023-11-12 DIAGNOSIS — I73.9 PERIPHERAL VASCULAR DISEASE, UNSPECIFIED: ICD-10-CM

## 2023-11-12 DIAGNOSIS — Z29.9 ENCOUNTER FOR PROPHYLACTIC MEASURES, UNSPECIFIED: ICD-10-CM

## 2023-11-12 DIAGNOSIS — E11.65 TYPE 2 DIABETES MELLITUS WITH HYPERGLYCEMIA: ICD-10-CM

## 2023-11-12 DIAGNOSIS — A41.9 SEPSIS, UNSPECIFIED ORGANISM: ICD-10-CM

## 2023-11-12 DIAGNOSIS — N45.3 EPIDIDYMO-ORCHITIS: ICD-10-CM

## 2023-11-12 DIAGNOSIS — I10 ESSENTIAL (PRIMARY) HYPERTENSION: ICD-10-CM

## 2023-11-12 DIAGNOSIS — N45.2 ORCHITIS: ICD-10-CM

## 2023-11-12 LAB
ANION GAP SERPL CALC-SCNC: 12 MMOL/L — SIGNIFICANT CHANGE UP (ref 7–14)
ANION GAP SERPL CALC-SCNC: 12 MMOL/L — SIGNIFICANT CHANGE UP (ref 7–14)
BASE EXCESS BLDV CALC-SCNC: -0.2 MMOL/L — SIGNIFICANT CHANGE UP (ref -2–3)
BASE EXCESS BLDV CALC-SCNC: -0.2 MMOL/L — SIGNIFICANT CHANGE UP (ref -2–3)
BASOPHILS # BLD AUTO: 0.05 K/UL — SIGNIFICANT CHANGE UP (ref 0–0.2)
BASOPHILS # BLD AUTO: 0.05 K/UL — SIGNIFICANT CHANGE UP (ref 0–0.2)
BASOPHILS NFR BLD AUTO: 0.3 % — SIGNIFICANT CHANGE UP (ref 0–2)
BASOPHILS NFR BLD AUTO: 0.3 % — SIGNIFICANT CHANGE UP (ref 0–2)
BLOOD GAS VENOUS COMPREHENSIVE RESULT: SIGNIFICANT CHANGE UP
BLOOD GAS VENOUS COMPREHENSIVE RESULT: SIGNIFICANT CHANGE UP
BUN SERPL-MCNC: 9 MG/DL — SIGNIFICANT CHANGE UP (ref 7–23)
BUN SERPL-MCNC: 9 MG/DL — SIGNIFICANT CHANGE UP (ref 7–23)
CALCIUM SERPL-MCNC: 9.3 MG/DL — SIGNIFICANT CHANGE UP (ref 8.4–10.5)
CALCIUM SERPL-MCNC: 9.3 MG/DL — SIGNIFICANT CHANGE UP (ref 8.4–10.5)
CHLORIDE BLDV-SCNC: 99 MMOL/L — SIGNIFICANT CHANGE UP (ref 96–108)
CHLORIDE BLDV-SCNC: 99 MMOL/L — SIGNIFICANT CHANGE UP (ref 96–108)
CHLORIDE SERPL-SCNC: 96 MMOL/L — LOW (ref 98–107)
CHLORIDE SERPL-SCNC: 96 MMOL/L — LOW (ref 98–107)
CO2 BLDV-SCNC: 24.6 MMOL/L — SIGNIFICANT CHANGE UP (ref 22–26)
CO2 BLDV-SCNC: 24.6 MMOL/L — SIGNIFICANT CHANGE UP (ref 22–26)
CO2 SERPL-SCNC: 23 MMOL/L — SIGNIFICANT CHANGE UP (ref 22–31)
CO2 SERPL-SCNC: 23 MMOL/L — SIGNIFICANT CHANGE UP (ref 22–31)
CREAT SERPL-MCNC: 0.7 MG/DL — SIGNIFICANT CHANGE UP (ref 0.5–1.3)
CREAT SERPL-MCNC: 0.7 MG/DL — SIGNIFICANT CHANGE UP (ref 0.5–1.3)
CULTURE RESULTS: SIGNIFICANT CHANGE UP
CULTURE RESULTS: SIGNIFICANT CHANGE UP
EGFR: 99 ML/MIN/1.73M2 — SIGNIFICANT CHANGE UP
EGFR: 99 ML/MIN/1.73M2 — SIGNIFICANT CHANGE UP
EOSINOPHIL # BLD AUTO: 0.06 K/UL — SIGNIFICANT CHANGE UP (ref 0–0.5)
EOSINOPHIL # BLD AUTO: 0.06 K/UL — SIGNIFICANT CHANGE UP (ref 0–0.5)
EOSINOPHIL NFR BLD AUTO: 0.4 % — SIGNIFICANT CHANGE UP (ref 0–6)
EOSINOPHIL NFR BLD AUTO: 0.4 % — SIGNIFICANT CHANGE UP (ref 0–6)
GAS PNL BLDV: 130 MMOL/L — LOW (ref 136–145)
GAS PNL BLDV: 130 MMOL/L — LOW (ref 136–145)
GLUCOSE BLDC GLUCOMTR-MCNC: 180 MG/DL — HIGH (ref 70–99)
GLUCOSE BLDC GLUCOMTR-MCNC: 180 MG/DL — HIGH (ref 70–99)
GLUCOSE BLDC GLUCOMTR-MCNC: 262 MG/DL — HIGH (ref 70–99)
GLUCOSE BLDC GLUCOMTR-MCNC: 262 MG/DL — HIGH (ref 70–99)
GLUCOSE BLDC GLUCOMTR-MCNC: 267 MG/DL — HIGH (ref 70–99)
GLUCOSE BLDC GLUCOMTR-MCNC: 267 MG/DL — HIGH (ref 70–99)
GLUCOSE BLDC GLUCOMTR-MCNC: 317 MG/DL — HIGH (ref 70–99)
GLUCOSE BLDC GLUCOMTR-MCNC: 317 MG/DL — HIGH (ref 70–99)
GLUCOSE BLDC GLUCOMTR-MCNC: 328 MG/DL — HIGH (ref 70–99)
GLUCOSE BLDC GLUCOMTR-MCNC: 328 MG/DL — HIGH (ref 70–99)
GLUCOSE BLDV-MCNC: 319 MG/DL — HIGH (ref 70–99)
GLUCOSE BLDV-MCNC: 319 MG/DL — HIGH (ref 70–99)
GLUCOSE SERPL-MCNC: 286 MG/DL — HIGH (ref 70–99)
GLUCOSE SERPL-MCNC: 286 MG/DL — HIGH (ref 70–99)
HCO3 BLDV-SCNC: 24 MMOL/L — SIGNIFICANT CHANGE UP (ref 22–29)
HCO3 BLDV-SCNC: 24 MMOL/L — SIGNIFICANT CHANGE UP (ref 22–29)
HCT VFR BLD CALC: 24.8 % — LOW (ref 39–50)
HCT VFR BLD CALC: 24.8 % — LOW (ref 39–50)
HCT VFR BLDA CALC: 24 % — LOW (ref 39–51)
HCT VFR BLDA CALC: 24 % — LOW (ref 39–51)
HGB BLD CALC-MCNC: 8.1 G/DL — LOW (ref 12.6–17.4)
HGB BLD CALC-MCNC: 8.1 G/DL — LOW (ref 12.6–17.4)
HGB BLD-MCNC: 7.7 G/DL — LOW (ref 13–17)
HGB BLD-MCNC: 7.7 G/DL — LOW (ref 13–17)
IANC: 12.8 K/UL — HIGH (ref 1.8–7.4)
IANC: 12.8 K/UL — HIGH (ref 1.8–7.4)
IMM GRANULOCYTES NFR BLD AUTO: 0.7 % — SIGNIFICANT CHANGE UP (ref 0–0.9)
IMM GRANULOCYTES NFR BLD AUTO: 0.7 % — SIGNIFICANT CHANGE UP (ref 0–0.9)
LACTATE BLDV-MCNC: 1.6 MMOL/L — SIGNIFICANT CHANGE UP (ref 0.5–2)
LACTATE BLDV-MCNC: 1.6 MMOL/L — SIGNIFICANT CHANGE UP (ref 0.5–2)
LYMPHOCYTES # BLD AUTO: 0.95 K/UL — LOW (ref 1–3.3)
LYMPHOCYTES # BLD AUTO: 0.95 K/UL — LOW (ref 1–3.3)
LYMPHOCYTES # BLD AUTO: 6.2 % — LOW (ref 13–44)
LYMPHOCYTES # BLD AUTO: 6.2 % — LOW (ref 13–44)
MAGNESIUM SERPL-MCNC: 1.8 MG/DL — SIGNIFICANT CHANGE UP (ref 1.6–2.6)
MAGNESIUM SERPL-MCNC: 1.8 MG/DL — SIGNIFICANT CHANGE UP (ref 1.6–2.6)
MCHC RBC-ENTMCNC: 21.6 PG — LOW (ref 27–34)
MCHC RBC-ENTMCNC: 21.6 PG — LOW (ref 27–34)
MCHC RBC-ENTMCNC: 31 GM/DL — LOW (ref 32–36)
MCHC RBC-ENTMCNC: 31 GM/DL — LOW (ref 32–36)
MCV RBC AUTO: 69.5 FL — LOW (ref 80–100)
MCV RBC AUTO: 69.5 FL — LOW (ref 80–100)
MONOCYTES # BLD AUTO: 1.36 K/UL — HIGH (ref 0–0.9)
MONOCYTES # BLD AUTO: 1.36 K/UL — HIGH (ref 0–0.9)
MONOCYTES NFR BLD AUTO: 8.9 % — SIGNIFICANT CHANGE UP (ref 2–14)
MONOCYTES NFR BLD AUTO: 8.9 % — SIGNIFICANT CHANGE UP (ref 2–14)
NEUTROPHILS # BLD AUTO: 12.8 K/UL — HIGH (ref 1.8–7.4)
NEUTROPHILS # BLD AUTO: 12.8 K/UL — HIGH (ref 1.8–7.4)
NEUTROPHILS NFR BLD AUTO: 83.5 % — HIGH (ref 43–77)
NEUTROPHILS NFR BLD AUTO: 83.5 % — HIGH (ref 43–77)
NRBC # BLD: 0 /100 WBCS — SIGNIFICANT CHANGE UP (ref 0–0)
NRBC # BLD: 0 /100 WBCS — SIGNIFICANT CHANGE UP (ref 0–0)
NRBC # FLD: 0 K/UL — SIGNIFICANT CHANGE UP (ref 0–0)
NRBC # FLD: 0 K/UL — SIGNIFICANT CHANGE UP (ref 0–0)
PCO2 BLDV: 34 MMHG — LOW (ref 42–55)
PCO2 BLDV: 34 MMHG — LOW (ref 42–55)
PH BLDV: 7.45 — HIGH (ref 7.32–7.43)
PH BLDV: 7.45 — HIGH (ref 7.32–7.43)
PHOSPHATE SERPL-MCNC: 2.8 MG/DL — SIGNIFICANT CHANGE UP (ref 2.5–4.5)
PHOSPHATE SERPL-MCNC: 2.8 MG/DL — SIGNIFICANT CHANGE UP (ref 2.5–4.5)
PLATELET # BLD AUTO: 375 K/UL — SIGNIFICANT CHANGE UP (ref 150–400)
PLATELET # BLD AUTO: 375 K/UL — SIGNIFICANT CHANGE UP (ref 150–400)
PO2 BLDV: 64 MMHG — HIGH (ref 25–45)
PO2 BLDV: 64 MMHG — HIGH (ref 25–45)
POTASSIUM BLDV-SCNC: 3.5 MMOL/L — SIGNIFICANT CHANGE UP (ref 3.5–5.1)
POTASSIUM BLDV-SCNC: 3.5 MMOL/L — SIGNIFICANT CHANGE UP (ref 3.5–5.1)
POTASSIUM SERPL-MCNC: 3.4 MMOL/L — LOW (ref 3.5–5.3)
POTASSIUM SERPL-MCNC: 3.4 MMOL/L — LOW (ref 3.5–5.3)
POTASSIUM SERPL-SCNC: 3.4 MMOL/L — LOW (ref 3.5–5.3)
POTASSIUM SERPL-SCNC: 3.4 MMOL/L — LOW (ref 3.5–5.3)
RBC # BLD: 3.57 M/UL — LOW (ref 4.2–5.8)
RBC # BLD: 3.57 M/UL — LOW (ref 4.2–5.8)
RBC # FLD: 18.9 % — HIGH (ref 10.3–14.5)
RBC # FLD: 18.9 % — HIGH (ref 10.3–14.5)
SAO2 % BLDV: 93.1 % — HIGH (ref 67–88)
SAO2 % BLDV: 93.1 % — HIGH (ref 67–88)
SODIUM SERPL-SCNC: 131 MMOL/L — LOW (ref 135–145)
SODIUM SERPL-SCNC: 131 MMOL/L — LOW (ref 135–145)
SPECIMEN SOURCE: SIGNIFICANT CHANGE UP
SPECIMEN SOURCE: SIGNIFICANT CHANGE UP
WBC # BLD: 15.33 K/UL — HIGH (ref 3.8–10.5)
WBC # BLD: 15.33 K/UL — HIGH (ref 3.8–10.5)
WBC # FLD AUTO: 15.33 K/UL — HIGH (ref 3.8–10.5)
WBC # FLD AUTO: 15.33 K/UL — HIGH (ref 3.8–10.5)

## 2023-11-12 PROCEDURE — 99223 1ST HOSP IP/OBS HIGH 75: CPT

## 2023-11-12 RX ORDER — INSULIN LISPRO 100/ML
3 VIAL (ML) SUBCUTANEOUS ONCE
Refills: 0 | Status: COMPLETED | OUTPATIENT
Start: 2023-11-12 | End: 2023-11-12

## 2023-11-12 RX ORDER — OXYCODONE HYDROCHLORIDE 5 MG/1
5 TABLET ORAL THREE TIMES A DAY
Refills: 0 | Status: DISCONTINUED | OUTPATIENT
Start: 2023-11-12 | End: 2023-11-15

## 2023-11-12 RX ORDER — DEXTROSE 50 % IN WATER 50 %
15 SYRINGE (ML) INTRAVENOUS ONCE
Refills: 0 | Status: DISCONTINUED | OUTPATIENT
Start: 2023-11-12 | End: 2023-11-17

## 2023-11-12 RX ORDER — INSULIN LISPRO 100/ML
VIAL (ML) SUBCUTANEOUS
Refills: 0 | Status: DISCONTINUED | OUTPATIENT
Start: 2023-11-12 | End: 2023-11-17

## 2023-11-12 RX ORDER — ACETAMINOPHEN 500 MG
650 TABLET ORAL EVERY 6 HOURS
Refills: 0 | Status: DISCONTINUED | OUTPATIENT
Start: 2023-11-12 | End: 2023-11-17

## 2023-11-12 RX ORDER — POTASSIUM CHLORIDE 20 MEQ
40 PACKET (EA) ORAL ONCE
Refills: 0 | Status: COMPLETED | OUTPATIENT
Start: 2023-11-12 | End: 2023-11-12

## 2023-11-12 RX ORDER — INSULIN LISPRO 100/ML
2 VIAL (ML) SUBCUTANEOUS
Refills: 0 | Status: DISCONTINUED | OUTPATIENT
Start: 2023-11-12 | End: 2023-11-17

## 2023-11-12 RX ORDER — ATORVASTATIN CALCIUM 80 MG/1
20 TABLET, FILM COATED ORAL AT BEDTIME
Refills: 0 | Status: DISCONTINUED | OUTPATIENT
Start: 2023-11-12 | End: 2023-11-17

## 2023-11-12 RX ORDER — GLUCAGON INJECTION, SOLUTION 0.5 MG/.1ML
1 INJECTION, SOLUTION SUBCUTANEOUS ONCE
Refills: 0 | Status: DISCONTINUED | OUTPATIENT
Start: 2023-11-12 | End: 2023-11-17

## 2023-11-12 RX ORDER — APIXABAN 2.5 MG/1
1 TABLET, FILM COATED ORAL
Refills: 0 | DISCHARGE

## 2023-11-12 RX ORDER — INSULIN GLARGINE 100 [IU]/ML
20 INJECTION, SOLUTION SUBCUTANEOUS EVERY MORNING
Refills: 0 | Status: DISCONTINUED | OUTPATIENT
Start: 2023-11-12 | End: 2023-11-17

## 2023-11-12 RX ORDER — CEFEPIME 1 G/1
2000 INJECTION, POWDER, FOR SOLUTION INTRAMUSCULAR; INTRAVENOUS EVERY 8 HOURS
Refills: 0 | Status: DISCONTINUED | OUTPATIENT
Start: 2023-11-12 | End: 2023-11-12

## 2023-11-12 RX ORDER — INSULIN LISPRO 100/ML
VIAL (ML) SUBCUTANEOUS AT BEDTIME
Refills: 0 | Status: DISCONTINUED | OUTPATIENT
Start: 2023-11-12 | End: 2023-11-17

## 2023-11-12 RX ORDER — SODIUM CHLORIDE 9 MG/ML
1000 INJECTION INTRAMUSCULAR; INTRAVENOUS; SUBCUTANEOUS
Refills: 0 | Status: DISCONTINUED | OUTPATIENT
Start: 2023-11-12 | End: 2023-11-17

## 2023-11-12 RX ORDER — CEFEPIME 1 G/1
2000 INJECTION, POWDER, FOR SOLUTION INTRAMUSCULAR; INTRAVENOUS EVERY 8 HOURS
Refills: 0 | Status: DISCONTINUED | OUTPATIENT
Start: 2023-11-12 | End: 2023-11-13

## 2023-11-12 RX ORDER — CILOSTAZOL 100 MG/1
50 TABLET ORAL
Refills: 0 | Status: DISCONTINUED | OUTPATIENT
Start: 2023-11-12 | End: 2023-11-17

## 2023-11-12 RX ORDER — ONDANSETRON 8 MG/1
4 TABLET, FILM COATED ORAL EVERY 8 HOURS
Refills: 0 | Status: DISCONTINUED | OUTPATIENT
Start: 2023-11-12 | End: 2023-11-17

## 2023-11-12 RX ORDER — ATENOLOL 25 MG/1
25 TABLET ORAL DAILY
Refills: 0 | Status: DISCONTINUED | OUTPATIENT
Start: 2023-11-12 | End: 2023-11-17

## 2023-11-12 RX ORDER — DEXTROSE 50 % IN WATER 50 %
25 SYRINGE (ML) INTRAVENOUS ONCE
Refills: 0 | Status: DISCONTINUED | OUTPATIENT
Start: 2023-11-12 | End: 2023-11-17

## 2023-11-12 RX ORDER — DEXTROSE 50 % IN WATER 50 %
12.5 SYRINGE (ML) INTRAVENOUS ONCE
Refills: 0 | Status: DISCONTINUED | OUTPATIENT
Start: 2023-11-12 | End: 2023-11-17

## 2023-11-12 RX ORDER — LANOLIN ALCOHOL/MO/W.PET/CERES
3 CREAM (GRAM) TOPICAL AT BEDTIME
Refills: 0 | Status: DISCONTINUED | OUTPATIENT
Start: 2023-11-12 | End: 2023-11-17

## 2023-11-12 RX ORDER — SODIUM CHLORIDE 9 MG/ML
1000 INJECTION, SOLUTION INTRAVENOUS
Refills: 0 | Status: DISCONTINUED | OUTPATIENT
Start: 2023-11-12 | End: 2023-11-17

## 2023-11-12 RX ADMIN — Medication 3 UNIT(S): at 04:57

## 2023-11-12 RX ADMIN — Medication 75 MILLIGRAM(S): at 12:04

## 2023-11-12 RX ADMIN — Medication 2 UNIT(S): at 09:26

## 2023-11-12 RX ADMIN — CEFEPIME 100 MILLIGRAM(S): 1 INJECTION, POWDER, FOR SOLUTION INTRAMUSCULAR; INTRAVENOUS at 23:46

## 2023-11-12 RX ADMIN — ATENOLOL 25 MILLIGRAM(S): 25 TABLET ORAL at 05:54

## 2023-11-12 RX ADMIN — SODIUM CHLORIDE 100 MILLILITER(S): 9 INJECTION INTRAMUSCULAR; INTRAVENOUS; SUBCUTANEOUS at 08:44

## 2023-11-12 RX ADMIN — ATORVASTATIN CALCIUM 20 MILLIGRAM(S): 80 TABLET, FILM COATED ORAL at 22:20

## 2023-11-12 RX ADMIN — Medication 1000 MILLIGRAM(S): at 04:50

## 2023-11-12 RX ADMIN — OXYCODONE HYDROCHLORIDE 5 MILLIGRAM(S): 5 TABLET ORAL at 19:55

## 2023-11-12 RX ADMIN — Medication 2 UNIT(S): at 17:05

## 2023-11-12 RX ADMIN — Medication 650 MILLIGRAM(S): at 21:53

## 2023-11-12 RX ADMIN — CEFEPIME 100 MILLIGRAM(S): 1 INJECTION, POWDER, FOR SOLUTION INTRAMUSCULAR; INTRAVENOUS at 13:26

## 2023-11-12 RX ADMIN — Medication 3: at 09:27

## 2023-11-12 RX ADMIN — CEFEPIME 100 MILLIGRAM(S): 1 INJECTION, POWDER, FOR SOLUTION INTRAMUSCULAR; INTRAVENOUS at 05:57

## 2023-11-12 RX ADMIN — CILOSTAZOL 50 MILLIGRAM(S): 100 TABLET ORAL at 23:46

## 2023-11-12 RX ADMIN — CILOSTAZOL 50 MILLIGRAM(S): 100 TABLET ORAL at 08:46

## 2023-11-12 RX ADMIN — Medication 3: at 17:05

## 2023-11-12 RX ADMIN — INSULIN GLARGINE 20 UNIT(S): 100 INJECTION, SOLUTION SUBCUTANEOUS at 09:28

## 2023-11-12 RX ADMIN — Medication 2 UNIT(S): at 12:41

## 2023-11-12 RX ADMIN — Medication 40 MILLIEQUIVALENT(S): at 19:44

## 2023-11-12 RX ADMIN — Medication 4: at 12:40

## 2023-11-12 NOTE — ED ADULT NURSE REASSESSMENT NOTE - STATUS
awaits dispo As per handoff rpt pt with c/o testicula pain x 4 days r/o cellulitis  antibiotcs initiated tylenol given
as per handoff pt hx of ETOH abuse last drank few hours ago also reported taking ativan , HX of anxiety, afib as per hand off pt is axo4, drowsy, cooperative, belongings secured sent to security.

## 2023-11-12 NOTE — H&P ADULT - HISTORY OF PRESENT ILLNESS
68 year-old male with medical history HTN, HLD, DM Type 2, PAD, recent admission for pericardial effusion, ulcer of the aorta adn complicated UTI  c/o 4 days of right testicular pain and swelling with associated fever 3 days ago.  Patient saw urologist today and was sent to the ED for further evaluation.  Patient reports no headaches, vision changes, chest pain, trouble breathing, abdominal pain, nausea/vomiting, diarrhea/constipation, dysuria, hematuria.    ED course:  c/s; Ceftriaxone IV;    68 year-old male, ambulatory with a cane,  with medical history HTN, HLD, DM Type 2, PAD, recent admission for pericardial effusion (small, EF=60%), ulcer of the aorta and complicated UTI  c/o 4 days of right testicular pain and swelling with associated fever 3 days ago.  Patient saw urologist today and was sent to the ED for further evaluation.  Patient reports no headaches, vision changes, chest pain, trouble breathing, abdominal pain, nausea/vomiting, diarrhea/constipation, dysuria, hematuria. Of note, pt states that has been off Eliquis for appx 1 month due to GI "problem".     ED course:  c/s; Ceftriaxone IV;    68 year-old male, ambulatory with a cane,  with medical history HTN, HLD, DM Type 2, PAD, recent admission for pericardial effusion (small, EF=60%), ulcer of the aorta and complicated UTI  c/o 4 days of right testicular pain and swelling with associated fever 3 days ago.  Patient saw urologist today and was sent to the ED for further evaluation.  Patient reports no headaches, vision changes, chest pain, trouble breathing, abdominal pain, nausea/vomiting, diarrhea/constipation, dysuria, hematuria.  Of note, pt states that has been off Eliquis for appx 1 month due to GI "problem".     ED course:  c/s; Ceftriaxone IV;

## 2023-11-12 NOTE — H&P ADULT - NSHPLABSRESULTS_GEN_ALL_CORE
.    -Personally interpreted EKG:    -Personally reviewed the following labs below:    19:20 - VBG - pH: 7.38  | pCO2: 43    | pO2: 44    | Lactate: 1.8                   8.2    15.32 )-----------( 383      ( 11 Nov 2023 19:20 )             26.8     11-11    131<L>  |  95<L>  |  8   ----------------------------<  282<H>  3.9   |  24  |  0.72    Ca    9.7      11 Nov 2023 19:20    TPro  8.0  /  Alb  3.8  /  TBili  0.3  /  DBili  x   /  AST  13  /  ALT  11  /  AlkPhos  110  11-11        -Personally reviewed: US SCROTUM AND CONTENTS   ORDERED BY: CHASE LAWSON     PROCEDURE DATE:  11/11/2023      INTERPRETATION:  CLINICAL INFORMATION: Right testicular pain and fever.  COMPARISON: None available.  TECHNIQUE: Testicular ultrasound utilizing color and spectral Doppler.  FINDINGS:    RIGHT:  Right testis: 3.3 cm x 2.0 cm x 2.9 cm. Normal echogenicity and   echotexture with no masses or areas of architectural distortion. The   right testis demonstrates diffuse hyperemia/hypervascularity. The venous   and arterial waveforms are normal in appearance.  Right epididymis: The right epididymis enlarged, diffusely hypoechoic and   demonstrates diffuse hyperemia/hypervascularity.  Right hydrocele: Small right hydrocele is present, likely reactive.  Right varicocele: None.    LEFT:  Left testis: 3.9 cm x 1.9 cm x 2.9 cm. Normal echogenicity and   echotexture with no masses or areas of architectural distortion. The left   testis demonstrates diffuse hyperemia/hypervascularity, although less   severe when compared to the right. The venous and arterial waveforms are   normal in appearance.  Left epididymis: A small cyst measuring up to 0.3 cm is noted at the   epididymal head.  Left hydrocele: Small left hydrocele.  Left varicocele: None.    IMPRESSION:  No evidence of testicular torsion.  Right epididymorchitis and left orchitis.  No discrete focal drainable   collection. .    -Personally interpreted EKG: sinus tach, 112bpm, qtc 483, no acute tw or st changes, no pacs or pvcs - unchanged compared to EKG dated 10/22/23    -Personally reviewed the following labs below:    19:20 - VBG - pH: 7.38  | pCO2: 43    | pO2: 44    | Lactate: 1.8                   8.2    15.32 )-----------( 383      ( 11 Nov 2023 19:20 )             26.8     11-11    131<L>  |  95<L>  |  8   ----------------------------<  282<H>  3.9   |  24  |  0.72    Ca    9.7      11 Nov 2023 19:20    TPro  8.0  /  Alb  3.8  /  TBili  0.3  /  DBili  x   /  AST  13  /  ALT  11  /  AlkPhos  110  11-11        -Personally reviewed: US SCROTUM AND CONTENTS   ORDERED BY: CHASE LAWSON     PROCEDURE DATE:  11/11/2023      INTERPRETATION:  CLINICAL INFORMATION: Right testicular pain and fever.  COMPARISON: None available.  TECHNIQUE: Testicular ultrasound utilizing color and spectral Doppler.  FINDINGS:    RIGHT:  Right testis: 3.3 cm x 2.0 cm x 2.9 cm. Normal echogenicity and   echotexture with no masses or areas of architectural distortion. The   right testis demonstrates diffuse hyperemia/hypervascularity. The venous   and arterial waveforms are normal in appearance.  Right epididymis: The right epididymis enlarged, diffusely hypoechoic and   demonstrates diffuse hyperemia/hypervascularity.  Right hydrocele: Small right hydrocele is present, likely reactive.  Right varicocele: None.    LEFT:  Left testis: 3.9 cm x 1.9 cm x 2.9 cm. Normal echogenicity and   echotexture with no masses or areas of architectural distortion. The left   testis demonstrates diffuse hyperemia/hypervascularity, although less   severe when compared to the right. The venous and arterial waveforms are   normal in appearance.  Left epididymis: A small cyst measuring up to 0.3 cm is noted at the   epididymal head.  Left hydrocele: Small left hydrocele.  Left varicocele: None.    IMPRESSION:  No evidence of testicular torsion.  Right epididymorchitis and left orchitis.  No discrete focal drainable   collection.

## 2023-11-12 NOTE — ED ADULT NURSE REASSESSMENT NOTE - NS ED NURSE REASSESS COMMENT FT1
Pt is a/o x 4. Patient denies any complaints of chest pain, headache, nausea, dizziness, vomiting, SOB, fever, chills  verbalized. Pt has 20g iv placed to right AC with no redness or swelling noted. Patient normal sinus on the monitor. Patient has a patent airway. Breath sounds clear and equal. Chest rise equal and adequate. Patient is calm. laying in the supine position not complaining of any anxiety.  Vitals unremarkable. Patient stable upon leaving the room. Pt is a/o x 4. Patient denies any complaints of chest pain, headache, nausea, dizziness, vomiting, SOB, fever, chills  verbalized. Pt has 20g iv placed to right AC with no redness or swelling noted. Patient not endorsing any pain at the moment.  Patient normal sinus on the monitor when reassess for vitals . Patient has a patent airway. Breath sounds clear and equal. Chest rise equal and adequate. Vitals unremarkable. Patient stable upon assessment.

## 2023-11-12 NOTE — CONSULT NOTE ADULT - SUBJECTIVE AND OBJECTIVE BOX
C A R D I O L O G Y  *********************    DATE OF SERVICE: 11-12-23    HISTORY OF PRESENT ILLNESS: HPI:   Pt is a 68 year-old male medical history HTN, HLD, DM Type 2, PAD, recent admission for pericardial effusion (small, EF=60%), ulcer of the aorta and complicated UTI  c/o 4 days of right testicular pain and swelling with associated fever 3 days ago.  Patient saw urologist  and was sent to the ED for further evaluation.  Patient reports no headaches, vision changes, chest pain, trouble breathing, abdominal pain, nausea/vomiting, diarrhea/constipation, dysuria, hematuria.    Of note, pt states that has been off Eliquis for appx 1 month due to GI "problem".     ED course:  c/s; Ceftriaxone IV;   (12 Nov 2023 04:02)      PAST MEDICAL & SURGICAL HISTORY:  HTN (hypertension)  HLD (hyperlipidemia)  DM (diabetes mellitus)  PAD (peripheral artery disease)  Tobacco abuse  Former  H/O iron deficiency  S/P ppendectomy  S/P peripheral artery angioplasty with stent placement      MEDICATIONS:  MEDICATIONS  (STANDING):  atenolol  Tablet 25 milliGRAM(s) Oral daily  atorvastatin 20 milliGRAM(s) Oral at bedtime  cefepime   IVPB 2000 milliGRAM(s) IV Intermittent every 8 hours  cilostazol 50 milliGRAM(s) Oral two times a day  dextrose 5%. 1000 milliLiter(s) (50 mL/Hr) IV Continuous <Continuous>  dextrose 5%. 1000 milliLiter(s) (100 mL/Hr) IV Continuous <Continuous>  dextrose 50% Injectable 25 Gram(s) IV Push once  dextrose 50% Injectable 12.5 Gram(s) IV Push once  dextrose 50% Injectable 25 Gram(s) IV Push once  glucagon  Injectable 1 milliGRAM(s) IntraMuscular once  insulin glargine Injectable (LANTUS) 20 Unit(s) SubCutaneous every morning  insulin lispro (ADMELOG) corrective regimen sliding scale   SubCutaneous three times a day before meals  insulin lispro (ADMELOG) corrective regimen sliding scale   SubCutaneous at bedtime  insulin lispro Injectable (ADMELOG) 2 Unit(s) SubCutaneous three times a day before meals  potassium chloride    Tablet ER 40 milliEquivalent(s) Oral once  pregabalin 75 milliGRAM(s) Oral daily  sodium chloride 0.9%. 1000 milliLiter(s) (100 mL/Hr) IV Continuous <Continuous>      Allergies  Advil (Rash)    FAMILY HISTORY:  No pertinent family history in first degree relatives      SOCIAL HISTORY:    [X ] Non-smoker  [ ] Smoker  [ ] Alcohol    FLU VACCINE THIS YEAR STARTS IN AUGUST:  [ ] Yes    [ ] No    IF OVER 65 HAVE YOU EVER HAD A PNA VACCINE:  [ ] Yes    [ ] No       [ ] N/A      REVIEW OF SYSTEMS:  [ ]chest pain  [  ]shortness of breath  [  ]palpitations  [  ]syncope  [ ]near syncope [ ]upper extremity weakness   [ ] lower extremity weakness  [  ]diplopia  [  ]altered mental status   [  ]fevers  [ ]chills [ ]nausea  [ ]vomiting  [  ]dysphagia    [ ]abdominal pain  [ ]melena  [ ]BRBPR    [  ]epistaxis  [  ]rash    [ ]lower extremity edema        [X] All others negative	  [ ] Unable to obtain      LABS:	 	    CARDIAC MARKERS:                              7.7    15.33 )-----------( 375      ( 12 Nov 2023 06:02 )             24.8     Hb Trend: 7.7<--    11-12    131<L>  |  96<L>  |  9   ----------------------------<  286<H>  3.4<L>   |  23  |  0.70    Ca    9.3      12 Nov 2023 06:02  Phos  2.8     11-12  Mg     1.80     11-12    TPro  8.0  /  Alb  3.8  /  TBili  0.3  /  DBili  x   /  AST  13  /  ALT  11  /  AlkPhos  110  11-11    Creatinine Trend: 0.70<--, 0.72<--, 0.76<--, 0.72<--, 0.69<--, 0.80<--      PHYSICAL EXAM:  T(C): 37.4 (11-12-23 @ 15:10), Max: 38.6 (11-11-23 @ 21:03)  HR: 86 (11-12-23 @ 15:10) (81 - 106)  BP: 147/56 (11-12-23 @ 15:10) (130/60 - 162/68)  RR: 18 (11-12-23 @ 15:10) (14 - 20)  SpO2: 100% (11-12-23 @ 15:10) (98% - 100%)  Wt(kg): --   BMI (kg/m2): 23.4 (11-12-23 @ 04:42)  I&O's Summary      Gen: NAD  HEENT:  (-)icterus (-)pallor  CV: N S1 S2 1/6 DEYSI (+)2 Pulses B/l  Resp:  Clear to auscultation B/L, normal effort  GI: (+) BS Soft, NT, ND  Lymph:  (-)Edema, (-)obvious lymphadenopathy  Skin: Warm to touch, Normal turgor  Psych: Appropriate mood and affect      TELEMETRY: 	      ECG:  Sinus tach	    RADIOLOGY:         CXR:     Echo:   < from: TTE W or WO Ultrasound Enhancing Agent (10.23.23 @ 15:46) >     < from: TTE Limited W or WO Ultrasound Enhancing Agent (10.29.23 @ 09:52) >   1. Left ventricular systolic function is normal with an ejection fraction of 60 % by 3D.   2. Normal right ventricular cavity size, wall thickness, and probably normal systolic function.   3. The left atrium is normal in size.   4. The right atrium is normal in size.   5. No significant valvular disease.   6. Moderate pericardial effusion noted adjacent to the posterior left ventricle and small pericardial effusion noted adjacent tothe right ventricle with no evidence of hemodynamic compromise.   7. Right pleural effusion noted.   8. The inferior vena cava is normal in size (normal <2.1cm) with abnormal inspiratory collapse (abnormal <50%) consistent with mildly elevated right atrial pressure (~8, range 5-10mmHg).      < from: TTE Limited W or WO Ultrasound Enhancing Agent (10.29.23 @ 09:52) >  1. Normal right ventricular cavity size, wall thickness, and systolic function.   2. There is a small pericardial effusion measuring 1.00 cm with no evidence of hemodynamic compromise. Please note that the majority of pericardial effusion is posterior to the left ventricle, loculated around the right atrium and there is a very small amount of effusion anterior to the right ventricle in the subcostal window.   3. Compared to the transthoracic echocardiogram performed on 10/23/2023 There is now a small pericardial effusion.    Summary only: There is now a small pericardial effusion.    < end of copied text >        ASSESSMENT/PLAN: 	Pt is a 68 year-old male medical history HTN, HLD, DM Type 2, PAD, recent admission for pericardial effusion (small, EF=60%), penetrating ulcer of the aorta and complicated UTI  c/o 4 days of right testicular pain and swelling with associated fever 3 days ago.     1. Pericardial effusion/tachycardia  - rpeepeat echo from previous admission showed improvement on sie of effusion, can check repeat  - sinus tach on ekg leeanneley from infection  - check orthostats  - not in tamponade on physical exam  - ABX per medicine  - c/w BB and statin  - obtain records to why patient off eliquis  - Ulcer of aorta worked up las admission with discussion with ct surgery and repeat CT in 1 year    Logan Garcia MD  Pager: [phone]

## 2023-11-12 NOTE — H&P ADULT - PROBLEM SELECTOR PLAN 2
Acute R epididymo-orchitis and L orchitis  Recent Urine Cx: Klebsiella pneumonia sensitive to Cefepime 100L CFU, dated Oct 22, 2023  US Testes: . Right epididymo-orchitis and left orchitis.  No discrete focal drainable collection. No evidence of testicular torsion    -Evaluated by    -Cefepime 2g q8h iso sepsis   -f/u UCx  -Ordered GC/ Chlamydia urine, SELMA   -Consider ID c/s - deferred to Dr. Garcia, primary attending Acute R epididymo-orchitis and L orchitis  Recent Urine Cx: Klebsiella pneumonia sensitive to Cefepime 100L CFU, dated Oct 22, 2023  US Testes: . Right epididymo-orchitis and left orchitis.  No discrete focal drainable collection. No evidence of testicular torsion    -Evaluated by    -Cefepime 2g q8h iso sepsis   -f/u UCx  -Ordered GC/ Chlamydia urine, SELMA   -ID c/s - deferred to Dr. Garcia, primary attending

## 2023-11-12 NOTE — H&P ADULT - PROBLEM SELECTOR PLAN 1
Due to acute R epididymo-orchitis and L orchitis;  Rool=448.4; Tachy HR 110s-90s  Leukocytosis=15.3K;    -Abx as below  -VS q4h  -Monitor VBG. lactate  -f/u BCx

## 2023-11-12 NOTE — H&P ADULT - NSHPPHYSICALEXAM_GEN_ALL_CORE
Vital Signs Last 24 Hrs  T(C): 36.7 (12 Nov 2023 01:00), Max: 38.6 (11 Nov 2023 21:03)  T(F): 98.1 (12 Nov 2023 01:00), Max: 101.4 (11 Nov 2023 21:03)  HR: 89 (12 Nov 2023 01:00) (89 - 115)  BP: 148/89 (12 Nov 2023 01:00) (133/60 - 162/68)  BP(mean): --  RR: 20 (12 Nov 2023 01:00) (14 - 24)  SpO2: 98% (12 Nov 2023 01:00) (98% - 100%)    Parameters below as of 12 Nov 2023 01:00  Patient On (Oxygen Delivery Method): room air

## 2023-11-12 NOTE — H&P ADULT - PROBLEM SELECTOR PLAN 3
[id number]    -c/w Lantus, conservative dosing, 24-->20u in AM   -TID pre meal 2u   -ISS TID and qhs  -2u Admelog now

## 2023-11-12 NOTE — H&P ADULT - ASSESSMENT
68 year-old male, ambulatory with a cane,  with medical history HTN, HLD, DM Type 2, PAD, recent admission for pericardial effusion (small, EF=60%), ulcer of the aorta and complicated UTI ; a/w sepsis due to right epididymo-orchitis and left orchitis;

## 2023-11-12 NOTE — H&P ADULT - PROBLEM SELECTOR PLAN 4
C/W Cilostazol  BID  Hold Eliquis - on hold per the pt for 1 month, and per recent IM progress note, Dr. Garcia, dated 10/28/23, iso recent GIB

## 2023-11-12 NOTE — ED ADULT NURSE REASSESSMENT NOTE - NS ED NURSE REASSESS COMMENT FT1
report was given to [facility] 2 MAC Betancourt. Patient stable and not endorsing any pain. Patient has patent airway, breath sounds clear and equal. Chest rise bi-lateral and equal. Patients vital unremarkable

## 2023-11-13 LAB
ANION GAP SERPL CALC-SCNC: 12 MMOL/L — SIGNIFICANT CHANGE UP (ref 7–14)
ANION GAP SERPL CALC-SCNC: 12 MMOL/L — SIGNIFICANT CHANGE UP (ref 7–14)
BUN SERPL-MCNC: 8 MG/DL — SIGNIFICANT CHANGE UP (ref 7–23)
BUN SERPL-MCNC: 8 MG/DL — SIGNIFICANT CHANGE UP (ref 7–23)
CALCIUM SERPL-MCNC: 9.5 MG/DL — SIGNIFICANT CHANGE UP (ref 8.4–10.5)
CALCIUM SERPL-MCNC: 9.5 MG/DL — SIGNIFICANT CHANGE UP (ref 8.4–10.5)
CHLORIDE SERPL-SCNC: 101 MMOL/L — SIGNIFICANT CHANGE UP (ref 98–107)
CHLORIDE SERPL-SCNC: 101 MMOL/L — SIGNIFICANT CHANGE UP (ref 98–107)
CO2 SERPL-SCNC: 22 MMOL/L — SIGNIFICANT CHANGE UP (ref 22–31)
CO2 SERPL-SCNC: 22 MMOL/L — SIGNIFICANT CHANGE UP (ref 22–31)
CREAT SERPL-MCNC: 0.73 MG/DL — SIGNIFICANT CHANGE UP (ref 0.5–1.3)
CREAT SERPL-MCNC: 0.73 MG/DL — SIGNIFICANT CHANGE UP (ref 0.5–1.3)
EGFR: 98 ML/MIN/1.73M2 — SIGNIFICANT CHANGE UP
EGFR: 98 ML/MIN/1.73M2 — SIGNIFICANT CHANGE UP
GLUCOSE BLDC GLUCOMTR-MCNC: 174 MG/DL — HIGH (ref 70–99)
GLUCOSE BLDC GLUCOMTR-MCNC: 174 MG/DL — HIGH (ref 70–99)
GLUCOSE BLDC GLUCOMTR-MCNC: 243 MG/DL — HIGH (ref 70–99)
GLUCOSE BLDC GLUCOMTR-MCNC: 243 MG/DL — HIGH (ref 70–99)
GLUCOSE BLDC GLUCOMTR-MCNC: 245 MG/DL — HIGH (ref 70–99)
GLUCOSE BLDC GLUCOMTR-MCNC: 245 MG/DL — HIGH (ref 70–99)
GLUCOSE BLDC GLUCOMTR-MCNC: 267 MG/DL — HIGH (ref 70–99)
GLUCOSE BLDC GLUCOMTR-MCNC: 267 MG/DL — HIGH (ref 70–99)
GLUCOSE BLDC GLUCOMTR-MCNC: 313 MG/DL — HIGH (ref 70–99)
GLUCOSE BLDC GLUCOMTR-MCNC: 313 MG/DL — HIGH (ref 70–99)
GLUCOSE SERPL-MCNC: 191 MG/DL — HIGH (ref 70–99)
GLUCOSE SERPL-MCNC: 191 MG/DL — HIGH (ref 70–99)
HCT VFR BLD CALC: 25.6 % — LOW (ref 39–50)
HCT VFR BLD CALC: 25.6 % — LOW (ref 39–50)
HGB BLD-MCNC: 7.8 G/DL — LOW (ref 13–17)
HGB BLD-MCNC: 7.8 G/DL — LOW (ref 13–17)
MAGNESIUM SERPL-MCNC: 1.9 MG/DL — SIGNIFICANT CHANGE UP (ref 1.6–2.6)
MAGNESIUM SERPL-MCNC: 1.9 MG/DL — SIGNIFICANT CHANGE UP (ref 1.6–2.6)
MCHC RBC-ENTMCNC: 21.5 PG — LOW (ref 27–34)
MCHC RBC-ENTMCNC: 21.5 PG — LOW (ref 27–34)
MCHC RBC-ENTMCNC: 30.5 GM/DL — LOW (ref 32–36)
MCHC RBC-ENTMCNC: 30.5 GM/DL — LOW (ref 32–36)
MCV RBC AUTO: 70.5 FL — LOW (ref 80–100)
MCV RBC AUTO: 70.5 FL — LOW (ref 80–100)
NRBC # BLD: 0 /100 WBCS — SIGNIFICANT CHANGE UP (ref 0–0)
NRBC # BLD: 0 /100 WBCS — SIGNIFICANT CHANGE UP (ref 0–0)
NRBC # FLD: 0 K/UL — SIGNIFICANT CHANGE UP (ref 0–0)
NRBC # FLD: 0 K/UL — SIGNIFICANT CHANGE UP (ref 0–0)
PHOSPHATE SERPL-MCNC: 3.2 MG/DL — SIGNIFICANT CHANGE UP (ref 2.5–4.5)
PHOSPHATE SERPL-MCNC: 3.2 MG/DL — SIGNIFICANT CHANGE UP (ref 2.5–4.5)
PLATELET # BLD AUTO: 337 K/UL — SIGNIFICANT CHANGE UP (ref 150–400)
PLATELET # BLD AUTO: 337 K/UL — SIGNIFICANT CHANGE UP (ref 150–400)
POTASSIUM SERPL-MCNC: 3.9 MMOL/L — SIGNIFICANT CHANGE UP (ref 3.5–5.3)
POTASSIUM SERPL-MCNC: 3.9 MMOL/L — SIGNIFICANT CHANGE UP (ref 3.5–5.3)
POTASSIUM SERPL-SCNC: 3.9 MMOL/L — SIGNIFICANT CHANGE UP (ref 3.5–5.3)
POTASSIUM SERPL-SCNC: 3.9 MMOL/L — SIGNIFICANT CHANGE UP (ref 3.5–5.3)
RBC # BLD: 3.63 M/UL — LOW (ref 4.2–5.8)
RBC # BLD: 3.63 M/UL — LOW (ref 4.2–5.8)
RBC # FLD: 18.8 % — HIGH (ref 10.3–14.5)
RBC # FLD: 18.8 % — HIGH (ref 10.3–14.5)
SODIUM SERPL-SCNC: 135 MMOL/L — SIGNIFICANT CHANGE UP (ref 135–145)
SODIUM SERPL-SCNC: 135 MMOL/L — SIGNIFICANT CHANGE UP (ref 135–145)
WBC # BLD: 14.15 K/UL — HIGH (ref 3.8–10.5)
WBC # BLD: 14.15 K/UL — HIGH (ref 3.8–10.5)
WBC # FLD AUTO: 14.15 K/UL — HIGH (ref 3.8–10.5)
WBC # FLD AUTO: 14.15 K/UL — HIGH (ref 3.8–10.5)

## 2023-11-13 PROCEDURE — 99222 1ST HOSP IP/OBS MODERATE 55: CPT

## 2023-11-13 PROCEDURE — 99254 IP/OBS CNSLTJ NEW/EST MOD 60: CPT

## 2023-11-13 RX ORDER — CEFTRIAXONE 500 MG/1
1000 INJECTION, POWDER, FOR SOLUTION INTRAMUSCULAR; INTRAVENOUS EVERY 24 HOURS
Refills: 0 | Status: DISCONTINUED | OUTPATIENT
Start: 2023-11-13 | End: 2023-11-13

## 2023-11-13 RX ORDER — INFLUENZA VIRUS VACCINE 15; 15; 15; 15 UG/.5ML; UG/.5ML; UG/.5ML; UG/.5ML
0.7 SUSPENSION INTRAMUSCULAR ONCE
Refills: 0 | Status: DISCONTINUED | OUTPATIENT
Start: 2023-11-13 | End: 2023-11-17

## 2023-11-13 RX ORDER — CEFTRIAXONE 500 MG/1
2000 INJECTION, POWDER, FOR SOLUTION INTRAMUSCULAR; INTRAVENOUS EVERY 24 HOURS
Refills: 0 | Status: DISCONTINUED | OUTPATIENT
Start: 2023-11-13 | End: 2023-11-17

## 2023-11-13 RX ADMIN — ATORVASTATIN CALCIUM 20 MILLIGRAM(S): 80 TABLET, FILM COATED ORAL at 21:27

## 2023-11-13 RX ADMIN — CEFEPIME 100 MILLIGRAM(S): 1 INJECTION, POWDER, FOR SOLUTION INTRAMUSCULAR; INTRAVENOUS at 14:12

## 2023-11-13 RX ADMIN — CEFTRIAXONE 100 MILLIGRAM(S): 500 INJECTION, POWDER, FOR SOLUTION INTRAMUSCULAR; INTRAVENOUS at 21:32

## 2023-11-13 RX ADMIN — Medication 2 UNIT(S): at 09:02

## 2023-11-13 RX ADMIN — CILOSTAZOL 50 MILLIGRAM(S): 100 TABLET ORAL at 05:27

## 2023-11-13 RX ADMIN — Medication 2: at 21:29

## 2023-11-13 RX ADMIN — Medication 2 UNIT(S): at 18:15

## 2023-11-13 RX ADMIN — Medication 1: at 18:15

## 2023-11-13 RX ADMIN — Medication 2: at 13:05

## 2023-11-13 RX ADMIN — CEFEPIME 100 MILLIGRAM(S): 1 INJECTION, POWDER, FOR SOLUTION INTRAMUSCULAR; INTRAVENOUS at 07:44

## 2023-11-13 RX ADMIN — OXYCODONE HYDROCHLORIDE 5 MILLIGRAM(S): 5 TABLET ORAL at 19:35

## 2023-11-13 RX ADMIN — Medication 2 UNIT(S): at 13:05

## 2023-11-13 RX ADMIN — OXYCODONE HYDROCHLORIDE 5 MILLIGRAM(S): 5 TABLET ORAL at 09:11

## 2023-11-13 RX ADMIN — Medication 75 MILLIGRAM(S): at 14:12

## 2023-11-13 RX ADMIN — Medication 2: at 09:02

## 2023-11-13 RX ADMIN — ATENOLOL 25 MILLIGRAM(S): 25 TABLET ORAL at 05:27

## 2023-11-13 RX ADMIN — OXYCODONE HYDROCHLORIDE 5 MILLIGRAM(S): 5 TABLET ORAL at 20:35

## 2023-11-13 RX ADMIN — Medication 650 MILLIGRAM(S): at 23:44

## 2023-11-13 RX ADMIN — CILOSTAZOL 50 MILLIGRAM(S): 100 TABLET ORAL at 18:11

## 2023-11-13 RX ADMIN — INSULIN GLARGINE 20 UNIT(S): 100 INJECTION, SOLUTION SUBCUTANEOUS at 09:04

## 2023-11-13 NOTE — PATIENT PROFILE ADULT - FALL HARM RISK - PATIENT NEEDS ASSISTANCE
Physician Pre-Sedation Assessment    Pre-Sedation Assessment:    Sedation History: Previous Sedation with No Complications and Airway Assessed    Cardiac: normal S1, S2  Respiratory: breath sounds clear bilaterally   Abdomen: soft, BS (+), non-tender    ASA Classification: 3. A patient with a severe systemic disease.     Plan: IV Sedation Standing/Walking

## 2023-11-13 NOTE — CONSULT NOTE ADULT - ASSESSMENT
This is a 67 y/o M w/ PMHx of HTN, HLD, DM2, PAD, pericaridal effusion, aortic ulcer, UTI who is presenting with sepsis R epidiymoorchitis w/o drainable collection, started on Cefepime.    Recently admitted last month for urinary rention w/ fever/leukocytosis, treated for UTI w/ klebsiella with Ceftin.     #R epidiymoorchitis   #Sepsis   #Leukocytosis   #Fever     Sepsis 2/2 R epididymoorchitis w/o drainable collection, UCx w/ normal earnest, recent UCx w/ Klebsiella, sensitive to CTX, levofloxacin. Pt febrile ON, mild leukocytosis to 14, was 12 on recent discharge. Would treat for 10 day course to cover enteric earnest    Plan:   1. Change to Ceftriaxone 2 g q 24 while inpatient  2. F/u BCx, fever curve, CBC  3. When improved, can change to Levofloxacin 500 mg on discharge finish 10 day course until 11/20/23    NOTE IS INCOMPLETE      This is a 67 y/o M w/ PMHx of HTN, HLD, DM2, PAD, pericardial effusion, aortic ulcer, UTI who is presenting with sepsis R epididymoorchitis w/o drainable collection, started on Cefepime.    Recently admitted last month for urinary retention w/ fever/leukocytosis, treated for UTI w/ klebsiella with Ceftin.     #R epididymoorchitis   #Sepsis   #Leukocytosis   #Fever     Sepsis 2/2 R epididymoorchitis w/o drainable collection, UCx w/ normal earnest, recent UCx w/ Klebsiella, sensitive to CTX, levofloxacin. Pt febrile ON, mild leukocytosis to 14, was 12 on recent discharge. Would treat for 10 day course to cover enteric earnest    Plan:   1. Change to Ceftriaxone 2 g q 24 while inpatient  2. Scrotal elevation  3. F/u BCx, fever curve, CBC. If continues to have fevers, may need to repeat imaging  4. When improved, can change to Levofloxacin 500 mg on discharge finish 10 day course until 11/20/23    Thank you for this consult. Inpatient ID team will follow.    Rk Deutsch M.D.  Attending Physician  Division of Infectious Diseases  Department of Medicine    Please contact through MS Teams message.  Office: [phone] (after 5 PM or weekend)

## 2023-11-13 NOTE — CONSULT NOTE ADULT - SUBJECTIVE AND OBJECTIVE BOX
ID INITIAL CONSULT NOTE     Patient is a 68y old  Male who presents with a chief complaint of R testicle pain and swelling (13 Nov 2023 12:38)      HPI:  This is a 69 y/o M w/ PMHx of HTN, HLD, DM2, PAD, pericaridal effusion, aortic ulcer, UTI who is presenting to [facility] on 11/11/23 for R testicular pain adn swelling with fever. Pt was seen by urologist, sent to ED for further care.   Pt was febrile to 102.5 on 11/12, mild leukocytosis to 15. US testicles w/ right epididymorchitis and left orchitis, no drainable collection. Urology consulted in the ED, no surgical intervention.      prior hospital charts reviewed [ X ]  primary team notes reviewed [X  ]  other consultant notes reviewed [ X ]    PAST MEDICAL & SURGICAL HISTORY:  HTN (hypertension)      HLD (hyperlipidemia)      DM (diabetes mellitus)      PAD (peripheral artery disease)      Tobacco abuse  Former      H/O iron deficiency      S/P appendectomy      S/P peripheral artery angioplasty with stent placement          Allergies  Advil (Rash)        ANTIMICROBIALS:  cefepime   IVPB 2000 every 8 hours      Current Abx:     Past Abx:       OTHER MEDS: MEDICATIONS  (STANDING):  acetaminophen     Tablet .. 650 every 6 hours PRN  aluminum hydroxide/magnesium hydroxide/simethicone Suspension 30 every 4 hours PRN  atenolol  Tablet 25 daily  atorvastatin 20 at bedtime  cilostazol 50 two times a day  dextrose 50% Injectable 12.5 once  dextrose 50% Injectable 25 once  dextrose 50% Injectable 25 once  dextrose Oral Gel 15 once PRN  glucagon  Injectable 1 once  insulin glargine Injectable (LANTUS) 20 every morning  insulin lispro (ADMELOG) corrective regimen sliding scale  three times a day before meals  insulin lispro (ADMELOG) corrective regimen sliding scale  at bedtime  insulin lispro Injectable (ADMELOG) 2 three times a day before meals  melatonin 3 at bedtime PRN  ondansetron Injectable 4 every 8 hours PRN  oxyCODONE    IR 5 three times a day PRN  pregabalin 75 daily      SOCIAL HISTORY: [ ] etoh [ ] tobacco [ ] former smoker [ ] IVDU    FAMILY HISTORY:  No pertinent family history in first degree relatives        REVIEW OF SYSTEMS:    CONSTITUTIONAL: No weakness, fevers or chills  EYES/ENT: No visual changes;  No vertigo or throat pain   NECK: No pain or stiffness  RESPIRATORY: No cough, wheezing, hemoptysis; No shortness of breath  CARDIOVASCULAR: No chest pain or palpitations  GASTROINTESTINAL: No abdominal or epigastric pain. No nausea, vomiting, or hematemesis; No diarrhea or constipation. No melena or hematochezia.  GENITOURINARY: No dysuria, frequency or hematuria  NEUROLOGICAL: No numbness or weakness  SKIN: No itching, burning, rashes, or lesions   All other review of systems is negative unless indicated above.    Vital Signs Last 24 Hrs  T(F): 98.2 (11-13-23 @ 11:17), Max: 102.5 (11-12-23 @ 21:48)    Vital Signs Last 24 Hrs  HR: 73 (11-13-23 @ 11:17) (73 - 93)  BP: 130/55 (11-13-23 @ 11:17) (124/58 - 164/55)  RR: 17 (11-13-23 @ 11:17)  SpO2: 100% (11-13-23 @ 11:17) (99% - 100%)  Wt(kg): --    PHYSICAL EXAM:  GENERAL: NAD, well-developed  HEAD:  Atraumatic, Normocephalic  EYES: EOMI, PERRLA, conjunctiva and sclera clear  NECK: Supple, No JVD  CHEST/LUNG: Clear to auscultation bilaterally; No wheeze  HEART: Regular rate and rhythm; No murmurs, rubs, or gallops  ABDOMEN: Soft, Nontender, Nondistended; Bowel sounds present  EXTREMITIES:  2+ Peripheral Pulses, No clubbing, cyanosis, or edema  PSYCH: AAOx3  NEUROLOGY: non-focal  SKIN: No rashes or lesions      Labs:                          7.8    14.15 )-----------( 337      ( 13 Nov 2023 05:36 )             25.6       11-13    135  |  101  |  8   ----------------------------<  191<H>  3.9   |  22  |  0.73    Ca    9.5      13 Nov 2023 05:36  Phos  3.2     11-13  Mg     1.90     11-13    TPro  8.0  /  Alb  3.8  /  TBili  0.3  /  DBili  x   /  AST  13  /  ALT  11  /  AlkPhos  110  11-11      Urinalysis Basic - ( 13 Nov 2023 05:36 )    Color: x / Appearance: x / SG: x / pH: x  Gluc: 191 mg/dL / Ketone: x  / Bili: x / Urobili: x   Blood: x / Protein: x / Nitrite: x   Leuk Esterase: x / RBC: x / WBC x   Sq Epi: x / Non Sq Epi: x / Bacteria: x          MICROBIOLOGY:  .Blood Blood-Peripheral  11-11-23   No growth at 24 hours  --  --      .Blood Blood-Peripheral  11-11-23   No growth at 24 hours  --  --      Clean Catch Clean Catch (Midstream)  11-11-23   <10,000 CFU/mL Normal Urogenital Luisa  --  --      .Blood Blood-Venous  10-22-23   No growth at 5 days  --  --      .Blood Blood-Peripheral  10-22-23   No growth at 5 days  --  --      Clean Catch Clean Catch (Midstream)  10-22-23   >100,000 CFU/ml Klebsiella pneumoniae  --  Klebsiella pneumoniae              v              RADIOLOGY:  < from: US Testicles (11.11.23 @ 21:51) >    IMPRESSION:    No evidence of testicular torsion.  Right epididymorchitis and left orchitis.  No discrete focal drainable   collection.     ID INITIAL CONSULT NOTE     Patient is a 68y old  Male who presents with a chief complaint of R testicle pain and swelling (13 Nov 2023 12:38)      HPI:  This is a 69 y/o M w/ PMHx of HTN, HLD, DM2, PAD, pericaridal effusion, aortic ulcer, UTI who is presenting to [facility] on 11/11[facility]23 for R testicular pain adn swelling with fever. Pt was seen by urologist, sent to ED for further care.   Pt was febrile to 102.5 on 11/12, mild leukocytosis to 15. US testicles w/ right epididymorchitis and left orchitis, no drainable collection. Urology consulted in the ED, no surgical intervention.      prior hospital charts reviewed [ X ]  primary team notes reviewed [X  ]  other consultant notes reviewed [ X ]    PAST MEDICAL & SURGICAL HISTORY:  HTN (hypertension)      HLD (hyperlipidemia)      DM (diabetes mellitus)      PAD (peripheral artery disease)      Tobacco abuse  Former      H/O iron deficiency      S/P appendectomy      S/P peripheral artery angioplasty with stent placement          Allergies  Advil (Rash)        ANTIMICROBIALS:  cefepime   IVPB 2000 every 8 hours      Current Abx:     Past Abx:       OTHER MEDS: MEDICATIONS  (STANDING):  acetaminophen     Tablet .. 650 every 6 hours PRN  aluminum hydroxide/magnesium hydroxide/simethicone Suspension 30 every 4 hours PRN  atenolol  Tablet 25 daily  atorvastatin 20 at bedtime  cilostazol 50 two times a day  dextrose 50% Injectable 12.5 once  dextrose 50% Injectable 25 once  dextrose 50% Injectable 25 once  dextrose Oral Gel 15 once PRN  glucagon  Injectable 1 once  insulin glargine Injectable (LANTUS) 20 every morning  insulin lispro (ADMELOG) corrective regimen sliding scale  three times a day before meals  insulin lispro (ADMELOG) corrective regimen sliding scale  at bedtime  insulin lispro Injectable (ADMELOG) 2 three times a day before meals  melatonin 3 at bedtime PRN  ondansetron Injectable 4 every 8 hours PRN  oxyCODONE    IR 5 three times a day PRN  pregabalin 75 daily      SOCIAL HISTORY: [ ] etoh [ ] tobacco [ ] former smoker [ ] IVDU  Denies smoking, alcohol     FAMILY HISTORY:  No family h/o diabetes     REVIEW OF SYSTEMS:    CONSTITUTIONAL: No weakness, fevers or chills  EYES/ENT: No visual changes;  No vertigo or throat pain   NECK: No pain or stiffness  RESPIRATORY: No cough, wheezing, hemoptysis; No shortness of breath  CARDIOVASCULAR: No chest pain or palpitations  GASTROINTESTINAL: No abdominal or epigastric pain. No nausea, vomiting, or hematemesis; No diarrhea or constipation. No melena or hematochezia.  GENITOURINARY: No dysuria, frequency or hematuria  NEUROLOGICAL: No numbness or weakness  SKIN: No itching, burning, rashes, or lesions   All other review of systems is negative unless indicated above.    Vital Signs Last 24 Hrs  T(F): 98.2 (11-13-23 @ 11:17), Max: 102.5 (11-12-23 @ 21:48)    Vital Signs Last 24 Hrs  HR: 73 (11-13-23 @ 11:17) (73 - 93)  BP: 130/55 (11-13-23 @ 11:17) (124/58 - 164/55)  RR: 17 (11-13-23 @ 11:17)  SpO2: 100% (11-13-23 @ 11:17) (99% - 100%)  Wt(kg): --    PHYSICAL EXAM:  GENERAL: NAD, well-developed  HEAD:  Atraumatic, Normocephalic  EYES: EOMI, PERRLA, conjunctiva and sclera clear  NECK: Supple, No JVD  CHEST/LUNG: Clear to auscultation bilaterally; No wheeze  HEART: Regular rate and rhythm; No murmurs, rubs, or gallops  ABDOMEN: Soft, Nontender, Nondistended; Bowel sounds present. Suprapubic pain   : R scrotal swelling, tenderness, erythema   EXTREMITIES:  2+ Peripheral Pulses, No clubbing, cyanosis, or edema  PSYCH: AAOx3  NEUROLOGY: non-focal  SKIN: No rashes or lesions      Labs:                          7.8    14.15 )-----------( 337      ( 13 Nov 2023 05:36 )             25.6       11-13    135  |  101  |  8   ----------------------------<  191<H>  3.9   |  22  |  0.73    Ca    9.5      13 Nov 2023 05:36  Phos  3.2     11-13  Mg     1.90     11-13    TPro  8.0  /  Alb  3.8  /  TBili  0.3  /  DBili  x   /  AST  13  /  ALT  11  /  AlkPhos  110  11-11      Urinalysis Basic - ( 13 Nov 2023 05:36 )    Color: x / Appearance: x / SG: x / pH: x  Gluc: 191 mg/dL / Ketone: x  / Bili: x / Urobili: x   Blood: x / Protein: x / Nitrite: x   Leuk Esterase: x / RBC: x / WBC x   Sq Epi: x / Non Sq Epi: x / Bacteria: x          MICROBIOLOGY:  .Blood Blood-Peripheral  11-11-23   No growth at 24 hours  --  --      .Blood Blood-Peripheral  11-11-23   No growth at 24 hours  --  --      Clean Catch Clean Catch (Midstream)  11-11-23   <10,000 CFU/mL Normal Urogenital Luisa  --  --      .Blood Blood-Venous  10-22-23   No growth at 5 days  --  --      .Blood Blood-Peripheral  10-22-23   No growth at 5 days  --  --      Clean Catch Clean Catch (Midstream)  10-22-23   >100,000 CFU/ml Klebsiella pneumoniae  --  Klebsiella pneumoniae              v              RADIOLOGY:  < from: US Testicles (11.11.23 @ 21:51) >    IMPRESSION:    No evidence of testicular torsion.  Right epididymorchitis and left orchitis.  No discrete focal drainable   collection.

## 2023-11-13 NOTE — PATIENT PROFILE ADULT - ARE SIGNIFICANT INDICATORS COMPLETE.
All patient questions have been answered, no further questions at this time. Discharge paperwork was gone over with patient. Patient verbalizes understanding of discharge teaching, medications, wound care, when to return and the importance of follow up.  Patient verbalizes feeling safe returning home at this time.     Yes

## 2023-11-13 NOTE — PATIENT PROFILE ADULT - FALL HARM RISK - HARM RISK INTERVENTIONS

## 2023-11-14 LAB
ANION GAP SERPL CALC-SCNC: 13 MMOL/L — SIGNIFICANT CHANGE UP (ref 7–14)
ANION GAP SERPL CALC-SCNC: 13 MMOL/L — SIGNIFICANT CHANGE UP (ref 7–14)
BASOPHILS # BLD AUTO: 0.05 K/UL — SIGNIFICANT CHANGE UP (ref 0–0.2)
BASOPHILS # BLD AUTO: 0.05 K/UL — SIGNIFICANT CHANGE UP (ref 0–0.2)
BASOPHILS NFR BLD AUTO: 0.4 % — SIGNIFICANT CHANGE UP (ref 0–2)
BASOPHILS NFR BLD AUTO: 0.4 % — SIGNIFICANT CHANGE UP (ref 0–2)
BUN SERPL-MCNC: 8 MG/DL — SIGNIFICANT CHANGE UP (ref 7–23)
BUN SERPL-MCNC: 8 MG/DL — SIGNIFICANT CHANGE UP (ref 7–23)
C TRACH RRNA SPEC QL NAA+PROBE: SIGNIFICANT CHANGE UP
C TRACH RRNA SPEC QL NAA+PROBE: SIGNIFICANT CHANGE UP
CALCIUM SERPL-MCNC: 9.6 MG/DL — SIGNIFICANT CHANGE UP (ref 8.4–10.5)
CALCIUM SERPL-MCNC: 9.6 MG/DL — SIGNIFICANT CHANGE UP (ref 8.4–10.5)
CHLORIDE SERPL-SCNC: 99 MMOL/L — SIGNIFICANT CHANGE UP (ref 98–107)
CHLORIDE SERPL-SCNC: 99 MMOL/L — SIGNIFICANT CHANGE UP (ref 98–107)
CO2 SERPL-SCNC: 24 MMOL/L — SIGNIFICANT CHANGE UP (ref 22–31)
CO2 SERPL-SCNC: 24 MMOL/L — SIGNIFICANT CHANGE UP (ref 22–31)
CREAT SERPL-MCNC: 0.71 MG/DL — SIGNIFICANT CHANGE UP (ref 0.5–1.3)
CREAT SERPL-MCNC: 0.71 MG/DL — SIGNIFICANT CHANGE UP (ref 0.5–1.3)
EGFR: 99 ML/MIN/1.73M2 — SIGNIFICANT CHANGE UP
EGFR: 99 ML/MIN/1.73M2 — SIGNIFICANT CHANGE UP
EOSINOPHIL # BLD AUTO: 0.44 K/UL — SIGNIFICANT CHANGE UP (ref 0–0.5)
EOSINOPHIL # BLD AUTO: 0.44 K/UL — SIGNIFICANT CHANGE UP (ref 0–0.5)
EOSINOPHIL NFR BLD AUTO: 3.8 % — SIGNIFICANT CHANGE UP (ref 0–6)
EOSINOPHIL NFR BLD AUTO: 3.8 % — SIGNIFICANT CHANGE UP (ref 0–6)
GLUCOSE BLDC GLUCOMTR-MCNC: 171 MG/DL — HIGH (ref 70–99)
GLUCOSE BLDC GLUCOMTR-MCNC: 171 MG/DL — HIGH (ref 70–99)
GLUCOSE BLDC GLUCOMTR-MCNC: 227 MG/DL — HIGH (ref 70–99)
GLUCOSE BLDC GLUCOMTR-MCNC: 227 MG/DL — HIGH (ref 70–99)
GLUCOSE BLDC GLUCOMTR-MCNC: 232 MG/DL — HIGH (ref 70–99)
GLUCOSE BLDC GLUCOMTR-MCNC: 232 MG/DL — HIGH (ref 70–99)
GLUCOSE BLDC GLUCOMTR-MCNC: 256 MG/DL — HIGH (ref 70–99)
GLUCOSE BLDC GLUCOMTR-MCNC: 256 MG/DL — HIGH (ref 70–99)
GLUCOSE SERPL-MCNC: 136 MG/DL — HIGH (ref 70–99)
GLUCOSE SERPL-MCNC: 136 MG/DL — HIGH (ref 70–99)
HCT VFR BLD CALC: 24.7 % — LOW (ref 39–50)
HCT VFR BLD CALC: 24.7 % — LOW (ref 39–50)
HGB BLD-MCNC: 7.6 G/DL — LOW (ref 13–17)
HGB BLD-MCNC: 7.6 G/DL — LOW (ref 13–17)
IANC: 8.4 K/UL — HIGH (ref 1.8–7.4)
IANC: 8.4 K/UL — HIGH (ref 1.8–7.4)
IMM GRANULOCYTES NFR BLD AUTO: 0.7 % — SIGNIFICANT CHANGE UP (ref 0–0.9)
IMM GRANULOCYTES NFR BLD AUTO: 0.7 % — SIGNIFICANT CHANGE UP (ref 0–0.9)
LYMPHOCYTES # BLD AUTO: 1.57 K/UL — SIGNIFICANT CHANGE UP (ref 1–3.3)
LYMPHOCYTES # BLD AUTO: 1.57 K/UL — SIGNIFICANT CHANGE UP (ref 1–3.3)
LYMPHOCYTES # BLD AUTO: 13.4 % — SIGNIFICANT CHANGE UP (ref 13–44)
LYMPHOCYTES # BLD AUTO: 13.4 % — SIGNIFICANT CHANGE UP (ref 13–44)
MAGNESIUM SERPL-MCNC: 2.1 MG/DL — SIGNIFICANT CHANGE UP (ref 1.6–2.6)
MAGNESIUM SERPL-MCNC: 2.1 MG/DL — SIGNIFICANT CHANGE UP (ref 1.6–2.6)
MCHC RBC-ENTMCNC: 21.3 PG — LOW (ref 27–34)
MCHC RBC-ENTMCNC: 21.3 PG — LOW (ref 27–34)
MCHC RBC-ENTMCNC: 30.8 GM/DL — LOW (ref 32–36)
MCHC RBC-ENTMCNC: 30.8 GM/DL — LOW (ref 32–36)
MCV RBC AUTO: 69.4 FL — LOW (ref 80–100)
MCV RBC AUTO: 69.4 FL — LOW (ref 80–100)
MONOCYTES # BLD AUTO: 1.14 K/UL — HIGH (ref 0–0.9)
MONOCYTES # BLD AUTO: 1.14 K/UL — HIGH (ref 0–0.9)
MONOCYTES NFR BLD AUTO: 9.8 % — SIGNIFICANT CHANGE UP (ref 2–14)
MONOCYTES NFR BLD AUTO: 9.8 % — SIGNIFICANT CHANGE UP (ref 2–14)
N GONORRHOEA RRNA SPEC QL NAA+PROBE: SIGNIFICANT CHANGE UP
N GONORRHOEA RRNA SPEC QL NAA+PROBE: SIGNIFICANT CHANGE UP
NEUTROPHILS # BLD AUTO: 8.4 K/UL — HIGH (ref 1.8–7.4)
NEUTROPHILS # BLD AUTO: 8.4 K/UL — HIGH (ref 1.8–7.4)
NEUTROPHILS NFR BLD AUTO: 71.9 % — SIGNIFICANT CHANGE UP (ref 43–77)
NEUTROPHILS NFR BLD AUTO: 71.9 % — SIGNIFICANT CHANGE UP (ref 43–77)
NRBC # BLD: 0 /100 WBCS — SIGNIFICANT CHANGE UP (ref 0–0)
NRBC # BLD: 0 /100 WBCS — SIGNIFICANT CHANGE UP (ref 0–0)
NRBC # FLD: 0 K/UL — SIGNIFICANT CHANGE UP (ref 0–0)
NRBC # FLD: 0 K/UL — SIGNIFICANT CHANGE UP (ref 0–0)
PHOSPHATE SERPL-MCNC: 4.1 MG/DL — SIGNIFICANT CHANGE UP (ref 2.5–4.5)
PHOSPHATE SERPL-MCNC: 4.1 MG/DL — SIGNIFICANT CHANGE UP (ref 2.5–4.5)
PLATELET # BLD AUTO: 340 K/UL — SIGNIFICANT CHANGE UP (ref 150–400)
PLATELET # BLD AUTO: 340 K/UL — SIGNIFICANT CHANGE UP (ref 150–400)
POTASSIUM SERPL-MCNC: 3.3 MMOL/L — LOW (ref 3.5–5.3)
POTASSIUM SERPL-MCNC: 3.3 MMOL/L — LOW (ref 3.5–5.3)
POTASSIUM SERPL-SCNC: 3.3 MMOL/L — LOW (ref 3.5–5.3)
POTASSIUM SERPL-SCNC: 3.3 MMOL/L — LOW (ref 3.5–5.3)
RBC # BLD: 3.56 M/UL — LOW (ref 4.2–5.8)
RBC # BLD: 3.56 M/UL — LOW (ref 4.2–5.8)
RBC # FLD: 19 % — HIGH (ref 10.3–14.5)
RBC # FLD: 19 % — HIGH (ref 10.3–14.5)
SODIUM SERPL-SCNC: 136 MMOL/L — SIGNIFICANT CHANGE UP (ref 135–145)
SODIUM SERPL-SCNC: 136 MMOL/L — SIGNIFICANT CHANGE UP (ref 135–145)
SPECIMEN SOURCE: SIGNIFICANT CHANGE UP
SPECIMEN SOURCE: SIGNIFICANT CHANGE UP
WBC # BLD: 11.68 K/UL — HIGH (ref 3.8–10.5)
WBC # BLD: 11.68 K/UL — HIGH (ref 3.8–10.5)
WBC # FLD AUTO: 11.68 K/UL — HIGH (ref 3.8–10.5)
WBC # FLD AUTO: 11.68 K/UL — HIGH (ref 3.8–10.5)

## 2023-11-14 PROCEDURE — 99232 SBSQ HOSP IP/OBS MODERATE 35: CPT

## 2023-11-14 RX ORDER — POTASSIUM CHLORIDE 20 MEQ
40 PACKET (EA) ORAL EVERY 4 HOURS
Refills: 0 | Status: COMPLETED | OUTPATIENT
Start: 2023-11-14 | End: 2023-11-14

## 2023-11-14 RX ORDER — APIXABAN 2.5 MG/1
2.5 TABLET, FILM COATED ORAL
Refills: 0 | Status: DISCONTINUED | OUTPATIENT
Start: 2023-11-14 | End: 2023-11-17

## 2023-11-14 RX ADMIN — CILOSTAZOL 50 MILLIGRAM(S): 100 TABLET ORAL at 18:30

## 2023-11-14 RX ADMIN — OXYCODONE HYDROCHLORIDE 5 MILLIGRAM(S): 5 TABLET ORAL at 18:31

## 2023-11-14 RX ADMIN — CILOSTAZOL 50 MILLIGRAM(S): 100 TABLET ORAL at 04:26

## 2023-11-14 RX ADMIN — ATORVASTATIN CALCIUM 20 MILLIGRAM(S): 80 TABLET, FILM COATED ORAL at 21:32

## 2023-11-14 RX ADMIN — Medication 75 MILLIGRAM(S): at 13:13

## 2023-11-14 RX ADMIN — INSULIN GLARGINE 20 UNIT(S): 100 INJECTION, SOLUTION SUBCUTANEOUS at 09:38

## 2023-11-14 RX ADMIN — Medication 40 MILLIEQUIVALENT(S): at 09:39

## 2023-11-14 RX ADMIN — Medication 2: at 13:08

## 2023-11-14 RX ADMIN — Medication 2 UNIT(S): at 13:07

## 2023-11-14 RX ADMIN — OXYCODONE HYDROCHLORIDE 5 MILLIGRAM(S): 5 TABLET ORAL at 08:30

## 2023-11-14 RX ADMIN — Medication 1: at 18:31

## 2023-11-14 RX ADMIN — Medication 3: at 09:38

## 2023-11-14 RX ADMIN — Medication 2 UNIT(S): at 18:32

## 2023-11-14 RX ADMIN — Medication 40 MILLIEQUIVALENT(S): at 06:29

## 2023-11-14 RX ADMIN — CEFTRIAXONE 100 MILLIGRAM(S): 500 INJECTION, POWDER, FOR SOLUTION INTRAMUSCULAR; INTRAVENOUS at 21:32

## 2023-11-14 RX ADMIN — APIXABAN 2.5 MILLIGRAM(S): 2.5 TABLET, FILM COATED ORAL at 18:29

## 2023-11-14 RX ADMIN — Medication 2 UNIT(S): at 09:38

## 2023-11-14 RX ADMIN — ATENOLOL 25 MILLIGRAM(S): 25 TABLET ORAL at 04:40

## 2023-11-14 RX ADMIN — OXYCODONE HYDROCHLORIDE 5 MILLIGRAM(S): 5 TABLET ORAL at 07:48

## 2023-11-14 NOTE — PHYSICAL THERAPY INITIAL EVALUATION ADULT - ADDITIONAL COMMENTS
As per patient he lives on the second floor of a private home with his wife and daughter. Utilized a handrail. Ambulatory with cane, no falls. Required assistance with ADL's at home.    Patient left semi-reclined in bed NAD, all lines and tubes intact, bed alarm on, call mc within reach MAC Rubin made aware

## 2023-11-14 NOTE — PHYSICAL THERAPY INITIAL EVALUATION ADULT - NSPTDMEREC_GEN_A_CORE
the patient will require a rolling walker to be able to perform their mobility related ADL's within their home/rolling walker

## 2023-11-14 NOTE — PHYSICAL THERAPY INITIAL EVALUATION ADULT - PERTINENT HX OF CURRENT PROBLEM, REHAB EVAL
Patient is a 68 year-old male, ambulatory with a cane, with past medical history of HTN, HLD, DM Type 2, PAD, recent admission for pericardial effusion (small, EF=60%), ulcer of the aorta and complicated UTI ; presenting with sepsis; US Testes: Right epididymo-orchitis and left orchitis.  No discrete focal drainable collection. No evidence of testicular torsion

## 2023-11-14 NOTE — CHART NOTE - NSCHARTNOTEFT_GEN_A_CORE
Discussed with attng Dr. Garcia, per chart review on patient's recent admission, Eliquis was held however resumed upon discharge. Per Dr. Garcia okay to resume Eliquis 2.5mg BID for PVD.

## 2023-11-14 NOTE — PROVIDER CONTACT NOTE (OTHER) - ACTION/TREATMENT ORDERED:
As per ACP Raven, no further intervention. Cultures completed and pt on antibiotics. Okay for pt to transport to floor.
Provider notified. Tylenol given as ordered.

## 2023-11-14 NOTE — PROVIDER CONTACT NOTE (OTHER) - RECOMMENDATIONS
Notify provider, give pt Tylenol
As per ACP Raven, no further intervention. Cultures completed and pt on antibiotics. Okay for pt to transport to floor.

## 2023-11-14 NOTE — PROVIDER CONTACT NOTE (OTHER) - BACKGROUND
Pt admitted with failure to thrive in adult
Pt admitted with Orchitis. PMH of DM type 2, HTN and PAD

## 2023-11-14 NOTE — PHYSICAL THERAPY INITIAL EVALUATION ADULT - MANUAL MUSCLE TESTING RESULTS, REHAB EVAL
Patients right lower extremity strength grossly 4-/5, left lower extremity 4/5, bilateral upper extremity strength grossly 4+/5 upon MMT and functional assessments

## 2023-11-14 NOTE — PHYSICAL THERAPY INITIAL EVALUATION ADULT - LEVEL OF INDEPENDENCE: STAIR NEGOTIATION, REHAB EVAL
patient deferred stair training assessment despite positive encouragement and education on home set up, patient deferred due to pain, RN made aware

## 2023-11-14 NOTE — PROVIDER CONTACT NOTE (OTHER) - ASSESSMENT
Pt A&Ox4. Denies chest pain, sob, or headache. All other vitals stable.
Pt A&Ox4. Denies chest pain, sob, or headache.

## 2023-11-15 LAB
ANION GAP SERPL CALC-SCNC: 13 MMOL/L — SIGNIFICANT CHANGE UP (ref 7–14)
ANION GAP SERPL CALC-SCNC: 13 MMOL/L — SIGNIFICANT CHANGE UP (ref 7–14)
BASOPHILS # BLD AUTO: 0.04 K/UL — SIGNIFICANT CHANGE UP (ref 0–0.2)
BASOPHILS # BLD AUTO: 0.04 K/UL — SIGNIFICANT CHANGE UP (ref 0–0.2)
BASOPHILS NFR BLD AUTO: 0.3 % — SIGNIFICANT CHANGE UP (ref 0–2)
BASOPHILS NFR BLD AUTO: 0.3 % — SIGNIFICANT CHANGE UP (ref 0–2)
BUN SERPL-MCNC: 7 MG/DL — SIGNIFICANT CHANGE UP (ref 7–23)
BUN SERPL-MCNC: 7 MG/DL — SIGNIFICANT CHANGE UP (ref 7–23)
CALCIUM SERPL-MCNC: 10 MG/DL — SIGNIFICANT CHANGE UP (ref 8.4–10.5)
CALCIUM SERPL-MCNC: 10 MG/DL — SIGNIFICANT CHANGE UP (ref 8.4–10.5)
CHLORIDE SERPL-SCNC: 96 MMOL/L — LOW (ref 98–107)
CHLORIDE SERPL-SCNC: 96 MMOL/L — LOW (ref 98–107)
CO2 SERPL-SCNC: 23 MMOL/L — SIGNIFICANT CHANGE UP (ref 22–31)
CO2 SERPL-SCNC: 23 MMOL/L — SIGNIFICANT CHANGE UP (ref 22–31)
CREAT SERPL-MCNC: 0.67 MG/DL — SIGNIFICANT CHANGE UP (ref 0.5–1.3)
CREAT SERPL-MCNC: 0.67 MG/DL — SIGNIFICANT CHANGE UP (ref 0.5–1.3)
EGFR: 100 ML/MIN/1.73M2 — SIGNIFICANT CHANGE UP
EGFR: 100 ML/MIN/1.73M2 — SIGNIFICANT CHANGE UP
EOSINOPHIL # BLD AUTO: 0.51 K/UL — HIGH (ref 0–0.5)
EOSINOPHIL # BLD AUTO: 0.51 K/UL — HIGH (ref 0–0.5)
EOSINOPHIL NFR BLD AUTO: 3.4 % — SIGNIFICANT CHANGE UP (ref 0–6)
EOSINOPHIL NFR BLD AUTO: 3.4 % — SIGNIFICANT CHANGE UP (ref 0–6)
GLUCOSE BLDC GLUCOMTR-MCNC: 181 MG/DL — HIGH (ref 70–99)
GLUCOSE BLDC GLUCOMTR-MCNC: 181 MG/DL — HIGH (ref 70–99)
GLUCOSE BLDC GLUCOMTR-MCNC: 187 MG/DL — HIGH (ref 70–99)
GLUCOSE BLDC GLUCOMTR-MCNC: 187 MG/DL — HIGH (ref 70–99)
GLUCOSE BLDC GLUCOMTR-MCNC: 196 MG/DL — HIGH (ref 70–99)
GLUCOSE BLDC GLUCOMTR-MCNC: 196 MG/DL — HIGH (ref 70–99)
GLUCOSE BLDC GLUCOMTR-MCNC: 232 MG/DL — HIGH (ref 70–99)
GLUCOSE BLDC GLUCOMTR-MCNC: 232 MG/DL — HIGH (ref 70–99)
GLUCOSE BLDC GLUCOMTR-MCNC: 257 MG/DL — HIGH (ref 70–99)
GLUCOSE BLDC GLUCOMTR-MCNC: 257 MG/DL — HIGH (ref 70–99)
GLUCOSE SERPL-MCNC: 170 MG/DL — HIGH (ref 70–99)
GLUCOSE SERPL-MCNC: 170 MG/DL — HIGH (ref 70–99)
HCT VFR BLD CALC: 27.3 % — LOW (ref 39–50)
HCT VFR BLD CALC: 27.3 % — LOW (ref 39–50)
HGB BLD-MCNC: 8.5 G/DL — LOW (ref 13–17)
HGB BLD-MCNC: 8.5 G/DL — LOW (ref 13–17)
IANC: 11.53 K/UL — HIGH (ref 1.8–7.4)
IANC: 11.53 K/UL — HIGH (ref 1.8–7.4)
IMM GRANULOCYTES NFR BLD AUTO: 1 % — HIGH (ref 0–0.9)
IMM GRANULOCYTES NFR BLD AUTO: 1 % — HIGH (ref 0–0.9)
LYMPHOCYTES # BLD AUTO: 1.39 K/UL — SIGNIFICANT CHANGE UP (ref 1–3.3)
LYMPHOCYTES # BLD AUTO: 1.39 K/UL — SIGNIFICANT CHANGE UP (ref 1–3.3)
LYMPHOCYTES # BLD AUTO: 9.3 % — LOW (ref 13–44)
LYMPHOCYTES # BLD AUTO: 9.3 % — LOW (ref 13–44)
MAGNESIUM SERPL-MCNC: 2 MG/DL — SIGNIFICANT CHANGE UP (ref 1.6–2.6)
MAGNESIUM SERPL-MCNC: 2 MG/DL — SIGNIFICANT CHANGE UP (ref 1.6–2.6)
MCHC RBC-ENTMCNC: 21.5 PG — LOW (ref 27–34)
MCHC RBC-ENTMCNC: 21.5 PG — LOW (ref 27–34)
MCHC RBC-ENTMCNC: 31.1 GM/DL — LOW (ref 32–36)
MCHC RBC-ENTMCNC: 31.1 GM/DL — LOW (ref 32–36)
MCV RBC AUTO: 68.9 FL — LOW (ref 80–100)
MCV RBC AUTO: 68.9 FL — LOW (ref 80–100)
MONOCYTES # BLD AUTO: 1.26 K/UL — HIGH (ref 0–0.9)
MONOCYTES # BLD AUTO: 1.26 K/UL — HIGH (ref 0–0.9)
MONOCYTES NFR BLD AUTO: 8.5 % — SIGNIFICANT CHANGE UP (ref 2–14)
MONOCYTES NFR BLD AUTO: 8.5 % — SIGNIFICANT CHANGE UP (ref 2–14)
NEUTROPHILS # BLD AUTO: 11.53 K/UL — HIGH (ref 1.8–7.4)
NEUTROPHILS # BLD AUTO: 11.53 K/UL — HIGH (ref 1.8–7.4)
NEUTROPHILS NFR BLD AUTO: 77.5 % — HIGH (ref 43–77)
NEUTROPHILS NFR BLD AUTO: 77.5 % — HIGH (ref 43–77)
NRBC # BLD: 0 /100 WBCS — SIGNIFICANT CHANGE UP (ref 0–0)
NRBC # BLD: 0 /100 WBCS — SIGNIFICANT CHANGE UP (ref 0–0)
NRBC # FLD: 0 K/UL — SIGNIFICANT CHANGE UP (ref 0–0)
NRBC # FLD: 0 K/UL — SIGNIFICANT CHANGE UP (ref 0–0)
PHOSPHATE SERPL-MCNC: 3.7 MG/DL — SIGNIFICANT CHANGE UP (ref 2.5–4.5)
PHOSPHATE SERPL-MCNC: 3.7 MG/DL — SIGNIFICANT CHANGE UP (ref 2.5–4.5)
PLATELET # BLD AUTO: 364 K/UL — SIGNIFICANT CHANGE UP (ref 150–400)
PLATELET # BLD AUTO: 364 K/UL — SIGNIFICANT CHANGE UP (ref 150–400)
POTASSIUM SERPL-MCNC: 4 MMOL/L — SIGNIFICANT CHANGE UP (ref 3.5–5.3)
POTASSIUM SERPL-MCNC: 4 MMOL/L — SIGNIFICANT CHANGE UP (ref 3.5–5.3)
POTASSIUM SERPL-SCNC: 4 MMOL/L — SIGNIFICANT CHANGE UP (ref 3.5–5.3)
POTASSIUM SERPL-SCNC: 4 MMOL/L — SIGNIFICANT CHANGE UP (ref 3.5–5.3)
RBC # BLD: 3.96 M/UL — LOW (ref 4.2–5.8)
RBC # BLD: 3.96 M/UL — LOW (ref 4.2–5.8)
RBC # FLD: 18.7 % — HIGH (ref 10.3–14.5)
RBC # FLD: 18.7 % — HIGH (ref 10.3–14.5)
SODIUM SERPL-SCNC: 132 MMOL/L — LOW (ref 135–145)
SODIUM SERPL-SCNC: 132 MMOL/L — LOW (ref 135–145)
WBC # BLD: 14.88 K/UL — HIGH (ref 3.8–10.5)
WBC # BLD: 14.88 K/UL — HIGH (ref 3.8–10.5)
WBC # FLD AUTO: 14.88 K/UL — HIGH (ref 3.8–10.5)
WBC # FLD AUTO: 14.88 K/UL — HIGH (ref 3.8–10.5)

## 2023-11-15 PROCEDURE — 99232 SBSQ HOSP IP/OBS MODERATE 35: CPT

## 2023-11-15 RX ORDER — SODIUM CHLORIDE 9 MG/ML
1000 INJECTION INTRAMUSCULAR; INTRAVENOUS; SUBCUTANEOUS
Refills: 0 | Status: DISCONTINUED | OUTPATIENT
Start: 2023-11-15 | End: 2023-11-17

## 2023-11-15 RX ORDER — ACETAMINOPHEN 500 MG
1000 TABLET ORAL ONCE
Refills: 0 | Status: COMPLETED | OUTPATIENT
Start: 2023-11-15 | End: 2023-11-16

## 2023-11-15 RX ADMIN — Medication 1: at 22:08

## 2023-11-15 RX ADMIN — Medication 1: at 09:32

## 2023-11-15 RX ADMIN — ATENOLOL 25 MILLIGRAM(S): 25 TABLET ORAL at 05:13

## 2023-11-15 RX ADMIN — SODIUM CHLORIDE 75 MILLILITER(S): 9 INJECTION INTRAMUSCULAR; INTRAVENOUS; SUBCUTANEOUS at 18:29

## 2023-11-15 RX ADMIN — Medication 2 UNIT(S): at 18:28

## 2023-11-15 RX ADMIN — INSULIN GLARGINE 20 UNIT(S): 100 INJECTION, SOLUTION SUBCUTANEOUS at 09:22

## 2023-11-15 RX ADMIN — OXYCODONE HYDROCHLORIDE 5 MILLIGRAM(S): 5 TABLET ORAL at 21:00

## 2023-11-15 RX ADMIN — CILOSTAZOL 50 MILLIGRAM(S): 100 TABLET ORAL at 05:13

## 2023-11-15 RX ADMIN — CILOSTAZOL 50 MILLIGRAM(S): 100 TABLET ORAL at 18:27

## 2023-11-15 RX ADMIN — Medication 2 UNIT(S): at 13:07

## 2023-11-15 RX ADMIN — Medication 75 MILLIGRAM(S): at 13:14

## 2023-11-15 RX ADMIN — OXYCODONE HYDROCHLORIDE 5 MILLIGRAM(S): 5 TABLET ORAL at 21:45

## 2023-11-15 RX ADMIN — APIXABAN 2.5 MILLIGRAM(S): 2.5 TABLET, FILM COATED ORAL at 05:13

## 2023-11-15 RX ADMIN — APIXABAN 2.5 MILLIGRAM(S): 2.5 TABLET, FILM COATED ORAL at 18:28

## 2023-11-15 RX ADMIN — Medication 2: at 13:07

## 2023-11-15 RX ADMIN — Medication 1: at 18:28

## 2023-11-15 RX ADMIN — Medication 2 UNIT(S): at 09:23

## 2023-11-15 RX ADMIN — ATORVASTATIN CALCIUM 20 MILLIGRAM(S): 80 TABLET, FILM COATED ORAL at 22:08

## 2023-11-15 RX ADMIN — CEFTRIAXONE 100 MILLIGRAM(S): 500 INJECTION, POWDER, FOR SOLUTION INTRAMUSCULAR; INTRAVENOUS at 22:07

## 2023-11-16 ENCOUNTER — TRANSCRIPTION ENCOUNTER (OUTPATIENT)
Age: 68
End: 2023-11-16

## 2023-11-16 LAB
ANION GAP SERPL CALC-SCNC: 13 MMOL/L — SIGNIFICANT CHANGE UP (ref 7–14)
ANION GAP SERPL CALC-SCNC: 13 MMOL/L — SIGNIFICANT CHANGE UP (ref 7–14)
BASOPHILS # BLD AUTO: 0.05 K/UL — SIGNIFICANT CHANGE UP (ref 0–0.2)
BASOPHILS # BLD AUTO: 0.05 K/UL — SIGNIFICANT CHANGE UP (ref 0–0.2)
BASOPHILS NFR BLD AUTO: 0.4 % — SIGNIFICANT CHANGE UP (ref 0–2)
BASOPHILS NFR BLD AUTO: 0.4 % — SIGNIFICANT CHANGE UP (ref 0–2)
BUN SERPL-MCNC: 7 MG/DL — SIGNIFICANT CHANGE UP (ref 7–23)
BUN SERPL-MCNC: 7 MG/DL — SIGNIFICANT CHANGE UP (ref 7–23)
CALCIUM SERPL-MCNC: 9.5 MG/DL — SIGNIFICANT CHANGE UP (ref 8.4–10.5)
CALCIUM SERPL-MCNC: 9.5 MG/DL — SIGNIFICANT CHANGE UP (ref 8.4–10.5)
CHLORIDE SERPL-SCNC: 98 MMOL/L — SIGNIFICANT CHANGE UP (ref 98–107)
CHLORIDE SERPL-SCNC: 98 MMOL/L — SIGNIFICANT CHANGE UP (ref 98–107)
CO2 SERPL-SCNC: 23 MMOL/L — SIGNIFICANT CHANGE UP (ref 22–31)
CO2 SERPL-SCNC: 23 MMOL/L — SIGNIFICANT CHANGE UP (ref 22–31)
CREAT SERPL-MCNC: 0.69 MG/DL — SIGNIFICANT CHANGE UP (ref 0.5–1.3)
CREAT SERPL-MCNC: 0.69 MG/DL — SIGNIFICANT CHANGE UP (ref 0.5–1.3)
EGFR: 100 ML/MIN/1.73M2 — SIGNIFICANT CHANGE UP
EGFR: 100 ML/MIN/1.73M2 — SIGNIFICANT CHANGE UP
EOSINOPHIL # BLD AUTO: 0.6 K/UL — HIGH (ref 0–0.5)
EOSINOPHIL # BLD AUTO: 0.6 K/UL — HIGH (ref 0–0.5)
EOSINOPHIL NFR BLD AUTO: 4.6 % — SIGNIFICANT CHANGE UP (ref 0–6)
EOSINOPHIL NFR BLD AUTO: 4.6 % — SIGNIFICANT CHANGE UP (ref 0–6)
GLUCOSE BLDC GLUCOMTR-MCNC: 160 MG/DL — HIGH (ref 70–99)
GLUCOSE BLDC GLUCOMTR-MCNC: 160 MG/DL — HIGH (ref 70–99)
GLUCOSE BLDC GLUCOMTR-MCNC: 257 MG/DL — HIGH (ref 70–99)
GLUCOSE BLDC GLUCOMTR-MCNC: 257 MG/DL — HIGH (ref 70–99)
GLUCOSE BLDC GLUCOMTR-MCNC: 264 MG/DL — HIGH (ref 70–99)
GLUCOSE BLDC GLUCOMTR-MCNC: 264 MG/DL — HIGH (ref 70–99)
GLUCOSE BLDC GLUCOMTR-MCNC: 267 MG/DL — HIGH (ref 70–99)
GLUCOSE BLDC GLUCOMTR-MCNC: 267 MG/DL — HIGH (ref 70–99)
GLUCOSE SERPL-MCNC: 136 MG/DL — HIGH (ref 70–99)
GLUCOSE SERPL-MCNC: 136 MG/DL — HIGH (ref 70–99)
HCT VFR BLD CALC: 27.4 % — LOW (ref 39–50)
HCT VFR BLD CALC: 27.4 % — LOW (ref 39–50)
HGB BLD-MCNC: 8.3 G/DL — LOW (ref 13–17)
HGB BLD-MCNC: 8.3 G/DL — LOW (ref 13–17)
IANC: 9.6 K/UL — HIGH (ref 1.8–7.4)
IANC: 9.6 K/UL — HIGH (ref 1.8–7.4)
IMM GRANULOCYTES NFR BLD AUTO: 1.2 % — HIGH (ref 0–0.9)
IMM GRANULOCYTES NFR BLD AUTO: 1.2 % — HIGH (ref 0–0.9)
LYMPHOCYTES # BLD AUTO: 1.45 K/UL — SIGNIFICANT CHANGE UP (ref 1–3.3)
LYMPHOCYTES # BLD AUTO: 1.45 K/UL — SIGNIFICANT CHANGE UP (ref 1–3.3)
LYMPHOCYTES # BLD AUTO: 11.1 % — LOW (ref 13–44)
LYMPHOCYTES # BLD AUTO: 11.1 % — LOW (ref 13–44)
MAGNESIUM SERPL-MCNC: 2 MG/DL — SIGNIFICANT CHANGE UP (ref 1.6–2.6)
MAGNESIUM SERPL-MCNC: 2 MG/DL — SIGNIFICANT CHANGE UP (ref 1.6–2.6)
MCHC RBC-ENTMCNC: 21.2 PG — LOW (ref 27–34)
MCHC RBC-ENTMCNC: 21.2 PG — LOW (ref 27–34)
MCHC RBC-ENTMCNC: 30.3 GM/DL — LOW (ref 32–36)
MCHC RBC-ENTMCNC: 30.3 GM/DL — LOW (ref 32–36)
MCV RBC AUTO: 70.1 FL — LOW (ref 80–100)
MCV RBC AUTO: 70.1 FL — LOW (ref 80–100)
MONOCYTES # BLD AUTO: 1.18 K/UL — HIGH (ref 0–0.9)
MONOCYTES # BLD AUTO: 1.18 K/UL — HIGH (ref 0–0.9)
MONOCYTES NFR BLD AUTO: 9 % — SIGNIFICANT CHANGE UP (ref 2–14)
MONOCYTES NFR BLD AUTO: 9 % — SIGNIFICANT CHANGE UP (ref 2–14)
NEUTROPHILS # BLD AUTO: 9.6 K/UL — HIGH (ref 1.8–7.4)
NEUTROPHILS # BLD AUTO: 9.6 K/UL — HIGH (ref 1.8–7.4)
NEUTROPHILS NFR BLD AUTO: 73.7 % — SIGNIFICANT CHANGE UP (ref 43–77)
NEUTROPHILS NFR BLD AUTO: 73.7 % — SIGNIFICANT CHANGE UP (ref 43–77)
NRBC # BLD: 0 /100 WBCS — SIGNIFICANT CHANGE UP (ref 0–0)
NRBC # BLD: 0 /100 WBCS — SIGNIFICANT CHANGE UP (ref 0–0)
NRBC # FLD: 0 K/UL — SIGNIFICANT CHANGE UP (ref 0–0)
NRBC # FLD: 0 K/UL — SIGNIFICANT CHANGE UP (ref 0–0)
PHOSPHATE SERPL-MCNC: 3.8 MG/DL — SIGNIFICANT CHANGE UP (ref 2.5–4.5)
PHOSPHATE SERPL-MCNC: 3.8 MG/DL — SIGNIFICANT CHANGE UP (ref 2.5–4.5)
PLATELET # BLD AUTO: 373 K/UL — SIGNIFICANT CHANGE UP (ref 150–400)
PLATELET # BLD AUTO: 373 K/UL — SIGNIFICANT CHANGE UP (ref 150–400)
POTASSIUM SERPL-MCNC: 4.1 MMOL/L — SIGNIFICANT CHANGE UP (ref 3.5–5.3)
POTASSIUM SERPL-MCNC: 4.1 MMOL/L — SIGNIFICANT CHANGE UP (ref 3.5–5.3)
POTASSIUM SERPL-SCNC: 4.1 MMOL/L — SIGNIFICANT CHANGE UP (ref 3.5–5.3)
POTASSIUM SERPL-SCNC: 4.1 MMOL/L — SIGNIFICANT CHANGE UP (ref 3.5–5.3)
RBC # BLD: 3.91 M/UL — LOW (ref 4.2–5.8)
RBC # BLD: 3.91 M/UL — LOW (ref 4.2–5.8)
RBC # FLD: 18.9 % — HIGH (ref 10.3–14.5)
RBC # FLD: 18.9 % — HIGH (ref 10.3–14.5)
SODIUM SERPL-SCNC: 134 MMOL/L — LOW (ref 135–145)
SODIUM SERPL-SCNC: 134 MMOL/L — LOW (ref 135–145)
WBC # BLD: 13.04 K/UL — HIGH (ref 3.8–10.5)
WBC # BLD: 13.04 K/UL — HIGH (ref 3.8–10.5)
WBC # FLD AUTO: 13.04 K/UL — HIGH (ref 3.8–10.5)
WBC # FLD AUTO: 13.04 K/UL — HIGH (ref 3.8–10.5)

## 2023-11-16 PROCEDURE — 93308 TTE F-UP OR LMTD: CPT | Mod: 26,GC

## 2023-11-16 PROCEDURE — 93321 DOPPLER ECHO F-UP/LMTD STD: CPT | Mod: 26

## 2023-11-16 RX ADMIN — Medication 3: at 13:16

## 2023-11-16 RX ADMIN — Medication 1: at 21:15

## 2023-11-16 RX ADMIN — Medication 3: at 18:16

## 2023-11-16 RX ADMIN — CILOSTAZOL 50 MILLIGRAM(S): 100 TABLET ORAL at 05:49

## 2023-11-16 RX ADMIN — ATORVASTATIN CALCIUM 20 MILLIGRAM(S): 80 TABLET, FILM COATED ORAL at 21:06

## 2023-11-16 RX ADMIN — APIXABAN 2.5 MILLIGRAM(S): 2.5 TABLET, FILM COATED ORAL at 18:30

## 2023-11-16 RX ADMIN — Medication 400 MILLIGRAM(S): at 18:19

## 2023-11-16 RX ADMIN — INSULIN GLARGINE 20 UNIT(S): 100 INJECTION, SOLUTION SUBCUTANEOUS at 09:10

## 2023-11-16 RX ADMIN — Medication 1: at 09:02

## 2023-11-16 RX ADMIN — Medication 75 MILLIGRAM(S): at 12:06

## 2023-11-16 RX ADMIN — CEFTRIAXONE 100 MILLIGRAM(S): 500 INJECTION, POWDER, FOR SOLUTION INTRAMUSCULAR; INTRAVENOUS at 21:06

## 2023-11-16 RX ADMIN — ATENOLOL 25 MILLIGRAM(S): 25 TABLET ORAL at 05:47

## 2023-11-16 RX ADMIN — APIXABAN 2.5 MILLIGRAM(S): 2.5 TABLET, FILM COATED ORAL at 05:47

## 2023-11-16 RX ADMIN — Medication 2 UNIT(S): at 18:15

## 2023-11-16 RX ADMIN — Medication 2 UNIT(S): at 13:16

## 2023-11-16 RX ADMIN — CILOSTAZOL 50 MILLIGRAM(S): 100 TABLET ORAL at 18:24

## 2023-11-16 RX ADMIN — Medication 2 UNIT(S): at 09:02

## 2023-11-16 NOTE — DISCHARGE NOTE PROVIDER - HOSPITAL COURSE
68M PMHx HTN, HLD, T2DM, PAD, recent admission for pericardial effusion (small, EF=60%), ulcer of the aorta and complicated UTI, recent hx of GIB; a/w sepsis due to right epididymo-orchitis and left orchitis. US Testes: Right epididymo-orchitis and left orchitis.  No discrete focal drainable collection. No evidence of testicular torsion. GC/ Chlamydia and ucx neg. Pt seen by ID who recommended IV CTX while inpt and switched to Levofloxacin 500 mg to finish 10 day course until 11/20/23.  Pt seen by Cardiology for Pericardial effusion/tachycardia seen on prior admission. Repeat echo done this admission showed moderate pericardial effusion. Cardiology recommends---- 68M PMHx HTN, HLD, T2DM, PAD, recent admission for pericardial effusion (small, EF=60%), ulcer of the aorta and complicated UTI, recent hx of GIB; a/w sepsis due to right epididymo-orchitis and left orchitis. US Testes: Right epididymo-orchitis and left orchitis.  No discrete focal drainable collection. No evidence of testicular torsion. GC/ Chlamydia and ucx neg. Pt seen by ID who recommended IV CTX while inpt and switched to Levofloxacin 500 mg to finish 10 day course until 11/20/23.  Pt seen by Cardiology for Pericardial effusion/tachycardia seen on prior admission. Repeat echo done this admission showed moderate pericardial effusion, though no tamponade on physical exam, no tamponade by echo no further work-up, will need serial outpatient echocardiograms, and outpatient cardiology follow up 68M PMHx HTN, HLD, T2DM, PAD, recent admission for pericardial effusion (small, EF=60%), ulcer of the aorta and complicated UTI, recent hx of GIB; a/w sepsis due to right epididymo-orchitis and left orchitis. US Testes: Right epididymo-orchitis and left orchitis.  No discrete focal drainable collection. No evidence of testicular torsion. GC/ Chlamydia and ucx neg. Pt seen by ID who recommended IV CTX while inpt and switched to Levofloxacin 500 mg to finish 10 day course until 11/20/23.  Pt seen by Cardiology for Pericardial effusion/tachycardia seen on prior admission. Repeat echo done this admission showed moderate pericardial effusion, though no tamponade on physical exam, no tamponade by echo no further work-up, will need serial outpatient echocardiograms, no need to be seen by CT surgery. Will need outpatient cardiology follow up.

## 2023-11-16 NOTE — DISCHARGE NOTE PROVIDER - NSDCCPCAREPLAN_GEN_ALL_CORE_FT
PRINCIPAL DISCHARGE DIAGNOSIS  Diagnosis: Orchitis  Assessment and Plan of Treatment: You were seen by Infectious Disease doctor for epididymo-orchitis and left orchitis. Ultrasound of your Testes was done that showed infection. No discrete focal drainable collection. You were receiving intravenous antibiotics while inpatient and will be discharged on oral Levofloxacin 500 mg to finish 10 day course until 11/20/23.  Follow up with PCP.      SECONDARY DISCHARGE DIAGNOSES  Diagnosis: Pericardial effusion  Assessment and Plan of Treatment: You had an echo (ultrasound of your heart) done that showed moderate  pericardial effusion (fluid around your heart). You were seen by thoracic surgeon who recommended -----     PRINCIPAL DISCHARGE DIAGNOSIS  Diagnosis: Orchitis  Assessment and Plan of Treatment: You were seen by Infectious Disease doctor for epididymo-orchitis and left orchitis. Ultrasound of your Testes was done that showed infection. No discrete focal drainable collection. You were receiving intravenous antibiotics while inpatient and will be discharged on oral Levofloxacin 500 mg to finish 10 day course until 11/20/23.  Follow up with PCP.      SECONDARY DISCHARGE DIAGNOSES  Diagnosis: Pericardial effusion  Assessment and Plan of Treatment: You had an echo (ultrasound of your heart) done that showed moderate  pericardial effusion (fluid around your heart).                      Repeat echo from previous admission showed improvement on size of effusion, however repeat today revealed increasing size, though no tamponade on physical exam, no tamponade by echo no further work-up, will need serial outpatient echocardiograms outpatient. Follow up outpatient with your cardiologist.       PRINCIPAL DISCHARGE DIAGNOSIS  Diagnosis: Orchitis  Assessment and Plan of Treatment: You were seen by Infectious Disease doctor for epididymo-orchitis and left orchitis. Ultrasound of your Testes was done that showed infection. No discrete focal drainable collection. You were receiving intravenous antibiotics while inpatient and will be discharged on oral Levofloxacin 500 mg to finish 10 day course until 11/20/23.  Follow up with PCP.      SECONDARY DISCHARGE DIAGNOSES  Diagnosis: Pericardial effusion  Assessment and Plan of Treatment: You had an echo (ultrasound of your heart) done that showed moderate  pericardial effusion (fluid around your heart).                      Repeat echo from previous admission showed improvement on size of effusion, however repeat today revealed increasing size, though no tamponade on physical exam, no tamponade by echo no further work-up needed inpatient at this time. You will need serial outpatient echocardiograms outpatient. Follow up outpatient with your cardiologist.

## 2023-11-16 NOTE — PROGRESS NOTE ADULT - NS ATTEND AMEND GEN_ALL_CORE FT
Patient seen and examined. Agree with plan as detailed in PA/NP Note.     Would recommend gentle hydration, f/u TTE    Logan Garcia MD  Pager: [phone]
Patient seen and examined. Agree with plan as detailed in PA/NP Note.     - c/w BB and statin  - Obtain records to why of Jordan Garcia MD  Pager: [phone]
Patient seen and examined. Agree with plan as detailed in PA/NP Note.     Echo noted no tamponade on physical exam, no tamponade by echo    Logan Garcia MD  Pager: [phone]
Patient seen and examined. Agree with plan as detailed in PA/NP Note.     F/u repeat TTE for size of effusion    Logan Garcia MD  Pager: [phone]

## 2023-11-16 NOTE — PROGRESS NOTE ADULT - ASSESSMENT
This is a 67 y/o M w/ PMHx of HTN, HLD, DM2, PAD, pericardial effusion, aortic ulcer, UTI who is presenting with sepsis R epididymoorchitis w/o drainable collection, started on Cefepime.    Recently admitted last month for urinary retention w/ fever/leukocytosis, treated for UTI w/ klebsiella with Ceftin.     #R epididymoorchitis   #Sepsis   #Leukocytosis   #Fever     Sepsis 2/2 R epididymoorchitis w/o drainable collection, UCx w/ normal earnest, recent UCx w/ Klebsiella, sensitive to CTX, levofloxacin. Pt febrile ON, mild leukocytosis to 14, was 12 on recent discharge. Would treat for 10 day course to cover enteric earnest    Plan:   1. C/w Ceftriaxone 2 g q 24 while inpatient  2. Scrotal elevation  3. When ready for discharge, can change to Levofloxacin 500 mg on discharge finish 10 day course until 11/20/23. (EKG last month with QTc 448)    Discussed with primary team     Thank you for this consult. Inpatient ID consult team will sign off.    Further changes in lab values, imaging studies, or clinical status will not be known to ID inpatient consultants unless specifically communicated by primary team.    Rk Deutsch MD  Attending Physician  Division of Infectious Diseases  Department of Medicine    Please contact through MS Teams message.  Office: [phone] (after 5 PM or weekend)        
This is a 69 y/o M w/ PMHx of HTN, HLD, DM2, PAD, pericardial effusion, aortic ulcer, UTI who is presenting with sepsis R epididymoorchitis w/o drainable collection, started on Cefepime.    Recently admitted last month for urinary retention w/ fever/leukocytosis, treated for UTI w/ klebsiella with Ceftin.     #R epididymoorchitis   #Sepsis   #Leukocytosis   #Fever     Sepsis 2/2 R epididymoorchitis w/o drainable collection, UCx w/ normal earnest, recent UCx w/ Klebsiella, sensitive to CTX, levofloxacin. Pt febrile ON, mild leukocytosis to 14, was 12 on recent discharge. Would treat for 10 day course to cover enteric aernest    Plan:   1. C/w Ceftriaxone 2 g q 24 while inpatient  2. Scrotal elevation  3. F/u BCx, fever curve, CBC. If continues to have fevers, may need to repeat imaging  4. When improved, can change to Levofloxacin 500 mg on discharge finish 10 day course until 11/20/23    Thank you for this consult. Inpatient ID team will follow.    Rk Deutsch M.D.  Attending Physician  Division of Infectious Diseases  Department of Medicine    Please contact through MS Teams message.  Office: [phone] (after 5 PM or weekend)        
{\rtf1\iabrbi77155\ansi\afhpyfo1993\ftnbj\uc1\deff0  {\fonttbl{\f0 \fnil Segoe UI;}{\f1 \fnil \fcharset0 Segoe UI;}{\f2 \fnil Times New En;}}  {\colortbl ;\zbj292\wybqq752\aayl259 ;\red0\green0\blue0 ;\red0\green0\bwbw855 ;\red0\green0\blue0 ;}  {\stylesheet{\f0\fs20 Normal;}{\cs1 Default Paragraph Font;}{\cs2\f0\fs16 Line Number;}{\cs3\f2\fs24\ul\cf3 Hyperlink;}}  {\*\revtbl{Unknown;}}  \kfflpj95945\bjodgk74912\wpqrv5639\ibyrm9439\ytvjm9400\oeobg6707\vffesgy193\uowqnyd681\nogrowautofit\trvhry820\formshade\nofeaturethrottle1\dntblnsbdb\fet4\aendnotes\aftnnrlc\pgbrdrhead\pgbrdrfoot  \sectd\cavrmz24775\lbdjjn39164\guttersxn0\fjufiikg7219\graraxkq7128\lcqnzyjj8261\dxfllptm6950\tfgnhgb158\bkemwmy511\sbkpage\pgncont\pgndec  \plain\plain\f0\fs24\ql\plain\f0\fs24\plain\f1\fs16\aeqo6954\hich\f1\dbch\f1\loch\f1\cf2\fs16 68 year-old male, ambulatory with a cane,  with medical history HTN, HLD, DM Type 2, PAD, recent admission for pericardial effusion (small, EF=60%), ulcer of   the aorta and complicated UTI ; a/w sepsis due to right epididymo-orchitis and left orchitis;\par  \par  \par  \plain\f1\fs16\psyl7503\hich\f1\dbch\f1\loch\f1\cf2\fs16\b\ul{\field{\*\fldinst HYPERLINK [id number] }{\fldrslt Problem/Plan - 1:}}\plain\f1\fs16\azxc0466\hich\f1\dbch\f1\loch\f1\cf2\fs16\ql\par  \'b7  {\*\bkmkstart mp36635212274}{\*\bkmkend hx57836614149}Problem: {\*\bkmkstart dv86397698123}{\*\bkmkend sa63343387489}Sepsis. \par  \'b7  {\*\bkmkstart xy68028642758}{\*\bkmkend yd51220094610}Plan: {\*\bkmkstart it68336905144}{\*\bkmkend tv08240482057}Due to acute R epididymo-orchitis and L orchitis;\par  cw abx\par  ID fu \par  -f/u BCx.\par  \par  \plain\f1\fs16\xltq1220\hich\f1\dbch\f1\loch\f1\cf2\fs16\b\ul{\field{\*\fldinst HYPERLINK [id number] }{\fldrslt Problem/Plan - 2:}}\plain\f1\fs16\efkb3290\hich\f1\dbch\f1\loch\f1\cf2\fs16\ql\par  \'b7  {\*\bkmkstart ji04750946340}{\*\bkmkend zi73488496127}Problem: {\*\bkmkstart zi07393549885}{\*\bkmkend gi22147385560}Epididymo-orchitis. \par  \'b7  {\*\bkmkstart qn61938295942}{\*\bkmkend vy97471648226}Plan: {\*\bkmkstart ev12900064861}{\*\bkmkend kf97532341801}Acute R epididymo-orchitis and L orchitis\par  Recent Urine Cx: Klebsiella pneumonia sensitive to Cefepime 100L CFU, dated Oct 22, 2023\par  US Testes: . Right epididymo-orchitis and left orchitis.  No discrete focal drainable collection. No evidence of testicular torsion\par   and ID fu \plain\f1\fs16\uxuu8558\hich\f1\dbch\f1\loch\f1\cf2\fs16\strike\plain\f1\fs16\toam8946\hich\f1\dbch\f1\loch\f1\cf2\fs16\par  \par  \plain\f1\fs16\ooxh0070\hich\f1\dbch\f1\loch\f1\cf2\fs16\b\ul{\field{\*\fldinst HYPERLINK [id number] }{\fldrslt Problem/Plan - 3:}}\plain\f1\fs16\wmkq4842\hich\f1\dbch\f1\loch\f1\cf2\fs16\ql\par  \'b7  {\*\bkmkstart uh08319826403}{\*\bkmkend ap67380270690}Problem: {\*\bkmkstart tb87364004914}{\*\bkmkend tc20782178345}Type 2 diabetes mellitus with hyperglycemia, with long-term current use of insulin. \par  \'b7  {\*\bkmkstart ia08819363034}{\*\bkmkend wt26732809502}Plan: Monitor FS\par  basal, bolus {\*\bkmkstart vd26567123342}{\*\bkmkend aw77364252826}\par  \par  \plain\f1\fs16\hcow7116\hich\f1\dbch\f1\loch\f1\cf2\fs16\b\ul{\field{\*\fldinst HYPERLINK [id number] }{\fldrslt Problem/Plan - 4:}}\plain\f1\fs16\jszy9424\hich\f1\dbch\f1\loch\f1\cf2\fs16\ql\par  \'b7  {\*\bkmkstart jx18101178738}{\*\bkmkend nq33850508903}Problem: {\*\bkmkstart oz99716370101}{\*\bkmkend gv85580732467}PAD (peripheral artery disease). \par  \'b7  {\*\bkmkstart hz50359258486}{\*\bkmkend ry18269190984}Plan: {\*\bkmkstart nm71019684312}{\*\bkmkend pa97445059360}C/W Cilostazol  BID\par  \par  \plain\f1\fs16\uwum4277\hich\f1\dbch\f1\loch\f1\cf2\fs16\b\ul{\field{\*\fldinst HYPERLINK [id number] }{\fldrslt Problem/Plan - 5:}}\plain\f1\fs16\btlz8197\hich\f1\dbch\f1\loch\f1\cf2\fs16\ql\par  \'b7  {\*\bkmkstart qj31347924827}{\*\bkmkend zx21469799210}Problem: {\*\bkmkstart ho55122450932}{\*\bkmkend zp69697551046}HTN (hypertension). \par  \'b7  {\*\bkmkstart yi94670607306}{\*\bkmkend ad84061936897}Plan: {\*\bkmkstart uk41147379931}{\*\bkmkend im63028395280}c/w Atenolol \par  \par  \par  \plain\f1\fs16\buda3907\hich\f1\dbch\f1\loch\f1\cf2\fs16\b\ul{\field{\*\fldinst HYPERLINK [id number] }{\fldrslt Problem/Plan - 6:}}\plain\f1\fs16\uysi5153\hich\f1\dbch\f1\loch\f1\cf2\fs16\ql\par  \'b7  {\*\bkmkstart ny43651378388}{\*\bkmkend kz31799029369}Problem: {\*\bkmkstart he25897409066}{\*\bkmkend za77163138129}Encounter for deep vein thrombosis (DVT) prophylaxis. \par  \'b7  {\*\bkmkstart yd83325032373}{\*\bkmkend yc97994595321}Plan: {\*\bkmkstart rf03718162394}{\*\bkmkend pk26217803389}SCDs\par  Hold Heparin sq iso recent GIB (Eliquis also on hold).\par  \plain\f0\fs20\uulp0449\hich\f0\dbch\f0\loch\f0\fs20\par  }  
68 year-old male, ambulatory with a cane,  with medical history HTN, HLD, DM Type 2, PAD, recent admission for pericardial effusion (small, EF=60%), ulcer of the aorta and complicated UTI ; a/w sepsis due to right epididymo-orchitis and left orchitis;      Problem/Plan - 1:  ·  Problem: Sepsis.   ·  Plan: Due to acute R epididymo-orchitis and L orchitis;  cw abx  ID fu     Problem/Plan - 2:  ·  Problem: Epididymo-orchitis.   ·  Plan: Acute R epididymo-orchitis and L orchitis  Recent Urine Cx: Klebsiella pneumonia sensitive to Cefepime 100L CFU, dated Oct 22, 2023  US Testes: . Right epididymo-orchitis and left orchitis.  No discrete focal drainable collection. No evidence of testicular torsion   and ID fu     Problem/Plan - 3:  ·  Problem: Type 2 diabetes mellitus with hyperglycemia, with long-term current use of insulin.   ·  Plan: Monitor FS  basal, bolus     Problem/Plan - 4:  ·  Problem: PAD (peripheral artery disease).   ·  Plan: C/W Cilostazol  BID  cw eliquis    Problem/Plan - 5:  ·  Problem: HTN (hypertension).   ·  Plan: c/w Atenolol       
68 year-old male, ambulatory with a cane,  with medical history HTN, HLD, DM Type 2, PAD, recent admission for pericardial effusion (small, EF=60%), ulcer of the aorta and complicated UTI ; a/w sepsis due to right epididymo-orchitis and left orchitis;      Problem/Plan - 1:  ·  Problem: Sepsis.   ·  Plan: Due to acute R epididymo-orchitis and L orchitis;  cw abx  ID fu   -f/u BCx.    Problem/Plan - 2:  ·  Problem: Epididymo-orchitis.   ·  Plan: Acute R epididymo-orchitis and L orchitis  Recent Urine Cx: Klebsiella pneumonia sensitive to Cefepime 100L CFU, dated Oct 22, 2023  US Testes: . Right epididymo-orchitis and left orchitis.  No discrete focal drainable collection. No evidence of testicular torsion   and ID fu     Problem/Plan - 3:  ·  Problem: Type 2 diabetes mellitus with hyperglycemia, with long-term current use of insulin.   ·  Plan: Monitor FS  basal, bolus     Problem/Plan - 4:  ·  Problem: PAD (peripheral artery disease).   ·  Plan: C/W Cilostazol  BID  retart eliquis and monitor     Problem/Plan - 5:  ·  Problem: HTN (hypertension).   ·  Plan: c/w Atenolol       
68 year-old male, ambulatory with a cane,  with medical history HTN, HLD, DM Type 2, PAD, recent admission for pericardial effusion (small, EF=60%), ulcer of the aorta and complicated UTI ; a/w sepsis due to right epididymo-orchitis and left orchitis;      Problem/Plan - 1:  ·  Problem: Sepsis.   ·  Plan: Due to acute R epididymo-orchitis and L orchitis;  cw abx  ID fu   -f/u BCx.    Problem/Plan - 2:  ·  Problem: Epididymo-orchitis.   ·  Plan: Acute R epididymo-orchitis and L orchitis  Recent Urine Cx: Klebsiella pneumonia sensitive to Cefepime 100L CFU, dated Oct 22, 2023  US Testes: . Right epididymo-orchitis and left orchitis.  No discrete focal drainable collection. No evidence of testicular torsion   and ID fu     Problem/Plan - 3:  ·  Problem: Type 2 diabetes mellitus with hyperglycemia, with long-term current use of insulin.   ·  Plan: Monitor FS  basal, bolus     Problem/Plan - 4:  ·  Problem: PAD (peripheral artery disease).   ·  Plan: C/W Cilostazol  BID  retart eliquis and monitor     Problem/Plan - 5:  ·  Problem: HTN (hypertension).   ·  Plan: c/w Atenolol

## 2023-11-16 NOTE — DISCHARGE NOTE PROVIDER - NSDCMRMEDTOKEN_GEN_ALL_CORE_FT
atenolol 25 mg oral tablet: 1 tab(s) orally once a day  atorvastatin 20 mg oral tablet: 1 tab(s) orally once a day (at bedtime)  cilostazol 50 mg oral tablet: 1 tab(s) orally 2 times a day  Jentadueto 2.5 mg-1000 mg oral tablet: 1 tab(s) orally 2 times a day  Lantus 100 units/mL subcutaneous solution: 24 unit(s) subcutaneous once a day (at bedtime)  losartan 50 mg oral tablet: 1 tab(s) orally once a day  pregabalin 75 mg oral capsule: 1 cap(s) orally once a day  Vitamin B12 1000 mcg oral tablet: 1 tab(s) orally once a day   acetaminophen 325 mg oral tablet: 2 tab(s) orally every 6 hours As needed Temp greater or equal to 38C (100.4F), Mild Pain (1 - 3)  apixaban 2.5 mg oral tablet: 1 tab(s) orally 2 times a day  atenolol 25 mg oral tablet: 1 tab(s) orally once a day  atorvastatin 20 mg oral tablet: 1 tab(s) orally once a day (at bedtime)  cilostazol 50 mg oral tablet: 1 tab(s) orally 2 times a day  Jentadueto 2.5 mg-1000 mg oral tablet: 1 tab(s) orally 2 times a day  Lantus 100 units/mL subcutaneous solution: 24 unit(s) subcutaneous once a day (at bedtime)  levoFLOXacin 500 mg oral tablet: 1 tab(s) orally once a day  pregabalin 75 mg oral capsule: 1 cap(s) orally once a day  Vitamin B12 1000 mcg oral tablet: 1 tab(s) orally once a day

## 2023-11-16 NOTE — DISCHARGE NOTE NURSING/CASE MANAGEMENT/SOCIAL WORK - PATIENT PORTAL LINK FT
You can access the FollowMyHealth Patient Portal offered by [facility] by registering at the following website: [email or web address]. By joining [employer]’s FollowMyHealth portal, you will also be able to view your health information using other applications (apps) compatible with our system.

## 2023-11-16 NOTE — DISCHARGE NOTE PROVIDER - CARE PROVIDER_API CALL
Deep Boggs  Urology  [address], [address]  [place] [address]  Phone: [phone]  Fax: [phone]  Follow Up Time:     Logan Garcia  Interventional Cardiology  [id number]  [place] [address]  Phone: [phone]  Fax: [phone]  Follow Up Time:     MD Magali [facility]  Internal Medicine  [address] [place] [address], [address]  [place] [address]  Phone: [phone]  Fax: [phone]  Follow Up Time:

## 2023-11-16 NOTE — DISCHARGE NOTE PROVIDER - NSDCFUSCHEDAPPT_GEN_ALL_CORE_FT
[facility]  VASCULAR [address]  Scheduled Appointment: 01/10/2024    Mahad Trujillo  [facility]  VASCULAR [address]  Scheduled Appointment: 01/10/2024

## 2023-11-16 NOTE — DISCHARGE NOTE PROVIDER - PROVIDER TOKENS
PROVIDER:[TOKEN:[[id number]]],PROVIDER:[TOKEN:[[id number]]],PROVIDER:[TOKEN:[[id number]]]

## 2023-11-17 VITALS
TEMPERATURE: 98 F | DIASTOLIC BLOOD PRESSURE: 65 MMHG | OXYGEN SATURATION: 99 % | HEART RATE: 88 BPM | SYSTOLIC BLOOD PRESSURE: 146 MMHG | RESPIRATION RATE: 18 BRPM

## 2023-11-17 LAB
ANION GAP SERPL CALC-SCNC: 13 MMOL/L — SIGNIFICANT CHANGE UP (ref 7–14)
ANION GAP SERPL CALC-SCNC: 13 MMOL/L — SIGNIFICANT CHANGE UP (ref 7–14)
BUN SERPL-MCNC: 8 MG/DL — SIGNIFICANT CHANGE UP (ref 7–23)
BUN SERPL-MCNC: 8 MG/DL — SIGNIFICANT CHANGE UP (ref 7–23)
CALCIUM SERPL-MCNC: 10.3 MG/DL — SIGNIFICANT CHANGE UP (ref 8.4–10.5)
CALCIUM SERPL-MCNC: 10.3 MG/DL — SIGNIFICANT CHANGE UP (ref 8.4–10.5)
CHLORIDE SERPL-SCNC: 100 MMOL/L — SIGNIFICANT CHANGE UP (ref 98–107)
CHLORIDE SERPL-SCNC: 100 MMOL/L — SIGNIFICANT CHANGE UP (ref 98–107)
CO2 SERPL-SCNC: 24 MMOL/L — SIGNIFICANT CHANGE UP (ref 22–31)
CO2 SERPL-SCNC: 24 MMOL/L — SIGNIFICANT CHANGE UP (ref 22–31)
CREAT SERPL-MCNC: 0.7 MG/DL — SIGNIFICANT CHANGE UP (ref 0.5–1.3)
CREAT SERPL-MCNC: 0.7 MG/DL — SIGNIFICANT CHANGE UP (ref 0.5–1.3)
CULTURE RESULTS: SIGNIFICANT CHANGE UP
EGFR: 99 ML/MIN/1.73M2 — SIGNIFICANT CHANGE UP
EGFR: 99 ML/MIN/1.73M2 — SIGNIFICANT CHANGE UP
GLUCOSE BLDC GLUCOMTR-MCNC: 156 MG/DL — HIGH (ref 70–99)
GLUCOSE BLDC GLUCOMTR-MCNC: 156 MG/DL — HIGH (ref 70–99)
GLUCOSE BLDC GLUCOMTR-MCNC: 200 MG/DL — HIGH (ref 70–99)
GLUCOSE BLDC GLUCOMTR-MCNC: 200 MG/DL — HIGH (ref 70–99)
GLUCOSE SERPL-MCNC: 140 MG/DL — HIGH (ref 70–99)
GLUCOSE SERPL-MCNC: 140 MG/DL — HIGH (ref 70–99)
HCT VFR BLD CALC: 27.9 % — LOW (ref 39–50)
HCT VFR BLD CALC: 27.9 % — LOW (ref 39–50)
HGB BLD-MCNC: 8.6 G/DL — LOW (ref 13–17)
HGB BLD-MCNC: 8.6 G/DL — LOW (ref 13–17)
MAGNESIUM SERPL-MCNC: 2.2 MG/DL — SIGNIFICANT CHANGE UP (ref 1.6–2.6)
MAGNESIUM SERPL-MCNC: 2.2 MG/DL — SIGNIFICANT CHANGE UP (ref 1.6–2.6)
MCHC RBC-ENTMCNC: 21.6 PG — LOW (ref 27–34)
MCHC RBC-ENTMCNC: 21.6 PG — LOW (ref 27–34)
MCHC RBC-ENTMCNC: 30.8 GM/DL — LOW (ref 32–36)
MCHC RBC-ENTMCNC: 30.8 GM/DL — LOW (ref 32–36)
MCV RBC AUTO: 70.1 FL — LOW (ref 80–100)
MCV RBC AUTO: 70.1 FL — LOW (ref 80–100)
NRBC # BLD: 0 /100 WBCS — SIGNIFICANT CHANGE UP (ref 0–0)
NRBC # BLD: 0 /100 WBCS — SIGNIFICANT CHANGE UP (ref 0–0)
NRBC # FLD: 0 K/UL — SIGNIFICANT CHANGE UP (ref 0–0)
NRBC # FLD: 0 K/UL — SIGNIFICANT CHANGE UP (ref 0–0)
PHOSPHATE SERPL-MCNC: 3.9 MG/DL — SIGNIFICANT CHANGE UP (ref 2.5–4.5)
PHOSPHATE SERPL-MCNC: 3.9 MG/DL — SIGNIFICANT CHANGE UP (ref 2.5–4.5)
PLATELET # BLD AUTO: 405 K/UL — HIGH (ref 150–400)
PLATELET # BLD AUTO: 405 K/UL — HIGH (ref 150–400)
POTASSIUM SERPL-MCNC: 4.4 MMOL/L — SIGNIFICANT CHANGE UP (ref 3.5–5.3)
POTASSIUM SERPL-MCNC: 4.4 MMOL/L — SIGNIFICANT CHANGE UP (ref 3.5–5.3)
POTASSIUM SERPL-SCNC: 4.4 MMOL/L — SIGNIFICANT CHANGE UP (ref 3.5–5.3)
POTASSIUM SERPL-SCNC: 4.4 MMOL/L — SIGNIFICANT CHANGE UP (ref 3.5–5.3)
RBC # BLD: 3.98 M/UL — LOW (ref 4.2–5.8)
RBC # BLD: 3.98 M/UL — LOW (ref 4.2–5.8)
RBC # FLD: 19 % — HIGH (ref 10.3–14.5)
RBC # FLD: 19 % — HIGH (ref 10.3–14.5)
SODIUM SERPL-SCNC: 137 MMOL/L — SIGNIFICANT CHANGE UP (ref 135–145)
SODIUM SERPL-SCNC: 137 MMOL/L — SIGNIFICANT CHANGE UP (ref 135–145)
SPECIMEN SOURCE: SIGNIFICANT CHANGE UP
WBC # BLD: 14.43 K/UL — HIGH (ref 3.8–10.5)
WBC # BLD: 14.43 K/UL — HIGH (ref 3.8–10.5)
WBC # FLD AUTO: 14.43 K/UL — HIGH (ref 3.8–10.5)
WBC # FLD AUTO: 14.43 K/UL — HIGH (ref 3.8–10.5)

## 2023-11-17 RX ORDER — LOSARTAN POTASSIUM 100 MG/1
1 TABLET, FILM COATED ORAL
Refills: 0 | DISCHARGE

## 2023-11-17 RX ORDER — APIXABAN 2.5 MG/1
1 TABLET, FILM COATED ORAL
Qty: 60 | Refills: 0
Start: 2023-11-17 | End: 2023-12-16

## 2023-11-17 RX ORDER — ACETAMINOPHEN 500 MG
2 TABLET ORAL
Qty: 0 | Refills: 0 | DISCHARGE
Start: 2023-11-17

## 2023-11-17 RX ORDER — LEVOFLOXACIN 5 MG/ML
1 INJECTION, SOLUTION INTRAVENOUS
Qty: 4 | Refills: 0
Start: 2023-11-17 | End: 2023-11-20

## 2023-11-17 RX ADMIN — Medication 75 MILLIGRAM(S): at 11:01

## 2023-11-17 RX ADMIN — ATENOLOL 25 MILLIGRAM(S): 25 TABLET ORAL at 05:14

## 2023-11-17 RX ADMIN — Medication 1: at 12:26

## 2023-11-17 RX ADMIN — Medication 2 UNIT(S): at 09:02

## 2023-11-17 RX ADMIN — APIXABAN 2.5 MILLIGRAM(S): 2.5 TABLET, FILM COATED ORAL at 05:13

## 2023-11-17 RX ADMIN — Medication 2 UNIT(S): at 12:26

## 2023-11-17 RX ADMIN — Medication 1: at 09:03

## 2023-11-17 RX ADMIN — CILOSTAZOL 50 MILLIGRAM(S): 100 TABLET ORAL at 05:13

## 2023-11-17 RX ADMIN — INSULIN GLARGINE 20 UNIT(S): 100 INJECTION, SOLUTION SUBCUTANEOUS at 09:02

## 2023-11-17 NOTE — PROGRESS NOTE ADULT - SUBJECTIVE AND OBJECTIVE BOX
Date of service 11/13/23    chief complaint: testicular pain    extended hpi: 68 year-old male medical history HTN, HLD, DM Type 2, PAD, recent admission for pericardial effusion (small, EF=60%), penetrating ulcer of the aorta and complicated UTI  c/o 4 days of right testicular pain and swelling with associated fever 3 days ago.      c/o ongoing pain in testicle, denies chest pain, SOB or palps    Review of Systems:   Constitutional: [ ] fevers, [ ] chills.   Skin: [ ] dry skin. [ ] rashes.  Psychiatric: [ ] depression, [ ] anxiety.   Gastrointestinal: [ ] BRBPR, [ ] melena.   Neurological: [ ] confusion. [ ] seizures. [ ] shuffling gait.   Ears,Nose,Mouth and Throat: [ ] ear pain [ ] sore throat.   Eyes: [ ] diplopia.   Respiratory: [ ] hemoptysis. [ ] shortness of breath  Cardiovascular: See HPI above  Hematologic/Lymphatic: [ ] anemia. [ ] painful nodes. [ ] prolonged bleeding.   Genitourinary: [ ] hematuria. [ ] flank pain.   Endocrine: [ ] significant change in weight. [ ] intolerance to heat and cold.     Review of systems [x ] otherwise negative, [ ] otherwise unable to obtain    FH: no family history of sudden cardiac death in first degree relatives    SH: [ ] tobacco, [ ] alcohol, [ ] drugs    acetaminophen     Tablet .. 650 milliGRAM(s) Oral every 6 hours PRN  aluminum hydroxide/magnesium hydroxide/simethicone Suspension 30 milliLiter(s) Oral every 4 hours PRN  atenolol  Tablet 25 milliGRAM(s) Oral daily  atorvastatin 20 milliGRAM(s) Oral at bedtime  cefepime   IVPB 2000 milliGRAM(s) IV Intermittent every 8 hours  cilostazol 50 milliGRAM(s) Oral two times a day  glucagon  Injectable 1 milliGRAM(s) IntraMuscular once  insulin glargine Injectable (LANTUS) 20 Unit(s) SubCutaneous every morning  insulin lispro (ADMELOG) corrective regimen sliding scale   SubCutaneous three times a day before meals  insulin lispro (ADMELOG) corrective regimen sliding scale   SubCutaneous at bedtime  insulin lispro Injectable (ADMELOG) 2 Unit(s) SubCutaneous three times a day before meals  melatonin 3 milliGRAM(s) Oral at bedtime PRN  ondansetron Injectable 4 milliGRAM(s) IV Push every 8 hours PRN  oxyCODONE    IR 5 milliGRAM(s) Oral three times a day PRN  pregabalin 75 milliGRAM(s) Oral daily  sodium chloride 0.9%. 1000 milliLiter(s) IV Continuous <Continuous>                            7.8    14.15 )-----------( 337      ( 13 Nov 2023 05:36 )             25.6     135  |  101  |  8   ----------------------------<  191<H>  3.9   |  22  |  0.73    Ca    9.5      13 Nov 2023 05:36  Phos  3.2     11-13  Mg     1.90     11-13    TPro  8.0  /  Alb  3.8  /  TBili  0.3  /  DBili  x   /  AST  13  /  ALT  11  /  AlkPhos  110  11-11      T(C): 36.8 (11-13-23 @ 11:17), Max: 39.2 (11-12-23 @ 21:48)  HR: 73 (11-13-23 @ 11:17) (73 - 93)  BP: 130/55 (11-13-23 @ 11:17) (124/58 - 164/55)  RR: 17 (11-13-23 @ 11:17) (17 - 18)  SpO2: 100% (11-13-23 @ 11:17) (99% - 100%)  Wt(kg): --    General: Well nourished in no acute distress. Alert and Oriented * 3.   Head: Normocephalic and atraumatic.   Neck: No JVD. No bruits. Supple. Does not appear to be enlarged.   Cardiovascular: + S1,S2 ; RRR Soft systolic murmur at the left lower sternal border. No rubs noted.    Lungs: CTA b/l. No rhonchi, rales or wheezes.   Abdomen: + BS, soft. Non tender. Non distended. No rebound. No guarding.   Extremities: No clubbing/cyanosis/edema.   Neurologic: Moves all four extremities. Full range of motion.   Skin: Warm and moist. The patient's skin has normal elasticity and good skin turgor.   Psychiatric: Appropriate mood and affect.  Musculoskeletal: Normal range of motion, normal strength    DATA    tele- SR    < from: TTE Limited W or WO Ultrasound Enhancing Agent (10.29.23 @ 09:52) >  CONCLUSIONS:      1. Normal right ventricular cavity size, wall thickness, and systolic function.   2. There is a small pericardial effusion measuring 1.00 cm with no evidence of hemodynamic compromise. Please note that the majority of pericardial effusion is posterior to the left ventricle, loculated around the right atrium and there is a very small amount of effusion anterior to the right ventricle in the subcostal window.   3. Compared to the transthoracic echocardiogram performed on 10/23/2023 There is now a small pericardial effusion.    Summary only: There is now a small pericardial effusion.    < end of copied text >        ASSESSMENT/PLAN: 	Pt is a 68 year-old male medical history HTN, HLD, DM Type 2, PAD, recent admission for pericardial effusion (small, EF=60%), penetrating ulcer of the aorta and complicated UTI  c/o 4 days of right testicular pain and swelling with associated fever 3 days ago.     1. Pericardial effusion/tachycardia  - repeat echo from previous admission showed improvement on size of effusion, can check repeat TTE this admit  - sinus tach on ekg likely from infection  - check orthostats  - not in tamponade on physical exam  - ABX per medicine  - c/w BB and statin  - obtain records to why patient off eliquis  - Ulcer of aorta worked up last admission with discussion with ct surgery and repeat CT in 1 year          
Date of service 11/15/23    chief complaint: testicular pain    extended hpi: 68 year-old male medical history HTN, HLD, DM Type 2, PAD, recent admission for pericardial effusion (small, EF=60%), penetrating ulcer of the aorta and complicated UTI  c/o 4 days of right testicular pain and swelling with associated fever 3 days ago.      c/o ongoing pain in testicle, denies chest pain, SOB or palps    Review of Systems:   Constitutional: [ ] fevers, [ ] chills.   Skin: [ ] dry skin. [ ] rashes.  Psychiatric: [ ] depression, [ ] anxiety.   Gastrointestinal: [ ] BRBPR, [ ] melena.   Neurological: [ ] confusion. [ ] seizures. [ ] shuffling gait.   Ears,Nose,Mouth and Throat: [ ] ear pain [ ] sore throat.   Eyes: [ ] diplopia.   Respiratory: [ ] hemoptysis. [ ] shortness of breath  Cardiovascular: See HPI above  Hematologic/Lymphatic: [ ] anemia. [ ] painful nodes. [ ] prolonged bleeding.   Genitourinary: [ ] hematuria. [ ] flank pain.   Endocrine: [ ] significant change in weight. [ ] intolerance to heat and cold.     Review of systems [x ] otherwise negative, [ ] otherwise unable to obtain    FH: no family history of sudden cardiac death in first degree relatives    SH: [ ] tobacco, [ ] alcohol, [ ] drugs    acetaminophen     Tablet .. 650 milliGRAM(s) Oral every 6 hours PRN  acetaminophen   IVPB .. 1000 milliGRAM(s) IV Intermittent once PRN  aluminum hydroxide/magnesium hydroxide/simethicone Suspension 30 milliLiter(s) Oral every 4 hours PRN  apixaban 2.5 milliGRAM(s) Oral two times a day  atenolol  Tablet 25 milliGRAM(s) Oral daily  atorvastatin 20 milliGRAM(s) Oral at bedtime  cefTRIAXone   IVPB 2000 milliGRAM(s) IV Intermittent every 24 hours  cilostazol 50 milliGRAM(s) Oral two times a day  dextrose 5%. 1000 milliLiter(s) IV Continuous <Continuous>  dextrose 5%. 1000 milliLiter(s) IV Continuous <Continuous>  dextrose 50% Injectable 25 Gram(s) IV Push once  dextrose 50% Injectable 25 Gram(s) IV Push once  dextrose 50% Injectable 12.5 Gram(s) IV Push once  dextrose Oral Gel 15 Gram(s) Oral once PRN  glucagon  Injectable 1 milliGRAM(s) IntraMuscular once  influenza  Vaccine (HIGH DOSE) 0.7 milliLiter(s) IntraMuscular once  insulin glargine Injectable (LANTUS) 20 Unit(s) SubCutaneous every morning  insulin lispro (ADMELOG) corrective regimen sliding scale   SubCutaneous three times a day before meals  insulin lispro (ADMELOG) corrective regimen sliding scale   SubCutaneous at bedtime  insulin lispro Injectable (ADMELOG) 2 Unit(s) SubCutaneous three times a day before meals  melatonin 3 milliGRAM(s) Oral at bedtime PRN  ondansetron Injectable 4 milliGRAM(s) IV Push every 8 hours PRN  oxyCODONE    IR 5 milliGRAM(s) Oral three times a day PRN  pregabalin 75 milliGRAM(s) Oral daily  sodium chloride 0.9%. 1000 milliLiter(s) IV Continuous <Continuous>                            8.5    14.88 )-----------( 364      ( 15 Nov 2023 05:58 )             27.3       132<L>  |  96<L>  |  7   ----------------------------<  170<H>  4.0   |  23  |  0.67    Ca    10.0      15 Nov 2023 05:58  Phos  3.7     11-15  Mg     2.00     11-15      T(C): 36.7 (11-15-23 @ 05:11), Max: 37.4 (11-14-23 @ 16:15)  HR: 94 (11-15-23 @ 05:11) (83 - 95)  BP: 164/86 (11-15-23 @ 05:11) (131/61 - 164/86)  RR: 18 (11-15-23 @ 05:11) (18 - 18)  SpO2: 96% (11-15-23 @ 05:11) (96% - 99%)  Wt(kg): --    I&O's Summary    14 Nov 2023 07:01  -  15 Nov 2023 07:00  --------------------------------------------------------  IN: 0 mL / OUT: 150 mL / NET: -150 mL    General: Well nourished in no acute distress. Alert and Oriented * 3.   Head: Normocephalic and atraumatic.   Neck: No JVD. No bruits. Supple. Does not appear to be enlarged.   Cardiovascular: + S1,S2 ; RRR Soft systolic murmur at the left lower sternal border. No rubs noted.    Lungs: CTA b/l. No rhonchi, rales or wheezes.   Abdomen: + BS, soft. Non tender. Non distended. No rebound. No guarding.   Extremities: No clubbing/cyanosis/edema.   Neurologic: Moves all four extremities. Full range of motion.   Skin: Warm and moist. The patient's skin has normal elasticity and good skin turgor.   Psychiatric: Appropriate mood and affect.  Musculoskeletal: Normal range of motion, normal strength    DATA    tele- SR    < from: TTE Limited W or WO Ultrasound Enhancing Agent (10.29.23 @ 09:52) >  CONCLUSIONS:      1. Normal right ventricular cavity size, wall thickness, and systolic function.   2. There is a small pericardial effusion measuring 1.00 cm with no evidence of hemodynamic compromise. Please note that the majority of pericardial effusion is posterior to the left ventricle, loculated around the right atrium and there is a very small amount of effusion anterior to the right ventricle in the subcostal window.   3. Compared to the transthoracic echocardiogram performed on 10/23/2023 There is now a small pericardial effusion.    Summary only: There is now a small pericardial effusion.    < end of copied text >          ASSESSMENT/PLAN: 	Pt is a 68 year-old male medical history HTN, HLD, DM Type 2, PAD, recent admission for pericardial effusion (small, EF=60%), penetrating ulcer of the aorta and complicated UTI  c/o 4 days of right testicular pain and swelling with associated fever 3 days ago.     1. Pericardial effusion/tachycardia  - repeat echo from previous admission showed improvement on size of effusion, can check repeat TTE this admit  - sinus tach on ekg likely from infection  - Orthostatics noted, would give IVF  - not in tamponade on physical exam  - ABX per medicine  - c/w BB and statin  - on Eliquis as OP for PAD  - Ulcer of aorta worked up last admission with discussion with ct surgery and repeat CT in 1 year          
Date of service 11/16/23    chief complaint: testicular pain    extended hpi: 68 year-old male medical history HTN, HLD, DM Type 2, PAD, recent admission for pericardial effusion (small, EF=60%), penetrating ulcer of the aorta and complicated UTI  c/o 4 days of right testicular pain and swelling with associated fever 3 days ago.      less pain in testicle, denies chest pain, SOB or palps    Review of Systems:   Constitutional: [ ] fevers, [ ] chills.   Skin: [ ] dry skin. [ ] rashes.  Psychiatric: [ ] depression, [ ] anxiety.   Gastrointestinal: [ ] BRBPR, [ ] melena.   Neurological: [ ] confusion. [ ] seizures. [ ] shuffling gait.   Ears,Nose,Mouth and Throat: [ ] ear pain [ ] sore throat.   Eyes: [ ] diplopia.   Respiratory: [ ] hemoptysis. [ ] shortness of breath  Cardiovascular: See HPI above  Hematologic/Lymphatic: [ ] anemia. [ ] painful nodes. [ ] prolonged bleeding.   Genitourinary: [ ] hematuria. [ ] flank pain.   Endocrine: [ ] significant change in weight. [ ] intolerance to heat and cold.     Review of systems [x ] otherwise negative, [ ] otherwise unable to obtain    FH: no family history of sudden cardiac death in first degree relatives    SH: [ ] tobacco, [ ] alcohol, [ ] drugs    acetaminophen     Tablet .. 650 milliGRAM(s) Oral every 6 hours PRN  acetaminophen   IVPB .. 1000 milliGRAM(s) IV Intermittent once PRN  aluminum hydroxide/magnesium hydroxide/simethicone Suspension 30 milliLiter(s) Oral every 4 hours PRN  apixaban 2.5 milliGRAM(s) Oral two times a day  atenolol  Tablet 25 milliGRAM(s) Oral daily  atorvastatin 20 milliGRAM(s) Oral at bedtime  cefTRIAXone   IVPB 2000 milliGRAM(s) IV Intermittent every 24 hours  cilostazol 50 milliGRAM(s) Oral two times a day  dextrose 5%. 1000 milliLiter(s) IV Continuous <Continuous>  dextrose 5%. 1000 milliLiter(s) IV Continuous <Continuous>  dextrose 50% Injectable 25 Gram(s) IV Push once  dextrose 50% Injectable 12.5 Gram(s) IV Push once  dextrose 50% Injectable 25 Gram(s) IV Push once  dextrose Oral Gel 15 Gram(s) Oral once PRN  glucagon  Injectable 1 milliGRAM(s) IntraMuscular once  influenza  Vaccine (HIGH DOSE) 0.7 milliLiter(s) IntraMuscular once  insulin glargine Injectable (LANTUS) 20 Unit(s) SubCutaneous every morning  insulin lispro (ADMELOG) corrective regimen sliding scale   SubCutaneous three times a day before meals  insulin lispro (ADMELOG) corrective regimen sliding scale   SubCutaneous at bedtime  insulin lispro Injectable (ADMELOG) 2 Unit(s) SubCutaneous three times a day before meals  melatonin 3 milliGRAM(s) Oral at bedtime PRN  ondansetron Injectable 4 milliGRAM(s) IV Push every 8 hours PRN  pregabalin 75 milliGRAM(s) Oral daily  sodium chloride 0.9%. 1000 milliLiter(s) IV Continuous <Continuous>  sodium chloride 0.9%. 1000 milliLiter(s) IV Continuous <Continuous>                            8.3    13.04 )-----------( 373      ( 16 Nov 2023 06:05 )             27.4       11-16    134<L>  |  98  |  7   ----------------------------<  136<H>  4.1   |  23  |  0.69    Ca    9.5      16 Nov 2023 06:05  Phos  3.8     11-16  Mg     2.00     11-16              T(C): 36.4 (11-16-23 @ 11:50), Max: 36.8 (11-15-23 @ 20:14)  HR: 85 (11-16-23 @ 11:50) (83 - 93)  BP: 146/62 (11-16-23 @ 11:50) (137/65 - 165/81)  RR: 17 (11-16-23 @ 11:50) (16 - 18)  SpO2: 98% (11-16-23 @ 11:50) (98% - 98%)  Wt(kg): --    I&O's Summary    15 Nov 2023 07:01  -  16 Nov 2023 07:00  --------------------------------------------------------  IN: 0 mL / OUT: 600 mL / NET: -600 mL      General: Well nourished in no acute distress. Alert and Oriented * 3.   Head: Normocephalic and atraumatic.   Neck: No JVD. No bruits. Supple. Does not appear to be enlarged.   Cardiovascular: + S1,S2 ; RRR Soft systolic murmur at the left lower sternal border. No rubs noted.    Lungs: CTA b/l. No rhonchi, rales or wheezes.   Abdomen: + BS, soft. Non tender. Non distended. No rebound. No guarding.   Extremities: No clubbing/cyanosis/edema.   Neurologic: Moves all four extremities. Full range of motion.   Skin: Warm and moist. The patient's skin has normal elasticity and good skin turgor.   Psychiatric: Appropriate mood and affect.  Musculoskeletal: Normal range of motion, normal strength    DATA    tele- SR    < from: TTE Limited W or WO Ultrasound Enhancing Agent (10.29.23 @ 09:52) >  CONCLUSIONS:      1. Normal right ventricular cavity size, wall thickness, and systolic function.   2. There is a small pericardial effusion measuring 1.00 cm with no evidence of hemodynamic compromise. Please note that the majority of pericardial effusion is posterior to the left ventricle, loculated around the right atrium and there is a very small amount of effusion anterior to the right ventricle in the subcostal window.   3. Compared to the transthoracic echocardiogram performed on 10/23/2023 There is now a small pericardial effusion.    Summary only: There is now a small pericardial effusion.    < end of copied text >      < from: TTE W or WO Ultrasound Enhancing Agent (11.16.23 @ 10:13) >     CONCLUSIONS:      1. Limited repeat study to re-evaluate pericardial effusion.   2. Moderate pericardial effusion (~ 1.1 cm) posterior to the left ventricle, otherwise small circumferential pericardial effusion. No echocardiographic evidence of cardiac tamponade.   3. Compared to the transthoracic echocardiogram performed on 10/29/2023 the pericardial effusion has increased in size.    < end of copied text >      ASSESSMENT/PLAN: 	Pt is a 68 year-old male medical history HTN, HLD, DM Type 2, PAD, recent admission for pericardial effusion (small, EF=60%), penetrating ulcer of the aorta and complicated UTI  c/o 4 days of right testicular pain and swelling with associated fever 3 days ago.     1. Pericardial effusion/tachycardia  - repeat echo from previous admission showed improvement on size of effusion, however repeat today revealed increasing size  -thoracic surg eval called  - sinus tach on ekg likely from infection  - Orthostatics noted, would give IVF  - not in tamponade on physical exam  - ABX per medicine  - c/w BB and statin  - on Eliquis as OP for PAD  - Ulcer of aorta worked up last admission with discussion with ct surgery and repeat CT in 1 year          
Date of service 11/17/23    chief complaint: testicular pain    extended hpi: 68 year-old male medical history HTN, HLD, DM Type 2, PAD, recent admission for pericardial effusion (small, EF=60%), penetrating ulcer of the aorta and complicated UTI  c/o 4 days of right testicular pain and swelling with associated fever 3 days ago.        DATE OF SERVICE: 11-17-23    Patient denies chest pain or shortness of breath.   Review of symptoms otherwise negative.    MEDICATIONS:  acetaminophen     Tablet .. 650 milliGRAM(s) Oral every 6 hours PRN  aluminum hydroxide/magnesium hydroxide/simethicone Suspension 30 milliLiter(s) Oral every 4 hours PRN  apixaban 2.5 milliGRAM(s) Oral two times a day  atenolol  Tablet 25 milliGRAM(s) Oral daily  atorvastatin 20 milliGRAM(s) Oral at bedtime  cefTRIAXone   IVPB 2000 milliGRAM(s) IV Intermittent every 24 hours  cilostazol 50 milliGRAM(s) Oral two times a day  dextrose 5%. 1000 milliLiter(s) IV Continuous <Continuous>  dextrose 5%. 1000 milliLiter(s) IV Continuous <Continuous>  dextrose 50% Injectable 25 Gram(s) IV Push once  dextrose 50% Injectable 12.5 Gram(s) IV Push once  dextrose 50% Injectable 25 Gram(s) IV Push once  dextrose Oral Gel 15 Gram(s) Oral once PRN  glucagon  Injectable 1 milliGRAM(s) IntraMuscular once  influenza  Vaccine (HIGH DOSE) 0.7 milliLiter(s) IntraMuscular once  insulin glargine Injectable (LANTUS) 20 Unit(s) SubCutaneous every morning  insulin lispro (ADMELOG) corrective regimen sliding scale   SubCutaneous three times a day before meals  insulin lispro (ADMELOG) corrective regimen sliding scale   SubCutaneous at bedtime  insulin lispro Injectable (ADMELOG) 2 Unit(s) SubCutaneous three times a day before meals  melatonin 3 milliGRAM(s) Oral at bedtime PRN  ondansetron Injectable 4 milliGRAM(s) IV Push every 8 hours PRN  pregabalin 75 milliGRAM(s) Oral daily  sodium chloride 0.9%. 1000 milliLiter(s) IV Continuous <Continuous>  sodium chloride 0.9%. 1000 milliLiter(s) IV Continuous <Continuous>      LABS:                        8.6    14.43 )-----------( 405      ( 17 Nov 2023 05:51 )             27.9       Hemoglobin: 8.6 g/dL (11-17 @ 05:51)  Hemoglobin: 8.3 g/dL (11-16 @ 06:05)  Hemoglobin: 8.5 g/dL (11-15 @ 05:58)  Hemoglobin: 7.6 g/dL (11-14 @ 04:23)  Hemoglobin: 7.8 g/dL (11-13 @ 05:36)      11-17    137  |  100  |  8   ----------------------------<  140<H>  4.4   |  24  |  0.70    Ca    10.3      17 Nov 2023 05:51  Phos  3.9     11-17  Mg     2.20     11-17      Creatinine Trend: 0.70<--, 0.69<--, 0.67<--, 0.71<--, 0.73<--, 0.70<--    COAGS:           PHYSICAL EXAM:  T(C): 36.6 (11-17-23 @ 08:46), Max: 36.7 (11-16-23 @ 20:00)  HR: 99 (11-17-23 @ 08:46) (87 - 99)  BP: 126/65 (11-17-23 @ 08:46) (126/65 - 151/87)  RR: 18 (11-17-23 @ 08:46) (17 - 18)  SpO2: 99% (11-17-23 @ 08:46) (99% - 100%)  Wt(kg): --    I&O's Summary      General: Well nourished in no acute distress. Alert and Oriented * 3.   Head: Normocephalic and atraumatic.   Neck: No JVD. No bruits. Supple. Does not appear to be enlarged.   Cardiovascular: + S1,S2 ; RRR Soft systolic murmur at the left lower sternal border. No rubs noted.    Lungs: CTA b/l. No rhonchi, rales or wheezes.   Abdomen: + BS, soft. Non tender. Non distended. No rebound. No guarding.   Extremities: No clubbing/cyanosis/edema.   Neurologic: Moves all four extremities. Full range of motion.   Skin: Warm and moist. The patient's skin has normal elasticity and good skin turgor.   Psychiatric: Appropriate mood and affect.  Musculoskeletal: Normal range of motion, normal strength    DATA    tele- SR    < from: TTE Limited W or WO Ultrasound Enhancing Agent (10.29.23 @ 09:52) >  CONCLUSIONS:      1. Normal right ventricular cavity size, wall thickness, and systolic function.   2. There is a small pericardial effusion measuring 1.00 cm with no evidence of hemodynamic compromise. Please note that the majority of pericardial effusion is posterior to the left ventricle, loculated around the right atrium and there is a very small amount of effusion anterior to the right ventricle in the subcostal window.   3. Compared to the transthoracic echocardiogram performed on 10/23/2023 There is now a small pericardial effusion.    Summary only: There is now a small pericardial effusion.    < end of copied text >      < from: TTE W or WO Ultrasound Enhancing Agent (11.16.23 @ 10:13) >     CONCLUSIONS:      1. Limited repeat study to re-evaluate pericardial effusion.   2. Moderate pericardial effusion (~ 1.1 cm) posterior to the left ventricle, otherwise small circumferential pericardial effusion. No echocardiographic evidence of cardiac tamponade.   3. Compared to the transthoracic echocardiogram performed on 10/29/2023 the pericardial effusion has increased in size.    < end of copied text >      ASSESSMENT/PLAN: 	Pt is a 68 year-old male medical history HTN, HLD, DM Type 2, PAD, recent admission for pericardial effusion (small, EF=60%), penetrating ulcer of the aorta and complicated UTI  c/o 4 days of right testicular pain and swelling with associated fever 3 days ago.     1. Pericardial effusion/tachycardia  - repeat echo from previous admission showed improvement on size of effusion, however repeat today revealed increasing size  - sinus tach on ekg likely from infection  - Orthostatics noted, would give IVF  - not in tamponade on physical exam  - ABX per medicine  - c/w BB and statin  - on Eliquis as OP for PAD  - Ulcer of aorta worked up last admission with discussion with ct surgery and repeat CT in 1 year  - Echo noted no tamponade on physical exam, no tamponade by echo no further work-up, will need serial outpatient echo    Logan Garcia MD  Pager: [phone]           
  Infectious Diseases Follow Up:    Patient is a 68y old  Male who presents with a chief complaint of R testicle pain and swelling (14 Nov 2023 16:12)      Interval History/ROS:  No acute events. Pt afebrile, improvement in R scrotal pain.     Allergies  Advil (Rash)        ANTIMICROBIALS:  cefTRIAXone   IVPB 2000 every 24 hours      Current Abx:     Previous Abx     OTHER MEDS:  MEDICATIONS  (STANDING):  acetaminophen     Tablet .. 650 every 6 hours PRN  aluminum hydroxide/magnesium hydroxide/simethicone Suspension 30 every 4 hours PRN  apixaban 2.5 two times a day  atenolol  Tablet 25 daily  atorvastatin 20 at bedtime  cilostazol 50 two times a day  dextrose 50% Injectable 12.5 once  dextrose 50% Injectable 25 once  dextrose 50% Injectable 25 once  dextrose Oral Gel 15 once PRN  glucagon  Injectable 1 once  influenza  Vaccine (HIGH DOSE) 0.7 once  insulin glargine Injectable (LANTUS) 20 every morning  insulin lispro (ADMELOG) corrective regimen sliding scale  three times a day before meals  insulin lispro (ADMELOG) corrective regimen sliding scale  at bedtime  insulin lispro Injectable (ADMELOG) 2 three times a day before meals  melatonin 3 at bedtime PRN  ondansetron Injectable 4 every 8 hours PRN  oxyCODONE    IR 5 three times a day PRN  pregabalin 75 daily      Vital Signs Last 24 Hrs  T(C): 36.7 (15 Nov 2023 05:11), Max: 37.4 (14 Nov 2023 16:15)  T(F): 98.1 (15 Nov 2023 05:11), Max: 99.3 (14 Nov 2023 16:15)  HR: 94 (15 Nov 2023 05:11) (83 - 95)  BP: 164/86 (15 Nov 2023 05:11) (131/61 - 164/86)  BP(mean): 77 (14 Nov 2023 16:15) (77 - 77)  RR: 18 (15 Nov 2023 05:11) (18 - 18)  SpO2: 96% (15 Nov 2023 05:11) (96% - 99%)    Parameters below as of 15 Nov 2023 05:11  Patient On (Oxygen Delivery Method): room air        PHYSICAL EXAM:  GENERAL: NAD, well-developed  HEAD:  Atraumatic, Normocephalic  EYES: EOMI, PERRLA, conjunctiva and sclera clear  NECK: Supple, No JVD  CHEST/LUNG: Clear to auscultation bilaterally; No wheeze  HEART: Regular rate and rhythm; No murmurs, rubs, or gallops  ABDOMEN: Soft, Nontender, Nondistended; Bowel sounds present  : R scrotal swelling, erythema improved   EXTREMITIES:  2+ Peripheral Pulses, No clubbing, cyanosis, or edema  PSYCH: AAOx3  NEUROLOGY: non-focal  SKIN: No rashes or lesions                          8.5    14.88 )-----------( 364      ( 15 Nov 2023 05:58 )             27.3       11-15    132<L>  |  96<L>  |  7   ----------------------------<  170<H>  4.0   |  23  |  0.67    Ca    10.0      15 Nov 2023 05:58  Phos  3.7     11-15  Mg     2.00     11-15        Urinalysis Basic - ( 15 Nov 2023 05:58 )    Color: x / Appearance: x / SG: x / pH: x  Gluc: 170 mg/dL / Ketone: x  / Bili: x / Urobili: x   Blood: x / Protein: x / Nitrite: x   Leuk Esterase: x / RBC: x / WBC x   Sq Epi: x / Non Sq Epi: x / Bacteria: x        MICROBIOLOGY:  v  .Blood Blood  11-14-23   No growth at 24 hours  --  --      .Blood Blood-Peripheral  11-14-23   No growth at 24 hours  --  --      .Blood Blood-Peripheral  11-11-23   No growth at 72 Hours  --  --      .Blood Blood-Peripheral  11-11-23   No growth at 72 Hours  --  --      Clean Catch Clean Catch (Midstream)  11-11-23   <10,000 CFU/mL Normal Urogenital Luisa  --  --      .Blood Blood-Venous  10-22-23   No growth at 5 days  --  --      .Blood Blood-Peripheral  10-22-23   No growth at 5 days  --  --      Clean Catch Clean Catch (Midstream)  10-22-23   >100,000 CFU/ml Klebsiella pneumoniae  --  Klebsiella pneumoniae                RADIOLOGY:    
Date of service 11/14/23    chief complaint: testicular pain    extended hpi: 68 year-old male medical history HTN, HLD, DM Type 2, PAD, recent admission for pericardial effusion (small, EF=60%), penetrating ulcer of the aorta and complicated UTI  c/o 4 days of right testicular pain and swelling with associated fever 3 days ago.      c/o ongoing pain in testicle, denies chest pain, SOB or palps    Review of Systems:   Constitutional: [ ] fevers, [ ] chills.   Skin: [ ] dry skin. [ ] rashes.  Psychiatric: [ ] depression, [ ] anxiety.   Gastrointestinal: [ ] BRBPR, [ ] melena.   Neurological: [ ] confusion. [ ] seizures. [ ] shuffling gait.   Ears,Nose,Mouth and Throat: [ ] ear pain [ ] sore throat.   Eyes: [ ] diplopia.   Respiratory: [ ] hemoptysis. [ ] shortness of breath  Cardiovascular: See HPI above  Hematologic/Lymphatic: [ ] anemia. [ ] painful nodes. [ ] prolonged bleeding.   Genitourinary: [ ] hematuria. [ ] flank pain.   Endocrine: [ ] significant change in weight. [ ] intolerance to heat and cold.     Review of systems [x ] otherwise negative, [ ] otherwise unable to obtain    FH: no family history of sudden cardiac death in first degree relatives    SH: [ ] tobacco, [ ] alcohol, [ ] drugs    acetaminophen     Tablet .. 650 milliGRAM(s) Oral every 6 hours PRN  aluminum hydroxide/magnesium hydroxide/simethicone Suspension 30 milliLiter(s) Oral every 4 hours PRN  atenolol  Tablet 25 milliGRAM(s) Oral daily  atorvastatin 20 milliGRAM(s) Oral at bedtime  cefTRIAXone   IVPB 2000 milliGRAM(s) IV Intermittent every 24 hours  cilostazol 50 milliGRAM(s) Oral two times a day  glucagon  Injectable 1 milliGRAM(s) IntraMuscular once  influenza  Vaccine (HIGH DOSE) 0.7 milliLiter(s) IntraMuscular once  insulin glargine Injectable (LANTUS) 20 Unit(s) SubCutaneous every morning  insulin lispro (ADMELOG) corrective regimen sliding scale   SubCutaneous three times a day before meals  insulin lispro (ADMELOG) corrective regimen sliding scale   SubCutaneous at bedtime  insulin lispro Injectable (ADMELOG) 2 Unit(s) SubCutaneous three times a day before meals  melatonin 3 milliGRAM(s) Oral at bedtime PRN  ondansetron Injectable 4 milliGRAM(s) IV Push every 8 hours PRN  oxyCODONE    IR 5 milliGRAM(s) Oral three times a day PRN  pregabalin 75 milliGRAM(s) Oral daily  sodium chloride 0.9%. 1000 milliLiter(s) IV Continuous <Continuous>                          7.6    11.68 )-----------( 340      ( 14 Nov 2023 04:23 )             24.7       136  |  99  |  8   ----------------------------<  136<H>  3.3<L>   |  24  |  0.71    Ca    9.6      14 Nov 2023 04:23  Phos  4.1     11-14  Mg     2.10     11-14    T(C): 36.8 (11-14-23 @ 12:30), Max: 38 (11-13-23 @ 23:35)  HR: 83 (11-14-23 @ 12:30) (72 - 91)  BP: 142/65 (11-14-23 @ 12:30) (132/62 - 162/75)  RR: 18 (11-14-23 @ 12:30) (18 - 18)  SpO2: 98% (11-14-23 @ 12:30) (97% - 100%)  Wt(kg): --    I&O's Summary    13 Nov 2023 07:01  -  14 Nov 2023 07:00  --------------------------------------------------------  IN: 240 mL / OUT: 950 mL / NET: -710 mL    General: Well nourished in no acute distress. Alert and Oriented * 3.   Head: Normocephalic and atraumatic.   Neck: No JVD. No bruits. Supple. Does not appear to be enlarged.   Cardiovascular: + S1,S2 ; RRR Soft systolic murmur at the left lower sternal border. No rubs noted.    Lungs: CTA b/l. No rhonchi, rales or wheezes.   Abdomen: + BS, soft. Non tender. Non distended. No rebound. No guarding.   Extremities: No clubbing/cyanosis/edema.   Neurologic: Moves all four extremities. Full range of motion.   Skin: Warm and moist. The patient's skin has normal elasticity and good skin turgor.   Psychiatric: Appropriate mood and affect.  Musculoskeletal: Normal range of motion, normal strength    DATA    tele- SR    < from: TTE Limited W or WO Ultrasound Enhancing Agent (10.29.23 @ 09:52) >  CONCLUSIONS:      1. Normal right ventricular cavity size, wall thickness, and systolic function.   2. There is a small pericardial effusion measuring 1.00 cm with no evidence of hemodynamic compromise. Please note that the majority of pericardial effusion is posterior to the left ventricle, loculated around the right atrium and there is a very small amount of effusion anterior to the right ventricle in the subcostal window.   3. Compared to the transthoracic echocardiogram performed on 10/23/2023 There is now a small pericardial effusion.    Summary only: There is now a small pericardial effusion.    < end of copied text >      ASSESSMENT/PLAN: 	Pt is a 68 year-old male medical history HTN, HLD, DM Type 2, PAD, recent admission for pericardial effusion (small, EF=60%), penetrating ulcer of the aorta and complicated UTI  c/o 4 days of right testicular pain and swelling with associated fever 3 days ago.     1. Pericardial effusion/tachycardia  - repeat echo from previous admission showed improvement on size of effusion, can check repeat TTE this admit  - sinus tach on ekg likely from infection  - check orthostats  - not in tamponade on physical exam  - ABX per medicine  - c/w BB and statin  - obtain records to why patient off eliquis  - Ulcer of aorta worked up last admission with discussion with ct surgery and repeat CT in 1 year          
Patient is a 68y old  Male who presents with a chief complaint of R testicle pain and swelling (14 Nov 2023 13:22)    Date of servie : 11-14-23 @ 16:12  INTERVAL HPI/OVERNIGHT EVENTS:  T(C): 36.8 (11-14-23 @ 12:30), Max: 38 (11-13-23 @ 23:35)  HR: 83 (11-14-23 @ 12:30) (77 - 91)  BP: 142/65 (11-14-23 @ 12:30) (137/68 - 162/75)  RR: 18 (11-14-23 @ 12:30) (18 - 18)  SpO2: 98% (11-14-23 @ 12:30) (97% - 99%)  Wt(kg): --  I&O's Summary    13 Nov 2023 07:01  -  14 Nov 2023 07:00  --------------------------------------------------------  IN: 240 mL / OUT: 950 mL / NET: -710 mL        LABS:                        7.6    11.68 )-----------( 340      ( 14 Nov 2023 04:23 )             24.7     11-14    136  |  99  |  8   ----------------------------<  136<H>  3.3<L>   |  24  |  0.71    Ca    9.6      14 Nov 2023 04:23  Phos  4.1     11-14  Mg     2.10     11-14        Urinalysis Basic - ( 14 Nov 2023 04:23 )    Color: x / Appearance: x / SG: x / pH: x  Gluc: 136 mg/dL / Ketone: x  / Bili: x / Urobili: x   Blood: x / Protein: x / Nitrite: x   Leuk Esterase: x / RBC: x / WBC x   Sq Epi: x / Non Sq Epi: x / Bacteria: x      CAPILLARY BLOOD GLUCOSE      POCT Blood Glucose.: 232 mg/dL (14 Nov 2023 12:13)  POCT Blood Glucose.: 256 mg/dL (14 Nov 2023 08:39)  POCT Blood Glucose.: 313 mg/dL (13 Nov 2023 21:26)  POCT Blood Glucose.: 174 mg/dL (13 Nov 2023 17:52)        Urinalysis Basic - ( 14 Nov 2023 04:23 )    Color: x / Appearance: x / SG: x / pH: x  Gluc: 136 mg/dL / Ketone: x  / Bili: x / Urobili: x   Blood: x / Protein: x / Nitrite: x   Leuk Esterase: x / RBC: x / WBC x   Sq Epi: x / Non Sq Epi: x / Bacteria: x        MEDICATIONS  (STANDING):  atenolol  Tablet 25 milliGRAM(s) Oral daily  atorvastatin 20 milliGRAM(s) Oral at bedtime  cefTRIAXone   IVPB 2000 milliGRAM(s) IV Intermittent every 24 hours  cilostazol 50 milliGRAM(s) Oral two times a day  dextrose 5%. 1000 milliLiter(s) (50 mL/Hr) IV Continuous <Continuous>  dextrose 5%. 1000 milliLiter(s) (100 mL/Hr) IV Continuous <Continuous>  dextrose 50% Injectable 25 Gram(s) IV Push once  dextrose 50% Injectable 12.5 Gram(s) IV Push once  dextrose 50% Injectable 25 Gram(s) IV Push once  glucagon  Injectable 1 milliGRAM(s) IntraMuscular once  influenza  Vaccine (HIGH DOSE) 0.7 milliLiter(s) IntraMuscular once  insulin glargine Injectable (LANTUS) 20 Unit(s) SubCutaneous every morning  insulin lispro (ADMELOG) corrective regimen sliding scale   SubCutaneous three times a day before meals  insulin lispro (ADMELOG) corrective regimen sliding scale   SubCutaneous at bedtime  insulin lispro Injectable (ADMELOG) 2 Unit(s) SubCutaneous three times a day before meals  pregabalin 75 milliGRAM(s) Oral daily  sodium chloride 0.9%. 1000 milliLiter(s) (100 mL/Hr) IV Continuous <Continuous>    MEDICATIONS  (PRN):  acetaminophen     Tablet .. 650 milliGRAM(s) Oral every 6 hours PRN Temp greater or equal to 38C (100.4F), Mild Pain (1 - 3)  aluminum hydroxide/magnesium hydroxide/simethicone Suspension 30 milliLiter(s) Oral every 4 hours PRN Dyspepsia  dextrose Oral Gel 15 Gram(s) Oral once PRN Blood Glucose LESS THAN 70 milliGRAM(s)/deciliter  melatonin 3 milliGRAM(s) Oral at bedtime PRN Insomnia  ondansetron Injectable 4 milliGRAM(s) IV Push every 8 hours PRN Nausea and/or Vomiting  oxyCODONE    IR 5 milliGRAM(s) Oral three times a day PRN Moderate Pain (4 - 6)          PHYSICAL EXAM:  GENERAL: NAD, well-groomed, well-developed  HEAD:  Atraumatic, Normocephalic  CHEST/LUNG: Clear to percussion bilaterally; No rales, rhonchi, wheezing, or rubs  HEART: Regular rate and rhythm; No murmurs, rubs, or gallops  ABDOMEN: Soft, Nontender, Nondistended; Bowel sounds present  EXTREMITIES:  2+ Peripheral Pulses, No clubbing, cyanosis, or edema  LYMPH: No lymphadenopathy noted  SKIN: No rashes or lesions    Care Discussed with Consultants/Other Providers [ ] YES  [ ] NO
Patient is a 68y old  Male who presents with a chief complaint of R testicle pain and swelling (16 Nov 2023 14:36)    Date of servie : 11-16-23 @ 18:47  INTERVAL HPI/OVERNIGHT EVENTS:  T(C): 36.5 (11-16-23 @ 16:10), Max: 36.8 (11-15-23 @ 20:14)  HR: 87 (11-16-23 @ 16:10) (83 - 93)  BP: 151/87 (11-16-23 @ 16:10) (137/65 - 165/81)  RR: 17 (11-16-23 @ 16:10) (17 - 18)  SpO2: 99% (11-16-23 @ 16:10) (98% - 99%)  Wt(kg): --  I&O's Summary    15 Nov 2023 07:01  -  16 Nov 2023 07:00  --------------------------------------------------------  IN: 0 mL / OUT: 600 mL / NET: -600 mL        LABS:                        8.3    13.04 )-----------( 373      ( 16 Nov 2023 06:05 )             27.4     11-16    134<L>  |  98  |  7   ----------------------------<  136<H>  4.1   |  23  |  0.69    Ca    9.5      16 Nov 2023 06:05  Phos  3.8     11-16  Mg     2.00     11-16        Urinalysis Basic - ( 16 Nov 2023 06:05 )    Color: x / Appearance: x / SG: x / pH: x  Gluc: 136 mg/dL / Ketone: x  / Bili: x / Urobili: x   Blood: x / Protein: x / Nitrite: x   Leuk Esterase: x / RBC: x / WBC x   Sq Epi: x / Non Sq Epi: x / Bacteria: x      CAPILLARY BLOOD GLUCOSE      POCT Blood Glucose.: 257 mg/dL (16 Nov 2023 17:30)  POCT Blood Glucose.: 264 mg/dL (16 Nov 2023 12:40)  POCT Blood Glucose.: 160 mg/dL (16 Nov 2023 08:25)  POCT Blood Glucose.: 257 mg/dL (15 Nov 2023 21:55)        Urinalysis Basic - ( 16 Nov 2023 06:05 )    Color: x / Appearance: x / SG: x / pH: x  Gluc: 136 mg/dL / Ketone: x  / Bili: x / Urobili: x   Blood: x / Protein: x / Nitrite: x   Leuk Esterase: x / RBC: x / WBC x   Sq Epi: x / Non Sq Epi: x / Bacteria: x        MEDICATIONS  (STANDING):  apixaban 2.5 milliGRAM(s) Oral two times a day  atenolol  Tablet 25 milliGRAM(s) Oral daily  atorvastatin 20 milliGRAM(s) Oral at bedtime  cefTRIAXone   IVPB 2000 milliGRAM(s) IV Intermittent every 24 hours  cilostazol 50 milliGRAM(s) Oral two times a day  dextrose 5%. 1000 milliLiter(s) (100 mL/Hr) IV Continuous <Continuous>  dextrose 5%. 1000 milliLiter(s) (50 mL/Hr) IV Continuous <Continuous>  dextrose 50% Injectable 25 Gram(s) IV Push once  dextrose 50% Injectable 12.5 Gram(s) IV Push once  dextrose 50% Injectable 25 Gram(s) IV Push once  glucagon  Injectable 1 milliGRAM(s) IntraMuscular once  influenza  Vaccine (HIGH DOSE) 0.7 milliLiter(s) IntraMuscular once  insulin glargine Injectable (LANTUS) 20 Unit(s) SubCutaneous every morning  insulin lispro (ADMELOG) corrective regimen sliding scale   SubCutaneous three times a day before meals  insulin lispro (ADMELOG) corrective regimen sliding scale   SubCutaneous at bedtime  insulin lispro Injectable (ADMELOG) 2 Unit(s) SubCutaneous three times a day before meals  pregabalin 75 milliGRAM(s) Oral daily  sodium chloride 0.9%. 1000 milliLiter(s) (75 mL/Hr) IV Continuous <Continuous>  sodium chloride 0.9%. 1000 milliLiter(s) (100 mL/Hr) IV Continuous <Continuous>    MEDICATIONS  (PRN):  acetaminophen     Tablet .. 650 milliGRAM(s) Oral every 6 hours PRN Temp greater or equal to 38C (100.4F), Mild Pain (1 - 3)  aluminum hydroxide/magnesium hydroxide/simethicone Suspension 30 milliLiter(s) Oral every 4 hours PRN Dyspepsia  dextrose Oral Gel 15 Gram(s) Oral once PRN Blood Glucose LESS THAN 70 milliGRAM(s)/deciliter  melatonin 3 milliGRAM(s) Oral at bedtime PRN Insomnia  ondansetron Injectable 4 milliGRAM(s) IV Push every 8 hours PRN Nausea and/or Vomiting          PHYSICAL EXAM:  GENERAL: NAD, well-groomed, well-developed  HEAD:  Atraumatic, Normocephalic  CHEST/LUNG: Clear to percussion bilaterally; No rales, rhonchi, wheezing, or rubs  HEART: Regular rate and rhythm; No murmurs, rubs, or gallops  ABDOMEN: Soft, Nontender, Nondistended; Bowel sounds present  EXTREMITIES:  2+ Peripheral Pulses, No clubbing, cyanosis, or edema  LYMPH: No lymphadenopathy noted  SKIN: No rashes or lesions    Care Discussed with Consultants/Other Providers [ ] YES  [ ] NO
Patient is a 68y old  Male who presents with a chief complaint of R testicle pain and swelling (15 Nov 2023 12:15)    Date of servie : 11-15-23 @ 15:34  INTERVAL HPI/OVERNIGHT EVENTS:  T(C): 36.8 (11-15-23 @ 13:08), Max: 37.4 (11-14-23 @ 16:15)  HR: 84 (11-15-23 @ 13:08) (84 - 95)  BP: 118/64 (11-15-23 @ 13:08) (118/64 - 164/86)  RR: 18 (11-15-23 @ 13:08) (18 - 18)  SpO2: 98% (11-15-23 @ 13:08) (96% - 99%)  Wt(kg): --  I&O's Summary    14 Nov 2023 07:01  -  15 Nov 2023 07:00  --------------------------------------------------------  IN: 0 mL / OUT: 150 mL / NET: -150 mL    15 Nov 2023 07:01  -  15 Nov 2023 15:34  --------------------------------------------------------  IN: 0 mL / OUT: 600 mL / NET: -600 mL        LABS:                        8.5    14.88 )-----------( 364      ( 15 Nov 2023 05:58 )             27.3     11-15    132<L>  |  96<L>  |  7   ----------------------------<  170<H>  4.0   |  23  |  0.67    Ca    10.0      15 Nov 2023 05:58  Phos  3.7     11-15  Mg     2.00     11-15        Urinalysis Basic - ( 15 Nov 2023 05:58 )    Color: x / Appearance: x / SG: x / pH: x  Gluc: 170 mg/dL / Ketone: x  / Bili: x / Urobili: x   Blood: x / Protein: x / Nitrite: x   Leuk Esterase: x / RBC: x / WBC x   Sq Epi: x / Non Sq Epi: x / Bacteria: x      CAPILLARY BLOOD GLUCOSE      POCT Blood Glucose.: 232 mg/dL (15 Nov 2023 12:11)  POCT Blood Glucose.: 181 mg/dL (15 Nov 2023 09:30)  POCT Blood Glucose.: 187 mg/dL (15 Nov 2023 08:23)  POCT Blood Glucose.: 227 mg/dL (14 Nov 2023 21:29)  POCT Blood Glucose.: 171 mg/dL (14 Nov 2023 18:24)        Urinalysis Basic - ( 15 Nov 2023 05:58 )    Color: x / Appearance: x / SG: x / pH: x  Gluc: 170 mg/dL / Ketone: x  / Bili: x / Urobili: x   Blood: x / Protein: x / Nitrite: x   Leuk Esterase: x / RBC: x / WBC x   Sq Epi: x / Non Sq Epi: x / Bacteria: x        MEDICATIONS  (STANDING):  apixaban 2.5 milliGRAM(s) Oral two times a day  atenolol  Tablet 25 milliGRAM(s) Oral daily  atorvastatin 20 milliGRAM(s) Oral at bedtime  cefTRIAXone   IVPB 2000 milliGRAM(s) IV Intermittent every 24 hours  cilostazol 50 milliGRAM(s) Oral two times a day  dextrose 5%. 1000 milliLiter(s) (50 mL/Hr) IV Continuous <Continuous>  dextrose 5%. 1000 milliLiter(s) (100 mL/Hr) IV Continuous <Continuous>  dextrose 50% Injectable 25 Gram(s) IV Push once  dextrose 50% Injectable 12.5 Gram(s) IV Push once  dextrose 50% Injectable 25 Gram(s) IV Push once  glucagon  Injectable 1 milliGRAM(s) IntraMuscular once  influenza  Vaccine (HIGH DOSE) 0.7 milliLiter(s) IntraMuscular once  insulin glargine Injectable (LANTUS) 20 Unit(s) SubCutaneous every morning  insulin lispro (ADMELOG) corrective regimen sliding scale   SubCutaneous three times a day before meals  insulin lispro (ADMELOG) corrective regimen sliding scale   SubCutaneous at bedtime  insulin lispro Injectable (ADMELOG) 2 Unit(s) SubCutaneous three times a day before meals  pregabalin 75 milliGRAM(s) Oral daily  sodium chloride 0.9%. 1000 milliLiter(s) (100 mL/Hr) IV Continuous <Continuous>    MEDICATIONS  (PRN):  acetaminophen     Tablet .. 650 milliGRAM(s) Oral every 6 hours PRN Temp greater or equal to 38C (100.4F), Mild Pain (1 - 3)  acetaminophen   IVPB .. 1000 milliGRAM(s) IV Intermittent once PRN Severe Pain (7 - 10)  aluminum hydroxide/magnesium hydroxide/simethicone Suspension 30 milliLiter(s) Oral every 4 hours PRN Dyspepsia  dextrose Oral Gel 15 Gram(s) Oral once PRN Blood Glucose LESS THAN 70 milliGRAM(s)/deciliter  melatonin 3 milliGRAM(s) Oral at bedtime PRN Insomnia  ondansetron Injectable 4 milliGRAM(s) IV Push every 8 hours PRN Nausea and/or Vomiting  oxyCODONE    IR 5 milliGRAM(s) Oral three times a day PRN Moderate Pain (4 - 6)          PHYSICAL EXAM:  GENERAL: NAD, well-groomed, well-developed  HEAD:  Atraumatic, Normocephalic  CHEST/LUNG: Clear to percussion bilaterally; No rales, rhonchi, wheezing, or rubs  HEART: Regular rate and rhythm; No murmurs, rubs, or gallops  ABDOMEN: Soft, Nontender, Nondistended; Bowel sounds present  EXTREMITIES:  2+ Peripheral Pulses, No clubbing, cyanosis, or edema  LYMPH: No lymphadenopathy noted  SKIN: No rashes or lesions    Care Discussed with Consultants/Other Providers [ ] YES  [ ] NO
  Infectious Diseases Follow Up:    Patient is a 68y old  Male who presents with a chief complaint of R testicle pain and swelling (13 Nov 2023 15:50)      Interval History/ROS:  Febrile to 100.4 yesterday, improved pain/swelling today     Allergies  Advil (Rash)        ANTIMICROBIALS:  cefTRIAXone   IVPB 2000 every 24 hours      Current Abx:     Previous Abx     OTHER MEDS:  MEDICATIONS  (STANDING):  acetaminophen     Tablet .. 650 every 6 hours PRN  aluminum hydroxide/magnesium hydroxide/simethicone Suspension 30 every 4 hours PRN  atenolol  Tablet 25 daily  atorvastatin 20 at bedtime  cilostazol 50 two times a day  dextrose 50% Injectable 12.5 once  dextrose 50% Injectable 25 once  dextrose 50% Injectable 25 once  dextrose Oral Gel 15 once PRN  glucagon  Injectable 1 once  influenza  Vaccine (HIGH DOSE) 0.7 once  insulin glargine Injectable (LANTUS) 20 every morning  insulin lispro (ADMELOG) corrective regimen sliding scale  three times a day before meals  insulin lispro (ADMELOG) corrective regimen sliding scale  at bedtime  insulin lispro Injectable (ADMELOG) 2 three times a day before meals  melatonin 3 at bedtime PRN  ondansetron Injectable 4 every 8 hours PRN  oxyCODONE    IR 5 three times a day PRN  pregabalin 75 daily      Vital Signs Last 24 Hrs  T(C): 36.6 (14 Nov 2023 08:30), Max: 38 (13 Nov 2023 23:35)  T(F): 97.9 (14 Nov 2023 08:30), Max: 100.4 (13 Nov 2023 23:35)  HR: 77 (14 Nov 2023 08:30) (72 - 91)  BP: 138/64 (14 Nov 2023 08:30) (130/55 - 162/75)  BP(mean): 80 (14 Nov 2023 08:30) (80 - 80)  RR: 18 (14 Nov 2023 08:30) (17 - 18)  SpO2: 99% (14 Nov 2023 08:30) (97% - 100%)    Parameters below as of 14 Nov 2023 08:30  Patient On (Oxygen Delivery Method): room air        PHYSICAL EXAM:  GENERAL: NAD, well-developed  HEAD:  Atraumatic, Normocephalic  EYES: EOMI, PERRLA, conjunctiva and sclera clear  NECK: Supple, No JVD  CHEST/LUNG: Clear to auscultation bilaterally; No wheeze  HEART: Regular rate and rhythm; No murmurs, rubs, or gallops  ABDOMEN: Soft, Nontender, Nondistended; Bowel sounds present  : Improved R scrotum pain   EXTREMITIES:  2+ Peripheral Pulses, No clubbing, cyanosis, or edema  PSYCH: AAOx3  NEUROLOGY: non-focal  SKIN: No rashes or lesions                          7.6    11.68 )-----------( 340      ( 14 Nov 2023 04:23 )             24.7       11-14    136  |  99  |  8   ----------------------------<  136<H>  3.3<L>   |  24  |  0.71    Ca    9.6      14 Nov 2023 04:23  Phos  4.1     11-14  Mg     2.10     11-14        Urinalysis Basic - ( 14 Nov 2023 04:23 )    Color: x / Appearance: x / SG: x / pH: x  Gluc: 136 mg/dL / Ketone: x  / Bili: x / Urobili: x   Blood: x / Protein: x / Nitrite: x   Leuk Esterase: x / RBC: x / WBC x   Sq Epi: x / Non Sq Epi: x / Bacteria: x        MICROBIOLOGY:  v  .Blood Blood-Peripheral  11-11-23   No growth at 48 Hours  --  --      .Blood Blood-Peripheral  11-11-23   No growth at 48 Hours  --  --      Clean Catch Clean Catch (Midstream)  11-11-23   <10,000 CFU/mL Normal Urogenital Luisa  --  --      .Blood Blood-Venous  10-22-23   No growth at 5 days  --  --      .Blood Blood-Peripheral  10-22-23   No growth at 5 days  --  --      Clean Catch Clean Catch (Midstream)  10-22-23   >100,000 CFU/ml Klebsiella pneumoniae  --  Klebsiella pneumoniae                RADIOLOGY:    
Patient is a 68y old  Male who presents with a chief complaint of R testicle pain and swelling (13 Nov 2023 13:27)    Date of servie : 11-13-23 @ 15:50  INTERVAL HPI/OVERNIGHT EVENTS:  T(C): 36.8 (11-13-23 @ 11:17), Max: 39.2 (11-12-23 @ 21:48)  HR: 73 (11-13-23 @ 11:17) (73 - 93)  BP: 130/55 (11-13-23 @ 11:17) (124/58 - 164/55)  RR: 17 (11-13-23 @ 11:17) (17 - 18)  SpO2: 100% (11-13-23 @ 11:17) (99% - 100%)  Wt(kg): --  I&O's Summary      LABS:                        7.8    14.15 )-----------( 337      ( 13 Nov 2023 05:36 )             25.6     11-13    135  |  101  |  8   ----------------------------<  191<H>  3.9   |  22  |  0.73    Ca    9.5      13 Nov 2023 05:36  Phos  3.2     11-13  Mg     1.90     11-13    TPro  8.0  /  Alb  3.8  /  TBili  0.3  /  DBili  x   /  AST  13  /  ALT  11  /  AlkPhos  110  11-11    PT/INR - ( 11 Nov 2023 19:20 )   PT: 12.6 sec;   INR: 1.12 ratio         PTT - ( 11 Nov 2023 19:20 )  PTT:31.1 sec  Urinalysis Basic - ( 13 Nov 2023 05:36 )    Color: x / Appearance: x / SG: x / pH: x  Gluc: 191 mg/dL / Ketone: x  / Bili: x / Urobili: x   Blood: x / Protein: x / Nitrite: x   Leuk Esterase: x / RBC: x / WBC x   Sq Epi: x / Non Sq Epi: x / Bacteria: x      CAPILLARY BLOOD GLUCOSE      POCT Blood Glucose.: 245 mg/dL (13 Nov 2023 12:16)  POCT Blood Glucose.: 267 mg/dL (13 Nov 2023 11:32)  POCT Blood Glucose.: 243 mg/dL (13 Nov 2023 08:39)  POCT Blood Glucose.: 180 mg/dL (12 Nov 2023 22:18)  POCT Blood Glucose.: 262 mg/dL (12 Nov 2023 16:24)        Urinalysis Basic - ( 13 Nov 2023 05:36 )    Color: x / Appearance: x / SG: x / pH: x  Gluc: 191 mg/dL / Ketone: x  / Bili: x / Urobili: x   Blood: x / Protein: x / Nitrite: x   Leuk Esterase: x / RBC: x / WBC x   Sq Epi: x / Non Sq Epi: x / Bacteria: x        MEDICATIONS  (STANDING):  atenolol  Tablet 25 milliGRAM(s) Oral daily  atorvastatin 20 milliGRAM(s) Oral at bedtime  cefepime   IVPB 2000 milliGRAM(s) IV Intermittent every 8 hours  cilostazol 50 milliGRAM(s) Oral two times a day  dextrose 5%. 1000 milliLiter(s) (50 mL/Hr) IV Continuous <Continuous>  dextrose 5%. 1000 milliLiter(s) (100 mL/Hr) IV Continuous <Continuous>  dextrose 50% Injectable 25 Gram(s) IV Push once  dextrose 50% Injectable 12.5 Gram(s) IV Push once  dextrose 50% Injectable 25 Gram(s) IV Push once  glucagon  Injectable 1 milliGRAM(s) IntraMuscular once  insulin glargine Injectable (LANTUS) 20 Unit(s) SubCutaneous every morning  insulin lispro (ADMELOG) corrective regimen sliding scale   SubCutaneous three times a day before meals  insulin lispro (ADMELOG) corrective regimen sliding scale   SubCutaneous at bedtime  insulin lispro Injectable (ADMELOG) 2 Unit(s) SubCutaneous three times a day before meals  pregabalin 75 milliGRAM(s) Oral daily  sodium chloride 0.9%. 1000 milliLiter(s) (100 mL/Hr) IV Continuous <Continuous>    MEDICATIONS  (PRN):  acetaminophen     Tablet .. 650 milliGRAM(s) Oral every 6 hours PRN Temp greater or equal to 38C (100.4F), Mild Pain (1 - 3)  aluminum hydroxide/magnesium hydroxide/simethicone Suspension 30 milliLiter(s) Oral every 4 hours PRN Dyspepsia  dextrose Oral Gel 15 Gram(s) Oral once PRN Blood Glucose LESS THAN 70 milliGRAM(s)/deciliter  melatonin 3 milliGRAM(s) Oral at bedtime PRN Insomnia  ondansetron Injectable 4 milliGRAM(s) IV Push every 8 hours PRN Nausea and/or Vomiting  oxyCODONE    IR 5 milliGRAM(s) Oral three times a day PRN Moderate Pain (4 - 6)          PHYSICAL EXAM:  GENERAL: NAD, well-groomed, well-developed  HEAD:  Atraumatic, Normocephalic  CHEST/LUNG: Clear to percussion bilaterally; No rales, rhonchi, wheezing, or rubs  HEART: Regular rate and rhythm; No murmurs, rubs, or gallops  ABDOMEN: Soft, Nontender, Nondistended; Bowel sounds present  EXTREMITIES:  2+ Peripheral Pulses, No clubbing, cyanosis, or edema  LYMPH: No lymphadenopathy noted  SKIN: No rashes or lesions    Care Discussed with Consultants/Other Providers [ ] YES  [ ] NO

## 2023-11-17 NOTE — PROGRESS NOTE ADULT - REASON FOR ADMISSION
R testicle pain and swelling

## 2023-11-17 NOTE — PROGRESS NOTE ADULT - PROVIDER SPECIALTY LIST ADULT
Cardiology
Hospitalist
Cardiology
Cardiology
Infectious Disease
Cardiology
Cardiology
Infectious Disease

## 2023-11-17 NOTE — PHARMACOTHERAPY INTERVENTION NOTE - COMMENTS
Discharge medications were reviewed with the patient and family at bedside. Current medication schedule was discussed in detail including: medication name, indication, dose, administration times, treatment duration of antibiotics, side effects, drug interactions, and special instructions. All questions and concerns were answered and addressed. Patient verbalized understanding and was provided with educational handout.    New medications: Levofloxacin.    Irais Sherman, PharmD  Clinical Pharmacy Specialist  [facility] [address]

## 2023-11-19 LAB
CULTURE RESULTS: SIGNIFICANT CHANGE UP
SPECIMEN SOURCE: SIGNIFICANT CHANGE UP

## 2023-11-29 NOTE — H&P CARDIOLOGY - GASTROINTESTINAL
Mrs. Savi Ivy is a 47 year old female patient with past medical history of chronic back pain (s/p multiple spinal surgeries), asthma, obesity (s/p gastric bypass), and HTN who is admitted for Acute Inpatient Rehabilitation with a multidisciplinary rehab program at [facility] with functional impairments in ADLs and mobility secondary to chronic back pain s/p multiple spinal surgeries treated further surgically with an elective revision of a PSF with extension of fusion with instrumentation from T3 through pelvis, L4 pedicle subtraction osteotomy (PSO), tethers, iliac fixation, T3-T10 Duvall-Meneses osteotomies with Plastic Surgery closure whose intraoperative course was remarkable for acute blood loss requiring 3 unit PRBC transfusion with one additional PRBC post-op. Post-op hospital course additionally significant for urinary retention.    Spinal stenosis with neurogenic claudication s/p T4-Pelvis revision surgery  - S/P elective revision of PSF with extension of fusion with instrumentation from T3 through pelvis (11/14)  - S/P L4 pedicle subtraction osteotomy (PSO)  - S/P proximal tethers and iliac fixation  - S/P T3-T10 Smith-Meneses osteotomies with Plastic Surgery closure   - HV x 2 and SHAWNA x 4 drains removed on 11/20   - Impaired ADLs and mobility  - Need for assistance with personal care   - Continue comprehensive rehab program of PT/OT - 3 hours a day, 5 days a week. P&O as needed   - Pain control as below  - Fall/Spinal precautions  - WBAT    Pain Control   - Initial regimen     * Tylenol 975 mg Q8H     * Cymbalta 60 mg QD     * Lyrica 200 mg TID     * Robaxin 500 mg TID     * Fentanyl Patch 50 mcg Q72H     * PRN: Morphine 15 mg Q4H mod pain, 30 mg Q4H severe pain  - Current regimen as follows (changed on 11/29):     * acetaminophen     Tablet .. 975 milliGRAM(s) Oral every 8 hours     * DULoxetine 60 milliGRAM(s) Oral daily     * methocarbamol 500 milliGRAM(s) Oral three times a day     * fentaNYL   Patch  25 MICROgram(s)/Hr 1 Patch Transdermal every 72 hours     * pregabalin 200 milliGRAM(s) Oral three times a day     * morphine   Solution 10 milliGRAM(s) Oral every 4 hours PRN Moderate Pain (4 - 6)     * morphine   Solution 25 milliGRAM(s) Oral every 4 hours PRN Severe Pain (7 - 10)  - follow with pain management outpatient  - evaluated by pain management at [facility] who recommended discharge on home dose of nucynta 75mg 4x/day, fentanyl patch, as well as robaxin 500mg PO 3x/day and lyrica 200mg PO 3x/day      Acute Blood Loss Anemia  - s/p 3u PRBCs intra-op and one additional PRBC post-op  - monitor H/H    Asthma  - albuterol inhaler Q6H PRN    HTN  - continue triamterene 37.5mg- HCTZ 25mg QD  - Monitor BP    Mood / Cognition  - Neuropsychology consult PRN    Sleep  - Maintain quiet hours and a low stim environment.   - Melatonin 3 mg QHS     GI / Bowel  - at risk for constipation due to neurologic diagnosis, immobility, and/or medication use  - Senna qHS  - Miralax BID  - Movantik 12.5 mg QD  - Dulcolax 5 mg BID PRN constipation  - GI ppx: pantoprazole 40 mg QD     / Bladder  - Currently patient voids independently  - Cobb removed 11/21 and passed TOV    Skin:  - Skin assessment on admission performed : Left buttock healing wound with scab, spinal surgical incision clean an dry and left LIVE (11/25)  - Pressure Injury/Skin: OOB to chair, PT/OT  - nursing to monitor skin q Shift    Diet:  - Diet Consistency: regular  - Aspiration Precautions  - Nutrition consult    DVT prophylaxis:   - Lovenox 40 mg QD    Outpatient Follow-up:  Schwab, Frank Johann  Orthopaedic Surgery  [address]  [place] [address]  Phone: [phone]  Fax: [phone]    Code Status/Emergency Contact:    ---------------    Goals: Safe discharge to home  Estimated Length of Stay: 10-14 days  Rehab Potential: Good  Medical Prognosis: Good  Estimated Disposition: Home with home care      PRESCREEN COMPARISON:  I have reviewed the prescreen information and I have found no relevant changes between the preadmission screening and my post admission evaluation.    RATIONALE FOR INPATIENT ADMISSION: Patient demonstrates the following:  [X] Medically appropriate for rehabilitation admission  [X] Has attainable rehab goals with an appropriate initial discharge plan  [X]Has rehabilitation potential (expected to make a significant improvement within a reasonable period of time)  [X] Requires close medical management by a rehab physician, rehab nursing care, Hospitalist and comprehensive interdisciplinary team (including PT, OT and/or SLP, Prosthetics and Orthotics)     detailed exam

## 2024-01-10 ENCOUNTER — APPOINTMENT (OUTPATIENT)
Dept: VASCULAR SURGERY | Facility: CLINIC | Age: 69
End: 2024-01-10
Payer: COMMERCIAL

## 2024-01-10 VITALS
DIASTOLIC BLOOD PRESSURE: 77 MMHG | TEMPERATURE: 98.4 F | SYSTOLIC BLOOD PRESSURE: 147 MMHG | HEIGHT: 66 IN | WEIGHT: 142 LBS | HEART RATE: 81 BPM | BODY MASS INDEX: 22.82 KG/M2

## 2024-01-10 PROCEDURE — 99213 OFFICE O/P EST LOW 20 MIN: CPT

## 2024-01-10 PROCEDURE — 93923 UPR/LXTR ART STDY 3+ LVLS: CPT

## 2024-01-11 RX ORDER — PREGABALIN 75 MG/1
75 CAPSULE ORAL
Refills: 0 | Status: ACTIVE | COMMUNITY

## 2024-01-11 RX ORDER — PNV NO.95/FERROUS FUM/FOLIC AC 28MG-0.8MG
TABLET ORAL
Refills: 0 | Status: ACTIVE | COMMUNITY

## 2024-01-11 RX ORDER — LINAGLIPTIN AND METFORMIN HYDROCHLORIDE 2.5; 1 MG/1; MG/1
2.5-1 TABLET, FILM COATED ORAL
Refills: 0 | Status: ACTIVE | COMMUNITY

## 2024-01-11 NOTE — HISTORY OF PRESENT ILLNESS
[FreeTextEntry1] : 67 year old male with history of HTN, HLD, DM, PAD, and aortoiliac occlusive disease who presents s/p diagnostic right pelvic angiography. He previously underwent outpatient angiography complicated by right iliac artery dissection.   04/12/23 - s/p bilateral lower extremity angiogram with revascularization denies rest pain, complains of one block intermittent claudication complains of burning pain in plantar aspect of both feet from time to time  07/19/23 - Stable intermittent one block claudication of left lower extremity no symptoms in right leg   11/08/23 - Stable intermittent one block claudication of left lower extremity, no rest pain. [de-identified] : Now has rest pain in his left lower extremity. He has constant pain from his foot to his lower leg which only somewhat improves with dependency.

## 2024-01-11 NOTE — ASSESSMENT
[FreeTextEntry1] : Problem #1 peripheral arterial disease chronic limb threatening ischemia of the left lower extremity, Bert 4 will schedule for left lower extremity angiogram, possible revascularization

## 2024-01-11 NOTE — DATA REVIEWED
24-Aug-2021 11:57 25-Aug-2021 14:26 [FreeTextEntry1] : 01/2024  R BRITTNEY .77 R TBI .58 (toe pressure 85)  L BRTITNEY .27 L TBI .08 (toe pressure 11)  11/2023 R BRITTNEY .77 R TBI .52 (toe pressure 74)  L BRITTNEY 0.00 L TBI 0.00  07/2023 R BRITTNEY .82 R TBI .50 (toe pressure 60)  L BRITTNEY 0.00 L TBI 0.00 24-Aug-2021 12:26 26-Aug-2021 10:48 27-Aug-2021 12:15

## 2024-01-11 NOTE — PHYSICAL EXAM
[Respiratory Effort] : normal respiratory effort [Normal Rate and Rhythm] : normal rate and rhythm [2+] : left 2+ [1+] : right 1+ [0] : left 0 [Ankle Swelling (On Exam)] : not present [Varicose Veins Of Lower Extremities] : not present [] : not present [Abdomen Tenderness] : ~T ~M No abdominal tenderness [Skin Ulcer] : no ulcer [Alert] : alert [Oriented to Person] : oriented to person [Oriented to Place] : oriented to place [Oriented to Time] : oriented to time [Calm] : calm [de-identified] : appears stated age [de-identified] : normocephalic, atraumatic [de-identified] : supple

## 2024-01-30 ENCOUNTER — APPOINTMENT (OUTPATIENT)
Dept: VASCULAR SURGERY | Facility: HOSPITAL | Age: 69
End: 2024-01-30

## 2024-01-30 ENCOUNTER — INPATIENT (INPATIENT)
Facility: HOSPITAL | Age: 69
LOS: 0 days | Discharge: HOME | End: 2024-01-31
Attending: STUDENT IN AN ORGANIZED HEALTH CARE EDUCATION/TRAINING PROGRAM | Admitting: STUDENT IN AN ORGANIZED HEALTH CARE EDUCATION/TRAINING PROGRAM
Payer: MEDICARE

## 2024-01-30 VITALS
TEMPERATURE: 98 F | OXYGEN SATURATION: 100 % | SYSTOLIC BLOOD PRESSURE: 148 MMHG | RESPIRATION RATE: 16 BRPM | HEART RATE: 92 BPM | DIASTOLIC BLOOD PRESSURE: 63 MMHG

## 2024-01-30 DIAGNOSIS — Z95.820 PERIPHERAL VASCULAR ANGIOPLASTY STATUS WITH IMPLANTS AND GRAFTS: Chronic | ICD-10-CM

## 2024-01-30 DIAGNOSIS — Z29.9 ENCOUNTER FOR PROPHYLACTIC MEASURES, UNSPECIFIED: ICD-10-CM

## 2024-01-30 DIAGNOSIS — I10 ESSENTIAL (PRIMARY) HYPERTENSION: ICD-10-CM

## 2024-01-30 DIAGNOSIS — E11.9 TYPE 2 DIABETES MELLITUS WITHOUT COMPLICATIONS: ICD-10-CM

## 2024-01-30 DIAGNOSIS — I73.9 PERIPHERAL VASCULAR DISEASE, UNSPECIFIED: ICD-10-CM

## 2024-01-30 DIAGNOSIS — Z90.49 ACQUIRED ABSENCE OF OTHER SPECIFIED PARTS OF DIGESTIVE TRACT: Chronic | ICD-10-CM

## 2024-01-30 DIAGNOSIS — E78.5 HYPERLIPIDEMIA, UNSPECIFIED: ICD-10-CM

## 2024-01-30 DIAGNOSIS — D64.9 ANEMIA, UNSPECIFIED: ICD-10-CM

## 2024-01-30 LAB
ANION GAP SERPL CALC-SCNC: 12 MMOL/L — SIGNIFICANT CHANGE UP (ref 7–14)
BLD GP AB SCN SERPL QL: NEGATIVE — SIGNIFICANT CHANGE UP
BUN SERPL-MCNC: 8 MG/DL — SIGNIFICANT CHANGE UP (ref 7–23)
CALCIUM SERPL-MCNC: 9.4 MG/DL — SIGNIFICANT CHANGE UP (ref 8.4–10.5)
CHLORIDE SERPL-SCNC: 102 MMOL/L — SIGNIFICANT CHANGE UP (ref 98–107)
CO2 SERPL-SCNC: 23 MMOL/L — SIGNIFICANT CHANGE UP (ref 22–31)
CREAT SERPL-MCNC: 0.7 MG/DL — SIGNIFICANT CHANGE UP (ref 0.5–1.3)
EGFR: 99 ML/MIN/1.73M2 — SIGNIFICANT CHANGE UP
GLUCOSE BLDC GLUCOMTR-MCNC: 174 MG/DL — HIGH (ref 70–99)
GLUCOSE SERPL-MCNC: 169 MG/DL — HIGH (ref 70–99)
HCT VFR BLD CALC: 24.1 % — LOW (ref 39–50)
HGB BLD-MCNC: 7.1 G/DL — LOW (ref 13–17)
MCHC RBC-ENTMCNC: 20.5 PG — LOW (ref 27–34)
MCHC RBC-ENTMCNC: 29.5 GM/DL — LOW (ref 32–36)
MCV RBC AUTO: 69.5 FL — LOW (ref 80–100)
NRBC # BLD: 0 /100 WBCS — SIGNIFICANT CHANGE UP (ref 0–0)
NRBC # FLD: 0 K/UL — SIGNIFICANT CHANGE UP (ref 0–0)
PLATELET # BLD AUTO: 304 K/UL — SIGNIFICANT CHANGE UP (ref 150–400)
POTASSIUM SERPL-MCNC: 3.9 MMOL/L — SIGNIFICANT CHANGE UP (ref 3.5–5.3)
POTASSIUM SERPL-SCNC: 3.9 MMOL/L — SIGNIFICANT CHANGE UP (ref 3.5–5.3)
RBC # BLD: 3.47 M/UL — LOW (ref 4.2–5.8)
RBC # FLD: 18.8 % — HIGH (ref 10.3–14.5)
RH IG SCN BLD-IMP: NEGATIVE — SIGNIFICANT CHANGE UP
RH IG SCN BLD-IMP: NEGATIVE — SIGNIFICANT CHANGE UP
SODIUM SERPL-SCNC: 137 MMOL/L — SIGNIFICANT CHANGE UP (ref 135–145)
WBC # BLD: 8.93 K/UL — SIGNIFICANT CHANGE UP (ref 3.8–10.5)
WBC # FLD AUTO: 8.93 K/UL — SIGNIFICANT CHANGE UP (ref 3.8–10.5)

## 2024-01-30 PROCEDURE — 99222 1ST HOSP IP/OBS MODERATE 55: CPT

## 2024-01-30 RX ORDER — SODIUM CHLORIDE 9 MG/ML
1000 INJECTION, SOLUTION INTRAVENOUS
Refills: 0 | Status: DISCONTINUED | OUTPATIENT
Start: 2024-01-30 | End: 2024-01-31

## 2024-01-30 RX ORDER — DEXTROSE 50 % IN WATER 50 %
25 SYRINGE (ML) INTRAVENOUS ONCE
Refills: 0 | Status: DISCONTINUED | OUTPATIENT
Start: 2024-01-30 | End: 2024-01-31

## 2024-01-30 RX ORDER — INSULIN LISPRO 100/ML
VIAL (ML) SUBCUTANEOUS
Refills: 0 | Status: DISCONTINUED | OUTPATIENT
Start: 2024-01-30 | End: 2024-01-31

## 2024-01-30 RX ORDER — ATORVASTATIN CALCIUM 80 MG/1
20 TABLET, FILM COATED ORAL AT BEDTIME
Refills: 0 | Status: DISCONTINUED | OUTPATIENT
Start: 2024-01-30 | End: 2024-01-31

## 2024-01-30 RX ORDER — INSULIN GLARGINE 100 [IU]/ML
24 INJECTION, SOLUTION SUBCUTANEOUS
Refills: 0 | DISCHARGE

## 2024-01-30 RX ORDER — ASPIRIN/CALCIUM CARB/MAGNESIUM 324 MG
81 TABLET ORAL DAILY
Refills: 0 | Status: DISCONTINUED | OUTPATIENT
Start: 2024-01-30 | End: 2024-01-31

## 2024-01-30 RX ORDER — LOSARTAN POTASSIUM 100 MG/1
50 TABLET, FILM COATED ORAL DAILY
Refills: 0 | Status: DISCONTINUED | OUTPATIENT
Start: 2024-01-30 | End: 2024-01-31

## 2024-01-30 RX ORDER — ATENOLOL 25 MG/1
25 TABLET ORAL DAILY
Refills: 0 | Status: DISCONTINUED | OUTPATIENT
Start: 2024-01-30 | End: 2024-01-31

## 2024-01-30 RX ORDER — GLUCAGON INJECTION, SOLUTION 0.5 MG/.1ML
1 INJECTION, SOLUTION SUBCUTANEOUS ONCE
Refills: 0 | Status: DISCONTINUED | OUTPATIENT
Start: 2024-01-30 | End: 2024-01-31

## 2024-01-30 RX ORDER — ASPIRIN/CALCIUM CARB/MAGNESIUM 324 MG
1 TABLET ORAL
Qty: 30 | Refills: 3
Start: 2024-01-30 | End: 2024-05-28

## 2024-01-30 RX ORDER — DEXTROSE 50 % IN WATER 50 %
12.5 SYRINGE (ML) INTRAVENOUS ONCE
Refills: 0 | Status: DISCONTINUED | OUTPATIENT
Start: 2024-01-30 | End: 2024-01-31

## 2024-01-30 RX ORDER — SODIUM CHLORIDE 9 MG/ML
3 INJECTION INTRAMUSCULAR; INTRAVENOUS; SUBCUTANEOUS EVERY 8 HOURS
Refills: 0 | Status: DISCONTINUED | OUTPATIENT
Start: 2024-01-30 | End: 2024-01-31

## 2024-01-30 RX ORDER — SODIUM CHLORIDE 9 MG/ML
500 INJECTION INTRAMUSCULAR; INTRAVENOUS; SUBCUTANEOUS
Refills: 0 | Status: DISCONTINUED | OUTPATIENT
Start: 2024-01-30 | End: 2024-01-31

## 2024-01-30 RX ORDER — CILOSTAZOL 100 MG/1
50 TABLET ORAL
Refills: 0 | Status: DISCONTINUED | OUTPATIENT
Start: 2024-01-30 | End: 2024-01-30

## 2024-01-30 RX ORDER — DIPHENHYDRAMINE HCL 50 MG
25 CAPSULE ORAL ONCE
Refills: 0 | Status: DISCONTINUED | OUTPATIENT
Start: 2024-01-30 | End: 2024-01-31

## 2024-01-30 RX ORDER — ACETAMINOPHEN 500 MG
650 TABLET ORAL ONCE
Refills: 0 | Status: DISCONTINUED | OUTPATIENT
Start: 2024-01-30 | End: 2024-01-31

## 2024-01-30 RX ORDER — DEXTROSE 50 % IN WATER 50 %
15 SYRINGE (ML) INTRAVENOUS ONCE
Refills: 0 | Status: DISCONTINUED | OUTPATIENT
Start: 2024-01-30 | End: 2024-01-31

## 2024-01-30 RX ADMIN — Medication 81 MILLIGRAM(S): at 12:57

## 2024-01-30 RX ADMIN — SODIUM CHLORIDE 3 MILLILITER(S): 9 INJECTION INTRAMUSCULAR; INTRAVENOUS; SUBCUTANEOUS at 22:10

## 2024-01-30 RX ADMIN — Medication 3: at 18:22

## 2024-01-30 RX ADMIN — SODIUM CHLORIDE 75 MILLILITER(S): 9 INJECTION INTRAMUSCULAR; INTRAVENOUS; SUBCUTANEOUS at 11:32

## 2024-01-30 RX ADMIN — ATORVASTATIN CALCIUM 20 MILLIGRAM(S): 80 TABLET, FILM COATED ORAL at 21:07

## 2024-01-30 RX ADMIN — Medication 75 MILLIGRAM(S): at 21:07

## 2024-01-30 NOTE — CONSULT NOTE ADULT - PROBLEM SELECTOR RECOMMENDATION 6
already on asa.  Heparin drip to start after PBBC. keep Ptt 60--80    d/w Dr Trujillo and with BOBBI Pederson as well as patient and wife and daughter Sara at bedside ([phone]

## 2024-01-30 NOTE — CONSULT NOTE ADULT - PROBLEM SELECTOR RECOMMENDATION 2
Hgb 7.1 today  I unit PRBC. stool occult . Gi referral, after orbc get cbc, then start heparin drip. with no bolus keep ptt 60-80  Keep asa, got today Hgb 7.1 today  t/s and transfuse  I unit PRBC. stool occult . Gi referral, after orbc get cbc, then start heparin drip. with no bolus   keep ptt 60-80  Keep asa, got today  Keep hgb >8  cbc q8 hrs Hgb 7.1 today  t/s and transfuse  I unit PRBC. stool occult . Gi consult ,   after 1 prbc get cbc, then start heparin drip. with no bolus   keep ptt 60-80  Keep asa, got today  r/o gi bleed.  Keep hgb >8  cbc q8 hrs

## 2024-01-30 NOTE — H&P CARDIOLOGY - REVIEW OF SYSTEMS
The patient denies chest pain, SOB, palpitations, dizziness, presyncope, syncope,  headache, visual disturbances, CVA, PE, DVT, RENE, abdominal pain, N/V/D/C, hematochezia, melena, dysuria, hematuria, fever, chills.

## 2024-01-30 NOTE — CONSULT NOTE ADULT - PROBLEM SELECTOR RECOMMENDATION 3
c/w Lovastatin DM type 2 , insulin dependent.  Dm diet started.  Monitor FS with SS coverage. Hold Po medications.  monitor hbaic in AM.  Daughter noted patient no longer takes Lantus

## 2024-01-30 NOTE — CONSULT NOTE ADULT - ASSESSMENT
68 y.o. male HTN, HLD, PVDhad stent prior tp R LE  , DM  presents today for elective LLE angiogram. The patient c/o LLE claudication getting progressively worse.  Had Stent to LLE today.    d/w Dr Trujillo and with BOBBI Pederson as well as patient and wife and daughter Sara at bedside ([phone]     68 y.o. male HTN, DM  HLD, PVD had stent prior tp R LE  , DM  presents today for elective LLE angiogram. The patient c/o LLE claudication getting progressively worse.  Had Stent to LLE today.  DM , can do FS with ss coverage. monitor hgb a1c in AM    d/w Dr Trujillo and with BOBBI Pederson as well as patient and wife and daughter Sara at bedside ([phone]

## 2024-01-30 NOTE — CHART NOTE - NSCHARTNOTEFT_GEN_A_CORE
The patient is s/p LLE angioplasty, noted to have Hgb 7.1 (baseline 7.6-8.6). Dr. Trujillo was made aware, recommended to f/u  with patient's PMD as an oupatient.  LLE angiogram - SFA and popliteal stenosis - s/p POBA to SFA + stent and POBA to Popliteal.   As per Dr. Trujillo, will start ASA 81 mg daily, Eliquis  in AM, continue Cilostazol. The patient is s/p LLE angiogram, noted to have Hgb 7.1 pre procedure (baseline 7.6-8.6). Dr. Trujillo was made aware, recommended to admit the patient under a hospitalist to work up anemia, GI consult.   The patient and his family were made aware.

## 2024-01-30 NOTE — CONSULT NOTE ADULT - SUBJECTIVE AND OBJECTIVE BOX
HPI:  68 y.o. male presents today for elective LLE angiogram. The patient c/o LLE claudication getting progressively worse. The patient denies chest pain, SOB, palpitations, dizziness, presyncope, syncope,  headache, visual disturbances, CVA, PE, DVT, RENE, abdominal pain, N/V/D/C, hematochezia, melena, dysuria, hematuria, fever, chills. He was evaluated by a vascular surgeon, recommended to have LLE angiogram/angioplasty. He denies any complaints at present.     PS: the patient has h/o anemia, s/p GIB, transfused in 11/2023.    (30 Jan 2024 06:34)      PAST MEDICAL & SURGICAL HISTORY:  HTN (hypertension)  HLD (hyperlipidemia)  DM (diabetes mellitus)  PAD (peripheral artery disease)      Tobacco abuse  Former      H/O iron deficiency  [place] (hard of hearing)  Peripheral neuropathy  S/P appendectomy  S/P peripheral artery angioplasty with stent placement      Review of Systems:   CONSTITUTIONAL: No fever, weight loss, or fatigue  EYES: No eye pain, visual disturbances, or discharge  ENMT:  No difficulty hearing, tinnitus, vertigo; No sinus or throat pain  NECK: No pain or stiffness  BREASTS: No pain, masses, or nipple discharge  RESPIRATORY: No cough, wheezing, chills or hemoptysis; No shortness of breath  CARDIOVASCULAR: No chest pain, palpitations, dizziness, or leg swelling  GASTROINTESTINAL: No abdominal or epigastric pain. No nausea, vomiting, or hematemesis; No diarrhea or constipation. No melena or hematochezia.  GENITOURINARY: No dysuria, frequency, hematuria, or incontinence  NEUROLOGICAL: No headaches, memory loss, loss of strength, numbness, or tremors  SKIN: No itching, burning, rashes, or lesions   LYMPH NODES: No enlarged glands  ENDOCRINE: No heat or cold intolerance; No hair loss  MUSCULOSKELETAL: No joint pain or swelling; No muscle, back, or extremity pain  PSYCHIATRIC: No depression, anxiety, mood swings, or difficulty sleeping  HEME/LYMPH: No easy bruising, or bleeding gums  ALLERY AND IMMUNOLOGIC: No hives or eczema    Allergies    Advil (Rash)    Intolerances        Social History:     FAMILY HISTORY:  No pertinent family history in first degree relatives        MEDICATIONS  (STANDING):  aspirin enteric coated 81 milliGRAM(s) Oral daily  atenolol  Tablet 25 milliGRAM(s) Oral daily  atorvastatin 20 milliGRAM(s) Oral at bedtime  cilostazol 50 milliGRAM(s) Oral two times a day  dextrose 5%. 1000 milliLiter(s) (100 mL/Hr) IV Continuous <Continuous>  dextrose 5%. 1000 milliLiter(s) (50 mL/Hr) IV Continuous <Continuous>  dextrose 50% Injectable 25 Gram(s) IV Push once  dextrose 50% Injectable 12.5 Gram(s) IV Push once  dextrose 50% Injectable 25 Gram(s) IV Push once  glucagon  Injectable 1 milliGRAM(s) IntraMuscular once  insulin lispro (ADMELOG) corrective regimen sliding scale   SubCutaneous three times a day before meals  losartan 50 milliGRAM(s) Oral daily  pregabalin 75 milliGRAM(s) Oral daily  sodium chloride 0.9% lock flush 3 milliLiter(s) IV Push every 8 hours  sodium chloride 0.9%. 500 milliLiter(s) (75 mL/Hr) IV Continuous <Continuous>    MEDICATIONS  (PRN):  dextrose Oral Gel 15 Gram(s) Oral once PRN Blood Glucose LESS THAN 70 milliGRAM(s)/deciliter        CAPILLARY BLOOD GLUCOSE      POCT Blood Glucose.: 220 mg/dL (30 Jan 2024 15:10)  POCT Blood Glucose.: 174 mg/dL (30 Jan 2024 06:56)        PHYSICAL EXAM:  GENERAL: NAD,   HEAD:  Atraumatic, Normocephalic  EYES: EOMI, PERRLA, conjunctiva and sclera clear  NECK: Supple, No JVD  CHEST/LUNG: Clear to auscultation bilaterally; No wheeze  HEART: Regular rate and rhythm; No murmurs, rubs, or gallops  ABDOMEN: Soft, Nontender, Nondistended; Bowel sounds present  EXTREMITIES:  2+ Peripheral Pulses, No clubbing, cyanosis, or edema  PSYCH: AAOx3  NEUROLOGY: non-focal  SKIN: No rashes or lesions    LABS:                        7.1    8.93  )-----------( 304      ( 30 Jan 2024 07:00 )             24.1     01-30    137  |  102  |  8   ----------------------------<  169<H>  3.9   |  23  |  0.70    Ca    9.4      30 Jan 2024 07:00        RADIOLOGY & ADDITIONAL TESTS:      Care Discussed with Consultants/Other Providers:   HPI:  68 y.o. male presents today for elective LLE angiogram. The patient c/o LLE claudication getting progressively worse. The patient denies chest pain, SOB, palpitations, dizziness, presyncope, syncope,  headache, visual disturbances, CVA, PE, DVT, RENE, abdominal pain, N/V/D/C, hematochezia, melena, dysuria, hematuria, fever, chills. He was evaluated by a vascular surgeon, recommended to have LLE angiogram/angioplasty. He denies any complaints at present.     PS: the patient has h/o anemia, s/p GIB, transfused in 11/2023.    (30 Jan 2024 06:34)      PAST MEDICAL & SURGICAL HISTORY:  HTN (hypertension)  HLD (hyperlipidemia)  DM (diabetes mellitus)  PAD (peripheral artery disease)      Tobacco abuse  Former      H/O iron deficiency  [place] (hard of hearing)  Peripheral neuropathy  S/P appendectomy  S/P peripheral artery angioplasty with stent placement      Review of Systems:   CONSTITUTIONAL: No fever, weight loss, or fatigue  EYES: No eye pain, visual disturbances, or discharge  ENMT:  No difficulty hearing, tinnitus, vertigo; No sinus or throat pain  NECK: No pain or stiffness  BREASTS: No pain, masses, or nipple discharge  RESPIRATORY: No cough, wheezing, chills or hemoptysis; No shortness of breath  CARDIOVASCULAR: No chest pain, palpitations, dizziness, or leg swelling  GASTROINTESTINAL: No abdominal or epigastric pain. No nausea, vomiting, or hematemesis; No diarrhea or constipation. No melena or hematochezia.  Had GI bleed in past  GENITOURINARY: No dysuria, frequency, hematuria, or incontinence  NEUROLOGICAL: No headaches, memory loss, loss of strength, numbness to feet   SKIN: No itching, burning, rashes, or lesions   LYMPH NODES: No enlarged glands  ENDOCRINE: No heat or cold intolerance; No hair loss  MUSCULOSKELETAL: No joint pain or swelling; No muscle, back, or extremity pain  PSYCHIATRIC: No depression, anxiety, mood swings, or difficulty sleeping  HEME/LYMPH: No easy bruising, or bleeding gums  ALLERY AND IMMUNOLOGIC: No hives or eczema    Allergies  Advil (Rash)  Intolerances        Social History:     FAMILY HISTORY:  No pertinent family history in first degree relatives        MEDICATIONS  (STANDING):  aspirin enteric coated 81 milliGRAM(s) Oral daily  atenolol  Tablet 25 milliGRAM(s) Oral daily  atorvastatin 20 milliGRAM(s) Oral at bedtime  cilostazol 50 milliGRAM(s) Oral two times a day  dextrose 5%. 1000 milliLiter(s) (100 mL/Hr) IV Continuous <Continuous>  dextrose 5%. 1000 milliLiter(s) (50 mL/Hr) IV Continuous <Continuous>  dextrose 50% Injectable 25 Gram(s) IV Push once  dextrose 50% Injectable 12.5 Gram(s) IV Push once  dextrose 50% Injectable 25 Gram(s) IV Push once  glucagon  Injectable 1 milliGRAM(s) IntraMuscular once  insulin lispro (ADMELOG) corrective regimen sliding scale   SubCutaneous three times a day before meals  losartan 50 milliGRAM(s) Oral daily  pregabalin 75 milliGRAM(s) Oral daily  sodium chloride 0.9% lock flush 3 milliLiter(s) IV Push every 8 hours  sodium chloride 0.9%. 500 milliLiter(s) (75 mL/Hr) IV Continuous <Continuous>    MEDICATIONS  (PRN):  dextrose Oral Gel 15 Gram(s) Oral once PRN Blood Glucose LESS THAN 70 milliGRAM(s)/deciliter        CAPILLARY BLOOD GLUCOSE      POCT Blood Glucose.: 220 mg/dL (30 Jan 2024 15:10)  POCT Blood Glucose.: 174 mg/dL (30 Jan 2024 06:56)        PHYSICAL EXAM:  GENERAL: NAD,   HEAD:  Atraumatic, Normocephalic  EYES: EOMI, PERRLA, conjunctiva and sclera clear  NECK: Supple, No JVD  CHEST/LUNG: Clear to auscultation bilaterally; No wheeze  HEART: Regular rate and rhythm; No murmurs, rubs, or gallops  ABDOMEN: Soft, Nontender, Nondistended; Bowel sounds present  EXTREMITIES:  2+ Peripheral Pulses, No clubbing, cyanosis, or edema  PSYCH: AAOx3  NEUROLOGY: non-focal  SKIN: No rashes or lesions    LABS:                        7.1    8.93  )-----------( 304      ( 30 Jan 2024 07:00 )             24.1     01-30    137  |  102  |  8   ----------------------------<  169<H>  3.9   |  23  |  0.70    Ca    9.4      30 Jan 2024 07:00        RADIOLOGY & ADDITIONAL TESTS:  s/p Stent L LE 1/30/24 and need asa  Care Discussed with Consultants/Other Providers:

## 2024-01-30 NOTE — CONSULT NOTE ADULT - PROBLEM SELECTOR RECOMMENDATION 5
already on asa.  Heparin drip to start after PBBC. keep Ptt 60--80    d/w Dr Trujillo and with BOBBI Pederson as well as patient and wife and daughter Sara at bedside ([phone] c/w cozaar

## 2024-01-30 NOTE — H&P CARDIOLOGY - NSICDXPASTMEDICALHX_GEN_ALL_CORE_FT
PAST MEDICAL HISTORY:  DM (diabetes mellitus)     H/O iron deficiency     HLD (hyperlipidemia)     HTN (hypertension)     PAD (peripheral artery disease)     Tobacco abuse Former     PAST MEDICAL HISTORY:  DM (diabetes mellitus)     H/O iron deficiency     HLD (hyperlipidemia)     [place] (hard of hearing)     HTN (hypertension)     PAD (peripheral artery disease)     Peripheral neuropathy     Tobacco abuse Former

## 2024-01-30 NOTE — H&P CARDIOLOGY - HISTORY OF PRESENT ILLNESS
68 y.o. male presents today for elective LLE angiogram.  68 y.o. male presents today for elective LLE angiogram. The patient c/o LLE claudication getting progressively worse. The patient denies chest pain, SOB, palpitations, dizziness, presyncope, syncope,  headache, visual disturbances, CVA, PE, DVT, RENE, abdominal pain, N/V/D/C, hematochezia, melena, dysuria, hematuria, fever, chills. He was evaluated by a vascular surgeon, recommended to have LLE angiogram/angioplasty. He denies any complaints at present.    68 y.o. male presents today for elective LLE angiogram. The patient c/o LLE claudication getting progressively worse. The patient denies chest pain, SOB, palpitations, dizziness, presyncope, syncope,  headache, visual disturbances, CVA, PE, DVT, RENE, abdominal pain, N/V/D/C, hematochezia, melena, dysuria, hematuria, fever, chills. He was evaluated by a vascular surgeon, recommended to have LLE angiogram/angioplasty. He denies any complaints at present.     PS: the patient has h/o anemia, s/p GIB, transfused in 11/2023.

## 2024-01-30 NOTE — CONSULT NOTE ADULT - PROBLEM SELECTOR RECOMMENDATION 9
d/w Dr Trujillo and with BOBBI Pederson as well as patient and wife and daughter Sara at bedside ([phone]  I unit PRBC. stool occult . Gi referral, after orbc get cbc, then start heparin drip. with no bolus keep ptt 60-80  Keep asa, got today  Start heparin drip keep at 60 to 80 PTT and monitor for bleed, then plan to transition to Eliquis  keep hgb 8  no pletal.

## 2024-01-31 ENCOUNTER — TRANSCRIPTION ENCOUNTER (OUTPATIENT)
Age: 69
End: 2024-01-31

## 2024-01-31 VITALS
HEART RATE: 89 BPM | OXYGEN SATURATION: 99 % | SYSTOLIC BLOOD PRESSURE: 134 MMHG | TEMPERATURE: 99 F | DIASTOLIC BLOOD PRESSURE: 60 MMHG | RESPIRATION RATE: 18 BRPM

## 2024-01-31 LAB
A1C WITH ESTIMATED AVERAGE GLUCOSE RESULT: 7.5 % — HIGH (ref 4–5.6)
ANION GAP SERPL CALC-SCNC: 11 MMOL/L — SIGNIFICANT CHANGE UP (ref 7–14)
APTT BLD: 28.6 SEC — SIGNIFICANT CHANGE UP (ref 24.5–35.6)
APTT BLD: 60 SEC — HIGH (ref 24.5–35.6)
BUN SERPL-MCNC: 11 MG/DL — SIGNIFICANT CHANGE UP (ref 7–23)
CALCIUM SERPL-MCNC: 9 MG/DL — SIGNIFICANT CHANGE UP (ref 8.4–10.5)
CHLORIDE SERPL-SCNC: 101 MMOL/L — SIGNIFICANT CHANGE UP (ref 98–107)
CO2 SERPL-SCNC: 23 MMOL/L — SIGNIFICANT CHANGE UP (ref 22–31)
CREAT SERPL-MCNC: 0.75 MG/DL — SIGNIFICANT CHANGE UP (ref 0.5–1.3)
EGFR: 97 ML/MIN/1.73M2 — SIGNIFICANT CHANGE UP
ESTIMATED AVERAGE GLUCOSE: 169 — SIGNIFICANT CHANGE UP
GLUCOSE BLDC GLUCOMTR-MCNC: 196 MG/DL — HIGH (ref 70–99)
GLUCOSE SERPL-MCNC: 257 MG/DL — HIGH (ref 70–99)
HCT VFR BLD CALC: 29.6 % — LOW (ref 39–50)
HGB BLD-MCNC: 8.8 G/DL — LOW (ref 13–17)
MAGNESIUM SERPL-MCNC: 1.8 MG/DL — SIGNIFICANT CHANGE UP (ref 1.6–2.6)
MCHC RBC-ENTMCNC: 20.7 PG — LOW (ref 27–34)
MCHC RBC-ENTMCNC: 29.7 GM/DL — LOW (ref 32–36)
MCV RBC AUTO: 69.6 FL — LOW (ref 80–100)
NRBC # BLD: 0 /100 WBCS — SIGNIFICANT CHANGE UP (ref 0–0)
NRBC # FLD: 0 K/UL — SIGNIFICANT CHANGE UP (ref 0–0)
PHOSPHATE SERPL-MCNC: 3.1 MG/DL — SIGNIFICANT CHANGE UP (ref 2.5–4.5)
PLATELET # BLD AUTO: 280 K/UL — SIGNIFICANT CHANGE UP (ref 150–400)
POTASSIUM SERPL-MCNC: 3.9 MMOL/L — SIGNIFICANT CHANGE UP (ref 3.5–5.3)
POTASSIUM SERPL-SCNC: 3.9 MMOL/L — SIGNIFICANT CHANGE UP (ref 3.5–5.3)
RBC # BLD: 4.25 M/UL — SIGNIFICANT CHANGE UP (ref 4.2–5.8)
RBC # FLD: 19.3 % — HIGH (ref 10.3–14.5)
SODIUM SERPL-SCNC: 135 MMOL/L — SIGNIFICANT CHANGE UP (ref 135–145)
WBC # BLD: 10.99 K/UL — HIGH (ref 3.8–10.5)
WBC # FLD AUTO: 10.99 K/UL — HIGH (ref 3.8–10.5)

## 2024-01-31 PROCEDURE — 99222 1ST HOSP IP/OBS MODERATE 55: CPT

## 2024-01-31 PROCEDURE — 99222 1ST HOSP IP/OBS MODERATE 55: CPT | Mod: GC

## 2024-01-31 PROCEDURE — 99233 SBSQ HOSP IP/OBS HIGH 50: CPT

## 2024-01-31 RX ORDER — ASPIRIN/CALCIUM CARB/MAGNESIUM 324 MG
1 TABLET ORAL
Qty: 30 | Refills: 0
Start: 2024-01-31 | End: 2024-02-29

## 2024-01-31 RX ORDER — LOSARTAN POTASSIUM 100 MG/1
1 TABLET, FILM COATED ORAL
Refills: 0 | DISCHARGE

## 2024-01-31 RX ORDER — APIXABAN 2.5 MG/1
2.5 TABLET, FILM COATED ORAL
Refills: 0 | Status: DISCONTINUED | OUTPATIENT
Start: 2024-01-31 | End: 2024-01-31

## 2024-01-31 RX ORDER — PANTOPRAZOLE SODIUM 20 MG/1
1 TABLET, DELAYED RELEASE ORAL
Qty: 10 | Refills: 0
Start: 2024-01-31 | End: 2024-02-09

## 2024-01-31 RX ORDER — HEPARIN SODIUM 5000 [USP'U]/ML
1100 INJECTION INTRAVENOUS; SUBCUTANEOUS
Qty: 25000 | Refills: 0 | Status: DISCONTINUED | OUTPATIENT
Start: 2024-01-31 | End: 2024-01-31

## 2024-01-31 RX ORDER — CILOSTAZOL 100 MG/1
1 TABLET ORAL
Refills: 0 | DISCHARGE

## 2024-01-31 RX ORDER — ASPIRIN/CALCIUM CARB/MAGNESIUM 324 MG
1 TABLET ORAL
Qty: 0 | Refills: 0 | DISCHARGE
Start: 2024-01-31

## 2024-01-31 RX ORDER — DEXLANSOPRAZOLE 30 MG/1
1 CAPSULE, DELAYED RELEASE ORAL
Qty: 7 | Refills: 0
Start: 2024-01-31 | End: 2024-02-06

## 2024-01-31 RX ORDER — INFLUENZA VIRUS VACCINE 15; 15; 15; 15 UG/.5ML; UG/.5ML; UG/.5ML; UG/.5ML
0.7 SUSPENSION INTRAMUSCULAR ONCE
Refills: 0 | Status: DISCONTINUED | OUTPATIENT
Start: 2024-01-31 | End: 2024-01-31

## 2024-01-31 RX ORDER — LOSARTAN POTASSIUM 100 MG/1
1 TABLET, FILM COATED ORAL
Qty: 0 | Refills: 0 | DISCHARGE
Start: 2024-01-31

## 2024-01-31 RX ADMIN — Medication 81 MILLIGRAM(S): at 13:08

## 2024-01-31 RX ADMIN — HEPARIN SODIUM 11 UNIT(S)/HR: 5000 INJECTION INTRAVENOUS; SUBCUTANEOUS at 03:16

## 2024-01-31 RX ADMIN — APIXABAN 2.5 MILLIGRAM(S): 2.5 TABLET, FILM COATED ORAL at 13:08

## 2024-01-31 RX ADMIN — HEPARIN SODIUM 11 UNIT(S)/HR: 5000 INJECTION INTRAVENOUS; SUBCUTANEOUS at 08:27

## 2024-01-31 RX ADMIN — Medication 1: at 13:09

## 2024-01-31 RX ADMIN — Medication 3: at 09:22

## 2024-01-31 RX ADMIN — SODIUM CHLORIDE 3 MILLILITER(S): 9 INJECTION INTRAMUSCULAR; INTRAVENOUS; SUBCUTANEOUS at 06:03

## 2024-01-31 RX ADMIN — ATENOLOL 25 MILLIGRAM(S): 25 TABLET ORAL at 05:47

## 2024-01-31 RX ADMIN — LOSARTAN POTASSIUM 50 MILLIGRAM(S): 100 TABLET, FILM COATED ORAL at 05:47

## 2024-01-31 RX ADMIN — SODIUM CHLORIDE 3 MILLILITER(S): 9 INJECTION INTRAMUSCULAR; INTRAVENOUS; SUBCUTANEOUS at 13:34

## 2024-01-31 NOTE — DISCHARGE NOTE PROVIDER - HOSPITAL COURSE
68 y.o. male HTN, DM  HLD, PVD had stent prior tp R LE  , DM  presents today for elective LLE angiogram. The patient c/o LLE claudication getting progressively worse.  Had Stent to LLE 1/30. Got a unit of prbc for Hb 7.1, responded appropriately to 8.8. Denies melena, bleeding, dizziness. VSS. Initially consulted GI but patient refused to have inpatient GI workup. Spoke with Dr. Trujillo who recommends patient is to be discharged on asa and eliquis. Spoke with outpatient GI Dr. Ian Bearden's associate about the anemia and being discharged on asa and eliquis, who recommends to be discharged on ppi daily and outpt f/u asap. Patient has an appt with Dr. Bearden 2/3.     Educated patient and family regarding sign/symptoms of bleed and anemia and should come back to hospital if developed them. Medically stable to be discharged.

## 2024-01-31 NOTE — DISCHARGE NOTE PROVIDER - CARE PROVIDERS DIRECT ADDRESSES
,[email or web address]

## 2024-01-31 NOTE — CONSULT NOTE ADULT - ASSESSMENT
68M hx anemia, DM, HTN, HLD, PAD, peripheral neuropathy, who presented on 1/30 for an elective LLE angiogram. Pt in now s/p LLE angiogram with balloon angioplasty of the SFA and popliteal arteries with placement of a right SFA stent via right groin access.    Recommendations:  -Transition from Heparin gtt to Eliquis today.  -F/u outpatient with Dr. Trujillo.  -Vascular will follow.    Discussed with vascular surgery fellow on behalf of Dr. Trujillo.    C Team/Vascular Surgery  Please page [phone] for all questions.   68M hx anemia, DM, HTN, HLD, PAD, peripheral neuropathy, who presented on 1/30 for an elective LLE angiogram. Pt in now s/p LLE angiogram with balloon angioplasty of the SFA and popliteal arteries with placement of a right SFA stent via right groin access.    Recommendations:  -Transition from Heparin gtt to Eliquis today.  -Resume patient's DAPT.  -F/u outpatient with Dr. Trujillo.  -Vascular will follow.    Discussed with vascular surgery fellow on behalf of Dr. Trujillo.    C Team/Vascular Surgery  Please page [phone] for all questions.   68M hx anemia, DM, HTN, HLD, PAD, peripheral neuropathy, who presented on 1/30 for an elective LLE angiogram. Pt in now s/p LLE angiogram with balloon angioplasty of the SFA and popliteal arteries with placement of a right SFA stent via right groin access.    Recommendations:  -Transition from Heparin gtt to Eliquis today.  -Resume ASA.  -F/u outpatient with Dr. Trujillo.  -Vascular will follow.    Discussed with vascular surgery fellow on behalf of Dr. Trujillo.    C Team/Vascular Surgery  Please page [phone] for all questions.

## 2024-01-31 NOTE — CONSULT NOTE ADULT - SUBJECTIVE AND OBJECTIVE BOX
Chief Complaint:  Patient is a 68y old  Male who presents with a chief complaint of Stenting of LE (2024 17:03)      HPI:  Mr. Pineda is a 69yo M with PMH of HTN, DM  HLD, PVD s/p stent  tp R LE, who presented for elective LLE angiogram, done . GI consulted for anemia.    History obtained from patient, daughter and wife at bedside.    Patient with known microcytic anemia (Hb ~8). He reportedly underwent an EGD and colonoscopy 6 months prior - colonoscopy was reportedly normal, EGD showed persistent ulcer in the stomach, unclear size and if was biopsied. This ulcer was also seen a year prior to last EGD. Patient denies abdominal pain, nausea, vomiting, early satiety. No melena or hematochezia. No dysphagia or odynophagia. Reportedly on daily PPI.  Patient is planned for initiation of ASA+Eliquis. He is adamant he does not want to stay for inpatient GI evaluation and has a followup appointment with his established GI.    Allergies:  Advil (Rash)      Home Medications:  aspirin 81 mg oral delayed release tablet: 1 tab(s) orally once a day (2024 13:06)  atenolol 25 mg oral tablet: 1 tab(s) orally once a day (2024 07:06)  atorvastatin 20 mg oral tablet: 1 tab(s) orally once a day (at bedtime) (2024 07:06)  Jentadueto 2.5 mg-1000 mg oral tablet: 1 tab(s) orally 2 times a day (2024 07:06)  losartan 50 mg oral tablet: 1 tab(s) orally once a day (2024 13:06)  Vitamin B12 1000 mcg oral tablet: 1 tab(s) orally once a day (2024 07:06)    Hospital Medications:  acetaminophen     Tablet .. 650 milliGRAM(s) Oral once PRN  apixaban 2.5 milliGRAM(s) Oral two times a day  aspirin enteric coated 81 milliGRAM(s) Oral daily  atenolol  Tablet 25 milliGRAM(s) Oral daily  atorvastatin 20 milliGRAM(s) Oral at bedtime  dextrose 5%. 1000 milliLiter(s) IV Continuous <Continuous>  dextrose 5%. 1000 milliLiter(s) IV Continuous <Continuous>  dextrose 50% Injectable 25 Gram(s) IV Push once  dextrose 50% Injectable 12.5 Gram(s) IV Push once  dextrose 50% Injectable 25 Gram(s) IV Push once  dextrose Oral Gel 15 Gram(s) Oral once PRN  diphenhydrAMINE 25 milliGRAM(s) Oral once  glucagon  Injectable 1 milliGRAM(s) IntraMuscular once  influenza  Vaccine (HIGH DOSE) 0.7 milliLiter(s) IntraMuscular once  insulin lispro (ADMELOG) corrective regimen sliding scale   SubCutaneous three times a day before meals  losartan 50 milliGRAM(s) Oral daily  pregabalin 75 milliGRAM(s) Oral daily  sodium chloride 0.9% lock flush 3 milliLiter(s) IV Push every 8 hours  sodium chloride 0.9%. 500 milliLiter(s) IV Continuous <Continuous>      PMHX/PSHX:  HTN (hypertension)    HLD (hyperlipidemia)    DM2 (diabetes mellitus, type 2)    DM (diabetes mellitus)    PAD (peripheral artery disease)    Tobacco abuse    H/O iron deficiency    [place] (hard of hearing)    Peripheral neuropathy    Abnormal angiogram    S/P appendectomy    S/P peripheral artery angioplasty with stent placement        Family history:  No significant family history (Father)    No pertinent family history in first degree relatives    No pertinent family history in first degree relatives        Denies family history of colon cancer/polyps, stomach cancer/polyps, pancreatic cancer/masses, liver cancer/disease, ovarian cancer and endometrial cancer.    Social History:     Tob: Denies  EtOH: As above  Illicit Drugs: Denies    ROS:   General:  No wt loss, fevers, chills, night sweats, fatigue  Eyes:  Good vision, no reported pain  ENT:  No sore throat, pain, runny nose, dysphagia  CV:  No pain, palpitations, hypo/hypertension  Pulm:  No dyspnea, cough, tachypnea, wheezing  GI:  As per HPI  :  No pain, bleeding, incontinence, nocturia  Muscle:  No pain, weakness  Neuro:  No weakness, tingling, memory problems  Psych:  No fatigue, insomnia, mood problems, depression  Endocrine:  No polyuria, polydipsia, cold/heat intolerance  Heme:  No petechiae, ecchymosis, easy bruisability  Skin:  No rash, tattoos, scars, edema    PHYSICAL EXAM:   GENERAL:  No acute distress  HEENT:  Normocephalic/atraumatic, no scleral icterus  CHEST:  No accessory muscle use  HEART:  Regular rate and rhythm  ABDOMEN:  Soft, non-tender, non-distended.  EXTREMITIES: No cyanosis, clubbing, or edema  SKIN:  No rash  NEURO:  Alert and oriented x 3, no asterixis    Vital Signs:  Vital Signs Last 24 Hrs  T(C): 37 (2024 09:32), Max: 37 (2024 20:42)  T(F): 98.6 (2024 09:32), Max: 98.6 (2024 20:42)  HR: 87 (2024 09:32) (87 - 93)  BP: 138/66 (2024 09:32) (126/49 - 153/65)  BP(mean): --  RR: 17 (2024 09:32) (16 - 18)  SpO2: 98% (:32) (98% - 100%)    Parameters below as of 2024 09:32  Patient On (Oxygen Delivery Method): room air      Daily     Daily Weight in k.4 (2024 02:47)    LABS:                        8.8    10.99 )-----------( 280      ( 2024 02:00 )             29.6     Mean Cell Volume: 69.6 fL (- @ 02:00)        135  |  101  |  11  ----------------------------<  257<H>  3.9   |  23  |  0.75    Ca    9.0      2024 10:44  Phos  3.1       Mg     1.80             PTT - ( 2024 10:44 )  PTT:60.0 sec  Urinalysis Basic - ( 2024 10:44 )    Color: x / Appearance: x / SG: x / pH: x  Gluc: 257 mg/dL / Ketone: x  / Bili: x / Urobili: x   Blood: x / Protein: x / Nitrite: x   Leuk Esterase: x / RBC: x / WBC x   Sq Epi: x / Non Sq Epi: x / Bacteria: x                              8.8    10.99 )-----------( 280      ( 2024 02:00 )             29.6                         7.1    8.93  )-----------( 304      ( 2024 07:00 )             24.1       Imaging:

## 2024-01-31 NOTE — CONSULT NOTE ADULT - ATTENDING COMMENTS
admitted following angiogram for acute on chronic anemia  anemia unrelated to vascular procedure  admitted to hospitalist team  patient discussed extensively with hospitalist team, appreciate excellent care  transfused 1 unit pRBC with normal response on post transfusion CBC  discharge planning on Eliquis and aspirin

## 2024-01-31 NOTE — PATIENT PROFILE ADULT - FALL HARM RISK - HARM RISK INTERVENTIONS

## 2024-01-31 NOTE — DISCHARGE NOTE NURSING/CASE MANAGEMENT/SOCIAL WORK - PATIENT PORTAL LINK FT
You can access the FollowMyHealth Patient Portal offered by [facility] by registering at the following website: [email or web address]. By joining [email or web address]’s FollowMyHealth portal, you will also be able to view your health information using other applications (apps) compatible with our system.

## 2024-01-31 NOTE — DISCHARGE NOTE PROVIDER - NSDCMRMEDTOKEN_GEN_ALL_CORE_FT
apixaban 2.5 mg oral tablet: 1 tab(s) orally 2 times a day  aspirin 81 mg oral delayed release tablet: 1 tab(s) orally once a day  atenolol 25 mg oral tablet: 1 tab(s) orally once a day  atorvastatin 20 mg oral tablet: 1 tab(s) orally once a day (at bedtime)  Jentadueto 2.5 mg-1000 mg oral tablet: 1 tab(s) orally 2 times a day  losartan 50 mg oral tablet: 1 tab(s) orally once a day  pregabalin 75 mg oral capsule: 1 cap(s) orally once a day  Vitamin B12 1000 mcg oral tablet: 1 tab(s) orally once a day   apixaban 2.5 mg oral tablet: 1 tab(s) orally 2 times a day  aspirin 81 mg oral delayed release tablet: 1 tab(s) orally once a day  atenolol 25 mg oral tablet: 1 tab(s) orally once a day  atorvastatin 20 mg oral tablet: 1 tab(s) orally once a day (at bedtime)  Jentadueto 2.5 mg-1000 mg oral tablet: 1 tab(s) orally 2 times a day  losartan 50 mg oral tablet: 1 tab(s) orally once a day  pregabalin 75 mg oral capsule: 1 cap(s) orally once a day MDD: 1  Protonix 40 mg oral delayed release tablet: 1 tab(s) orally once a day  Vitamin B12 1000 mcg oral tablet: 1 tab(s) orally once a day   apixaban 2.5 mg oral tablet: 1 tab(s) orally 2 times a day  aspirin 81 mg oral delayed release tablet: 1 tab(s) orally once a day  atenolol 25 mg oral tablet: 1 tab(s) orally once a day  atorvastatin 20 mg oral tablet: 1 tab(s) orally once a day (at bedtime)  dexlansoprazole 60 mg oral delayed release capsule: 1 cap(s) orally once a day  Jentadueto 2.5 mg-1000 mg oral tablet: 1 tab(s) orally 2 times a day  losartan 50 mg oral tablet: 1 tab(s) orally once a day  pregabalin 75 mg oral capsule: 1 cap(s) orally once a day MDD: 1  Protonix 40 mg oral delayed release tablet: 1 tab(s) orally once a day  Vitamin B12 1000 mcg oral tablet: 1 tab(s) orally once a day   apixaban 2.5 mg oral tablet: 1 tab(s) orally 2 times a day  aspirin 81 mg oral delayed release tablet: 1 tab(s) orally once a day  atenolol 25 mg oral tablet: 1 tab(s) orally once a day  atorvastatin 20 mg oral tablet: 1 tab(s) orally once a day (at bedtime)  dexlansoprazole 60 mg oral delayed release capsule: 1 cap(s) orally once a day  Jentadueto 2.5 mg-1000 mg oral tablet: 1 tab(s) orally 2 times a day  losartan 50 mg oral tablet: 1 tab(s) orally once a day  pregabalin 75 mg oral capsule: 1 cap(s) orally once a day MDD: 1  Vitamin B12 1000 mcg oral tablet: 1 tab(s) orally once a day

## 2024-01-31 NOTE — CONSULT NOTE ADULT - SUBJECTIVE AND OBJECTIVE BOX
Vascular Surgery Consult  Consulting surgical team: C Team  Consulting attending: Dr. Trujillo    HPI:  68M hx anemia, DM, HTN, HLD, PAD, peripheral neuropathy, who presented on 1/30 for an elective LLE angiogram. Pt in now s/p LLE angiogram with balloon angioplasty of the SFA and popliteal arteries with placement of a right SFA stent via right groin access.    PAST MEDICAL HISTORY:  HTN (hypertension)    HLD (hyperlipidemia)    DM2 (diabetes mellitus, type 2)    DM (diabetes mellitus)    PAD (peripheral artery disease)    Tobacco abuse    H/O iron deficiency    [place] (hard of hearing)    Peripheral neuropathy        PAST SURGICAL HISTORY:  Abnormal angiogram    S/P appendectomy    S/P peripheral artery angioplasty with stent placement        MEDICATIONS:  acetaminophen     Tablet .. 650 milliGRAM(s) Oral once PRN  aspirin enteric coated 81 milliGRAM(s) Oral daily  atenolol  Tablet 25 milliGRAM(s) Oral daily  atorvastatin 20 milliGRAM(s) Oral at bedtime  dextrose 5%. 1000 milliLiter(s) IV Continuous <Continuous>  dextrose 5%. 1000 milliLiter(s) IV Continuous <Continuous>  dextrose 50% Injectable 25 Gram(s) IV Push once  dextrose 50% Injectable 12.5 Gram(s) IV Push once  dextrose 50% Injectable 25 Gram(s) IV Push once  dextrose Oral Gel 15 Gram(s) Oral once PRN  diphenhydrAMINE 25 milliGRAM(s) Oral once  glucagon  Injectable 1 milliGRAM(s) IntraMuscular once  heparin  Infusion 1100 Unit(s)/Hr IV Continuous <Continuous>  influenza  Vaccine (HIGH DOSE) 0.7 milliLiter(s) IntraMuscular once  insulin lispro (ADMELOG) corrective regimen sliding scale   SubCutaneous three times a day before meals  losartan 50 milliGRAM(s) Oral daily  pregabalin 75 milliGRAM(s) Oral daily  sodium chloride 0.9% lock flush 3 milliLiter(s) IV Push every 8 hours  sodium chloride 0.9%. 500 milliLiter(s) IV Continuous <Continuous>      ALLERGIES:  Advil (Rash)      VITALS & I/Os:  Vital Signs Last 24 Hrs  T(C): 36.9 (31 Jan 2024 04:38), Max: 37 (30 Jan 2024 20:42)  T(F): 98.5 (31 Jan 2024 04:38), Max: 98.6 (30 Jan 2024 20:42)  HR: 93 (31 Jan 2024 04:38) (87 - 93)  BP: 149/60 (31 Jan 2024 04:38) (126/49 - 153/65)  BP(mean): --  RR: 18 (31 Jan 2024 04:38) (16 - 18)  SpO2: 100% (31 Jan 2024 04:38) (100% - 100%)    Parameters below as of 31 Jan 2024 04:38  Patient On (Oxygen Delivery Method): room air        I&O's Summary    30 Jan 2024 07:01  -  31 Jan 2024 07:00  --------------------------------------------------------  IN: 0 mL / OUT: 600 mL / NET: -600 mL        PHYSICAL EXAM:  General: Not acutely distressed  Respiratory: Nonlabored respirations  Cardiovascular: Pulse present  Abdominal: Soft, nondistended, nontender. No rebound or guarding. No organomegaly, no palpable mass.  Extremities: Warm. LLE motion intact, chronic peripheral neuropathy noted.   Vascular: Left AT/PT dopplerable    LABS:                        8.8    10.99 )-----------( 280      ( 31 Jan 2024 02:00 )             29.6     01-30    137  |  102  |  8   ----------------------------<  169<H>  3.9   |  23  |  0.70    Ca    9.4      30 Jan 2024 07:00      Lactate:    PTT - ( 31 Jan 2024 02:00 )  PTT:28.6 sec          Urinalysis Basic - ( 30 Jan 2024 07:00 )    Color: x / Appearance: x / SG: x / pH: x  Gluc: 169 mg/dL / Ketone: x  / Bili: x / Urobili: x   Blood: x / Protein: x / Nitrite: x   Leuk Esterase: x / RBC: x / WBC x   Sq Epi: x / Non Sq Epi: x / Bacteria: x        IMAGING:

## 2024-01-31 NOTE — CONSULT NOTE ADULT - ASSESSMENT
67yo M with PMH of HTN, DM  HLD, PVD s/p stent  tp R LE, who presented for elective LLE angiogram, done 1/31. GI consulted for anemia.    # Anemia - microcytic anemia, no iron studies on file. Noted history of persistent gastric ulcer of unclear etiology. Patient does not want to pursue inpatient GI evaluation. Cannot rule out a GI source for anemia at this time, and patient is at a somewhat higher risk for bleeding while on ASA+Eliquis given history of persistent gastric ulcer. As patient refuses inpatient workup, would urge close outpatient followup with GI for further evaluation.   # PAD    Recommendations:  - Outpatient followup with GI - scheduled for this week  - PPI 40mg BID  - Monitor closely for bleeding while on AC/ASA  - Diet as tolerated    Thank you for involving us in the care of this patient. Please reach out if any further questions.    Henrique Witt  Gastroenterology    Available on [employer] Teams  After 5PM/Weekends, please contact the on-call GI fellow: [phone]

## 2024-01-31 NOTE — BRIEF OPERATIVE NOTE - NSICDXBRIEFPROCEDURE_GEN_ALL_CORE_FT
PROCEDURES:  Angiogram, lower extremity, minor cutdown approach, with, if indicated, 1 or more of angioplasty, atherectomy, thrombolysis, and stent insrt 31-Jan-2024 07:09:15  George Childers

## 2024-01-31 NOTE — DISCHARGE NOTE PROVIDER - ATTENDING DISCHARGE PHYSICAL EXAMINATION:
VITALS: T(C): 37.1 (01-31-24 @ 16:42), Max: 37.1 (01-31-24 @ 16:42)  HR: 89 (01-31-24 @ 16:42) (85 - 93)  BP: 134/60 (01-31-24 @ 16:42) (126/49 - 153/65)  RR: 18 (01-31-24 @ 16:42) (16 - 18)  SpO2: 99% (01-31-24 @ 16:42) (98% - 100%)  GENERAL: NAD, alert, resting comfortably  HEAD:  Atraumatic, Normocephalic  EYES: conjunctiva and sclera clear  NECK: Supple, No JVD  CHEST/LUNG: Clear to auscultation bilaterally; No wheeze, ronchi or rales  HEART: Regular rate and rhythm; No murmurs, rubs, or gallops  ABDOMEN: Soft, Nontender, Nondistended; Bowel sounds present  EXTREMITIES:  No clubbing, cyanosis, or edema  PSYCH: normal behavior, normal affect  NEUROLOGY: moving all extremities  SKIN: angiosite nontender, no erythema

## 2024-01-31 NOTE — DISCHARGE NOTE PROVIDER - NSDCCPCAREPLAN_GEN_ALL_CORE_FT
PRINCIPAL DISCHARGE DIAGNOSIS  Diagnosis: PVD (peripheral vascular disease)  Assessment and Plan of Treatment: You got a stent in your leg. Dr. Trujillo recommended that you to be on aspirin and eliquis. Please continue taking the medications and follow up with vascular doctor after discharge.      SECONDARY DISCHARGE DIAGNOSES  Diagnosis: Anemia  Assessment and Plan of Treatment: You got a unit of blood transfusion after the procedure yesterday. Your hemoglobin responded appropriately. Please continue taking your medication as instructed and followup with Dr. Bearden as soon as possible after discharge. If you noticed any black stool or bleed, or if your blood pressure is low, feeling dizzy, please go to emergency room.     PRINCIPAL DISCHARGE DIAGNOSIS  Diagnosis: PVD (peripheral vascular disease)  Assessment and Plan of Treatment: You got a stent in your leg. Dr. Trujillo recommended that you to be on aspirin and eliquis. Please continue taking the medications and follow up with vascular doctor after discharge.        SECONDARY DISCHARGE DIAGNOSES  Diagnosis: Anemia  Assessment and Plan of Treatment: You got a unit of blood transfusion after the procedure yesterday. Your hemoglobin responded appropriately. Please continue taking your medication as instructed and followup with Dr. Bearden as soon as possible after discharge. If you noticed any black stool or bleed, or if your blood pressure is low, feeling dizzy, please go to emergency room.

## 2024-01-31 NOTE — DISCHARGE NOTE PROVIDER - CARE PROVIDER_API CALL
Lucas De Los Santos [facility]  Internal Medicine  [address], [address]  [place] [address]  Phone: [phone]  Fax: [phone]  Follow Up Time:     Mahad Trujillo  Vascular Surgery  [address] [place] [address]  Phone: [phone]  Fax: [phone]  Follow Up Time:

## 2024-02-02 LAB
HEMOGLOBIN INTERPRETATION: SIGNIFICANT CHANGE UP
HGB A MFR BLD: 97.1 % — SIGNIFICANT CHANGE UP (ref 95–97.6)
HGB A2 MFR BLD: 2.3 % — LOW (ref 2.4–3.5)
HGB F MFR BLD: <1 % — SIGNIFICANT CHANGE UP (ref 0–1.5)

## 2024-02-05 NOTE — CHART NOTE - NSCHARTNOTEFT_GEN_A_CORE
Post-Discharge Medication Review    Patient's preferred pharmacy was updated in OMR: [facility] [phone]    Patient contacted to offer medication counseling post-discharge. Medication reconciliation completed. Per patient, medications include:    ammonium lactate 12% topical cream apply topically to affected areas twice daily  apixaban 2.5 mg oral tablet 1 tab(s) orally 2 times a day  Aspirin EC 81 mg oral delayed release tablet 1 tab(s) orally once a day  atenolol 25 mg oral tablet 1 tab(s) orally once a day  atorvastatin 40 mg oral tablet 1 tab(s) orally once a day (at bedtime) (per patient, to be updated on OMR)  dexlansoprazole 60 mg oral delayed release capsule 1 cap(s) orally once a day  Jentadueto 2.5 mg-1000 mg oral tablet 1 tab(s) orally 2 times a day  mometasone 0.1% topical ointment apply to affected areas topically once daily (per patient, to be added to OMR)  pregabalin 75 mg oral capsule 2 cap(s) orally once a day (per patient, to be updated on OMR)  sucralfate 1 g oral tablet 1 tablet orally 4 times daily (per patient, to be added to OMR)  Vitamin B12 1000 mcg oral tablet 1 tab(s) orally once a day    Per patient, medications no longer taking include (to be removed from OMR):	  losartan 50 mg oral tablet 1 tab(s) orally once a day    Medication name, indication, administration, side effect, and monitoring reviewed for new medications post-discharge with patient. Patient demonstrated understanding. Counseling offered for all medications.

## 2024-02-12 RX ORDER — ATORVASTATIN CALCIUM 80 MG/1
1 TABLET, FILM COATED ORAL
Qty: 0 | Refills: 0 | DISCHARGE

## 2024-02-28 ENCOUNTER — INPATIENT (INPATIENT)
Facility: HOSPITAL | Age: 69
LOS: 0 days | Discharge: LEFT BEFORE DISCHARGE | End: 2024-02-29
Attending: INTERNAL MEDICINE | Admitting: INTERNAL MEDICINE
Payer: COMMERCIAL

## 2024-02-28 VITALS
SYSTOLIC BLOOD PRESSURE: 130 MMHG | OXYGEN SATURATION: 99 % | TEMPERATURE: 98 F | RESPIRATION RATE: 16 BRPM | DIASTOLIC BLOOD PRESSURE: 72 MMHG | HEART RATE: 91 BPM

## 2024-02-28 DIAGNOSIS — K92.2 GASTROINTESTINAL HEMORRHAGE, UNSPECIFIED: ICD-10-CM

## 2024-02-28 DIAGNOSIS — E11.9 TYPE 2 DIABETES MELLITUS WITHOUT COMPLICATIONS: ICD-10-CM

## 2024-02-28 DIAGNOSIS — Z90.49 ACQUIRED ABSENCE OF OTHER SPECIFIED PARTS OF DIGESTIVE TRACT: Chronic | ICD-10-CM

## 2024-02-28 DIAGNOSIS — Z29.9 ENCOUNTER FOR PROPHYLACTIC MEASURES, UNSPECIFIED: ICD-10-CM

## 2024-02-28 DIAGNOSIS — D64.9 ANEMIA, UNSPECIFIED: ICD-10-CM

## 2024-02-28 DIAGNOSIS — I73.9 PERIPHERAL VASCULAR DISEASE, UNSPECIFIED: ICD-10-CM

## 2024-02-28 DIAGNOSIS — Z79.899 OTHER LONG TERM (CURRENT) DRUG THERAPY: ICD-10-CM

## 2024-02-28 DIAGNOSIS — Z95.820 PERIPHERAL VASCULAR ANGIOPLASTY STATUS WITH IMPLANTS AND GRAFTS: Chronic | ICD-10-CM

## 2024-02-28 DIAGNOSIS — R21 RASH AND OTHER NONSPECIFIC SKIN ERUPTION: ICD-10-CM

## 2024-02-28 PROBLEM — H91.90 UNSPECIFIED HEARING LOSS, UNSPECIFIED EAR: Chronic | Status: ACTIVE | Noted: 2024-01-30

## 2024-02-28 PROBLEM — G62.9 POLYNEUROPATHY, UNSPECIFIED: Chronic | Status: ACTIVE | Noted: 2024-01-30

## 2024-02-28 LAB
ALBUMIN SERPL ELPH-MCNC: 4.4 G/DL — SIGNIFICANT CHANGE UP (ref 3.3–5)
ALP SERPL-CCNC: 99 U/L — SIGNIFICANT CHANGE UP (ref 40–120)
ALT FLD-CCNC: 14 U/L — SIGNIFICANT CHANGE UP (ref 4–41)
ANION GAP SERPL CALC-SCNC: 16 MMOL/L — HIGH (ref 7–14)
AST SERPL-CCNC: 29 U/L — SIGNIFICANT CHANGE UP (ref 4–40)
BASOPHILS # BLD AUTO: 0.07 K/UL — SIGNIFICANT CHANGE UP (ref 0–0.2)
BASOPHILS NFR BLD AUTO: 0.9 % — SIGNIFICANT CHANGE UP (ref 0–2)
BILIRUB SERPL-MCNC: 0.3 MG/DL — SIGNIFICANT CHANGE UP (ref 0.2–1.2)
BLD GP AB SCN SERPL QL: NEGATIVE — SIGNIFICANT CHANGE UP
BUN SERPL-MCNC: 10 MG/DL — SIGNIFICANT CHANGE UP (ref 7–23)
CALCIUM SERPL-MCNC: 9.6 MG/DL — SIGNIFICANT CHANGE UP (ref 8.4–10.5)
CHLORIDE SERPL-SCNC: 100 MMOL/L — SIGNIFICANT CHANGE UP (ref 98–107)
CO2 SERPL-SCNC: 20 MMOL/L — LOW (ref 22–31)
CREAT SERPL-MCNC: 0.71 MG/DL — SIGNIFICANT CHANGE UP (ref 0.5–1.3)
DAT POLY-SP REAG RBC QL: NEGATIVE — SIGNIFICANT CHANGE UP
EGFR: 98 ML/MIN/1.73M2 — SIGNIFICANT CHANGE UP
EOSINOPHIL # BLD AUTO: 0.28 K/UL — SIGNIFICANT CHANGE UP (ref 0–0.5)
EOSINOPHIL NFR BLD AUTO: 3.5 % — SIGNIFICANT CHANGE UP (ref 0–6)
GLUCOSE BLDC GLUCOMTR-MCNC: 259 MG/DL — HIGH (ref 70–99)
GLUCOSE SERPL-MCNC: 172 MG/DL — HIGH (ref 70–99)
HAPTOGLOB SERPL-MCNC: 242 MG/DL — HIGH (ref 34–200)
HCT VFR BLD CALC: 24.2 % — LOW (ref 39–50)
HGB BLD-MCNC: 6.8 G/DL — CRITICAL LOW (ref 13–17)
IANC: 5.06 K/UL — SIGNIFICANT CHANGE UP (ref 1.8–7.4)
LDH SERPL L TO P-CCNC: 348 U/L — HIGH (ref 135–225)
LYMPHOCYTES # BLD AUTO: 2.34 K/UL — SIGNIFICANT CHANGE UP (ref 1–3.3)
LYMPHOCYTES # BLD AUTO: 29.3 % — SIGNIFICANT CHANGE UP (ref 13–44)
MCHC RBC-ENTMCNC: 19.5 PG — LOW (ref 27–34)
MCHC RBC-ENTMCNC: 28.1 GM/DL — LOW (ref 32–36)
MCV RBC AUTO: 69.3 FL — LOW (ref 80–100)
MONOCYTES # BLD AUTO: 0.82 K/UL — SIGNIFICANT CHANGE UP (ref 0–0.9)
MONOCYTES NFR BLD AUTO: 10.3 % — SIGNIFICANT CHANGE UP (ref 2–14)
NEUTROPHILS # BLD AUTO: 4.47 K/UL — SIGNIFICANT CHANGE UP (ref 1.8–7.4)
NEUTROPHILS NFR BLD AUTO: 56 % — SIGNIFICANT CHANGE UP (ref 43–77)
PLATELET # BLD AUTO: 249 K/UL — SIGNIFICANT CHANGE UP (ref 150–400)
POTASSIUM SERPL-MCNC: 4.4 MMOL/L — SIGNIFICANT CHANGE UP (ref 3.5–5.3)
POTASSIUM SERPL-SCNC: 4.4 MMOL/L — SIGNIFICANT CHANGE UP (ref 3.5–5.3)
PROT SERPL-MCNC: 8.5 G/DL — HIGH (ref 6–8.3)
RBC # BLD: 3.49 M/UL — LOW (ref 4.2–5.8)
RBC # FLD: 19.7 % — HIGH (ref 10.3–14.5)
RH IG SCN BLD-IMP: NEGATIVE — SIGNIFICANT CHANGE UP
SODIUM SERPL-SCNC: 136 MMOL/L — SIGNIFICANT CHANGE UP (ref 135–145)
WBC # BLD: 7.99 K/UL — SIGNIFICANT CHANGE UP (ref 3.8–10.5)
WBC # FLD AUTO: 7.99 K/UL — SIGNIFICANT CHANGE UP (ref 3.8–10.5)

## 2024-02-28 PROCEDURE — 99285 EMERGENCY DEPT VISIT HI MDM: CPT

## 2024-02-28 PROCEDURE — 99223 1ST HOSP IP/OBS HIGH 75: CPT

## 2024-02-28 PROCEDURE — 71046 X-RAY EXAM CHEST 2 VIEWS: CPT | Mod: 26

## 2024-02-28 RX ORDER — GLUCAGON INJECTION, SOLUTION 0.5 MG/.1ML
1 INJECTION, SOLUTION SUBCUTANEOUS ONCE
Refills: 0 | Status: DISCONTINUED | OUTPATIENT
Start: 2024-02-28 | End: 2024-02-29

## 2024-02-28 RX ORDER — DEXTROSE 50 % IN WATER 50 %
12.5 SYRINGE (ML) INTRAVENOUS ONCE
Refills: 0 | Status: DISCONTINUED | OUTPATIENT
Start: 2024-02-28 | End: 2024-02-29

## 2024-02-28 RX ORDER — DIPHENHYDRAMINE HCL 50 MG
25 CAPSULE ORAL ONCE
Refills: 0 | Status: DISCONTINUED | OUTPATIENT
Start: 2024-02-28 | End: 2024-02-29

## 2024-02-28 RX ORDER — PREGABALIN 225 MG/1
1000 CAPSULE ORAL DAILY
Refills: 0 | Status: DISCONTINUED | OUTPATIENT
Start: 2024-02-28 | End: 2024-02-29

## 2024-02-28 RX ORDER — SODIUM CHLORIDE 9 MG/ML
1000 INJECTION, SOLUTION INTRAVENOUS
Refills: 0 | Status: DISCONTINUED | OUTPATIENT
Start: 2024-02-28 | End: 2024-02-29

## 2024-02-28 RX ORDER — INSULIN LISPRO 100/ML
VIAL (ML) SUBCUTANEOUS
Refills: 0 | Status: DISCONTINUED | OUTPATIENT
Start: 2024-02-28 | End: 2024-02-29

## 2024-02-28 RX ORDER — ATORVASTATIN CALCIUM 80 MG/1
40 TABLET, FILM COATED ORAL AT BEDTIME
Refills: 0 | Status: DISCONTINUED | OUTPATIENT
Start: 2024-02-28 | End: 2024-02-29

## 2024-02-28 RX ORDER — DEXTROSE 50 % IN WATER 50 %
15 SYRINGE (ML) INTRAVENOUS ONCE
Refills: 0 | Status: DISCONTINUED | OUTPATIENT
Start: 2024-02-28 | End: 2024-02-29

## 2024-02-28 RX ORDER — SUCRALFATE 1 G
1 TABLET ORAL
Refills: 0 | Status: DISCONTINUED | OUTPATIENT
Start: 2024-02-28 | End: 2024-02-29

## 2024-02-28 RX ORDER — DEXTROSE 50 % IN WATER 50 %
25 SYRINGE (ML) INTRAVENOUS ONCE
Refills: 0 | Status: DISCONTINUED | OUTPATIENT
Start: 2024-02-28 | End: 2024-02-29

## 2024-02-28 RX ORDER — PANTOPRAZOLE SODIUM 20 MG/1
40 TABLET, DELAYED RELEASE ORAL EVERY 12 HOURS
Refills: 0 | Status: DISCONTINUED | OUTPATIENT
Start: 2024-02-28 | End: 2024-02-29

## 2024-02-28 RX ORDER — INSULIN LISPRO 100/ML
VIAL (ML) SUBCUTANEOUS AT BEDTIME
Refills: 0 | Status: DISCONTINUED | OUTPATIENT
Start: 2024-02-28 | End: 2024-02-29

## 2024-02-28 RX ORDER — ATENOLOL 25 MG/1
25 TABLET ORAL DAILY
Refills: 0 | Status: DISCONTINUED | OUTPATIENT
Start: 2024-02-28 | End: 2024-02-29

## 2024-02-28 RX ADMIN — ATORVASTATIN CALCIUM 40 MILLIGRAM(S): 80 TABLET, FILM COATED ORAL at 22:57

## 2024-02-28 RX ADMIN — Medication 1: at 23:06

## 2024-02-28 NOTE — ED ADULT NURSE REASSESSMENT NOTE - NS ED NURSE REASSESS COMMENT FT1
Pt received at change of shift. Pt laying in bed, NAD, respirations even and unlabored. VS in flow sheet. 1 unit PRBC completed, pt tolerated well. Pt denies SOB, itching, low back pain. Pt medicated per MD orders. Report given to ESS[facility] 3 RN, transport by ED tech and RN. Bed in lowest position, safety maintained.

## 2024-02-28 NOTE — H&P ADULT - PROBLEM SELECTOR PLAN 1
-gi emailed  -prbc running  -repeat  Hb, ppi  -hold Eliquis, resume aspirin tomorrow if Hb responds appropriately to prbc

## 2024-02-28 NOTE — ED PROVIDER NOTE - CLINICAL SUMMARY MEDICAL DECISION MAKING FREE TEXT BOX
69 Y M presenting with anemia hgb 7 on outpatinet, no infectious etiology identified on ROS, likely chronic acute on chronic anemia of unclear etiology, bw, likely transfuse, admit for chronic unrelenting anemia requiring further workup.

## 2024-02-28 NOTE — ED ADULT NURSE NOTE - IS THE PATIENT ABLE TO BE SCREENED?
Medication(s) Requested: ALPRAZolam (XANAX) 0.5 MG tablet  PDMP reviewed : 12/13/2021  Alerts: negative  Last dispensed: 11/10/21  UDS on file: No   Contract on file:  Yes  Last office visit: 11/10/21  Next office visit: not scheduled.     Refill sent to provider for review.        Yes

## 2024-02-28 NOTE — H&P ADULT - HISTORY OF PRESENT ILLNESS
69 y.o. male HTN, DM  HLD, gastric ulcers, DM, PAD / stent prior to R LE   s/p recent  Stent to LLE 1/30/24. Preop Hb then 7.1 (Jan 30 2024)  and transfused with Hb improving to 8.8. Refused gi workup in house and discharged on aspirin,  eliquis and PPI BID with instruction for urgent  GI followup that had been scheduled Feb 3 69 y.o. male HTN, DM  HLD, gastric ulcers, pericardial effusion, DM, PAD / stent prior to R LE   s/p recent  Stent to LLE 1/30/24. Preop Hb then 7.1 (Jan 30 2024)  and transfused with Hb improving to 8.8. Refused gi workup in house and discharged on aspirin,  eliquis and PPI BID with instruction for urgent  GI followup that had been scheduled Feb 3 69 y.o. male HTN, DM  HLD, gastric ulcers, pericardial effusion, DM, PAD / stent prior to R LE   s/p recent  Stent to LLE 1/30/24. Preop Hb then 7.1 (Jan 30 2024)  and transfused with Hb improving to 8.8. Refused gi workup in house and discharged on aspirin,  eliquis and PPI BID with instruction for urgent  GI followup that had been scheduled Feb 3; saw GI Dr Bearden but insurance did not approve capsule endoscopy, planned for possible bleeding scan? (reportedly underwent an EGD and colonoscopy 6 months prior - colonoscopy was reportedly normal, EGD showed persistent ulcer in the stomach, unclear size and if was biopsied).   Pt returns to ed in setting of outpt Hb < 7 in setting of recent new-onset dyspnea and lightheadedness. James nichols cp. EKG non-ischemic. Hb 6.8 compared to 8.8 Jan 31st. Pt denies bloody or black stool, but has noted darker appearing stool over last 2 weeks. Stopped Eliquis yesterday

## 2024-02-28 NOTE — ED PROVIDER NOTE - OBJECTIVE STATEMENT
69 Y M presenting with anemia, 4th episode requiring transfusion for Hgb <7, presenting with hgb 7 on recent BW, + sob on exertion, denies any bleeding gums, abdominal pain, nausea, vomiting, difficulty breathing while resting, fever, chills.

## 2024-02-28 NOTE — ED PROVIDER NOTE - PROGRESS NOTE DETAILS
Alexander Garvin MD:  Attempted to contact Dr. Garcia for admission for further workup. pt stable, pending admission Dr. Blaine Garcia accepted admission.  Patient started transfusion now patient hemodynamically stable patient care transferred

## 2024-02-28 NOTE — ED PROVIDER NOTE - ATTENDING CONTRIBUTION TO CARE
Attending Statement: I have personally seen and examined this patient. I have fully participated in the care of this patient. I have reviewed all pertinent clinical information, including history physical exam, plan and the fellow  note and agree except as noted  69-year-old male history of hypertension, high cholesterol, type 2 diabetes, history of anemia iron deficiency, peripheral vascular disease,  former smoker sent in by his primary doctor for low hemoglobin of 7.  Patient states he recently had a blood transfusion (4 units) for low hemoglobin as well.  Patient endorsing feeling lightheaded and dizzy when he stands, intermittent shortness of breath on exertion.  No chest pain.  No falls or trauma.  Denies any active bleeding; no melena, no rectal bleed, no hematuria.  No epistasis, no gum bleeds, no headache.  Patient does take Eliquis and aspirin.  Vital signs appreciated patient laying in bed ANO x 3.  With family at bedside.  No facial symmetry no slurred speech.  Pale conjunctiva.  No epistasis.  No respiratory distress.  Soft nontender abdomen.  Moving all extremities.  Plan EKG, fall precautions, labs, chest x-ray, admission, transfusion as needed.  Plan discussed with patient.

## 2024-02-28 NOTE — H&P ADULT - NSHPPHYSICALEXAM_GEN_ALL_CORE
PHYSICAL EXAM:      Constitutional: NAD, well-groomed, well-developed  HEENT: EOMI, Normal Hearing  Neck: No LAD, No JVD  Back: Normal spine flexure, No CVA tenderness  Respiratory: CTAB  Cardiovascular: S1 and S2, RRR, no M/G/R  Gastrointestinal: BS+, soft, NT/ND  Extremities: No peripheral edema  Vascular: 2+ peripheral pulses  Neurological: A/O x 3, no focal deficits  Psychiatric: Normal mood, normal affect  Musculoskeletal: 5/5 strength b/l upper and lower extremities  Skin: scattered small, papular lesions on legs and arms

## 2024-02-28 NOTE — H&P ADULT - NSHPLABSRESULTS_GEN_ALL_CORE
6.8    7.99  )-----------( 249      ( 28 Feb 2024 16:27 )             24.2     02-28    136  |  100  |  10  ----------------------------<  172<H>  4.4   |  20<L>  |  0.71    Ca    9.6      28 Feb 2024 16:27    TPro  8.5<H>  /  Alb  4.4  /  TBili  0.3  /  DBili  x   /  AST  29  /  ALT  14  /  AlkPhos  99  02-28    CAPILLARY BLOOD GLUCOSE      POCT Blood Glucose.: 229 mg/dL (28 Feb 2024 14:08)      Urinalysis Basic - ( 28 Feb 2024 16:27 )    Color: x / Appearance: x / SG: x / pH: x  Gluc: 172 mg/dL / Ketone: x  / Bili: x / Urobili: x   Blood: x / Protein: x / Nitrite: x   Leuk Esterase: x / RBC: x / WBC x   Sq Epi: x / Non Sq Epi: x / Bacteria: x      Vital Signs Last 24 Hrs  T(C): 36.7 (28 Feb 2024 19:55), Max: 37.1 (28 Feb 2024 16:39)  T(F): 98.1 (28 Feb 2024 19:55), Max: 98.7 (28 Feb 2024 16:39)  HR: 78 (28 Feb 2024 19:55) (78 - 91)  BP: 147/70 (28 Feb 2024 19:55) (120/90 - 158/71)  BP(mean): --  RR: 18 (28 Feb 2024 19:55) (16 - 18)  SpO2: 100% (28 Feb 2024 19:55) (99% - 100%)    Parameters below as of 28 Feb 2024 19:55  Patient On (Oxygen Delivery Method): room air

## 2024-02-28 NOTE — ED PROVIDER NOTE - PHYSICAL EXAMINATION
Physical Exam:  General: NAD, Conversive  Eyes: EOMI, Conjunctiva and sclera clear but pale  Neck: No JVD  Lungs: Clear to auscultation bilaterally, no wheeze, no rhonchi  Heart: Normal S1, S2, no murmurs  Abdomen: Soft, nontender, nondistended, no CVA tenderness  Extremities: 2+ peripheral pulses, no edema  Psych: AAO X3  Neurologic: Non-focal

## 2024-02-28 NOTE — ED ADULT TRIAGE NOTE - CHIEF COMPLAINT QUOTE
pt sent by PCP for hemoglobin of 7. pt states first transfusion was 01/30 this year. pt reports dizziness and SOB, worse with laying down. denies any active bleeding, denies dark stools. takes eliquis and ASA daily. past medical history: DM, HLD, HTN

## 2024-02-28 NOTE — ED ADULT NURSE NOTE - OBJECTIVE STATEMENT
Pt awake and alert, DM HTN recent Dx of anemia requiring first blood transfusion last week, presenting to ED after follow up outpatient blood work showed low H&H. Pt endorsing SOB when lying flat on Saturday which has since subsided. Pt also endorsing intermittent dizziness. Pt breathing unlabored in ED, denies cp and current sob. Pt does appear pale for race in ED. 20g IV placed, labs sent, Awaiting results and disposition.

## 2024-02-28 NOTE — H&P ADULT - NSHPREVIEWOFSYSTEMS_GEN_ALL_CORE
Review of Systems:   CONSTITUTIONAL: No fever  EYES: No eye pain, visual disturbances, or discharge  ENMT:  No difficulty hearing, tinnitus, vertigo; No sinus or throat pain  NECK: No pain or stiffness  RESPIRATORY: No cough, wheezing, chills or hemoptysis; + shortness of breath  CARDIOVASCULAR: No chest pain, palpitations,  or leg swelling; + recent lightheadedness   GASTROINTESTINAL: No abdominal or epigastric pain. No nausea, vomiting, or hematemesis; No diarrhea or constipation. No melena or hematochezia, darker stool over last 2 weeks  GENITOURINARY: No dysuria, frequency, hematuria, or incontinence  NEUROLOGICAL: No headaches; feet neuropathy (chronic)  SKIN:  improving   rash over arms and legs  ENDOCRINE: No heat or cold intolerance; No hair loss  MUSCULOSKELETAL: No joint pain or swelling; No muscle, back, or extremity pain

## 2024-02-28 NOTE — ED PROVIDER NOTE - NSICDXPASTMEDICALHX_GEN_ALL_CORE_FT
PAST MEDICAL HISTORY:  DM (diabetes mellitus)     H/O iron deficiency     HLD (hyperlipidemia)     [place] (hard of hearing)     HTN (hypertension)     PAD (peripheral artery disease)     Peripheral neuropathy     Tobacco abuse Former

## 2024-02-28 NOTE — H&P ADULT - NSICDXPASTMEDICALHX_GEN_ALL_CORE_FT
PAST MEDICAL HISTORY:  DM (diabetes mellitus)     H/O iron deficiency     HLD (hyperlipidemia)     [place] (hard of hearing)     HTN (hypertension)     PAD (peripheral artery disease)     Peripheral neuropathy     Tobacco abuse Former

## 2024-02-29 ENCOUNTER — TRANSCRIPTION ENCOUNTER (OUTPATIENT)
Age: 69
End: 2024-02-29

## 2024-02-29 VITALS
TEMPERATURE: 98 F | RESPIRATION RATE: 18 BRPM | OXYGEN SATURATION: 96 % | SYSTOLIC BLOOD PRESSURE: 152 MMHG | HEART RATE: 93 BPM | DIASTOLIC BLOOD PRESSURE: 72 MMHG

## 2024-02-29 LAB
ALBUMIN SERPL ELPH-MCNC: 4 G/DL — SIGNIFICANT CHANGE UP (ref 3.3–5)
ALP SERPL-CCNC: 95 U/L — SIGNIFICANT CHANGE UP (ref 40–120)
ALT FLD-CCNC: 11 U/L — SIGNIFICANT CHANGE UP (ref 4–41)
ANION GAP SERPL CALC-SCNC: 11 MMOL/L — SIGNIFICANT CHANGE UP (ref 7–14)
APTT BLD: 28.9 SEC — SIGNIFICANT CHANGE UP (ref 24.5–35.6)
AST SERPL-CCNC: 23 U/L — SIGNIFICANT CHANGE UP (ref 4–40)
BASOPHILS # BLD AUTO: 0.05 K/UL — SIGNIFICANT CHANGE UP (ref 0–0.2)
BASOPHILS NFR BLD AUTO: 0.5 % — SIGNIFICANT CHANGE UP (ref 0–2)
BILIRUB SERPL-MCNC: 0.8 MG/DL — SIGNIFICANT CHANGE UP (ref 0.2–1.2)
BUN SERPL-MCNC: 11 MG/DL — SIGNIFICANT CHANGE UP (ref 7–23)
CALCIUM SERPL-MCNC: 9.4 MG/DL — SIGNIFICANT CHANGE UP (ref 8.4–10.5)
CHLORIDE SERPL-SCNC: 103 MMOL/L — SIGNIFICANT CHANGE UP (ref 98–107)
CO2 SERPL-SCNC: 22 MMOL/L — SIGNIFICANT CHANGE UP (ref 22–31)
CREAT SERPL-MCNC: 0.72 MG/DL — SIGNIFICANT CHANGE UP (ref 0.5–1.3)
EGFR: 98 ML/MIN/1.73M2 — SIGNIFICANT CHANGE UP
EOSINOPHIL # BLD AUTO: 0.18 K/UL — SIGNIFICANT CHANGE UP (ref 0–0.5)
EOSINOPHIL NFR BLD AUTO: 1.7 % — SIGNIFICANT CHANGE UP (ref 0–6)
GLUCOSE BLDC GLUCOMTR-MCNC: 183 MG/DL — HIGH (ref 70–99)
GLUCOSE BLDC GLUCOMTR-MCNC: 191 MG/DL — HIGH (ref 70–99)
GLUCOSE SERPL-MCNC: 158 MG/DL — HIGH (ref 70–99)
HCT VFR BLD CALC: 25.3 % — LOW (ref 39–50)
HGB BLD-MCNC: 7.7 G/DL — LOW (ref 13–17)
IANC: 7.82 K/UL — HIGH (ref 1.8–7.4)
IMM GRANULOCYTES NFR BLD AUTO: 0.5 % — SIGNIFICANT CHANGE UP (ref 0–0.9)
INR BLD: 0.95 RATIO — SIGNIFICANT CHANGE UP (ref 0.85–1.18)
LYMPHOCYTES # BLD AUTO: 1.4 K/UL — SIGNIFICANT CHANGE UP (ref 1–3.3)
LYMPHOCYTES # BLD AUTO: 13.3 % — SIGNIFICANT CHANGE UP (ref 13–44)
MCHC RBC-ENTMCNC: 21.1 PG — LOW (ref 27–34)
MCHC RBC-ENTMCNC: 30.4 GM/DL — LOW (ref 32–36)
MCV RBC AUTO: 69.3 FL — LOW (ref 80–100)
MONOCYTES # BLD AUTO: 1.04 K/UL — HIGH (ref 0–0.9)
MONOCYTES NFR BLD AUTO: 9.9 % — SIGNIFICANT CHANGE UP (ref 2–14)
NEUTROPHILS # BLD AUTO: 7.82 K/UL — HIGH (ref 1.8–7.4)
NEUTROPHILS NFR BLD AUTO: 74.1 % — SIGNIFICANT CHANGE UP (ref 43–77)
NRBC # BLD: 0 /100 WBCS — SIGNIFICANT CHANGE UP (ref 0–0)
NRBC # FLD: 0.03 K/UL — HIGH (ref 0–0)
PLATELET # BLD AUTO: 230 K/UL — SIGNIFICANT CHANGE UP (ref 150–400)
POTASSIUM SERPL-MCNC: 4.1 MMOL/L — SIGNIFICANT CHANGE UP (ref 3.5–5.3)
POTASSIUM SERPL-SCNC: 4.1 MMOL/L — SIGNIFICANT CHANGE UP (ref 3.5–5.3)
PROT SERPL-MCNC: 7.8 G/DL — SIGNIFICANT CHANGE UP (ref 6–8.3)
PROTHROM AB SERPL-ACNC: 10.7 SEC — SIGNIFICANT CHANGE UP (ref 9.5–13)
RBC # BLD: 3.65 M/UL — LOW (ref 4.2–5.8)
RBC # FLD: 19 % — HIGH (ref 10.3–14.5)
SODIUM SERPL-SCNC: 136 MMOL/L — SIGNIFICANT CHANGE UP (ref 135–145)
WBC # BLD: 10.54 K/UL — HIGH (ref 3.8–10.5)
WBC # FLD AUTO: 10.54 K/UL — HIGH (ref 3.8–10.5)

## 2024-02-29 PROCEDURE — 99223 1ST HOSP IP/OBS HIGH 75: CPT | Mod: GC

## 2024-02-29 PROCEDURE — 99222 1ST HOSP IP/OBS MODERATE 55: CPT

## 2024-02-29 RX ORDER — ATENOLOL 25 MG/1
1 TABLET ORAL
Refills: 0 | DISCHARGE

## 2024-02-29 RX ORDER — PREGABALIN 225 MG/1
1 CAPSULE ORAL
Qty: 0 | Refills: 0 | DISCHARGE

## 2024-02-29 RX ORDER — MOMETASONE FUROATE 1 MG/G
1 CREAM TOPICAL
Refills: 0 | DISCHARGE

## 2024-02-29 RX ORDER — SOD,AMMONIUM,POTASSIUM LACTATE
1 CREAM (GRAM) TOPICAL
Refills: 0 | DISCHARGE

## 2024-02-29 RX ORDER — ATORVASTATIN CALCIUM 80 MG/1
1 TABLET, FILM COATED ORAL
Refills: 0 | DISCHARGE

## 2024-02-29 RX ORDER — FLUTICASONE PROPIONATE 0.5 MG/G
1 CREAM TOPICAL
Refills: 0 | DISCHARGE

## 2024-02-29 RX ORDER — HYDROCORTISONE 1 %
1 OINTMENT (GRAM) TOPICAL
Refills: 0 | DISCHARGE

## 2024-02-29 RX ORDER — LINAGLIPTIN AND METFORMIN HYDROCHLORIDE 2.5; 85 MG/1; MG/1
1 TABLET, FILM COATED ORAL
Refills: 0 | DISCHARGE

## 2024-02-29 RX ORDER — SUCRALFATE 1 G
1 TABLET ORAL
Refills: 0 | DISCHARGE

## 2024-02-29 RX ORDER — INFLUENZA VIRUS VACCINE 15; 15; 15; 15 UG/.5ML; UG/.5ML; UG/.5ML; UG/.5ML
0.7 SUSPENSION INTRAMUSCULAR ONCE
Refills: 0 | Status: DISCONTINUED | OUTPATIENT
Start: 2024-02-29 | End: 2024-02-29

## 2024-02-29 RX ADMIN — PANTOPRAZOLE SODIUM 40 MILLIGRAM(S): 20 TABLET, DELAYED RELEASE ORAL at 05:48

## 2024-02-29 RX ADMIN — Medication 1: at 07:27

## 2024-02-29 RX ADMIN — Medication 150 MILLIGRAM(S): at 12:36

## 2024-02-29 NOTE — CONSULT NOTE ADULT - ASSESSMENT
68yo M HTN, DM  HLD, gastric ulcers, pericardial effusion, DM, PAD / stent prior to R LE and s/p recent stent to LLE 1/30/24 on eliquis presents after being found anemic on outpatient labs.    #Microcytic Anemia  #PUD  #Pericardial Effusion  #PAD on Eliquis  *LDH and Haptoglobin elevated    DDx: AOCD vs hemolysis esophagitis, peptic ulcer disease, erosive gastropathy, arteriovenous malformation [AVM],   Recommendations:  -trend vitals, CBC, and monitor for clinical signs of bleeding  -maintain active type and screen  All recommendations preliminary until note signed by service attending.    Thank you for involving us in the care of this patient. Please contact should any concern or questions arise.    Bernardo Wilkins MD   Gastroenterology/Hepatology Fellow PGY-5  Available on [employer] Teams 7am - 5pm  [email or web address]    NON-URGENT CONSULTS:  Please email:  [email or web address]   OR  [email or web address]    After 5pm, please contact the on-call GI fellow. [phone]    AT NIGHT AND ON WEEKENDS:  Contact on-call GI fellow via answering service ([phone]) from 5pm-8am and on weekends/holidays  -transfusion goal to maintain hemoglobin >/= 7.0 and platelets >/= 50  -rule out other causes for anemia [consider iron studies, ferritin, vitamin B12, folate, or hemolysis workup with haptoglobin, LDH, reticulocytes, peripheral blood smear]  -avoid NSAIDs  -PPI IV BID acceptable for now  -will order colon prep, clear liquid diet today and please keep NPO at midnight for tentative endoscopy and colonoscopy tomorrow       70yo M HTN, DM  HLD, gastric ulcers, pericardial effusion, DM, PAD / stent prior to R LE and s/p recent stent to LLE 1/30/24 on eliquis presents after being found anemic on outpatient labs.    #Microcytic Anemia  #PUD  #Pericardial Effusion  #PAD on Eliquis  *EDDY negative 2/29 - brown stool visualized, formed stool  *LDH and Haptoglobin elevated    DDx: AOCD vs hemolysis vs esophagitis, peptic ulcer disease, erosive gastropathy, arteriovenous malformation [AVM], malignancy    Recommendations:  -trend vitals, CBC, and monitor for clinical signs of bleeding  -maintain active type and screen  -transfusion goal to maintain hemoglobin >/= 7.0 and platelets >/= 50  -rule out other causes for anemia [consider iron studies, ferritin, vitamin B12, folate, or hemolysis workup with, reticulocyte count, peripheral blood smear]  -avoid NSAIDs  -c/w PPI therapy  -obtain TTE  -obtain cardiac clearance  -NPO at midnight  -tentatively planned for EGD tomorrow 3/01    All recommendations preliminary until note signed by service attending.    Thank you for involving us in the care of this patient. Please contact should any concern or questions arise.    Bernardo Wilkins MD   Gastroenterology/Hepatology Fellow PGY-5  Available on [employer] Teams 7am - 5pm  [email or web address]    NON-URGENT CONSULTS:  Please email:  [email or web address]   OR  [email or web address]    After 5pm, please contact the on-call GI fellow. [phone]    AT NIGHT AND ON WEEKENDS:  Contact on-call GI fellow via answering service ([phone]) from 5pm-8am and on weekends/holidays

## 2024-02-29 NOTE — PATIENT PROFILE ADULT - FALL HARM RISK - HARM RISK INTERVENTIONS

## 2024-02-29 NOTE — DISCHARGE NOTE PROVIDER - CARE PROVIDERS DIRECT ADDRESSES
,[email or web address]

## 2024-02-29 NOTE — DISCHARGE NOTE PROVIDER - NSFOLLOWUPCLINICS_GEN_ALL_ED_FT
[facility] Gastroenterology  Gastroenterology  [address] [place] [address] [address]  [place] [address]  Phone: [phone]  Fax:

## 2024-02-29 NOTE — CONSULT NOTE ADULT - ATTENDING COMMENTS
# Anemia  # History of PUD  # History of moderate pericardial effusion  # PAD s/p stent on Eliquis    --PPI BID  --Please obtain repeat TTE given history of pericardial effusion  --EGD offered to patient if/when medically optimized and as early as tomorrow    Of note, patient AMA'ed after GI evaluation. Should follow up with established GI provider as outpatient.

## 2024-02-29 NOTE — DISCHARGE NOTE PROVIDER - NSDCFUSCHEDAPPT_GEN_ALL_CORE_FT
Mahad Trujillo Physician [facility]  VASCULAR [address]  Scheduled Appointment: 03/19/2024

## 2024-02-29 NOTE — PATIENT PROFILE ADULT - HEALTH LITERACY
Post-Op Assessment Note      CV Status:  Stable    Mental Status:  Alert and awake    Hydration Status:  Euvolemic    PONV Controlled:  Controlled    Airway Patency:  Patent    Post Op Vitals Reviewed: Yes          Staff: Anesthesiologist           BP     Temp      Pulse     Resp      SpO2 no

## 2024-02-29 NOTE — DISCHARGE NOTE PROVIDER - HOSPITAL COURSE
69 y.o. male HTN, DM  HLD, gastric ulcers, pericardial effusion, DM, PAD / stent prior to R LE   s/p recent  Stent to LLE 1/30/24. Preop Hb then 7.1 (Jan 30 2024)  and transfused with Hb improving to 8.8. Refused gi workup in house and discharged on aspirin,  eliquis and PPI BID with instruction for urgent  GI followup that had been scheduled Feb 3; saw GI Dr Bearden but insurance did not approve capsule endoscopy, planned for possible bleeding scan? (reportedly underwent an EGD and colonoscopy 6 months prior - colonoscopy was reportedly normal, EGD showed persistent ulcer in the stomach, unclear size and if was biopsied).   Pt returns to ed in setting of outpt Hb < 7 in setting of recent new-onset dyspnea and lightheadedness. James nichols cp. EKG non-ischemic. Hb 6.8 compared to 8.8 Jan 31st. Pt denies bloody or black stool, but has noted darker appearing stool over last 2 weeks. Stopped Eliquis yesterday      Case discussed with  ___ on ___. Patient is medically stable and optimized for discharge as per attending. All medications were reviewed and prescriptions were sent to a mutually agreed upon pharmacy. Discharge plan reviewed with patient and/or family. 69M PMH of HTN, DM  HLD, gastric ulcers, pericardial effusion, DM, PAD / stent prior to R LE  s/p recent  stent to LLE 1/30/24. Pre-op Hgb then 7.1 (Jan 30 2024)  and transfused with Hgb improving to 8.8. Refused GI workup in house and discharged on aspirin,  eliquis, and PPI BID with instruction for urgent  GI followup that had been scheduled Feb 3; saw GI Dr. Bearden, but insurance did not approve capsule endoscopy, planned for possible bleeding scan (reportedly underwent an EGD and colonoscopy 6 months prior - colonoscopy was reportedly normal, EGD showed persistent ulcer in the stomach, unclear size / whether it was biopsied).   Admitted in setting of outpatient Hgb < 7 in setting of recent new-onset dyspnea and lightheadedness. Hgb 6.8 compared to 8.8 Jan 31st.     GI consulted, recommended repeat TTE and plan for EGD 3/1, however, patient decided to leave against medical advice.    Case discussed with Dr. Garcia on 2/29. Patient is not medically cleared for discharge and is leaving against medical advice.

## 2024-02-29 NOTE — CONSULT NOTE ADULT - SUBJECTIVE AND OBJECTIVE BOX
Chief Complaint:  Patient is a 69y old  Male who presents with a chief complaint of anemia (28 Feb 2024 20:10)      HPI:    Allergies:  Advil (Rash)    Home Medications:  ASA 81 qD (last dose 2/27 PM)  Eliquis BiD (last dose 2/27 AM)  Dexlansoprazole 60mg qAM (compliant)    Hospital Medications:  atenolol  Tablet 25 milliGRAM(s) Oral daily  atorvastatin 40 milliGRAM(s) Oral at bedtime  cyanocobalamin 1000 MICROGram(s) Oral daily  dextrose 5%. 1000 milliLiter(s) IV Continuous <Continuous>  dextrose 5%. 1000 milliLiter(s) IV Continuous <Continuous>  dextrose 50% Injectable 25 Gram(s) IV Push once  dextrose 50% Injectable 12.5 Gram(s) IV Push once  dextrose 50% Injectable 25 Gram(s) IV Push once  dextrose Oral Gel 15 Gram(s) Oral once PRN  diphenhydrAMINE Injectable 25 milliGRAM(s) IV Push once  glucagon  Injectable 1 milliGRAM(s) IntraMuscular once  influenza  Vaccine (HIGH DOSE) 0.7 milliLiter(s) IntraMuscular once  insulin lispro (ADMELOG) corrective regimen sliding scale   SubCutaneous three times a day before meals  insulin lispro (ADMELOG) corrective regimen sliding scale   SubCutaneous at bedtime  pantoprazole  Injectable 40 milliGRAM(s) IV Push every 12 hours  pregabalin 150 milliGRAM(s) Oral daily  sucralfate 1 Gram(s) Oral four times a day      PMHX/PSHX:  HTN (hypertension)    HLD (hyperlipidemia)    DM2 (diabetes mellitus, type 2)    DM (diabetes mellitus)    PAD (peripheral artery disease)    Tobacco abuse    H/O iron deficiency    [place] (hard of hearing)    Peripheral neuropathy    Abnormal angiogram    S/P appendectomy    S/P peripheral artery angioplasty with stent placement      Family history:    Denies FHx GI Ca/GI Dz    Social History:     Tob: Denies  EtOH: Denies  Illicit Drugs: Denies    ROS:   General:  No wt loss, fevers, chills, night sweats, fatigue  Eyes:  Good vision, no reported pain  ENT:  No sore throat, pain, runny nose, dysphagia  CV:  No pain, palpitations, hypo/hypertension  Pulm:  No dyspnea, cough, tachypnea, wheezing  GI:  As per HPI  :  No pain, bleeding, incontinence, nocturia  Muscle:  No pain, weakness  Neuro:  No weakness, tingling, memory problems  Psych:  No fatigue, insomnia, mood problems, depression  Endocrine:  No polyuria, polydipsia, cold/heat intolerance  Heme:  No petechiae, ecchymosis, easy bruisability  Skin:  No rash, tattoos, scars, edema    PHYSICAL EXAM:   GENERAL:  No acute distress  HEENT:  Normocephalic/atraumatic, no scleral icterus  CHEST:  No accessory muscle use  HEART:  Regular rate and rhythm  ABDOMEN:  Soft, non-tender, non-distended  EDDY 2/29 with RN Chaperone - brown stool visualized, no red or black. Formed stool palpated in rectal vault  EXTREMITIES: No b/l LE edema  SKIN:  No rash  NEURO:  Alert and oriented x 3    Vital Signs:  Vital Signs Last 24 Hrs  T(C): 36.8 (29 Feb 2024 06:00), Max: 37.1 (28 Feb 2024 16:39)  T(F): 98.2 (29 Feb 2024 06:00), Max: 98.7 (28 Feb 2024 16:39)  HR: 90 (29 Feb 2024 06:00) (78 - 91)  BP: 156/72 (29 Feb 2024 06:00) (120/90 - 174/73)  BP(mean): --  RR: 17 (29 Feb 2024 06:00) (16 - 19)  SpO2: 96% (29 Feb 2024 06:00) (96% - 100%)    Parameters below as of 29 Feb 2024 06:00  Patient On (Oxygen Delivery Method): room air      Daily     Daily     LABS:                        7.7    10.54 )-----------( 230      ( 29 Feb 2024 06:25 )             25.3     Mean Cell Volume: 69.3 fL (02-29-24 @ 06:25)    02-29    136  |  103  |  11  ----------------------------<  158<H>  4.1   |  22  |  0.72    Ca    9.4      29 Feb 2024 06:25    TPro  7.8  /  Alb  4.0  /  TBili  0.8  /  DBili  x   /  AST  23  /  ALT  11  /  AlkPhos  95  02-29    LIVER FUNCTIONS - ( 29 Feb 2024 06:25 )  Alb: 4.0 g/dL / Pro: 7.8 g/dL / ALK PHOS: 95 U/L / ALT: 11 U/L / AST: 23 U/L / GGT: x           PT/INR - ( 29 Feb 2024 06:25 )   PT: 10.7 sec;   INR: 0.95 ratio         PTT - ( 29 Feb 2024 06:25 )  PTT:28.9 sec  Urinalysis Basic - ( 29 Feb 2024 06:25 )    Color: x / Appearance: x / SG: x / pH: x  Gluc: 158 mg/dL / Ketone: x  / Bili: x / Urobili: x   Blood: x / Protein: x / Nitrite: x   Leuk Esterase: x / RBC: x / WBC x   Sq Epi: x / Non Sq Epi: x / Bacteria: x                              7.7    10.54 )-----------( 230      ( 29 Feb 2024 06:25 )             25.3                         6.8    7.99  )-----------( 249      ( 28 Feb 2024 16:27 )             24.2       Imaging:  < from: Xray Chest 2 Views PA/Lat (02.28.24 @ 17:35) >  FINDINGS:  The heart is enlarged stable in size.  No vascular congestion but a small right pleural effusion is now present.  No focal abnormalities to suggest pneumonia.   No pneumothorax.  No acute bony abnormality.    IMPRESSION: Clear lungs with small right effusion and stable cardiomegaly.    < end of copied text >           Chief Complaint:  Patient is a 69y old  Male who presents with a chief complaint of anemia (28 Feb 2024 20:10)      HPI:  70yo M HTN, DM  HLD, gastric ulcers, pericardial effusion, DM, PAD / stent prior to R LE and s/p recent stent to LLE 1/30/24 on eliquis presents after being found anemic on outpatient labs. Patient with Hb 6.8 in ER with MCV 69.3. Patient reports he had EGD and colonoscopy 4-6 months ago with a Dr. Ian Bearden as an outpatient. patient reports found with a gastric ulcer at that time and started on PPI therapy with dexlansoprazole qAM. He reports colonoscopy was normal. Patient has been having 1-2 BM/day. Last BM was 2 days ago. Noted to be darker, but no red blood. He reports prior episode of anemia July 2023 as well. he is currently taking eliquis biD and aspirin. His last dose of the ASA was 2/27 Pm and last dose of eliquis was 2/27 AM. EDDY this AM with brown visualized, no red blood or black. Formed stool palpated in the rectal vault.    Allergies:  Advil (Rash)    Home Medications:  ASA 81 qD (last dose 2/27 PM)  Eliquis BiD (last dose 2/27 AM)  Dexlansoprazole 60mg qAM (compliant)    Hospital Medications:  atenolol  Tablet 25 milliGRAM(s) Oral daily  atorvastatin 40 milliGRAM(s) Oral at bedtime  cyanocobalamin 1000 MICROGram(s) Oral daily  dextrose 5%. 1000 milliLiter(s) IV Continuous <Continuous>  dextrose 5%. 1000 milliLiter(s) IV Continuous <Continuous>  dextrose 50% Injectable 25 Gram(s) IV Push once  dextrose 50% Injectable 12.5 Gram(s) IV Push once  dextrose 50% Injectable 25 Gram(s) IV Push once  dextrose Oral Gel 15 Gram(s) Oral once PRN  diphenhydrAMINE Injectable 25 milliGRAM(s) IV Push once  glucagon  Injectable 1 milliGRAM(s) IntraMuscular once  influenza  Vaccine (HIGH DOSE) 0.7 milliLiter(s) IntraMuscular once  insulin lispro (ADMELOG) corrective regimen sliding scale   SubCutaneous three times a day before meals  insulin lispro (ADMELOG) corrective regimen sliding scale   SubCutaneous at bedtime  pantoprazole  Injectable 40 milliGRAM(s) IV Push every 12 hours  pregabalin 150 milliGRAM(s) Oral daily  sucralfate 1 Gram(s) Oral four times a day      PMHX/PSHX:  HTN (hypertension)    HLD (hyperlipidemia)    DM2 (diabetes mellitus, type 2)    DM (diabetes mellitus)    PAD (peripheral artery disease)    Tobacco abuse    H/O iron deficiency    [place] (hard of hearing)    Peripheral neuropathy    Abnormal angiogram    S/P appendectomy    S/P peripheral artery angioplasty with stent placement      Family history:    Denies FHx GI Ca/GI Dz    Social History:     Tob: Denies  EtOH: Denies  Illicit Drugs: Denies    ROS:   General:  No wt loss, fevers, chills, night sweats, fatigue  Eyes:  Good vision, no reported pain  ENT:  No sore throat, pain, runny nose, dysphagia  CV:  No pain, palpitations, hypo/hypertension  Pulm:  No dyspnea, cough, tachypnea, wheezing  GI:  As per HPI  :  No pain, bleeding, incontinence, nocturia  Muscle:  No pain, weakness  Neuro:  No weakness, tingling, memory problems  Psych:  No fatigue, insomnia, mood problems, depression  Endocrine:  No polyuria, polydipsia, cold/heat intolerance  Heme:  No petechiae, ecchymosis, easy bruisability  Skin:  No rash, tattoos, scars, edema    PHYSICAL EXAM:   GENERAL:  No acute distress  HEENT:  Normocephalic/atraumatic, no scleral icterus  CHEST:  No accessory muscle use  HEART:  Regular rate and rhythm  ABDOMEN:  Soft, non-tender, non-distended  EDDY 2/29 with RN Chaperone - brown stool visualized, no red or black. Formed stool palpated in rectal vault  EXTREMITIES: No b/l LE edema  SKIN:  No rash  NEURO:  Alert and oriented x 3    Vital Signs:  Vital Signs Last 24 Hrs  T(C): 36.8 (29 Feb 2024 06:00), Max: 37.1 (28 Feb 2024 16:39)  T(F): 98.2 (29 Feb 2024 06:00), Max: 98.7 (28 Feb 2024 16:39)  HR: 90 (29 Feb 2024 06:00) (78 - 91)  BP: 156/72 (29 Feb 2024 06:00) (120/90 - 174/73)  BP(mean): --  RR: 17 (29 Feb 2024 06:00) (16 - 19)  SpO2: 96% (29 Feb 2024 06:00) (96% - 100%)    Parameters below as of 29 Feb 2024 06:00  Patient On (Oxygen Delivery Method): room air      Daily     Daily     LABS:                        7.7    10.54 )-----------( 230      ( 29 Feb 2024 06:25 )             25.3     Mean Cell Volume: 69.3 fL (02-29-24 @ 06:25)    02-29    136  |  103  |  11  ----------------------------<  158<H>  4.1   |  22  |  0.72    Ca    9.4      29 Feb 2024 06:25    TPro  7.8  /  Alb  4.0  /  TBili  0.8  /  DBili  x   /  AST  23  /  ALT  11  /  AlkPhos  95  02-29    LIVER FUNCTIONS - ( 29 Feb 2024 06:25 )  Alb: 4.0 g/dL / Pro: 7.8 g/dL / ALK PHOS: 95 U/L / ALT: 11 U/L / AST: 23 U/L / GGT: x           PT/INR - ( 29 Feb 2024 06:25 )   PT: 10.7 sec;   INR: 0.95 ratio         PTT - ( 29 Feb 2024 06:25 )  PTT:28.9 sec  Urinalysis Basic - ( 29 Feb 2024 06:25 )    Color: x / Appearance: x / SG: x / pH: x  Gluc: 158 mg/dL / Ketone: x  / Bili: x / Urobili: x   Blood: x / Protein: x / Nitrite: x   Leuk Esterase: x / RBC: x / WBC x   Sq Epi: x / Non Sq Epi: x / Bacteria: x                              7.7    10.54 )-----------( 230      ( 29 Feb 2024 06:25 )             25.3                         6.8    7.99  )-----------( 249      ( 28 Feb 2024 16:27 )             24.2       Imaging:  < from: Xray Chest 2 Views PA/Lat (02.28.24 @ 17:35) >  FINDINGS:  The heart is enlarged stable in size.  No vascular congestion but a small right pleural effusion is now present.  No focal abnormalities to suggest pneumonia.   No pneumothorax.  No acute bony abnormality.    IMPRESSION: Clear lungs with small right effusion and stable cardiomegaly.    < end of copied text >           Chief Complaint:  Patient is a 69y old  Male who presents with a chief complaint of anemia (28 Feb 2024 20:10)    HPI:  70yo M HTN, DM  HLD, gastric ulcers, pericardial effusion, DM, PAD / stent prior to R LE and s/p recent stent to LLE 1/30/24 on Eliquis presents after being found anemic on outpatient labs. Patient with Hb 6.8 in ER with MCV 69.3. Patient reports he had EGD and colonoscopy 4-6 months ago with a Dr. Ian Bearden as an outpatient. patient reports found with a gastric ulcer at that time and started on PPI therapy with dexlansoprazole qAM. He reports colonoscopy was normal. Patient has been having 1-2 BM/day. Last BM was 2 days ago. Noted to be darker, but no red blood. He reports prior episode of anemia July 2023 as well. he is currently taking eliquis biD and aspirin. His last dose of the ASA was 2/27 Pm and last dose of eliquis was 2/27 AM. EDDY this AM with brown visualized, no red blood or black. Formed stool palpated in the rectal vault.    Allergies:  Advil (Rash)    Home Medications:  ASA 81 qD (last dose 2/27 PM)  Eliquis BiD (last dose 2/27 AM)  Dexlansoprazole 60mg qAM (compliant)    Hospital Medications:  atenolol  Tablet 25 milliGRAM(s) Oral daily  atorvastatin 40 milliGRAM(s) Oral at bedtime  cyanocobalamin 1000 MICROGram(s) Oral daily  dextrose 5%. 1000 milliLiter(s) IV Continuous <Continuous>  dextrose 5%. 1000 milliLiter(s) IV Continuous <Continuous>  dextrose 50% Injectable 25 Gram(s) IV Push once  dextrose 50% Injectable 12.5 Gram(s) IV Push once  dextrose 50% Injectable 25 Gram(s) IV Push once  dextrose Oral Gel 15 Gram(s) Oral once PRN  diphenhydrAMINE Injectable 25 milliGRAM(s) IV Push once  glucagon  Injectable 1 milliGRAM(s) IntraMuscular once  influenza  Vaccine (HIGH DOSE) 0.7 milliLiter(s) IntraMuscular once  insulin lispro (ADMELOG) corrective regimen sliding scale   SubCutaneous three times a day before meals  insulin lispro (ADMELOG) corrective regimen sliding scale   SubCutaneous at bedtime  pantoprazole  Injectable 40 milliGRAM(s) IV Push every 12 hours  pregabalin 150 milliGRAM(s) Oral daily  sucralfate 1 Gram(s) Oral four times a day      PMHX/PSHX:  HTN (hypertension)    HLD (hyperlipidemia)    DM2 (diabetes mellitus, type 2)    DM (diabetes mellitus)    PAD (peripheral artery disease)    Tobacco abuse    H/O iron deficiency    [place] (hard of hearing)    Peripheral neuropathy    Abnormal angiogram    S/P appendectomy    S/P peripheral artery angioplasty with stent placement      Family history:    Denies FHx GI Ca/GI Dz    Social History:     Tob: Denies  EtOH: Denies  Illicit Drugs: Denies    PHYSICAL EXAM:   GENERAL:  No acute distress  HEENT:  Normocephalic/atraumatic, no scleral icterus  CHEST:  No accessory muscle use  HEART:  Regular rate and rhythm  ABDOMEN:  Soft, non-tender, non-distended  EDDY 2/29 with RN Chaperone - brown stool visualized, no red or black. Formed stool palpated in rectal vault  EXTREMITIES: No b/l LE edema  SKIN:  No rash  NEURO:  Alert and oriented x 3    Vital Signs:  Vital Signs Last 24 Hrs  T(C): 36.8 (29 Feb 2024 06:00), Max: 37.1 (28 Feb 2024 16:39)  T(F): 98.2 (29 Feb 2024 06:00), Max: 98.7 (28 Feb 2024 16:39)  HR: 90 (29 Feb 2024 06:00) (78 - 91)  BP: 156/72 (29 Feb 2024 06:00) (120/90 - 174/73)  BP(mean): --  RR: 17 (29 Feb 2024 06:00) (16 - 19)  SpO2: 96% (29 Feb 2024 06:00) (96% - 100%)    Parameters below as of 29 Feb 2024 06:00  Patient On (Oxygen Delivery Method): room air      Daily     Daily     LABS:                        7.7    10.54 )-----------( 230      ( 29 Feb 2024 06:25 )             25.3     Mean Cell Volume: 69.3 fL (02-29-24 @ 06:25)    02-29    136  |  103  |  11  ----------------------------<  158<H>  4.1   |  22  |  0.72    Ca    9.4      29 Feb 2024 06:25    TPro  7.8  /  Alb  4.0  /  TBili  0.8  /  DBili  x   /  AST  23  /  ALT  11  /  AlkPhos  95  02-29    LIVER FUNCTIONS - ( 29 Feb 2024 06:25 )  Alb: 4.0 g/dL / Pro: 7.8 g/dL / ALK PHOS: 95 U/L / ALT: 11 U/L / AST: 23 U/L / GGT: x           PT/INR - ( 29 Feb 2024 06:25 )   PT: 10.7 sec;   INR: 0.95 ratio         PTT - ( 29 Feb 2024 06:25 )  PTT:28.9 sec  Urinalysis Basic - ( 29 Feb 2024 06:25 )    Color: x / Appearance: x / SG: x / pH: x  Gluc: 158 mg/dL / Ketone: x  / Bili: x / Urobili: x   Blood: x / Protein: x / Nitrite: x   Leuk Esterase: x / RBC: x / WBC x   Sq Epi: x / Non Sq Epi: x / Bacteria: x                              7.7    10.54 )-----------( 230      ( 29 Feb 2024 06:25 )             25.3                         6.8    7.99  )-----------( 249      ( 28 Feb 2024 16:27 )             24.2       Imaging:  < from: Xray Chest 2 Views PA/Lat (02.28.24 @ 17:35) >  FINDINGS:  The heart is enlarged stable in size.  No vascular congestion but a small right pleural effusion is now present.  No focal abnormalities to suggest pneumonia.   No pneumothorax.  No acute bony abnormality.    IMPRESSION: Clear lungs with small right effusion and stable cardiomegaly.    < end of copied text >

## 2024-02-29 NOTE — DISCHARGE NOTE NURSING/CASE MANAGEMENT/SOCIAL WORK - NSPROMEDSBROUGHTTOHOSP_GEN_A_NUR
Patient notified of results, understanding verbalized. She will monitor her sx and follow up as indicated.     Petra Hi RN   [facility], [facility]     no

## 2024-02-29 NOTE — PHARMACOTHERAPY INTERVENTION NOTE - COMMENTS
Medication list in Outpatient Medication Review ([facility]) has been verified and updated accordingly.   Of Note:   Per outpatient pharmacy, patient may be using the following topicals for rash with hydrocortisone being the most recently filled topical steroid:   - Lac-Hydrin Cream (Filled Feb/2024)   - Hydrocortisone 2.5% Cream (Filled in Feb/2024)   - Mometasone 0.1% Ointment (Filled in Oct/2023) ?  - Fluticasone 0.05% Cream (Filled Sept/2023) ?

## 2024-02-29 NOTE — DISCHARGE NOTE PROVIDER - NSDCCPCAREPLAN_GEN_ALL_CORE_FT
PRINCIPAL DISCHARGE DIAGNOSIS  Diagnosis: Anemia  Assessment and Plan of Treatment: You came to the hospital due to anemia (low blood counts) and received a unit of blood. You were seen by the Gastroenterology team who recommended an upper endoscopy, however, you decided to leave against medical advice. It was recommended that you stay for further workup of GI bleeding, however, you declined to stay.  *YOU ARE LEAVING AGAINST MEDICAL ADVICE.* Please return to the hospital if you experience chest pain, shortness of breath, dizziness, weakness, or any other signs of anemia such as bright red blood per rectum or dark stools. Please follow up with your primary care provider and Gastroenterology within 1 week of discharge for further workup and management.      SECONDARY DISCHARGE DIAGNOSES  Diagnosis: PAD (peripheral artery disease)  Assessment and Plan of Treatment: Your aspirin was held due to concern for GI bleeding. Please follow up closely with your primary care provider and vascular surgeon regarding when you can safely restart aspirin as you recently underwent stent placement to left lower extremity on 1/30.  *YOU ARE LEAVING AGAINST MEDICAL ADVICE.*    Diagnosis: DM (diabetes mellitus)  Assessment and Plan of Treatment: Your hemoglobin A1C is 7.5%. Target goal for hemoglobin A1C is <7%. Monitor blood glucose levels throughout the day, before meals and at bedtime. Record blood sugars and bring to outpatient provider appointments in order to be reviewed by your doctor for management modifications. If your sugars are more than 400 or less than 70 you should contact your primary care provider immediately. Monitor for signs/symptoms of low blood glucose, such as dizziness, altered mental status, or cool/clammy skin. In addition, monitor for signs/symptoms of high blood glucose, such as feeling hot, dry, fatigued, or with increased thirst/urination. Make regular podiatry appointments in order to have feet checked for wounds and uncontrolled toe nail growth to prevent infections. Make regular ophthalmology appointments to monitor your vision.  *YOU ARE LEAVING AGAINST MEDICAL ADVICE.*

## 2024-02-29 NOTE — DISCHARGE NOTE NURSING/CASE MANAGEMENT/SOCIAL WORK - PATIENT PORTAL LINK FT
You can access the FollowMyHealth Patient Portal offered by [facility] by registering at the following website: [email or web address]. By joining [facility]’s FollowMyHealth portal, you will also be able to view your health information using other applications (apps) compatible with our system.

## 2024-02-29 NOTE — CHART NOTE - NSCHARTNOTEFT_GEN_A_CORE
Provider called to bedside by RN as patient wishes to leave AMA.     Patient seen at bedside to further discuss plan of care. Discussed risks of leaving against medical advice, which includes worsening of current medical condition, including death. Patient understands risks and still wishes to leave. AMA paperwork signed and placed in chart.     Dr. Garcia made aware. Patient advised to follow up closely with PCP for further monitoring of labs and gastroenterology for outpatient EGD for evaluation of GI bleed.

## 2024-02-29 NOTE — PATIENT PROFILE ADULT - NSPROGENOTHERPROVIDER_GEN_A_NUR
Post-Op Assessment Note    CV Status:  Stable  Pain Score: 0    Pain management: adequate     Mental Status:  Awake and alert   Hydration Status:  Stable   PONV Controlled:  None   Airway Patency:  Patent and adequate      Post Op Vitals Reviewed: Yes      Staff: CRNA, Anesthesiologist         No notable events documented.     /60   Temp      Pulse  89   Resp   16   SpO2 100%
none

## 2024-02-29 NOTE — PATIENT PROFILE ADULT - NSFALLSECTIONLABEL_GEN_A_CORE
. Complex Repair Preamble Text (Leave Blank If You Do Not Want): Extensive wide undermining was performed.

## 2024-02-29 NOTE — DISCHARGE NOTE PROVIDER - NSDCFUADDAPPT_GEN_ALL_CORE_FT
Follow up with your primary care provider within 1 week of discharge for repeat labs to evaluate hemoglobin.    Follow up with gastroenterology for further management of GI bleed.    *YOU ARE LEAVING AGAINST MEDICAL ADVICE.*

## 2024-02-29 NOTE — DISCHARGE NOTE PROVIDER - CARE PROVIDER_API CALL
Mahad Trujillo  Vascular Surgery  [address] [place] [address]  Phone: [phone]  Fax: [phone]  Follow Up Time:     MD Magali [facility]  Internal Medicine  [id number] [place] [address], [address]  [place] [address]  Phone: [phone]  Fax: [phone]  Follow Up Time:

## 2024-02-29 NOTE — DISCHARGE NOTE PROVIDER - NSDCMRMEDTOKEN_GEN_ALL_CORE_FT
ammonium lactate 12% topical cream: Apply topically to affected area 2 times a day  apixaban 2.5 mg oral tablet: 1 tab(s) orally 2 times a day  aspirin 81 mg oral delayed release tablet: 1 tab(s) orally once a day  atenolol 25 mg oral tablet: 1 tab(s) orally once a day  atorvastatin 40 mg oral tablet: 1 tab(s) orally once a day (at bedtime)  dexlansoprazole 60 mg oral delayed release capsule: 1 cap(s) orally once a day  fluticasone 0.05% topical cream: Apply topically to affected area 2 times a day (Filled in Sept/2023)  hydrocortisone 2.5% topical cream: Apply topically to affected area 2 times a day  Jentadueto 2.5 mg-1000 mg oral tablet: 1 tab(s) orally 2 times a day  mometasone 0.1% topical ointment: Apply topically to affected area once a day (Filled Sept/2023)  pregabalin 75 mg oral capsule: 2 cap(s) orally once a day  sucralfate 1 g oral tablet: 1 tab(s) orally 4 times a day  Vitamin B12 1000 mcg oral tablet: 1 tab(s) orally once a day   atenolol 25 mg oral tablet: 1 tab(s) orally once a day  atorvastatin 40 mg oral tablet: 1 tab(s) orally once a day (at bedtime)  dexlansoprazole 60 mg oral delayed release capsule: 1 cap(s) orally once a day  hydrocortisone 2.5% topical cream: Apply topically to affected area 2 times a day  Jentadueto 2.5 mg-1000 mg oral tablet: 1 tab(s) orally 2 times a day  pregabalin 75 mg oral capsule: 2 cap(s) orally once a day  sucralfate 1 g oral tablet: 1 tab(s) orally 4 times a day  Vitamin B12 1000 mcg oral tablet: 1 tab(s) orally once a day

## 2024-03-19 ENCOUNTER — APPOINTMENT (OUTPATIENT)
Dept: VASCULAR SURGERY | Facility: CLINIC | Age: 69
End: 2024-03-19
Payer: COMMERCIAL

## 2024-03-19 VITALS
BODY MASS INDEX: 23.03 KG/M2 | HEIGHT: 66.5 IN | DIASTOLIC BLOOD PRESSURE: 68 MMHG | HEART RATE: 81 BPM | WEIGHT: 145 LBS | SYSTOLIC BLOOD PRESSURE: 108 MMHG | TEMPERATURE: 97.6 F

## 2024-03-19 DIAGNOSIS — I73.9 PERIPHERAL VASCULAR DISEASE, UNSPECIFIED: ICD-10-CM

## 2024-03-19 PROCEDURE — 99213 OFFICE O/P EST LOW 20 MIN: CPT

## 2024-03-20 PROBLEM — I73.9 PERIPHERAL ARTERY DISEASE: Status: ACTIVE | Noted: 2022-12-30

## 2024-03-20 NOTE — PHYSICAL EXAM
[Respiratory Effort] : normal respiratory effort [Normal Rate and Rhythm] : normal rate and rhythm [2+] : left 2+ [1+] : right 1+ [0] : left 0 [Ankle Swelling (On Exam)] : not present [Varicose Veins Of Lower Extremities] : not present [] : not present [Abdomen Tenderness] : ~T ~M No abdominal tenderness [Skin Ulcer] : no ulcer [Alert] : alert [Oriented to Place] : oriented to place [Oriented to Person] : oriented to person [Oriented to Time] : oriented to time [Calm] : calm [de-identified] : appears stated age [de-identified] : normocephalic, atraumatic [de-identified] : supple

## 2024-03-20 NOTE — ASSESSMENT
[FreeTextEntry1] : Problem #1 peripheral arterial disease concern for ongoing LLE rest pain, CLTI due to insurance issues unable to perform BRITTNEY/PVRs today will schedule for follow-up for test only and telephonic appt next week continue best medical therapy

## 2024-03-20 NOTE — HISTORY OF PRESENT ILLNESS
[FreeTextEntry1] : 67 year old male with history of HTN, HLD, DM, PAD, and aortoiliac occlusive disease who presents s/p diagnostic right pelvic angiography. He previously underwent outpatient angiography complicated by right iliac artery dissection.   04/12/23 - s/p bilateral lower extremity angiogram with revascularization denies rest pain, complains of one block intermittent claudication complains of burning pain in plantar aspect of both feet from time to time  07/19/23 - Stable intermittent one block claudication of left lower extremity no symptoms in right leg   11/08/23 - Stable intermittent one block claudication of left lower extremity, no rest pain.  01/10/24 - Now has rest pain in his left lower extremity. He has constant pain from his foot to his lower leg which only somewhat improves with dependency.  01/30/24 - L SFA and pop atherectomy with DCB and stent [de-identified] : States he has ongoing left lower extremity pain in calf but seems to be more neurogenic versus musculoskeletal. Denies any problems with the right leg.

## 2024-03-26 ENCOUNTER — APPOINTMENT (OUTPATIENT)
Dept: VASCULAR SURGERY | Facility: CLINIC | Age: 69
End: 2024-03-26
Payer: COMMERCIAL

## 2024-03-26 PROCEDURE — 93926 LOWER EXTREMITY STUDY: CPT

## 2024-03-26 PROCEDURE — 93922 UPR/L XTREMITY ART 2 LEVELS: CPT

## 2024-04-09 ENCOUNTER — APPOINTMENT (OUTPATIENT)
Dept: VASCULAR SURGERY | Facility: CLINIC | Age: 69
End: 2024-04-09

## 2024-04-09 PROCEDURE — 99441: CPT

## 2024-04-09 RX ORDER — EMPAGLIFLOZIN 10 MG/1
10 TABLET, FILM COATED ORAL
Refills: 0 | Status: ACTIVE | COMMUNITY

## 2024-04-26 NOTE — H&P CARDIOLOGY - MS EXT PE MLT D E PC
Spoke with patient aware CXR possible pneumonia pt taking doxycycline 100mg bid x7 days and OTC mucinex has productive cough states she is feeling better has apt 5-7-24 for ANNUAL WELLNESS VISIT/f/u.   no clubbing/no cyanosis/no pedal edema

## 2024-07-17 ENCOUNTER — APPOINTMENT (OUTPATIENT)
Dept: VASCULAR SURGERY | Facility: CLINIC | Age: 69
End: 2024-07-17
Payer: COMMERCIAL

## 2024-07-17 VITALS
BODY MASS INDEX: 23.3 KG/M2 | TEMPERATURE: 98.2 F | HEIGHT: 66 IN | DIASTOLIC BLOOD PRESSURE: 73 MMHG | WEIGHT: 145 LBS | SYSTOLIC BLOOD PRESSURE: 138 MMHG | HEART RATE: 76 BPM

## 2024-07-17 DIAGNOSIS — I73.9 PERIPHERAL VASCULAR DISEASE, UNSPECIFIED: ICD-10-CM

## 2024-07-17 PROCEDURE — 93923 UPR/LXTR ART STDY 3+ LVLS: CPT

## 2024-07-17 PROCEDURE — 99213 OFFICE O/P EST LOW 20 MIN: CPT

## 2024-08-28 NOTE — DISCHARGE NOTE PROVIDER - NSDCCPGOAL_GEN_ALL_CORE_FT
To get better and follow your care plan as instructed. Time spent on patient encounter including emergency department chart review, history taking and physical exam, developing patient plan and tailoring H&P, as well as discussion with patient, family, RN, and other providers involved in patient's care.

## 2024-10-08 NOTE — DISCHARGE NOTE PROVIDER - NSDCQMERRANDS_GEN_ALL_CORE
Nail Removal    Date/Time: 10/8/2024 10:30 AM    Performed by: Alice Best DPM  Authorized by: Alice Best DPM    Consent Done?:  Yes (Written)  Time out: Immediately prior to the procedure a time out was called    Prep: patient was prepped and draped in usual sterile fashion    Location:     Location:  Left foot    Location detail:  Left big toe  Anesthesia:     Anesthesia:  Digital block    Local anesthetic:  Lidocaine 1% without epinephrine (0.75% Marcaine plain)    Anesthetic total (ml):  3  Procedure Details:     Preparation:  Skin prepped with alcohol and skin prepped with Betadine    Side:  Medial    Wedge excision of skin of nail fold: No      Nail bed sutured?: No      Nail matrix removed:  Partial    Removal method:  Phenol and alcohol    Dressing applied:  4x4, antibiotic ointment and dressing applied    Patient tolerance:  Patient tolerated the procedure well with no immediate complications    
Nail Removal    Date/Time: 10/8/2024 10:30 AM    Performed by: Alice Best DPM  Authorized by: Alice Best DPM    Consent Done?:  Yes (Written)  Time out: Immediately prior to the procedure a time out was called    Prep: patient was prepped and draped in usual sterile fashion    Location:     Location:  Right foot    Location detail:  Right big toe  Anesthesia:     Anesthesia:  Digital block    Local anesthetic:  Lidocaine 1% without epinephrine (0.75% Marcaine plain)    Anesthetic total (ml):  3  Procedure Details:     Preparation:  Skin prepped with alcohol and skin prepped with Betadine    Amount removed:  Partial    Side:  Lateral    Wedge excision of skin of nail fold: No      Nail bed sutured?: No      Nail matrix removed:  Partial    Removal method:  Phenol and alcohol    Dressing applied:  4x4, antibiotic ointment and dressing applied    Patient tolerance:  Patient tolerated the procedure well with no immediate complications    
No

## 2024-10-23 ENCOUNTER — APPOINTMENT (OUTPATIENT)
Dept: VASCULAR SURGERY | Facility: CLINIC | Age: 69
End: 2024-10-23
Payer: COMMERCIAL

## 2024-10-23 VITALS
TEMPERATURE: 98.2 F | DIASTOLIC BLOOD PRESSURE: 66 MMHG | WEIGHT: 145 LBS | HEART RATE: 74 BPM | HEIGHT: 66 IN | SYSTOLIC BLOOD PRESSURE: 131 MMHG | BODY MASS INDEX: 23.3 KG/M2

## 2024-10-23 DIAGNOSIS — I77.810 THORACIC AORTIC ECTASIA: ICD-10-CM

## 2024-10-23 PROCEDURE — 93922 UPR/L XTREMITY ART 2 LEVELS: CPT

## 2024-10-23 PROCEDURE — 99214 OFFICE O/P EST MOD 30 MIN: CPT

## 2024-11-12 ENCOUNTER — TRANSCRIPTION ENCOUNTER (OUTPATIENT)
Age: 69
End: 2024-11-12

## 2024-11-12 ENCOUNTER — APPOINTMENT (OUTPATIENT)
Dept: VASCULAR SURGERY | Facility: HOSPITAL | Age: 69
End: 2024-11-12

## 2024-11-12 ENCOUNTER — OUTPATIENT (OUTPATIENT)
Dept: OUTPATIENT SERVICES | Facility: HOSPITAL | Age: 69
LOS: 1 days | Discharge: HOME | End: 2024-11-12
Payer: COMMERCIAL

## 2024-11-12 VITALS
OXYGEN SATURATION: 100 % | DIASTOLIC BLOOD PRESSURE: 66 MMHG | HEART RATE: 65 BPM | RESPIRATION RATE: 15 BRPM | SYSTOLIC BLOOD PRESSURE: 162 MMHG

## 2024-11-12 VITALS
DIASTOLIC BLOOD PRESSURE: 65 MMHG | OXYGEN SATURATION: 95 % | WEIGHT: 149.03 LBS | HEART RATE: 72 BPM | TEMPERATURE: 98 F | SYSTOLIC BLOOD PRESSURE: 128 MMHG | RESPIRATION RATE: 18 BRPM | HEIGHT: 66 IN

## 2024-11-12 DIAGNOSIS — Z90.49 ACQUIRED ABSENCE OF OTHER SPECIFIED PARTS OF DIGESTIVE TRACT: Chronic | ICD-10-CM

## 2024-11-12 DIAGNOSIS — I73.9 PERIPHERAL VASCULAR DISEASE, UNSPECIFIED: ICD-10-CM

## 2024-11-12 DIAGNOSIS — Z95.820 PERIPHERAL VASCULAR ANGIOPLASTY STATUS WITH IMPLANTS AND GRAFTS: Chronic | ICD-10-CM

## 2024-11-12 LAB
ANION GAP SERPL CALC-SCNC: 12 MMOL/L — SIGNIFICANT CHANGE UP (ref 7–14)
BLD GP AB SCN SERPL QL: NEGATIVE — SIGNIFICANT CHANGE UP
BUN SERPL-MCNC: 12 MG/DL — SIGNIFICANT CHANGE UP (ref 7–23)
CALCIUM SERPL-MCNC: 9.2 MG/DL — SIGNIFICANT CHANGE UP (ref 8.4–10.5)
CHLORIDE SERPL-SCNC: 104 MMOL/L — SIGNIFICANT CHANGE UP (ref 98–107)
CO2 SERPL-SCNC: 21 MMOL/L — LOW (ref 22–31)
CREAT SERPL-MCNC: 0.81 MG/DL — SIGNIFICANT CHANGE UP (ref 0.5–1.3)
EGFR: 94 ML/MIN/1.73M2 — SIGNIFICANT CHANGE UP
GLUCOSE BLDC GLUCOMTR-MCNC: 154 MG/DL — HIGH (ref 70–99)
GLUCOSE SERPL-MCNC: 164 MG/DL — HIGH (ref 70–99)
HCT VFR BLD CALC: 38.2 % — LOW (ref 39–50)
HGB BLD-MCNC: 12 G/DL — LOW (ref 13–17)
MCHC RBC-ENTMCNC: 27.7 PG — SIGNIFICANT CHANGE UP (ref 27–34)
MCHC RBC-ENTMCNC: 31.4 G/DL — LOW (ref 32–36)
MCV RBC AUTO: 88.2 FL — SIGNIFICANT CHANGE UP (ref 80–100)
NRBC # BLD: 0 /100 WBCS — SIGNIFICANT CHANGE UP (ref 0–0)
NRBC # FLD: 0 K/UL — SIGNIFICANT CHANGE UP (ref 0–0)
PLATELET # BLD AUTO: 280 K/UL — SIGNIFICANT CHANGE UP (ref 150–400)
POTASSIUM SERPL-MCNC: 4 MMOL/L — SIGNIFICANT CHANGE UP (ref 3.5–5.3)
POTASSIUM SERPL-SCNC: 4 MMOL/L — SIGNIFICANT CHANGE UP (ref 3.5–5.3)
RBC # BLD: 4.33 M/UL — SIGNIFICANT CHANGE UP (ref 4.2–5.8)
RBC # FLD: 15.1 % — HIGH (ref 10.3–14.5)
RH IG SCN BLD-IMP: NEGATIVE — SIGNIFICANT CHANGE UP
RH IG SCN BLD-IMP: NEGATIVE — SIGNIFICANT CHANGE UP
SODIUM SERPL-SCNC: 137 MMOL/L — SIGNIFICANT CHANGE UP (ref 135–145)
WBC # BLD: 7.25 K/UL — SIGNIFICANT CHANGE UP (ref 3.8–10.5)
WBC # FLD AUTO: 7.25 K/UL — SIGNIFICANT CHANGE UP (ref 3.8–10.5)

## 2024-11-12 PROCEDURE — 99152 MOD SED SAME PHYS/QHP 5/>YRS: CPT

## 2024-11-12 PROCEDURE — 75710 ARTERY X-RAYS ARM/LEG: CPT | Mod: 26,LT

## 2024-11-12 PROCEDURE — 75716 ARTERY X-RAYS ARMS/LEGS: CPT | Mod: 26

## 2024-11-12 PROCEDURE — 75625 CONTRAST EXAM ABDOMINL AORTA: CPT | Mod: 26

## 2024-11-12 PROCEDURE — 76937 US GUIDE VASCULAR ACCESS: CPT | Mod: 26,RT

## 2024-11-12 PROCEDURE — 36246 INS CATH ABD/L-EXT ART 2ND: CPT | Mod: LT

## 2024-11-12 RX ORDER — HYDRALAZINE HYDROCHLORIDE 50 MG/1
2.5 TABLET, FILM COATED ORAL ONCE
Refills: 0 | Status: COMPLETED | OUTPATIENT
Start: 2024-11-12 | End: 2024-11-12

## 2024-11-12 RX ORDER — ASPIRIN/MAG CARB/ALUMINUM AMIN 325 MG
81 TABLET ORAL DAILY
Refills: 0 | Status: DISCONTINUED | OUTPATIENT
Start: 2024-11-12 | End: 2024-11-26

## 2024-11-12 RX ADMIN — HYDRALAZINE HYDROCHLORIDE 2.5 MILLIGRAM(S): 50 TABLET, FILM COATED ORAL at 11:51

## 2024-11-12 RX ADMIN — HYDRALAZINE HYDROCHLORIDE 2.5 MILLIGRAM(S): 50 TABLET, FILM COATED ORAL at 10:48

## 2024-11-12 RX ADMIN — Medication 81 MILLIGRAM(S): at 11:31

## 2024-11-12 NOTE — ASU PATIENT PROFILE, ADULT - FALL HARM RISK - UNIVERSAL INTERVENTIONS
Bed in lowest position, wheels locked, appropriate side rails in place/Call bell, personal items and telephone in reach/Instruct patient to call for assistance before getting out of bed or chair/Non-slip footwear when patient is out of bed/[place] to call system/Physically safe environment - no spills, clutter or unnecessary equipment/Purposeful Proactive Rounding/Room/bathroom lighting operational, light cord in reach

## 2024-11-12 NOTE — ASU DISCHARGE PLAN (ADULT/PEDIATRIC) - COMMENTS
Please follow up with your cardiologist within this or next week to get medical clearance for L lower extremity vascular surgery. Please follow up with your cardiologist, Dr. Aneudy Trotter, within this or next week to get medical clearance for L lower extremity vascular surgery.

## 2024-11-12 NOTE — H&P CARDIOLOGY - HISTORY OF PRESENT ILLNESS
69 y.o. male presents today for elective LLE angiogram/angioplasty.  69 y.o. male presents today for elective LLE angiogram/angioplasty. He c/o b/l lower extremities claudication. He denies b/l lower extremities skin color change. The patient denies chest pain, SOB, palpitations, dizziness, presyncope, syncope,  headache, visual disturbances, CVA, PE, DVT, RENE, abdominal pain, N/V/D/C, hematochezia, melena, dysuria, hematuria, fever, chills. The patient was evaluated by a vascular surgeon, recommended to have LLE angiogram, possible angioplasty.

## 2024-11-12 NOTE — ASU DISCHARGE PLAN (ADULT/PEDIATRIC) - ASU DC SPECIAL INSTRUCTIONSFT
Your procedure was performed through the [facility],  For the next 5 days:  - Limit climbing stairs.  - Do not soak in a bathtub or pool.  - Avoid strenuous activities (pushing, pulling, straining).  - Do not lift anything more than 10 pounds.    MEDICATION:   - Take your medications as explained (see attached medication reconciliation on discharge paperwork).    ADDITIONAL INSTRUCTIONS:  - Follow a heart healthy diet recommended by your doctor.   - If you smoke, we encourage smoking cessation. You may call [phone] for center of tobacco control if you need assistance.  - Call your doctor to make appointment within 2 weeks.    ***CALL YOUR DOCTOR***  - If you experience fever, chills, body aches, or severe pain, swelling, redness, heat, or yellow discharge from your incision site.  - If you notice bleeding or significant swelling at the procedural site.  - If you experience chest pain.  - If you experience extremity numbness, tingling, or temperature change.    If you are unable to get in contact with your doctor, you may contact the [facility] at [phone].  Please reference your discharge instructions for any questions or concerns.

## 2024-11-12 NOTE — ASU PATIENT PROFILE, ADULT - NSICDXPASTMEDICALHX_GEN_ALL_CORE_FT
PAST MEDICAL HISTORY:  DM (diabetes mellitus)     H/O iron deficiency     HLD (hyperlipidemia)     [place] (hard of hearing)     HTN (hypertension)     PAD (peripheral artery disease)     Peripheral neuropathy     Tobacco abuse Former

## 2024-11-12 NOTE — ASU PATIENT PROFILE, ADULT - CAREGIVER PHONE NUMBER
CHIEF COMPLAINT:  Chief Complaint   Patient presents with   • Physical   • Foot     spot on foot   • Rash     underarm        HISTORY OF PRESENT ILLNESS:    Tony Bor is a 31 year old male presenting with physical and discuss his left plantar wart    Past Medical History:   Diagnosis Date   • Lumbar discogenic pain syndrome 1/15/2014       History reviewed. No pertinent surgical history.    Patient Active Problem List   Diagnosis   • Lumbar discogenic pain syndrome   • Mild intermittent asthma with status asthmaticus   • Familial hypercholesterolemia   • Prediabetes     Current Medications    ACETAMINOPHEN (TYLENOL) 325 MG TABLET    Take 650 mg by mouth every 4 hours as needed for Pain.    ASPIRIN 325 MG TABLET    Take 325 mg by mouth daily as needed for Pain.    IBUPROFEN (MOTRIN) 200 MG TABLET    Take 200 mg by mouth daily.     ALLERGIES:   Allergen Reactions   • Penicillin G        Social History     Socioeconomic History   • Marital status: Single     Spouse name: Not on file   • Number of children: Not on file   • Years of education: Not on file   • Highest education level: Not on file   Occupational History   • Not on file   Tobacco Use   • Smoking status: Never Smoker   • Smokeless tobacco: Never Used   Substance and Sexual Activity   • Alcohol use: No     Alcohol/week: 0.0 standard drinks   • Drug use: No   • Sexual activity: Never     Partners: Female   Other Topics Concern   •  Service No   • Blood Transfusions Not Asked   • Caffeine Concern Yes     Comment: 2 cups of coffee per day   • Occupational Exposure Not Asked   • Hobby Hazards Not Asked   • Sleep Concern No   • Stress Concern Not Asked   • Weight Concern Not Asked   • Special Diet No     Comment: Fruits and veggies   • Back Care Not Asked   • Exercise Yes     Comment: Weight lifting, sword practice, and walking dog   • Bike Helmet Not Asked   • Seat Belt Yes   • Self-Exams Not Asked   Social History Narrative    6/14/21-He is single.  He lives with parents. He graduated from [employer] bachelor of arts in political science. He works as a . He is looking into graduate studies. He is very heavy with the political consultating.      Social Determinants of Health     Financial Resource Strain:    • Social Determinants: Financial Resource Strain:    Food Insecurity:    • Social Determinants: Food Insecurity:    Transportation Needs:    • Lack of Transportation (Medical):    • Lack of Transportation (Non-Medical):    Physical Activity:    • Days of Exercise per Week:    • Minutes of Exercise per Session:    Stress:    • Social Determinants: Stress:    Social Connections:    • Social Determinants: Social Connections:    Intimate Partner Violence:    • Social Determinants: Intimate Partner Violence Past Fear:    • Social Determinants: Intimate Partner Violence Current Fear:        Family History   Problem Relation Age of Onset   • Asthma Mother    • Dementia/Alzheimers Paternal Grandmother         Alzheimers       OBJECTIVE:  VITAL SIGNS:    Visit Vitals  /81 (BP Location: RUE - Right upper extremity, Patient Position: Sitting, Cuff Size: Large Adult)   Pulse 76   Temp 98 °F (36.7 °C) (Temporal)   Resp 16   Ht 6' (1.829 m)   Wt 98.3 kg   SpO2 98%   BMI 29.39 kg/m²     GENERAL:  Tony Bro is a 31 year old male who is well developed and well nourished. He is in no acute distress and appears comfortable at rest.   HEENT:  Normocephalic, atraumatic. Pupils are equal, round and reactive to light. Extraocular muscles are intact. Ears are clear. Membranes are clear. Ears canals are normal. Oropharynx is moist without oral lesions.  NECK:  Neck is supple without jugular venous distention, bruit, or thyromegaly. Trachea is midline. Neck has normal range of motion.  LUNGS:  Lungs are clear to auscultation bilaterally. There are no crackles or wheezes. Respiratory effort is normal.   HEART:  Heart has a regular rate and rhythm. I do not  appreciate any murmurs, rubs, or gallops.   ABDOMEN:  Abdomen is soft, flat, nontender, and nondistended. Bowel sounds are present. No masses are appreciated. There is no hepatosplenomegaly. There is no rebound or guarding.  SKIN:  He has left foot plantar wart with callous formation. He has also some axillary hyperhydrsis.         ASSESSMENT/PLAN:  1. Health Maintenance-advanced directives previously given. He did get his COVID vaccine.   2. Plantar wart-left foot. He has had it for a while. He has tried OTC products. He has callous formation. The callous was paired down with an 11 blade and then using a cryogun freeze thaw freeze was done. If no improvement he may need podiatry care for deeper callous removal.   2. HYperhidrosis of axilla-recommend drysol and avoid scratching.       Prediabetes  Glucose (mg/dL)   Date Value   06/09/2021 101 (H)     Discussed decreasing his simple sugars. Patient education given. Recheck in a year.     Familial hypercholesterolemia  LDL (mg/dL)   Date Value   06/09/2021 151 (H)     He is not on any medicaiton. He was advised long term high LDL risks. He will work on diet and exercise and recheck in a year.     Mild intermittent asthma with status asthmaticus  Currently not on any medication.       Orders Placed This Encounter   • Comprehensive Metabolic Panel   • Lipid Panel With Reflex   • Glycohemoglobin   • acetaminophen (TYLENOL) 325 MG tablet   • aluminum chloride (DRYSOL) 20 % topical solution       Return in about 1 year (around 6/14/2022) for CPE, fasting lab prior.    [phone]

## 2024-11-12 NOTE — ASU DISCHARGE PLAN (ADULT/PEDIATRIC) - CARE PROVIDER_API CALL
Mahad Trujillo  Vascular Surgery  [address] [place] [address]  Phone: [phone]  Fax: [phone]  Follow Up Time:

## 2024-11-12 NOTE — BRIEF OPERATIVE NOTE - OPERATION/FINDINGS
s/p left lower extremity diagnostic angiogram with patent bilateral femoral stents and left occluded popliteal, TPT and PTA with 2v runoffs (Peroneal and HILARIA)

## 2024-11-12 NOTE — H&P CARDIOLOGY - NSICDXPASTMEDICALHX_GEN_ALL_CORE_FT
PAST MEDICAL HISTORY:  DM (diabetes mellitus)     H/O iron deficiency     HLD (hyperlipidemia)     [place] (hard of hearing)     HTN (hypertension)     PAD (peripheral artery disease)     Peripheral neuropathy     Tobacco abuse Former

## 2024-11-12 NOTE — ASU DISCHARGE PLAN (ADULT/PEDIATRIC) - FINANCIAL ASSISTANCE
[facility] provides services at a reduced cost to those who are determined to be eligible through [facility]’s financial assistance program. Information regarding [facility]’s financial assistance program can be found by going to [email or web address] or by calling [phone].

## 2024-11-12 NOTE — ASU PATIENT PROFILE, ADULT - ABILITY TO HEAR (WITH HEARING AID OR HEARING APPLIANCE IF NORMALLY USED):
left hearing aid at home/Mildly to Moderately Impaired: difficulty hearing in some environments or speaker may need to increase volume or speak distinctly

## 2024-11-12 NOTE — ASU PATIENT PROFILE, ADULT - VISION (WITH CORRECTIVE LENSES IF THE PATIENT USUALLY WEARS THEM):
Poor Right Peripheral Vision but can  see medication bottles per patient/Normal vision: sees adequately in most situations; can see medication labels, newsprint

## 2024-11-12 NOTE — H&P CARDIOLOGY - COMMENTS
Pre-sedation evaluation    Dentures:   Last PO intake:    Level of consciousness: 1  Obstructive sleep apnea: No  Aspiration risk: No  Mallampati score: 2  ASA Classification: 2  Prior Sedative or Anesthesia Experience: No complications  Informed consent by responsible adult: Yes  Responsible adult escort: Yes  Based on today's assessment, anesthesia consult requested: No Pre-sedation evaluation    Dentures: bottom jaw  Last PO intake: 11/11/24 23:45    Level of consciousness: 1  Obstructive sleep apnea: No  Aspiration risk: No  Mallampati score: 2  ASA Classification: 2  Prior Sedative or Anesthesia Experience: No complications  Informed consent by responsible adult: Yes  Responsible adult escort: Yes  Based on today's assessment, anesthesia consult requested: No

## 2024-12-06 ENCOUNTER — OUTPATIENT (OUTPATIENT)
Dept: OUTPATIENT SERVICES | Facility: HOSPITAL | Age: 69
LOS: 1 days | End: 2024-12-06
Payer: MEDICARE

## 2024-12-06 VITALS
WEIGHT: 151.02 LBS | OXYGEN SATURATION: 99 % | SYSTOLIC BLOOD PRESSURE: 135 MMHG | HEIGHT: 66.5 IN | HEART RATE: 77 BPM | DIASTOLIC BLOOD PRESSURE: 74 MMHG | RESPIRATION RATE: 16 BRPM | TEMPERATURE: 98 F

## 2024-12-06 DIAGNOSIS — Z90.49 ACQUIRED ABSENCE OF OTHER SPECIFIED PARTS OF DIGESTIVE TRACT: Chronic | ICD-10-CM

## 2024-12-06 DIAGNOSIS — Z98.49 CATARACT EXTRACTION STATUS, UNSPECIFIED EYE: Chronic | ICD-10-CM

## 2024-12-06 DIAGNOSIS — Z01.818 ENCOUNTER FOR OTHER PREPROCEDURAL EXAMINATION: ICD-10-CM

## 2024-12-06 DIAGNOSIS — I73.9 PERIPHERAL VASCULAR DISEASE, UNSPECIFIED: ICD-10-CM

## 2024-12-06 DIAGNOSIS — Z95.820 PERIPHERAL VASCULAR ANGIOPLASTY STATUS WITH IMPLANTS AND GRAFTS: Chronic | ICD-10-CM

## 2024-12-06 DIAGNOSIS — E11.9 TYPE 2 DIABETES MELLITUS WITHOUT COMPLICATIONS: ICD-10-CM

## 2024-12-06 DIAGNOSIS — Z98.890 OTHER SPECIFIED POSTPROCEDURAL STATES: Chronic | ICD-10-CM

## 2024-12-06 PROBLEM — Z72.0 TOBACCO USE: Chronic | Status: INACTIVE | Noted: 2022-11-28 | Resolved: 2024-12-06

## 2024-12-06 LAB
A1C WITH ESTIMATED AVERAGE GLUCOSE RESULT: 7.4 % — HIGH (ref 4–5.6)
ANION GAP SERPL CALC-SCNC: 16 MMOL/L — SIGNIFICANT CHANGE UP (ref 5–17)
BLD GP AB SCN SERPL QL: NEGATIVE — SIGNIFICANT CHANGE UP
BUN SERPL-MCNC: 12 MG/DL — SIGNIFICANT CHANGE UP (ref 7–23)
CALCIUM SERPL-MCNC: 9.8 MG/DL — SIGNIFICANT CHANGE UP (ref 8.4–10.5)
CHLORIDE SERPL-SCNC: 102 MMOL/L — SIGNIFICANT CHANGE UP (ref 96–108)
CO2 SERPL-SCNC: 21 MMOL/L — LOW (ref 22–31)
CREAT SERPL-MCNC: 0.85 MG/DL — SIGNIFICANT CHANGE UP (ref 0.5–1.3)
EGFR: 93 ML/MIN/1.73M2 — SIGNIFICANT CHANGE UP
ESTIMATED AVERAGE GLUCOSE: 166 MG/DL — HIGH (ref 68–114)
GLUCOSE SERPL-MCNC: 153 MG/DL — HIGH (ref 70–99)
HCT VFR BLD CALC: 41.1 % — SIGNIFICANT CHANGE UP (ref 39–50)
HGB BLD-MCNC: 12.8 G/DL — LOW (ref 13–17)
MCHC RBC-ENTMCNC: 27.3 PG — SIGNIFICANT CHANGE UP (ref 27–34)
MCHC RBC-ENTMCNC: 31.1 G/DL — LOW (ref 32–36)
MCV RBC AUTO: 87.6 FL — SIGNIFICANT CHANGE UP (ref 80–100)
NRBC # BLD: 0 /100 WBCS — SIGNIFICANT CHANGE UP (ref 0–0)
PLATELET # BLD AUTO: 272 K/UL — SIGNIFICANT CHANGE UP (ref 150–400)
POTASSIUM SERPL-MCNC: 4 MMOL/L — SIGNIFICANT CHANGE UP (ref 3.5–5.3)
POTASSIUM SERPL-SCNC: 4 MMOL/L — SIGNIFICANT CHANGE UP (ref 3.5–5.3)
RBC # BLD: 4.69 M/UL — SIGNIFICANT CHANGE UP (ref 4.2–5.8)
RBC # FLD: 14.8 % — HIGH (ref 10.3–14.5)
RH IG SCN BLD-IMP: NEGATIVE — SIGNIFICANT CHANGE UP
SODIUM SERPL-SCNC: 139 MMOL/L — SIGNIFICANT CHANGE UP (ref 135–145)
WBC # BLD: 8.08 K/UL — SIGNIFICANT CHANGE UP (ref 3.8–10.5)
WBC # FLD AUTO: 8.08 K/UL — SIGNIFICANT CHANGE UP (ref 3.8–10.5)

## 2024-12-06 PROCEDURE — 85027 COMPLETE CBC AUTOMATED: CPT

## 2024-12-06 PROCEDURE — 86901 BLOOD TYPING SEROLOGIC RH(D): CPT

## 2024-12-06 PROCEDURE — 86900 BLOOD TYPING SEROLOGIC ABO: CPT

## 2024-12-06 PROCEDURE — G0463: CPT

## 2024-12-06 PROCEDURE — 83036 HEMOGLOBIN GLYCOSYLATED A1C: CPT

## 2024-12-06 PROCEDURE — 86850 RBC ANTIBODY SCREEN: CPT

## 2024-12-06 PROCEDURE — 36415 COLL VENOUS BLD VENIPUNCTURE: CPT

## 2024-12-06 PROCEDURE — 80048 BASIC METABOLIC PNL TOTAL CA: CPT

## 2024-12-06 RX ORDER — EMPAGLIFLOZIN 25 MG/1
1 TABLET, FILM COATED ORAL
Refills: 0 | DISCHARGE

## 2024-12-06 RX ORDER — ASPIRIN/MAG CARB/ALUMINUM AMIN 325 MG
0 TABLET ORAL
Refills: 0 | DISCHARGE

## 2024-12-06 RX ORDER — PIOGLITAZONE 45 MG/1
1 TABLET ORAL
Refills: 0 | DISCHARGE

## 2024-12-06 NOTE — H&P PST ADULT - ASSESSMENT
DASI score:  DASI activity: Able to walk 2-3 blocks, ADLs, Grocery Shopping, 1 Flight of Stairs   Loose teeth or denture: denies    CAPRINI SCORE    AGE RELATED RISK FACTORS                                                             [ ] Age 41-60 years                                            (1 Point)  [ X] Age: 61-74 years                                           (2 Points)                 [ ] Age= 75 years                                                (3 Points)             DISEASE RELATED RISK FACTORS                                                       [ ] Edema in the lower extremities                 (1 Point)                     [ ] Varicose veins                                               (1 Point)                                 [ ] BMI > 25 Kg/m2                                            (1 Point)                                  [ ] Serious infection (ie PNA)                            (1 Point)                     [ ] Lung disease ( COPD, Emphysema)            (1 Point)                                                                          [ ] Acute myocardial infarction                         (1 Point)                  [ ] Congestive heart failure (in the previous month)  (1 Point)         [ ] Inflammatory bowel disease                            (1 Point)                  [ ] Central venous access, PICC or Port               (2 points)       (within the last month)                                                                [ ] Stroke (in the previous month)                        (5 Points)    [ ] Previous or present malignancy                       (2 points)                                                                                                                                                         HEMATOLOGY RELATED FACTORS                                                         [ ] Prior episodes of VTE                                     (3 Points)                     [ ] Positive family history for VTE                      (3 Points)                  [ ] Prothrombin 70094 A                                     (3 Points)                     [ ] Factor V Leiden                                                (3 Points)                        [ ] Lupus anticoagulants                                      (3 Points)                                                           [ ] Anticardiolipin antibodies                              (3 Points)                                                       [ ] High homocysteine in the blood                   (3 Points)                                             [ ] Other congenital or acquired thrombophilia      (3 Points)                                                [ ] Heparin induced thrombocytopenia                  (3 Points)                                        MOBILITY RELATED FACTORS  [ ] Bed rest                                                         (1 Point)  [ ] Plaster cast                                                    (2 points)  [ ] Bed bound for more than 72 hours           (2 Points)    GENDER SPECIFIC FACTORS  [ ] Pregnancy or had a baby within the last month   (1 Point)  [ ] Post-partum < 6 weeks                                   (1 Point)  [ ] Hormonal therapy  or oral contraception   (1 Point)  [ ] History of pregnancy complications              (1 point)  [ ] Unexplained or recurrent              (1 Point)    OTHER RISK FACTORS                                           (1 Point)  [ ] BMI >40, smoking, diabetes requiring insulin, chemotherapy  blood transfusions and length of surgery over 2 hours    SURGERY RELATED RISK FACTORS  [ ]  Section within the last month     (1 Point)  [ ] Minor surgery                                                  (1 Point)  [ ] Arthroscopic surgery                                       (2 Points)  [ X] Planned major surgery lasting more            (2 Points)      than 45 minutes     [ ] Elective hip or knee joint replacement       (5 points)       surgery                                                TRAUMA RELATED RISK FACTORS  [ ] Fracture of the hip, pelvis, or leg                       (5 Points)  [ ] Spinal cord injury resulting in paralysis             (5 points)       (in the previous month)    [ ] Paralysis  (less than 1 month)                             (5 Points)  [ ] Multiple Trauma within 1 month                        (5 Points)    Total Score [        ]    Caprini Score 0-2: Low Risk, NO VTE prophylaxis required for most patients, encourage ambulation  Caprini Score 3-6: Moderate Risk , pharmacologic VTE prophylaxis is indicated for most patients (in the absence of contraindications)  Caprini Score Greater than or =7: High risk, pharmocologic VTE prophylaxis indicated for most patients (in the absence of contraindications) DASI score: 6.05  DASI activity: Able to walk 2-3 blocks with cane assistance, ADLs, Grocery Shopping, 1 Flight of Stairs   Loose teeth or denture: denies    CAPRINI SCORE    AGE RELATED RISK FACTORS                                                             [ ] Age 41-60 years                                            (1 Point)  [ X] Age: 61-74 years                                           (2 Points)                 [ ] Age= 75 years                                                (3 Points)             DISEASE RELATED RISK FACTORS                                                       [ ] Edema in the lower extremities                 (1 Point)                     [ ] Varicose veins                                               (1 Point)                                 [ ] BMI > 25 Kg/m2                                            (1 Point)                                  [ ] Serious infection (ie PNA)                            (1 Point)                     [ ] Lung disease ( COPD, Emphysema)            (1 Point)                                                                          [ ] Acute myocardial infarction                         (1 Point)                  [ ] Congestive heart failure (in the previous month)  (1 Point)         [ ] Inflammatory bowel disease                            (1 Point)                  [ ] Central venous access, PICC or Port               (2 points)       (within the last month)                                                                [ ] Stroke (in the previous month)                        (5 Points)    [ ] Previous or present malignancy                       (2 points)                                                                                                                                                         HEMATOLOGY RELATED FACTORS                                                         [ ] Prior episodes of VTE                                     (3 Points)                     [ ] Positive family history for VTE                      (3 Points)                  [ ] Prothrombin 51449 A                                     (3 Points)                     [ ] Factor V Leiden                                                (3 Points)                        [ ] Lupus anticoagulants                                      (3 Points)                                                           [ ] Anticardiolipin antibodies                              (3 Points)                                                       [ ] High homocysteine in the blood                   (3 Points)                                             [ ] Other congenital or acquired thrombophilia      (3 Points)                                                [ ] Heparin induced thrombocytopenia                  (3 Points)                                        MOBILITY RELATED FACTORS  [ ] Bed rest                                                         (1 Point)  [ ] Plaster cast                                                    (2 points)  [ ] Bed bound for more than 72 hours           (2 Points)    GENDER SPECIFIC FACTORS  [ ] Pregnancy or had a baby within the last month   (1 Point)  [ ] Post-partum < 6 weeks                                   (1 Point)  [ ] Hormonal therapy  or oral contraception   (1 Point)  [ ] History of pregnancy complications              (1 point)  [ ] Unexplained or recurrent              (1 Point)    OTHER RISK FACTORS                                           (1 Point)  [ ] BMI >40, smoking, diabetes requiring insulin, chemotherapy  blood transfusions and length of surgery over 2 hours    SURGERY RELATED RISK FACTORS  [ ]  Section within the last month     (1 Point)  [ ] Minor surgery                                                  (1 Point)  [ ] Arthroscopic surgery                                       (2 Points)  [ X] Planned major surgery lasting more            (2 Points)      than 45 minutes     [ ] Elective hip or knee joint replacement       (5 points)       surgery                                                TRAUMA RELATED RISK FACTORS  [ ] Fracture of the hip, pelvis, or leg                       (5 Points)  [ ] Spinal cord injury resulting in paralysis             (5 points)       (in the previous month)    [ ] Paralysis  (less than 1 month)                             (5 Points)  [ ] Multiple Trauma within 1 month                        (5 Points)    Total Score [        ]    Caprini Score 0-2: Low Risk, NO VTE prophylaxis required for most patients, encourage ambulation  Caprini Score 3-6: Moderate Risk , pharmacologic VTE prophylaxis is indicated for most patients (in the absence of contraindications)  Caprini Score Greater than or =7: High risk, pharmocologic VTE prophylaxis indicated for most patients (in the absence of contraindications) DASI score: 6.05  DASI activity: Able to walk 2-3 blocks with cane assistance, ADLs, Grocery Shopping, 1 Flight of Stairs   Loose teeth or denture: denies    CAPRINI SCORE    AGE RELATED RISK FACTORS                                                             [ ] Age 41-60 years                                            (1 Point)  [ X] Age: 61-74 years                                           (2 Points)                 [ ] Age= 75 years                                                (3 Points)             DISEASE RELATED RISK FACTORS                                                       [ ] Edema in the lower extremities                 (1 Point)                     [ ] Varicose veins                                               (1 Point)                                 [ ] BMI > 25 Kg/m2                                            (1 Point)                                  [ ] Serious infection (ie PNA)                            (1 Point)                     [ ] Lung disease ( COPD, Emphysema)            (1 Point)                                                                          [ ] Acute myocardial infarction                         (1 Point)                  [ ] Congestive heart failure (in the previous month)  (1 Point)         [ ] Inflammatory bowel disease                            (1 Point)                  [ ] Central venous access, PICC or Port               (2 points)       (within the last month)                                                                [ ] Stroke (in the previous month)                        (5 Points)    [ ] Previous or present malignancy                       (2 points)                                                                                                                                                         HEMATOLOGY RELATED FACTORS                                                         [ ] Prior episodes of VTE                                     (3 Points)                     [ ] Positive family history for VTE                      (3 Points)                  [ ] Prothrombin 86951 A                                     (3 Points)                     [ ] Factor V Leiden                                                (3 Points)                        [ ] Lupus anticoagulants                                      (3 Points)                                                           [ ] Anticardiolipin antibodies                              (3 Points)                                                       [ ] High homocysteine in the blood                   (3 Points)                                             [ ] Other congenital or acquired thrombophilia      (3 Points)                                                [ ] Heparin induced thrombocytopenia                  (3 Points)                                        MOBILITY RELATED FACTORS  [ ] Bed rest                                                         (1 Point)  [ ] Plaster cast                                                    (2 points)  [ ] Bed bound for more than 72 hours           (2 Points)    GENDER SPECIFIC FACTORS  [ ] Pregnancy or had a baby within the last month   (1 Point)  [ ] Post-partum < 6 weeks                                   (1 Point)  [ ] Hormonal therapy  or oral contraception   (1 Point)  [ ] History of pregnancy complications              (1 point)  [ ] Unexplained or recurrent              (1 Point)    OTHER RISK FACTORS                                           (1 Point)  [ ] BMI >40, smoking, diabetes requiring insulin, chemotherapy  blood transfusions and length of surgery over 2 hours    SURGERY RELATED RISK FACTORS  [ ]  Section within the last month     (1 Point)  [ ] Minor surgery                                                  (1 Point)  [ ] Arthroscopic surgery                                       (2 Points)  [ X] Planned major surgery lasting more            (2 Points)      than 45 minutes     [ ] Elective hip or knee joint replacement       (5 points)       surgery                                                TRAUMA RELATED RISK FACTORS  [ ] Fracture of the hip, pelvis, or leg                       (5 Points)  [ ] Spinal cord injury resulting in paralysis             (5 points)       (in the previous month)    [ ] Paralysis  (less than 1 month)                             (5 Points)  [ ] Multiple Trauma within 1 month                        (5 Points)    Total Score [   4     ]    Caprini Score 0-2: Low Risk, NO VTE prophylaxis required for most patients, encourage ambulation  Caprini Score 3-6: Moderate Risk , pharmacologic VTE prophylaxis is indicated for most patients (in the absence of contraindications)  Caprini Score Greater than or =7: High risk, pharmocologic VTE prophylaxis indicated for most patients (in the absence of contraindications)

## 2024-12-06 NOTE — H&P PST ADULT - LAST STRESS TEST
possibly 2 weeks ago with cardiologist - possibly positive? pt unsure - will obtain results possibly 2 weeks ago with cardiologist - unsure of results - will obtain

## 2024-12-06 NOTE — H&P PST ADULT - PROBLEM SELECTOR PLAN 1
Pt is scheduled for a left femoral to anterior tibial artery bypass, possible angiogram with Dr. Trujillo on 12/20/24  CBC, BMP and T/S ordered and obtained at [facility]  ABO on admit  Chlorhexidine soap and instructions reviewed and provided to pt with teach back understanding

## 2024-12-06 NOTE — H&P PST ADULT - PROBLEM SELECTOR PLAN 2
HGA1C ordered and obtained at [facility]  FS on admit HGA1C ordered and obtained at [facility]  FS on admit  Hold Pioglitazone and Jentadueto DOS with last dose on 12/19/24  Hold Jardiance 3 days preoperatively with last dose on 12/16/24

## 2024-12-06 NOTE — H&P PST ADULT - HISTORY OF PRESENT ILLNESS
69 year old male with PMH HTN, HLD, DM, PAD and aortoiliac occlusive disease who previously underwent an outpatient angiography complicated by right iliac artery dissection. Pt now presents to PST for scheduled left femoral to anterior tibial artery bypass, possible angiogram with Dr. Trujillo on 12/20/24. 69 year old male with PMH former smoker (1.5 PPD x 50 years; quit 2 years ago), CVA? (2 years ago, dx through imaging - no residual deficits), HTN, HLD, DM, PAD and aortoiliac occlusive disease who previously underwent an outpatient angiography complicated by right iliac artery dissection. Pt reports c/o left ankle pain when initially starting to walk that resolves after walking 2-3 blocks for about 3 weeks. He states that an office angiogram was attempted twice and was unsuccessful. Pt now presents to PST for scheduled left femoral to anterior tibial artery bypass, possible angiogram with Dr. Trujillo on 12/20/24.

## 2024-12-06 NOTE — H&P PST ADULT - REASON FOR ADMISSION
"I am having an operation on my left leg because the doctor was unable to do an angiogram in the office."

## 2024-12-06 NOTE — H&P PST ADULT - NSICDXPASTMEDICALHX_GEN_ALL_CORE_FT
PAST MEDICAL HISTORY:  DM (diabetes mellitus)     H/O iron deficiency     HLD (hyperlipidemia)     [place] (hard of hearing)     HTN (hypertension)     PAD (peripheral artery disease)     Peripheral neuropathy     Peripheral vascular disease, unspecified     Tobacco abuse Former     PAST MEDICAL HISTORY:  CVA (cerebrovascular accident)     DM (diabetes mellitus)     Former smoker     H/O iron deficiency     HLD (hyperlipidemia)     [place] (hard of hearing)     HTN (hypertension)     PAD (peripheral artery disease)     Peripheral neuropathy     Peripheral vascular disease, unspecified

## 2024-12-06 NOTE — H&P PST ADULT - NSICDXPASTSURGICALHX_GEN_ALL_CORE_FT
PAST SURGICAL HISTORY:  S/P appendectomy     S/P peripheral artery angioplasty with stent placement      PAST SURGICAL HISTORY:  S/P angiography     S/P appendectomy     S/P cataract surgery     S/P peripheral artery angioplasty with stent placement

## 2024-12-20 ENCOUNTER — OUTPATIENT (OUTPATIENT)
Dept: INPATIENT UNIT | Facility: HOSPITAL | Age: 69
LOS: 1 days | End: 2024-12-20

## 2024-12-20 ENCOUNTER — APPOINTMENT (OUTPATIENT)
Dept: VASCULAR SURGERY | Facility: HOSPITAL | Age: 69
End: 2024-12-20

## 2024-12-20 VITALS
OXYGEN SATURATION: 98 % | SYSTOLIC BLOOD PRESSURE: 144 MMHG | RESPIRATION RATE: 16 BRPM | TEMPERATURE: 98 F | HEART RATE: 81 BPM | WEIGHT: 151.02 LBS | DIASTOLIC BLOOD PRESSURE: 84 MMHG | HEIGHT: 66.5 IN

## 2024-12-20 DIAGNOSIS — Z95.820 PERIPHERAL VASCULAR ANGIOPLASTY STATUS WITH IMPLANTS AND GRAFTS: Chronic | ICD-10-CM

## 2024-12-20 DIAGNOSIS — Z98.890 OTHER SPECIFIED POSTPROCEDURAL STATES: Chronic | ICD-10-CM

## 2024-12-20 DIAGNOSIS — I73.9 PERIPHERAL VASCULAR DISEASE, UNSPECIFIED: ICD-10-CM

## 2024-12-20 DIAGNOSIS — Z90.49 ACQUIRED ABSENCE OF OTHER SPECIFIED PARTS OF DIGESTIVE TRACT: Chronic | ICD-10-CM

## 2024-12-20 DIAGNOSIS — Z98.49 CATARACT EXTRACTION STATUS, UNSPECIFIED EYE: Chronic | ICD-10-CM

## 2024-12-20 LAB
GLUCOSE BLDC GLUCOMTR-MCNC: 138 MG/DL — HIGH (ref 70–99)
RH IG SCN BLD-IMP: NEGATIVE — SIGNIFICANT CHANGE UP

## 2024-12-20 PROCEDURE — 82962 GLUCOSE BLOOD TEST: CPT

## 2024-12-20 RX ORDER — GABAPENTIN 300 MG/1
1 CAPSULE ORAL
Refills: 0 | DISCHARGE

## 2024-12-20 RX ORDER — CEFAZOLIN SODIUM 10 G
2000 VIAL (EA) INJECTION ONCE
Refills: 0 | Status: COMPLETED | OUTPATIENT
Start: 2024-12-20 | End: 2024-12-20

## 2024-12-20 RX ORDER — LIDOCAINE HCL 20 MG/ML
0.2 VIAL (ML) INJECTION ONCE
Refills: 0 | Status: ACTIVE | OUTPATIENT
Start: 2024-12-20

## 2024-12-20 RX ORDER — SODIUM CHLORIDE 9 MG/ML
3 INJECTION, SOLUTION INTRAMUSCULAR; INTRAVENOUS; SUBCUTANEOUS EVERY 8 HOURS
Refills: 0 | Status: ACTIVE | OUTPATIENT
Start: 2024-12-20 | End: 2025-11-18

## 2024-12-20 RX ORDER — CHLORHEXIDINE GLUCONATE 1.2 MG/ML
1 RINSE ORAL ONCE
Refills: 0 | Status: ACTIVE | OUTPATIENT
Start: 2024-12-20 | End: 2024-12-20

## 2024-12-20 NOTE — PRE-OP CHECKLIST - PATIENT'S PERSONAL PROPERTY GIVEN TO
[facility] ENCOUN[facility]  CONSULT NOTE    ADMISSION DATE:  7/18/2023  CONSULTING PHYSICIAN:  SABAS Soto  ATTENDING PHYSICIAN:  Eusebio,*   PCP: Gerson Dai MD  REQUESTING PROVIDER:  No ref. provider found  CODE STATUS:  Full Resuscitation    CHIEF COMPLAINT:  GI bleed     SUBJECTIVE:    I was asked to see Modesto Unger for anemia.     Modesto Unger is a 77 year old female patient admitted with Blood loss anemia [D50.0]  Gastrointestinal hemorrhage, unspecified gastrointestinal hemorrhage type [K92.2].  Patient has a past medical history significant for diabetes, GERD, hypertension, UTI, depression, pneumonia, appendectomy, hysterectomy, tubal ligation, and cholecystectomy who presents to the ER for anemia.  She is stopped taking NSAIDs 60 days ago for chronic back pain.  Has felt very fatigued and weak.  She went to see GI yesterday for drop in her hemoglobin.  States she had stopped taking iron and has noticed dark stools as well as still having dysphagia.  She is also had vomiting admitted lower abdominal pain.  Labs were drawn and her hemoglobin was noted to be 7.1 so she was sent to the ER.  She received a unit of blood and was started on a Protonix drip.    Today, patient denies any nausea or vomiting.  States that she did have this 2 days ago.  States she has not been eating much the past 2 days.  She has constipation and diarrhea on and off but states this is normal for her.  Last bowel movement was on Sunday or Monday which was black.  She has abdominal gas pains at times.  She takes oral iron at home.  States the last 3 weeks she is had more trouble with larger pills going down.  As well as an unusual cough 3 days ago that has now subsided.  Hemoglobin today 8.2, MCV normal.  Iron noted to be low.  She denies any cardiac history or being on any blood thinners.  States she has had an appendectomy, hysterectomy, tubal ligation, and cholecystectomy.  Patient had a  EGD with dilation as well as colonoscopy done March of 2022.  This showed gastritis, polyps, mild diverticular disease of the left colon, and minimal internal hemorrhoids.      HISTORIES:    Allergies, Medications, Medical history, Surgical history, Social history and Family history were reviewed and updated.  ALLERGIES:   Allergen Reactions   • Codeine VOMITING   • Opioid Analgesics Nausea & Vomiting   • Sulfa Drugs Cross Reactors HIVES     Current Facility-Administered Medications   Medication   • sodium chloride 0.9% infusion   • pantoprazole (PROTONIX) 80 mg/100mL in sodium chloride 0.9% infusion   • escitalopram (LEXAPRO) tablet 20 mg   • gabapentin (NEURONTIN) capsule 300 mg   • metoPROLOL succinate (TOPROL-XL) ER tablet 25 mg   • nystatin (MYCOSTATIN) cream   • oxybutynin (DITROPAN-XL) ER tablet 5 mg   • atorvastatin (LIPITOR) tablet 20 mg   • losartan (COZAAR) tablet 100 mg   • ferrous sulfate (65 mg Fe per 325 mg) tablet 325 mg   • cyclobenzaprine (FLEXERIL) tablet 10 mg   • fenofibrate micronized (LOFIBRA) capsule 134 mg   • morphine injection 2 mg   • sodium chloride (NORMAL SALINE) 0.9 % bolus 500 mL   • dextrose 50 % injection 25 g   • dextrose 50 % injection 12.5 g   • glucagon (GLUCAGEN) injection 1 mg   • dextrose (GLUTOSE) 40 % gel 15 g   • dextrose (GLUTOSE) 40 % gel 30 g   • sodium chloride 0.9 % flush bag 25 mL   • sodium chloride (PF) 0.9 % injection 2 mL   • sodium chloride 0.9 % flush bag 25 mL   • insulin lispro (ADMELOG,HumaLOG) - Correction Dose   • Potassium Standard Replacement Protocol (Levels 3.5 and lower)   • Magnesium Standard Replacement Protocol   • ondansetron (ZOFRAN) injection 4 mg   • acetaminophen (TYLENOL) tablet 650 mg     Immunization History   Administered Date(s) Administered   • COVID Pfizer 12Y+ 04/21/2022   • COVID Pfizer 12Y+ (Requires Dilution) 02/14/2021, 03/06/2021, 11/02/2021   • Influenz, recombinant quadrivalent, egg-free, preserve-free (FLUBLOK) 10/24/2020   •  Influenza, High Dose quadrivalent, preserve-free 10/01/2021   • Influenza, Unspecified Formulation 12/05/2018   • Influenza, high dose seasonal, preservative-free 11/09/2016, 10/31/2017, 12/05/2018, 09/11/2019   • Influenza, quadrivalent, preserve-free 10/26/2015   • Influenza, seasonal, injectable, trivalent 11/04/2008, 09/23/2011, 12/10/2012, 12/03/2013, 11/07/2016   • Novel Influenza [id number], Unspecified Formulation 12/31/2009   • Pneumococcal Conjugate 13 Valent Vacc (Prevnar 13) 03/21/2016   • Pneumococcal Polysaccharide Vacc (Pneumovax 23) 04/29/2010   • TD Adult, Adsorbed 07/24/2000, 08/06/2003   • Td:Adult type tetanus/diphtheria 07/24/2000, 08/06/2003, 10/08/2010   • Zostavax (Zoster Shingles) 10/26/2015     Past Medical History:   Diagnosis Date   • Adenomatous colon polyp    • Depression    • Diabetes mellitus (CMD)    • Gastroesophageal reflux disease    • Hiatal hernia    • HTN (hypertension)    • Hypercholesterolemia    • Pneumonia    • PONV (postoperative nausea and vomiting)    • Reflux    • Urinary tract infection      Past Surgical History:   Procedure Laterality Date   • Appendectomy     • Colonoscopy  07/30/2012   • Dexa bone density axial skeleton  06/23/2008   • Esophagogastroduodenoscopy transoral flex w/bx single or mult  07/30/2012    EGD with Bx   • Hysterectomy      BSO   • Removal gallbladder      Cholecystectomy (gallbladder)   • Tonsillectomy     • Tubal ligation      bilateral     Social History     Tobacco Use   • Smoking status: Never   • Smokeless tobacco: Never   Vaping Use   • Vaping Use: never used   Substance Use Topics   • Alcohol use: Yes     Comment: socially   • Drug use: No     Social History     Tobacco Use   Smoking Status Never   Smokeless Tobacco Never     Social History     Substance and Sexual Activity   Alcohol Use Yes    Comment: socially     Family History   Problem Relation Age of Onset   • Cancer Mother         gyne and bladder   • Diabetes Mother    • Thyroid  Mother    • Asthma Mother    • Osteoarthritis Mother    • COPD Mother    • Gastrointestinal Mother    • Heart disease Mother    • Hypertension Mother    • High cholesterol Mother    • High cholesterol Father    • Cancer Father         mouth,skin   • Dementia/Alzheimers Father    • Depression Father    • Hypertension Father    • Stroke Maternal Grandfather    • Dementia/Alzheimers Paternal Grandmother    • Stroke Paternal Grandfather    • Cancer Other         lymphoma, colon ca,cva pat aunt/uncle   • Osteoarthritis Brother    • Depression Brother    • Diabetes Brother    • Stroke/TIA Brother    • Hypertension Brother    • High cholesterol Brother      Health Maintenance   Topic Date Due   • DTaP/Tdap/Td Vaccine (1 - Tdap) 10/09/2010   • COVID-19 Vaccine (5 - Pfizer series) 06/16/2022   • Diabetes Eye Exam  10/04/2022   • Diabetes Foot Exam  06/21/2023   • Shingles Vaccine (2 of 3) 08/19/2030 (Originally 12/21/2015)   • Influenza Vaccine (1) 09/01/2023   • Diabetes A1C  01/06/2024   • Traditional Medicare- Medicare Wellness Visit  07/06/2024   • DM/CKD GFR  07/19/2024   • Colorectal Cancer Risk - Colonoscopy  03/09/2027   • Osteoporosis Screening  Completed   • Pneumococcal Vaccine 65+  Completed   • Meningococcal Vaccine  Aged Out   • Hepatitis B Vaccine (For Physician/[facility] Discussion)  Aged Out   • HPV Vaccine  Aged Out         REVIEW OF SYSTEMS:    All systems reviewed and are negative with the exception of the findings noted in the history of present illness.      OBJECTIVE:      PROBLEM LIST:    Patient Active Problem List   Diagnosis   • Anxiety   • Essential hypertension   • Gastroesophageal reflux disease without esophagitis   • Type 2 diabetes mellitus without complication, without long-term current use of insulin (CMD)   • Familial hypercholesterolemia   • Major depressive disorder, recurrent episode, in full remission (CMD)   • Urge incontinence of urine   • Chronic left shoulder pain   • Arthralgia of  multiple sites   • Diverticulosis   • Family history of malignant neoplasm of gastrointestinal tract   • Gastroparesis   • Personal history of colonic polyps   • Vomiting   • Disorder of esophagus   • Chronic thoracic back pain   • Anemia   • Other specified disease of esophagus   • Gastrointestinal hemorrhage, unspecified gastrointestinal hemorrhage type       MEDICATIONS:  Medications were reviewed.       REVIEW OF SYSTEMS:    All other systems are reviewed and are negative except as documented in the history of present illness.    PHYSICAL EXAM:    VITAL SIGNS:    Visit Vitals  /71 (BP Location: RUE - Right upper extremity, Patient Position: Supine)   Pulse 90   Temp 97.8 °F (36.6 °C) (Oral)   Resp 20   Ht 5' 3\" (1.6 m)   Wt 80 kg (176 lb 5.9 oz)   SpO2 91%   BMI 31.24 kg/m²     INTAKE/OUTPUT:      Intake/Output Summary (Last 24 hours) at 7/19/2023 0842  Last data filed at 7/19/2023 0356  Gross per 24 hour   Intake 351 ml   Output --   Net 351 ml      General: The patient is well developed, well nourished, in no acute distress, appears stated age.   Psychiatric: Alert. Oriented to person, place and time.   Neurological: Normal mood and affect, normal speech, no gross tremor.  Skin: Warm and dry, normal turgor.  Head: Normocephalic.  Eyes: Normal conjunctivae and sclerae.  Pupils equal, round.  Extraocular movements intact.  Neck: Symmetric without swelling or tenderness. No cervical and supraclavicular lymphadenopathy.  Respiratory: Respiratory effort normal.   Clear to auscultation bilaterally.  Cardiovascular: Regular rate and rhythm.   Extremities: No swelling or tenderness.  No edema.  Gastrointestinal:  Soft and non distended, Non tender.  Normal bowel sounds.  No hernia.        LABORATORY DATA:    Lab Results   Component Value Date    WBC 6.9 07/19/2023    HGB 8.2 (L) 07/19/2023    HCT 29.1 (L) 07/19/2023     07/19/2023    MCV 85.1 07/19/2023   ,   Lab Results   Component Value Date    SODIUM  137 07/19/2023    POTASSIUM 3.8 07/19/2023    BUN 14 07/19/2023    CREATININE 0.73 07/19/2023    CALCIUM 8.7 07/19/2023    ALBUMIN 3.5 (L) 07/18/2023    BILIRUBIN 0.2 07/18/2023    ALKPT 47 07/18/2023    AST 19 07/18/2023    GPT 16 07/18/2023   ,   Lab Results   Component Value Date    BILIRUBIN 0.2 07/18/2023    ALKPT 47 07/18/2023    AST 19 07/18/2023    GPT 16 07/18/2023   , No results found for: \"LIPASE\",   Lab Results   Component Value Date    INR 1.0 07/18/2023   , No components found for: \"ESR\",   Lab Results   Component Value Date    CRP <0.3 03/03/2020    and   Lab Results   Component Value Date    TSH 0.990 07/06/2023     ENDOSCOPY:    Procedure EGD with mucosal biopsy as well as esophageal dilatation, along with colonoscopy with mucosal biopsy.     Indication: The patient is undergoing upper endoscopy today to evaluate for worsening solid food dysphagia symptoms.  Our patient is also undergoing colonoscopy at this time for surveillance.  She does carry a personal history of colonic polyps and is in need of follow-up exam at this time.     Sedation used:  Monitored anesthesia care     Estimated blood loss: Less than 10 mm.     Procedure:     The patient was placed in the left lateral decubitus and after adequate sedation had been given the patient was easily intubated with the straight viewing diagnostic Olympus EGD scope. The scope and advanced to the second portion of the duodenum which had a normal appearance. The scope then withdrawn into the duodenal bulb which had a normal appearance.     The scope then withdrawn into the gastric antrum where mild mucosal erythema was seen. Similar findings were seen in the gastric body. Collectively the findings did suggest a mild gastritis. Retroflexed view of the fundus appeared normal. This did include good inspection of the gastric cardia.     The scope and ultimately straightened and withdrawn into the lower esophagus where a normal-appearing squamocolumnar  columnar junction was seen. The esophageal body throughout its course appeared normal. However given this patient's symptoms of dysphagia we chose to carry out dilatation at this point.     The endoscope was advanced gastric antrum. A guidewire was placed under direct vision. The endoscope was withdrawn over the guidewire and the guidewire in place, a 45 [place] savory dilator was then advanced into the esophagus with minimal resistance. The guidewire and the dilator was then withdrawn from the patient. The patient was then reintubated with the endoscope. The scope and advanced into the esophageal lumen which appeared completely atraumatic. At this point we chose to carry out further dilatation. The endoscope was readvanced into the gastric antrum. A guidewire was placed into the gastric antrum. The endoscope was then withdrawn over the guidewire and a savory dilatation was then carried out over the guidewire utilizing a 51 [place] savory dilator. The dilator and guidewire were then withdrawn from the patient and the patient reintubated with the scope and the scope then advanced into the esophageal lumen which again appeared completely atraumatic. At this point we proceeded to take several mucosal biopsies of the distal and mid esophagus to assess for any histologic evidence of reflux esophagitis or possible eosinophilic esophagitis. The scope then withdrawn from the patient revealing a grossly normal appearance to the structures of the upper airway. The patient appeared to tolerate the procedure well.     Impression:      1. Diffuse mucosal erythema throughout the gastric antrum and body to suggest mild gastritis. Mucosal biopsies taken.   2. Normal-appearing esophageal mucosa with empiric dilatation carried out up to 51 [place] with savory dilator technique. Mucosal biopsies taken from the distal and midesophagus as well.           Recommendation:     Further recommendation will be made shortly once review the biopsy  data.                    Procedure: Colonoscopy.      Estimated blood loss: Less than 10 mm.    Quality of prep: Good.    Procedure:    The patient was placed in the left lateral decubitus position and after adequate sedation had been given the Olympus colonoscope was then advanced to the cecum. Inspection was made of the mucosa in this region which did reveal a small 3-4 mm size polyp.  This lesion was removed with cold biopsy forcep.   We were able to intubate the very distal aspect of the terminal ileum which did have a normal appearance.    The scope then withdrawn back into the cecum and subsequently into the ascending colon where an additional 3-4 mm size polyp was seen and removed with cold biopsy forcep.  The transverse colon had a normal appearance.   Retroflexion was achieved in the right colon.    The scope then slowly withdrawn back into the descending and sigmoid colon.  Several scattered diverticula were seen in both these areas.          The scope then withdrawn into the rectum where retroflexion was achieved and in this position minimal internal hemorrhoids were noted. The scope was straightened and careful inspection was made of the proximal rectal mucosa which otherwise appeared normal. The scope was then slowly withdrawn the colon decompressed and the patient appeared to tolerate the procedure well.    Impression:   1. Normal-appearing terminal ileum.   2.  Two small definitive polyps seen on this exam and removed.    3. Mild diverticular disease of the left colon.    4. Minimal internal hemorrhoids.         Recommendation:    The patient will be advised about the maintenance of a high-fiber diet. This should include a minimum of 20-30 g of dietary fiber per day. Additionally we will contact patient shortly to review the histology of the polyps that were taken from today's exam.     ID Type Source Tests Collected by Time   A : r/o hpylori Tissue Stomach, Antrum SURGICAL PATHOLOGY Andrea Bran V  MD 3/9/2022 1326   B : distal/mid r/o eoe Tissue Esophagus, Distal SURGICAL PATHOLOGY Andrea CASILLAS MD 3/9/2022 1327   C :  Polyp Stomach SURGICAL PATHOLOGY Andrea CASILLAS MD 3/9/2022 1329   D :  Polyp Large Intestine, Cecum SURGICAL PATHOLOGY Andrea CASILLAS MD 3/9/2022 1342   E :  Polyp Large Intestine, Right/Ascending Colon SURGICAL PATHOLOGY Andrea CASILLAS MD 3/9/2022 1344          ASSESSMENT:    NICK   Melena   Dysphagia   HX: appendectomy, hysterectomy, tubal ligation, and cholecystectomy   EGD with dilation as well as colonoscopy 3/2022- gastritis, polyps, mild diverticular disease of the left colon, and minimal internal hemorrhoids    PLAN:   1.) Patient presents to the ER for anemia.  She is stopped taking NSAIDs 60 days ago for chronic back pain.  Has felt very fatigued and weak.  She went to see GI yesterday for drop in her hemoglobin.  States she had stopped taking iron and has noticed dark stools as well as still having dysphagia.  She is also had vomiting admitted lower abdominal pain.  Labs were drawn and her hemoglobin was noted to be 7.1 so she was sent to the ER.  She received a unit of blood and was started on a Protonix drip.  Today, patient denies any nausea or vomiting.  States that she did have this 2 days ago.  States she has not been eating much the past 2 days.  She has constipation and diarrhea on and off but states this is normal for her.  Last bowel movement was on Sunday or Monday which was black.  She has abdominal gas pains at times.  She takes oral iron at home.  States the last 3 weeks she is had more trouble with larger pills going down.  She has had previous EGD with dilatation.  As well as an unusual cough 3 days ago that has now subsided.  Hemoglobin today 8.2, MCV normal.  Iron noted to be low.  She denies any cardiac history or being on any blood thinners.  Continue to watch for signs and symptoms of GI bleeding.  Continue Protonix drip as well.  Will plan for EGD with  possible dilatation tomorrow.  Okay for clear liquids today.  NPO at midnight.  Will also have speech come and see patient.  2.) Will continue to follow.       CASE DISCUSSED WITH:  Patient, RN and MD    A copy of this note was sent to the referring provider.   Thank you for the referral and the opportunity to care for this patient.    SIGNED:  SABAS Soto  7/19/2023  8:42 AM               family member/on unit

## 2024-12-20 NOTE — PRE-OP CHECKLIST - STERILIZATION AFFIRMATION
"[facility]   Physical Therapy Initial Evaluation     Date: 6/29/2022   Name: Sara Bustos  Clinic Number: [id number]    n/a

## 2024-12-20 NOTE — PATIENT PROFILE ADULT - FALL HARM RISK - HARM RISK INTERVENTIONS

## 2024-12-22 NOTE — ED ADULT NURSE NOTE - CHIEF COMPLAINT QUOTE
Entered patient's room for morning vital signs and head to toe assessment. Patient resting in the bed at this time. A&O x4, calm, and cooperative. Patient denies of pain at this time. Vital signs and head to toe assessment completed at this time, see flowsheets for more details. Patient taken to the bathroom and moved to chair. Patient set up for breakfast. Patient denies no more needs at this time. Call light within reach. Chair alarm on. Chair wheels locked. Bed in lowest position. Will continue to monitor.     Pt c/o 2 weeks of bilateral toes numbness, as well as pain to calves and shins. Pt states he had 2 angiograms through groin, second in July. Pt also reports occasional chest pain, not at this time.

## 2024-12-31 NOTE — ED PROVIDER NOTE - NSICDXPASTSURGICALHX_GEN_ALL_CORE_FT
PAST SURGICAL HISTORY:  S/P appendectomy     S/P peripheral artery angioplasty with stent placement     
PRINCIPAL PROCEDURE  Procedure: Open reduction and internal fixation (ORIF) of fracture of right proximal humerus  Findings and Treatment:

## 2025-01-17 PROBLEM — I63.9 CEREBRAL INFARCTION, UNSPECIFIED: Chronic | Status: ACTIVE | Noted: 2024-12-06

## 2025-01-17 PROBLEM — I73.9 PERIPHERAL VASCULAR DISEASE, UNSPECIFIED: Chronic | Status: ACTIVE | Noted: 2024-12-06

## 2025-01-17 PROBLEM — Z87.891 PERSONAL HISTORY OF NICOTINE DEPENDENCE: Chronic | Status: ACTIVE | Noted: 2024-12-06

## 2025-01-28 ENCOUNTER — OUTPATIENT (OUTPATIENT)
Dept: OUTPATIENT SERVICES | Facility: HOSPITAL | Age: 70
LOS: 1 days | End: 2025-01-28
Payer: MEDICARE

## 2025-01-28 VITALS
RESPIRATION RATE: 14 BRPM | SYSTOLIC BLOOD PRESSURE: 128 MMHG | DIASTOLIC BLOOD PRESSURE: 76 MMHG | HEIGHT: 66 IN | OXYGEN SATURATION: 97 % | TEMPERATURE: 98 F | HEART RATE: 75 BPM | WEIGHT: 154.98 LBS

## 2025-01-28 DIAGNOSIS — Z98.890 OTHER SPECIFIED POSTPROCEDURAL STATES: Chronic | ICD-10-CM

## 2025-01-28 DIAGNOSIS — I73.9 PERIPHERAL VASCULAR DISEASE, UNSPECIFIED: ICD-10-CM

## 2025-01-28 DIAGNOSIS — E11.9 TYPE 2 DIABETES MELLITUS WITHOUT COMPLICATIONS: ICD-10-CM

## 2025-01-28 DIAGNOSIS — Z90.49 ACQUIRED ABSENCE OF OTHER SPECIFIED PARTS OF DIGESTIVE TRACT: Chronic | ICD-10-CM

## 2025-01-28 DIAGNOSIS — Z95.820 PERIPHERAL VASCULAR ANGIOPLASTY STATUS WITH IMPLANTS AND GRAFTS: Chronic | ICD-10-CM

## 2025-01-28 DIAGNOSIS — Z98.49 CATARACT EXTRACTION STATUS, UNSPECIFIED EYE: Chronic | ICD-10-CM

## 2025-01-28 LAB
A1C WITH ESTIMATED AVERAGE GLUCOSE RESULT: 7.7 % — HIGH (ref 4–5.6)
ANION GAP SERPL CALC-SCNC: 12 MMOL/L — SIGNIFICANT CHANGE UP (ref 5–17)
BLD GP AB SCN SERPL QL: NEGATIVE — SIGNIFICANT CHANGE UP
BUN SERPL-MCNC: 12 MG/DL — SIGNIFICANT CHANGE UP (ref 7–23)
CALCIUM SERPL-MCNC: 10.1 MG/DL — SIGNIFICANT CHANGE UP (ref 8.4–10.5)
CHLORIDE SERPL-SCNC: 101 MMOL/L — SIGNIFICANT CHANGE UP (ref 96–108)
CO2 SERPL-SCNC: 24 MMOL/L — SIGNIFICANT CHANGE UP (ref 22–31)
CREAT SERPL-MCNC: 0.91 MG/DL — SIGNIFICANT CHANGE UP (ref 0.5–1.3)
EGFR: 90 ML/MIN/1.73M2 — SIGNIFICANT CHANGE UP
ESTIMATED AVERAGE GLUCOSE: 174 MG/DL — HIGH (ref 68–114)
GLUCOSE SERPL-MCNC: 150 MG/DL — HIGH (ref 70–99)
HCT VFR BLD CALC: 39.4 % — SIGNIFICANT CHANGE UP (ref 39–50)
HGB BLD-MCNC: 12.2 G/DL — LOW (ref 13–17)
MCHC RBC-ENTMCNC: 26.6 PG — LOW (ref 27–34)
MCHC RBC-ENTMCNC: 31 G/DL — LOW (ref 32–36)
MCV RBC AUTO: 86 FL — SIGNIFICANT CHANGE UP (ref 80–100)
NRBC # BLD: 0 /100 WBCS — SIGNIFICANT CHANGE UP (ref 0–0)
NRBC BLD-RTO: 0 /100 WBCS — SIGNIFICANT CHANGE UP (ref 0–0)
PLATELET # BLD AUTO: 302 K/UL — SIGNIFICANT CHANGE UP (ref 150–400)
POTASSIUM SERPL-MCNC: 4.2 MMOL/L — SIGNIFICANT CHANGE UP (ref 3.5–5.3)
POTASSIUM SERPL-SCNC: 4.2 MMOL/L — SIGNIFICANT CHANGE UP (ref 3.5–5.3)
RBC # BLD: 4.58 M/UL — SIGNIFICANT CHANGE UP (ref 4.2–5.8)
RBC # FLD: 15.7 % — HIGH (ref 10.3–14.5)
RH IG SCN BLD-IMP: NEGATIVE — SIGNIFICANT CHANGE UP
SODIUM SERPL-SCNC: 137 MMOL/L — SIGNIFICANT CHANGE UP (ref 135–145)
WBC # BLD: 6.98 K/UL — SIGNIFICANT CHANGE UP (ref 3.8–10.5)
WBC # FLD AUTO: 6.98 K/UL — SIGNIFICANT CHANGE UP (ref 3.8–10.5)

## 2025-01-28 PROCEDURE — 80048 BASIC METABOLIC PNL TOTAL CA: CPT

## 2025-01-28 PROCEDURE — 86923 COMPATIBILITY TEST ELECTRIC: CPT

## 2025-01-28 PROCEDURE — G0463: CPT

## 2025-01-28 PROCEDURE — 86901 BLOOD TYPING SEROLOGIC RH(D): CPT

## 2025-01-28 PROCEDURE — 86900 BLOOD TYPING SEROLOGIC ABO: CPT

## 2025-01-28 PROCEDURE — 36415 COLL VENOUS BLD VENIPUNCTURE: CPT

## 2025-01-28 PROCEDURE — 83036 HEMOGLOBIN GLYCOSYLATED A1C: CPT

## 2025-01-28 PROCEDURE — 85027 COMPLETE CBC AUTOMATED: CPT

## 2025-01-28 PROCEDURE — 86850 RBC ANTIBODY SCREEN: CPT

## 2025-01-28 RX ORDER — LIDOCAINE HCL/PF 10 MG/ML
0.2 VIAL (ML) INJECTION ONCE
Refills: 0 | Status: DISCONTINUED | OUTPATIENT
Start: 2025-02-07 | End: 2025-02-07

## 2025-01-28 RX ORDER — CEFAZOLIN SODIUM IN 0.9 % NACL 3 G/100 ML
2000 INTRAVENOUS SOLUTION, PIGGYBACK (ML) INTRAVENOUS ONCE
Refills: 0 | Status: COMPLETED | OUTPATIENT
Start: 2025-02-07 | End: 2025-02-07

## 2025-01-28 NOTE — H&P PST ADULT - HISTORY OF PRESENT ILLNESS
69 year old male with PMH of former smoker (1.5 PPD x 50 years; quit 2 years ago), CVA? (2 years ago, dx through imaging - no residual deficits), HTN, HLD, DM, PAD and aortoiliac occlusive disease, who previously underwent an outpatient angiography complicated by right iliac artery dissection. Pt reports c/o left ankle pain when initially starting to walk that resolves after walking 2-3 blocks, for about 3 weeks. He states that an office angiogram was attempted twice and was unsuccessful. Pt now presents to PST for scheduled left femoral to anterior tibial artery bypass, possible angiogram with Dr. Trujillo on 2/7/25. Patient denies recent fever, chills, chest pain, SOB, palpitations, or recent exposure to COVID-19.    Patient was previously scheduled to have procedure on 12/20/24, but he did not hold Jardiance for 3 days, so it was canceled. Discussed at length with patient about holding appropriate medications prior to surgery, and he and his wife state understanding.

## 2025-01-28 NOTE — H&P PST ADULT - PROBLEM SELECTOR PLAN 1
Left Femoral to Anterior Tibial Artery Bypass, Possible Angiogram on 2/7/25.   CBC, BMP, [id number] and T&S done today at [facility].   Pre op instructions, including chlorhexidine, provided and all questions answered.

## 2025-01-28 NOTE — H&P PST ADULT - NSICDXPASTMEDICALHX_GEN_ALL_CORE_FT
PAST MEDICAL HISTORY:  CVA (cerebrovascular accident)     DM (diabetes mellitus)     Former smoker     H/O iron deficiency     HLD (hyperlipidemia)     [place] (hard of hearing)     HTN (hypertension)     PAD (peripheral artery disease)     Peripheral neuropathy     Peripheral vascular disease, unspecified

## 2025-01-28 NOTE — H&P PST ADULT - NSANTHGENDERRD_ENT_A_CORE
OFFICE FOLLOW UP - Nephrology   Kieran Solomon 66 y o  male MRN: [id number]    Encounter: [id number]      ASSESSMENT and PLAN:  Diagnoses and all orders for this visit:    He is 66years old with a longstanding history of type 2 diabetes mellitus, renal artery stenosis and hypertension, peripheral vascular disease and carotid artery stenosis who presents for follow-up of stage 3 chronic kidney disease    CKD (chronic kidney disease) stage 3, GFR 30-59 ml/min  -overall given most recent history creatinine has remained stable around 1 2 and estimated GFR is around 60  -medications are appropriately dosed  -continue cardiovascular risk reduction measures    Type 1 diabetes mellitus with complication (HCC)  - continue  Glycemic control he has seen Endocrinology as well    Renal artery stenosis ([facility] [address] )  - he has bilateral renal artery stenosis he is having cardiology testing as well as an angiogram will hold off on repeat renal artery Doppler until after his workup is complete    Benign hypertension with chronic kidney disease, stage III  - as above regarding his blood pressure  - blood pressures are currently stable avoid hypotension  -continue blood pressure control he is currently on metoprolol 25 mg twice daily    DARINEL (acute kidney injury) ([facility] [address] )  -had a recent acute kidney injury after cardiac catheterization in [place] now creatinine is better 1 2 d    Renal cyst, right  - renal ultrasound was checked in February of 2018 which showed a stable simple cyst in the upper pole of the right kidney and a normal left kidney and bladder,    Proteinuria, unspecified type  - he does have about 1 2 g of protein once his creatinine is in steady state will continue to quantify and do a further serologic workup currently not anemic or hypercalcemia,  And no associated hematuria as his RBCs were negative-this will be done when his angiogram and cardiac testing her complete    Shortness of breath with coronary artery disease  -his echocardiograms were reviewed from [facility] elevated BNP no appreciable JVD or over volume overload  -nevertheless will start furosemide 40 mg daily for the next 5 days to see if there is any symptomatic improvement    Peripheral vascular disease  -will be going for repeat angiogram and potential intervention as an outpatient  -sees vascular surgery    More recently he has been doing okay, repeat echocardiogram is pending, BNP elevated creatinine lower than baseline blood pressure slightly elevated no signs of overt volume overload but will initiate Lasix 40 mg daily for the next 5 days to see if there is any symptomatic improvement in his dyspnea on exertion  Will repeat a BMP in about 5 days to make sure creatinine is stable and electrolytes are stable prior to angiogram he will require protocol intravenous fluids as well as discontinuation of diuretic  Follow-up in 6 weeks for further serologic workup of proteinuria as well as repeat imaging for renal artery stenosis    HPI: Chelita Cisneros is a 66 y o  male who is here for  Chronic kidney disease    Since his last office visit he had gone to [place] where he had unstable angina and cardiac catheterization requiring stent placement he follows up with Cardiology as well now here and electrolytes have been stable as creatinine has been stable he did have an AKA I with a creatinine that went to 2 2 he still has some dyspnea on exertion he has a history of COPD as well as an ischemic cardiomyopathy currently denies any difficulties with urination no hypotension he is accompanied by his wife today    ROS:   All the systems were reviewed and were negative except as documented on the HPI  Allergies: Doxazosin; Cardura [doxazosin mesylate]; Other; Tylenol [acetaminophen]; and Zestril [lisinopril]    Medications:   Current Outpatient Medications:     aspirin 81 MG tablet, Take 81 mg by mouth daily  , Disp: , Rfl:     cholecalciferol (VITAMIN D3) 1,000 units tablet, Take 1,000 Units by mouth daily  , Disp: , Rfl:     clopidogrel (PLAVIX) 75 mg tablet, Take 1 tablet (75 mg total) by mouth daily, Disp: 30 tablet, Rfl: 4    Coenzyme Q10 (COQ-10) 200 MG CAPS, Take 200 mg by mouth daily, Disp: , Rfl:     furosemide (LASIX) 20 mg tablet, Take 40 mg by mouth daily as needed take daily for the next 5 days, Disp: , Rfl:     gabapentin (NEURONTIN) 100 mg capsule, Take 3 capsules twice a day (Patient taking differently: Take 200 mg by mouth 2 (two) times a day Take 3 capsules twice a day ), Disp: 180 capsule, Rfl: 0    glucagon (GLUCAGON EMERGENCY) 1 MG injection, Inject 1 mg under the skin once as needed for low blood sugar, Disp: 1 each, Rfl: 2    glucagon (GLUCAGON EMERGENCY) 1 MG injection, 1 mg, Disp: , Rfl:     hydrocortisone (ANUSOL-HC, PROCTOSOL HC,) 2 5 % rectal cream, Apply twice a day, Disp: 35 g, Rfl: 0    insulin glargine (LANTUS) 100 units/mL subcutaneous injection, Inject 66 Units under the skin daily 38 units in the morning and 28 units in the evening, Disp: 10 mL, Rfl: 0    insulin lispro (HUMALOG) 100 units/mL injection, Inject 3 units with breakfast, 6 units at lunch, and 6 units at dinner, Disp: 10 mL, Rfl: 1    Lactobacillus (PROBIATA) TABS, Take 1 capsule by mouth daily, Disp: , Rfl:     Lactobacillus-Inulin ([facility] DIGESTIVE [facility]), Take 1 capsule by mouth daily, Disp: , Rfl:     levalbuterol (XOPENEX HFA) 45 mcg/act inhaler, Inhale 1-2 puffs, Disp: , Rfl:     levothyroxine 175 mcg tablet, Take 1 tablet (175 mcg total) by mouth daily in the early morning, Disp: 90 tablet, Rfl: 3    metoprolol tartrate (LOPRESSOR) 25 mg tablet, Take 0 5 tablets (12 5 mg total) by mouth every 12 (twelve) hours, Disp: 45 tablet, Rfl: 3    mupirocin (BACTROBAN) 2 % ointment, Apply topically 3 (three) times a day, Disp: 30 g, Rfl: 1    NIFEdipine 0 3%-lidocaine 5% rectal ointment, Apply 1 application topically every 4 (four) hours as needed for discomfort or pain Apply a small amount to anal opening 4-6 times per day , Disp: 2 oz, Rfl: 0    nitroglycerin (NITROSTAT) 0 4 mg SL tablet, Place 1 tablet (0 4 mg total) under the tongue every 5 (five) minutes as needed for chest pain, Disp: 25 tablet, Rfl: 3    rosuvastatin (CRESTOR) 20 MG tablet, Take 1 tablet (20 mg total) by mouth daily, Disp: 90 tablet, Rfl: 3    Ubiquinol 200 MG CAPS, Take 200 mg by mouth daily  , Disp: , Rfl:     ULTICARE INSULIN SYRINGE 30G X 1/2" 1 ML MISC, Use as directed, Disp: , Rfl: 0    ULTICARE INSULIN SYRINGE 30G X 5/16" 0 3 ML MISC, Use as directed, Disp: , Rfl: 0    Cholecalciferol (VITAMIN D3) 1000 units CAPS, Take 5,000 Units by mouth daily, Disp: , Rfl:     Past Medical History:   Diagnosis Date    Acute kidney [facility] ([facility] )     resolved 11/30/2015    Acute venous embolism and thrombosis of deep vessels of proximal lower extremity ([facility] [address] )     resolved 04/04/2015    DARINEL (acute kidney injury) ([facility] [address] ) 1/12/2016    Cardiac disease     heart attack, stents x 4    [facility]congestive [facility] failure[facility]     Chronic cough     resolved 02/04/2016    COPD (chronic obstructive pulmonary disease) ([facility] [address] )     Diabetes mellitus ([facility] [address] )     Disease of thyroid gland     DVT (deep venous thrombosis) ([facility] [address] )     Hypertension     Ischemic cardiomyopathy     last assessed 09/26/2017    Pulmonary granuloma ([facility] [address] )     resolved 02/03/2017    Renal failure     Sleep apnea      Past Surgical History:   Procedure Laterality Date    BYPASS FEMORAL-POPLITEAL      initial stenosis with stent left, 7 x 100 smart stent onset 02/24/2014    MANUEL (HISTORICAL)  2018    CORONARY ANGIOPLASTY WITH STENT PLACEMENT      x4     Family History   Problem Relation Age of Onset    Heart disease Father         pacer placement    Hypertension Father     Kidney disease Father     Heart failure Father     Heart attack Father     Liver disease Father     Cirrhosis Mother         due to beer consumption    Liver disease Mother  Diabetes Other       reports that he quit smoking about 11 years ago  His smoking use included cigarettes  He has a 192 00 pack-year smoking history  He has never used smokeless tobacco  He reports that he drinks about 12 6 oz of alcohol per week  He reports that he does not use drugs  Physical Exam:   Vitals:    03/06/19 1331   BP: 144/52   BP Location: Right arm   Patient Position: Sitting   Cuff Size: Large   Pulse: 63   Weight: 84 4 kg (186 lb)   Height: 5' 7" (1 702 m)     Body mass index is 29 13 kg/m²  General: conscious, cooperative, in not acute distress  Eyes: conjunctivae pink, anicteric sclerae  ENT: lips and mucous membranes moist  Neck: supple, no JVD  Chest: clear breath sounds bilateral, no crackles, ronchus or wheezings  CVS: distinct S1 & S2, normal rate, regular rhythm  Abdomen: soft, non-tender, non-distended, normoactive bowel sounds  Extremities: no edema of both legs  Skin: no rash  Neuro: awake, alert, oriented,  No asterixis or myoclonus     renal imaging:    A renal artery Doppler was done 2/27/2018 which shows that his right renal artery has less than 60% stenosis and in the main renal artery and a patent renal veins    The left renal artery has greater than 60% stenosis in the main renal artery and a patent renal veins    an ultrasound of the kidney and bladder was done on February 27 which shows a right kidney that was 9 3 x 5 2 cm and a left kidney that was 9 5 x 5 8 cm with normal echogenicity and contour-     Lab Results:  Results from last 7 days   Lab Units 03/05/19  1031   WBC Thousand/uL 6 23   HEMOGLOBIN g/dL 12 6   HEMATOCRIT % 38 4   PLATELETS Thousands/uL 137*   POTASSIUM mmol/L 5 1   CHLORIDE mmol/L 107   CO2 mmol/L 28   BUN mg/dL 21   CREATININE mg/dL 1 20   CALCIUM mg/dL 9 4   MAGNESIUM mg/dL 2 3   PHOSPHORUS mg/dL 3 4     Results for orders placed or performed in visit on 03/05/19   Urine culture   Result Value Ref Range    Urine Culture No Growth <1000 cfu/mL CBC and differential   Result Value Ref Range    WBC 6 23 4 31 - 10 16 Thousand/uL    RBC 4 00 3 88 - 5 62 Million/uL    Hemoglobin 12 6 12 0 - 17 0 g/dL    Hematocrit 38 4 36 5 - 49 3 %    MCV 96 82 - 98 fL    MCH 31 5 26 8 - 34 3 pg    MCHC 32 8 31 4 - 37 4 g/dL    RDW 13 2 11 6 - 15 1 %    MPV 12 2 8 9 - 12 7 fL    Platelets 108 (L) 612 - 390 Thousands/uL    nRBC 0 /100 WBCs    Neutrophils Relative 44 43 - 75 %    Immat GRANS % 0 0 - 2 %    Lymphocytes Relative 37 14 - 44 %    Monocytes Relative 15 (H) 4 - 12 %    Eosinophils Relative 4 0 - 6 %    Basophils Relative 0 0 - 1 %    Neutrophils Absolute 2 75 1 85 - 7 62 Thousands/µL    Immature Grans Absolute 0 01 0 00 - 0 20 Thousand/uL    Lymphocytes Absolute 2 29 0 60 - 4 47 Thousands/µL    Monocytes Absolute 0 92 0 17 - 1 22 Thousand/µL    Eosinophils Absolute 0 25 0 00 - 0 61 Thousand/µL    Basophils Absolute 0 01 0 00 - 0 10 Thousands/µL   Magnesium   Result Value Ref Range    Magnesium 2 3 1 6 - 2 6 mg/dL   Phosphorus   Result Value Ref Range    Phosphorus 3 4 2 3 - 4 1 mg/dL   Protein / creatinine ratio, urine   Result Value Ref Range    Creatinine, Ur 44 2 mg/dL    Protein Urine Random 42 mg/dL    Prot/Creat Ratio, Ur 0 95 (H) 0 00 - 0 10   Lipid Panel with Direct LDL reflex   Result Value Ref Range    Cholesterol 113 50 - 200 mg/dL    Triglycerides 140 <=150 mg/dL    HDL, Direct 39 (L) 40 - 60 mg/dL    LDL Calculated 46 0 - 100 mg/dL   NT-BNP PRO   Result Value Ref Range    NT-proBNP 2,679 (H) <125 pg/mL   TSH, 3rd generation   Result Value Ref Range    TSH 3RD GENERATON 0 223 (L) 0 358 - 3 740 uIU/mL      creatinine 1 2  Portions of the record may have been created with voice recognition software  Occasional wrong word or "sound a like" substitutions may have occurred due to the inherent limitations of voice recognition software  Read the chart carefully and recognize, using context, where substitutions have occurred  If you have any questions, please contact the dictating provider  Yes

## 2025-01-28 NOTE — H&P PST ADULT - ASSESSMENT
Activity: Patient states he can walk 1-2 blocks slowly, limited by claudication.     DASI: 5.07    Mallampati: 2    Dental: Partial lower dentures, denies any other loose teeth.       CAPRINI SCORE    AGE RELATED RISK FACTORS                                                             [ ] Age 41-60 years                                            (1 Point)  [ X] Age: 61-74 years                                           (2 Points)                 [ ] Age= 75 years                                                (3 Points)             DISEASE RELATED RISK FACTORS                                                       [ ] Edema in the lower extremities                 (1 Point)                     [ ] Varicose veins                                               (1 Point)                                 [ ] BMI > 25 Kg/m2                                            (1 Point)                                  [ ] Serious infection (ie PNA)                            (1 Point)                     [ ] Lung disease ( COPD, Emphysema)            (1 Point)                                                                          [ ] Acute myocardial infarction                         (1 Point)                  [ ] Congestive heart failure (in the previous month)  (1 Point)         [ ] Inflammatory bowel disease                            (1 Point)                  [ ] Central venous access, PICC or Port               (2 points)       (within the last month)                                                                [ ] Stroke (in the previous month)                        (5 Points)    [ ] Previous or present malignancy                       (2 points)                                                                                                                                                         HEMATOLOGY RELATED FACTORS                                                         [ ] Prior episodes of VTE                                     (3 Points)                     [ ] Positive family history for VTE                      (3 Points)                  [ ] Prothrombin 22613 A                                     (3 Points)                     [ ] Factor V Leiden                                                (3 Points)                        [ ] Lupus anticoagulants                                      (3 Points)                                                           [ ] Anticardiolipin antibodies                              (3 Points)                                                       [ ] High homocysteine in the blood                   (3 Points)                                             [ ] Other congenital or acquired thrombophilia      (3 Points)                                                [ ] Heparin induced thrombocytopenia                  (3 Points)                                        MOBILITY RELATED FACTORS  [ ] Bed rest                                                         (1 Point)  [ ] Plaster cast                                                    (2 points)  [ ] Bed bound for more than 72 hours           (2 Points)    GENDER SPECIFIC FACTORS  [ ] Pregnancy or had a baby within the last month   (1 Point)  [ ] Post-partum < 6 weeks                                   (1 Point)  [ ] Hormonal therapy  or oral contraception   (1 Point)  [ ] History of pregnancy complications              (1 point)  [ ] Unexplained or recurrent              (1 Point)    OTHER RISK FACTORS                                           (1 Point)  [ ] BMI >40, smoking, diabetes requiring insulin, chemotherapy  blood transfusions and length of surgery over 2 hours    SURGERY RELATED RISK FACTORS  [ ]  Section within the last month     (1 Point)  [ ] Minor surgery                                                  (1 Point)  [ ] Arthroscopic surgery                                       (2 Points)  [ X] Planned major surgery lasting more            (2 Points)      than 45 minutes     [ ] Elective hip or knee joint replacement       (5 points)       surgery                                                TRAUMA RELATED RISK FACTORS  [ ] Fracture of the hip, pelvis, or leg                       (5 Points)  [ ] Spinal cord injury resulting in paralysis             (5 points)       (in the previous month)    [ ] Paralysis  (less than 1 month)                             (5 Points)  [ ] Multiple Trauma within 1 month                        (5 Points)    Total Score [   4     ]    Caprini Score 0-2: Low Risk, NO VTE prophylaxis required for most patients, encourage ambulation  Caprini Score 3-6: Moderate Risk , pharmacologic VTE prophylaxis is indicated for most patients (in the absence of contraindications)  Caprini Score Greater than or =7: High risk, pharmocologic VTE prophylaxis indicated for most patients (in the absence of contraindications)

## 2025-01-28 NOTE — H&P PST ADULT - NSICDXPASTSURGICALHX_GEN_ALL_CORE_FT
PAST SURGICAL HISTORY:  S/P angiography     S/P appendectomy     S/P cataract surgery     S/P peripheral artery angioplasty with stent placement

## 2025-01-28 NOTE — H&P PST ADULT - PROBLEM SELECTOR PLAN 2
Patient advised to hold Jardiance for 3 days prior to surgery, with last dose on 2/3/25; advised to hold Jentadueto and Pioglitazone on DOS, with last dose on 2/6/25. HemA1c done today at [facility], FS on DOS.

## 2025-02-07 ENCOUNTER — RESULT REVIEW (OUTPATIENT)
Age: 70
End: 2025-02-07

## 2025-02-07 ENCOUNTER — APPOINTMENT (OUTPATIENT)
Dept: VASCULAR SURGERY | Facility: HOSPITAL | Age: 70
End: 2025-02-07

## 2025-02-07 ENCOUNTER — INPATIENT (INPATIENT)
Facility: HOSPITAL | Age: 70
LOS: 45 days | Discharge: NURSING, REHAB OR LONG-TERM CARE | DRG: 301 | End: 2025-03-25
Attending: STUDENT IN AN ORGANIZED HEALTH CARE EDUCATION/TRAINING PROGRAM | Admitting: STUDENT IN AN ORGANIZED HEALTH CARE EDUCATION/TRAINING PROGRAM
Payer: MEDICARE

## 2025-02-07 ENCOUNTER — TRANSCRIPTION ENCOUNTER (OUTPATIENT)
Age: 70
End: 2025-02-07

## 2025-02-07 VITALS
HEART RATE: 87 BPM | SYSTOLIC BLOOD PRESSURE: 130 MMHG | OXYGEN SATURATION: 97 % | HEIGHT: 66 IN | TEMPERATURE: 98 F | RESPIRATION RATE: 16 BRPM | DIASTOLIC BLOOD PRESSURE: 76 MMHG | WEIGHT: 154.98 LBS

## 2025-02-07 DIAGNOSIS — Z98.890 OTHER SPECIFIED POSTPROCEDURAL STATES: Chronic | ICD-10-CM

## 2025-02-07 DIAGNOSIS — Z90.49 ACQUIRED ABSENCE OF OTHER SPECIFIED PARTS OF DIGESTIVE TRACT: Chronic | ICD-10-CM

## 2025-02-07 DIAGNOSIS — I73.9 PERIPHERAL VASCULAR DISEASE, UNSPECIFIED: ICD-10-CM

## 2025-02-07 DIAGNOSIS — Z98.49 CATARACT EXTRACTION STATUS, UNSPECIFIED EYE: Chronic | ICD-10-CM

## 2025-02-07 DIAGNOSIS — Z95.820 PERIPHERAL VASCULAR ANGIOPLASTY STATUS WITH IMPLANTS AND GRAFTS: Chronic | ICD-10-CM

## 2025-02-07 LAB
ANION GAP SERPL CALC-SCNC: 13 MMOL/L — SIGNIFICANT CHANGE UP (ref 5–17)
BUN SERPL-MCNC: 14 MG/DL — SIGNIFICANT CHANGE UP (ref 7–23)
CALCIUM SERPL-MCNC: 9.4 MG/DL — SIGNIFICANT CHANGE UP (ref 8.4–10.5)
CHLORIDE SERPL-SCNC: 104 MMOL/L — SIGNIFICANT CHANGE UP (ref 96–108)
CO2 SERPL-SCNC: 20 MMOL/L — LOW (ref 22–31)
CREAT SERPL-MCNC: 0.73 MG/DL — SIGNIFICANT CHANGE UP (ref 0.5–1.3)
EGFR: 97 ML/MIN/1.73M2 — SIGNIFICANT CHANGE UP
EGFR: 97 ML/MIN/1.73M2 — SIGNIFICANT CHANGE UP
GAS PNL BLDA: SIGNIFICANT CHANGE UP
GLUCOSE BLDC GLUCOMTR-MCNC: 163 MG/DL — HIGH (ref 70–99)
GLUCOSE BLDC GLUCOMTR-MCNC: 184 MG/DL — HIGH (ref 70–99)
GLUCOSE BLDC GLUCOMTR-MCNC: 210 MG/DL — HIGH (ref 70–99)
GLUCOSE SERPL-MCNC: 231 MG/DL — HIGH (ref 70–99)
HCT VFR BLD CALC: 29.8 % — LOW (ref 39–50)
HGB BLD-MCNC: 9.6 G/DL — LOW (ref 13–17)
MCHC RBC-ENTMCNC: 27 PG — SIGNIFICANT CHANGE UP (ref 27–34)
MCHC RBC-ENTMCNC: 32.2 G/DL — SIGNIFICANT CHANGE UP (ref 32–36)
MCV RBC AUTO: 83.7 FL — SIGNIFICANT CHANGE UP (ref 80–100)
NRBC # BLD: 0 /100 WBCS — SIGNIFICANT CHANGE UP (ref 0–0)
NRBC BLD-RTO: 0 /100 WBCS — SIGNIFICANT CHANGE UP (ref 0–0)
PLATELET # BLD AUTO: 232 K/UL — SIGNIFICANT CHANGE UP (ref 150–400)
POTASSIUM SERPL-MCNC: 4.1 MMOL/L — SIGNIFICANT CHANGE UP (ref 3.5–5.3)
POTASSIUM SERPL-SCNC: 4.1 MMOL/L — SIGNIFICANT CHANGE UP (ref 3.5–5.3)
RBC # BLD: 3.56 M/UL — LOW (ref 4.2–5.8)
RBC # FLD: 14.6 % — HIGH (ref 10.3–14.5)
SODIUM SERPL-SCNC: 137 MMOL/L — SIGNIFICANT CHANGE UP (ref 135–145)
WBC # BLD: 16.08 K/UL — HIGH (ref 3.8–10.5)
WBC # FLD AUTO: 16.08 K/UL — HIGH (ref 3.8–10.5)

## 2025-02-07 PROCEDURE — 73590 X-RAY EXAM OF LOWER LEG: CPT | Mod: 26,LT

## 2025-02-07 PROCEDURE — 35566 ART BYP FEM-ANT-POST TIB/PRL: CPT | Mod: LT

## 2025-02-07 PROCEDURE — 73552 X-RAY EXAM OF FEMUR 2/>: CPT | Mod: 26,LT

## 2025-02-07 RX ORDER — CLOPIDOGREL BISULFATE 75 MG/1
300 TABLET, FILM COATED ORAL ONCE
Refills: 0 | Status: COMPLETED | OUTPATIENT
Start: 2025-02-07 | End: 2025-02-07

## 2025-02-07 RX ORDER — OXYCODONE HYDROCHLORIDE 30 MG/1
2.5 TABLET ORAL EVERY 4 HOURS
Refills: 0 | Status: DISCONTINUED | OUTPATIENT
Start: 2025-02-07 | End: 2025-02-14

## 2025-02-07 RX ORDER — OXYCODONE HYDROCHLORIDE 30 MG/1
5 TABLET ORAL EVERY 4 HOURS
Refills: 0 | Status: DISCONTINUED | OUTPATIENT
Start: 2025-02-07 | End: 2025-02-14

## 2025-02-07 RX ORDER — ACETAMINOPHEN 500 MG/5ML
1000 LIQUID (ML) ORAL EVERY 6 HOURS
Refills: 0 | Status: COMPLETED | OUTPATIENT
Start: 2025-02-07 | End: 2025-02-08

## 2025-02-07 RX ORDER — ONDANSETRON HCL/PF 4 MG/2 ML
4 VIAL (ML) INJECTION ONCE
Refills: 0 | Status: DISCONTINUED | OUTPATIENT
Start: 2025-02-07 | End: 2025-02-07

## 2025-02-07 RX ORDER — DEXTROSE 50 % IN WATER 50 %
12.5 SYRINGE (ML) INTRAVENOUS ONCE
Refills: 0 | Status: DISCONTINUED | OUTPATIENT
Start: 2025-02-07 | End: 2025-02-27

## 2025-02-07 RX ORDER — DEXTROSE 50 % IN WATER 50 %
15 SYRINGE (ML) INTRAVENOUS ONCE
Refills: 0 | Status: DISCONTINUED | OUTPATIENT
Start: 2025-02-07 | End: 2025-02-27

## 2025-02-07 RX ORDER — HYDROMORPHONE/SOD CHLOR,ISO/PF 2 MG/10 ML
0.25 SYRINGE (ML) INJECTION
Refills: 0 | Status: DISCONTINUED | OUTPATIENT
Start: 2025-02-07 | End: 2025-02-07

## 2025-02-07 RX ORDER — GABAPENTIN 400 MG/1
100 CAPSULE ORAL DAILY
Refills: 0 | Status: DISCONTINUED | OUTPATIENT
Start: 2025-02-07 | End: 2025-02-27

## 2025-02-07 RX ORDER — CYANOCOBALAMIN 1000 UG/ML
1000 INJECTION INTRAMUSCULAR; SUBCUTANEOUS DAILY
Refills: 0 | Status: DISCONTINUED | OUTPATIENT
Start: 2025-02-07 | End: 2025-02-27

## 2025-02-07 RX ORDER — CLOPIDOGREL BISULFATE 75 MG/1
75 TABLET, FILM COATED ORAL DAILY
Refills: 0 | Status: DISCONTINUED | OUTPATIENT
Start: 2025-02-07 | End: 2025-02-27

## 2025-02-07 RX ORDER — SODIUM CHLORIDE 9 G/1000ML
1000 INJECTION, SOLUTION INTRAVENOUS
Refills: 0 | Status: DISCONTINUED | OUTPATIENT
Start: 2025-02-07 | End: 2025-02-27

## 2025-02-07 RX ORDER — INSULIN LISPRO 100 U/ML
INJECTION, SOLUTION INTRAVENOUS; SUBCUTANEOUS AT BEDTIME
Refills: 0 | Status: DISCONTINUED | OUTPATIENT
Start: 2025-02-07 | End: 2025-02-13

## 2025-02-07 RX ORDER — DEXTROSE 50 % IN WATER 50 %
25 SYRINGE (ML) INTRAVENOUS ONCE
Refills: 0 | Status: DISCONTINUED | OUTPATIENT
Start: 2025-02-07 | End: 2025-02-27

## 2025-02-07 RX ORDER — HYDROMORPHONE/SOD CHLOR,ISO/PF 2 MG/10 ML
0.5 SYRINGE (ML) INJECTION
Refills: 0 | Status: DISCONTINUED | OUTPATIENT
Start: 2025-02-07 | End: 2025-02-07

## 2025-02-07 RX ORDER — GLUCAGON 3 MG/1
1 POWDER NASAL ONCE
Refills: 0 | Status: DISCONTINUED | OUTPATIENT
Start: 2025-02-07 | End: 2025-02-27

## 2025-02-07 RX ORDER — ASPIRIN 325 MG
81 TABLET ORAL DAILY
Refills: 0 | Status: DISCONTINUED | OUTPATIENT
Start: 2025-02-07 | End: 2025-02-27

## 2025-02-07 RX ORDER — SODIUM CHLORIDE 9 G/1000ML
1000 INJECTION, SOLUTION INTRAVENOUS ONCE
Refills: 0 | Status: COMPLETED | OUTPATIENT
Start: 2025-02-07 | End: 2025-02-07

## 2025-02-07 RX ORDER — ATORVASTATIN CALCIUM 80 MG/1
40 TABLET, FILM COATED ORAL AT BEDTIME
Refills: 0 | Status: DISCONTINUED | OUTPATIENT
Start: 2025-02-07 | End: 2025-02-27

## 2025-02-07 RX ORDER — LISINOPRIL 30 MG/1
25 TABLET ORAL DAILY
Refills: 0 | Status: DISCONTINUED | OUTPATIENT
Start: 2025-02-07 | End: 2025-02-27

## 2025-02-07 RX ORDER — INSULIN LISPRO 100 U/ML
INJECTION, SOLUTION INTRAVENOUS; SUBCUTANEOUS
Refills: 0 | Status: DISCONTINUED | OUTPATIENT
Start: 2025-02-07 | End: 2025-02-13

## 2025-02-07 RX ADMIN — Medication 1000 MILLIGRAM(S): at 23:42

## 2025-02-07 RX ADMIN — SODIUM CHLORIDE 2000 MILLILITER(S): 9 INJECTION, SOLUTION INTRAVENOUS at 19:51

## 2025-02-07 RX ADMIN — Medication 1 APPLICATION(S): at 06:55

## 2025-02-07 RX ADMIN — CLOPIDOGREL BISULFATE 300 MILLIGRAM(S): 75 TABLET, FILM COATED ORAL at 18:08

## 2025-02-07 RX ADMIN — OXYCODONE HYDROCHLORIDE 5 MILLIGRAM(S): 30 TABLET ORAL at 20:40

## 2025-02-07 RX ADMIN — Medication 3 MILLILITER(S): at 06:55

## 2025-02-07 RX ADMIN — Medication 0.25 MILLIGRAM(S): at 19:00

## 2025-02-07 RX ADMIN — Medication 400 MILLIGRAM(S): at 23:12

## 2025-02-07 RX ADMIN — ATORVASTATIN CALCIUM 40 MILLIGRAM(S): 80 TABLET, FILM COATED ORAL at 22:10

## 2025-02-07 RX ADMIN — OXYCODONE HYDROCHLORIDE 5 MILLIGRAM(S): 30 TABLET ORAL at 21:10

## 2025-02-07 RX ADMIN — Medication 0.25 MILLIGRAM(S): at 18:45

## 2025-02-07 RX ADMIN — INSULIN LISPRO 2: 100 INJECTION, SOLUTION INTRAVENOUS; SUBCUTANEOUS at 16:54

## 2025-02-07 RX ADMIN — Medication 0.5 MILLIGRAM(S): at 16:45

## 2025-02-07 RX ADMIN — Medication 0.5 MILLIGRAM(S): at 17:00

## 2025-02-07 NOTE — CHART NOTE - NSCHARTNOTEFT_GEN_A_CORE
Patient is a 69y old  Male who presents with a chief complaint of   Pt is S/P Creation of bypass from left femoral artery to distal tibial artery with RSVG and completion angiogram    Vital Signs Last 24 Hrs  T(C): 36.9 (07 Feb 2025 15:25), Max: 36.9 (07 Feb 2025 05:50)  T(F): 98.4 (07 Feb 2025 15:25), Max: 98.4 (07 Feb 2025 05:50)  HR: 87 (07 Feb 2025 19:00) (84 - 95)  BP: 140/64 (07 Feb 2025 19:00) (114/56 - 167/63)  BP(mean): 92 (07 Feb 2025 19:00) (81 - 109)  RR: 16 (07 Feb 2025 19:00) (16 - 16)  SpO2: 100% (07 Feb 2025 19:00) (97% - 100%)    Parameters below as of 07 Feb 2025 19:00  Patient On (Oxygen Delivery Method): room air      I&O's Detail    07 Feb 2025 07:01  -  07 Feb 2025 19:44  --------------------------------------------------------  IN:    Oral Fluid: 240 mL  Total IN: 240 mL    OUT:    Indwelling Catheter - Urethral (mL): 225 mL  Total OUT: 225 mL    Total NET: 15 mL        aspirin  chewable 81  atenolol  Tablet 25  clopidogrel Tablet 75    PAST MEDICAL & SURGICAL HISTORY:  HTN (hypertension)      HLD (hyperlipidemia)      DM (diabetes mellitus)      PAD (peripheral artery disease)      H/O iron deficiency      [place] (hard of hearing)      Peripheral neuropathy      Peripheral vascular disease, unspecified      Former smoker      CVA (cerebrovascular accident)      S/P appendectomy      S/P peripheral artery angioplasty with stent placement      S/P angiography      S/P cataract surgery        Physical Exam:  General: NAD, resting comfortably in bed  Pulmonary: Nonlabored breathing, no respiratory distress  Abdominal: soft, NT/ND, access sites soft and compressible with dressings c/d/i  Extremities: LLE with signals over the bypass, no distal signals      LABS:                        9.6    16.08 )-----------( 232      ( 07 Feb 2025 16:08 )             29.8     02-07    137  |  104  |  14  ----------------------------<  231[H]  4.1   |  20[L]  |  0.73    Ca    9.4      07 Feb 2025 16:08        CAPILLARY BLOOD GLUCOSE      POCT Blood Glucose.: 210 mg/dL (07 Feb 2025 15:40)  POCT Blood Glucose.: 163 mg/dL (07 Feb 2025 06:22)      Radiology and Additional Studies:    Assessment:69yMaleHPI:   S/P Creation of bypass from left femoral artery to distal tibial artery with RSVG and completion angiogram    Plan:  ASA and Plavix (s/p plavix load)  Statin  Art overnight, strict I&Os , getting 1L LR bolus and stat ABG lytes  - F/U ABG lytes   Seen and discussed with vascular surgery fellow at bedside   PACU 6 hours     Todd Lovett MD   NS/SATHYA Surgery Resident PGY1  Vascular Surgery [id number]

## 2025-02-07 NOTE — BRIEF OPERATIVE NOTE - NSICDXBRIEFPROCEDURE_GEN_ALL_CORE_FT
PROCEDURES:  Creation of bypass from left femoral artery to distal tibial artery 07-Feb-2025 18:41:27  Demetrio Harvey

## 2025-02-07 NOTE — ASU PATIENT PROFILE, ADULT - FALL HARM RISK - RISK INTERVENTIONS

## 2025-02-07 NOTE — PATIENT PROFILE ADULT - FALL HARM RISK - HARM RISK INTERVENTIONS
Assistance with ambulation/Assistance OOB with selected safe patient handling equipment/Communicate Risk of Fall with Harm to all staff/Discuss with provider need for PT consult/Monitor gait and stability/Provide patient with walking aids - walker, cane, crutches/Reinforce activity limits and safety measures with patient and family/Sit up slowly, dangle for a short time, stand at bedside before walking/Tailored Fall Risk Interventions/Use of alarms - bed, chair and/or voice tab/Visual Cue: Yellow wristband and red socks/Bed in lowest position, wheels locked, appropriate side rails in place/Call bell, personal items and telephone in reach/Instruct patient to call for assistance before getting out of bed or chair/Non-slip footwear when patient is out of bed/[place] to call system/Physically safe environment - no spills, clutter or unnecessary equipment/Purposeful Proactive Rounding/Room/bathroom lighting operational, light cord in reach

## 2025-02-08 LAB
ANION GAP SERPL CALC-SCNC: 12 MMOL/L — SIGNIFICANT CHANGE UP (ref 5–17)
BUN SERPL-MCNC: 15 MG/DL — SIGNIFICANT CHANGE UP (ref 7–23)
CALCIUM SERPL-MCNC: 8.9 MG/DL — SIGNIFICANT CHANGE UP (ref 8.4–10.5)
CHLORIDE SERPL-SCNC: 101 MMOL/L — SIGNIFICANT CHANGE UP (ref 96–108)
CO2 SERPL-SCNC: 23 MMOL/L — SIGNIFICANT CHANGE UP (ref 22–31)
CREAT SERPL-MCNC: 0.9 MG/DL — SIGNIFICANT CHANGE UP (ref 0.5–1.3)
EGFR: 91 ML/MIN/1.73M2 — SIGNIFICANT CHANGE UP
EGFR: 91 ML/MIN/1.73M2 — SIGNIFICANT CHANGE UP
GAS PNL BLDA: SIGNIFICANT CHANGE UP
GLUCOSE BLDC GLUCOMTR-MCNC: 232 MG/DL — HIGH (ref 70–99)
GLUCOSE BLDC GLUCOMTR-MCNC: 251 MG/DL — HIGH (ref 70–99)
GLUCOSE BLDC GLUCOMTR-MCNC: 258 MG/DL — HIGH (ref 70–99)
GLUCOSE BLDC GLUCOMTR-MCNC: 263 MG/DL — HIGH (ref 70–99)
GLUCOSE SERPL-MCNC: 221 MG/DL — HIGH (ref 70–99)
HCT VFR BLD CALC: 24.1 % — LOW (ref 39–50)
HGB BLD-MCNC: 7.9 G/DL — LOW (ref 13–17)
MAGNESIUM SERPL-MCNC: 1.6 MG/DL — SIGNIFICANT CHANGE UP (ref 1.6–2.6)
MCHC RBC-ENTMCNC: 27.4 PG — SIGNIFICANT CHANGE UP (ref 27–34)
MCHC RBC-ENTMCNC: 32.8 G/DL — SIGNIFICANT CHANGE UP (ref 32–36)
MCV RBC AUTO: 83.7 FL — SIGNIFICANT CHANGE UP (ref 80–100)
NRBC # BLD: 0 /100 WBCS — SIGNIFICANT CHANGE UP (ref 0–0)
NRBC BLD-RTO: 0 /100 WBCS — SIGNIFICANT CHANGE UP (ref 0–0)
PHOSPHATE SERPL-MCNC: 4.2 MG/DL — SIGNIFICANT CHANGE UP (ref 2.5–4.5)
PLATELET # BLD AUTO: 229 K/UL — SIGNIFICANT CHANGE UP (ref 150–400)
POTASSIUM SERPL-MCNC: 4.1 MMOL/L — SIGNIFICANT CHANGE UP (ref 3.5–5.3)
POTASSIUM SERPL-SCNC: 4.1 MMOL/L — SIGNIFICANT CHANGE UP (ref 3.5–5.3)
RBC # BLD: 2.88 M/UL — LOW (ref 4.2–5.8)
RBC # FLD: 15.3 % — HIGH (ref 10.3–14.5)
SODIUM SERPL-SCNC: 136 MMOL/L — SIGNIFICANT CHANGE UP (ref 135–145)
WBC # BLD: 12.64 K/UL — HIGH (ref 3.8–10.5)
WBC # FLD AUTO: 12.64 K/UL — HIGH (ref 3.8–10.5)

## 2025-02-08 RX ORDER — HEPARIN SODIUM 1000 [USP'U]/ML
800 INJECTION INTRAVENOUS; SUBCUTANEOUS
Qty: 25000 | Refills: 0 | Status: DISCONTINUED | OUTPATIENT
Start: 2025-02-08 | End: 2025-02-09

## 2025-02-08 RX ORDER — INFLUENZA A VIRUS A/IDAHO/07/2018 (H1N1) ANTIGEN (MDCK CELL DERIVED, PROPIOLACTONE INACTIVATED, INFLUENZA A VIRUS A/INDIANA/08/2018 (H3N2) ANTIGEN (MDCK CELL DERIVED, PROPIOLACTONE INACTIVATED), INFLUENZA B VIRUS B/SINGAPORE/INFTT-16-0610/2016 ANTIGEN (MDCK CELL DERIVED, PROPIOLACTONE INACTIVATED), INFLUENZA B VIRUS B/IOWA/06/2017 ANTIGEN (MDCK CELL DERIVED, PROPIOLACTONE INACTIVATED) 15; 15; 15; 15 UG/.5ML; UG/.5ML; UG/.5ML; UG/.5ML
0.5 INJECTION, SUSPENSION INTRAMUSCULAR ONCE
Refills: 0 | Status: DISCONTINUED | OUTPATIENT
Start: 2025-02-08 | End: 2025-03-25

## 2025-02-08 RX ORDER — MAGNESIUM SULFATE 500 MG/ML
2 SYRINGE (ML) INJECTION ONCE
Refills: 0 | Status: COMPLETED | OUTPATIENT
Start: 2025-02-08 | End: 2025-02-08

## 2025-02-08 RX ADMIN — OXYCODONE HYDROCHLORIDE 5 MILLIGRAM(S): 30 TABLET ORAL at 03:34

## 2025-02-08 RX ADMIN — Medication 81 MILLIGRAM(S): at 11:59

## 2025-02-08 RX ADMIN — LISINOPRIL 25 MILLIGRAM(S): 30 TABLET ORAL at 05:29

## 2025-02-08 RX ADMIN — INSULIN LISPRO 3: 100 INJECTION, SOLUTION INTRAVENOUS; SUBCUTANEOUS at 12:11

## 2025-02-08 RX ADMIN — HEPARIN SODIUM 8 UNIT(S)/HR: 1000 INJECTION INTRAVENOUS; SUBCUTANEOUS at 17:32

## 2025-02-08 RX ADMIN — Medication 400 MILLIGRAM(S): at 11:58

## 2025-02-08 RX ADMIN — Medication 400 MILLIGRAM(S): at 17:32

## 2025-02-08 RX ADMIN — ATORVASTATIN CALCIUM 40 MILLIGRAM(S): 80 TABLET, FILM COATED ORAL at 21:47

## 2025-02-08 RX ADMIN — GABAPENTIN 100 MILLIGRAM(S): 400 CAPSULE ORAL at 11:59

## 2025-02-08 RX ADMIN — OXYCODONE HYDROCHLORIDE 5 MILLIGRAM(S): 30 TABLET ORAL at 21:47

## 2025-02-08 RX ADMIN — Medication 400 MILLIGRAM(S): at 05:29

## 2025-02-08 RX ADMIN — CLOPIDOGREL BISULFATE 75 MILLIGRAM(S): 75 TABLET, FILM COATED ORAL at 11:59

## 2025-02-08 RX ADMIN — OXYCODONE HYDROCHLORIDE 5 MILLIGRAM(S): 30 TABLET ORAL at 09:44

## 2025-02-08 RX ADMIN — Medication 25 GRAM(S): at 11:59

## 2025-02-08 RX ADMIN — OXYCODONE HYDROCHLORIDE 5 MILLIGRAM(S): 30 TABLET ORAL at 02:34

## 2025-02-08 RX ADMIN — INSULIN LISPRO 2: 100 INJECTION, SOLUTION INTRAVENOUS; SUBCUTANEOUS at 17:32

## 2025-02-08 RX ADMIN — INSULIN LISPRO 1: 100 INJECTION, SOLUTION INTRAVENOUS; SUBCUTANEOUS at 21:48

## 2025-02-08 RX ADMIN — CYANOCOBALAMIN 1000 MICROGRAM(S): 1000 INJECTION INTRAMUSCULAR; SUBCUTANEOUS at 11:59

## 2025-02-08 RX ADMIN — HEPARIN SODIUM 8 UNIT(S)/HR: 1000 INJECTION INTRAVENOUS; SUBCUTANEOUS at 19:30

## 2025-02-08 RX ADMIN — INSULIN LISPRO 3: 100 INJECTION, SOLUTION INTRAVENOUS; SUBCUTANEOUS at 09:14

## 2025-02-08 RX ADMIN — Medication 1000 MILLIGRAM(S): at 05:59

## 2025-02-08 RX ADMIN — OXYCODONE HYDROCHLORIDE 5 MILLIGRAM(S): 30 TABLET ORAL at 22:47

## 2025-02-08 RX ADMIN — OXYCODONE HYDROCHLORIDE 5 MILLIGRAM(S): 30 TABLET ORAL at 09:14

## 2025-02-08 NOTE — PROGRESS NOTE ADULT - ASSESSMENT
69M PM of former smoker (1.5 PPD % 50 years; quit 2 years ago),  HTN, HLD, DM, PAD and aortoiliac occlusive disease, who previously underwent an outpatient angiography complicated by right iliac artery dissection. Now S/P Creation of bypass from left femoral artery to distal tibial artery with RSVG and completion angiogram on 2/7/25.      Plan:   -ASA and Plavix (s/p plavix load)   - Statin   - dc melendez; fu TOV   - Hgb drop this AM, will transfuse 1U and FU CBC tmr AM   - neurovascular checks has L bypass signal    - Diet: Regular

## 2025-02-08 NOTE — PROGRESS NOTE ADULT - SUBJECTIVE AND OBJECTIVE BOX
SURGERY DAILY PROGRESS NOTE    24 Hour/Overnight Events: No acute events overnight    SUBJECTIVE: Patient seen and evaluated on AM rounds. Pt is resting comfortably in bed with no acute complaints. Tolerating diet. Denies fevers/chills, chest pain, dyspnea, abdominal pain, nausea, vomiting, and diarrhea    ------------------------------------------------------------------------------------------------------------  OBJECTIVE:  Vital Signs Last 24 Hrs  T(C): 37.2 (08 Feb 2025 09:18), Max: 37.3 (07 Feb 2025 22:00)  T(F): 99 (08 Feb 2025 09:18), Max: 99.2 (07 Feb 2025 22:00)  HR: 95 (08 Feb 2025 09:18) (84 - 100)  BP: 120/72 (08 Feb 2025 09:18) (114/56 - 174/72)  BP(mean): 104 (07 Feb 2025 21:00) (81 - 109)  RR: 18 (08 Feb 2025 09:18) (16 - 18)  SpO2: 96% (08 Feb 2025 09:18) (93% - 100%)    Parameters below as of 08 Feb 2025 09:18  Patient On (Oxygen Delivery Method): room air      I&O's Detail    07 Feb 2025 07:01  -  08 Feb 2025 07:00  --------------------------------------------------------  IN:    IV PiggyBack: 200 mL    Lactated Ringers Bolus: 1000 mL    Oral Fluid: 600 mL  Total IN: 1800 mL    OUT:    Indwelling Catheter - Urethral (mL): 1030 mL  Total OUT: 1030 mL    Total NET: 770 mL      08 Feb 2025 07:01  -  08 Feb 2025 11:28  --------------------------------------------------------  IN:    Oral Fluid: 120 mL  Total IN: 120 mL    OUT:    Indwelling Catheter - Urethral (mL): 350 mL  Total OUT: 350 mL    Total NET: -230 mL          LABS:                        7.9    12.64 )-----------( 229      ( 08 Feb 2025 06:57 )             24.1     02-08    136  |  101  |  15  ----------------------------<  221[H]  4.1   |  23  |  0.90    Ca    8.9      08 Feb 2025 06:57  Phos  4.2     02-08  Mg     1.6     02-08          Urinalysis Basic - ( 08 Feb 2025 06:57 )    Color: x / Appearance: x / SG: x / pH: x  Gluc: 221 mg/dL / Ketone: x  / Bili: x / Urobili: x   Blood: x / Protein: x / Nitrite: x   Leuk Esterase: x / RBC: x / WBC x   Sq Epi: x / Non Sq Epi: x / Bacteria: x      Physical Exam:  General: NAD, resting comfortably in bed  Pulmonary: Nonlabored breathing, no respiratory distress  Abdominal: soft, NT/ND, access sites soft and compressible with dressings c/d/i  Extremities: LLE with signals over the left bypass, no distal signals

## 2025-02-09 LAB
ADD ON TEST-SPECIMEN IN LAB: SIGNIFICANT CHANGE UP
ANION GAP SERPL CALC-SCNC: 11 MMOL/L — SIGNIFICANT CHANGE UP (ref 5–17)
ANION GAP SERPL CALC-SCNC: 15 MMOL/L — SIGNIFICANT CHANGE UP (ref 5–17)
APPEARANCE UR: CLEAR — SIGNIFICANT CHANGE UP
APTT BLD: 45.3 SEC — HIGH (ref 24.5–35.6)
APTT BLD: 54.6 SEC — HIGH (ref 24.5–35.6)
APTT BLD: 54.9 SEC — HIGH (ref 24.5–35.6)
APTT BLD: 64.4 SEC — HIGH (ref 24.5–35.6)
BACTERIA # UR AUTO: NEGATIVE /HPF — SIGNIFICANT CHANGE UP
BILIRUB UR-MCNC: NEGATIVE — SIGNIFICANT CHANGE UP
BUN SERPL-MCNC: 14 MG/DL — SIGNIFICANT CHANGE UP (ref 7–23)
BUN SERPL-MCNC: 16 MG/DL — SIGNIFICANT CHANGE UP (ref 7–23)
CALCIUM SERPL-MCNC: 8.8 MG/DL — SIGNIFICANT CHANGE UP (ref 8.4–10.5)
CALCIUM SERPL-MCNC: 8.8 MG/DL — SIGNIFICANT CHANGE UP (ref 8.4–10.5)
CAST: 0 /LPF — SIGNIFICANT CHANGE UP (ref 0–4)
CHLORIDE SERPL-SCNC: 100 MMOL/L — SIGNIFICANT CHANGE UP (ref 96–108)
CHLORIDE SERPL-SCNC: 99 MMOL/L — SIGNIFICANT CHANGE UP (ref 96–108)
CO2 SERPL-SCNC: 21 MMOL/L — LOW (ref 22–31)
CO2 SERPL-SCNC: 25 MMOL/L — SIGNIFICANT CHANGE UP (ref 22–31)
COLOR SPEC: YELLOW — SIGNIFICANT CHANGE UP
CREAT SERPL-MCNC: 0.78 MG/DL — SIGNIFICANT CHANGE UP (ref 0.5–1.3)
CREAT SERPL-MCNC: 0.8 MG/DL — SIGNIFICANT CHANGE UP (ref 0.5–1.3)
DIFF PNL FLD: ABNORMAL
EGFR: 95 ML/MIN/1.73M2 — SIGNIFICANT CHANGE UP
GLUCOSE BLDC GLUCOMTR-MCNC: 239 MG/DL — HIGH (ref 70–99)
GLUCOSE BLDC GLUCOMTR-MCNC: 247 MG/DL — HIGH (ref 70–99)
GLUCOSE BLDC GLUCOMTR-MCNC: 255 MG/DL — HIGH (ref 70–99)
GLUCOSE BLDC GLUCOMTR-MCNC: 255 MG/DL — HIGH (ref 70–99)
GLUCOSE SERPL-MCNC: 225 MG/DL — HIGH (ref 70–99)
GLUCOSE SERPL-MCNC: 231 MG/DL — HIGH (ref 70–99)
GLUCOSE UR QL: >=1000 MG/DL
HCT VFR BLD CALC: 21.3 % — LOW (ref 39–50)
HCT VFR BLD CALC: 25.5 % — LOW (ref 39–50)
HCT VFR BLD CALC: 25.7 % — LOW (ref 39–50)
HGB BLD-MCNC: 7 G/DL — CRITICAL LOW (ref 13–17)
HGB BLD-MCNC: 8.3 G/DL — LOW (ref 13–17)
HGB BLD-MCNC: 8.4 G/DL — LOW (ref 13–17)
INR BLD: 1.14 RATIO — SIGNIFICANT CHANGE UP (ref 0.85–1.16)
KETONES UR-MCNC: 15 MG/DL
LACTATE BLDV-MCNC: 1.6 MMOL/L — SIGNIFICANT CHANGE UP (ref 0.5–2)
LEUKOCYTE ESTERASE UR-ACNC: NEGATIVE — SIGNIFICANT CHANGE UP
MAGNESIUM SERPL-MCNC: 1.9 MG/DL — SIGNIFICANT CHANGE UP (ref 1.6–2.6)
MAGNESIUM SERPL-MCNC: 2 MG/DL — SIGNIFICANT CHANGE UP (ref 1.6–2.6)
MCHC RBC-ENTMCNC: 27.6 PG — SIGNIFICANT CHANGE UP (ref 27–34)
MCHC RBC-ENTMCNC: 28.3 PG — SIGNIFICANT CHANGE UP (ref 27–34)
MCHC RBC-ENTMCNC: 28.7 PG — SIGNIFICANT CHANGE UP (ref 27–34)
MCHC RBC-ENTMCNC: 32.3 G/DL — SIGNIFICANT CHANGE UP (ref 32–36)
MCHC RBC-ENTMCNC: 32.9 G/DL — SIGNIFICANT CHANGE UP (ref 32–36)
MCHC RBC-ENTMCNC: 32.9 G/DL — SIGNIFICANT CHANGE UP (ref 32–36)
MCV RBC AUTO: 85.4 FL — SIGNIFICANT CHANGE UP (ref 80–100)
MCV RBC AUTO: 85.9 FL — SIGNIFICANT CHANGE UP (ref 80–100)
MCV RBC AUTO: 87.3 FL — SIGNIFICANT CHANGE UP (ref 80–100)
NITRITE UR-MCNC: NEGATIVE — SIGNIFICANT CHANGE UP
NRBC # BLD: 0 /100 WBCS — SIGNIFICANT CHANGE UP (ref 0–0)
NRBC BLD-RTO: 0 /100 WBCS — SIGNIFICANT CHANGE UP (ref 0–0)
PH UR: 6.5 — SIGNIFICANT CHANGE UP (ref 5–8)
PHOSPHATE SERPL-MCNC: 1.5 MG/DL — LOW (ref 2.5–4.5)
PHOSPHATE SERPL-MCNC: 2.3 MG/DL — LOW (ref 2.5–4.5)
PLATELET # BLD AUTO: 202 K/UL — SIGNIFICANT CHANGE UP (ref 150–400)
PLATELET # BLD AUTO: 202 K/UL — SIGNIFICANT CHANGE UP (ref 150–400)
PLATELET # BLD AUTO: 226 K/UL — SIGNIFICANT CHANGE UP (ref 150–400)
POTASSIUM SERPL-MCNC: 3.4 MMOL/L — LOW (ref 3.5–5.3)
POTASSIUM SERPL-MCNC: 3.7 MMOL/L — SIGNIFICANT CHANGE UP (ref 3.5–5.3)
POTASSIUM SERPL-SCNC: 3.4 MMOL/L — LOW (ref 3.5–5.3)
POTASSIUM SERPL-SCNC: 3.7 MMOL/L — SIGNIFICANT CHANGE UP (ref 3.5–5.3)
PROT UR-MCNC: 100 MG/DL
PROTHROM AB SERPL-ACNC: 13.1 SEC — SIGNIFICANT CHANGE UP (ref 9.9–13.4)
RBC # BLD: 2.44 M/UL — LOW (ref 4.2–5.8)
RBC # BLD: 2.97 M/UL — LOW (ref 4.2–5.8)
RBC # BLD: 3.01 M/UL — LOW (ref 4.2–5.8)
RBC # FLD: 15.3 % — HIGH (ref 10.3–14.5)
RBC # FLD: 15.5 % — HIGH (ref 10.3–14.5)
RBC # FLD: 15.5 % — HIGH (ref 10.3–14.5)
RBC CASTS # UR COMP ASSIST: 1 /HPF — SIGNIFICANT CHANGE UP (ref 0–4)
REVIEW: SIGNIFICANT CHANGE UP
SODIUM SERPL-SCNC: 135 MMOL/L — SIGNIFICANT CHANGE UP (ref 135–145)
SODIUM SERPL-SCNC: 136 MMOL/L — SIGNIFICANT CHANGE UP (ref 135–145)
SP GR SPEC: 1.02 — SIGNIFICANT CHANGE UP (ref 1–1.03)
SQUAMOUS # UR AUTO: 1 /HPF — SIGNIFICANT CHANGE UP (ref 0–5)
UROBILINOGEN FLD QL: 1 MG/DL — SIGNIFICANT CHANGE UP (ref 0.2–1)
WBC # BLD: 12.34 K/UL — HIGH (ref 3.8–10.5)
WBC # BLD: 14.74 K/UL — HIGH (ref 3.8–10.5)
WBC # BLD: 16.24 K/UL — HIGH (ref 3.8–10.5)
WBC # FLD AUTO: 12.34 K/UL — HIGH (ref 3.8–10.5)
WBC # FLD AUTO: 14.74 K/UL — HIGH (ref 3.8–10.5)
WBC # FLD AUTO: 16.24 K/UL — HIGH (ref 3.8–10.5)
WBC UR QL: 0 /HPF — SIGNIFICANT CHANGE UP (ref 0–5)

## 2025-02-09 PROCEDURE — 71045 X-RAY EXAM CHEST 1 VIEW: CPT | Mod: 26

## 2025-02-09 RX ORDER — ACETAMINOPHEN 500 MG/5ML
975 LIQUID (ML) ORAL EVERY 6 HOURS
Refills: 0 | Status: DISCONTINUED | OUTPATIENT
Start: 2025-02-09 | End: 2025-02-27

## 2025-02-09 RX ORDER — HEPARIN SODIUM 1000 [USP'U]/ML
900 INJECTION INTRAVENOUS; SUBCUTANEOUS
Qty: 25000 | Refills: 0 | Status: DISCONTINUED | OUTPATIENT
Start: 2025-02-09 | End: 2025-02-09

## 2025-02-09 RX ORDER — HEPARIN SODIUM 1000 [USP'U]/ML
1000 INJECTION INTRAVENOUS; SUBCUTANEOUS
Qty: 25000 | Refills: 0 | Status: DISCONTINUED | OUTPATIENT
Start: 2025-02-09 | End: 2025-02-10

## 2025-02-09 RX ORDER — HEPARIN SODIUM 1000 [USP'U]/ML
2500 INJECTION INTRAVENOUS; SUBCUTANEOUS ONCE
Refills: 0 | Status: COMPLETED | OUTPATIENT
Start: 2025-02-09 | End: 2025-02-09

## 2025-02-09 RX ADMIN — OXYCODONE HYDROCHLORIDE 5 MILLIGRAM(S): 30 TABLET ORAL at 22:46

## 2025-02-09 RX ADMIN — Medication 975 MILLIGRAM(S): at 22:15

## 2025-02-09 RX ADMIN — Medication 81 MILLIGRAM(S): at 11:41

## 2025-02-09 RX ADMIN — HEPARIN SODIUM 2500 UNIT(S): 1000 INJECTION INTRAVENOUS; SUBCUTANEOUS at 02:29

## 2025-02-09 RX ADMIN — OXYCODONE HYDROCHLORIDE 5 MILLIGRAM(S): 30 TABLET ORAL at 14:43

## 2025-02-09 RX ADMIN — ATORVASTATIN CALCIUM 40 MILLIGRAM(S): 80 TABLET, FILM COATED ORAL at 21:16

## 2025-02-09 RX ADMIN — HEPARIN SODIUM 9 UNIT(S)/HR: 1000 INJECTION INTRAVENOUS; SUBCUTANEOUS at 07:25

## 2025-02-09 RX ADMIN — CYANOCOBALAMIN 1000 MICROGRAM(S): 1000 INJECTION INTRAMUSCULAR; SUBCUTANEOUS at 11:41

## 2025-02-09 RX ADMIN — OXYCODONE HYDROCHLORIDE 5 MILLIGRAM(S): 30 TABLET ORAL at 15:43

## 2025-02-09 RX ADMIN — OXYCODONE HYDROCHLORIDE 5 MILLIGRAM(S): 30 TABLET ORAL at 02:29

## 2025-02-09 RX ADMIN — INSULIN LISPRO 3: 100 INJECTION, SOLUTION INTRAVENOUS; SUBCUTANEOUS at 08:51

## 2025-02-09 RX ADMIN — OXYCODONE HYDROCHLORIDE 5 MILLIGRAM(S): 30 TABLET ORAL at 22:16

## 2025-02-09 RX ADMIN — HEPARIN SODIUM 10 UNIT(S)/HR: 1000 INJECTION INTRAVENOUS; SUBCUTANEOUS at 19:23

## 2025-02-09 RX ADMIN — OXYCODONE HYDROCHLORIDE 5 MILLIGRAM(S): 30 TABLET ORAL at 10:21

## 2025-02-09 RX ADMIN — HEPARIN SODIUM 11 UNIT(S)/HR: 1000 INJECTION INTRAVENOUS; SUBCUTANEOUS at 23:35

## 2025-02-09 RX ADMIN — LISINOPRIL 25 MILLIGRAM(S): 30 TABLET ORAL at 05:12

## 2025-02-09 RX ADMIN — OXYCODONE HYDROCHLORIDE 5 MILLIGRAM(S): 30 TABLET ORAL at 03:29

## 2025-02-09 RX ADMIN — GABAPENTIN 100 MILLIGRAM(S): 400 CAPSULE ORAL at 11:41

## 2025-02-09 RX ADMIN — HEPARIN SODIUM 9 UNIT(S)/HR: 1000 INJECTION INTRAVENOUS; SUBCUTANEOUS at 02:34

## 2025-02-09 RX ADMIN — HEPARIN SODIUM 9 UNIT(S)/HR: 1000 INJECTION INTRAVENOUS; SUBCUTANEOUS at 09:38

## 2025-02-09 RX ADMIN — INSULIN LISPRO 2: 100 INJECTION, SOLUTION INTRAVENOUS; SUBCUTANEOUS at 17:25

## 2025-02-09 RX ADMIN — INSULIN LISPRO 3: 100 INJECTION, SOLUTION INTRAVENOUS; SUBCUTANEOUS at 12:19

## 2025-02-09 RX ADMIN — CLOPIDOGREL BISULFATE 75 MILLIGRAM(S): 75 TABLET, FILM COATED ORAL at 11:41

## 2025-02-09 RX ADMIN — HEPARIN SODIUM 10 UNIT(S)/HR: 1000 INJECTION INTRAVENOUS; SUBCUTANEOUS at 17:08

## 2025-02-09 RX ADMIN — Medication 63.75 MILLIMOLE(S): at 10:53

## 2025-02-09 RX ADMIN — OXYCODONE HYDROCHLORIDE 5 MILLIGRAM(S): 30 TABLET ORAL at 09:21

## 2025-02-09 NOTE — PROVIDER CONTACT NOTE (CRITICAL VALUE NOTIFICATION) - ACTION/TREATMENT ORDERED:
MD made aware. MD made aware. Per MD, OK to continue heparin gtt at this time. 1u PRBC to be transfused.

## 2025-02-09 NOTE — PROVIDER CONTACT NOTE (SEPSIS SCREENING) - NOTIFICATION DETAILS (SBAR) SITUATION:
Pt febrile at 102F orally, tachy at 107, BP  Pt febrile at 102F orally, tachy at 107, /70. Pt denies any CP, HA, SOB/, lightheadedness, dysuria. Urine output >1000mL within 24 hours. Pt WBC trending up > 12,000. Provider made aware and assessed pt at bedside. Full fever w/u ordered (UA, CBC, BMP, CXR, Blood Cx x2, lactate). Tylenol ordered.

## 2025-02-09 NOTE — PROGRESS NOTE ADULT - ASSESSMENT
69M PM of former smoker (1.5 PPD % 50 years; quit 2 years ago),  HTN, HLD, DM, PAD and aortoiliac occlusive disease, who previously underwent an outpatient angiography complicated by right iliac artery dissection. Now S/P Creation of bypass from left femoral artery to distal tibial artery with RSVG and completion angiogram on 2/7/25.      Plan:   - ASA and Plavix (s/p plavix load); heparin drip; fu aptt this AM   - Statin   - passed TOV   - FU CBC for Hgb    - neurovascular checks has L bypass signal    - Diet: Regular   - PT evaluation

## 2025-02-09 NOTE — CHART NOTE - NSCHARTNOTEFT_GEN_A_CORE
EVENT NOTE    Event: Patient febrile to 102F orally, tachy to 107. /70.     Assessment: Went to patient bedside to assess. Pt reports that he is feeling okay, having some pain from his PAD that is not new. Not having any dysuria, shortness of breath, chest pain, or palpitations. No n/v.         Exam:  General: Patient seen lying in bed comfortably, in no acute distress  Cardio: Tachycardic   Pulm: No tachypnea, no increased WOB  Abdomen: Soft, nontender, nondistended  Neuro: Awake and alert. Answers questions appropriately   Extremities: LLE with signals over the left bypass, no distal signals    Plan:  - LLE exam unchanged   - Full fever w/u (BMP, CBC, UA, CXR, lactate, blood cultures x 2)   - Reordered Tylenol  - Reassess vitals in 1 hour   - Encouraged pt to do IS 10x/hr      Vascular Surgery   [id number] EVENT NOTE    Event: Patient febrile to 102F orally, tachy to 107. /70.     Assessment: Went to patient bedside to assess. Pt reports that he is feeling okay, having some pain from his PAD that is not new. Not having any dysuria, shortness of breath, chest pain, or palpitations. No n/v.         Exam:  General: Patient seen lying in bed comfortably, in no acute distress  Cardio: Tachycardic   Pulm: No tachypnea, no increased WOB  Abdomen: Soft, nontender, nondistended  Neuro: Awake and alert. Answers questions appropriately   Extremities: LLE with signals over the left bypass, no distal signals    Plan:  - LLE exam unchanged   - Full fever w/u (BMP, CBC, UA, CXR, lactate, blood cultures x 2)   - Reordered Tylenol  - Reassess vitals in 1 hour   - Encouraged pt to do IS 10x/hr      2330 Reassessment: Pt temp now 99.7F after Tylenol, continues to be tachy at 107, /62. Repeat labs significant for WBC of 16.24, Hgb 7. Pt ordered for 1U pRBC. Also given potassium and phosphorous repletions.       Vascular Surgery   [id number]

## 2025-02-09 NOTE — PROVIDER CONTACT NOTE (CRITICAL VALUE NOTIFICATION) - ASSESSMENT
Pt currently febrile at 102 orally and tachy at 107. See flowsheet. Sepsis/Fever w/u already done. Pt has no complaints of CP, HA, SOB/, lightheadedness. Pt currently on heparin gtt at 10mL/hr with no signs of bleeding or bruising.

## 2025-02-09 NOTE — PHYSICAL THERAPY INITIAL EVALUATION ADULT - PERTINENT HX OF CURRENT PROBLEM, REHAB EVAL
69 y.o. M PMH former smoker (1.5 PPD % 50 years; quit 2 years ago),  HTN, HLD, DM, PAD and aortoiliac occlusive disease, who previously underwent an outpatient angiography complicated by right iliac artery dissection. Now S/P Creation of bypass from left femoral artery to distal tibial artery with RSVG and completion angiogram on 2/7/25.

## 2025-02-09 NOTE — PROGRESS NOTE ADULT - SUBJECTIVE AND OBJECTIVE BOX
SURGERY DAILY PROGRESS NOTE    24 Hour/Overnight Events:   - Passed TOV  - Recieved 1 U for Hgb of 7.9     SUBJECTIVE: Patient seen and evaluated on AM rounds. Pt is resting comfortably in bed with no acute complaints. Tolerating diet. Denies fevers/chills, chest pain, dyspnea, abdominal pain, nausea, vomiting, and diarrhea    ------------------------------------------------------------------------------------------------------------  OBJECTIVE:  Vital Signs Last 24 Hrs  T(C): 37.4 (09 Feb 2025 05:05), Max: 37.4 (09 Feb 2025 05:05)  T(F): 99.3 (09 Feb 2025 05:05), Max: 99.3 (09 Feb 2025 05:05)  HR: 100 (09 Feb 2025 05:05) (81 - 101)  BP: 131/68 (09 Feb 2025 05:05) (117/71 - 136/71)  BP(mean): --  RR: 18 (09 Feb 2025 05:05) (18 - 18)  SpO2: 95% (09 Feb 2025 05:05) (95% - 98%)    Parameters below as of 09 Feb 2025 05:05  Patient On (Oxygen Delivery Method): room air      I&O's Detail    08 Feb 2025 07:01  -  09 Feb 2025 07:00  --------------------------------------------------------  IN:    Heparin: 64 mL    Heparin: 45 mL    IV PiggyBack: 250 mL    Oral Fluid: 840 mL    PRBCs (Packed Red Blood Cells): 300 mL  Total IN: 1499 mL    OUT:    Indwelling Catheter - Urethral (mL): 700 mL    Voided (mL): 1350 mL  Total OUT: 2050 mL    Total NET: -551 mL      09 Feb 2025 07:01  -  09 Feb 2025 09:41  --------------------------------------------------------  IN:  Total IN: 0 mL    OUT:    Voided (mL): 600 mL  Total OUT: 600 mL    Total NET: -600 mL          LABS:                        8.4    14.74 )-----------( 202      ( 09 Feb 2025 08:31 )             25.5     02-09    136  |  100  |  14  ----------------------------<  225[H]  3.7   |  25  |  0.80    Ca    8.8      09 Feb 2025 08:31  Phos  2.3     02-09  Mg     2.0     02-09        PT/INR - ( 09 Feb 2025 08:31 )   PT: 13.1 sec;   INR: 1.14 ratio         PTT - ( 09 Feb 2025 09:17 )  PTT:64.4 sec  Urinalysis Basic - ( 09 Feb 2025 08:31 )    Color: x / Appearance: x / SG: x / pH: x  Gluc: 225 mg/dL / Ketone: x  / Bili: x / Urobili: x   Blood: x / Protein: x / Nitrite: x   Leuk Esterase: x / RBC: x / WBC x   Sq Epi: x / Non Sq Epi: x / Bacteria: x    Physical Exam:  General: NAD, resting comfortably in bed  Pulmonary: Nonlabored breathing, no respiratory distress  Abdominal: soft, NT/ND, access sites soft and compressible with dressings c/d/i  Extremities: LLE with signals over the left bypass, no distal signals

## 2025-02-10 LAB
ADD ON TEST-SPECIMEN IN LAB: SIGNIFICANT CHANGE UP
ANION GAP SERPL CALC-SCNC: 13 MMOL/L — SIGNIFICANT CHANGE UP (ref 5–17)
APTT BLD: 42.7 SEC — HIGH (ref 24.5–35.6)
APTT BLD: 51.6 SEC — HIGH (ref 24.5–35.6)
APTT BLD: 75.1 SEC — HIGH (ref 24.5–35.6)
BLD GP AB SCN SERPL QL: NEGATIVE — SIGNIFICANT CHANGE UP
BUN SERPL-MCNC: 19 MG/DL — SIGNIFICANT CHANGE UP (ref 7–23)
CALCIUM SERPL-MCNC: 8.8 MG/DL — SIGNIFICANT CHANGE UP (ref 8.4–10.5)
CHLORIDE SERPL-SCNC: 102 MMOL/L — SIGNIFICANT CHANGE UP (ref 96–108)
CK SERPL-CCNC: 4210 U/L — HIGH (ref 30–200)
CO2 SERPL-SCNC: 23 MMOL/L — SIGNIFICANT CHANGE UP (ref 22–31)
CREAT SERPL-MCNC: 0.76 MG/DL — SIGNIFICANT CHANGE UP (ref 0.5–1.3)
EGFR: 96 ML/MIN/1.73M2 — SIGNIFICANT CHANGE UP
EGFR: 96 ML/MIN/1.73M2 — SIGNIFICANT CHANGE UP
GLUCOSE BLDC GLUCOMTR-MCNC: 268 MG/DL — HIGH (ref 70–99)
GLUCOSE BLDC GLUCOMTR-MCNC: 285 MG/DL — HIGH (ref 70–99)
GLUCOSE BLDC GLUCOMTR-MCNC: 298 MG/DL — HIGH (ref 70–99)
GLUCOSE BLDC GLUCOMTR-MCNC: 338 MG/DL — HIGH (ref 70–99)
GLUCOSE BLDC GLUCOMTR-MCNC: 342 MG/DL — HIGH (ref 70–99)
GLUCOSE BLDC GLUCOMTR-MCNC: 360 MG/DL — HIGH (ref 70–99)
GLUCOSE SERPL-MCNC: 262 MG/DL — HIGH (ref 70–99)
HCT VFR BLD CALC: 25.2 % — LOW (ref 39–50)
HGB BLD-MCNC: 8.2 G/DL — LOW (ref 13–17)
INR BLD: 1.02 RATIO — SIGNIFICANT CHANGE UP (ref 0.85–1.16)
MAGNESIUM SERPL-MCNC: 2 MG/DL — SIGNIFICANT CHANGE UP (ref 1.6–2.6)
MCHC RBC-ENTMCNC: 27.2 PG — SIGNIFICANT CHANGE UP (ref 27–34)
MCHC RBC-ENTMCNC: 32.5 G/DL — SIGNIFICANT CHANGE UP (ref 32–36)
MCV RBC AUTO: 83.4 FL — SIGNIFICANT CHANGE UP (ref 80–100)
NRBC # BLD: 0 /100 WBCS — SIGNIFICANT CHANGE UP (ref 0–0)
NRBC BLD-RTO: 0 /100 WBCS — SIGNIFICANT CHANGE UP (ref 0–0)
PHOSPHATE SERPL-MCNC: 1.9 MG/DL — LOW (ref 2.5–4.5)
PLATELET # BLD AUTO: 202 K/UL — SIGNIFICANT CHANGE UP (ref 150–400)
POTASSIUM SERPL-MCNC: 3.7 MMOL/L — SIGNIFICANT CHANGE UP (ref 3.5–5.3)
POTASSIUM SERPL-SCNC: 3.7 MMOL/L — SIGNIFICANT CHANGE UP (ref 3.5–5.3)
PROTHROM AB SERPL-ACNC: 11.6 SEC — SIGNIFICANT CHANGE UP (ref 9.9–13.4)
RBC # BLD: 3.02 M/UL — LOW (ref 4.2–5.8)
RBC # FLD: 16 % — HIGH (ref 10.3–14.5)
RH IG SCN BLD-IMP: NEGATIVE — SIGNIFICANT CHANGE UP
SODIUM SERPL-SCNC: 138 MMOL/L — SIGNIFICANT CHANGE UP (ref 135–145)
WBC # BLD: 14.66 K/UL — HIGH (ref 3.8–10.5)
WBC # FLD AUTO: 14.66 K/UL — HIGH (ref 3.8–10.5)

## 2025-02-10 RX ORDER — HEPARIN SODIUM 1000 [USP'U]/ML
1400 INJECTION INTRAVENOUS; SUBCUTANEOUS
Qty: 25000 | Refills: 0 | Status: DISCONTINUED | OUTPATIENT
Start: 2025-02-10 | End: 2025-02-13

## 2025-02-10 RX ORDER — HEPARIN SODIUM 1000 [USP'U]/ML
900 INJECTION INTRAVENOUS; SUBCUTANEOUS
Qty: 25000 | Refills: 0 | Status: DISCONTINUED | OUTPATIENT
Start: 2025-02-10 | End: 2025-02-10

## 2025-02-10 RX ORDER — POTASSIUM PHOSPHATE, MONOBASIC POTASSIUM PHOSPHATE, DIBASIC INJECTION, 236; 224 MG/ML; MG/ML
30 SOLUTION, CONCENTRATE INTRAVENOUS ONCE
Refills: 0 | Status: COMPLETED | OUTPATIENT
Start: 2025-02-10 | End: 2025-02-10

## 2025-02-10 RX ORDER — HEPARIN SODIUM 1000 [USP'U]/ML
1300 INJECTION INTRAVENOUS; SUBCUTANEOUS
Qty: 25000 | Refills: 0 | Status: DISCONTINUED | OUTPATIENT
Start: 2025-02-10 | End: 2025-02-10

## 2025-02-10 RX ADMIN — GABAPENTIN 100 MILLIGRAM(S): 400 CAPSULE ORAL at 13:18

## 2025-02-10 RX ADMIN — Medication 975 MILLIGRAM(S): at 05:11

## 2025-02-10 RX ADMIN — Medication 975 MILLIGRAM(S): at 05:41

## 2025-02-10 RX ADMIN — Medication 40 MILLIEQUIVALENT(S): at 00:29

## 2025-02-10 RX ADMIN — Medication 975 MILLIGRAM(S): at 13:17

## 2025-02-10 RX ADMIN — INSULIN LISPRO 3: 100 INJECTION, SOLUTION INTRAVENOUS; SUBCUTANEOUS at 21:16

## 2025-02-10 RX ADMIN — Medication 81 MILLIGRAM(S): at 13:18

## 2025-02-10 RX ADMIN — HEPARIN SODIUM 11 UNIT(S)/HR: 1000 INJECTION INTRAVENOUS; SUBCUTANEOUS at 07:34

## 2025-02-10 RX ADMIN — HEPARIN SODIUM 13 UNIT(S)/HR: 1000 INJECTION INTRAVENOUS; SUBCUTANEOUS at 09:03

## 2025-02-10 RX ADMIN — Medication 975 MILLIGRAM(S): at 23:56

## 2025-02-10 RX ADMIN — HEPARIN SODIUM 13 UNIT(S)/HR: 1000 INJECTION INTRAVENOUS; SUBCUTANEOUS at 19:40

## 2025-02-10 RX ADMIN — INSULIN LISPRO 3: 100 INJECTION, SOLUTION INTRAVENOUS; SUBCUTANEOUS at 18:53

## 2025-02-10 RX ADMIN — Medication 975 MILLIGRAM(S): at 18:07

## 2025-02-10 RX ADMIN — LISINOPRIL 25 MILLIGRAM(S): 30 TABLET ORAL at 05:11

## 2025-02-10 RX ADMIN — POTASSIUM PHOSPHATE, MONOBASIC POTASSIUM PHOSPHATE, DIBASIC INJECTION, 83.33 MILLIMOLE(S): 236; 224 SOLUTION, CONCENTRATE INTRAVENOUS at 01:01

## 2025-02-10 RX ADMIN — INSULIN LISPRO 3: 100 INJECTION, SOLUTION INTRAVENOUS; SUBCUTANEOUS at 09:48

## 2025-02-10 RX ADMIN — HEPARIN SODIUM 1400 UNIT(S)/HR: 1000 INJECTION INTRAVENOUS; SUBCUTANEOUS at 23:19

## 2025-02-10 RX ADMIN — ATORVASTATIN CALCIUM 40 MILLIGRAM(S): 80 TABLET, FILM COATED ORAL at 21:16

## 2025-02-10 RX ADMIN — Medication 975 MILLIGRAM(S): at 19:00

## 2025-02-10 RX ADMIN — INSULIN LISPRO 3: 100 INJECTION, SOLUTION INTRAVENOUS; SUBCUTANEOUS at 13:42

## 2025-02-10 RX ADMIN — CYANOCOBALAMIN 1000 MICROGRAM(S): 1000 INJECTION INTRAMUSCULAR; SUBCUTANEOUS at 13:18

## 2025-02-10 RX ADMIN — Medication 975 MILLIGRAM(S): at 23:26

## 2025-02-10 RX ADMIN — CLOPIDOGREL BISULFATE 75 MILLIGRAM(S): 75 TABLET, FILM COATED ORAL at 13:18

## 2025-02-10 RX ADMIN — Medication 975 MILLIGRAM(S): at 14:10

## 2025-02-10 NOTE — PROGRESS NOTE ADULT - SUBJECTIVE AND OBJECTIVE BOX
SURGERY DAILY PROGRESS NOTE     Patient is a 69y old  Male who presents with a chief complaint of       OVERNIGHT EVENTS: Febrile to 102F and tachy to 107, seen at bedside and pt endorsing some pain c/w his PVD pain. No SOB/dysuria. LLE still with signals over L bypass. Got Tylenol and full fever w/u sent, repeat temp 99.7F.     Labs showed Hgb of 7, WBC of 16. Ordered 1U pRBC. UA without any signs of infection. Repleted K and phos. Heparin gtt adjusted to 1100.       SUBJECTIVE:   Patient seen and examined at bedside with surgical team, patient without complaints.       OBJECTIVE     Vital Signs Last 24 Hrs  T(C): 37.6 (2025 23:44), Max: 38.9 (2025 21:01)  T(F): 99.7 (2025 23:44), Max: 102 (2025 21:01)  HR: 107 (2025 23:44) (92 - 107)  BP: 145/62 (2025 23:44) (131/68 - 157/76)  BP(mean): --  RR: 18 (2025 23:44) (17 - 18)  SpO2: 96% (2025 23:44) (95% - 98%)    Parameters below as of 2025 23:44  Patient On (Oxygen Delivery Method): room air    I&O's Detail    2025 07:01  -  2025 07:00  --------------------------------------------------------  IN:    Heparin: 64 mL    Heparin: 45 mL    IV PiggyBack: 250 mL    Oral Fluid: 840 mL    PRBCs (Packed Red Blood Cells): 300 mL  Total IN: 1499 mL    OUT:    Indwelling Catheter - Urethral (mL): 700 mL    Voided (mL): 1350 mL  Total OUT: 2050 mL    Total NET: -551 mL      2025 07:01  -  10 Feb 2025 04:15  --------------------------------------------------------  IN:    Heparin: 81 mL    Heparin: 30 mL    Oral Fluid: 120 mL  Total IN: 231 mL    OUT:    Voided (mL): 1600 mL  Total OUT: 1600 mL    Total NET: -1369 mL          Physical Exam  Constitutional: NAD  Respiratory: Breathing comfortably on RA  Gastrointestinal: Soft, nontender, nondistended  Extremities: LLE with signals over the left bypass, no distal signals, unchanged from prior  Psychiatric:  A&Ox3, appropriate affect          Medications  MEDICATIONS  (STANDING):  acetaminophen     Tablet .. 975 milliGRAM(s) Oral every 6 hours  aspirin  chewable 81 milliGRAM(s) Oral daily  atenolol  Tablet 25 milliGRAM(s) Oral daily  atorvastatin 40 milliGRAM(s) Oral at bedtime  clopidogrel Tablet 75 milliGRAM(s) Oral daily  cyanocobalamin 1000 MICROGram(s) Oral daily  dextrose 5%. 1000 milliLiter(s) (100 mL/Hr) IV Continuous <Continuous>  dextrose 5%. 1000 milliLiter(s) (50 mL/Hr) IV Continuous <Continuous>  dextrose 50% Injectable 25 Gram(s) IV Push once  dextrose 50% Injectable 12.5 Gram(s) IV Push once  dextrose 50% Injectable 25 Gram(s) IV Push once  gabapentin 100 milliGRAM(s) Oral daily  glucagon  Injectable 1 milliGRAM(s) IntraMuscular once  heparin  Infusion 1000 Unit(s)/Hr (11 mL/Hr) IV Continuous <Continuous>  influenza  Vaccine (HIGH DOSE) 0.5 milliLiter(s) IntraMuscular once  insulin lispro (ADMELOG) corrective regimen sliding scale   SubCutaneous three times a day before meals  insulin lispro (ADMELOG) corrective regimen sliding scale   SubCutaneous at bedtime    MEDICATIONS  (PRN):  dextrose Oral Gel 15 Gram(s) Oral once PRN Blood Glucose LESS THAN 70 milliGRAM(s)/deciliter  oxyCODONE    IR 2.5 milliGRAM(s) Oral every 4 hours PRN Moderate Pain (4 - 6)  oxyCODONE    IR 5 milliGRAM(s) Oral every 4 hours PRN Severe Pain (7 - 10)        LABS:                        7.0    16.24 )-----------( 226      ( 2025 22:43 )             21.3     02-    135  |  99  |  16  ----------------------------<  231[H]  3.4[L]   |  21[L]  |  0.78    Ca    8.8      2025 22:43  Phos  1.5     -  Mg     1.9     -      PT/INR - ( 2025 08:31 )   PT: 13.1 sec;   INR: 1.14 ratio         PTT - ( 2025 22:42 )  PTT:54.9 sec    Urinalysis Basic - ( 2025 22:43 )    Color: Yellow / Appearance: Clear / S.020 / pH: x  Gluc: 231 mg/dL / Ketone: 15 mg/dL  / Bili: Negative / Urobili: 1.0 mg/dL   Blood: x / Protein: 100 mg/dL / Nitrite: Negative   Leuk Esterase: Negative / RBC: 1 /HPF / WBC 0 /HPF   Sq Epi: x / Non Sq Epi: 1 /HPF / Bacteria: Negative /HPF

## 2025-02-10 NOTE — PROGRESS NOTE ADULT - PROVIDER SPECIALTY LIST ADULT
Vascular Surgery
Vascular Surgery
Endocrinology
Vascular Surgery
Endocrinology
Vascular Surgery
Vascular Surgery
Endocrinology
Hospitalist
Hospitalist
Endocrinology
Hospitalist
No

## 2025-02-10 NOTE — PROGRESS NOTE ADULT - ASSESSMENT
69M PM of former smoker (1.5 PPD % 50 years; quit 2 years ago),  HTN, HLD, DM, PAD and aortoiliac occlusive disease, who previously underwent an outpatient angiography complicated by right iliac artery dissection. Now S/P Creation of bypass from left femoral artery to distal tibial artery with RSVG and completion angiogram on 2/7/25.      Plan:   - F/u fever w/u  - ASA and Plavix (s/p plavix load); heparin drip  - Statin   - F/u CBC after transfusion  - Neurovascular checks, has L bypass signal    - Diet: regular   - PT recs pending further evaluation      Vascular Surgery  [id number], [phone]    69M PM of former smoker (1.5 PPD % 50 years; quit 2 years ago),  HTN, HLD, DM, PAD and aortoiliac occlusive disease, who previously underwent an outpatient angiography complicated by right iliac artery dissection. Now S/P Creation of bypass from left femoral artery to distal tibial artery with RSVG and completion angiogram on 2/7/25.      Plan:   - F/u fever w/u; Hgb drop, patient now being transfused 1U Hgb, FU post CBC and add on CK       - CXR -       - UA-       - BMP reviewed   - ASA and Plavix (s/p plavix load); heparin drip; fu aptt @ 3:30 pm   - Statin   - Neurovascular checks, has L bypass signal    - Diet: regular   - PT recs pending further evaluation      Vascular Surgery  [id number], [phone]

## 2025-02-11 LAB
ANION GAP SERPL CALC-SCNC: 13 MMOL/L — SIGNIFICANT CHANGE UP (ref 5–17)
APTT BLD: 81.9 SEC — HIGH (ref 24.5–35.6)
APTT BLD: 91.2 SEC — HIGH (ref 24.5–35.6)
BUN SERPL-MCNC: 26 MG/DL — HIGH (ref 7–23)
CALCIUM SERPL-MCNC: 8.3 MG/DL — LOW (ref 8.4–10.5)
CHLORIDE SERPL-SCNC: 102 MMOL/L — SIGNIFICANT CHANGE UP (ref 96–108)
CK SERPL-CCNC: 3371 U/L — HIGH (ref 30–200)
CO2 SERPL-SCNC: 23 MMOL/L — SIGNIFICANT CHANGE UP (ref 22–31)
CREAT SERPL-MCNC: 0.78 MG/DL — SIGNIFICANT CHANGE UP (ref 0.5–1.3)
EGFR: 95 ML/MIN/1.73M2 — SIGNIFICANT CHANGE UP
EGFR: 95 ML/MIN/1.73M2 — SIGNIFICANT CHANGE UP
GLUCOSE BLDC GLUCOMTR-MCNC: 288 MG/DL — HIGH (ref 70–99)
GLUCOSE BLDC GLUCOMTR-MCNC: 310 MG/DL — HIGH (ref 70–99)
GLUCOSE BLDC GLUCOMTR-MCNC: 369 MG/DL — HIGH (ref 70–99)
GLUCOSE BLDC GLUCOMTR-MCNC: 386 MG/DL — HIGH (ref 70–99)
GLUCOSE SERPL-MCNC: 285 MG/DL — HIGH (ref 70–99)
HCT VFR BLD CALC: 19.1 % — CRITICAL LOW (ref 39–50)
HCT VFR BLD CALC: 23.7 % — LOW (ref 39–50)
HGB BLD-MCNC: 6.2 G/DL — CRITICAL LOW (ref 13–17)
HGB BLD-MCNC: 7.8 G/DL — LOW (ref 13–17)
INR BLD: 1.08 RATIO — SIGNIFICANT CHANGE UP (ref 0.85–1.16)
MAGNESIUM SERPL-MCNC: 2.2 MG/DL — SIGNIFICANT CHANGE UP (ref 1.6–2.6)
MCHC RBC-ENTMCNC: 27.8 PG — SIGNIFICANT CHANGE UP (ref 27–34)
MCHC RBC-ENTMCNC: 28.6 PG — SIGNIFICANT CHANGE UP (ref 27–34)
MCHC RBC-ENTMCNC: 32.5 G/DL — SIGNIFICANT CHANGE UP (ref 32–36)
MCHC RBC-ENTMCNC: 32.9 G/DL — SIGNIFICANT CHANGE UP (ref 32–36)
MCV RBC AUTO: 85.7 FL — SIGNIFICANT CHANGE UP (ref 80–100)
MCV RBC AUTO: 86.8 FL — SIGNIFICANT CHANGE UP (ref 80–100)
NRBC # BLD: 2 /100 WBCS — HIGH (ref 0–0)
NRBC BLD AUTO-RTO: 1 /100 WBCS — HIGH (ref 0–0)
NRBC BLD-RTO: 2 /100 WBCS — HIGH (ref 0–0)
PHOSPHATE SERPL-MCNC: 1.7 MG/DL — LOW (ref 2.5–4.5)
PLATELET # BLD AUTO: 203 K/UL — SIGNIFICANT CHANGE UP (ref 150–400)
PLATELET # BLD AUTO: 208 K/UL — SIGNIFICANT CHANGE UP (ref 150–400)
POTASSIUM SERPL-MCNC: 3.2 MMOL/L — LOW (ref 3.5–5.3)
POTASSIUM SERPL-SCNC: 3.2 MMOL/L — LOW (ref 3.5–5.3)
PROTHROM AB SERPL-ACNC: 12.3 SEC — SIGNIFICANT CHANGE UP (ref 9.9–13.4)
RBC # BLD: 2.23 M/UL — LOW (ref 4.2–5.8)
RBC # BLD: 2.73 M/UL — LOW (ref 4.2–5.8)
RBC # FLD: 16.5 % — HIGH (ref 10.3–14.5)
RBC # FLD: 17.2 % — HIGH (ref 10.3–14.5)
SODIUM SERPL-SCNC: 138 MMOL/L — SIGNIFICANT CHANGE UP (ref 135–145)
WBC # BLD: 15.98 K/UL — HIGH (ref 3.8–10.5)
WBC # BLD: 18.18 K/UL — HIGH (ref 3.8–10.5)
WBC # FLD AUTO: 15.98 K/UL — HIGH (ref 3.8–10.5)
WBC # FLD AUTO: 18.18 K/UL — HIGH (ref 3.8–10.5)

## 2025-02-11 PROCEDURE — 93010 ELECTROCARDIOGRAM REPORT: CPT

## 2025-02-11 RX ADMIN — Medication 975 MILLIGRAM(S): at 05:14

## 2025-02-11 RX ADMIN — CYANOCOBALAMIN 1000 MICROGRAM(S): 1000 INJECTION INTRAMUSCULAR; SUBCUTANEOUS at 12:02

## 2025-02-11 RX ADMIN — Medication 85 MILLIMOLE(S): at 12:40

## 2025-02-11 RX ADMIN — OXYCODONE HYDROCHLORIDE 5 MILLIGRAM(S): 30 TABLET ORAL at 22:14

## 2025-02-11 RX ADMIN — OXYCODONE HYDROCHLORIDE 5 MILLIGRAM(S): 30 TABLET ORAL at 05:14

## 2025-02-11 RX ADMIN — INSULIN LISPRO 3: 100 INJECTION, SOLUTION INTRAVENOUS; SUBCUTANEOUS at 22:10

## 2025-02-11 RX ADMIN — Medication 40 MILLIEQUIVALENT(S): at 17:51

## 2025-02-11 RX ADMIN — OXYCODONE HYDROCHLORIDE 5 MILLIGRAM(S): 30 TABLET ORAL at 05:44

## 2025-02-11 RX ADMIN — HEPARIN SODIUM 1400 UNIT(S)/HR: 1000 INJECTION INTRAVENOUS; SUBCUTANEOUS at 05:56

## 2025-02-11 RX ADMIN — Medication 40 MILLIEQUIVALENT(S): at 12:29

## 2025-02-11 RX ADMIN — ATORVASTATIN CALCIUM 40 MILLIGRAM(S): 80 TABLET, FILM COATED ORAL at 22:15

## 2025-02-11 RX ADMIN — INSULIN LISPRO 4: 100 INJECTION, SOLUTION INTRAVENOUS; SUBCUTANEOUS at 12:06

## 2025-02-11 RX ADMIN — CLOPIDOGREL BISULFATE 75 MILLIGRAM(S): 75 TABLET, FILM COATED ORAL at 12:02

## 2025-02-11 RX ADMIN — Medication 975 MILLIGRAM(S): at 12:01

## 2025-02-11 RX ADMIN — Medication 975 MILLIGRAM(S): at 17:51

## 2025-02-11 RX ADMIN — GABAPENTIN 100 MILLIGRAM(S): 400 CAPSULE ORAL at 12:02

## 2025-02-11 RX ADMIN — Medication 975 MILLIGRAM(S): at 05:44

## 2025-02-11 RX ADMIN — HEPARIN SODIUM 1400 UNIT(S)/HR: 1000 INJECTION INTRAVENOUS; SUBCUTANEOUS at 12:32

## 2025-02-11 RX ADMIN — LISINOPRIL 25 MILLIGRAM(S): 30 TABLET ORAL at 05:14

## 2025-02-11 RX ADMIN — Medication 81 MILLIGRAM(S): at 12:01

## 2025-02-11 RX ADMIN — HEPARIN SODIUM 1400 UNIT(S)/HR: 1000 INJECTION INTRAVENOUS; SUBCUTANEOUS at 19:22

## 2025-02-11 RX ADMIN — INSULIN LISPRO 5: 100 INJECTION, SOLUTION INTRAVENOUS; SUBCUTANEOUS at 17:50

## 2025-02-11 RX ADMIN — OXYCODONE HYDROCHLORIDE 5 MILLIGRAM(S): 30 TABLET ORAL at 22:44

## 2025-02-11 RX ADMIN — HEPARIN SODIUM 1400 UNIT(S)/HR: 1000 INJECTION INTRAVENOUS; SUBCUTANEOUS at 07:31

## 2025-02-11 RX ADMIN — INSULIN LISPRO 3: 100 INJECTION, SOLUTION INTRAVENOUS; SUBCUTANEOUS at 09:48

## 2025-02-11 NOTE — PROGRESS NOTE ADULT - ASSESSMENT
69M PM of former smoker (1.5 PPD % 50 years; quit 2 years ago),  HTN, HLD, DM, PAD and aortoiliac occlusive disease, who previously underwent an outpatient angiography complicated by right iliac artery dissection. Now S/P Creation of bypass from left femoral artery to distal tibial artery with RSVG and completion angiogram on 2/7/25.      Plan:   - Fever w/u wnl except for CK   - Trend CK  - ASA and Plavix (s/p plavix load); heparin drip; fu aPTT   - Statin   - Neurovascular checks, has L bypass signal    - Diet: regular   - PT recs pending further evaluation      Vascular Surgery  [id number], [phone]

## 2025-02-11 NOTE — PROVIDER CONTACT NOTE (CRITICAL VALUE NOTIFICATION) - ASSESSMENT
Pt tachy. EKG done showing sinus tach. Afebrile, other vss. See flowsheet. Pt tachy. EKG done showing sinus tach. Afebrile, other vss. See flowsheet. Pt denies CP, HA, SOB/, lightheadedness.

## 2025-02-11 NOTE — PROGRESS NOTE ADULT - SUBJECTIVE AND OBJECTIVE BOX
SURGERY DAILY PROGRESS NOTE     Patient is a 70y old  Male who presents with a chief complaint of         OVERNIGHT EVENTS: NAEON, heparin gtt adjusted to 1400/hr      SUBJECTIVE:   Patient seen and examined at bedside with surgical team, patient without complaints. ***      OBJECTIVE     Vital Signs Last 24 Hrs  T(C): 37.5 (11 Feb 2025 01:28), Max: 37.5 (11 Feb 2025 01:28)  T(F): 99.5 (11 Feb 2025 01:28), Max: 99.5 (11 Feb 2025 01:28)  HR: 100 (11 Feb 2025 01:28) (89 - 105)  BP: 133/75 (11 Feb 2025 01:28) (123/69 - 137/77)  BP(mean): --  RR: 18 (11 Feb 2025 01:28) (18 - 18)  SpO2: 97% (11 Feb 2025 01:28) (97% - 99%)    Parameters below as of 11 Feb 2025 01:28  Patient On (Oxygen Delivery Method): room air    I&O's Detail    09 Feb 2025 07:01  -  10 Feb 2025 07:00  --------------------------------------------------------  IN:    Heparin: 81 mL    Heparin: 158 mL    Oral Fluid: 240 mL  Total IN: 479 mL    OUT:    Voided (mL): 1900 mL  Total OUT: 1900 mL    Total NET: -1421 mL      10 Feb 2025 07:01  -  11 Feb 2025 04:05  --------------------------------------------------------  IN:    Heparin: 150 mL    Oral Fluid: 120 mL  Total IN: 270 mL    OUT:    Voided (mL): 600 mL  Total OUT: 600 mL    Total NET: -330 mL          Physical Exam  Constitutional: NAD  Respiratory: Breathing comfortably on RA  Gastrointestinal: Soft, nontender, nondistended  Extremities: LLE with signals over the left bypass, no distal signals, unchanged from prior ***  Psychiatric:  A&Ox3, appropriate affect          Medications  MEDICATIONS  (STANDING):  acetaminophen     Tablet .. 975 milliGRAM(s) Oral every 6 hours  aspirin  chewable 81 milliGRAM(s) Oral daily  atenolol  Tablet 25 milliGRAM(s) Oral daily  atorvastatin 40 milliGRAM(s) Oral at bedtime  clopidogrel Tablet 75 milliGRAM(s) Oral daily  cyanocobalamin 1000 MICROGram(s) Oral daily  dextrose 5%. 1000 milliLiter(s) (100 mL/Hr) IV Continuous <Continuous>  dextrose 5%. 1000 milliLiter(s) (50 mL/Hr) IV Continuous <Continuous>  dextrose 50% Injectable 25 Gram(s) IV Push once  dextrose 50% Injectable 12.5 Gram(s) IV Push once  dextrose 50% Injectable 25 Gram(s) IV Push once  gabapentin 100 milliGRAM(s) Oral daily  glucagon  Injectable 1 milliGRAM(s) IntraMuscular once  heparin  Infusion. 1400 Unit(s)/Hr (14 mL/Hr) IV Continuous <Continuous>  influenza  Vaccine (HIGH DOSE) 0.5 milliLiter(s) IntraMuscular once  insulin lispro (ADMELOG) corrective regimen sliding scale   SubCutaneous three times a day before meals  insulin lispro (ADMELOG) corrective regimen sliding scale   SubCutaneous at bedtime    MEDICATIONS  (PRN):  dextrose Oral Gel 15 Gram(s) Oral once PRN Blood Glucose LESS THAN 70 milliGRAM(s)/deciliter  oxyCODONE    IR 2.5 milliGRAM(s) Oral every 4 hours PRN Moderate Pain (4 - 6)  oxyCODONE    IR 5 milliGRAM(s) Oral every 4 hours PRN Severe Pain (7 - 10)        LABS:                        8.2    14.66 )-----------( 202      ( 10 Feb 2025 08:39 )             25.2     02-10    138  |  102  |  19  ----------------------------<  262[H]  3.7   |  23  |  0.76    Ca    8.8      10 Feb 2025 08:38  Phos  1.9     02-10  Mg     2.0     02-10      PT/INR - ( 10 Feb 2025 07:36 )   PT: 11.6 sec;   INR: 1.02 ratio         PTT - ( 10 Feb 2025 22:25 )  PTT:51.6 sec    Urinalysis Basic - ( 10 Feb 2025 08:38 )    Color: x / Appearance: x / SG: x / pH: x  Gluc: 262 mg/dL / Ketone: x  / Bili: x / Urobili: x   Blood: x / Protein: x / Nitrite: x   Leuk Esterase: x / RBC: x / WBC x   Sq Epi: x / Non Sq Epi: x / Bacteria: x       SURGERY DAILY PROGRESS NOTE     Patient is a 70y old  Male who presents with a chief complaint of     OVERNIGHT EVENTS: NAEON, heparin gtt adjusted to 1400/hr    SUBJECTIVE:   Patient seen and examined at bedside with surgical team, patient without complaints.       OBJECTIVE     Vital Signs Last 24 Hrs  T(C): 37.5 (11 Feb 2025 01:28), Max: 37.5 (11 Feb 2025 01:28)  T(F): 99.5 (11 Feb 2025 01:28), Max: 99.5 (11 Feb 2025 01:28)  HR: 100 (11 Feb 2025 01:28) (89 - 105)  BP: 133/75 (11 Feb 2025 01:28) (123/69 - 137/77)  BP(mean): --  RR: 18 (11 Feb 2025 01:28) (18 - 18)  SpO2: 97% (11 Feb 2025 01:28) (97% - 99%)    Parameters below as of 11 Feb 2025 01:28  Patient On (Oxygen Delivery Method): room air    I&O's Detail    09 Feb 2025 07:01  -  10 Feb 2025 07:00  --------------------------------------------------------  IN:    Heparin: 81 mL    Heparin: 158 mL    Oral Fluid: 240 mL  Total IN: 479 mL    OUT:    Voided (mL): 1900 mL  Total OUT: 1900 mL    Total NET: -1421 mL      10 Feb 2025 07:01  -  11 Feb 2025 04:05  --------------------------------------------------------  IN:    Heparin: 150 mL    Oral Fluid: 120 mL  Total IN: 270 mL    OUT:    Voided (mL): 600 mL  Total OUT: 600 mL    Total NET: -330 mL          Physical Exam  Constitutional: NAD  Respiratory: Breathing comfortably on RA  Gastrointestinal: Soft, nontender, nondistended  Extremities: LLE with signals over the left bypass, no distal signals, unchanged from prior ***  Psychiatric:  A&Ox3, appropriate affect          Medications  MEDICATIONS  (STANDING):  acetaminophen     Tablet .. 975 milliGRAM(s) Oral every 6 hours  aspirin  chewable 81 milliGRAM(s) Oral daily  atenolol  Tablet 25 milliGRAM(s) Oral daily  atorvastatin 40 milliGRAM(s) Oral at bedtime  clopidogrel Tablet 75 milliGRAM(s) Oral daily  cyanocobalamin 1000 MICROGram(s) Oral daily  dextrose 5%. 1000 milliLiter(s) (100 mL/Hr) IV Continuous <Continuous>  dextrose 5%. 1000 milliLiter(s) (50 mL/Hr) IV Continuous <Continuous>  dextrose 50% Injectable 25 Gram(s) IV Push once  dextrose 50% Injectable 12.5 Gram(s) IV Push once  dextrose 50% Injectable 25 Gram(s) IV Push once  gabapentin 100 milliGRAM(s) Oral daily  glucagon  Injectable 1 milliGRAM(s) IntraMuscular once  heparin  Infusion. 1400 Unit(s)/Hr (14 mL/Hr) IV Continuous <Continuous>  influenza  Vaccine (HIGH DOSE) 0.5 milliLiter(s) IntraMuscular once  insulin lispro (ADMELOG) corrective regimen sliding scale   SubCutaneous three times a day before meals  insulin lispro (ADMELOG) corrective regimen sliding scale   SubCutaneous at bedtime    MEDICATIONS  (PRN):  dextrose Oral Gel 15 Gram(s) Oral once PRN Blood Glucose LESS THAN 70 milliGRAM(s)/deciliter  oxyCODONE    IR 2.5 milliGRAM(s) Oral every 4 hours PRN Moderate Pain (4 - 6)  oxyCODONE    IR 5 milliGRAM(s) Oral every 4 hours PRN Severe Pain (7 - 10)        LABS:                        8.2    14.66 )-----------( 202      ( 10 Feb 2025 08:39 )             25.2     02-10    138  |  102  |  19  ----------------------------<  262[H]  3.7   |  23  |  0.76    Ca    8.8      10 Feb 2025 08:38  Phos  1.9     02-10  Mg     2.0     02-10      PT/INR - ( 10 Feb 2025 07:36 )   PT: 11.6 sec;   INR: 1.02 ratio         PTT - ( 10 Feb 2025 22:25 )  PTT:51.6 sec    Urinalysis Basic - ( 10 Feb 2025 08:38 )    Color: x / Appearance: x / SG: x / pH: x  Gluc: 262 mg/dL / Ketone: x  / Bili: x / Urobili: x   Blood: x / Protein: x / Nitrite: x   Leuk Esterase: x / RBC: x / WBC x   Sq Epi: x / Non Sq Epi: x / Bacteria: x

## 2025-02-12 DIAGNOSIS — E78.5 HYPERLIPIDEMIA, UNSPECIFIED: ICD-10-CM

## 2025-02-12 DIAGNOSIS — E11.9 TYPE 2 DIABETES MELLITUS WITHOUT COMPLICATIONS: ICD-10-CM

## 2025-02-12 DIAGNOSIS — I10 ESSENTIAL (PRIMARY) HYPERTENSION: ICD-10-CM

## 2025-02-12 LAB
ANION GAP SERPL CALC-SCNC: 14 MMOL/L — SIGNIFICANT CHANGE UP (ref 5–17)
APTT BLD: 86.1 SEC — HIGH (ref 24.5–35.6)
BUN SERPL-MCNC: 26 MG/DL — HIGH (ref 7–23)
CALCIUM SERPL-MCNC: 8.3 MG/DL — LOW (ref 8.4–10.5)
CHLORIDE SERPL-SCNC: 104 MMOL/L — SIGNIFICANT CHANGE UP (ref 96–108)
CO2 SERPL-SCNC: 20 MMOL/L — LOW (ref 22–31)
CREAT SERPL-MCNC: 0.64 MG/DL — SIGNIFICANT CHANGE UP (ref 0.5–1.3)
EGFR: 101 ML/MIN/1.73M2 — SIGNIFICANT CHANGE UP
EGFR: 101 ML/MIN/1.73M2 — SIGNIFICANT CHANGE UP
GI PCR PANEL: SIGNIFICANT CHANGE UP
GLUCOSE BLDC GLUCOMTR-MCNC: 248 MG/DL — HIGH (ref 70–99)
GLUCOSE BLDC GLUCOMTR-MCNC: 268 MG/DL — HIGH (ref 70–99)
GLUCOSE BLDC GLUCOMTR-MCNC: 314 MG/DL — HIGH (ref 70–99)
GLUCOSE BLDC GLUCOMTR-MCNC: 317 MG/DL — HIGH (ref 70–99)
GLUCOSE BLDC GLUCOMTR-MCNC: 344 MG/DL — HIGH (ref 70–99)
GLUCOSE SERPL-MCNC: 307 MG/DL — HIGH (ref 70–99)
HCT VFR BLD CALC: 23.7 % — LOW (ref 39–50)
HCT VFR BLD CALC: 24.7 % — LOW (ref 39–50)
HGB BLD-MCNC: 7.4 G/DL — LOW (ref 13–17)
HGB BLD-MCNC: 8 G/DL — LOW (ref 13–17)
INR BLD: 1.01 RATIO — SIGNIFICANT CHANGE UP (ref 0.85–1.16)
MAGNESIUM SERPL-MCNC: 2.3 MG/DL — SIGNIFICANT CHANGE UP (ref 1.6–2.6)
MCHC RBC-ENTMCNC: 27.2 PG — SIGNIFICANT CHANGE UP (ref 27–34)
MCHC RBC-ENTMCNC: 27.7 PG — SIGNIFICANT CHANGE UP (ref 27–34)
MCHC RBC-ENTMCNC: 31.2 G/DL — LOW (ref 32–36)
MCHC RBC-ENTMCNC: 32.4 G/DL — SIGNIFICANT CHANGE UP (ref 32–36)
MCV RBC AUTO: 85.5 FL — SIGNIFICANT CHANGE UP (ref 80–100)
MCV RBC AUTO: 87.1 FL — SIGNIFICANT CHANGE UP (ref 80–100)
NRBC BLD AUTO-RTO: 2 /100 WBCS — HIGH (ref 0–0)
NRBC BLD AUTO-RTO: 2 /100 WBCS — HIGH (ref 0–0)
OB PNL STL: POSITIVE
PHOSPHATE SERPL-MCNC: 1.7 MG/DL — LOW (ref 2.5–4.5)
PLATELET # BLD AUTO: 159 K/UL — SIGNIFICANT CHANGE UP (ref 150–400)
PLATELET # BLD AUTO: 169 K/UL — SIGNIFICANT CHANGE UP (ref 150–400)
POTASSIUM SERPL-MCNC: 3.6 MMOL/L — SIGNIFICANT CHANGE UP (ref 3.5–5.3)
POTASSIUM SERPL-SCNC: 3.6 MMOL/L — SIGNIFICANT CHANGE UP (ref 3.5–5.3)
PROTHROM AB SERPL-ACNC: 11.6 SEC — SIGNIFICANT CHANGE UP (ref 9.9–13.4)
RBC # BLD: 2.72 M/UL — LOW (ref 4.2–5.8)
RBC # BLD: 2.89 M/UL — LOW (ref 4.2–5.8)
RBC # FLD: 16.4 % — HIGH (ref 10.3–14.5)
RBC # FLD: 17.2 % — HIGH (ref 10.3–14.5)
SODIUM SERPL-SCNC: 138 MMOL/L — SIGNIFICANT CHANGE UP (ref 135–145)
WBC # BLD: 14.78 K/UL — HIGH (ref 3.8–10.5)
WBC # BLD: 16.75 K/UL — HIGH (ref 3.8–10.5)
WBC # FLD AUTO: 14.78 K/UL — HIGH (ref 3.8–10.5)
WBC # FLD AUTO: 16.75 K/UL — HIGH (ref 3.8–10.5)

## 2025-02-12 PROCEDURE — 99223 1ST HOSP IP/OBS HIGH 75: CPT | Mod: GC

## 2025-02-12 PROCEDURE — 99223 1ST HOSP IP/OBS HIGH 75: CPT

## 2025-02-12 RX ORDER — INSULIN LISPRO 100 U/ML
6 INJECTION, SOLUTION INTRAVENOUS; SUBCUTANEOUS
Refills: 0 | Status: DISCONTINUED | OUTPATIENT
Start: 2025-02-12 | End: 2025-02-13

## 2025-02-12 RX ORDER — SODIUM CHLORIDE 9 G/1000ML
1000 INJECTION, SOLUTION INTRAVENOUS
Refills: 0 | Status: DISCONTINUED | OUTPATIENT
Start: 2025-02-12 | End: 2025-02-15

## 2025-02-12 RX ORDER — INSULIN LISPRO 100 U/ML
4 INJECTION, SOLUTION INTRAVENOUS; SUBCUTANEOUS ONCE
Refills: 0 | Status: COMPLETED | OUTPATIENT
Start: 2025-02-12 | End: 2025-02-12

## 2025-02-12 RX ORDER — SODIUM CHLORIDE 9 G/1000ML
1000 INJECTION, SOLUTION INTRAVENOUS
Refills: 0 | Status: DISCONTINUED | OUTPATIENT
Start: 2025-02-12 | End: 2025-02-27

## 2025-02-12 RX ORDER — DEXTROSE 50 % IN WATER 50 %
15 SYRINGE (ML) INTRAVENOUS ONCE
Refills: 0 | Status: DISCONTINUED | OUTPATIENT
Start: 2025-02-12 | End: 2025-02-27

## 2025-02-12 RX ORDER — DEXTROSE 50 % IN WATER 50 %
25 SYRINGE (ML) INTRAVENOUS ONCE
Refills: 0 | Status: DISCONTINUED | OUTPATIENT
Start: 2025-02-12 | End: 2025-02-27

## 2025-02-12 RX ORDER — DEXTROSE 50 % IN WATER 50 %
12.5 SYRINGE (ML) INTRAVENOUS ONCE
Refills: 0 | Status: DISCONTINUED | OUTPATIENT
Start: 2025-02-12 | End: 2025-02-27

## 2025-02-12 RX ORDER — INSULIN GLARGINE-YFGN 100 [IU]/ML
18 INJECTION, SOLUTION SUBCUTANEOUS AT BEDTIME
Refills: 0 | Status: DISCONTINUED | OUTPATIENT
Start: 2025-02-12 | End: 2025-02-13

## 2025-02-12 RX ORDER — LACTOBACILLUS ACIDOPHILUS/PECT 75 MM-100
1 CAPSULE ORAL
Refills: 0 | Status: DISCONTINUED | OUTPATIENT
Start: 2025-02-12 | End: 2025-02-27

## 2025-02-12 RX ORDER — GLUCAGON 3 MG/1
1 POWDER NASAL ONCE
Refills: 0 | Status: DISCONTINUED | OUTPATIENT
Start: 2025-02-12 | End: 2025-02-27

## 2025-02-12 RX ADMIN — INSULIN LISPRO 4: 100 INJECTION, SOLUTION INTRAVENOUS; SUBCUTANEOUS at 08:25

## 2025-02-12 RX ADMIN — Medication 40 MILLIEQUIVALENT(S): at 11:21

## 2025-02-12 RX ADMIN — Medication 85 MILLIMOLE(S): at 14:13

## 2025-02-12 RX ADMIN — HEPARIN SODIUM 1400 UNIT(S)/HR: 1000 INJECTION INTRAVENOUS; SUBCUTANEOUS at 19:09

## 2025-02-12 RX ADMIN — Medication 975 MILLIGRAM(S): at 02:16

## 2025-02-12 RX ADMIN — INSULIN LISPRO 4 UNIT(S): 100 INJECTION, SOLUTION INTRAVENOUS; SUBCUTANEOUS at 08:24

## 2025-02-12 RX ADMIN — OXYCODONE HYDROCHLORIDE 5 MILLIGRAM(S): 30 TABLET ORAL at 18:47

## 2025-02-12 RX ADMIN — Medication 975 MILLIGRAM(S): at 11:22

## 2025-02-12 RX ADMIN — Medication 1 TABLET(S): at 17:48

## 2025-02-12 RX ADMIN — INSULIN LISPRO 4: 100 INJECTION, SOLUTION INTRAVENOUS; SUBCUTANEOUS at 17:50

## 2025-02-12 RX ADMIN — Medication 975 MILLIGRAM(S): at 01:46

## 2025-02-12 RX ADMIN — HEPARIN SODIUM 1400 UNIT(S)/HR: 1000 INJECTION INTRAVENOUS; SUBCUTANEOUS at 07:19

## 2025-02-12 RX ADMIN — GABAPENTIN 100 MILLIGRAM(S): 400 CAPSULE ORAL at 11:20

## 2025-02-12 RX ADMIN — INSULIN LISPRO 6 UNIT(S): 100 INJECTION, SOLUTION INTRAVENOUS; SUBCUTANEOUS at 17:50

## 2025-02-12 RX ADMIN — Medication 975 MILLIGRAM(S): at 05:33

## 2025-02-12 RX ADMIN — OXYCODONE HYDROCHLORIDE 5 MILLIGRAM(S): 30 TABLET ORAL at 17:48

## 2025-02-12 RX ADMIN — Medication 975 MILLIGRAM(S): at 23:50

## 2025-02-12 RX ADMIN — LISINOPRIL 25 MILLIGRAM(S): 30 TABLET ORAL at 05:33

## 2025-02-12 RX ADMIN — INSULIN LISPRO 2: 100 INJECTION, SOLUTION INTRAVENOUS; SUBCUTANEOUS at 14:12

## 2025-02-12 RX ADMIN — Medication 81 MILLIGRAM(S): at 11:21

## 2025-02-12 RX ADMIN — Medication 975 MILLIGRAM(S): at 18:47

## 2025-02-12 RX ADMIN — Medication 975 MILLIGRAM(S): at 17:47

## 2025-02-12 RX ADMIN — CLOPIDOGREL BISULFATE 75 MILLIGRAM(S): 75 TABLET, FILM COATED ORAL at 11:21

## 2025-02-12 RX ADMIN — INSULIN LISPRO 6 UNIT(S): 100 INJECTION, SOLUTION INTRAVENOUS; SUBCUTANEOUS at 14:12

## 2025-02-12 RX ADMIN — CYANOCOBALAMIN 1000 MICROGRAM(S): 1000 INJECTION INTRAMUSCULAR; SUBCUTANEOUS at 11:21

## 2025-02-12 RX ADMIN — ATORVASTATIN CALCIUM 40 MILLIGRAM(S): 80 TABLET, FILM COATED ORAL at 22:55

## 2025-02-12 RX ADMIN — Medication 975 MILLIGRAM(S): at 06:03

## 2025-02-12 NOTE — CONSULT NOTE ADULT - SUBJECTIVE AND OBJECTIVE BOX
HPI:    69M PM of former smoker (1.5 PPD % 50 years; quit 2 years ago),  HTN, HLD, DM, PAD and aortoiliac occlusive disease, who previously underwent an outpatient angiography complicated by right iliac artery dissection. Now S/P Creation of bypass from left femoral artery to distal tibial artery with RSVG and completion angiogram on 2/7/25.     GI is consulted for acute on chronic anemia. He says this has been a long standing issue. He follows with a gastroenterologist in [place], [place], for this. He underwent colonoscopy/endoscopy and video capsule endoscopy in the outpatient setting during summer of last year, and reports that there was no bleeding found. He reports that he has had a gastric ulcer in the past, but it was not bleeding on this most recent endoscopic evaluation. His Hgb had been relatively stable about 12. He denies any black stools or bloody stools. He denies any recent weight loss. He denies any abdominal pain.     He has been hemodynamically normal here, Hgb 12.2 01/28, 9.6 02/07, 7.9 2.08. Has intermittently been requiring transfusions, Hgb 6.2 2.11, s/p 1 unit prbcs, up to 7.8 2/11. He has had diarrhea that has been new since being in the hospital 5 episodes yesterday, but brown.     Allergies:  Advil (Rash)      Home Medications:    Hospital Medications:  acetaminophen     Tablet .. 975 milliGRAM(s) Oral every 6 hours  aspirin  chewable 81 milliGRAM(s) Oral daily  atenolol  Tablet 25 milliGRAM(s) Oral daily  atorvastatin 40 milliGRAM(s) Oral at bedtime  clopidogrel Tablet 75 milliGRAM(s) Oral daily  cyanocobalamin 1000 MICROGram(s) Oral daily  dextrose 5%. 1000 milliLiter(s) IV Continuous <Continuous>  dextrose 5%. 1000 milliLiter(s) IV Continuous <Continuous>  dextrose 5%. 1000 milliLiter(s) IV Continuous <Continuous>  dextrose 5%. 1000 milliLiter(s) IV Continuous <Continuous>  dextrose 50% Injectable 25 Gram(s) IV Push once  dextrose 50% Injectable 12.5 Gram(s) IV Push once  dextrose 50% Injectable 25 Gram(s) IV Push once  dextrose 50% Injectable 25 Gram(s) IV Push once  dextrose 50% Injectable 12.5 Gram(s) IV Push once  dextrose 50% Injectable 25 Gram(s) IV Push once  dextrose Oral Gel 15 Gram(s) Oral once PRN  dextrose Oral Gel 15 Gram(s) Oral once PRN  gabapentin 100 milliGRAM(s) Oral daily  glucagon  Injectable 1 milliGRAM(s) IntraMuscular once  glucagon  Injectable 1 milliGRAM(s) IntraMuscular once  heparin  Infusion. 1400 Unit(s)/Hr IV Continuous <Continuous>  influenza  Vaccine (HIGH DOSE) 0.5 milliLiter(s) IntraMuscular once  insulin glargine Injectable (LANTUS) 18 Unit(s) SubCutaneous at bedtime  insulin lispro (ADMELOG) corrective regimen sliding scale   SubCutaneous three times a day before meals  insulin lispro (ADMELOG) corrective regimen sliding scale   SubCutaneous at bedtime  insulin lispro Injectable (ADMELOG) 6 Unit(s) SubCutaneous three times a day before meals  oxyCODONE    IR 2.5 milliGRAM(s) Oral every 4 hours PRN  oxyCODONE    IR 5 milliGRAM(s) Oral every 4 hours PRN      PMHX/PSHX:  HTN (hypertension)    HLD (hyperlipidemia)    DM2 (diabetes mellitus, type 2)    DM (diabetes mellitus)    PAD (peripheral artery disease)    Tobacco abuse    H/O iron deficiency    [place] (hard of hearing)    Peripheral neuropathy    Peripheral vascular disease, unspecified    Former smoker    CVA (cerebrovascular accident)    Abnormal angiogram    S/P appendectomy    S/P peripheral artery angioplasty with stent placement    S/P angiography    S/P cataract surgery        Family history:  No significant family history (Father)    No pertinent family history in first degree relatives    No pertinent family history in first degree relatives        Denies family history of colon cancer/polyps, stomach cancer/polyps, pancreatic cancer/masses, liver cancer/disease, ovarian cancer and endometrial cancer.    Social History:   Tob: Denies  EtOH: Denies  Illicit Drugs: Denies    ROS:     General:  No wt loss, fevers, chills, night sweats, fatigue  Eyes:  Good vision, no reported pain  ENT:  No sore throat, pain, runny nose, dysphagia  CV:  No pain, palpitations, hypo/hypertension  Pulm:  No dyspnea, cough, tachypnea, wheezing  GI:  see HPI  :  No pain, bleeding, incontinence, nocturia  Muscle:  No pain, weakness  Neuro:  No weakness, tingling, memory problems  Psych:  No fatigue, insomnia, mood problems, depression  Endocrine:  No polyuria, polydipsia, cold/heat intolerance  Heme:  No petechiae, ecchymosis, easy bruisability  Skin:  No rash, tattoos, scars, edema    PHYSICAL EXAM:     GENERAL:  No acute distress  HEENT:  NCAT, no scleral icterus   CHEST:  no respiratory distress  HEART:  Regular rate and rhythm  ABDOMEN:  Soft, non-tender, non-distended, normoactive bowel sounds,  no masses, brown stool on rectal exam   EXTREMITIES: No edema, left leg with wounds covered by bandages   SKIN:  No rash/erythema/ecchymoses/petechiae/wounds/abscess/warm/dry  NEURO:  Alert and oriented x 3, no asterixis    Vital Signs:  Vital Signs Last 24 Hrs  T(C): 36.9 (12 Feb 2025 13:01), Max: 37.6 (12 Feb 2025 01:15)  T(F): 98.4 (12 Feb 2025 13:01), Max: 99.6 (12 Feb 2025 01:15)  HR: 84 (12 Feb 2025 13:01) (84 - 101)  BP: 125/73 (12 Feb 2025 13:01) (124/66 - 152/81)  BP(mean): --  RR: 18 (12 Feb 2025 13:01) (18 - 18)  SpO2: 98% (12 Feb 2025 13:01) (93% - 100%)    Parameters below as of 12 Feb 2025 13:01  Patient On (Oxygen Delivery Method): room air      Daily     Daily     LABS:                        7.4    14.78 )-----------( 169      ( 12 Feb 2025 06:47 )             23.7     Mean Cell Volume: 87.1 fl (02-12-25 @ 06:47)    02-12    138  |  104  |  26[H]  ----------------------------<  307[H]  3.6   |  20[L]  |  0.64    Ca    8.3[L]      12 Feb 2025 06:47  Phos  1.7     02-12  Mg     2.3     02-12        PT/INR - ( 12 Feb 2025 06:47 )   PT: 11.6 sec;   INR: 1.01 ratio         PTT - ( 12 Feb 2025 06:47 )  PTT:86.1 sec  Urinalysis Basic - ( 12 Feb 2025 06:47 )    Color: x / Appearance: x / SG: x / pH: x  Gluc: 307 mg/dL / Ketone: x  / Bili: x / Urobili: x   Blood: x / Protein: x / Nitrite: x   Leuk Esterase: x / RBC: x / WBC x   Sq Epi: x / Non Sq Epi: x / Bacteria: x                              7.4    14.78 )-----------( 169      ( 12 Feb 2025 06:47 )             23.7                         7.8    18.18 )-----------( 203      ( 11 Feb 2025 12:11 )             23.7                         6.2    15.98 )-----------( 208      ( 11 Feb 2025 05:26 )             19.1                         8.2    14.66 )-----------( 202      ( 10 Feb 2025 08:39 )             25.2                         7.0    16.24 )-----------( 226      ( 09 Feb 2025 22:43 )             21.3

## 2025-02-12 NOTE — CONSULT NOTE ADULT - ATTENDING COMMENTS
Agree w above. 69 year old man with hx of HTN, HLD, DM, PAD and aortoiliac occlusive disease, on ASA, plavix and heparin. GI is consulted for acute on chronic anemia. He reports colonoscopy/endoscopy and video capsule endoscopy in the outpatient setting during summer of last year for chronic unexplained iron deficiency anemia, and reports that there was no bleeding found. He reports that he has had a gastric ulcer in the past, but it was not bleeding on this most recent endoscopic evaluation. His Hgb had been relatively stable about 12. Rec EGD to rule out UGI bleed with hx of PUD in the past. PPI tx. Trend CBC and transfuse as needed.

## 2025-02-12 NOTE — CONSULT NOTE ADULT - SUBJECTIVE AND OBJECTIVE BOX
HPI: 70 yr old Male with PMHx of HTN, HLD, DM, PAD and aortoiliac occlusive disease, who previously underwent an outpatient angiography complicated by right iliac artery dissection. Now S/P Creation of bypass from left femoral artery to distal tibial artery with RSVG and completion angiogram on 2/7/25. Patient's BG have ranged 288-369 mg/dL inpatient on current insulin regimen of low correction scale lispro at mealtimes and bedtime. Endocrinology consulted for further diabetes management recommendations.     Diabetes history:  Age at Dx:  How dx:  Most recent A1C: 7.7%- 1/28/25  Hx and duration of insulin:  Hx of hypoglycemia:  Hx of DKA/HHS?    Microvascular Complications:   Retinopathy?  Neuropathy?  Nephropathy?  Hx of foot ulcer/amputation?    Macrovascular Complications:  Hx of CHF?  +PAD now s/p bypass left femoral artery to distal tibial artery  Hx of CVA?      FH:  DM:  Thyroid:  Autoimmune:  Other:    SH:  Former smoker (1.5 PPD -50 years; quit 2 years ago),     PAST MEDICAL & SURGICAL HISTORY:  HTN (hypertension)      HLD (hyperlipidemia)      DM (diabetes mellitus)      PAD (peripheral artery disease)      H/O iron deficiency      [place] (hard of hearing)      Peripheral neuropathy      Peripheral vascular disease, unspecified      Former smoker      CVA (cerebrovascular accident)      S/P appendectomy      S/P peripheral artery angioplasty with stent placement      S/P angiography      S/P cataract surgery              Current Meds:  acetaminophen     Tablet .. 975 milliGRAM(s) Oral every 6 hours  aspirin  chewable 81 milliGRAM(s) Oral daily  atenolol  Tablet 25 milliGRAM(s) Oral daily  atorvastatin 40 milliGRAM(s) Oral at bedtime  clopidogrel Tablet 75 milliGRAM(s) Oral daily  cyanocobalamin 1000 MICROGram(s) Oral daily  dextrose 5%. 1000 milliLiter(s) IV Continuous <Continuous>  dextrose 5%. 1000 milliLiter(s) IV Continuous <Continuous>  dextrose 50% Injectable 25 Gram(s) IV Push once  dextrose 50% Injectable 12.5 Gram(s) IV Push once  dextrose 50% Injectable 25 Gram(s) IV Push once  dextrose Oral Gel 15 Gram(s) Oral once PRN  gabapentin 100 milliGRAM(s) Oral daily  glucagon  Injectable 1 milliGRAM(s) IntraMuscular once  heparin  Infusion. 1400 Unit(s)/Hr IV Continuous <Continuous>  influenza  Vaccine (HIGH DOSE) 0.5 milliLiter(s) IntraMuscular once  insulin lispro (ADMELOG) corrective regimen sliding scale   SubCutaneous three times a day before meals  insulin lispro (ADMELOG) corrective regimen sliding scale   SubCutaneous at bedtime  oxyCODONE    IR 2.5 milliGRAM(s) Oral every 4 hours PRN  oxyCODONE    IR 5 milliGRAM(s) Oral every 4 hours PRN  potassium chloride    Tablet ER 40 milliEquivalent(s) Oral once  sodium phosphate 30 milliMole(s)/500 mL IVPB 30 milliMole(s) IV Intermittent once  sodium phosphate 30 milliMole(s)/500 mL IVPB 30 milliMole(s) IV Intermittent once      Allergies:  Advil (Rash)      ROS:     Constitutional: No fever, good appetite/po intake  Eyes: No blurry vision, diplopia  Neuro: No tremors  HEENT: No pain  Cardiovascular: No chest pain, palpitations  Respiratory: No SOB, no cough  GI: No nausea, vomiting,   : No dysuria, hematuria  Skin: no rash  Psych: no depression  Endocrine: no polyuria, polydipsia  Hem/lymph: no swelling  Osteoporosis: no fractures     Vital Signs Last 24 Hrs  T(C): 37.2 (12 Feb 2025 04:45), Max: 37.6 (12 Feb 2025 01:15)  T(F): 98.9 (12 Feb 2025 04:45), Max: 99.6 (12 Feb 2025 01:15)  HR: 101 (12 Feb 2025 04:45) (86 - 101)  BP: 152/81 (12 Feb 2025 04:45) (121/71 - 152/81)  BP(mean): --  RR: 18 (12 Feb 2025 04:45) (18 - 18)  SpO2: 98% (12 Feb 2025 04:45) (97% - 100%)    Parameters below as of 12 Feb 2025 04:45  Patient On (Oxygen Delivery Method): room air      Height (cm): 167.6 (02-07 @ 06:48)  Weight (kg): 70.3 (02-07 @ 06:48)  BMI (kg/m2): 25 (02-07 @ 06:48)    VITALS: T(C): 37.2 (02-12-25 @ 04:45)  T(F): 98.9 (02-12-25 @ 04:45), Max: 99.6 (02-12-25 @ 01:15)  HR: 101 (02-12-25 @ 04:45) (86 - 101)  BP: 152/81 (02-12-25 @ 04:45) (121/71 - 152/81)  RR:  (18 - 18)  SpO2:  (97% - 100%)  Wt(kg): --  GENERAL: NAD, well-groomed, well-developed  EYES: No proptosis, no lid lag, anicteric, extraocular movements intact  HEENT:  Atraumatic, Normocephalic, moist mucous membranes  THYROID: Normal size, no palpable nodules, no thyromegaly  RESPIRATORY: non labored breathing, no accessory muscle use  CARDIOVASCULAR: non tachycardic, no peripheral edema  GI: Soft, nontender, non distended   SKIN: Dry, intact, No rashes or lesions  NEURO: sensation intact, no tremor  EXTREMITIES: no foot ulcers and bilateral distal pedal pulses intact  PSYCH: reactive affect, euthymic mood      LABS:                        7.4    14.78 )-----------( 169      ( 12 Feb 2025 06:47 )             23.7     02-12    138  |  104  |  26[H]  ----------------------------<  307[H]  3.6   |  20[L]  |  0.64    Ca    8.3[L]      12 Feb 2025 06:47  Phos  1.7     02-12  Mg     2.3     02-12      PT/INR - ( 12 Feb 2025 06:47 )   PT: 11.6 sec;   INR: 1.01 ratio         PTT - ( 12 Feb 2025 06:47 )  PTT:86.1 sec  Urinalysis Basic - ( 12 Feb 2025 06:47 )    Color: x / Appearance: x / SG: x / pH: x  Gluc: 307 mg/dL / Ketone: x  / Bili: x / Urobili: x   Blood: x / Protein: x / Nitrite: x   Leuk Esterase: x / RBC: x / WBC x   Sq Epi: x / Non Sq Epi: x / Bacteria: x            RADIOLOGY & ADDITIONAL STUDIES:  CAPILLARY BLOOD GLUCOSE      POCT Blood Glucose.: 314 mg/dL (12 Feb 2025 08:17)  POCT Blood Glucose.: 317 mg/dL (12 Feb 2025 06:06)  POCT Blood Glucose.: 386 mg/dL (11 Feb 2025 21:36)  POCT Blood Glucose.: 369 mg/dL (11 Feb 2025 17:35)  POCT Blood Glucose.: 310 mg/dL (11 Feb 2025 12:03)        A/P:70y Male  with PMH with history noted above.    # T2DM/T1DM with hyperglycemia   - Most recent Hemoglobin A1C 7.7% (1/28/25)  - Current FS ranges from 288-386 mg/dL  - Current diet: carb consistent diet   - Please monitor blood glucose values TID AC & QHS while eating regular meals and Q6H while NPO  - Blood glucose goals pre-meal less than 140 mg/dL and random blood glucose less than 180 mg/dL  - Recommendations:  ***    Discharge planning:   - DM medications:   - Patient will follow up at  [facility]:  [address] [place] [address]. [address]. [place] [address]  Tel: [phone]    [facility]  [address] [place], [place], [address]    [facility]  [address] [place] [address]  [phone]    PATIENT ACCESS SERVICES  [phone]  For all [facility] Endocrine practices phone numbers and locations throughout [facility], and [place].      - Insulin teaching  - Please ensure patient has the following diabetes supplies:  - Glucometer (ACCU_CHECK dominic Connect, Ascensia Contour Next EZ or One, Freestyle Freedome LITE or OneTouch Verio IQ)  - Glucometer test strips and lancets  (make sure compatible w/ glucometer), Dispense #100 (or #200) use as directed  - Lantus Solostar Pen (or Basaglar Kwikpen) ____ units before bedtime (dose TBD)  - BD carol 4mm pen needles.   - Please verify with pharmacy that each script is covered before discharge.  - Patient will need opthalmology and podiatry follow up as outpatient     # HTN  - BP goal 130/80  - Manage per primary team     # HLD  - Continue with atorvastatin 40 mg QHS  - Goal LDL<70  - Manage as outpatient       Thank you for the consult. Will continue to monitor. Please inform the endocrinology team at least 24 hours prior to intended discharge date.     Contact via pager or [employer] Teams during business hours. For follow up questions, discharge recommendations, or new consults please call answering service at [phone] (weekdays), [phone] (nights/weekends). For nonurgent matters, please email  [email or web address].     Please note a different endocrinology provider will be following the patient tomorrow.     Valeria Malik D.O.  Attending Physician   Department of Endocrinology, Diabetes and Metabolism   Microsoft Teams     For urgent matters before 9AM or after 5PM, or on weekends/holidays, please page the on call endocrine fellow.   For nonurgent matters, please email [email or web address] for assistance. HPI: 70 yr old Male with PMHx of HTN, HLD, DM, PAD and aortoiliac occlusive disease, who previously underwent an outpatient angiography complicated by right iliac artery dissection. Now S/P Creation of bypass from left femoral artery to distal tibial artery with RSVG and completion angiogram on 2/7/25. Patient's BG have ranged 288-369 mg/dL inpatient on current insulin regimen of low correction scale lispro at mealtimes and bedtime. Endocrinology consulted for further diabetes management recommendations.     Diabetes history:  Reports he was diagnosed with T2DM ~15-20 yrs ago; follows with PCP  - Most recent A1C: 7.7%- 1/28/25  - Home regimen: jardiance 10 mg, pioglitazone 30 mg, jentadueto 2.5-1000 mg BID  - He has never used insulin at home  - Denies hx of HHS/DKA  - Denies hypoglycemia  - BG monitoring: checking infrequently- 1-2 times per week with glucometer and reports values generally in mid-100s  - Diet at home: usually 2 meals per day, breakfast with 2 piece of toast with butter and jelly, eggs and often has rice for dinner meal  Lives with wife and daughter    Microvascular Complications:   Retinopathy? denies  Neuropathy? yes, admits numbness/tingling in toes  Nephropathy? denies    Macrovascular Complications:  Denies hx of CAD  +PAD now s/p bypass left femoral artery to distal tibial artery  +Hx of CVA      FH:  DM in brother    SH:  Former smoker (1.5 PPD -50 years; quit 2 years ago),     PAST MEDICAL & SURGICAL HISTORY:  HTN (hypertension)      HLD (hyperlipidemia)      DM (diabetes mellitus)      PAD (peripheral artery disease)      H/O iron deficiency      [place] (hard of hearing)      Peripheral neuropathy      Peripheral vascular disease, unspecified      Former smoker      CVA (cerebrovascular accident)      S/P appendectomy      S/P peripheral artery angioplasty with stent placement      S/P angiography      S/P cataract surgery              Current Meds:  acetaminophen     Tablet .. 975 milliGRAM(s) Oral every 6 hours  aspirin  chewable 81 milliGRAM(s) Oral daily  atenolol  Tablet 25 milliGRAM(s) Oral daily  atorvastatin 40 milliGRAM(s) Oral at bedtime  clopidogrel Tablet 75 milliGRAM(s) Oral daily  cyanocobalamin 1000 MICROGram(s) Oral daily  dextrose 5%. 1000 milliLiter(s) IV Continuous <Continuous>  dextrose 5%. 1000 milliLiter(s) IV Continuous <Continuous>  dextrose 50% Injectable 25 Gram(s) IV Push once  dextrose 50% Injectable 12.5 Gram(s) IV Push once  dextrose 50% Injectable 25 Gram(s) IV Push once  dextrose Oral Gel 15 Gram(s) Oral once PRN  gabapentin 100 milliGRAM(s) Oral daily  glucagon  Injectable 1 milliGRAM(s) IntraMuscular once  heparin  Infusion. 1400 Unit(s)/Hr IV Continuous <Continuous>  influenza  Vaccine (HIGH DOSE) 0.5 milliLiter(s) IntraMuscular once  insulin lispro (ADMELOG) corrective regimen sliding scale   SubCutaneous three times a day before meals  insulin lispro (ADMELOG) corrective regimen sliding scale   SubCutaneous at bedtime  oxyCODONE    IR 2.5 milliGRAM(s) Oral every 4 hours PRN  oxyCODONE    IR 5 milliGRAM(s) Oral every 4 hours PRN  potassium chloride    Tablet ER 40 milliEquivalent(s) Oral once  sodium phosphate 30 milliMole(s)/500 mL IVPB 30 milliMole(s) IV Intermittent once  sodium phosphate 30 milliMole(s)/500 mL IVPB 30 milliMole(s) IV Intermittent once      Allergies:  Advil (Rash)      ROS:     Constitutional: No fever, good appetite/po intake  Eyes: No blurry vision, diplopia  Neuro: No tremors  HEENT: No pain  Cardiovascular: No chest pain, palpitations  Respiratory: No SOB, no cough  GI: +diarrhea  : No dysuria, hematuria  Skin: no rash  Psych: no depression  Endocrine:+hyperglycemia  Hem/lymph: no swelling  Osteoporosis: no fractures     Vital Signs Last 24 Hrs  T(C): 37.2 (12 Feb 2025 04:45), Max: 37.6 (12 Feb 2025 01:15)  T(F): 98.9 (12 Feb 2025 04:45), Max: 99.6 (12 Feb 2025 01:15)  HR: 101 (12 Feb 2025 04:45) (86 - 101)  BP: 152/81 (12 Feb 2025 04:45) (121/71 - 152/81)  BP(mean): --  RR: 18 (12 Feb 2025 04:45) (18 - 18)  SpO2: 98% (12 Feb 2025 04:45) (97% - 100%)    Parameters below as of 12 Feb 2025 04:45  Patient On (Oxygen Delivery Method): room air      Height (cm): 167.6 (02-07 @ 06:48)  Weight (kg): 70.3 (02-07 @ 06:48)  BMI (kg/m2): 25 (02-07 @ 06:48)    VITALS: T(C): 37.2 (02-12-25 @ 04:45)  T(F): 98.9 (02-12-25 @ 04:45), Max: 99.6 (02-12-25 @ 01:15)  HR: 101 (02-12-25 @ 04:45) (86 - 101)  BP: 152/81 (02-12-25 @ 04:45) (121/71 - 152/81)  RR:  (18 - 18)  SpO2:  (97% - 100%)  Wt(kg): --  GENERAL: NAD, well-groomed, well-developed  EYES: No conjunctival injection, extraocular movements intact  HEENT:  Atraumatic, Normocephalic, moist mucous membranes  NECK: no masses  RESPIRATORY: non labored breathing, no accessory muscle use  CARDIOVASCULAR: non tachycardic  GI: non distended   SKIN: Dry, intact  NEURO: alert, answering questions appropriately  PSYCH: reactive affect, euthymic mood      LABS:                        7.4    14.78 )-----------( 169      ( 12 Feb 2025 06:47 )             23.7     02-12    138  |  104  |  26[H]  ----------------------------<  307[H]  3.6   |  20[L]  |  0.64    Ca    8.3[L]      12 Feb 2025 06:47  Phos  1.7     02-12  Mg     2.3     02-12      PT/INR - ( 12 Feb 2025 06:47 )   PT: 11.6 sec;   INR: 1.01 ratio         PTT - ( 12 Feb 2025 06:47 )  PTT:86.1 sec  Urinalysis Basic - ( 12 Feb 2025 06:47 )    Color: x / Appearance: x / SG: x / pH: x  Gluc: 307 mg/dL / Ketone: x  / Bili: x / Urobili: x   Blood: x / Protein: x / Nitrite: x   Leuk Esterase: x / RBC: x / WBC x   Sq Epi: x / Non Sq Epi: x / Bacteria: x            RADIOLOGY & ADDITIONAL STUDIES:  CAPILLARY BLOOD GLUCOSE      POCT Blood Glucose.: 314 mg/dL (12 Feb 2025 08:17)  POCT Blood Glucose.: 317 mg/dL (12 Feb 2025 06:06)  POCT Blood Glucose.: 386 mg/dL (11 Feb 2025 21:36)  POCT Blood Glucose.: 369 mg/dL (11 Feb 2025 17:35)  POCT Blood Glucose.: 310 mg/dL (11 Feb 2025 12:03)        A/P:70y Male with PMHx of HTN, HLD, DM, PAD and aortoiliac occlusive disease, who previously underwent an outpatient angiography complicated by right iliac artery dissection. Now S/P Creation of bypass from left femoral artery to distal tibial artery with RSVG and completion angiogram on 2/7/25. Endocrine team consulted for uncontrolled diabetes with BG elevations upto 386 mg/dL. Patient is high risk with high level decision making due to uncontrolled diabetes which places patient at high risk for cardiovascular and cerebrovascular events. Patient with lability of glucose requiring close monitoring and insulin adjustments.    # T2DM with hyperglycemia   - Most recent Hemoglobin A1C 7.7% (1/28/25)  - Current FS ranges from 288-386 mg/dL  - Current diet: carb consistent diet   - Please monitor blood glucose values TID AC & QHS while eating regular meals and Q6H while NPO  - Blood glucose goals pre-meal less than 140 mg/dL and random blood glucose less than 180 mg/dL  - Recommendations:  - Start lantus 18 units qhs  - Start lispro 6 units scheduled TID before meals  - Continue low correction scale lispro at mealtimes  - Recommend separate low correction scale lispro qhs    Discharge planning: Recommend can resume oral medications- jardiance and pioglitazone. Also can consider changing from jentadueto to metformin with GLP1RA. Please send trulicity 0.75 mg weekly to pharmacy to check for insurance coverage. Patient confirmed no hx of pancreatitis and no family hx of medullary thyroid cancer.    # HTN  - BP goal 130/80  - Manage per primary team     # HLD  - Continue with atorvastatin 40 mg QHS  - Goal LDL<70  - Manage as outpatient       Recommendations communicated to primary vascular team Susan Solano via phone call ([phone])    Thank you for the consult. Will continue to monitor. Please inform the endocrinology team at least 24 hours prior to intended discharge date.     Contact via pager or [employer] Teams during business hours. For follow up questions, discharge recommendations, or new consults please call answering service at [phone] (weekdays), [phone] (nights/weekends). For nonurgent matters, please email  [email or web address].     Please note a different endocrinology provider will be following the patient tomorrow.     Valeria Malik D.O.  Attending Physician   Department of Endocrinology, Diabetes and Metabolism   [employer] Teams     For urgent matters before 9AM or after 5PM, or on weekends/holidays, please page the on call endocrine fellow.   For nonurgent matters, please email [email or web address] for assistance. HPI: 70 yr old Male with PMHx of HTN, HLD, DM, PAD and aortoiliac occlusive disease, who previously underwent an outpatient angiography complicated by right iliac artery dissection. Now S/P Creation of bypass from left femoral artery to distal tibial artery with RSVG and completion angiogram on 2/7/25. Patient's BG have ranged 288-369 mg/dL inpatient on current insulin regimen of low correction scale lispro at mealtimes and bedtime. Endocrinology consulted for further diabetes management recommendations.     Diabetes history:  Reports he was diagnosed with T2DM ~15-20 yrs ago; follows with PCP  - Most recent A1C: 7.7%- 1/28/25  - Home regimen: jardiance 10 mg, pioglitazone 30 mg, jentadueto 2.5-1000 mg BID  - He has never used insulin at home  - Denies hx of HHS/DKA  - Denies hypoglycemia  - BG monitoring: checking infrequently- 1-2 times per week with glucometer and reports values generally in mid-100s  - Diet at home: usually 2 meals per day, breakfast with 2 piece of toast with butter and jelly, eggs and often has rice for dinner meal  Lives with wife and daughter    Microvascular Complications:   Retinopathy? denies  Neuropathy? yes, admits numbness/tingling in toes  Nephropathy? denies    Macrovascular Complications:  Denies hx of CAD  +PAD now s/p bypass left femoral artery to distal tibial artery  +Hx of CVA      FH:  DM in brother    SH:  Former smoker (1.5 PPD -50 years; quit 2 years ago),     PAST MEDICAL & SURGICAL HISTORY:  HTN (hypertension)      HLD (hyperlipidemia)      DM (diabetes mellitus)      PAD (peripheral artery disease)      H/O iron deficiency      [place] (hard of hearing)      Peripheral neuropathy      Peripheral vascular disease, unspecified      Former smoker      CVA (cerebrovascular accident)      S/P appendectomy      S/P peripheral artery angioplasty with stent placement      S/P angiography      S/P cataract surgery              Current Meds:  acetaminophen     Tablet .. 975 milliGRAM(s) Oral every 6 hours  aspirin  chewable 81 milliGRAM(s) Oral daily  atenolol  Tablet 25 milliGRAM(s) Oral daily  atorvastatin 40 milliGRAM(s) Oral at bedtime  clopidogrel Tablet 75 milliGRAM(s) Oral daily  cyanocobalamin 1000 MICROGram(s) Oral daily  dextrose 5%. 1000 milliLiter(s) IV Continuous <Continuous>  dextrose 5%. 1000 milliLiter(s) IV Continuous <Continuous>  dextrose 50% Injectable 25 Gram(s) IV Push once  dextrose 50% Injectable 12.5 Gram(s) IV Push once  dextrose 50% Injectable 25 Gram(s) IV Push once  dextrose Oral Gel 15 Gram(s) Oral once PRN  gabapentin 100 milliGRAM(s) Oral daily  glucagon  Injectable 1 milliGRAM(s) IntraMuscular once  heparin  Infusion. 1400 Unit(s)/Hr IV Continuous <Continuous>  influenza  Vaccine (HIGH DOSE) 0.5 milliLiter(s) IntraMuscular once  insulin lispro (ADMELOG) corrective regimen sliding scale   SubCutaneous three times a day before meals  insulin lispro (ADMELOG) corrective regimen sliding scale   SubCutaneous at bedtime  oxyCODONE    IR 2.5 milliGRAM(s) Oral every 4 hours PRN  oxyCODONE    IR 5 milliGRAM(s) Oral every 4 hours PRN  potassium chloride    Tablet ER 40 milliEquivalent(s) Oral once  sodium phosphate 30 milliMole(s)/500 mL IVPB 30 milliMole(s) IV Intermittent once  sodium phosphate 30 milliMole(s)/500 mL IVPB 30 milliMole(s) IV Intermittent once      Allergies:  Advil (Rash)      ROS:     Constitutional: No fever, good appetite/po intake  Eyes: No blurry vision, diplopia  Neuro: No tremors  HEENT: No pain  Cardiovascular: No chest pain, palpitations  Respiratory: No SOB, no cough  GI: +diarrhea  : No dysuria, hematuria  Skin: no rash  Psych: no depression  Endocrine:+hyperglycemia  Hem/lymph: no swelling  Osteoporosis: no fractures     Vital Signs Last 24 Hrs  T(C): 37.2 (12 Feb 2025 04:45), Max: 37.6 (12 Feb 2025 01:15)  T(F): 98.9 (12 Feb 2025 04:45), Max: 99.6 (12 Feb 2025 01:15)  HR: 101 (12 Feb 2025 04:45) (86 - 101)  BP: 152/81 (12 Feb 2025 04:45) (121/71 - 152/81)  BP(mean): --  RR: 18 (12 Feb 2025 04:45) (18 - 18)  SpO2: 98% (12 Feb 2025 04:45) (97% - 100%)    Parameters below as of 12 Feb 2025 04:45  Patient On (Oxygen Delivery Method): room air      Height (cm): 167.6 (02-07 @ 06:48)  Weight (kg): 70.3 (02-07 @ 06:48)  BMI (kg/m2): 25 (02-07 @ 06:48)    VITALS: T(C): 37.2 (02-12-25 @ 04:45)  T(F): 98.9 (02-12-25 @ 04:45), Max: 99.6 (02-12-25 @ 01:15)  HR: 101 (02-12-25 @ 04:45) (86 - 101)  BP: 152/81 (02-12-25 @ 04:45) (121/71 - 152/81)  RR:  (18 - 18)  SpO2:  (97% - 100%)  Wt(kg): --  GENERAL: NAD, well-groomed, well-developed  EYES: No conjunctival injection, extraocular movements intact  HEENT:  Atraumatic, Normocephalic, moist mucous membranes  NECK: no masses  RESPIRATORY: non labored breathing, no accessory muscle use  CARDIOVASCULAR: non tachycardic  GI: non distended   SKIN: Dry, intact  NEURO: alert, answering questions appropriately  PSYCH: reactive affect, euthymic mood      LABS:                        7.4    14.78 )-----------( 169      ( 12 Feb 2025 06:47 )             23.7     02-12    138  |  104  |  26[H]  ----------------------------<  307[H]  3.6   |  20[L]  |  0.64    Ca    8.3[L]      12 Feb 2025 06:47  Phos  1.7     02-12  Mg     2.3     02-12      PT/INR - ( 12 Feb 2025 06:47 )   PT: 11.6 sec;   INR: 1.01 ratio         PTT - ( 12 Feb 2025 06:47 )  PTT:86.1 sec  Urinalysis Basic - ( 12 Feb 2025 06:47 )    Color: x / Appearance: x / SG: x / pH: x  Gluc: 307 mg/dL / Ketone: x  / Bili: x / Urobili: x   Blood: x / Protein: x / Nitrite: x   Leuk Esterase: x / RBC: x / WBC x   Sq Epi: x / Non Sq Epi: x / Bacteria: x            RADIOLOGY & ADDITIONAL STUDIES:  CAPILLARY BLOOD GLUCOSE      POCT Blood Glucose.: 314 mg/dL (12 Feb 2025 08:17)  POCT Blood Glucose.: 317 mg/dL (12 Feb 2025 06:06)  POCT Blood Glucose.: 386 mg/dL (11 Feb 2025 21:36)  POCT Blood Glucose.: 369 mg/dL (11 Feb 2025 17:35)  POCT Blood Glucose.: 310 mg/dL (11 Feb 2025 12:03)        A/P:70 yr old Male with PMHx of HTN, HLD, DM, PAD and aortoiliac occlusive disease, who previously underwent an outpatient angiography complicated by right iliac artery dissection. Now S/P Creation of bypass from left femoral artery to distal tibial artery with RSVG and completion angiogram on 2/7/25. Endocrine team consulted for uncontrolled diabetes with BG elevations upto 386 mg/dL. Patient is high risk with high level decision making due to uncontrolled diabetes which places patient at high risk for cardiovascular and cerebrovascular events. Patient with lability of glucose requiring close monitoring and insulin adjustments.    # T2DM with hyperglycemia   - Most recent Hemoglobin A1C 7.7% (1/28/25)  - Current FS ranges from 288-386 mg/dL  - Current diet: carb consistent diet   - Please monitor blood glucose values TID AC & QHS while eating regular meals and Q6H while NPO  - Blood glucose goals pre-meal less than 140 mg/dL and random blood glucose less than 180 mg/dL  - Recommendations:  - Start lantus 18 units qhs  - Start lispro 6 units scheduled TID before meals  - Continue low correction scale lispro at mealtimes  - Recommend separate low correction scale lispro qhs    Discharge planning: Recommend can resume oral medications- jardiance and pioglitazone. Also can consider changing from jentadueto to metformin with GLP1RA. Please send trulicity 0.75 mg weekly to pharmacy to check for insurance coverage. Patient confirmed no hx of pancreatitis and no family hx of medullary thyroid cancer.    # HTN  - BP goal 130/80  - Manage per primary team     # HLD  - Continue with atorvastatin 40 mg QHS  - Goal LDL<70  - Manage as outpatient       Recommendations communicated to primary vascular team Susan Solano via phone call ([phone])    Thank you for the consult. Will continue to monitor. Please inform the endocrinology team at least 24 hours prior to intended discharge date.     Contact via pager or [employer] during business hours. For follow up questions, discharge recommendations, or new consults please call answering service at [phone] (weekdays), [phone] (nights/weekends). For nonurgent matters, please email  [email or web address].     Please note a different endocrinology provider will be following the patient tomorrow.     Valeria Malik D.O.  Attending Physician   Department of Endocrinology, Diabetes and Metabolism   [employer]     For urgent matters before 9AM or after 5PM, or on weekends/holidays, please page the on call endocrine fellow.   For nonurgent matters, please email [email or web address] for assistance.

## 2025-02-12 NOTE — PROGRESS NOTE ADULT - ASSESSMENT
69M PM of former smoker (1.5 PPD % 50 years; quit 2 years ago),  HTN, HLD, DM, PAD and aortoiliac occlusive disease, who previously underwent an outpatient angiography complicated by right iliac artery dissection. Now S/P Creation of bypass from left femoral artery to distal tibial artery with RSVG and completion angiogram on 2/7/25.      Plan:   - Fever w/u wnl except for CK   - Trend CK  - ASA and Plavix (s/p plavix load); heparin drip; fu aPTT   - Statin   - Neurovascular checks, has L bypass signal    - Diet: regular   - PT recs pending further evaluation      Vascular Surgery  [id number], [phone]    69M PM of former smoker (1.5 PPD % 50 years; quit 2 years ago),  HTN, HLD, DM, PAD and aortoiliac occlusive disease, who previously underwent an outpatient angiography complicated by right iliac artery dissection. Now S/P Creation of bypass from left femoral artery to distal tibial artery with RSVG and completion angiogram on 2/7/25.      Plan:   - s/p 2u pRBC -> AM Hgb 7.4 from 7.8l not responded to 2nd unit -> ordered for add'l 1u pRBC w/ post transfusion CBC  - Elevated BGL in low to high 300s; s/p 4U admelog this AM; mod ISS -> Endocrinology consulted for further management recommendations  - GI PCR negative iso multiple nonbloody, loose stools; if continues to have >5 episodes of diarrhea will order C. diff  - CK downtrending  - ASA and Plavix (s/p plavix load); heparin drip; fu aPTT   - Statin   - Neurovascular checks, has L bypass signal    - Diet: regular   - PT recs pending further evaluation      Vascular Surgery  [id number], [phone]

## 2025-02-12 NOTE — PROGRESS NOTE ADULT - SUBJECTIVE AND OBJECTIVE BOX
SURGERY DAILY PROGRESS NOTE     Patient is a 70y old  Male who presents with a chief complaint of       OVERNIGHT EVENTS: OZZIE, seen overnight and had dopplerable signals on L lateral calf       SUBJECTIVE:   Patient seen and examined at bedside with surgical team, patient without complaints. ***      OBJECTIVE     Vital Signs Last 24 Hrs  T(C): 37.6 (12 Feb 2025 01:15), Max: 37.6 (11 Feb 2025 05:00)  T(F): 99.6 (12 Feb 2025 01:15), Max: 99.7 (11 Feb 2025 05:00)  HR: 96 (12 Feb 2025 01:15) (86 - 117)  BP: 133/76 (12 Feb 2025 01:15) (111/64 - 151/80)  BP(mean): --  RR: 18 (12 Feb 2025 01:15) (18 - 18)  SpO2: 97% (12 Feb 2025 01:15) (97% - 100%)    Parameters below as of 12 Feb 2025 01:15  Patient On (Oxygen Delivery Method): room air    I&O's Detail    10 Feb 2025 07:01  -  11 Feb 2025 07:00  --------------------------------------------------------  IN:    Heparin: 150 mL    Heparin Infusion: 165 mL    Oral Fluid: 360 mL  Total IN: 675 mL    OUT:    Voided (mL): 600 mL  Total OUT: 600 mL    Total NET: 75 mL      11 Feb 2025 07:01  -  12 Feb 2025 03:41  --------------------------------------------------------  IN:    Heparin Infusion: 168 mL  Total IN: 168 mL    OUT:    Voided (mL): 1300 mL  Total OUT: 1300 mL    Total NET: -1132 mL          Physical Exam  Constitutional: NAD  Respiratory: Breathing comfortably on RA  Gastrointestinal: Soft, nontender, nondistended  Extremities: LLE with signals over the left bypass, no distal signals, unchanged from prior ***  Psychiatric:  A&Ox3, appropriate affect          Medications  MEDICATIONS  (STANDING):  acetaminophen     Tablet .. 975 milliGRAM(s) Oral every 6 hours  aspirin  chewable 81 milliGRAM(s) Oral daily  atenolol  Tablet 25 milliGRAM(s) Oral daily  atorvastatin 40 milliGRAM(s) Oral at bedtime  clopidogrel Tablet 75 milliGRAM(s) Oral daily  cyanocobalamin 1000 MICROGram(s) Oral daily  dextrose 5%. 1000 milliLiter(s) (100 mL/Hr) IV Continuous <Continuous>  dextrose 5%. 1000 milliLiter(s) (50 mL/Hr) IV Continuous <Continuous>  dextrose 50% Injectable 25 Gram(s) IV Push once  dextrose 50% Injectable 12.5 Gram(s) IV Push once  dextrose 50% Injectable 25 Gram(s) IV Push once  gabapentin 100 milliGRAM(s) Oral daily  glucagon  Injectable 1 milliGRAM(s) IntraMuscular once  heparin  Infusion. 1400 Unit(s)/Hr (14 mL/Hr) IV Continuous <Continuous>  influenza  Vaccine (HIGH DOSE) 0.5 milliLiter(s) IntraMuscular once  insulin lispro (ADMELOG) corrective regimen sliding scale   SubCutaneous three times a day before meals  insulin lispro (ADMELOG) corrective regimen sliding scale   SubCutaneous at bedtime    MEDICATIONS  (PRN):  dextrose Oral Gel 15 Gram(s) Oral once PRN Blood Glucose LESS THAN 70 milliGRAM(s)/deciliter  oxyCODONE    IR 5 milliGRAM(s) Oral every 4 hours PRN Severe Pain (7 - 10)  oxyCODONE    IR 2.5 milliGRAM(s) Oral every 4 hours PRN Moderate Pain (4 - 6)        LABS:                        7.8    18.18 )-----------( 203      ( 11 Feb 2025 12:11 )             23.7     02-11    138  |  102  |  26[H]  ----------------------------<  285[H]  3.2[L]   |  23  |  0.78    Ca    8.3[L]      11 Feb 2025 05:26  Phos  1.7     02-11  Mg     2.2     02-11      PT/INR - ( 11 Feb 2025 05:26 )   PT: 12.3 sec;   INR: 1.08 ratio         PTT - ( 11 Feb 2025 12:11 )  PTT:91.2 sec    Urinalysis Basic - ( 11 Feb 2025 05:26 )    Color: x / Appearance: x / SG: x / pH: x  Gluc: 285 mg/dL / Ketone: x  / Bili: x / Urobili: x   Blood: x / Protein: x / Nitrite: x   Leuk Esterase: x / RBC: x / WBC x   Sq Epi: x / Non Sq Epi: x / Bacteria: x       SURGERY DAILY PROGRESS NOTE     Patient is a 70y old  Male who presents with a chief complaint of       Interval Events:   - AM Hgb yesterday 6.2 -> 1u pRBC -> Hgb 7.8 -> 1u pRBC overnight  - seen overnight and had dopplerable signals on L lateral calf   - Home insulin added for rising BGL; 369, 386 overnight  - 9 episodes of nonbloody, watery stools overnight -> GI PCR obtained and negative      SUBJECTIVE:   Patient seen and examined at bedside with surgical team, patient reports some increased resting LLE pain.       OBJECTIVE     Vital Signs Last 24 Hrs  T(C): 37.6 (12 Feb 2025 01:15), Max: 37.6 (11 Feb 2025 05:00)  T(F): 99.6 (12 Feb 2025 01:15), Max: 99.7 (11 Feb 2025 05:00)  HR: 96 (12 Feb 2025 01:15) (86 - 117)  BP: 133/76 (12 Feb 2025 01:15) (111/64 - 151/80)  BP(mean): --  RR: 18 (12 Feb 2025 01:15) (18 - 18)  SpO2: 97% (12 Feb 2025 01:15) (97% - 100%)    Parameters below as of 12 Feb 2025 01:15  Patient On (Oxygen Delivery Method): room air    I&O's Detail    10 Feb 2025 07:01  -  11 Feb 2025 07:00  --------------------------------------------------------  IN:    Heparin: 150 mL    Heparin Infusion: 165 mL    Oral Fluid: 360 mL  Total IN: 675 mL    OUT:    Voided (mL): 600 mL  Total OUT: 600 mL    Total NET: 75 mL      11 Feb 2025 07:01  -  12 Feb 2025 03:41  --------------------------------------------------------  IN:    Heparin Infusion: 168 mL  Total IN: 168 mL    OUT:    Voided (mL): 1300 mL  Total OUT: 1300 mL    Total NET: -1132 mL          Physical Exam  Constitutional: NAD  Respiratory: Breathing comfortably on RA  Gastrointestinal: Soft, nontender, nondistended  Extremities: LLE lateral signal, no distal signals. Big toe with ischemic appearing changes. L foot mild swelling   Psychiatric:  A&Ox3, appropriate affect          Medications  MEDICATIONS  (STANDING):  acetaminophen     Tablet .. 975 milliGRAM(s) Oral every 6 hours  aspirin  chewable 81 milliGRAM(s) Oral daily  atenolol  Tablet 25 milliGRAM(s) Oral daily  atorvastatin 40 milliGRAM(s) Oral at bedtime  clopidogrel Tablet 75 milliGRAM(s) Oral daily  cyanocobalamin 1000 MICROGram(s) Oral daily  dextrose 5%. 1000 milliLiter(s) (100 mL/Hr) IV Continuous <Continuous>  dextrose 5%. 1000 milliLiter(s) (50 mL/Hr) IV Continuous <Continuous>  dextrose 50% Injectable 25 Gram(s) IV Push once  dextrose 50% Injectable 12.5 Gram(s) IV Push once  dextrose 50% Injectable 25 Gram(s) IV Push once  gabapentin 100 milliGRAM(s) Oral daily  glucagon  Injectable 1 milliGRAM(s) IntraMuscular once  heparin  Infusion. 1400 Unit(s)/Hr (14 mL/Hr) IV Continuous <Continuous>  influenza  Vaccine (HIGH DOSE) 0.5 milliLiter(s) IntraMuscular once  insulin lispro (ADMELOG) corrective regimen sliding scale   SubCutaneous three times a day before meals  insulin lispro (ADMELOG) corrective regimen sliding scale   SubCutaneous at bedtime    MEDICATIONS  (PRN):  dextrose Oral Gel 15 Gram(s) Oral once PRN Blood Glucose LESS THAN 70 milliGRAM(s)/deciliter  oxyCODONE    IR 5 milliGRAM(s) Oral every 4 hours PRN Severe Pain (7 - 10)  oxyCODONE    IR 2.5 milliGRAM(s) Oral every 4 hours PRN Moderate Pain (4 - 6)        LABS:                        7.8    18.18 )-----------( 203      ( 11 Feb 2025 12:11 )             23.7     02-11    138  |  102  |  26[H]  ----------------------------<  285[H]  3.2[L]   |  23  |  0.78    Ca    8.3[L]      11 Feb 2025 05:26  Phos  1.7     02-11  Mg     2.2     02-11      PT/INR - ( 11 Feb 2025 05:26 )   PT: 12.3 sec;   INR: 1.08 ratio         PTT - ( 11 Feb 2025 12:11 )  PTT:91.2 sec    Urinalysis Basic - ( 11 Feb 2025 05:26 )    Color: x / Appearance: x / SG: x / pH: x  Gluc: 285 mg/dL / Ketone: x  / Bili: x / Urobili: x   Blood: x / Protein: x / Nitrite: x   Leuk Esterase: x / RBC: x / WBC x   Sq Epi: x / Non Sq Epi: x / Bacteria: x

## 2025-02-12 NOTE — CONSULT NOTE ADULT - ASSESSMENT
69M PM of former smoker (1.5 PPD % 50 years; quit 2 years ago),  HTN, HLD, DM, PAD and aortoiliac occlusive disease, who previously underwent an outpatient angiography complicated by right iliac artery dissection. Now s/p creation of bypass from left femoral artery to distal tibial artery with RSVG and completion angiogram on 2/7/25. GI is consulted for acute on chronic anemia. He follows with a gastroenterologist in [place], [place], for this. He underwent colonoscopy/endoscopy and video capsule endoscopy in the outpatient setting during summer of last year for chronic unexplained iron deficiency anemia, and reports that there was no bleeding found. He reports that he has had a gastric ulcer in the past, but it was not bleeding on this most recent endoscopic evaluation. His Hgb had been relatively stable about 12.    He has been hemodynamically normal here, Hgb 12.2 01/28, 9.6 02/07, 7.9 2.08. Has intermittently been requiring transfusions,  6.2 2.11, s/p 1 unit prbcs, up to 7.8 2/11. He has had diarrhea that has been new since admitted, brown stools. FOBT +. He remains on heparin gtt, plavix and aspirin.     Upper GI bleed could be the cause of downtrending hemoglobin, however, would be unlikely in the setting of brown stools and acutely downtrending hemoglobin. He is at risk for PUD, with his current systemic illness, AVMs, however.     Recommendations: 69M PM of former smoker (1.5 PPD % 50 years; quit 2 years ago),  HTN, HLD, DM, PAD and aortoiliac occlusive disease, who previously underwent an outpatient angiography complicated by right iliac artery dissection. Now s/p creation of bypass from left femoral artery to distal tibial artery with RSVG and completion angiogram on 2/7/25. GI is consulted for acute on chronic anemia. He follows with a gastroenterologist in [place], [place], for this. He underwent colonoscopy/endoscopy and video capsule endoscopy in the outpatient setting during summer of last year for chronic unexplained iron deficiency anemia, and reports that there was no bleeding found. He reports that he has had a gastric ulcer in the past, but it was not bleeding on this most recent endoscopic evaluation. His Hgb had been relatively stable about 12.    He has been hemodynamically normal here, Hgb 12.2 01/28, 9.6 02/07, 7.9 2.08. Has intermittently been requiring transfusions,  6.2 2.11, s/p 1 unit prbcs, up to 7.8 2/11. He has had diarrhea that has been new since admitted, brown stools. FOBT +. He remains on heparin gtt, plavix and aspirin.     Upper GI bleed could be the cause of downtrending hemoglobin, however, would be unlikely in the setting of brown stools and acutely downtrending hemoglobin. He is at risk for PUD, with his current systemic illness, AVMs, however.     Recommendations:  -NPO at midnight for EGD +/- video capsule enteroscopy tomorrow  -Consider assessing for other causes of anemia (hemolysis- haptoglobin, indirect bilirubin LDH), further imaging of left leg to assess for hematoma  -Check iron studies (ferritin, transferrin saturation)    Note incomplete until finalized by attending signature/attestation.    Gricelda Soria MD  GI/Hepatology Fellow, PGY-4  Long Range Pager: [phone], Short Range Pager: [phone]    MONDAY-FRIDAY 8AM-5PM:  Please message via [employer] or email [email or web address]   On Weekends/Holidays (All day) and Weekdays after 5 PM to 8 AM  For nonurgent consults please email:  Please email [email or web address]    URGENT CONSULTS:  Please contact on call GI team. See Amion schedule ([facility]), [employer] paging system ([facility]), or call hospital  ([facility])       69M PM of former smoker (1.5 PPD % 50 years; quit 2 years ago),  HTN, HLD, DM, PAD and aortoiliac occlusive disease, who previously underwent an outpatient angiography complicated by right iliac artery dissection. Now s/p creation of bypass from left femoral artery to distal tibial artery with RSVG and completion angiogram on 2/7/25. GI is consulted for acute on chronic anemia. He follows with a gastroenterologist in [place], [place], for this. He underwent colonoscopy/endoscopy and video capsule endoscopy in the outpatient setting during summer of last year for chronic unexplained iron deficiency anemia, and reports that there was no bleeding found. He reports that he has had a gastric ulcer in the past, but it was not bleeding on this most recent endoscopic evaluation. His Hgb had been relatively stable about 12.    He has been hemodynamically normal here, Hgb 12.2 01/28, 9.6 02/07, 7.9 2.08. Has intermittently been requiring transfusions,  6.2 2.11, s/p 1 unit prbcs, up to 7.8 2/11. He has had diarrhea that has been new since admitted, brown stools. FOBT +. He remains on heparin gtt, plavix and aspirin.     Upper GI bleed could be the cause of downtrending hemoglobin, however, would be unlikely in the setting of brown stools and acutely downtrending hemoglobin. He is at risk for PUD, with his current systemic illness, AVMs, however.     Recommendations:  -Please pause heparin gtt at 2AM  -NPO at midnight for EGD +/- video capsule enteroscopy tomorrow  -Consider assessing for other causes of anemia (hemolysis- haptoglobin, indirect bilirubin LDH), further imaging of left leg to assess for hematoma  -Check iron studies (ferritin, transferrin saturation)    Note incomplete until finalized by attending signature/attestation.    Gricelda Soria MD  GI/Hepatology Fellow, PGY-4  Long Range Pager: [phone], Short Range Pager: [phone]    MONDAY-FRIDAY 8AM-5PM:  Please message via [employer] or email [email or web address]   On Weekends/Holidays (All day) and Weekdays after 5 PM to 8 AM  For nonurgent consults please email:  Please email [email or web address] OR [email or web address]    URGENT CONSULTS:  Please contact on call GI team. See Amion schedule ([facility]), [employer] paging system ([facility]), or call hospital  ([facility])

## 2025-02-13 LAB
ADD ON TEST-SPECIMEN IN LAB: SIGNIFICANT CHANGE UP
ANION GAP SERPL CALC-SCNC: 16 MMOL/L — SIGNIFICANT CHANGE UP (ref 5–17)
APTT BLD: 27 SEC — SIGNIFICANT CHANGE UP (ref 24.5–35.6)
BILIRUB DIRECT SERPL-MCNC: 0.1 MG/DL — SIGNIFICANT CHANGE UP (ref 0–0.3)
BILIRUB INDIRECT FLD-MCNC: 0.3 MG/DL — SIGNIFICANT CHANGE UP (ref 0.2–1)
BILIRUB SERPL-MCNC: 0.4 MG/DL — SIGNIFICANT CHANGE UP (ref 0.2–1.2)
BLD GP AB SCN SERPL QL: NEGATIVE — SIGNIFICANT CHANGE UP
BUN SERPL-MCNC: 18 MG/DL — SIGNIFICANT CHANGE UP (ref 7–23)
CALCIUM SERPL-MCNC: 7.5 MG/DL — LOW (ref 8.4–10.5)
CHLORIDE SERPL-SCNC: 103 MMOL/L — SIGNIFICANT CHANGE UP (ref 96–108)
CO2 SERPL-SCNC: 18 MMOL/L — LOW (ref 22–31)
CREAT SERPL-MCNC: 0.48 MG/DL — LOW (ref 0.5–1.3)
EGFR: 110 ML/MIN/1.73M2 — SIGNIFICANT CHANGE UP
EGFR: 110 ML/MIN/1.73M2 — SIGNIFICANT CHANGE UP
GLUCOSE BLDC GLUCOMTR-MCNC: 214 MG/DL — HIGH (ref 70–99)
GLUCOSE BLDC GLUCOMTR-MCNC: 222 MG/DL — HIGH (ref 70–99)
GLUCOSE BLDC GLUCOMTR-MCNC: 263 MG/DL — HIGH (ref 70–99)
GLUCOSE BLDC GLUCOMTR-MCNC: 275 MG/DL — HIGH (ref 70–99)
GLUCOSE BLDC GLUCOMTR-MCNC: 301 MG/DL — HIGH (ref 70–99)
GLUCOSE SERPL-MCNC: 202 MG/DL — HIGH (ref 70–99)
HAPTOGLOB SERPL-MCNC: 265 MG/DL — HIGH (ref 34–200)
HCT VFR BLD CALC: 18.8 % — CRITICAL LOW (ref 39–50)
HCT VFR BLD CALC: 28.3 % — LOW (ref 39–50)
HGB BLD-MCNC: 6.1 G/DL — CRITICAL LOW (ref 13–17)
HGB BLD-MCNC: 9.2 G/DL — LOW (ref 13–17)
INR BLD: 1.03 RATIO — SIGNIFICANT CHANGE UP (ref 0.85–1.16)
IRON SATN MFR SERPL: 11 % — LOW (ref 16–55)
IRON SATN MFR SERPL: 18 UG/DL — LOW (ref 45–165)
LDH SERPL L TO P-CCNC: 382 U/L — HIGH (ref 50–242)
MAGNESIUM SERPL-MCNC: 1.8 MG/DL — SIGNIFICANT CHANGE UP (ref 1.6–2.6)
MCHC RBC-ENTMCNC: 27.5 PG — SIGNIFICANT CHANGE UP (ref 27–34)
MCHC RBC-ENTMCNC: 27.6 PG — SIGNIFICANT CHANGE UP (ref 27–34)
MCHC RBC-ENTMCNC: 32.4 G/DL — SIGNIFICANT CHANGE UP (ref 32–36)
MCHC RBC-ENTMCNC: 32.5 G/DL — SIGNIFICANT CHANGE UP (ref 32–36)
MCV RBC AUTO: 84.7 FL — SIGNIFICANT CHANGE UP (ref 80–100)
MCV RBC AUTO: 85.1 FL — SIGNIFICANT CHANGE UP (ref 80–100)
NRBC BLD AUTO-RTO: 3 /100 WBCS — HIGH (ref 0–0)
NRBC BLD AUTO-RTO: 3 /100 WBCS — HIGH (ref 0–0)
PHOSPHATE SERPL-MCNC: 1.3 MG/DL — LOW (ref 2.5–4.5)
PLATELET # BLD AUTO: 152 K/UL — SIGNIFICANT CHANGE UP (ref 150–400)
PLATELET # BLD AUTO: 159 K/UL — SIGNIFICANT CHANGE UP (ref 150–400)
POTASSIUM SERPL-MCNC: 3.6 MMOL/L — SIGNIFICANT CHANGE UP (ref 3.5–5.3)
POTASSIUM SERPL-SCNC: 3.6 MMOL/L — SIGNIFICANT CHANGE UP (ref 3.5–5.3)
PROTHROM AB SERPL-ACNC: 11.8 SEC — SIGNIFICANT CHANGE UP (ref 9.9–13.4)
RBC # BLD: 2.21 M/UL — LOW (ref 4.2–5.8)
RBC # BLD: 3.34 M/UL — LOW (ref 4.2–5.8)
RBC # FLD: 16.8 % — HIGH (ref 10.3–14.5)
RBC # FLD: 17.1 % — HIGH (ref 10.3–14.5)
RH IG SCN BLD-IMP: NEGATIVE — SIGNIFICANT CHANGE UP
SODIUM SERPL-SCNC: 137 MMOL/L — SIGNIFICANT CHANGE UP (ref 135–145)
TIBC SERPL-MCNC: 156 UG/DL — LOW (ref 220–430)
UIBC SERPL-MCNC: 138 UG/DL — SIGNIFICANT CHANGE UP (ref 110–370)
WBC # BLD: 13.73 K/UL — HIGH (ref 3.8–10.5)
WBC # BLD: 15.78 K/UL — HIGH (ref 3.8–10.5)
WBC # FLD AUTO: 13.73 K/UL — HIGH (ref 3.8–10.5)
WBC # FLD AUTO: 15.78 K/UL — HIGH (ref 3.8–10.5)

## 2025-02-13 PROCEDURE — 99232 SBSQ HOSP IP/OBS MODERATE 35: CPT

## 2025-02-13 PROCEDURE — 43255 EGD CONTROL BLEEDING ANY: CPT | Mod: GC

## 2025-02-13 RX ORDER — INSULIN LISPRO 100 U/ML
INJECTION, SOLUTION INTRAVENOUS; SUBCUTANEOUS EVERY 6 HOURS
Refills: 0 | Status: DISCONTINUED | OUTPATIENT
Start: 2025-02-13 | End: 2025-02-13

## 2025-02-13 RX ORDER — INSULIN LISPRO 100 U/ML
INJECTION, SOLUTION INTRAVENOUS; SUBCUTANEOUS
Refills: 0 | Status: DISCONTINUED | OUTPATIENT
Start: 2025-02-13 | End: 2025-02-24

## 2025-02-13 RX ORDER — HEPARIN SODIUM 1000 [USP'U]/ML
1400 INJECTION INTRAVENOUS; SUBCUTANEOUS
Qty: 25000 | Refills: 0 | Status: DISCONTINUED | OUTPATIENT
Start: 2025-02-13 | End: 2025-02-13

## 2025-02-13 RX ORDER — HEPARIN SODIUM 1000 [USP'U]/ML
1400 INJECTION INTRAVENOUS; SUBCUTANEOUS
Qty: 25000 | Refills: 0 | Status: DISCONTINUED | OUTPATIENT
Start: 2025-02-13 | End: 2025-02-14

## 2025-02-13 RX ORDER — INSULIN LISPRO 100 U/ML
INJECTION, SOLUTION INTRAVENOUS; SUBCUTANEOUS AT BEDTIME
Refills: 0 | Status: DISCONTINUED | OUTPATIENT
Start: 2025-02-13 | End: 2025-02-27

## 2025-02-13 RX ORDER — INSULIN GLARGINE-YFGN 100 [IU]/ML
20 INJECTION, SOLUTION SUBCUTANEOUS AT BEDTIME
Refills: 0 | Status: DISCONTINUED | OUTPATIENT
Start: 2025-02-13 | End: 2025-02-15

## 2025-02-13 RX ORDER — POTASSIUM PHOSPHATE, MONOBASIC POTASSIUM PHOSPHATE, DIBASIC INJECTION, 236; 224 MG/ML; MG/ML
30 SOLUTION, CONCENTRATE INTRAVENOUS ONCE
Refills: 0 | Status: COMPLETED | OUTPATIENT
Start: 2025-02-13 | End: 2025-02-13

## 2025-02-13 RX ORDER — INSULIN LISPRO 100 U/ML
5 INJECTION, SOLUTION INTRAVENOUS; SUBCUTANEOUS
Refills: 0 | Status: DISCONTINUED | OUTPATIENT
Start: 2025-02-13 | End: 2025-02-14

## 2025-02-13 RX ORDER — INSULIN LISPRO 100 U/ML
8 INJECTION, SOLUTION INTRAVENOUS; SUBCUTANEOUS
Refills: 0 | Status: DISCONTINUED | OUTPATIENT
Start: 2025-02-13 | End: 2025-02-13

## 2025-02-13 RX ADMIN — INSULIN LISPRO 3: 100 INJECTION, SOLUTION INTRAVENOUS; SUBCUTANEOUS at 06:43

## 2025-02-13 RX ADMIN — Medication 81 MILLIGRAM(S): at 11:11

## 2025-02-13 RX ADMIN — OXYCODONE HYDROCHLORIDE 5 MILLIGRAM(S): 30 TABLET ORAL at 21:41

## 2025-02-13 RX ADMIN — CLOPIDOGREL BISULFATE 75 MILLIGRAM(S): 75 TABLET, FILM COATED ORAL at 11:12

## 2025-02-13 RX ADMIN — CYANOCOBALAMIN 1000 MICROGRAM(S): 1000 INJECTION INTRAMUSCULAR; SUBCUTANEOUS at 11:12

## 2025-02-13 RX ADMIN — ATORVASTATIN CALCIUM 40 MILLIGRAM(S): 80 TABLET, FILM COATED ORAL at 21:41

## 2025-02-13 RX ADMIN — LISINOPRIL 25 MILLIGRAM(S): 30 TABLET ORAL at 06:15

## 2025-02-13 RX ADMIN — INSULIN LISPRO 2: 100 INJECTION, SOLUTION INTRAVENOUS; SUBCUTANEOUS at 16:32

## 2025-02-13 RX ADMIN — Medication 1 TABLET(S): at 16:27

## 2025-02-13 RX ADMIN — Medication 975 MILLIGRAM(S): at 06:45

## 2025-02-13 RX ADMIN — INSULIN LISPRO 6 UNIT(S): 100 INJECTION, SOLUTION INTRAVENOUS; SUBCUTANEOUS at 12:35

## 2025-02-13 RX ADMIN — OXYCODONE HYDROCHLORIDE 5 MILLIGRAM(S): 30 TABLET ORAL at 22:41

## 2025-02-13 RX ADMIN — HEPARIN SODIUM 14 UNIT(S)/HR: 1000 INJECTION INTRAVENOUS; SUBCUTANEOUS at 19:01

## 2025-02-13 RX ADMIN — Medication 975 MILLIGRAM(S): at 11:11

## 2025-02-13 RX ADMIN — POTASSIUM PHOSPHATE, MONOBASIC POTASSIUM PHOSPHATE, DIBASIC INJECTION, 83.33 MILLIMOLE(S): 236; 224 SOLUTION, CONCENTRATE INTRAVENOUS at 11:47

## 2025-02-13 RX ADMIN — SODIUM CHLORIDE 120 MILLILITER(S): 9 INJECTION, SOLUTION INTRAVENOUS at 06:14

## 2025-02-13 RX ADMIN — INSULIN GLARGINE-YFGN 18 UNIT(S): 100 INJECTION, SOLUTION SUBCUTANEOUS at 00:34

## 2025-02-13 RX ADMIN — INSULIN LISPRO 4: 100 INJECTION, SOLUTION INTRAVENOUS; SUBCUTANEOUS at 00:52

## 2025-02-13 RX ADMIN — Medication 975 MILLIGRAM(S): at 00:20

## 2025-02-13 RX ADMIN — Medication 40 MILLIGRAM(S): at 11:12

## 2025-02-13 RX ADMIN — Medication 975 MILLIGRAM(S): at 23:28

## 2025-02-13 RX ADMIN — INSULIN LISPRO 5 UNIT(S): 100 INJECTION, SOLUTION INTRAVENOUS; SUBCUTANEOUS at 16:33

## 2025-02-13 RX ADMIN — INSULIN LISPRO 3: 100 INJECTION, SOLUTION INTRAVENOUS; SUBCUTANEOUS at 11:50

## 2025-02-13 RX ADMIN — Medication 975 MILLIGRAM(S): at 12:11

## 2025-02-13 RX ADMIN — Medication 975 MILLIGRAM(S): at 06:15

## 2025-02-13 RX ADMIN — INSULIN GLARGINE-YFGN 20 UNIT(S): 100 INJECTION, SOLUTION SUBCUTANEOUS at 21:41

## 2025-02-13 RX ADMIN — SODIUM CHLORIDE 120 MILLILITER(S): 9 INJECTION, SOLUTION INTRAVENOUS at 00:28

## 2025-02-13 RX ADMIN — GABAPENTIN 100 MILLIGRAM(S): 400 CAPSULE ORAL at 11:11

## 2025-02-13 RX ADMIN — Medication 975 MILLIGRAM(S): at 16:28

## 2025-02-13 NOTE — PROVIDER CONTACT NOTE (CRITICAL VALUE NOTIFICATION) - ASSESSMENT
Patient remains at baseline, VSS. Patient with no objective s/s of bleeding at this time. Patient returned from endoscopy with 1 bleeding ulcer cauterized.

## 2025-02-13 NOTE — CHART NOTE - NSCHARTNOTEFT_GEN_A_CORE
Procedure: EGD     SUBJECTIVE: Patient seen and evaluated at the bedside. Patient feels ok. Denies shortness of breath, fever, chills, chest pain.       Objective:   Vital Signs Last 24 Hrs  T(C): 37.3 (13 Feb 2025 13:12), Max: 37.4 (12 Feb 2025 21:15)  T(F): 99.1 (13 Feb 2025 13:12), Max: 99.3 (12 Feb 2025 21:15)  HR: 90 (13 Feb 2025 13:12) (79 - 100)  BP: 127/62 (13 Feb 2025 13:12) (126/60 - 152/67)  BP(mean): 104 (13 Feb 2025 08:19) (104 - 104)  RR: 18 (13 Feb 2025 13:12) (16 - 18)  SpO2: 99% (13 Feb 2025 13:12) (98% - 100%)    Parameters below as of 13 Feb 2025 13:12  Patient On (Oxygen Delivery Method): room air      I&O's Summary    12 Feb 2025 07:01  -  13 Feb 2025 07:00  --------------------------------------------------------  IN: 1400 mL / OUT: 1150 mL / NET: 250 mL    13 Feb 2025 07:01  -  13 Feb 2025 16:55  --------------------------------------------------------  IN: 360 mL / OUT: 425 mL / NET: -65 mL      I&O's Detail    12 Feb 2025 07:01  -  13 Feb 2025 07:00  --------------------------------------------------------  IN:    Heparin Infusion: 140 mL    Lactated Ringers: 960 mL    PRBCs (Packed Red Blood Cells): 300 mL  Total IN: 1400 mL    OUT:    Voided (mL): 1150 mL  Total OUT: 1150 mL    Total NET: 250 mL      13 Feb 2025 07:01  -  13 Feb 2025 16:55  --------------------------------------------------------  IN:    Oral Fluid: 360 mL  Total IN: 360 mL    OUT:    Voided (mL): 425 mL  Total OUT: 425 mL    Total NET: -65 mL            LABS:                        6.1    13.73 )-----------( 152      ( 13 Feb 2025 07:30 )             18.8     02-13    137  |  103  |  18  ----------------------------<  202[H]  3.6   |  18[L]  |  0.48[L]    Ca    7.5[L]      13 Feb 2025 07:28  Phos  1.3     02-13  Mg     1.8     02-13    TPro  x   /  Alb  x   /  TBili  0.4  /  DBili  0.1  /  AST  x   /  ALT  x   /  AlkPhos  x   02-13    PT/INR - ( 13 Feb 2025 07:30 )   PT: 11.8 sec;   INR: 1.03 ratio         PTT - ( 13 Feb 2025 07:30 )  PTT:27.0 sec  Urinalysis Basic - ( 13 Feb 2025 07:28 )    Color: x / Appearance: x / SG: x / pH: x  Gluc: 202 mg/dL / Ketone: x  / Bili: x / Urobili: x   Blood: x / Protein: x / Nitrite: x   Leuk Esterase: x / RBC: x / WBC x   Sq Epi: x / Non Sq Epi: x / Bacteria: x        MEDICATIONS  (STANDING):  acetaminophen     Tablet .. 975 milliGRAM(s) Oral every 6 hours  aspirin  chewable 81 milliGRAM(s) Oral daily  atenolol  Tablet 25 milliGRAM(s) Oral daily  atorvastatin 40 milliGRAM(s) Oral at bedtime  clopidogrel Tablet 75 milliGRAM(s) Oral daily  cyanocobalamin 1000 MICROGram(s) Oral daily  dextrose 5%. 1000 milliLiter(s) (50 mL/Hr) IV Continuous <Continuous>  dextrose 5%. 1000 milliLiter(s) (100 mL/Hr) IV Continuous <Continuous>  dextrose 5%. 1000 milliLiter(s) (50 mL/Hr) IV Continuous <Continuous>  dextrose 5%. 1000 milliLiter(s) (100 mL/Hr) IV Continuous <Continuous>  dextrose 50% Injectable 25 Gram(s) IV Push once  dextrose 50% Injectable 12.5 Gram(s) IV Push once  dextrose 50% Injectable 25 Gram(s) IV Push once  dextrose 50% Injectable 12.5 Gram(s) IV Push once  dextrose 50% Injectable 25 Gram(s) IV Push once  dextrose 50% Injectable 25 Gram(s) IV Push once  gabapentin 100 milliGRAM(s) Oral daily  glucagon  Injectable 1 milliGRAM(s) IntraMuscular once  glucagon  Injectable 1 milliGRAM(s) IntraMuscular once  influenza  Vaccine (HIGH DOSE) 0.5 milliLiter(s) IntraMuscular once  insulin glargine Injectable (LANTUS) 20 Unit(s) SubCutaneous at bedtime  insulin lispro (ADMELOG) corrective regimen sliding scale   SubCutaneous three times a day before meals  insulin lispro (ADMELOG) corrective regimen sliding scale   SubCutaneous at bedtime  insulin lispro Injectable (ADMELOG) 5 Unit(s) SubCutaneous three times a day before meals  lactated ringers. 1000 milliLiter(s) (120 mL/Hr) IV Continuous <Continuous>  lactobacillus acidophilus 1 Tablet(s) Oral with dinner  pantoprazole  Injectable 40 milliGRAM(s) IV Push daily    MEDICATIONS  (PRN):  dextrose Oral Gel 15 Gram(s) Oral once PRN Blood Glucose LESS THAN 70 milliGRAM(s)/deciliter  dextrose Oral Gel 15 Gram(s) Oral once PRN Blood Glucose LESS THAN 70 milliGRAM(s)/deciliter  oxyCODONE    IR 2.5 milliGRAM(s) Oral every 4 hours PRN Moderate Pain (4 - 6)  oxyCODONE    IR 5 milliGRAM(s) Oral every 4 hours PRN Severe Pain (7 - 10)      Physical exam  General: NAD, resting in bed   Abdomen: soft, nontender, nondistended     Assessment/Plan: 70y Male s/p EGD.   - Diet: cc clear liquid   - Activity- OOB with assistance  - Labs: repeat CBC after transfusion x 2   - Pain medication as needed

## 2025-02-13 NOTE — PROGRESS NOTE ADULT - SUBJECTIVE AND OBJECTIVE BOX
SURGERY DAILY PROGRESS NOTE    24 Hour/Overnight Events:   - EGD with GI to be done today     SUBJECTIVE: Patient seen and evaluated on AM rounds. Pt is resting comfortably in bed. Denies fevers/chills, chest pain, dyspnea, abdominal pain, nausea, vomiting, and diarrhea    ------------------------------------------------------------------------------------------------------------  OBJECTIVE:  Vital Signs Last 24 Hrs  T(C): 36.8 (13 Feb 2025 05:11), Max: 37.6 (12 Feb 2025 09:00)  T(F): 98.2 (13 Feb 2025 05:11), Max: 99.6 (12 Feb 2025 09:00)  HR: 100 (13 Feb 2025 05:11) (79 - 100)  BP: 149/76 (13 Feb 2025 05:11) (124/70 - 149/76)  BP(mean): --  RR: 18 (13 Feb 2025 05:11) (18 - 18)  SpO2: 98% (13 Feb 2025 05:11) (93% - 99%)    Parameters below as of 13 Feb 2025 05:11  Patient On (Oxygen Delivery Method): room air      I&O's Detail    12 Feb 2025 07:01  -  13 Feb 2025 07:00  --------------------------------------------------------  IN:    Heparin Infusion: 140 mL    Lactated Ringers: 960 mL    PRBCs (Packed Red Blood Cells): 300 mL  Total IN: 1400 mL    OUT:    Voided (mL): 1150 mL  Total OUT: 1150 mL    Total NET: 250 mL          LABS:                        8.0    16.75 )-----------( 159      ( 12 Feb 2025 16:03 )             24.7     02-12    138  |  104  |  26[H]  ----------------------------<  307[H]  3.6   |  20[L]  |  0.64    Ca    8.3[L]      12 Feb 2025 06:47  Phos  1.7     02-12  Mg     2.3     02-12        PT/INR - ( 13 Feb 2025 07:30 )   PT: 11.8 sec;   INR: 1.03 ratio         PTT - ( 13 Feb 2025 07:30 )  PTT:27.0 sec  Urinalysis Basic - ( 12 Feb 2025 06:47 )    Color: x / Appearance: x / SG: x / pH: x  Gluc: 307 mg/dL / Ketone: x  / Bili: x / Urobili: x   Blood: x / Protein: x / Nitrite: x   Leuk Esterase: x / RBC: x / WBC x   Sq Epi: x / Non Sq Epi: x / Bacteria: x      Physical Exam  Constitutional: NAD  Respiratory: Breathing comfortably on RA  Gastrointestinal: Soft, nontender, nondistended  Extremities: no LLE lateral signal, no distal signals. Big toe with ischemic appearing changes. L foot mild swelling   Psychiatric:  A&Ox3, appropriate affect

## 2025-02-13 NOTE — PROGRESS NOTE ADULT - SUBJECTIVE AND OBJECTIVE BOX
HPI:     Patient is a 70y old  Male who presents with a chief complaint of vascular surgery- right iliac a dissection complication on outpt angiography (12 Feb 2025 10:01)  Endocrinology consulted for diabetes management.   Most recent HbA1C: A1C with Estimated Average Glucose Result: 7.7 % (01-28-25 @ 10:05)    INTERVAL HPI/OVERNIGHT EVENTS:  Patient with extreme poor appetite.   He skipped breakfast (meal time insulin not given) the he ate some jello for lunch.    Currently denies polydipsia, polyuria , visual changes, numbness in feet.    atorvastatin   40 milliGRAM(s) Oral (02-12-25 @ 22:55)   40 milliGRAM(s) Oral (02-11-25 @ 22:15)    insulin glargine Injectable (LANTUS)   18 Unit(s) SubCutaneous (02-13-25 @ 00:34)    insulin lispro (ADMELOG) corrective regimen sliding scale   3 Unit(s) SubCutaneous (02-13-25 @ 11:50)    insulin lispro (ADMELOG) corrective regimen sliding scale   3 Unit(s) SubCutaneous (02-13-25 @ 06:43)   4 Unit(s) SubCutaneous (02-13-25 @ 00:52)    insulin lispro (ADMELOG) corrective regimen sliding scale   4 Unit(s) SubCutaneous (02-12-25 @ 17:50)   2 Unit(s) SubCutaneous (02-12-25 @ 14:12)   4 Unit(s) SubCutaneous (02-12-25 @ 08:25)   5 Unit(s) SubCutaneous (02-11-25 @ 17:50)    insulin lispro (ADMELOG) corrective regimen sliding scale   3 Unit(s) SubCutaneous (02-11-25 @ 22:10)    insulin lispro Injectable (ADMELOG)   6 Unit(s) SubCutaneous (02-13-25 @ 12:35)   6 Unit(s) SubCutaneous (02-12-25 @ 17:50)   6 Unit(s) SubCutaneous (02-12-25 @ 14:12)    insulin lispro Injectable (ADMELOG).   4 Unit(s) SubCutaneous (02-12-25 @ 08:24)      Review of systems:   CONSTITUTIONAL:  Feels well, POOR appetite  CARDIOVASCULAR:  Negative for chest pain or palpitations  RESPIRATORY:  Negative for cough, or SOB   GASTROINTESTINAL:  Negative for nausea, vomiting, or abdominal pain  GENITOURINARY:  Negative frequency, urgency or dysuria   CAPILLARY BLOOD GLUCOSE      POCT Blood Glucose.: 275 mg/dL (13 Feb 2025 11:36)  POCT Blood Glucose.: 263 mg/dL (13 Feb 2025 06:23)  POCT Blood Glucose.: 301 mg/dL (13 Feb 2025 00:13)  POCT Blood Glucose.: 344 mg/dL (12 Feb 2025 17:37)       MEDICATIONS  (STANDING):  acetaminophen     Tablet .. 975 milliGRAM(s) Oral every 6 hours  aspirin  chewable 81 milliGRAM(s) Oral daily  atenolol  Tablet 25 milliGRAM(s) Oral daily  atorvastatin 40 milliGRAM(s) Oral at bedtime  clopidogrel Tablet 75 milliGRAM(s) Oral daily  cyanocobalamin 1000 MICROGram(s) Oral daily  dextrose 5%. 1000 milliLiter(s) (50 mL/Hr) IV Continuous <Continuous>  dextrose 5%. 1000 milliLiter(s) (100 mL/Hr) IV Continuous <Continuous>  dextrose 5%. 1000 milliLiter(s) (50 mL/Hr) IV Continuous <Continuous>  dextrose 5%. 1000 milliLiter(s) (100 mL/Hr) IV Continuous <Continuous>  dextrose 50% Injectable 25 Gram(s) IV Push once  dextrose 50% Injectable 12.5 Gram(s) IV Push once  dextrose 50% Injectable 25 Gram(s) IV Push once  dextrose 50% Injectable 12.5 Gram(s) IV Push once  dextrose 50% Injectable 25 Gram(s) IV Push once  dextrose 50% Injectable 25 Gram(s) IV Push once  gabapentin 100 milliGRAM(s) Oral daily  glucagon  Injectable 1 milliGRAM(s) IntraMuscular once  glucagon  Injectable 1 milliGRAM(s) IntraMuscular once  influenza  Vaccine (HIGH DOSE) 0.5 milliLiter(s) IntraMuscular once  insulin glargine Injectable (LANTUS) 20 Unit(s) SubCutaneous at bedtime  insulin lispro (ADMELOG) corrective regimen sliding scale   SubCutaneous three times a day before meals  insulin lispro (ADMELOG) corrective regimen sliding scale   SubCutaneous at bedtime  insulin lispro Injectable (ADMELOG) 5 Unit(s) SubCutaneous three times a day before meals  lactated ringers. 1000 milliLiter(s) (120 mL/Hr) IV Continuous <Continuous>  lactobacillus acidophilus 1 Tablet(s) Oral with dinner  pantoprazole  Injectable 40 milliGRAM(s) IV Push daily    MEDICATIONS  (PRN):  dextrose Oral Gel 15 Gram(s) Oral once PRN Blood Glucose LESS THAN 70 milliGRAM(s)/deciliter  dextrose Oral Gel 15 Gram(s) Oral once PRN Blood Glucose LESS THAN 70 milliGRAM(s)/deciliter  oxyCODONE    IR 2.5 milliGRAM(s) Oral every 4 hours PRN Moderate Pain (4 - 6)  oxyCODONE    IR 5 milliGRAM(s) Oral every 4 hours PRN Severe Pain (7 - 10)      PHYSICAL EXAM  Vital Signs Last 24 Hrs  T(C): 37.3 (13 Feb 2025 13:12), Max: 37.4 (12 Feb 2025 21:15)  T(F): 99.1 (13 Feb 2025 13:12), Max: 99.3 (12 Feb 2025 21:15)  HR: 90 (13 Feb 2025 13:12) (79 - 100)  BP: 127/62 (13 Feb 2025 13:12) (126/60 - 152/67)  BP(mean): 104 (13 Feb 2025 08:19) (104 - 104)  RR: 18 (13 Feb 2025 13:12) (16 - 18)  SpO2: 99% (13 Feb 2025 13:12) (98% - 100%)    Parameters below as of 13 Feb 2025 13:12  Patient On (Oxygen Delivery Method): room air    GENERAL: laying in bed in NAD  RESPIRATORY: nonlabored breathing, no accessory muscle use  Extremities: Warm, no edema in all 4 exts   NEURO: A&O X3    LABS:                        6.1    13.73 )-----------( 152      ( 13 Feb 2025 07:30 )             18.8     02-13    137  |  103  |  18  ----------------------------<  202[H]  3.6   |  18[L]  |  0.48[L]    Ca    7.5[L]      13 Feb 2025 07:28  Phos  1.3     02-13  Mg     1.8     02-13    TPro  x   /  Alb  x   /  TBili  0.4  /  DBili  0.1  /  AST  x   /  ALT  x   /  AlkPhos  x   02-13    PT/INR - ( 13 Feb 2025 07:30 )   PT: 11.8 sec;   INR: 1.03 ratio         PTT - ( 13 Feb 2025 07:30 )  PTT:27.0 sec  Urinalysis Basic - ( 13 Feb 2025 07:28 )    Color: x / Appearance: x / SG: x / pH: x  Gluc: 202 mg/dL / Ketone: x  / Bili: x / Urobili: x   Blood: x / Protein: x / Nitrite: x   Leuk Esterase: x / RBC: x / WBC x   Sq Epi: x / Non Sq Epi: x / Bacteria: x

## 2025-02-13 NOTE — PROGRESS NOTE ADULT - ASSESSMENT
A 70-year-old male with a medical history of hypertension, hyperlipidemia, diabetes mellitus, peripheral artery disease, and aortoiliac occlusive disease, previously underwent outpatient angiography that resulted in a right iliac artery dissection. He is now status post for the creation of a bypass from the left femoral artery to the distal tibial artery using a reversed saphenous vein graft, with a completion angiogram completed on February 7, 2025. The Endocrine team has been consulted due to his uncontrolled diabetes.      #Type 2 Diabetes Mellitus with Hyperglycemia  Recent Hemoglobin A1C: 7.7% (January 28, 2025)  Current fingerstick blood glucose levels range from 288-386 mg/dL  Following a carb-consistent diet  Monitor blood glucose values three times a day before meals and at bedtime when consuming regular meals, and every six hours while NPO.  Blood glucose targets: pre-meal levels below 140 mg/dL and random levels below 180 mg/dL.  Recommendations:  Increase Lantus to 20 units at bedtime   Decrease Admelog to 5 units tid with meals given poor PO intake, to be given if patient eats 50% of the meal.    Continue low correction scale Lispro at meal times  Recommend a separate low correction scale Lispro at bedtime  Discharge Planning: It is advised to resume oral medications, specifically Jardiance and Pioglitazone. It is also worth considering switching from Jentadueto to Metformin with a GLP-1 receptor agonist. Trulicity 0.75 mg weekly should be sent to the pharmacy to verify insurance coverage. The patient confirmed no personal or family history of pancreatitis or medullary thyroid cancer.    #Hypertension  Blood Pressure Goal: 130/80  To be managed by the primary care team  Hyperlipidemia  Continue Atorvastatin 40 mg at bedtime    #Goal LDL: <70 mg/dL  Management to continue as an outpatient

## 2025-02-13 NOTE — PROGRESS NOTE ADULT - ASSESSMENT
69M PM of former smoker (1.5 PPD % 50 years; quit 2 years ago),  HTN, HLD, DM, PAD and aortoiliac occlusive disease, who previously underwent an outpatient angiography complicated by right iliac artery dissection. Now S/P Creation of bypass from left femoral artery to distal tibial artery with RSVG and completion angiogram on 2/7/25 s/p 2u pRBC 7.4 from 7.8l not responded to 2nd unit on 2/12/25      Plan:   - 18U Lantus, 6U Lispro; appreciate endocrine recs   - heparin gtt dc for endoscopy and video capsule enteroscpy. ask them when to restart; appreciate GI  Recs   - FU on iron studies, haptoglobin, indirect bilirubin LDH   - CK downtrending  - ASA and Plavix (s/p plavix load)  - Statin   - Neurovascular checks, no L bypass signal    - Diet: regular   - PT recs pending further evaluation      Vascular Surgery  [id number], [phone]

## 2025-02-14 LAB
ANION GAP SERPL CALC-SCNC: 14 MMOL/L — SIGNIFICANT CHANGE UP (ref 5–17)
APTT BLD: 128.3 SEC — CRITICAL HIGH (ref 24.5–35.6)
APTT BLD: 75.7 SEC — HIGH (ref 24.5–35.6)
BUN SERPL-MCNC: 11 MG/DL — SIGNIFICANT CHANGE UP (ref 7–23)
CALCIUM SERPL-MCNC: 7.9 MG/DL — LOW (ref 8.4–10.5)
CHLORIDE SERPL-SCNC: 100 MMOL/L — SIGNIFICANT CHANGE UP (ref 96–108)
CO2 SERPL-SCNC: 19 MMOL/L — LOW (ref 22–31)
CREAT SERPL-MCNC: 0.5 MG/DL — SIGNIFICANT CHANGE UP (ref 0.5–1.3)
EGFR: 108 ML/MIN/1.73M2 — SIGNIFICANT CHANGE UP
EGFR: 108 ML/MIN/1.73M2 — SIGNIFICANT CHANGE UP
GLUCOSE BLDC GLUCOMTR-MCNC: 184 MG/DL — HIGH (ref 70–99)
GLUCOSE BLDC GLUCOMTR-MCNC: 213 MG/DL — HIGH (ref 70–99)
GLUCOSE BLDC GLUCOMTR-MCNC: 220 MG/DL — HIGH (ref 70–99)
GLUCOSE BLDC GLUCOMTR-MCNC: 235 MG/DL — HIGH (ref 70–99)
GLUCOSE SERPL-MCNC: 195 MG/DL — HIGH (ref 70–99)
HCT VFR BLD CALC: 23.6 % — LOW (ref 39–50)
HCT VFR BLD CALC: 23.6 % — LOW (ref 39–50)
HGB BLD-MCNC: 7.7 G/DL — LOW (ref 13–17)
HGB BLD-MCNC: 7.7 G/DL — LOW (ref 13–17)
INR BLD: 1.05 RATIO — SIGNIFICANT CHANGE UP (ref 0.85–1.16)
MAGNESIUM SERPL-MCNC: 1.9 MG/DL — SIGNIFICANT CHANGE UP (ref 1.6–2.6)
MCHC RBC-ENTMCNC: 27.4 PG — SIGNIFICANT CHANGE UP (ref 27–34)
MCHC RBC-ENTMCNC: 27.4 PG — SIGNIFICANT CHANGE UP (ref 27–34)
MCHC RBC-ENTMCNC: 32.6 G/DL — SIGNIFICANT CHANGE UP (ref 32–36)
MCHC RBC-ENTMCNC: 32.6 G/DL — SIGNIFICANT CHANGE UP (ref 32–36)
MCV RBC AUTO: 84 FL — SIGNIFICANT CHANGE UP (ref 80–100)
MCV RBC AUTO: 84 FL — SIGNIFICANT CHANGE UP (ref 80–100)
NRBC BLD AUTO-RTO: 2 /100 WBCS — HIGH (ref 0–0)
NRBC BLD AUTO-RTO: 2 /100 WBCS — HIGH (ref 0–0)
PHOSPHATE SERPL-MCNC: 1.9 MG/DL — LOW (ref 2.5–4.5)
PLATELET # BLD AUTO: 157 K/UL — SIGNIFICANT CHANGE UP (ref 150–400)
PLATELET # BLD AUTO: 160 K/UL — SIGNIFICANT CHANGE UP (ref 150–400)
POTASSIUM SERPL-MCNC: 3.8 MMOL/L — SIGNIFICANT CHANGE UP (ref 3.5–5.3)
POTASSIUM SERPL-SCNC: 3.8 MMOL/L — SIGNIFICANT CHANGE UP (ref 3.5–5.3)
PROTHROM AB SERPL-ACNC: 12.1 SEC — SIGNIFICANT CHANGE UP (ref 9.9–13.4)
RBC # BLD: 2.81 M/UL — LOW (ref 4.2–5.8)
RBC # BLD: 2.81 M/UL — LOW (ref 4.2–5.8)
RBC # FLD: 17.3 % — HIGH (ref 10.3–14.5)
RBC # FLD: 17.9 % — HIGH (ref 10.3–14.5)
SODIUM SERPL-SCNC: 133 MMOL/L — LOW (ref 135–145)
WBC # BLD: 14.12 K/UL — HIGH (ref 3.8–10.5)
WBC # BLD: 15.07 K/UL — HIGH (ref 3.8–10.5)
WBC # FLD AUTO: 14.12 K/UL — HIGH (ref 3.8–10.5)
WBC # FLD AUTO: 15.07 K/UL — HIGH (ref 3.8–10.5)

## 2025-02-14 PROCEDURE — 99232 SBSQ HOSP IP/OBS MODERATE 35: CPT

## 2025-02-14 PROCEDURE — 99232 SBSQ HOSP IP/OBS MODERATE 35: CPT | Mod: GC

## 2025-02-14 RX ORDER — INSULIN LISPRO 100 U/ML
7 INJECTION, SOLUTION INTRAVENOUS; SUBCUTANEOUS
Refills: 0 | Status: DISCONTINUED | OUTPATIENT
Start: 2025-02-14 | End: 2025-02-24

## 2025-02-14 RX ORDER — OXYCODONE HYDROCHLORIDE 30 MG/1
10 TABLET ORAL EVERY 4 HOURS
Refills: 0 | Status: DISCONTINUED | OUTPATIENT
Start: 2025-02-14 | End: 2025-02-21

## 2025-02-14 RX ORDER — OXYCODONE HYDROCHLORIDE 30 MG/1
5 TABLET ORAL EVERY 4 HOURS
Refills: 0 | Status: DISCONTINUED | OUTPATIENT
Start: 2025-02-14 | End: 2025-02-14

## 2025-02-14 RX ADMIN — INSULIN LISPRO 2: 100 INJECTION, SOLUTION INTRAVENOUS; SUBCUTANEOUS at 18:00

## 2025-02-14 RX ADMIN — HEPARIN SODIUM 14 UNIT(S)/HR: 1000 INJECTION INTRAVENOUS; SUBCUTANEOUS at 01:40

## 2025-02-14 RX ADMIN — Medication 85 MILLIMOLE(S): at 11:56

## 2025-02-14 RX ADMIN — OXYCODONE HYDROCHLORIDE 5 MILLIGRAM(S): 30 TABLET ORAL at 09:14

## 2025-02-14 RX ADMIN — OXYCODONE HYDROCHLORIDE 5 MILLIGRAM(S): 30 TABLET ORAL at 05:50

## 2025-02-14 RX ADMIN — Medication 40 MILLIGRAM(S): at 11:56

## 2025-02-14 RX ADMIN — GABAPENTIN 100 MILLIGRAM(S): 400 CAPSULE ORAL at 11:56

## 2025-02-14 RX ADMIN — CLOPIDOGREL BISULFATE 75 MILLIGRAM(S): 75 TABLET, FILM COATED ORAL at 11:57

## 2025-02-14 RX ADMIN — INSULIN LISPRO 5 UNIT(S): 100 INJECTION, SOLUTION INTRAVENOUS; SUBCUTANEOUS at 09:16

## 2025-02-14 RX ADMIN — Medication 975 MILLIGRAM(S): at 00:10

## 2025-02-14 RX ADMIN — INSULIN LISPRO 7 UNIT(S): 100 INJECTION, SOLUTION INTRAVENOUS; SUBCUTANEOUS at 14:17

## 2025-02-14 RX ADMIN — OXYCODONE HYDROCHLORIDE 10 MILLIGRAM(S): 30 TABLET ORAL at 21:41

## 2025-02-14 RX ADMIN — CYANOCOBALAMIN 1000 MICROGRAM(S): 1000 INJECTION INTRAMUSCULAR; SUBCUTANEOUS at 11:56

## 2025-02-14 RX ADMIN — INSULIN GLARGINE-YFGN 20 UNIT(S): 100 INJECTION, SOLUTION SUBCUTANEOUS at 21:42

## 2025-02-14 RX ADMIN — OXYCODONE HYDROCHLORIDE 5 MILLIGRAM(S): 30 TABLET ORAL at 04:50

## 2025-02-14 RX ADMIN — Medication 975 MILLIGRAM(S): at 11:57

## 2025-02-14 RX ADMIN — Medication 975 MILLIGRAM(S): at 23:22

## 2025-02-14 RX ADMIN — Medication 975 MILLIGRAM(S): at 05:50

## 2025-02-14 RX ADMIN — ATORVASTATIN CALCIUM 40 MILLIGRAM(S): 80 TABLET, FILM COATED ORAL at 21:40

## 2025-02-14 RX ADMIN — Medication 1 TABLET(S): at 17:59

## 2025-02-14 RX ADMIN — SODIUM CHLORIDE 120 MILLILITER(S): 9 INJECTION, SOLUTION INTRAVENOUS at 09:16

## 2025-02-14 RX ADMIN — Medication 975 MILLIGRAM(S): at 18:35

## 2025-02-14 RX ADMIN — INSULIN LISPRO 2: 100 INJECTION, SOLUTION INTRAVENOUS; SUBCUTANEOUS at 14:08

## 2025-02-14 RX ADMIN — Medication 975 MILLIGRAM(S): at 17:59

## 2025-02-14 RX ADMIN — Medication 975 MILLIGRAM(S): at 12:57

## 2025-02-14 RX ADMIN — LISINOPRIL 25 MILLIGRAM(S): 30 TABLET ORAL at 05:50

## 2025-02-14 RX ADMIN — Medication 975 MILLIGRAM(S): at 06:33

## 2025-02-14 RX ADMIN — Medication 81 MILLIGRAM(S): at 11:57

## 2025-02-14 RX ADMIN — OXYCODONE HYDROCHLORIDE 10 MILLIGRAM(S): 30 TABLET ORAL at 22:41

## 2025-02-14 RX ADMIN — OXYCODONE HYDROCHLORIDE 5 MILLIGRAM(S): 30 TABLET ORAL at 10:14

## 2025-02-14 RX ADMIN — INSULIN LISPRO 2: 100 INJECTION, SOLUTION INTRAVENOUS; SUBCUTANEOUS at 09:15

## 2025-02-14 NOTE — PROGRESS NOTE ADULT - ASSESSMENT
69M PM of former smoker (1.5 PPD % 50 years; quit 2 years ago),  HTN, HLD, DM, PAD and aortoiliac occlusive disease, who previously underwent an outpatient angiography complicated by right iliac artery dissection. Now S/P Creation of bypass from left femoral artery to distal tibial artery with RSVG and completion angiogram on 2/7/25 s/p 2u pRBC 7.4 from 7.8l not responded to 2nd unit on 2/12/25. EGD completed 2/13 found to have bleeding gastric ulcer w/ hemostasis achieved with cautery. Hgb found to be 6.1 and s/p 2u pRBC.       Plan:   - hep gtt restarted overnight, given post transfusion CBC of 7.7 and d/c'd at this time pending AM CBC  - 18U Lantus, 6U Lispro; appreciate endocrine recs   - FU on iron studies, haptoglobin, indirect bilirubin LDH   - CK downtrending  - ASA and Plavix (s/p plavix load)  - Statin   - Neurovascular checks, no L bypass signal    - Diet: regular   - PT recs pending further evaluation      Vascular Surgery  [id number], [phone]

## 2025-02-14 NOTE — PROGRESS NOTE ADULT - SUBJECTIVE AND OBJECTIVE BOX
Vascular Surgery Daily Progress Note    Last 24 hours events: s/p 2u pRBC yesterday     Subjective: Patient examined at bedside this morning. Denies fevers, chills, nausea, vomiting, or motor/sensory changes to LLE.       aspirin  chewable 81  atenolol  Tablet 25  clopidogrel Tablet 75        Vital Signs Last 24 Hrs  T(C): 37.3 (14 Feb 2025 09:03), Max: 37.3 (13 Feb 2025 13:12)  T(F): 99.2 (14 Feb 2025 09:03), Max: 99.2 (14 Feb 2025 01:19)  HR: 85 (14 Feb 2025 09:03) (83 - 94)  BP: 152/79 (14 Feb 2025 09:03) (127/62 - 152/79)  BP(mean): --  RR: 18 (14 Feb 2025 09:03) (18 - 18)  SpO2: 96% (14 Feb 2025 09:03) (96% - 100%)    Parameters below as of 14 Feb 2025 09:03  Patient On (Oxygen Delivery Method): room air      I&O's Summary    13 Feb 2025 07:01  -  14 Feb 2025 07:00  --------------------------------------------------------  IN: 2808 mL / OUT: 1275 mL / NET: 1533 mL        Physical Exam:  Constitutional: NAD  Respiratory: Breathing comfortably on RA  Gastrointestinal: Soft, nontender, nondistended  Extremities: no LLE lateral signal, no distal signals. Big toe with ischemic appearing changes. L foot mild swelling   Psychiatric:  A&Ox3, appropriate affect    LABS:                        7.7    15.07 )-----------( 160      ( 14 Feb 2025 07:29 )             23.6     02-14    133[L]  |  100  |  11  ----------------------------<  195[H]  3.8   |  19[L]  |  0.50    Ca    7.9[L]      14 Feb 2025 07:32  Phos  1.9     02-14  Mg     1.9     02-14    TPro  x   /  Alb  x   /  TBili  0.4  /  DBili  0.1  /  AST  x   /  ALT  x   /  AlkPhos  x   02-13    PT/INR - ( 14 Feb 2025 07:36 )   PT: 12.1 sec;   INR: 1.05 ratio         PTT - ( 14 Feb 2025 07:36 )  PTT:128.3 sec

## 2025-02-14 NOTE — PROGRESS NOTE ADULT - SUBJECTIVE AND OBJECTIVE BOX
seen earlier today     Chief Complaint: Diabetes Mellitus follow up    INTERVAL HX: " Feel okay" s/p EGD on 2/13 which showed bleeding gastric ulcer, remains on cleat liquid diet, tolerating clear liquid diet. BGs 195- 200s with serum fasting .       Review of Systems:  General: As above  GI: No nausea, vomiting  Endocrine: no  S&Sx of hypoglycemia    Allergies    Advil (Rash)    Intolerances      MEDICATIONS  (STANDING):  acetaminophen     Tablet .. 975 milliGRAM(s) Oral every 6 hours  aspirin  chewable 81 milliGRAM(s) Oral daily  atenolol  Tablet 25 milliGRAM(s) Oral daily  atorvastatin 40 milliGRAM(s) Oral at bedtime  clopidogrel Tablet 75 milliGRAM(s) Oral daily  cyanocobalamin 1000 MICROGram(s) Oral daily  dextrose 5%. 1000 milliLiter(s) (100 mL/Hr) IV Continuous <Continuous>  dextrose 5%. 1000 milliLiter(s) (50 mL/Hr) IV Continuous <Continuous>  dextrose 5%. 1000 milliLiter(s) (50 mL/Hr) IV Continuous <Continuous>  dextrose 5%. 1000 milliLiter(s) (100 mL/Hr) IV Continuous <Continuous>  dextrose 50% Injectable 25 Gram(s) IV Push once  dextrose 50% Injectable 12.5 Gram(s) IV Push once  dextrose 50% Injectable 25 Gram(s) IV Push once  dextrose 50% Injectable 25 Gram(s) IV Push once  dextrose 50% Injectable 12.5 Gram(s) IV Push once  dextrose 50% Injectable 25 Gram(s) IV Push once  gabapentin 100 milliGRAM(s) Oral daily  glucagon  Injectable 1 milliGRAM(s) IntraMuscular once  glucagon  Injectable 1 milliGRAM(s) IntraMuscular once  influenza  Vaccine (HIGH DOSE) 0.5 milliLiter(s) IntraMuscular once  insulin glargine Injectable (LANTUS) 20 Unit(s) SubCutaneous at bedtime  insulin lispro (ADMELOG) corrective regimen sliding scale   SubCutaneous three times a day before meals  insulin lispro (ADMELOG) corrective regimen sliding scale   SubCutaneous at bedtime  insulin lispro Injectable (ADMELOG) 7 Unit(s) SubCutaneous three times a day before meals  lactated ringers. 1000 milliLiter(s) (120 mL/Hr) IV Continuous <Continuous>  lactobacillus acidophilus 1 Tablet(s) Oral with dinner  pantoprazole  Injectable 40 milliGRAM(s) IV Push daily      atorvastatin   40 milliGRAM(s) Oral (02-13-25 @ 21:41)    insulin glargine Injectable (LANTUS)   20 Unit(s) SubCutaneous (02-13-25 @ 21:41)    insulin lispro (ADMELOG) corrective regimen sliding scale   2 Unit(s) SubCutaneous (02-14-25 @ 14:08)   2 Unit(s) SubCutaneous (02-14-25 @ 09:15)   2 Unit(s) SubCutaneous (02-13-25 @ 16:32)    insulin lispro Injectable (ADMELOG)   5 Unit(s) SubCutaneous (02-14-25 @ 09:16)   5 Unit(s) SubCutaneous (02-13-25 @ 16:33)    insulin lispro Injectable (ADMELOG)   7 Unit(s) SubCutaneous (02-14-25 @ 14:17)        PHYSICAL EXAM:  VITALS: T(C): 37.1 (02-14-25 @ 13:46)  T(F): 98.7 (02-14-25 @ 13:46), Max: 99.2 (02-14-25 @ 01:19)  HR: 80 (02-14-25 @ 13:46) (80 - 94)  BP: 147/72 (02-14-25 @ 13:46) (136/65 - 152/79)  RR:  (18 - 18)  SpO2:  (95% - 100%)  Wt(kg): --  GENERAL: Male sitting in chair, in NAD  Respiratory: Respirations unlabored   Extremities: Warm, no edema  NEURO: Alert , appropriate     LABS:  POCT Blood Glucose.: 235 mg/dL (02-14-25 @ 13:31)  POCT Blood Glucose.: 220 mg/dL (02-14-25 @ 09:01)  POCT Blood Glucose.: 214 mg/dL (02-13-25 @ 21:01)  POCT Blood Glucose.: 222 mg/dL (02-13-25 @ 16:12)  POCT Blood Glucose.: 275 mg/dL (02-13-25 @ 11:36)  POCT Blood Glucose.: 263 mg/dL (02-13-25 @ 06:23)  POCT Blood Glucose.: 301 mg/dL (02-13-25 @ 00:13)  POCT Blood Glucose.: 344 mg/dL (02-12-25 @ 17:37)  POCT Blood Glucose.: 248 mg/dL (02-12-25 @ 14:10)  POCT Blood Glucose.: 268 mg/dL (02-12-25 @ 12:35)  POCT Blood Glucose.: 314 mg/dL (02-12-25 @ 08:17)  POCT Blood Glucose.: 317 mg/dL (02-12-25 @ 06:06)  POCT Blood Glucose.: 386 mg/dL (02-11-25 @ 21:36)  POCT Blood Glucose.: 369 mg/dL (02-11-25 @ 17:35)                          7.7    15.07 )-----------( 160      ( 14 Feb 2025 07:29 )             23.6     02-14    133[L]  |  100  |  11  ----------------------------<  195[H]  3.8   |  19[L]  |  0.50    Ca    7.9[L]      14 Feb 2025 07:32  Phos  1.9     02-14  Mg     1.9     02-14    TPro  x   /  Alb  x   /  TBili  0.4  /  DBili  0.1  /  AST  x   /  ALT  x   /  AlkPhos  x   02-13    eGFR: 108 mL/min/1.73m2 (14 Feb 2025 07:32)      Thyroid Function Tests:      A1C with Estimated Average Glucose Result: 7.7 % (01-28-25 @ 10:05)  A1C with Estimated Average Glucose Result: 7.4 % (12-06-24 @ 09:06)    Estimated Average Glucose: 174 mg/dL (01-28-25 @ 10:05)  Estimated Average Glucose: 166 mg/dL (12-06-24 @ 09:06)        Diet, Consistent Carbohydrate Clear Liquid (02-13-25 @ 09:59) [Active]

## 2025-02-14 NOTE — PROGRESS NOTE ADULT - ASSESSMENT
A 70-year-old male with a medical history of hypertension, hyperlipidemia, diabetes mellitus, peripheral artery disease, and aortoiliac occlusive disease, previously underwent outpatient angiography that resulted in a right iliac artery dissection. He is now status post for the creation of a bypass from the left femoral artery to the distal tibial artery using a reversed saphenous vein graft, with a completion angiogram completed on February 7, 2025. The Endocrine team has been consulted due to his uncontrolled diabetes.  s/p EGD on 2/13 which showed bleeding gastric ulcer, remains on cleat liquid diet. BGs 195- 200s with serum fasting . Will cautiously adjust insulin doses for BG Goal 100-180mg/dl         #Type 2 Diabetes Mellitus with Hyperglycemia  Recent Hemoglobin A1C: 7.7% (January 28, 2025)    - Monitor blood glucose values three times a day before meals and at bedtime when consuming regular meals, and every six hours while NPO.  - Blood glucose targets: pre-meal levels below 140 mg/dL and random levels below 180 mg/dL.  Recommendations:  Continue  Lantus 20 units at bedtime   Slightly increase Admelog to 7 units tid with meals( Hold if NPO or eats less than 50 % of meals, please administer after pt eating at least 50% of the meal)    Continue low correction scale Lispro at meal times and a separate low correction scale Lispro at bedtime and 2 am     Discharge Planning:   Likely resume oral medications, specifically Jardiance and Pioglitazone. It is also worth considering switching from Jentadueto to Metformin with a GLP-1 receptor agonist. Trulicity 0.75 mg weekly should be sent to the pharmacy to verify insurance coverage. The patient confirmed no personal or family history of pancreatitis or medullary thyroid cancer. pt is willing to do weekly injection   Will consider GLP1 only if patient tolerating POs well  Pt should monitor BGs at least BID while on oral medications. Contact PCP or endocrinologist if BG < 70 X1, > 400 X1 or consistently > 200  Can follow up with [facility] - [address] [place] [address], [address]. [place] [address]  Tel) [phone]   Outpatient routine ophthalmology and podiatry evaluation     #Hypertension  Blood Pressure Goal: 130/80  To be managed by the primary care team    # Hyperlipidemia  Continue Atorvastatin 40 mg at bedtime  Goal LDL: <70 mg/dL  Management to continue as an outpatient      Contact via [employer] Teams during business hours  To reach covering provider access AMION via sun[employer] tools  For Urgent matters/after-hours/weekends/holidays please page endocrine fellow on call   For nonurgent matters please email [email or web address]    Please note that this patient may be followed by different provider tomorrow.  Notify endocrine 24 hours prior to discharge for final recommendations

## 2025-02-14 NOTE — PROGRESS NOTE ADULT - SUBJECTIVE AND OBJECTIVE BOX
Gastroenterology/Hepatology Progress Note      Interval Events:     Patient with one brown stool overnight, denies abdominal pain.     Allergies:  Advil (Rash)      Hospital Medications:  acetaminophen     Tablet .. 975 milliGRAM(s) Oral every 6 hours  aspirin  chewable 81 milliGRAM(s) Oral daily  atenolol  Tablet 25 milliGRAM(s) Oral daily  atorvastatin 40 milliGRAM(s) Oral at bedtime  clopidogrel Tablet 75 milliGRAM(s) Oral daily  cyanocobalamin 1000 MICROGram(s) Oral daily  dextrose 5%. 1000 milliLiter(s) IV Continuous <Continuous>  dextrose 5%. 1000 milliLiter(s) IV Continuous <Continuous>  dextrose 5%. 1000 milliLiter(s) IV Continuous <Continuous>  dextrose 5%. 1000 milliLiter(s) IV Continuous <Continuous>  dextrose 50% Injectable 25 Gram(s) IV Push once  dextrose 50% Injectable 12.5 Gram(s) IV Push once  dextrose 50% Injectable 25 Gram(s) IV Push once  dextrose 50% Injectable 25 Gram(s) IV Push once  dextrose 50% Injectable 12.5 Gram(s) IV Push once  dextrose 50% Injectable 25 Gram(s) IV Push once  dextrose Oral Gel 15 Gram(s) Oral once PRN  dextrose Oral Gel 15 Gram(s) Oral once PRN  gabapentin 100 milliGRAM(s) Oral daily  glucagon  Injectable 1 milliGRAM(s) IntraMuscular once  glucagon  Injectable 1 milliGRAM(s) IntraMuscular once  influenza  Vaccine (HIGH DOSE) 0.5 milliLiter(s) IntraMuscular once  insulin glargine Injectable (LANTUS) 20 Unit(s) SubCutaneous at bedtime  insulin lispro (ADMELOG) corrective regimen sliding scale   SubCutaneous three times a day before meals  insulin lispro (ADMELOG) corrective regimen sliding scale   SubCutaneous at bedtime  insulin lispro Injectable (ADMELOG) 7 Unit(s) SubCutaneous three times a day before meals  lactated ringers. 1000 milliLiter(s) IV Continuous <Continuous>  lactobacillus acidophilus 1 Tablet(s) Oral with dinner  oxyCODONE    IR 5 milliGRAM(s) Oral every 4 hours PRN  oxyCODONE    IR 10 milliGRAM(s) Oral every 4 hours PRN  pantoprazole  Injectable 40 milliGRAM(s) IV Push daily      ROS: 14 point ROS negative unless otherwise state in subjective    PHYSICAL EXAM:   Vital Signs:  Vital Signs Last 24 Hrs  T(C): 37.1 (14 Feb 2025 13:46), Max: 37.3 (14 Feb 2025 01:19)  T(F): 98.7 (14 Feb 2025 13:46), Max: 99.2 (14 Feb 2025 01:19)  HR: 80 (14 Feb 2025 13:46) (80 - 94)  BP: 147/72 (14 Feb 2025 13:46) (136/65 - 152/79)  BP(mean): --  RR: 18 (14 Feb 2025 13:46) (18 - 18)  SpO2: 95% (14 Feb 2025 13:46) (95% - 100%)    Parameters below as of 14 Feb 2025 13:46  Patient On (Oxygen Delivery Method): room air      Daily     Daily     GENERAL:  No acute distress  HEENT:  NCAT, no scleral icterus  CHEST: no resp distress  HEART:  RRR  ABDOMEN:  Soft, non-tender, non-distended, normoactive bowel sounds, no masses  EXTREMITIES:  No cyanosis, clubbing, or edema  SKIN:  No rash/erythema/ecchymoses/petechiae/wounds/abscess/warm/dry  NEURO:  Alert and oriented x 3, no asterixis, no tremor    LABS:                        7.7    15.07 )-----------( 160      ( 14 Feb 2025 07:29 )             23.6     Mean Cell Volume: 84.0 fl (02-14-25 @ 07:29)    02-14    133[L]  |  100  |  11  ----------------------------<  195[H]  3.8   |  19[L]  |  0.50    Ca    7.9[L]      14 Feb 2025 07:32  Phos  1.9     02-14  Mg     1.9     02-14    TPro  x   /  Alb  x   /  TBili  0.4  /  DBili  0.1  /  AST  x   /  ALT  x   /  AlkPhos  x   02-13      PT/INR - ( 14 Feb 2025 07:36 )   PT: 12.1 sec;   INR: 1.05 ratio         PTT - ( 14 Feb 2025 07:36 )  PTT:128.3 sec  Urinalysis Basic - ( 14 Feb 2025 07:32 )    Color: x / Appearance: x / SG: x / pH: x  Gluc: 195 mg/dL / Ketone: x  / Bili: x / Urobili: x   Blood: x / Protein: x / Nitrite: x   Leuk Esterase: x / RBC: x / WBC x   Sq Epi: x / Non Sq Epi: x / Bacteria: x

## 2025-02-14 NOTE — PROGRESS NOTE ADULT - ASSESSMENT
69M PM of former smoker (1.5 PPD % 50 years; quit 2 years ago),  HTN, HLD, DM, PAD and aortoiliac occlusive disease, who previously underwent an outpatient angiography complicated by right iliac artery dissection. Now s/p creation of bypass from left femoral artery to distal tibial artery with RSVG and completion angiogram on 2/7/25. GI is consulted for acute on chronic anemia. He follows with a gastroenterologist in [place], [place], for this. He underwent colonoscopy/endoscopy and video capsule endoscopy in the outpatient setting during summer of last year for chronic unexplained iron deficiency anemia, and reports that there was no bleeding found. He reports that he has had a gastric ulcer in the past, but it was not bleeding on this most recent endoscopic evaluation. His Hgb had been relatively stable about 12.    He has been hemodynamically normal here, Hgb 12.2 01/28, 9.6 02/07, 7.9 2.08. Has intermittently been requiring transfusions,  6.2 2.11, s/p 1 unit prbcs, up to 7.8 2/11. He has had diarrhea that has been new since admitted, brown stools. FOBT +. He remains on heparin gtt, plavix and aspirin.     S/p EGD 2/13 with Oral Class Ib on edges in antrum, 10m, treated with coagulation with bipolar probe.     Recommendations:  -Overnight, Hgb 9.2--> 7.7. Hemodynamically stable. Passing brown stool. He is at high risk for re-bleed. Will monitor for now.   -Continue clear liquid diet   -IV PPI BID  -Hgb >7      Note incomplete until finalized by attending signature/attestation.    Gricelda Soria MD  GI/Hepatology Fellow, PGY-4  Long Range Pager: [phone], Short Range Pager: [phone]    MONDAY-FRIDAY 8AM-5PM:  Please message via [employer] or email [email or web address]   On Weekends/Holidays (All day) and Weekdays after 5 PM to 8 AM  For nonurgent consults please email:  Please email [email or web address]    URGENT CONSULTS:  Please contact on call GI team. See Amion schedule ([facility]), Spok paging system ([facility]), or call hospital  ([facility])

## 2025-02-15 DIAGNOSIS — E11.65 TYPE 2 DIABETES MELLITUS WITH HYPERGLYCEMIA: ICD-10-CM

## 2025-02-15 LAB
ANION GAP SERPL CALC-SCNC: 13 MMOL/L — SIGNIFICANT CHANGE UP (ref 5–17)
APTT BLD: 28.6 SEC — SIGNIFICANT CHANGE UP (ref 24.5–35.6)
BUN SERPL-MCNC: 8 MG/DL — SIGNIFICANT CHANGE UP (ref 7–23)
CALCIUM SERPL-MCNC: 8.7 MG/DL — SIGNIFICANT CHANGE UP (ref 8.4–10.5)
CHLORIDE SERPL-SCNC: 102 MMOL/L — SIGNIFICANT CHANGE UP (ref 96–108)
CO2 SERPL-SCNC: 22 MMOL/L — SIGNIFICANT CHANGE UP (ref 22–31)
CREAT SERPL-MCNC: 0.61 MG/DL — SIGNIFICANT CHANGE UP (ref 0.5–1.3)
CULTURE RESULTS: SIGNIFICANT CHANGE UP
CULTURE RESULTS: SIGNIFICANT CHANGE UP
EGFR: 102 ML/MIN/1.73M2 — SIGNIFICANT CHANGE UP
EGFR: 102 ML/MIN/1.73M2 — SIGNIFICANT CHANGE UP
GLUCOSE BLDC GLUCOMTR-MCNC: 115 MG/DL — HIGH (ref 70–99)
GLUCOSE BLDC GLUCOMTR-MCNC: 120 MG/DL — HIGH (ref 70–99)
GLUCOSE BLDC GLUCOMTR-MCNC: 122 MG/DL — HIGH (ref 70–99)
GLUCOSE BLDC GLUCOMTR-MCNC: 122 MG/DL — HIGH (ref 70–99)
GLUCOSE BLDC GLUCOMTR-MCNC: 124 MG/DL — HIGH (ref 70–99)
GLUCOSE BLDC GLUCOMTR-MCNC: 72 MG/DL — SIGNIFICANT CHANGE UP (ref 70–99)
GLUCOSE BLDC GLUCOMTR-MCNC: 90 MG/DL — SIGNIFICANT CHANGE UP (ref 70–99)
GLUCOSE SERPL-MCNC: 72 MG/DL — SIGNIFICANT CHANGE UP (ref 70–99)
H PYLORI AG STL QL: NEGATIVE — SIGNIFICANT CHANGE UP
HCT VFR BLD CALC: 29 % — LOW (ref 39–50)
HCT VFR BLD CALC: 32.6 % — LOW (ref 39–50)
HGB BLD-MCNC: 10.3 G/DL — LOW (ref 13–17)
HGB BLD-MCNC: 9.6 G/DL — LOW (ref 13–17)
MAGNESIUM SERPL-MCNC: 2 MG/DL — SIGNIFICANT CHANGE UP (ref 1.6–2.6)
MCHC RBC-ENTMCNC: 27.8 PG — SIGNIFICANT CHANGE UP (ref 27–34)
MCHC RBC-ENTMCNC: 28.8 PG — SIGNIFICANT CHANGE UP (ref 27–34)
MCHC RBC-ENTMCNC: 31.6 G/DL — LOW (ref 32–36)
MCHC RBC-ENTMCNC: 33.1 G/DL — SIGNIFICANT CHANGE UP (ref 32–36)
MCV RBC AUTO: 87.1 FL — SIGNIFICANT CHANGE UP (ref 80–100)
MCV RBC AUTO: 87.9 FL — SIGNIFICANT CHANGE UP (ref 80–100)
NRBC BLD AUTO-RTO: 0 /100 WBCS — SIGNIFICANT CHANGE UP (ref 0–0)
NRBC BLD AUTO-RTO: 1 /100 WBCS — HIGH (ref 0–0)
PHOSPHATE SERPL-MCNC: 3 MG/DL — SIGNIFICANT CHANGE UP (ref 2.5–4.5)
PLATELET # BLD AUTO: 199 K/UL — SIGNIFICANT CHANGE UP (ref 150–400)
PLATELET # BLD AUTO: 210 K/UL — SIGNIFICANT CHANGE UP (ref 150–400)
POTASSIUM SERPL-MCNC: 3.5 MMOL/L — SIGNIFICANT CHANGE UP (ref 3.5–5.3)
POTASSIUM SERPL-SCNC: 3.5 MMOL/L — SIGNIFICANT CHANGE UP (ref 3.5–5.3)
RBC # BLD: 3.33 M/UL — LOW (ref 4.2–5.8)
RBC # BLD: 3.71 M/UL — LOW (ref 4.2–5.8)
RBC # FLD: 17.6 % — HIGH (ref 10.3–14.5)
RBC # FLD: 18.5 % — HIGH (ref 10.3–14.5)
SODIUM SERPL-SCNC: 137 MMOL/L — SIGNIFICANT CHANGE UP (ref 135–145)
SPECIMEN SOURCE: SIGNIFICANT CHANGE UP
SPECIMEN SOURCE: SIGNIFICANT CHANGE UP
WBC # BLD: 15.18 K/UL — HIGH (ref 3.8–10.5)
WBC # BLD: 17.18 K/UL — HIGH (ref 3.8–10.5)
WBC # FLD AUTO: 15.18 K/UL — HIGH (ref 3.8–10.5)
WBC # FLD AUTO: 17.18 K/UL — HIGH (ref 3.8–10.5)

## 2025-02-15 PROCEDURE — 99232 SBSQ HOSP IP/OBS MODERATE 35: CPT

## 2025-02-15 RX ORDER — HYDROMORPHONE/SOD CHLOR,ISO/PF 2 MG/10 ML
0.2 SYRINGE (ML) INJECTION ONCE
Refills: 0 | Status: DISCONTINUED | OUTPATIENT
Start: 2025-02-15 | End: 2025-02-15

## 2025-02-15 RX ORDER — HEPARIN SODIUM 1000 [USP'U]/ML
5000 INJECTION INTRAVENOUS; SUBCUTANEOUS EVERY 8 HOURS
Refills: 0 | Status: DISCONTINUED | OUTPATIENT
Start: 2025-02-15 | End: 2025-02-16

## 2025-02-15 RX ORDER — INSULIN GLARGINE-YFGN 100 [IU]/ML
15 INJECTION, SOLUTION SUBCUTANEOUS AT BEDTIME
Refills: 0 | Status: DISCONTINUED | OUTPATIENT
Start: 2025-02-15 | End: 2025-02-16

## 2025-02-15 RX ADMIN — OXYCODONE HYDROCHLORIDE 10 MILLIGRAM(S): 30 TABLET ORAL at 15:26

## 2025-02-15 RX ADMIN — Medication 975 MILLIGRAM(S): at 18:20

## 2025-02-15 RX ADMIN — Medication 40 MILLIGRAM(S): at 11:56

## 2025-02-15 RX ADMIN — OXYCODONE HYDROCHLORIDE 10 MILLIGRAM(S): 30 TABLET ORAL at 09:53

## 2025-02-15 RX ADMIN — OXYCODONE HYDROCHLORIDE 10 MILLIGRAM(S): 30 TABLET ORAL at 18:19

## 2025-02-15 RX ADMIN — INSULIN LISPRO 7 UNIT(S): 100 INJECTION, SOLUTION INTRAVENOUS; SUBCUTANEOUS at 08:10

## 2025-02-15 RX ADMIN — HEPARIN SODIUM 5000 UNIT(S): 1000 INJECTION INTRAVENOUS; SUBCUTANEOUS at 15:41

## 2025-02-15 RX ADMIN — OXYCODONE HYDROCHLORIDE 10 MILLIGRAM(S): 30 TABLET ORAL at 10:53

## 2025-02-15 RX ADMIN — Medication 975 MILLIGRAM(S): at 00:22

## 2025-02-15 RX ADMIN — LISINOPRIL 25 MILLIGRAM(S): 30 TABLET ORAL at 05:04

## 2025-02-15 RX ADMIN — Medication 975 MILLIGRAM(S): at 12:55

## 2025-02-15 RX ADMIN — Medication 975 MILLIGRAM(S): at 11:55

## 2025-02-15 RX ADMIN — CLOPIDOGREL BISULFATE 75 MILLIGRAM(S): 75 TABLET, FILM COATED ORAL at 11:56

## 2025-02-15 RX ADMIN — Medication 975 MILLIGRAM(S): at 19:09

## 2025-02-15 RX ADMIN — Medication 0.2 MILLIGRAM(S): at 21:27

## 2025-02-15 RX ADMIN — INSULIN LISPRO 7 UNIT(S): 100 INJECTION, SOLUTION INTRAVENOUS; SUBCUTANEOUS at 18:19

## 2025-02-15 RX ADMIN — HEPARIN SODIUM 5000 UNIT(S): 1000 INJECTION INTRAVENOUS; SUBCUTANEOUS at 22:23

## 2025-02-15 RX ADMIN — SODIUM CHLORIDE 120 MILLILITER(S): 9 INJECTION, SOLUTION INTRAVENOUS at 08:11

## 2025-02-15 RX ADMIN — OXYCODONE HYDROCHLORIDE 10 MILLIGRAM(S): 30 TABLET ORAL at 01:22

## 2025-02-15 RX ADMIN — INSULIN LISPRO 7 UNIT(S): 100 INJECTION, SOLUTION INTRAVENOUS; SUBCUTANEOUS at 14:26

## 2025-02-15 RX ADMIN — Medication 1 TABLET(S): at 18:20

## 2025-02-15 RX ADMIN — OXYCODONE HYDROCHLORIDE 10 MILLIGRAM(S): 30 TABLET ORAL at 19:09

## 2025-02-15 RX ADMIN — ATORVASTATIN CALCIUM 40 MILLIGRAM(S): 80 TABLET, FILM COATED ORAL at 22:23

## 2025-02-15 RX ADMIN — Medication 81 MILLIGRAM(S): at 11:56

## 2025-02-15 RX ADMIN — GABAPENTIN 100 MILLIGRAM(S): 400 CAPSULE ORAL at 11:56

## 2025-02-15 RX ADMIN — OXYCODONE HYDROCHLORIDE 10 MILLIGRAM(S): 30 TABLET ORAL at 02:18

## 2025-02-15 RX ADMIN — Medication 975 MILLIGRAM(S): at 23:43

## 2025-02-15 RX ADMIN — OXYCODONE HYDROCHLORIDE 10 MILLIGRAM(S): 30 TABLET ORAL at 14:26

## 2025-02-15 RX ADMIN — Medication 975 MILLIGRAM(S): at 05:04

## 2025-02-15 RX ADMIN — CYANOCOBALAMIN 1000 MICROGRAM(S): 1000 INJECTION INTRAMUSCULAR; SUBCUTANEOUS at 11:55

## 2025-02-15 RX ADMIN — INSULIN GLARGINE-YFGN 15 UNIT(S): 100 INJECTION, SOLUTION SUBCUTANEOUS at 22:24

## 2025-02-15 RX ADMIN — Medication 0.2 MILLIGRAM(S): at 20:27

## 2025-02-15 RX ADMIN — Medication 975 MILLIGRAM(S): at 05:57

## 2025-02-15 NOTE — PROGRESS NOTE ADULT - SUBJECTIVE AND OBJECTIVE BOX
SURGERY DAILY PROGRESS NOTE     Patient is a 70y old  Male who presents with a chief complaint of Vascular surgery (13 Feb 2025 15:26)        OVERNIGHT EVENTS: Pt had some pain overnight, oxy 10mg given 30 mins early. On assessment, L lateral signal still intact.       SUBJECTIVE:   Patient seen and examined at bedside with surgical team ***      OBJECTIVE     Vital Signs Last 24 Hrs  T(C): 36.8 (15 Feb 2025 01:18), Max: 37.3 (14 Feb 2025 09:03)  T(F): 98.2 (15 Feb 2025 01:18), Max: 99.2 (14 Feb 2025 09:03)  HR: 86 (15 Feb 2025 01:18) (79 - 94)  BP: 144/71 (15 Feb 2025 01:18) (141/72 - 168/83)  BP(mean): --  RR: 18 (15 Feb 2025 01:18) (18 - 18)  SpO2: 96% (15 Feb 2025 01:18) (95% - 100%)    Parameters below as of 15 Feb 2025 01:18  Patient On (Oxygen Delivery Method): room air    I&O's Detail    13 Feb 2025 07:01  -  14 Feb 2025 07:00  --------------------------------------------------------  IN:    Heparin: 56 mL    Heparin: 112 mL    Lactated Ringers: 1440 mL    Oral Fluid: 1200 mL  Total IN: 2808 mL    OUT:    Voided (mL): 1275 mL  Total OUT: 1275 mL    Total NET: 1533 mL      14 Feb 2025 07:01  -  15 Feb 2025 02:50  --------------------------------------------------------  IN:    IV PiggyBack: 500 mL    Lactated Ringers: 2400 mL    Oral Fluid: 1200 mL    PRBCs (Packed Red Blood Cells): 300 mL  Total IN: 4400 mL    OUT:    Voided (mL): 2100 mL  Total OUT: 2100 mL    Total NET: 2300 mL          Physical Exam  Constitutional: NAD  Respiratory: Breathing comfortably on RA  Gastrointestinal: Soft, nontender, nondistended  Extremities: no LLE lateral signal, no distal signals. Big toe with ischemic appearing changes. L foot mild swelling ***  Psychiatric:  A&Ox3, appropriate affect            Medications  MEDICATIONS  (STANDING):  acetaminophen     Tablet .. 975 milliGRAM(s) Oral every 6 hours  aspirin  chewable 81 milliGRAM(s) Oral daily  atenolol  Tablet 25 milliGRAM(s) Oral daily  atorvastatin 40 milliGRAM(s) Oral at bedtime  clopidogrel Tablet 75 milliGRAM(s) Oral daily  cyanocobalamin 1000 MICROGram(s) Oral daily  dextrose 5%. 1000 milliLiter(s) (100 mL/Hr) IV Continuous <Continuous>  dextrose 5%. 1000 milliLiter(s) (50 mL/Hr) IV Continuous <Continuous>  dextrose 5%. 1000 milliLiter(s) (50 mL/Hr) IV Continuous <Continuous>  dextrose 5%. 1000 milliLiter(s) (100 mL/Hr) IV Continuous <Continuous>  dextrose 50% Injectable 25 Gram(s) IV Push once  dextrose 50% Injectable 12.5 Gram(s) IV Push once  dextrose 50% Injectable 25 Gram(s) IV Push once  dextrose 50% Injectable 25 Gram(s) IV Push once  dextrose 50% Injectable 12.5 Gram(s) IV Push once  dextrose 50% Injectable 25 Gram(s) IV Push once  gabapentin 100 milliGRAM(s) Oral daily  glucagon  Injectable 1 milliGRAM(s) IntraMuscular once  glucagon  Injectable 1 milliGRAM(s) IntraMuscular once  influenza  Vaccine (HIGH DOSE) 0.5 milliLiter(s) IntraMuscular once  insulin glargine Injectable (LANTUS) 20 Unit(s) SubCutaneous at bedtime  insulin lispro (ADMELOG) corrective regimen sliding scale   SubCutaneous three times a day before meals  insulin lispro (ADMELOG) corrective regimen sliding scale   SubCutaneous at bedtime  insulin lispro Injectable (ADMELOG) 7 Unit(s) SubCutaneous three times a day before meals  lactated ringers. 1000 milliLiter(s) (120 mL/Hr) IV Continuous <Continuous>  lactobacillus acidophilus 1 Tablet(s) Oral with dinner  pantoprazole  Injectable 40 milliGRAM(s) IV Push daily    MEDICATIONS  (PRN):  dextrose Oral Gel 15 Gram(s) Oral once PRN Blood Glucose LESS THAN 70 milliGRAM(s)/deciliter  dextrose Oral Gel 15 Gram(s) Oral once PRN Blood Glucose LESS THAN 70 milliGRAM(s)/deciliter  oxyCODONE    IR 5 milliGRAM(s) Oral every 4 hours PRN Moderate Pain (4 - 6)  oxyCODONE    IR 10 milliGRAM(s) Oral every 4 hours PRN Severe Pain (7 - 10)        LABS:                        7.7    15.07 )-----------( 160      ( 14 Feb 2025 07:29 )             23.6     02-14    133[L]  |  100  |  11  ----------------------------<  195[H]  3.8   |  19[L]  |  0.50    Ca    7.9[L]      14 Feb 2025 07:32  Phos  1.9     02-14  Mg     1.9     02-14    TPro  x   /  Alb  x   /  TBili  0.4  /  DBili  0.1  /  AST  x   /  ALT  x   /  AlkPhos  x   02-13    PT/INR - ( 14 Feb 2025 07:36 )   PT: 12.1 sec;   INR: 1.05 ratio         PTT - ( 14 Feb 2025 07:36 )  PTT:128.3 sec    Urinalysis Basic - ( 14 Feb 2025 07:32 )    Color: x / Appearance: x / SG: x / pH: x  Gluc: 195 mg/dL / Ketone: x  / Bili: x / Urobili: x   Blood: x / Protein: x / Nitrite: x   Leuk Esterase: x / RBC: x / WBC x   Sq Epi: x / Non Sq Epi: x / Bacteria: x       SURGERY DAILY PROGRESS NOTE     Patient is a 70y old  Male who presents with a chief complaint of Vascular surgery (13 Feb 2025 15:26)        24 Hour Events:  - s/p 1u pRBC  - Pt had some pain overnight, oxy 10mg given 30 mins early. On assessment, L lateral signal still intact.       SUBJECTIVE:   Patient seen and examined at bedside with surgical team. Complains of LLE pain.      OBJECTIVE     Vital Signs Last 24 Hrs  T(C): 36.8 (15 Feb 2025 01:18), Max: 37.3 (14 Feb 2025 09:03)  T(F): 98.2 (15 Feb 2025 01:18), Max: 99.2 (14 Feb 2025 09:03)  HR: 86 (15 Feb 2025 01:18) (79 - 94)  BP: 144/71 (15 Feb 2025 01:18) (141/72 - 168/83)  BP(mean): --  RR: 18 (15 Feb 2025 01:18) (18 - 18)  SpO2: 96% (15 Feb 2025 01:18) (95% - 100%)    Parameters below as of 15 Feb 2025 01:18  Patient On (Oxygen Delivery Method): room air    I&O's Detail    13 Feb 2025 07:01  -  14 Feb 2025 07:00  --------------------------------------------------------  IN:    Heparin: 56 mL    Heparin: 112 mL    Lactated Ringers: 1440 mL    Oral Fluid: 1200 mL  Total IN: 2808 mL    OUT:    Voided (mL): 1275 mL  Total OUT: 1275 mL    Total NET: 1533 mL      14 Feb 2025 07:01  -  15 Feb 2025 02:50  --------------------------------------------------------  IN:    IV PiggyBack: 500 mL    Lactated Ringers: 2400 mL    Oral Fluid: 1200 mL    PRBCs (Packed Red Blood Cells): 300 mL  Total IN: 4400 mL    OUT:    Voided (mL): 2100 mL  Total OUT: 2100 mL    Total NET: 2300 mL          Physical Exam  Constitutional: NAD  Respiratory: Breathing comfortably on RA  Gastrointestinal: Soft, nontender, nondistended  Extremities: no LLE lateral signal on AM exam, no distal signals. Big toe, 2nd-3rd, and 5th digit with ischemic appearing changes. L foot mild swelling.   Psychiatric:  A&Ox3, appropriate affect            Medications  MEDICATIONS  (STANDING):  acetaminophen     Tablet .. 975 milliGRAM(s) Oral every 6 hours  aspirin  chewable 81 milliGRAM(s) Oral daily  atenolol  Tablet 25 milliGRAM(s) Oral daily  atorvastatin 40 milliGRAM(s) Oral at bedtime  clopidogrel Tablet 75 milliGRAM(s) Oral daily  cyanocobalamin 1000 MICROGram(s) Oral daily  dextrose 5%. 1000 milliLiter(s) (100 mL/Hr) IV Continuous <Continuous>  dextrose 5%. 1000 milliLiter(s) (50 mL/Hr) IV Continuous <Continuous>  dextrose 5%. 1000 milliLiter(s) (50 mL/Hr) IV Continuous <Continuous>  dextrose 5%. 1000 milliLiter(s) (100 mL/Hr) IV Continuous <Continuous>  dextrose 50% Injectable 25 Gram(s) IV Push once  dextrose 50% Injectable 12.5 Gram(s) IV Push once  dextrose 50% Injectable 25 Gram(s) IV Push once  dextrose 50% Injectable 25 Gram(s) IV Push once  dextrose 50% Injectable 12.5 Gram(s) IV Push once  dextrose 50% Injectable 25 Gram(s) IV Push once  gabapentin 100 milliGRAM(s) Oral daily  glucagon  Injectable 1 milliGRAM(s) IntraMuscular once  glucagon  Injectable 1 milliGRAM(s) IntraMuscular once  influenza  Vaccine (HIGH DOSE) 0.5 milliLiter(s) IntraMuscular once  insulin glargine Injectable (LANTUS) 20 Unit(s) SubCutaneous at bedtime  insulin lispro (ADMELOG) corrective regimen sliding scale   SubCutaneous three times a day before meals  insulin lispro (ADMELOG) corrective regimen sliding scale   SubCutaneous at bedtime  insulin lispro Injectable (ADMELOG) 7 Unit(s) SubCutaneous three times a day before meals  lactated ringers. 1000 milliLiter(s) (120 mL/Hr) IV Continuous <Continuous>  lactobacillus acidophilus 1 Tablet(s) Oral with dinner  pantoprazole  Injectable 40 milliGRAM(s) IV Push daily    MEDICATIONS  (PRN):  dextrose Oral Gel 15 Gram(s) Oral once PRN Blood Glucose LESS THAN 70 milliGRAM(s)/deciliter  dextrose Oral Gel 15 Gram(s) Oral once PRN Blood Glucose LESS THAN 70 milliGRAM(s)/deciliter  oxyCODONE    IR 5 milliGRAM(s) Oral every 4 hours PRN Moderate Pain (4 - 6)  oxyCODONE    IR 10 milliGRAM(s) Oral every 4 hours PRN Severe Pain (7 - 10)        LABS:                        7.7    15.07 )-----------( 160      ( 14 Feb 2025 07:29 )             23.6     02-14    133[L]  |  100  |  11  ----------------------------<  195[H]  3.8   |  19[L]  |  0.50    Ca    7.9[L]      14 Feb 2025 07:32  Phos  1.9     02-14  Mg     1.9     02-14    TPro  x   /  Alb  x   /  TBili  0.4  /  DBili  0.1  /  AST  x   /  ALT  x   /  AlkPhos  x   02-13    PT/INR - ( 14 Feb 2025 07:36 )   PT: 12.1 sec;   INR: 1.05 ratio         PTT - ( 14 Feb 2025 07:36 )  PTT:128.3 sec    Urinalysis Basic - ( 14 Feb 2025 07:32 )    Color: x / Appearance: x / SG: x / pH: x  Gluc: 195 mg/dL / Ketone: x  / Bili: x / Urobili: x   Blood: x / Protein: x / Nitrite: x   Leuk Esterase: x / RBC: x / WBC x   Sq Epi: x / Non Sq Epi: x / Bacteria: x

## 2025-02-15 NOTE — PROGRESS NOTE ADULT - SUBJECTIVE AND OBJECTIVE BOX
Diabetes Follow up note:    Chief complaint: T2DM    Interval Hx: Fasting glucose in 70-90 range this AM. Pt seen at bedside. On CLD, tolerating well. No hypoglycemia symptoms. Having LLE pain.     Review of Systems:  General: as above  GI: Tolerating POs. Denies N/V/D/Abd pain  CV: Denies CP/SOB  ENDO: No S&Sx of hypoglycemia    MEDS:  atorvastatin 40 milliGRAM(s) Oral at bedtime  insulin glargine Injectable (LANTUS) 15 Unit(s) SubCutaneous at bedtime  insulin lispro (ADMELOG) corrective regimen sliding scale   SubCutaneous three times a day before meals  insulin lispro (ADMELOG) corrective regimen sliding scale   SubCutaneous at bedtime  insulin lispro Injectable (ADMELOG) 7 Unit(s) SubCutaneous three times a day before meals      Allergies    Advil (Rash)          PE:  General: Male lying in bed. NAD.   Vital Signs Last 24 Hrs  T(C): 36.7 (15 Feb 2025 09:29), Max: 37.3 (14 Feb 2025 14:52)  T(F): 98 (15 Feb 2025 09:29), Max: 99.2 (14 Feb 2025 14:52)  HR: 90 (15 Feb 2025 09:29) (79 - 90)  BP: 151/71 (15 Feb 2025 09:29) (144/71 - 168/83)  BP(mean): --  RR: 18 (15 Feb 2025 09:29) (18 - 18)  SpO2: 97% (15 Feb 2025 09:29) (95% - 100%)    Parameters below as of 15 Feb 2025 09:29  Patient On (Oxygen Delivery Method): room air      Abd: Soft, NT,ND,   Extremities: LLE cool to touch. Staple line intact.   Neuro: A&O X3    LABS:  POCT Blood Glucose.: 90 mg/dL (02-15-25 @ 07:42)  POCT Blood Glucose.: 184 mg/dL (02-14-25 @ 21:16)  POCT Blood Glucose.: 213 mg/dL (02-14-25 @ 17:19)  POCT Blood Glucose.: 235 mg/dL (02-14-25 @ 13:31)  POCT Blood Glucose.: 220 mg/dL (02-14-25 @ 09:01)  POCT Blood Glucose.: 214 mg/dL (02-13-25 @ 21:01)  POCT Blood Glucose.: 222 mg/dL (02-13-25 @ 16:12)  POCT Blood Glucose.: 275 mg/dL (02-13-25 @ 11:36)  POCT Blood Glucose.: 263 mg/dL (02-13-25 @ 06:23)  POCT Blood Glucose.: 301 mg/dL (02-13-25 @ 00:13)  POCT Blood Glucose.: 344 mg/dL (02-12-25 @ 17:37)  POCT Blood Glucose.: 248 mg/dL (02-12-25 @ 14:10)  POCT Blood Glucose.: 268 mg/dL (02-12-25 @ 12:35)                            10.3   15.18 )-----------( 199      ( 15 Feb 2025 07:22 )             32.6       02-15    137  |  102  |  8   ----------------------------<  72  3.5   |  22  |  0.61    eGFR: 102    Ca    8.7      02-15  Mg     2.0     02-15  Phos  3.0     02-15    TPro  x   /  Alb  x   /  TBili  0.4  /  DBili  0.1  /  AST  x   /  ALT  x   /  AlkPhos  x   02-13        A1C with Estimated Average Glucose Result: 7.7 % (01-28-25 @ 10:05)  A1C with Estimated Average Glucose Result: 7.4 % (12-06-24 @ 09:06)          Contact number: [place]alia [phone] or [phone]

## 2025-02-15 NOTE — PROGRESS NOTE ADULT - ASSESSMENT
A 70-year-old male with a medical history of hypertension, hyperlipidemia, diabetes mellitus, peripheral artery disease, and aortoiliac occlusive disease, previously underwent outpatient angiography that resulted in a right iliac artery dissection. He is now status post for the creation of a bypass from the left femoral artery to the distal tibial artery using a reversed saphenous vein graft, with a completion angiogram completed on February 7, 2025. The Endocrine team has been consulted due to his uncontrolled diabetes.  s/p EGD on 2/13 which showed bleeding gastric ulcer, remains on cleat liquid diet. BG values now coming down in setting of decreased nutrition past 2 days. BG goal (100-180mg/dl).

## 2025-02-15 NOTE — PROGRESS NOTE ADULT - ASSESSMENT
69M PM of former smoker (1.5 PPD % 50 years; quit 2 years ago),  HTN, HLD, DM, PAD and aortoiliac occlusive disease, who previously underwent an outpatient angiography complicated by right iliac artery dissection. Now S/P Creation of bypass from left femoral artery to distal tibial artery with RSVG and completion angiogram on 2/7/25 s/p 2u pRBC 7.4 from 7.8l not responded to 2nd unit on 2/12/25. EGD completed 2/13 found to have bleeding gastric ulcer w/ hemostasis achieved with cautery. Hgb found to be 6.1 and s/p 2u pRBC.       Plan:   - D/c'ed hep gtt, f/u AM CBC  - 18U Lantus, 6U Lispro; appreciate endocrine recs   - F/u iron studies, haptoglobin, indirect bilirubin LDH   - CK downtrending  - ASA and Plavix (s/p plavix load)  - Statin   - Neurovascular checks, no L bypass signal    - Diet: regular   - PT recs pending further evaluation      Vascular Surgery  [id number], [phone]  69M PM of former smoker (1.5 PPD % 50 years; quit 2 years ago),  HTN, HLD, DM, PAD and aortoiliac occlusive disease, who previously underwent an outpatient angiography complicated by right iliac artery dissection. Now S/P Creation of bypass from left femoral artery to distal tibial artery with RSVG and completion angiogram on 2/7/25 s/p 2u pRBC 7.4 from 7.8l not responded to 2nd unit on 2/12/25. EGD completed 2/13 found to have bleeding gastric ulcer w/ hemostasis achieved with cautery. Hgb found to be 6.1 and s/p 2u pRBC.       Plan:   - Holding hep gtt iso high rebleeding risk of gastric ulcer -> AM CBC 10.3 -> repeat CBC in PM. If stable will evaluate restarting hep gtt   - 18U Lantus, 6U Lispro; appreciate endocrine recs   - F/u iron studies, haptoglobin, indirect bilirubin LDH   - ASA and Plavix (s/p plavix load)  - Statin   - Neurovascular checks, intermittent L bypass signal    - Diet: CLD -> f/u w/ GI if OK to advance  - PT recs pending further evaluation      Vascular Surgery  [id number], [phone]

## 2025-02-16 LAB
ANION GAP SERPL CALC-SCNC: 13 MMOL/L — SIGNIFICANT CHANGE UP (ref 5–17)
APTT BLD: 114.5 SEC — HIGH (ref 24.5–35.6)
APTT BLD: 31.2 SEC — SIGNIFICANT CHANGE UP (ref 24.5–35.6)
BUN SERPL-MCNC: 6 MG/DL — LOW (ref 7–23)
CALCIUM SERPL-MCNC: 9.2 MG/DL — SIGNIFICANT CHANGE UP (ref 8.4–10.5)
CHLORIDE SERPL-SCNC: 103 MMOL/L — SIGNIFICANT CHANGE UP (ref 96–108)
CO2 SERPL-SCNC: 21 MMOL/L — LOW (ref 22–31)
CREAT SERPL-MCNC: 0.64 MG/DL — SIGNIFICANT CHANGE UP (ref 0.5–1.3)
EGFR: 101 ML/MIN/1.73M2 — SIGNIFICANT CHANGE UP
EGFR: 101 ML/MIN/1.73M2 — SIGNIFICANT CHANGE UP
GLUCOSE BLDC GLUCOMTR-MCNC: 122 MG/DL — HIGH (ref 70–99)
GLUCOSE BLDC GLUCOMTR-MCNC: 174 MG/DL — HIGH (ref 70–99)
GLUCOSE BLDC GLUCOMTR-MCNC: 193 MG/DL — HIGH (ref 70–99)
GLUCOSE BLDC GLUCOMTR-MCNC: 195 MG/DL — HIGH (ref 70–99)
GLUCOSE BLDC GLUCOMTR-MCNC: 65 MG/DL — LOW (ref 70–99)
GLUCOSE BLDC GLUCOMTR-MCNC: 84 MG/DL — SIGNIFICANT CHANGE UP (ref 70–99)
GLUCOSE SERPL-MCNC: 57 MG/DL — LOW (ref 70–99)
HCT VFR BLD CALC: 31.5 % — LOW (ref 39–50)
HCT VFR BLD CALC: 31.7 % — LOW (ref 39–50)
HGB BLD-MCNC: 10.1 G/DL — LOW (ref 13–17)
HGB BLD-MCNC: 10.1 G/DL — LOW (ref 13–17)
MAGNESIUM SERPL-MCNC: 2.2 MG/DL — SIGNIFICANT CHANGE UP (ref 1.6–2.6)
MCHC RBC-ENTMCNC: 28.2 PG — SIGNIFICANT CHANGE UP (ref 27–34)
MCHC RBC-ENTMCNC: 28.2 PG — SIGNIFICANT CHANGE UP (ref 27–34)
MCHC RBC-ENTMCNC: 31.9 G/DL — LOW (ref 32–36)
MCHC RBC-ENTMCNC: 32.1 G/DL — SIGNIFICANT CHANGE UP (ref 32–36)
MCV RBC AUTO: 88 FL — SIGNIFICANT CHANGE UP (ref 80–100)
MCV RBC AUTO: 88.5 FL — SIGNIFICANT CHANGE UP (ref 80–100)
NRBC BLD AUTO-RTO: 0 /100 WBCS — SIGNIFICANT CHANGE UP (ref 0–0)
NRBC BLD AUTO-RTO: 0 /100 WBCS — SIGNIFICANT CHANGE UP (ref 0–0)
PHOSPHATE SERPL-MCNC: 3.5 MG/DL — SIGNIFICANT CHANGE UP (ref 2.5–4.5)
PLATELET # BLD AUTO: 253 K/UL — SIGNIFICANT CHANGE UP (ref 150–400)
PLATELET # BLD AUTO: 284 K/UL — SIGNIFICANT CHANGE UP (ref 150–400)
POTASSIUM SERPL-MCNC: 3.7 MMOL/L — SIGNIFICANT CHANGE UP (ref 3.5–5.3)
POTASSIUM SERPL-SCNC: 3.7 MMOL/L — SIGNIFICANT CHANGE UP (ref 3.5–5.3)
RBC # BLD: 3.58 M/UL — LOW (ref 4.2–5.8)
RBC # BLD: 3.58 M/UL — LOW (ref 4.2–5.8)
RBC # FLD: 17.7 % — HIGH (ref 10.3–14.5)
RBC # FLD: 18.2 % — HIGH (ref 10.3–14.5)
SODIUM SERPL-SCNC: 137 MMOL/L — SIGNIFICANT CHANGE UP (ref 135–145)
WBC # BLD: 18.85 K/UL — HIGH (ref 3.8–10.5)
WBC # BLD: 21.01 K/UL — HIGH (ref 3.8–10.5)
WBC # FLD AUTO: 18.85 K/UL — HIGH (ref 3.8–10.5)
WBC # FLD AUTO: 21.01 K/UL — HIGH (ref 3.8–10.5)

## 2025-02-16 RX ORDER — PIPERACILLIN-TAZO-DEXTROSE,ISO 3.375G/5
3.38 IV SOLUTION, PIGGYBACK PREMIX FROZEN(ML) INTRAVENOUS ONCE
Refills: 0 | Status: COMPLETED | OUTPATIENT
Start: 2025-02-16 | End: 2025-02-16

## 2025-02-16 RX ORDER — PIPERACILLIN-TAZO-DEXTROSE,ISO 3.375G/5
3.38 IV SOLUTION, PIGGYBACK PREMIX FROZEN(ML) INTRAVENOUS EVERY 8 HOURS
Refills: 0 | Status: DISCONTINUED | OUTPATIENT
Start: 2025-02-16 | End: 2025-02-19

## 2025-02-16 RX ORDER — HEPARIN SODIUM 1000 [USP'U]/ML
1200 INJECTION INTRAVENOUS; SUBCUTANEOUS
Qty: 25000 | Refills: 0 | Status: DISCONTINUED | OUTPATIENT
Start: 2025-02-16 | End: 2025-02-17

## 2025-02-16 RX ORDER — VANCOMYCIN HCL IN 5 % DEXTROSE 1.5G/250ML
1000 PLASTIC BAG, INJECTION (ML) INTRAVENOUS EVERY 12 HOURS
Refills: 0 | Status: DISCONTINUED | OUTPATIENT
Start: 2025-02-16 | End: 2025-02-18

## 2025-02-16 RX ORDER — INSULIN GLARGINE-YFGN 100 [IU]/ML
10 INJECTION, SOLUTION SUBCUTANEOUS AT BEDTIME
Refills: 0 | Status: DISCONTINUED | OUTPATIENT
Start: 2025-02-16 | End: 2025-02-17

## 2025-02-16 RX ORDER — HEPARIN SODIUM 1000 [USP'U]/ML
1300 INJECTION INTRAVENOUS; SUBCUTANEOUS
Qty: 25000 | Refills: 0 | Status: DISCONTINUED | OUTPATIENT
Start: 2025-02-16 | End: 2025-02-16

## 2025-02-16 RX ADMIN — Medication 975 MILLIGRAM(S): at 19:28

## 2025-02-16 RX ADMIN — HEPARIN SODIUM 5000 UNIT(S): 1000 INJECTION INTRAVENOUS; SUBCUTANEOUS at 05:15

## 2025-02-16 RX ADMIN — CYANOCOBALAMIN 1000 MICROGRAM(S): 1000 INJECTION INTRAMUSCULAR; SUBCUTANEOUS at 11:57

## 2025-02-16 RX ADMIN — INSULIN GLARGINE-YFGN 10 UNIT(S): 100 INJECTION, SOLUTION SUBCUTANEOUS at 21:49

## 2025-02-16 RX ADMIN — Medication 975 MILLIGRAM(S): at 05:15

## 2025-02-16 RX ADMIN — Medication 81 MILLIGRAM(S): at 11:56

## 2025-02-16 RX ADMIN — Medication 250 MILLIGRAM(S): at 11:56

## 2025-02-16 RX ADMIN — Medication 975 MILLIGRAM(S): at 00:43

## 2025-02-16 RX ADMIN — INSULIN LISPRO 7 UNIT(S): 100 INJECTION, SOLUTION INTRAVENOUS; SUBCUTANEOUS at 18:33

## 2025-02-16 RX ADMIN — INSULIN LISPRO 7 UNIT(S): 100 INJECTION, SOLUTION INTRAVENOUS; SUBCUTANEOUS at 10:09

## 2025-02-16 RX ADMIN — HEPARIN SODIUM 13 UNIT(S)/HR: 1000 INJECTION INTRAVENOUS; SUBCUTANEOUS at 13:35

## 2025-02-16 RX ADMIN — Medication 975 MILLIGRAM(S): at 11:56

## 2025-02-16 RX ADMIN — OXYCODONE HYDROCHLORIDE 10 MILLIGRAM(S): 30 TABLET ORAL at 14:35

## 2025-02-16 RX ADMIN — INSULIN LISPRO 1: 100 INJECTION, SOLUTION INTRAVENOUS; SUBCUTANEOUS at 10:09

## 2025-02-16 RX ADMIN — Medication 1 TABLET(S): at 18:28

## 2025-02-16 RX ADMIN — Medication 975 MILLIGRAM(S): at 06:01

## 2025-02-16 RX ADMIN — Medication 250 MILLIGRAM(S): at 23:49

## 2025-02-16 RX ADMIN — INSULIN LISPRO 1: 100 INJECTION, SOLUTION INTRAVENOUS; SUBCUTANEOUS at 18:28

## 2025-02-16 RX ADMIN — ATORVASTATIN CALCIUM 40 MILLIGRAM(S): 80 TABLET, FILM COATED ORAL at 21:49

## 2025-02-16 RX ADMIN — INSULIN LISPRO 7 UNIT(S): 100 INJECTION, SOLUTION INTRAVENOUS; SUBCUTANEOUS at 14:07

## 2025-02-16 RX ADMIN — CLOPIDOGREL BISULFATE 75 MILLIGRAM(S): 75 TABLET, FILM COATED ORAL at 11:57

## 2025-02-16 RX ADMIN — HEPARIN SODIUM 12 UNIT(S)/HR: 1000 INJECTION INTRAVENOUS; SUBCUTANEOUS at 22:31

## 2025-02-16 RX ADMIN — Medication 200 GRAM(S): at 11:56

## 2025-02-16 RX ADMIN — Medication 975 MILLIGRAM(S): at 18:28

## 2025-02-16 RX ADMIN — OXYCODONE HYDROCHLORIDE 10 MILLIGRAM(S): 30 TABLET ORAL at 02:30

## 2025-02-16 RX ADMIN — Medication 25 GRAM(S): at 16:05

## 2025-02-16 RX ADMIN — Medication 975 MILLIGRAM(S): at 23:06

## 2025-02-16 RX ADMIN — Medication 40 MILLIGRAM(S): at 11:57

## 2025-02-16 RX ADMIN — LISINOPRIL 25 MILLIGRAM(S): 30 TABLET ORAL at 05:15

## 2025-02-16 RX ADMIN — Medication 975 MILLIGRAM(S): at 12:56

## 2025-02-16 RX ADMIN — Medication 25 GRAM(S): at 21:49

## 2025-02-16 RX ADMIN — GABAPENTIN 100 MILLIGRAM(S): 400 CAPSULE ORAL at 11:57

## 2025-02-16 RX ADMIN — OXYCODONE HYDROCHLORIDE 10 MILLIGRAM(S): 30 TABLET ORAL at 13:35

## 2025-02-16 RX ADMIN — OXYCODONE HYDROCHLORIDE 10 MILLIGRAM(S): 30 TABLET ORAL at 01:36

## 2025-02-16 NOTE — PROVIDER CONTACT NOTE (HYPOGLYCEMIA EVENT) - NS PROVIDER CONTACT BACKGROUND-HYPO
Age: 70y    Gender: Male    POCT Blood Glucose:  84 mg/dL (02-16-25 @ 09:33)  65 mg/dL (02-16-25 @ 09:04)  124 mg/dL (02-15-25 @ 22:02)  72 mg/dL (02-15-25 @ 21:28)  122 mg/dL (02-15-25 @ 18:43)  120 mg/dL (02-15-25 @ 17:30)  122 mg/dL (02-15-25 @ 14:18)  115 mg/dL (02-15-25 @ 12:33)      eMAR:atorvastatin   40 milliGRAM(s) Oral (02-15-25 @ 22:23)    insulin glargine Injectable (LANTUS)   15 Unit(s) SubCutaneous (02-15-25 @ 22:24)    insulin lispro Injectable (ADMELOG)   7 Unit(s) SubCutaneous (02-15-25 @ 18:19)   7 Unit(s) SubCutaneous (02-15-25 @ 14:26)

## 2025-02-16 NOTE — PROGRESS NOTE ADULT - ASSESSMENT
69M PM of former smoker (1.5 PPD % 50 years; quit 2 years ago),  HTN, HLD, DM, PAD and aortoiliac occlusive disease, who previously underwent an outpatient angiography complicated by right iliac artery dissection. Now S/P Creation of bypass from left femoral artery to distal tibial artery with RSVG and completion angiogram on 2/7/25 s/p 2u pRBC 7.4 from 7.8l not responded to 2nd unit on 2/12/25. EGD completed 2/13 found to have bleeding gastric ulcer w/ hemostasis achieved with cautery. Hgb found to be 6.1 and s/p 2u pRBC. Patient now w/ ischemic changes to LLE.     Plan:   - Hep gtt restarted  - Advanced to RD  - 18U Lantus, 6U Lispro; appreciate endocrine recs   - F/u iron studies, haptoglobin, indirect bilirubin LDH   - ASA and Plavix (s/p plavix load)  - Statin   - Neurovascular checks, intermittent L bypass signal    - PT recs pending further evaluation      Vascular Surgery  [id number], [phone]

## 2025-02-16 NOTE — PROGRESS NOTE ADULT - SUBJECTIVE AND OBJECTIVE BOX
Vascular Surgery Daily Progress Note    Subjective: Patient examined at bedside this morning. Denies fevers, chills, nausea, vomiting, or motor/sensory changes to LLE. Continued pain in LLE.       piperacillin/tazobactam IVPB.. 3.375  vancomycin  IVPB 1000  aspirin  chewable 81  atenolol  Tablet 25  clopidogrel Tablet 75  heparin  Infusion 1200  piperacillin/tazobactam IVPB.. 3.375  vancomycin  IVPB 1000        Vital Signs Last 24 Hrs  T(C): 37.3 (16 Feb 2025 21:47), Max: 37.9 (16 Feb 2025 17:08)  T(F): 99.2 (16 Feb 2025 21:47), Max: 100.3 (16 Feb 2025 17:08)  HR: 91 (16 Feb 2025 21:47) (91 - 100)  BP: 161/78 (16 Feb 2025 21:47) (148/81 - 170/81)  BP(mean): --  RR: 18 (16 Feb 2025 21:47) (18 - 18)  SpO2: 97% (16 Feb 2025 21:47) (95% - 99%)    Parameters below as of 16 Feb 2025 21:47  Patient On (Oxygen Delivery Method): room air      I&O's Summary    15 Feb 2025 07:01  -  16 Feb 2025 07:00  --------------------------------------------------------  IN: 1780 mL / OUT: 2950 mL / NET: -1170 mL    16 Feb 2025 07:01  -  17 Feb 2025 00:11  --------------------------------------------------------  IN: 1299 mL / OUT: 1200 mL / NET: 99 mL        Physical Exam:  Constitutional: NAD  Respiratory: Breathing comfortably on RA  Gastrointestinal: Soft, nontender, nondistended  Extremities: no LLE lateral signal on AM exam, no distal signals. Big toe, 2nd-3rd, and 5th digit with ischemic appearing changes. L foot mild swelling.   Psychiatric:  A&Ox3, appropriate affect      LABS:                        10.1   21.01 )-----------( 284      ( 16 Feb 2025 19:34 )             31.5     02-16    137  |  103  |  6[L]  ----------------------------<  57[L]  3.7   |  21[L]  |  0.64    Ca    9.2      16 Feb 2025 06:35  Phos  3.5     02-16  Mg     2.2     02-16      PTT - ( 16 Feb 2025 19:34 )  PTT:114.5 sec

## 2025-02-17 LAB
ANION GAP SERPL CALC-SCNC: 13 MMOL/L — SIGNIFICANT CHANGE UP (ref 5–17)
APTT BLD: 173.5 SEC — CRITICAL HIGH (ref 24.5–35.6)
APTT BLD: 199.7 SEC — CRITICAL HIGH (ref 24.5–35.6)
APTT BLD: 89.3 SEC — HIGH (ref 24.5–35.6)
BLD GP AB SCN SERPL QL: NEGATIVE — SIGNIFICANT CHANGE UP
BUN SERPL-MCNC: 6 MG/DL — LOW (ref 7–23)
CALCIUM SERPL-MCNC: 8.7 MG/DL — SIGNIFICANT CHANGE UP (ref 8.4–10.5)
CHLORIDE SERPL-SCNC: 100 MMOL/L — SIGNIFICANT CHANGE UP (ref 96–108)
CO2 SERPL-SCNC: 23 MMOL/L — SIGNIFICANT CHANGE UP (ref 22–31)
CREAT SERPL-MCNC: 0.66 MG/DL — SIGNIFICANT CHANGE UP (ref 0.5–1.3)
EGFR: 100 ML/MIN/1.73M2 — SIGNIFICANT CHANGE UP
EGFR: 100 ML/MIN/1.73M2 — SIGNIFICANT CHANGE UP
GLUCOSE BLDC GLUCOMTR-MCNC: 112 MG/DL — HIGH (ref 70–99)
GLUCOSE BLDC GLUCOMTR-MCNC: 119 MG/DL — HIGH (ref 70–99)
GLUCOSE BLDC GLUCOMTR-MCNC: 183 MG/DL — HIGH (ref 70–99)
GLUCOSE BLDC GLUCOMTR-MCNC: 218 MG/DL — HIGH (ref 70–99)
GLUCOSE SERPL-MCNC: 108 MG/DL — HIGH (ref 70–99)
HCT VFR BLD CALC: 30.3 % — LOW (ref 39–50)
HGB BLD-MCNC: 9.7 G/DL — LOW (ref 13–17)
MAGNESIUM SERPL-MCNC: 2.2 MG/DL — SIGNIFICANT CHANGE UP (ref 1.6–2.6)
MCHC RBC-ENTMCNC: 28.3 PG — SIGNIFICANT CHANGE UP (ref 27–34)
MCHC RBC-ENTMCNC: 32 G/DL — SIGNIFICANT CHANGE UP (ref 32–36)
MCV RBC AUTO: 88.3 FL — SIGNIFICANT CHANGE UP (ref 80–100)
NRBC BLD AUTO-RTO: 0 /100 WBCS — SIGNIFICANT CHANGE UP (ref 0–0)
PHOSPHATE SERPL-MCNC: 2.9 MG/DL — SIGNIFICANT CHANGE UP (ref 2.5–4.5)
PLATELET # BLD AUTO: 304 K/UL — SIGNIFICANT CHANGE UP (ref 150–400)
POTASSIUM SERPL-MCNC: 3.4 MMOL/L — LOW (ref 3.5–5.3)
POTASSIUM SERPL-SCNC: 3.4 MMOL/L — LOW (ref 3.5–5.3)
RBC # BLD: 3.43 M/UL — LOW (ref 4.2–5.8)
RBC # FLD: 17.6 % — HIGH (ref 10.3–14.5)
RH IG SCN BLD-IMP: NEGATIVE — SIGNIFICANT CHANGE UP
SODIUM SERPL-SCNC: 136 MMOL/L — SIGNIFICANT CHANGE UP (ref 135–145)
WBC # BLD: 20.87 K/UL — HIGH (ref 3.8–10.5)
WBC # FLD AUTO: 20.87 K/UL — HIGH (ref 3.8–10.5)

## 2025-02-17 RX ORDER — HEPARIN SODIUM 1000 [USP'U]/ML
1000 INJECTION INTRAVENOUS; SUBCUTANEOUS
Qty: 25000 | Refills: 0 | Status: DISCONTINUED | OUTPATIENT
Start: 2025-02-17 | End: 2025-02-17

## 2025-02-17 RX ORDER — HEPARIN SODIUM 1000 [USP'U]/ML
800 INJECTION INTRAVENOUS; SUBCUTANEOUS
Qty: 25000 | Refills: 0 | Status: DISCONTINUED | OUTPATIENT
Start: 2025-02-17 | End: 2025-02-21

## 2025-02-17 RX ORDER — INSULIN GLARGINE-YFGN 100 [IU]/ML
8 INJECTION, SOLUTION SUBCUTANEOUS AT BEDTIME
Refills: 0 | Status: DISCONTINUED | OUTPATIENT
Start: 2025-02-17 | End: 2025-02-24

## 2025-02-17 RX ADMIN — Medication 975 MILLIGRAM(S): at 18:40

## 2025-02-17 RX ADMIN — OXYCODONE HYDROCHLORIDE 10 MILLIGRAM(S): 30 TABLET ORAL at 08:26

## 2025-02-17 RX ADMIN — HEPARIN SODIUM 8 UNIT(S)/HR: 1000 INJECTION INTRAVENOUS; SUBCUTANEOUS at 15:33

## 2025-02-17 RX ADMIN — HEPARIN SODIUM 8 UNIT(S)/HR: 1000 INJECTION INTRAVENOUS; SUBCUTANEOUS at 19:22

## 2025-02-17 RX ADMIN — OXYCODONE HYDROCHLORIDE 10 MILLIGRAM(S): 30 TABLET ORAL at 22:07

## 2025-02-17 RX ADMIN — ATORVASTATIN CALCIUM 40 MILLIGRAM(S): 80 TABLET, FILM COATED ORAL at 21:21

## 2025-02-17 RX ADMIN — INSULIN LISPRO 7 UNIT(S): 100 INJECTION, SOLUTION INTRAVENOUS; SUBCUTANEOUS at 17:41

## 2025-02-17 RX ADMIN — LISINOPRIL 25 MILLIGRAM(S): 30 TABLET ORAL at 05:20

## 2025-02-17 RX ADMIN — Medication 975 MILLIGRAM(S): at 12:37

## 2025-02-17 RX ADMIN — Medication 25 GRAM(S): at 13:33

## 2025-02-17 RX ADMIN — Medication 1 TABLET(S): at 17:40

## 2025-02-17 RX ADMIN — INSULIN LISPRO 7 UNIT(S): 100 INJECTION, SOLUTION INTRAVENOUS; SUBCUTANEOUS at 13:28

## 2025-02-17 RX ADMIN — Medication 81 MILLIGRAM(S): at 11:37

## 2025-02-17 RX ADMIN — OXYCODONE HYDROCHLORIDE 10 MILLIGRAM(S): 30 TABLET ORAL at 13:33

## 2025-02-17 RX ADMIN — OXYCODONE HYDROCHLORIDE 10 MILLIGRAM(S): 30 TABLET ORAL at 03:58

## 2025-02-17 RX ADMIN — Medication 25 GRAM(S): at 21:21

## 2025-02-17 RX ADMIN — Medication 975 MILLIGRAM(S): at 23:14

## 2025-02-17 RX ADMIN — CLOPIDOGREL BISULFATE 75 MILLIGRAM(S): 75 TABLET, FILM COATED ORAL at 11:37

## 2025-02-17 RX ADMIN — HEPARIN SODIUM 8 UNIT(S)/HR: 1000 INJECTION INTRAVENOUS; SUBCUTANEOUS at 22:08

## 2025-02-17 RX ADMIN — Medication 975 MILLIGRAM(S): at 05:20

## 2025-02-17 RX ADMIN — INSULIN GLARGINE-YFGN 8 UNIT(S): 100 INJECTION, SOLUTION SUBCUTANEOUS at 21:47

## 2025-02-17 RX ADMIN — OXYCODONE HYDROCHLORIDE 10 MILLIGRAM(S): 30 TABLET ORAL at 09:26

## 2025-02-17 RX ADMIN — Medication 975 MILLIGRAM(S): at 00:06

## 2025-02-17 RX ADMIN — HEPARIN SODIUM 10 UNIT(S)/HR: 1000 INJECTION INTRAVENOUS; SUBCUTANEOUS at 07:27

## 2025-02-17 RX ADMIN — Medication 25 GRAM(S): at 05:20

## 2025-02-17 RX ADMIN — INSULIN LISPRO 1: 100 INJECTION, SOLUTION INTRAVENOUS; SUBCUTANEOUS at 17:41

## 2025-02-17 RX ADMIN — Medication 975 MILLIGRAM(S): at 06:20

## 2025-02-17 RX ADMIN — GABAPENTIN 100 MILLIGRAM(S): 400 CAPSULE ORAL at 11:39

## 2025-02-17 RX ADMIN — OXYCODONE HYDROCHLORIDE 10 MILLIGRAM(S): 30 TABLET ORAL at 17:37

## 2025-02-17 RX ADMIN — Medication 100 MILLIEQUIVALENT(S): at 11:43

## 2025-02-17 RX ADMIN — Medication 100 MILLIEQUIVALENT(S): at 17:38

## 2025-02-17 RX ADMIN — OXYCODONE HYDROCHLORIDE 10 MILLIGRAM(S): 30 TABLET ORAL at 14:33

## 2025-02-17 RX ADMIN — Medication 975 MILLIGRAM(S): at 11:37

## 2025-02-17 RX ADMIN — Medication 40 MILLIGRAM(S): at 11:37

## 2025-02-17 RX ADMIN — Medication 100 MILLIEQUIVALENT(S): at 15:05

## 2025-02-17 RX ADMIN — Medication 975 MILLIGRAM(S): at 17:40

## 2025-02-17 RX ADMIN — CYANOCOBALAMIN 1000 MICROGRAM(S): 1000 INJECTION INTRAMUSCULAR; SUBCUTANEOUS at 11:37

## 2025-02-17 RX ADMIN — OXYCODONE HYDROCHLORIDE 10 MILLIGRAM(S): 30 TABLET ORAL at 18:40

## 2025-02-17 RX ADMIN — Medication 250 MILLIGRAM(S): at 11:57

## 2025-02-17 RX ADMIN — OXYCODONE HYDROCHLORIDE 10 MILLIGRAM(S): 30 TABLET ORAL at 02:58

## 2025-02-17 RX ADMIN — OXYCODONE HYDROCHLORIDE 10 MILLIGRAM(S): 30 TABLET ORAL at 23:14

## 2025-02-17 NOTE — PROGRESS NOTE ADULT - ASSESSMENT
69M PM of former smoker (1.5 PPD % 50 years; quit 2 years ago),  HTN, HLD, DM, PAD and aortoiliac occlusive disease, who previously underwent an outpatient angiography complicated by right iliac artery dissection. Now S/P Creation of bypass from left femoral artery to distal tibial artery with RSVG and completion angiogram on 2/7/25 s/p 2u pRBC 7.4 from 7.8l not responded to 2nd unit on 2/12/25. EGD completed 2/13 found to have bleeding gastric ulcer w/ hemostasis achieved with cautery. Hgb found to be 6.1 and s/p 2u pRBC. Patient now w/ ischemic changes to LLE.     Plan:   - Hep gtt restarted  - Advanced to RD  - 18U Lantus, 6U Lispro; appreciate endocrine recs   - F/u iron studies, haptoglobin, indirect bilirubin LDH   - ASA and Plavix (s/p plavix load)  - Statin   - Neurovascular checks, intermittent L bypass signal    - PT recs pending further evaluation      Vascular Surgery  [id number], [phone]  69M PM of former smoker (1.5 PPD % 50 years; quit 2 years ago),  HTN, HLD, DM, PAD and aortoiliac occlusive disease, who previously underwent an outpatient angiography complicated by right iliac artery dissection. Now S/P Creation of bypass from left femoral artery to distal tibial artery with RSVG and completion angiogram on 2/7/25 s/p 2u pRBC 7.4 from 7.8l not responded to 2nd unit on 2/12/25. EGD completed 2/13 found to have bleeding gastric ulcer w/ hemostasis achieved with cautery. Hgb found to be 6.1 and s/p 2u pRBC. Patient now w/ ischemic changes to LLE.     Plan:   - Hep gtt restarted; fu next aptt at 3 pm   - Diet: Regular   - 18U Lantus, 6U Lispro; appreciate endocrine recs   - ASA and Plavix (s/p plavix load)  - Statin   - Neurovascular checks, intermittent L bypass signal    - PT recs pending further evaluation      Vascular Surgery  [id number], [phone]

## 2025-02-17 NOTE — PROGRESS NOTE ADULT - SUBJECTIVE AND OBJECTIVE BOX
SURGERY DAILY PROGRESS NOTE     Patient is a 70y old  Male who presents with a chief complaint of Vascular surgery (13 Feb 2025 15:26)        24 HOUR EVENTS:     OVERNIGHT EVENTS: NAEON, heparin gtt adjusted.       SUBJECTIVE:   Patient seen and examined at bedside with surgical team, patient without complaints.       OBJECTIVE     Vital Signs Last 24 Hrs  T(C): 37.3 (17 Feb 2025 01:17), Max: 37.9 (16 Feb 2025 17:08)  T(F): 99.2 (17 Feb 2025 01:17), Max: 100.3 (16 Feb 2025 17:08)  HR: 99 (17 Feb 2025 01:17) (91 - 100)  BP: 145/68 (17 Feb 2025 01:17) (145/68 - 170/81)  BP(mean): --  RR: 18 (17 Feb 2025 01:17) (18 - 18)  SpO2: 97% (17 Feb 2025 01:17) (97% - 99%)    Parameters below as of 17 Feb 2025 01:17  Patient On (Oxygen Delivery Method): room air    I&O's Detail    15 Feb 2025 07:01  -  16 Feb 2025 07:00  --------------------------------------------------------  IN:    Lactated Ringers: 240 mL    Oral Fluid: 1540 mL  Total IN: 1780 mL    OUT:    Voided (mL): 2950 mL  Total OUT: 2950 mL    Total NET: -1170 mL      16 Feb 2025 07:01  -  17 Feb 2025 03:57  --------------------------------------------------------  IN:    Heparin: 117 mL    Heparin: 48 mL    IV PiggyBack: 550 mL    Oral Fluid: 620 mL  Total IN: 1335 mL    OUT:    Voided (mL): 1450 mL  Total OUT: 1450 mL    Total NET: -115 mL          Physical Exam  Constitutional: NAD  Respiratory: Breathing comfortably on RA  Gastrointestinal: Soft, nontender, nondistended  Extremities: no LLE lateral signal on AM exam, no distal signals. Big toe, 2nd-3rd, and 5th digit with ischemic appearing changes. L foot mild swelling. ***  Psychiatric:  A&Ox3, appropriate affect            Medications  MEDICATIONS  (STANDING):  acetaminophen     Tablet .. 975 milliGRAM(s) Oral every 6 hours  aspirin  chewable 81 milliGRAM(s) Oral daily  atenolol  Tablet 25 milliGRAM(s) Oral daily  atorvastatin 40 milliGRAM(s) Oral at bedtime  clopidogrel Tablet 75 milliGRAM(s) Oral daily  cyanocobalamin 1000 MICROGram(s) Oral daily  dextrose 5%. 1000 milliLiter(s) (100 mL/Hr) IV Continuous <Continuous>  dextrose 5%. 1000 milliLiter(s) (50 mL/Hr) IV Continuous <Continuous>  dextrose 5%. 1000 milliLiter(s) (50 mL/Hr) IV Continuous <Continuous>  dextrose 5%. 1000 milliLiter(s) (100 mL/Hr) IV Continuous <Continuous>  dextrose 50% Injectable 25 Gram(s) IV Push once  dextrose 50% Injectable 12.5 Gram(s) IV Push once  dextrose 50% Injectable 25 Gram(s) IV Push once  dextrose 50% Injectable 25 Gram(s) IV Push once  dextrose 50% Injectable 12.5 Gram(s) IV Push once  dextrose 50% Injectable 25 Gram(s) IV Push once  gabapentin 100 milliGRAM(s) Oral daily  glucagon  Injectable 1 milliGRAM(s) IntraMuscular once  glucagon  Injectable 1 milliGRAM(s) IntraMuscular once  heparin  Infusion 1200 Unit(s)/Hr (12 mL/Hr) IV Continuous <Continuous>  influenza  Vaccine (HIGH DOSE) 0.5 milliLiter(s) IntraMuscular once  insulin glargine Injectable (LANTUS) 10 Unit(s) SubCutaneous at bedtime  insulin lispro (ADMELOG) corrective regimen sliding scale   SubCutaneous three times a day before meals  insulin lispro (ADMELOG) corrective regimen sliding scale   SubCutaneous at bedtime  insulin lispro Injectable (ADMELOG) 7 Unit(s) SubCutaneous three times a day before meals  lactobacillus acidophilus 1 Tablet(s) Oral with dinner  pantoprazole  Injectable 40 milliGRAM(s) IV Push daily  piperacillin/tazobactam IVPB.. 3.375 Gram(s) IV Intermittent every 8 hours  vancomycin  IVPB 1000 milliGRAM(s) IV Intermittent every 12 hours    MEDICATIONS  (PRN):  dextrose Oral Gel 15 Gram(s) Oral once PRN Blood Glucose LESS THAN 70 milliGRAM(s)/deciliter  dextrose Oral Gel 15 Gram(s) Oral once PRN Blood Glucose LESS THAN 70 milliGRAM(s)/deciliter  oxyCODONE    IR 5 milliGRAM(s) Oral every 4 hours PRN Moderate Pain (4 - 6)  oxyCODONE    IR 10 milliGRAM(s) Oral every 4 hours PRN Severe Pain (7 - 10)        LABS:                        10.1   21.01 )-----------( 284      ( 16 Feb 2025 19:34 )             31.5     02-16    137  |  103  |  6[L]  ----------------------------<  57[L]  3.7   |  21[L]  |  0.64    Ca    9.2      16 Feb 2025 06:35  Phos  3.5     02-16  Mg     2.2     02-16      PTT - ( 16 Feb 2025 19:34 )  PTT:114.5 sec    Urinalysis Basic - ( 16 Feb 2025 06:35 )    Color: x / Appearance: x / SG: x / pH: x  Gluc: 57 mg/dL / Ketone: x  / Bili: x / Urobili: x   Blood: x / Protein: x / Nitrite: x   Leuk Esterase: x / RBC: x / WBC x   Sq Epi: x / Non Sq Epi: x / Bacteria: x       SURGERY DAILY PROGRESS NOTE     Patient is a 70y old  Male who presents with a chief complaint of Vascular surgery (13 Feb 2025 15:26)        24 HOUR EVENTS:     OVERNIGHT EVENTS: NAEON, heparin gtt adjusted.       SUBJECTIVE:   Patient seen and examined at bedside with surgical team, patient without complaints.       OBJECTIVE     Vital Signs Last 24 Hrs  T(C): 37.3 (17 Feb 2025 01:17), Max: 37.9 (16 Feb 2025 17:08)  T(F): 99.2 (17 Feb 2025 01:17), Max: 100.3 (16 Feb 2025 17:08)  HR: 99 (17 Feb 2025 01:17) (91 - 100)  BP: 145/68 (17 Feb 2025 01:17) (145/68 - 170/81)  BP(mean): --  RR: 18 (17 Feb 2025 01:17) (18 - 18)  SpO2: 97% (17 Feb 2025 01:17) (97% - 99%)    Parameters below as of 17 Feb 2025 01:17  Patient On (Oxygen Delivery Method): room air    I&O's Detail    15 Feb 2025 07:01  -  16 Feb 2025 07:00  --------------------------------------------------------  IN:    Lactated Ringers: 240 mL    Oral Fluid: 1540 mL  Total IN: 1780 mL    OUT:    Voided (mL): 2950 mL  Total OUT: 2950 mL    Total NET: -1170 mL      16 Feb 2025 07:01  -  17 Feb 2025 03:57  --------------------------------------------------------  IN:    Heparin: 117 mL    Heparin: 48 mL    IV PiggyBack: 550 mL    Oral Fluid: 620 mL  Total IN: 1335 mL    OUT:    Voided (mL): 1450 mL  Total OUT: 1450 mL    Total NET: -115 mL          Physical Exam  Constitutional: NAD  Respiratory: Breathing comfortably on RA  Gastrointestinal: Soft, nontender, nondistended  Extremities: no LLE lateral signal on AM exam, no distal signals. Big toe, 2nd-3rd, and 5th digit with ischemic appearing changes. L foot mild swelling.  Psychiatric:  A&Ox3, appropriate affect            Medications  MEDICATIONS  (STANDING):  acetaminophen     Tablet .. 975 milliGRAM(s) Oral every 6 hours  aspirin  chewable 81 milliGRAM(s) Oral daily  atenolol  Tablet 25 milliGRAM(s) Oral daily  atorvastatin 40 milliGRAM(s) Oral at bedtime  clopidogrel Tablet 75 milliGRAM(s) Oral daily  cyanocobalamin 1000 MICROGram(s) Oral daily  dextrose 5%. 1000 milliLiter(s) (100 mL/Hr) IV Continuous <Continuous>  dextrose 5%. 1000 milliLiter(s) (50 mL/Hr) IV Continuous <Continuous>  dextrose 5%. 1000 milliLiter(s) (50 mL/Hr) IV Continuous <Continuous>  dextrose 5%. 1000 milliLiter(s) (100 mL/Hr) IV Continuous <Continuous>  dextrose 50% Injectable 25 Gram(s) IV Push once  dextrose 50% Injectable 12.5 Gram(s) IV Push once  dextrose 50% Injectable 25 Gram(s) IV Push once  dextrose 50% Injectable 25 Gram(s) IV Push once  dextrose 50% Injectable 12.5 Gram(s) IV Push once  dextrose 50% Injectable 25 Gram(s) IV Push once  gabapentin 100 milliGRAM(s) Oral daily  glucagon  Injectable 1 milliGRAM(s) IntraMuscular once  glucagon  Injectable 1 milliGRAM(s) IntraMuscular once  heparin  Infusion 1200 Unit(s)/Hr (12 mL/Hr) IV Continuous <Continuous>  influenza  Vaccine (HIGH DOSE) 0.5 milliLiter(s) IntraMuscular once  insulin glargine Injectable (LANTUS) 10 Unit(s) SubCutaneous at bedtime  insulin lispro (ADMELOG) corrective regimen sliding scale   SubCutaneous three times a day before meals  insulin lispro (ADMELOG) corrective regimen sliding scale   SubCutaneous at bedtime  insulin lispro Injectable (ADMELOG) 7 Unit(s) SubCutaneous three times a day before meals  lactobacillus acidophilus 1 Tablet(s) Oral with dinner  pantoprazole  Injectable 40 milliGRAM(s) IV Push daily  piperacillin/tazobactam IVPB.. 3.375 Gram(s) IV Intermittent every 8 hours  vancomycin  IVPB 1000 milliGRAM(s) IV Intermittent every 12 hours    MEDICATIONS  (PRN):  dextrose Oral Gel 15 Gram(s) Oral once PRN Blood Glucose LESS THAN 70 milliGRAM(s)/deciliter  dextrose Oral Gel 15 Gram(s) Oral once PRN Blood Glucose LESS THAN 70 milliGRAM(s)/deciliter  oxyCODONE    IR 5 milliGRAM(s) Oral every 4 hours PRN Moderate Pain (4 - 6)  oxyCODONE    IR 10 milliGRAM(s) Oral every 4 hours PRN Severe Pain (7 - 10)        LABS:                        10.1   21.01 )-----------( 284      ( 16 Feb 2025 19:34 )             31.5     02-16    137  |  103  |  6[L]  ----------------------------<  57[L]  3.7   |  21[L]  |  0.64    Ca    9.2      16 Feb 2025 06:35  Phos  3.5     02-16  Mg     2.2     02-16      PTT - ( 16 Feb 2025 19:34 )  PTT:114.5 sec    Urinalysis Basic - ( 16 Feb 2025 06:35 )    Color: x / Appearance: x / SG: x / pH: x  Gluc: 57 mg/dL / Ketone: x  / Bili: x / Urobili: x   Blood: x / Protein: x / Nitrite: x   Leuk Esterase: x / RBC: x / WBC x   Sq Epi: x / Non Sq Epi: x / Bacteria: x

## 2025-02-17 NOTE — CHART NOTE - NSCHARTNOTEFT_GEN_A_CORE
Hgb stable for several days with no overt signs of bleeding.   - C/w PPI IV daily  - trend cbc, active T&S, transfuse Hgb>7    GI to sign off. Please call back if further drop in Hgb/overt signs of gi bleeding.    Maria T Sinclair, PGY4  Gastroenterology/Hepatology Fellow  Available on Microsoft Teams  [phone] (Long Range Pager)  [phone] (Short Range Pager [facility])    After 5 pm, please contact the on-call GI fellow for any urgent issues via the Hospital Call

## 2025-02-18 LAB
ANION GAP SERPL CALC-SCNC: 12 MMOL/L — SIGNIFICANT CHANGE UP (ref 5–17)
APTT BLD: 76 SEC — HIGH (ref 24.5–35.6)
BUN SERPL-MCNC: 8 MG/DL — SIGNIFICANT CHANGE UP (ref 7–23)
CALCIUM SERPL-MCNC: 8.8 MG/DL — SIGNIFICANT CHANGE UP (ref 8.4–10.5)
CHLORIDE SERPL-SCNC: 101 MMOL/L — SIGNIFICANT CHANGE UP (ref 96–108)
CO2 SERPL-SCNC: 23 MMOL/L — SIGNIFICANT CHANGE UP (ref 22–31)
CREAT SERPL-MCNC: 0.73 MG/DL — SIGNIFICANT CHANGE UP (ref 0.5–1.3)
EGFR: 97 ML/MIN/1.73M2 — SIGNIFICANT CHANGE UP
EGFR: 97 ML/MIN/1.73M2 — SIGNIFICANT CHANGE UP
GLUCOSE BLDC GLUCOMTR-MCNC: 158 MG/DL — HIGH (ref 70–99)
GLUCOSE BLDC GLUCOMTR-MCNC: 208 MG/DL — HIGH (ref 70–99)
GLUCOSE BLDC GLUCOMTR-MCNC: 248 MG/DL — HIGH (ref 70–99)
GLUCOSE BLDC GLUCOMTR-MCNC: 273 MG/DL — HIGH (ref 70–99)
GLUCOSE SERPL-MCNC: 168 MG/DL — HIGH (ref 70–99)
HCT VFR BLD CALC: 28.3 % — LOW (ref 39–50)
HGB BLD-MCNC: 9.1 G/DL — LOW (ref 13–17)
INR BLD: 1.18 RATIO — HIGH (ref 0.85–1.16)
MAGNESIUM SERPL-MCNC: 2.1 MG/DL — SIGNIFICANT CHANGE UP (ref 1.6–2.6)
MCHC RBC-ENTMCNC: 28.3 PG — SIGNIFICANT CHANGE UP (ref 27–34)
MCHC RBC-ENTMCNC: 32.2 G/DL — SIGNIFICANT CHANGE UP (ref 32–36)
MCV RBC AUTO: 87.9 FL — SIGNIFICANT CHANGE UP (ref 80–100)
NRBC BLD AUTO-RTO: 0 /100 WBCS — SIGNIFICANT CHANGE UP (ref 0–0)
PHOSPHATE SERPL-MCNC: 2.4 MG/DL — LOW (ref 2.5–4.5)
PLATELET # BLD AUTO: 383 K/UL — SIGNIFICANT CHANGE UP (ref 150–400)
POTASSIUM SERPL-MCNC: 3.8 MMOL/L — SIGNIFICANT CHANGE UP (ref 3.5–5.3)
POTASSIUM SERPL-SCNC: 3.8 MMOL/L — SIGNIFICANT CHANGE UP (ref 3.5–5.3)
PROTHROM AB SERPL-ACNC: 13.5 SEC — HIGH (ref 9.9–13.4)
RBC # BLD: 3.22 M/UL — LOW (ref 4.2–5.8)
RBC # FLD: 17.3 % — HIGH (ref 10.3–14.5)
SODIUM SERPL-SCNC: 136 MMOL/L — SIGNIFICANT CHANGE UP (ref 135–145)
VANCOMYCIN TROUGH SERPL-MCNC: 7.1 UG/ML — LOW (ref 10–20)
WBC # BLD: 18.78 K/UL — HIGH (ref 3.8–10.5)
WBC # FLD AUTO: 18.78 K/UL — HIGH (ref 3.8–10.5)

## 2025-02-18 RX ORDER — VANCOMYCIN HCL IN 5 % DEXTROSE 1.5G/250ML
1000 PLASTIC BAG, INJECTION (ML) INTRAVENOUS EVERY 8 HOURS
Refills: 0 | Status: DISCONTINUED | OUTPATIENT
Start: 2025-02-18 | End: 2025-02-19

## 2025-02-18 RX ORDER — SOD PHOS DI, MONO/K PHOS MONO 250 MG
1 TABLET ORAL ONCE
Refills: 0 | Status: COMPLETED | OUTPATIENT
Start: 2025-02-18 | End: 2025-02-18

## 2025-02-18 RX ADMIN — INSULIN GLARGINE-YFGN 8 UNIT(S): 100 INJECTION, SOLUTION SUBCUTANEOUS at 21:33

## 2025-02-18 RX ADMIN — OXYCODONE HYDROCHLORIDE 10 MILLIGRAM(S): 30 TABLET ORAL at 03:20

## 2025-02-18 RX ADMIN — Medication 975 MILLIGRAM(S): at 00:14

## 2025-02-18 RX ADMIN — Medication 1 PACKET(S): at 08:32

## 2025-02-18 RX ADMIN — INSULIN LISPRO 1: 100 INJECTION, SOLUTION INTRAVENOUS; SUBCUTANEOUS at 12:07

## 2025-02-18 RX ADMIN — INSULIN LISPRO 7 UNIT(S): 100 INJECTION, SOLUTION INTRAVENOUS; SUBCUTANEOUS at 08:30

## 2025-02-18 RX ADMIN — OXYCODONE HYDROCHLORIDE 10 MILLIGRAM(S): 30 TABLET ORAL at 14:08

## 2025-02-18 RX ADMIN — Medication 81 MILLIGRAM(S): at 12:02

## 2025-02-18 RX ADMIN — Medication 63.75 MILLIMOLE(S): at 17:15

## 2025-02-18 RX ADMIN — INSULIN LISPRO 7 UNIT(S): 100 INJECTION, SOLUTION INTRAVENOUS; SUBCUTANEOUS at 17:44

## 2025-02-18 RX ADMIN — ATORVASTATIN CALCIUM 40 MILLIGRAM(S): 80 TABLET, FILM COATED ORAL at 21:03

## 2025-02-18 RX ADMIN — Medication 975 MILLIGRAM(S): at 18:17

## 2025-02-18 RX ADMIN — INSULIN LISPRO 2: 100 INJECTION, SOLUTION INTRAVENOUS; SUBCUTANEOUS at 08:30

## 2025-02-18 RX ADMIN — OXYCODONE HYDROCHLORIDE 10 MILLIGRAM(S): 30 TABLET ORAL at 17:21

## 2025-02-18 RX ADMIN — OXYCODONE HYDROCHLORIDE 10 MILLIGRAM(S): 30 TABLET ORAL at 21:55

## 2025-02-18 RX ADMIN — HEPARIN SODIUM 8 UNIT(S)/HR: 1000 INJECTION INTRAVENOUS; SUBCUTANEOUS at 07:09

## 2025-02-18 RX ADMIN — Medication 25 GRAM(S): at 05:35

## 2025-02-18 RX ADMIN — Medication 1 TABLET(S): at 17:16

## 2025-02-18 RX ADMIN — OXYCODONE HYDROCHLORIDE 10 MILLIGRAM(S): 30 TABLET ORAL at 10:30

## 2025-02-18 RX ADMIN — INSULIN LISPRO 7 UNIT(S): 100 INJECTION, SOLUTION INTRAVENOUS; SUBCUTANEOUS at 12:07

## 2025-02-18 RX ADMIN — GABAPENTIN 100 MILLIGRAM(S): 400 CAPSULE ORAL at 12:02

## 2025-02-18 RX ADMIN — Medication 975 MILLIGRAM(S): at 06:35

## 2025-02-18 RX ADMIN — OXYCODONE HYDROCHLORIDE 10 MILLIGRAM(S): 30 TABLET ORAL at 20:55

## 2025-02-18 RX ADMIN — Medication 250 MILLIGRAM(S): at 21:33

## 2025-02-18 RX ADMIN — Medication 975 MILLIGRAM(S): at 13:08

## 2025-02-18 RX ADMIN — CLOPIDOGREL BISULFATE 75 MILLIGRAM(S): 75 TABLET, FILM COATED ORAL at 12:02

## 2025-02-18 RX ADMIN — Medication 975 MILLIGRAM(S): at 23:27

## 2025-02-18 RX ADMIN — Medication 975 MILLIGRAM(S): at 17:17

## 2025-02-18 RX ADMIN — OXYCODONE HYDROCHLORIDE 10 MILLIGRAM(S): 30 TABLET ORAL at 13:08

## 2025-02-18 RX ADMIN — INSULIN LISPRO 1: 100 INJECTION, SOLUTION INTRAVENOUS; SUBCUTANEOUS at 21:33

## 2025-02-18 RX ADMIN — Medication 975 MILLIGRAM(S): at 12:08

## 2025-02-18 RX ADMIN — OXYCODONE HYDROCHLORIDE 10 MILLIGRAM(S): 30 TABLET ORAL at 18:17

## 2025-02-18 RX ADMIN — LISINOPRIL 25 MILLIGRAM(S): 30 TABLET ORAL at 05:35

## 2025-02-18 RX ADMIN — CYANOCOBALAMIN 1000 MICROGRAM(S): 1000 INJECTION INTRAMUSCULAR; SUBCUTANEOUS at 12:02

## 2025-02-18 RX ADMIN — Medication 25 GRAM(S): at 21:03

## 2025-02-18 RX ADMIN — INSULIN LISPRO 2: 100 INJECTION, SOLUTION INTRAVENOUS; SUBCUTANEOUS at 17:44

## 2025-02-18 RX ADMIN — OXYCODONE HYDROCHLORIDE 10 MILLIGRAM(S): 30 TABLET ORAL at 02:20

## 2025-02-18 RX ADMIN — Medication 250 MILLIGRAM(S): at 05:35

## 2025-02-18 RX ADMIN — HEPARIN SODIUM 8 UNIT(S)/HR: 1000 INJECTION INTRAVENOUS; SUBCUTANEOUS at 19:37

## 2025-02-18 RX ADMIN — Medication 975 MILLIGRAM(S): at 05:35

## 2025-02-18 RX ADMIN — OXYCODONE HYDROCHLORIDE 10 MILLIGRAM(S): 30 TABLET ORAL at 09:30

## 2025-02-18 RX ADMIN — Medication 250 MILLIGRAM(S): at 14:37

## 2025-02-18 RX ADMIN — Medication 25 GRAM(S): at 14:37

## 2025-02-18 RX ADMIN — Medication 40 MILLIGRAM(S): at 12:02

## 2025-02-18 NOTE — PROGRESS NOTE ADULT - SUBJECTIVE AND OBJECTIVE BOX
SURGERY DAILY PROGRESS NOTE:       SUBJECTIVE/ROS: Patient seen and evaluated on AM rounds.   Has some pain.        OBJECTIVE:    Vital Signs Last 24 Hrs  T(C): 36.7 (18 Feb 2025 05:21), Max: 37.4 (18 Feb 2025 01:14)  T(F): 98.1 (18 Feb 2025 05:21), Max: 99.3 (18 Feb 2025 01:14)  HR: 84 (18 Feb 2025 05:21) (80 - 100)  BP: 156/62 (18 Feb 2025 05:21) (109/57 - 165/85)  BP(mean): --  RR: 18 (18 Feb 2025 05:21) (18 - 18)  SpO2: 97% (18 Feb 2025 05:21) (97% - 99%)    Parameters below as of 18 Feb 2025 05:21  Patient On (Oxygen Delivery Method): room air      I&O's Detail    17 Feb 2025 07:01  -  18 Feb 2025 07:00  --------------------------------------------------------  IN:    Heparin: 70 mL    Heparin: 136 mL    IV PiggyBack: 200 mL    Oral Fluid: 780 mL  Total IN: 1186 mL    OUT:    Voided (mL): 3250 mL  Total OUT: 3250 mL    Total NET: -2064 mL        Daily     Daily   MEDICATIONS  (STANDING):  acetaminophen     Tablet .. 975 milliGRAM(s) Oral every 6 hours  aspirin  chewable 81 milliGRAM(s) Oral daily  atenolol  Tablet 25 milliGRAM(s) Oral daily  atorvastatin 40 milliGRAM(s) Oral at bedtime  clopidogrel Tablet 75 milliGRAM(s) Oral daily  cyanocobalamin 1000 MICROGram(s) Oral daily  dextrose 5%. 1000 milliLiter(s) (50 mL/Hr) IV Continuous <Continuous>  dextrose 5%. 1000 milliLiter(s) (100 mL/Hr) IV Continuous <Continuous>  dextrose 5%. 1000 milliLiter(s) (50 mL/Hr) IV Continuous <Continuous>  dextrose 5%. 1000 milliLiter(s) (100 mL/Hr) IV Continuous <Continuous>  dextrose 50% Injectable 25 Gram(s) IV Push once  dextrose 50% Injectable 12.5 Gram(s) IV Push once  dextrose 50% Injectable 25 Gram(s) IV Push once  dextrose 50% Injectable 25 Gram(s) IV Push once  dextrose 50% Injectable 12.5 Gram(s) IV Push once  dextrose 50% Injectable 25 Gram(s) IV Push once  gabapentin 100 milliGRAM(s) Oral daily  glucagon  Injectable 1 milliGRAM(s) IntraMuscular once  glucagon  Injectable 1 milliGRAM(s) IntraMuscular once  heparin  Infusion 800 Unit(s)/Hr (8 mL/Hr) IV Continuous <Continuous>  influenza  Vaccine (HIGH DOSE) 0.5 milliLiter(s) IntraMuscular once  insulin glargine Injectable (LANTUS) 8 Unit(s) SubCutaneous at bedtime  insulin lispro (ADMELOG) corrective regimen sliding scale   SubCutaneous three times a day before meals  insulin lispro (ADMELOG) corrective regimen sliding scale   SubCutaneous at bedtime  insulin lispro Injectable (ADMELOG) 7 Unit(s) SubCutaneous three times a day before meals  lactobacillus acidophilus 1 Tablet(s) Oral with dinner  pantoprazole  Injectable 40 milliGRAM(s) IV Push daily  piperacillin/tazobactam IVPB.. 3.375 Gram(s) IV Intermittent every 8 hours  potassium phosphate / sodium phosphate Powder (PHOS-NaK) 1 Packet(s) Oral once  vancomycin  IVPB 1000 milliGRAM(s) IV Intermittent every 8 hours    MEDICATIONS  (PRN):  dextrose Oral Gel 15 Gram(s) Oral once PRN Blood Glucose LESS THAN 70 milliGRAM(s)/deciliter  dextrose Oral Gel 15 Gram(s) Oral once PRN Blood Glucose LESS THAN 70 milliGRAM(s)/deciliter  oxyCODONE    IR 5 milliGRAM(s) Oral every 4 hours PRN Moderate Pain (4 - 6)  oxyCODONE    IR 10 milliGRAM(s) Oral every 4 hours PRN Severe Pain (7 - 10)      LABS:                        9.1    18.78 )-----------( 383      ( 18 Feb 2025 04:20 )             28.3     02-18    136  |  101  |  8   ----------------------------<  168[H]  3.8   |  23  |  0.73    Ca    8.8      18 Feb 2025 04:20  Phos  2.4     02-18  Mg     2.1     02-18      PT/INR - ( 18 Feb 2025 04:20 )   PT: 13.5 sec;   INR: 1.18 ratio         PTT - ( 18 Feb 2025 04:20 )  PTT:76.0 sec  Urinalysis Basic - ( 18 Feb 2025 04:20 )    Color: x / Appearance: x / SG: x / pH: x  Gluc: 168 mg/dL / Ketone: x  / Bili: x / Urobili: x   Blood: x / Protein: x / Nitrite: x   Leuk Esterase: x / RBC: x / WBC x   Sq Epi: x / Non Sq Epi: x / Bacteria: x          Physical Exam  Constitutional: NAD  Respiratory: Breathing comfortably on RA  Gastrointestinal: Soft, nontender, nondistended  Extremities: no LLE lateral signal on AM exam, no distal signals. Big toe, 2nd-3rd, and 5th digit with ischemic appearing changes and up the calf. L foot mild swelling.  Psychiatric:  A&Ox3, appropriate affect

## 2025-02-18 NOTE — CHART NOTE - NSCHARTNOTEFT_GEN_A_CORE
COLBY KENNEDY  Gender: Male  70y  [facility] 9MON 918 D1    Patient not seen today, chart reviewed.     POCT Blood Glucose:  208 mg/dL (02-18-25 @ 08:00)  218 mg/dL (02-17-25 @ 21:34)  183 mg/dL (02-17-25 @ 17:05)  112 mg/dL (02-17-25 @ 12:46)      eMAR:atorvastatin   40 milliGRAM(s) Oral (02-17-25 @ 21:21)    insulin glargine Injectable (LANTUS)   8 Unit(s) SubCutaneous (02-17-25 @ 21:47)    insulin lispro (ADMELOG) corrective regimen sliding scale   2 Unit(s) SubCutaneous (02-18-25 @ 08:30)   1 Unit(s) SubCutaneous (02-17-25 @ 17:41)    insulin lispro Injectable (ADMELOG)   7 Unit(s) SubCutaneous (02-18-25 @ 08:30)   7 Unit(s) SubCutaneous (02-17-25 @ 17:41)   7 Unit(s) SubCutaneous (02-17-25 @ 13:28)     POC glucose, insulin requirements, lab values reviewed.   BGs mostly at goal over last 24hours.   Will sign off on this patient, discussed with Susan from vascular  Can re-consult if needed.    For discharge please follow these recommendations:     Problem/Plan - 1:  ·  Problem: Type 2 diabetes mellitus with hyperglycemia.   ·  Inpatient Plan:    -test BG AC/HS  -Decrease Lantus 15 units QHS. If BG tightly controlled all day today can reduce to 8 units tonight  -c/w Admelog 7 units AC meals for now, if trending down will adjust further  -Continue low correction scale Lispro at meal times and a separate low correction scale Lispro at bedtime and 2 am     Discharge Planning:   Would stop Jardiance due to increased risk for amputation with PAD  Continue Pioglitazone 30mg oral daily.   Continue Jentadueto 2.5-1000mg twice daily.  Can consider switching from Jentadueto to Metformin with a GLP-1 receptor agonist as outpatient.  The patient confirmed no personal or family history of pancreatitis or medullary thyroid cancer. pt is willing to do weekly injection   Pt should monitor BGs at least BID while on oral medications. Contact PCP or endocrinologist if BG < 70 X1, > 400 X1 or consistently > 200  Can follow up with [facility] - [address], [address]. [place] [address]  Tel) [phone]   Outpatient routine ophthalmology and podiatry evaluation.     Problem/Plan - 2:  ·  Problem: Benign essential HTN.   ·  Plan: Blood Pressure Goal: 130/80  To be managed by the primary care team.     Problem/Plan - 3:  ·  Problem: HLD (hyperlipidemia).   ·  Plan: Continue Atorvastatin 40 mg at bedtime  Goal LDL: <70 mg/dL  Management to continue as an outpatient    Contact via Microsoft Teams during business hours  To reach covering provider access AMION via sunrise tools  For Urgent matters/after-hours/weekends/holidays please page endocrine fellow on call   For nonurgent matters please email [email or web address]    Please note that this patient may be followed by different provider tomorrow.  Notify endocrine 24 hours prior to discharge for final recommendations

## 2025-02-18 NOTE — PROGRESS NOTE ADULT - ASSESSMENT
69M PM of former smoker (1.5 PPD % 50 years; quit 2 years ago),  HTN, HLD, DM, PAD and aortoiliac occlusive disease, who previously underwent an outpatient angiography complicated by right iliac artery dissection. Now S/P Creation of bypass from left femoral artery to distal tibial artery with RSVG and completion angiogram on 2/7/25 s/p 2u pRBC 7.4 from 7.8l not responded to 2nd unit on 2/12/25. EGD completed 2/13 found to have bleeding gastric ulcer w/ hemostasis achieved with cautery. Hgb found to be 6.1 and s/p 2u pRBC. Patient now w/ ischemic changes to LLE.     Plan:   - Hep gtt resumed   - Diet: Regular   - 18U Lantus, 6U Lispro; appreciate endocrine recs   - ASA and Plavix (s/p plavix load)  - Statin   - Neurovascular checks, intermittent L bypass signal    - PT recs pending further evaluation      Vascular Surgery  [id number], [phone]

## 2025-02-19 LAB
ANION GAP SERPL CALC-SCNC: 15 MMOL/L — SIGNIFICANT CHANGE UP (ref 5–17)
APTT BLD: 45.7 SEC — HIGH (ref 24.5–35.6)
APTT BLD: 70.4 SEC — HIGH (ref 24.5–35.6)
APTT BLD: 87.4 SEC — HIGH (ref 24.5–35.6)
BUN SERPL-MCNC: 6 MG/DL — LOW (ref 7–23)
CALCIUM SERPL-MCNC: 9 MG/DL — SIGNIFICANT CHANGE UP (ref 8.4–10.5)
CHLORIDE SERPL-SCNC: 98 MMOL/L — SIGNIFICANT CHANGE UP (ref 96–108)
CO2 SERPL-SCNC: 23 MMOL/L — SIGNIFICANT CHANGE UP (ref 22–31)
CREAT SERPL-MCNC: 0.72 MG/DL — SIGNIFICANT CHANGE UP (ref 0.5–1.3)
EGFR: 97 ML/MIN/1.73M2 — SIGNIFICANT CHANGE UP
EGFR: 97 ML/MIN/1.73M2 — SIGNIFICANT CHANGE UP
GLUCOSE BLDC GLUCOMTR-MCNC: 167 MG/DL — HIGH (ref 70–99)
GLUCOSE BLDC GLUCOMTR-MCNC: 180 MG/DL — HIGH (ref 70–99)
GLUCOSE BLDC GLUCOMTR-MCNC: 220 MG/DL — HIGH (ref 70–99)
GLUCOSE BLDC GLUCOMTR-MCNC: 76 MG/DL — SIGNIFICANT CHANGE UP (ref 70–99)
GLUCOSE BLDC GLUCOMTR-MCNC: 77 MG/DL — SIGNIFICANT CHANGE UP (ref 70–99)
GLUCOSE SERPL-MCNC: 152 MG/DL — HIGH (ref 70–99)
HCT VFR BLD CALC: 28.9 % — LOW (ref 39–50)
HGB BLD-MCNC: 9.2 G/DL — LOW (ref 13–17)
INR BLD: 1.14 RATIO — SIGNIFICANT CHANGE UP (ref 0.85–1.16)
MAGNESIUM SERPL-MCNC: 2.3 MG/DL — SIGNIFICANT CHANGE UP (ref 1.6–2.6)
MCHC RBC-ENTMCNC: 28.6 PG — SIGNIFICANT CHANGE UP (ref 27–34)
MCHC RBC-ENTMCNC: 31.8 G/DL — LOW (ref 32–36)
MCV RBC AUTO: 89.8 FL — SIGNIFICANT CHANGE UP (ref 80–100)
NRBC BLD AUTO-RTO: 0 /100 WBCS — SIGNIFICANT CHANGE UP (ref 0–0)
PHOSPHATE SERPL-MCNC: 2.6 MG/DL — SIGNIFICANT CHANGE UP (ref 2.5–4.5)
PLATELET # BLD AUTO: 510 K/UL — HIGH (ref 150–400)
POTASSIUM SERPL-MCNC: 3.7 MMOL/L — SIGNIFICANT CHANGE UP (ref 3.5–5.3)
POTASSIUM SERPL-SCNC: 3.7 MMOL/L — SIGNIFICANT CHANGE UP (ref 3.5–5.3)
PROTHROM AB SERPL-ACNC: 13 SEC — SIGNIFICANT CHANGE UP (ref 9.9–13.4)
RBC # BLD: 3.22 M/UL — LOW (ref 4.2–5.8)
RBC # FLD: 17.1 % — HIGH (ref 10.3–14.5)
SODIUM SERPL-SCNC: 136 MMOL/L — SIGNIFICANT CHANGE UP (ref 135–145)
VANCOMYCIN TROUGH SERPL-MCNC: 12.8 UG/ML — SIGNIFICANT CHANGE UP (ref 10–20)
WBC # BLD: 17.78 K/UL — HIGH (ref 3.8–10.5)
WBC # FLD AUTO: 17.78 K/UL — HIGH (ref 3.8–10.5)

## 2025-02-19 RX ORDER — AMOXICILLIN AND CLAVULANATE POTASSIUM 500; 125 MG/1; MG/1
1 TABLET, FILM COATED ORAL EVERY 12 HOURS
Refills: 0 | Status: DISCONTINUED | OUTPATIENT
Start: 2025-02-19 | End: 2025-02-20

## 2025-02-19 RX ADMIN — Medication 81 MILLIGRAM(S): at 12:06

## 2025-02-19 RX ADMIN — OXYCODONE HYDROCHLORIDE 10 MILLIGRAM(S): 30 TABLET ORAL at 09:00

## 2025-02-19 RX ADMIN — Medication 975 MILLIGRAM(S): at 12:45

## 2025-02-19 RX ADMIN — OXYCODONE HYDROCHLORIDE 10 MILLIGRAM(S): 30 TABLET ORAL at 23:11

## 2025-02-19 RX ADMIN — CLOPIDOGREL BISULFATE 75 MILLIGRAM(S): 75 TABLET, FILM COATED ORAL at 12:07

## 2025-02-19 RX ADMIN — Medication 40 MILLIGRAM(S): at 12:07

## 2025-02-19 RX ADMIN — INSULIN GLARGINE-YFGN 8 UNIT(S): 100 INJECTION, SOLUTION SUBCUTANEOUS at 22:54

## 2025-02-19 RX ADMIN — OXYCODONE HYDROCHLORIDE 10 MILLIGRAM(S): 30 TABLET ORAL at 13:21

## 2025-02-19 RX ADMIN — AMOXICILLIN AND CLAVULANATE POTASSIUM 1 TABLET(S): 500; 125 TABLET, FILM COATED ORAL at 17:53

## 2025-02-19 RX ADMIN — HEPARIN SODIUM 10 UNIT(S)/HR: 1000 INJECTION INTRAVENOUS; SUBCUTANEOUS at 08:18

## 2025-02-19 RX ADMIN — Medication 975 MILLIGRAM(S): at 17:54

## 2025-02-19 RX ADMIN — ATORVASTATIN CALCIUM 40 MILLIGRAM(S): 80 TABLET, FILM COATED ORAL at 21:08

## 2025-02-19 RX ADMIN — Medication 975 MILLIGRAM(S): at 12:07

## 2025-02-19 RX ADMIN — OXYCODONE HYDROCHLORIDE 10 MILLIGRAM(S): 30 TABLET ORAL at 14:00

## 2025-02-19 RX ADMIN — INSULIN LISPRO 1: 100 INJECTION, SOLUTION INTRAVENOUS; SUBCUTANEOUS at 10:30

## 2025-02-19 RX ADMIN — CYANOCOBALAMIN 1000 MICROGRAM(S): 1000 INJECTION INTRAMUSCULAR; SUBCUTANEOUS at 12:10

## 2025-02-19 RX ADMIN — OXYCODONE HYDROCHLORIDE 10 MILLIGRAM(S): 30 TABLET ORAL at 03:48

## 2025-02-19 RX ADMIN — LISINOPRIL 25 MILLIGRAM(S): 30 TABLET ORAL at 05:05

## 2025-02-19 RX ADMIN — Medication 975 MILLIGRAM(S): at 06:05

## 2025-02-19 RX ADMIN — INSULIN LISPRO 7 UNIT(S): 100 INJECTION, SOLUTION INTRAVENOUS; SUBCUTANEOUS at 10:29

## 2025-02-19 RX ADMIN — Medication 25 GRAM(S): at 05:05

## 2025-02-19 RX ADMIN — OXYCODONE HYDROCHLORIDE 10 MILLIGRAM(S): 30 TABLET ORAL at 22:11

## 2025-02-19 RX ADMIN — OXYCODONE HYDROCHLORIDE 10 MILLIGRAM(S): 30 TABLET ORAL at 08:26

## 2025-02-19 RX ADMIN — OXYCODONE HYDROCHLORIDE 10 MILLIGRAM(S): 30 TABLET ORAL at 02:48

## 2025-02-19 RX ADMIN — INSULIN LISPRO 1: 100 INJECTION, SOLUTION INTRAVENOUS; SUBCUTANEOUS at 17:54

## 2025-02-19 RX ADMIN — Medication 975 MILLIGRAM(S): at 05:05

## 2025-02-19 RX ADMIN — Medication 250 MILLIGRAM(S): at 08:18

## 2025-02-19 RX ADMIN — HEPARIN SODIUM 10 UNIT(S)/HR: 1000 INJECTION INTRAVENOUS; SUBCUTANEOUS at 19:30

## 2025-02-19 RX ADMIN — Medication 975 MILLIGRAM(S): at 00:27

## 2025-02-19 RX ADMIN — INSULIN LISPRO 7 UNIT(S): 100 INJECTION, SOLUTION INTRAVENOUS; SUBCUTANEOUS at 17:55

## 2025-02-19 RX ADMIN — Medication 1 TABLET(S): at 17:53

## 2025-02-19 RX ADMIN — GABAPENTIN 100 MILLIGRAM(S): 400 CAPSULE ORAL at 12:07

## 2025-02-19 RX ADMIN — INSULIN LISPRO 7 UNIT(S): 100 INJECTION, SOLUTION INTRAVENOUS; SUBCUTANEOUS at 13:23

## 2025-02-19 NOTE — PROGRESS NOTE ADULT - SUBJECTIVE AND OBJECTIVE BOX
Vascular Surgery Daily Progress Note    Last 24 hours events:     Subjective: Patient examined at bedside this morning. Denies fevers, chills, nausea, vomiting, or motor/sensory changes to _      piperacillin/tazobactam IVPB.. 3.375  vancomycin  IVPB 1000  aspirin  chewable 81  atenolol  Tablet 25  clopidogrel Tablet 75  heparin  Infusion 800  piperacillin/tazobactam IVPB.. 3.375  vancomycin  IVPB 1000        Vital Signs Last 24 Hrs  T(C): 37.2 (19 Feb 2025 05:00), Max: 37.6 (18 Feb 2025 17:04)  T(F): 99 (19 Feb 2025 05:00), Max: 99.6 (18 Feb 2025 17:04)  HR: 99 (19 Feb 2025 05:00) (83 - 99)  BP: 164/76 (19 Feb 2025 05:00) (153/68 - 164/76)  BP(mean): --  RR: 18 (19 Feb 2025 05:00) (17 - 18)  SpO2: 97% (19 Feb 2025 05:00) (96% - 98%)    Parameters below as of 19 Feb 2025 05:00  Patient On (Oxygen Delivery Method): room air      I&O's Summary    18 Feb 2025 07:01  -  19 Feb 2025 07:00  --------------------------------------------------------  IN: 1794 mL / OUT: 2050 mL / NET: -256 mL        Physical Exam:  Constitutional: NAD  Respiratory: Breathing comfortably on RA  Gastrointestinal: Soft, nontender, nondistended  Extremities: no LLE lateral signal on AM exam, no distal signals. Big toe, 2nd-3rd, and 5th digit with ischemic appearing changes and up the calf. L foot mild swelling.  Psychiatric:  A&Ox3, appropriate affect    LABS:                        9.2    17.78 )-----------( 510      ( 19 Feb 2025 07:13 )             28.9     02-19    136  |  98  |  6[L]  ----------------------------<  152[H]  3.7   |  23  |  0.72    Ca    9.0      19 Feb 2025 07:13  Phos  2.6     02-19  Mg     2.3     02-19      PT/INR - ( 19 Feb 2025 07:13 )   PT: 13.0 sec;   INR: 1.14 ratio         PTT - ( 19 Feb 2025 07:13 )  PTT:45.7 sec

## 2025-02-19 NOTE — PROGRESS NOTE ADULT - ASSESSMENT
69M PM of former smoker (1.5 PPD % 50 years; quit 2 years ago),  HTN, HLD, DM, PAD and aortoiliac occlusive disease, who previously underwent an outpatient angiography complicated by right iliac artery dissection. Now S/P Creation of bypass from left femoral artery to distal tibial artery with RSVG and completion angiogram on 2/7/25 s/p 2u pRBC 7.4 from 7.8l not responded to 2nd unit on 2/12/25. EGD completed 2/13 found to have bleeding gastric ulcer w/ hemostasis achieved with cautery. Hgb found to be 6.1 and s/p 2u pRBC. Patient now w/ ischemic changes to LLE.     Plan:   - Antibx: Empiric vanc + zosyn -> transition to PO augmentin today for dispo planning   - Hep gtt resumed -> monitor PTT as AM level subtherapeutic; consider restarting home Xarelto tomorrow  - Diet: Regular   - 18U Lantus, 6U Lispro; appreciate endocrine recs   - ASA and Plavix (s/p plavix load)  - Statin   - Neurovascular checks, intermittent L bypass signal    - PT recs -> [facility]      Vascular Surgery  [id number], [phone]

## 2025-02-20 LAB
ANION GAP SERPL CALC-SCNC: 13 MMOL/L — SIGNIFICANT CHANGE UP (ref 5–17)
ANION GAP SERPL CALC-SCNC: 14 MMOL/L — SIGNIFICANT CHANGE UP (ref 5–17)
APPEARANCE UR: CLEAR — SIGNIFICANT CHANGE UP
APTT BLD: 89.3 SEC — HIGH (ref 24.5–35.6)
BACTERIA # UR AUTO: NEGATIVE /HPF — SIGNIFICANT CHANGE UP
BILIRUB UR-MCNC: NEGATIVE — SIGNIFICANT CHANGE UP
BUN SERPL-MCNC: 6 MG/DL — LOW (ref 7–23)
BUN SERPL-MCNC: 7 MG/DL — SIGNIFICANT CHANGE UP (ref 7–23)
CALCIUM SERPL-MCNC: 8.5 MG/DL — SIGNIFICANT CHANGE UP (ref 8.4–10.5)
CALCIUM SERPL-MCNC: 8.6 MG/DL — SIGNIFICANT CHANGE UP (ref 8.4–10.5)
CAST: 0 /LPF — SIGNIFICANT CHANGE UP (ref 0–4)
CHLORIDE SERPL-SCNC: 96 MMOL/L — SIGNIFICANT CHANGE UP (ref 96–108)
CHLORIDE SERPL-SCNC: 98 MMOL/L — SIGNIFICANT CHANGE UP (ref 96–108)
CO2 SERPL-SCNC: 20 MMOL/L — LOW (ref 22–31)
CO2 SERPL-SCNC: 21 MMOL/L — LOW (ref 22–31)
COLOR SPEC: YELLOW — SIGNIFICANT CHANGE UP
CREAT SERPL-MCNC: 0.62 MG/DL — SIGNIFICANT CHANGE UP (ref 0.5–1.3)
CREAT SERPL-MCNC: 0.65 MG/DL — SIGNIFICANT CHANGE UP (ref 0.5–1.3)
DIFF PNL FLD: ABNORMAL
EGFR: 100 ML/MIN/1.73M2 — SIGNIFICANT CHANGE UP
EGFR: 100 ML/MIN/1.73M2 — SIGNIFICANT CHANGE UP
EGFR: 102 ML/MIN/1.73M2 — SIGNIFICANT CHANGE UP
EGFR: 102 ML/MIN/1.73M2 — SIGNIFICANT CHANGE UP
GLUCOSE BLDC GLUCOMTR-MCNC: 193 MG/DL — HIGH (ref 70–99)
GLUCOSE BLDC GLUCOMTR-MCNC: 217 MG/DL — HIGH (ref 70–99)
GLUCOSE BLDC GLUCOMTR-MCNC: 221 MG/DL — HIGH (ref 70–99)
GLUCOSE BLDC GLUCOMTR-MCNC: 242 MG/DL — HIGH (ref 70–99)
GLUCOSE SERPL-MCNC: 221 MG/DL — HIGH (ref 70–99)
GLUCOSE SERPL-MCNC: 232 MG/DL — HIGH (ref 70–99)
GLUCOSE UR QL: 250 MG/DL
HCT VFR BLD CALC: 28 % — LOW (ref 39–50)
HCT VFR BLD CALC: 28.3 % — LOW (ref 39–50)
HGB BLD-MCNC: 8.8 G/DL — LOW (ref 13–17)
HGB BLD-MCNC: 8.9 G/DL — LOW (ref 13–17)
KETONES UR-MCNC: NEGATIVE MG/DL — SIGNIFICANT CHANGE UP
LACTATE SERPL-SCNC: 1 MMOL/L — SIGNIFICANT CHANGE UP (ref 0.5–2)
LEUKOCYTE ESTERASE UR-ACNC: NEGATIVE — SIGNIFICANT CHANGE UP
MAGNESIUM SERPL-MCNC: 2.1 MG/DL — SIGNIFICANT CHANGE UP (ref 1.6–2.6)
MAGNESIUM SERPL-MCNC: 2.2 MG/DL — SIGNIFICANT CHANGE UP (ref 1.6–2.6)
MCHC RBC-ENTMCNC: 27.5 PG — SIGNIFICANT CHANGE UP (ref 27–34)
MCHC RBC-ENTMCNC: 27.7 PG — SIGNIFICANT CHANGE UP (ref 27–34)
MCHC RBC-ENTMCNC: 31.4 G/DL — LOW (ref 32–36)
MCHC RBC-ENTMCNC: 31.4 G/DL — LOW (ref 32–36)
MCV RBC AUTO: 87.3 FL — SIGNIFICANT CHANGE UP (ref 80–100)
MCV RBC AUTO: 88.1 FL — SIGNIFICANT CHANGE UP (ref 80–100)
NITRITE UR-MCNC: NEGATIVE — SIGNIFICANT CHANGE UP
NRBC BLD AUTO-RTO: 0 /100 WBCS — SIGNIFICANT CHANGE UP (ref 0–0)
NRBC BLD AUTO-RTO: 0 /100 WBCS — SIGNIFICANT CHANGE UP (ref 0–0)
PH UR: 7 — SIGNIFICANT CHANGE UP (ref 5–8)
PHOSPHATE SERPL-MCNC: 2.1 MG/DL — LOW (ref 2.5–4.5)
PHOSPHATE SERPL-MCNC: 2.4 MG/DL — LOW (ref 2.5–4.5)
PLATELET # BLD AUTO: 573 K/UL — HIGH (ref 150–400)
PLATELET # BLD AUTO: 582 K/UL — HIGH (ref 150–400)
POTASSIUM SERPL-MCNC: 3.6 MMOL/L — SIGNIFICANT CHANGE UP (ref 3.5–5.3)
POTASSIUM SERPL-MCNC: 3.8 MMOL/L — SIGNIFICANT CHANGE UP (ref 3.5–5.3)
POTASSIUM SERPL-SCNC: 3.6 MMOL/L — SIGNIFICANT CHANGE UP (ref 3.5–5.3)
POTASSIUM SERPL-SCNC: 3.8 MMOL/L — SIGNIFICANT CHANGE UP (ref 3.5–5.3)
PROT UR-MCNC: 30 MG/DL
RBC # BLD: 3.18 M/UL — LOW (ref 4.2–5.8)
RBC # BLD: 3.24 M/UL — LOW (ref 4.2–5.8)
RBC # FLD: 16.9 % — HIGH (ref 10.3–14.5)
RBC # FLD: 17 % — HIGH (ref 10.3–14.5)
RBC CASTS # UR COMP ASSIST: 1 /HPF — SIGNIFICANT CHANGE UP (ref 0–4)
SODIUM SERPL-SCNC: 129 MMOL/L — LOW (ref 135–145)
SODIUM SERPL-SCNC: 133 MMOL/L — LOW (ref 135–145)
SP GR SPEC: 1.01 — SIGNIFICANT CHANGE UP (ref 1–1.03)
SQUAMOUS # UR AUTO: 0 /HPF — SIGNIFICANT CHANGE UP (ref 0–5)
UROBILINOGEN FLD QL: 2 MG/DL (ref 0.2–1)
WBC # BLD: 19.69 K/UL — HIGH (ref 3.8–10.5)
WBC # BLD: 21.23 K/UL — HIGH (ref 3.8–10.5)
WBC # FLD AUTO: 19.69 K/UL — HIGH (ref 3.8–10.5)
WBC # FLD AUTO: 21.23 K/UL — HIGH (ref 3.8–10.5)
WBC UR QL: 0 /HPF — SIGNIFICANT CHANGE UP (ref 0–5)

## 2025-02-20 PROCEDURE — 71045 X-RAY EXAM CHEST 1 VIEW: CPT | Mod: 26

## 2025-02-20 RX ORDER — SENNA 187 MG
1 TABLET ORAL AT BEDTIME
Refills: 0 | Status: DISCONTINUED | OUTPATIENT
Start: 2025-02-20 | End: 2025-02-27

## 2025-02-20 RX ORDER — POLYETHYLENE GLYCOL 3350 17 G/17G
17 POWDER, FOR SOLUTION ORAL DAILY
Refills: 0 | Status: DISCONTINUED | OUTPATIENT
Start: 2025-02-20 | End: 2025-02-27

## 2025-02-20 RX ORDER — PIPERACILLIN-TAZO-DEXTROSE,ISO 3.375G/5
3.38 IV SOLUTION, PIGGYBACK PREMIX FROZEN(ML) INTRAVENOUS EVERY 8 HOURS
Refills: 0 | Status: COMPLETED | OUTPATIENT
Start: 2025-02-20 | End: 2025-02-26

## 2025-02-20 RX ORDER — MELATONIN 5 MG
6 TABLET ORAL AT BEDTIME
Refills: 0 | Status: DISCONTINUED | OUTPATIENT
Start: 2025-02-20 | End: 2025-02-27

## 2025-02-20 RX ORDER — VANCOMYCIN HCL IN 5 % DEXTROSE 1.5G/250ML
1000 PLASTIC BAG, INJECTION (ML) INTRAVENOUS EVERY 8 HOURS
Refills: 0 | Status: DISCONTINUED | OUTPATIENT
Start: 2025-02-20 | End: 2025-02-23

## 2025-02-20 RX ADMIN — Medication 975 MILLIGRAM(S): at 01:35

## 2025-02-20 RX ADMIN — HEPARIN SODIUM 10 UNIT(S)/HR: 1000 INJECTION INTRAVENOUS; SUBCUTANEOUS at 20:28

## 2025-02-20 RX ADMIN — Medication 40 MILLIGRAM(S): at 12:26

## 2025-02-20 RX ADMIN — Medication 250 MILLIGRAM(S): at 13:33

## 2025-02-20 RX ADMIN — Medication 975 MILLIGRAM(S): at 12:25

## 2025-02-20 RX ADMIN — Medication 81 MILLIGRAM(S): at 12:25

## 2025-02-20 RX ADMIN — Medication 40 MILLIEQUIVALENT(S): at 13:28

## 2025-02-20 RX ADMIN — INSULIN GLARGINE-YFGN 8 UNIT(S): 100 INJECTION, SOLUTION SUBCUTANEOUS at 21:59

## 2025-02-20 RX ADMIN — Medication 25 GRAM(S): at 13:33

## 2025-02-20 RX ADMIN — Medication 975 MILLIGRAM(S): at 00:35

## 2025-02-20 RX ADMIN — HEPARIN SODIUM 10 UNIT(S)/HR: 1000 INJECTION INTRAVENOUS; SUBCUTANEOUS at 07:14

## 2025-02-20 RX ADMIN — Medication 25 GRAM(S): at 03:23

## 2025-02-20 RX ADMIN — OXYCODONE HYDROCHLORIDE 10 MILLIGRAM(S): 30 TABLET ORAL at 20:28

## 2025-02-20 RX ADMIN — Medication 85 MILLIMOLE(S): at 21:00

## 2025-02-20 RX ADMIN — Medication 975 MILLIGRAM(S): at 23:32

## 2025-02-20 RX ADMIN — Medication 975 MILLIGRAM(S): at 05:40

## 2025-02-20 RX ADMIN — ATORVASTATIN CALCIUM 40 MILLIGRAM(S): 80 TABLET, FILM COATED ORAL at 23:32

## 2025-02-20 RX ADMIN — Medication 1 TABLET(S): at 21:59

## 2025-02-20 RX ADMIN — Medication 975 MILLIGRAM(S): at 13:00

## 2025-02-20 RX ADMIN — CYANOCOBALAMIN 1000 MICROGRAM(S): 1000 INJECTION INTRAMUSCULAR; SUBCUTANEOUS at 12:25

## 2025-02-20 RX ADMIN — INSULIN LISPRO 7 UNIT(S): 100 INJECTION, SOLUTION INTRAVENOUS; SUBCUTANEOUS at 13:30

## 2025-02-20 RX ADMIN — INSULIN LISPRO 7 UNIT(S): 100 INJECTION, SOLUTION INTRAVENOUS; SUBCUTANEOUS at 17:58

## 2025-02-20 RX ADMIN — Medication 1 TABLET(S): at 18:00

## 2025-02-20 RX ADMIN — Medication 25 GRAM(S): at 21:59

## 2025-02-20 RX ADMIN — Medication 250 MILLIGRAM(S): at 03:22

## 2025-02-20 RX ADMIN — Medication 85 MILLIMOLE(S): at 12:26

## 2025-02-20 RX ADMIN — INSULIN LISPRO 2: 100 INJECTION, SOLUTION INTRAVENOUS; SUBCUTANEOUS at 13:30

## 2025-02-20 RX ADMIN — CLOPIDOGREL BISULFATE 75 MILLIGRAM(S): 75 TABLET, FILM COATED ORAL at 12:25

## 2025-02-20 RX ADMIN — GABAPENTIN 100 MILLIGRAM(S): 400 CAPSULE ORAL at 12:25

## 2025-02-20 RX ADMIN — Medication 6 MILLIGRAM(S): at 22:00

## 2025-02-20 RX ADMIN — HEPARIN SODIUM 10 UNIT(S)/HR: 1000 INJECTION INTRAVENOUS; SUBCUTANEOUS at 19:09

## 2025-02-20 RX ADMIN — INSULIN LISPRO 2: 100 INJECTION, SOLUTION INTRAVENOUS; SUBCUTANEOUS at 17:58

## 2025-02-20 RX ADMIN — Medication 250 MILLIGRAM(S): at 21:58

## 2025-02-20 RX ADMIN — OXYCODONE HYDROCHLORIDE 10 MILLIGRAM(S): 30 TABLET ORAL at 05:39

## 2025-02-20 RX ADMIN — Medication 975 MILLIGRAM(S): at 06:40

## 2025-02-20 RX ADMIN — LISINOPRIL 25 MILLIGRAM(S): 30 TABLET ORAL at 05:41

## 2025-02-20 RX ADMIN — OXYCODONE HYDROCHLORIDE 10 MILLIGRAM(S): 30 TABLET ORAL at 06:40

## 2025-02-20 RX ADMIN — Medication 975 MILLIGRAM(S): at 18:00

## 2025-02-20 RX ADMIN — OXYCODONE HYDROCHLORIDE 10 MILLIGRAM(S): 30 TABLET ORAL at 21:38

## 2025-02-20 NOTE — PROVIDER CONTACT NOTE (SEPSIS SCREENING) - BACKGROUND:
Patient s/p L fem-tibial artery bypass with RSVG and angiogram
Pt admitted for PVD s/p L fem-tib artery bypass w/ graft and angiogram on 2/7.

## 2025-02-20 NOTE — PROGRESS NOTE ADULT - SUBJECTIVE AND OBJECTIVE BOX
SURGERY DAILY PROGRESS NOTE    24 Hour/Overnight Events:   - Spiked fever of 101.1     SUBJECTIVE: Patient seen and evaluated on AM rounds. Pt is resting comfortably in bed. Tolerating diet. Denies fevers/chills, chest pain, dyspnea, abdominal pain, nausea, vomiting, and diarrhea    ------------------------------------------------------------------------------------------------------------  OBJECTIVE:  Vital Signs Last 24 Hrs  T(C): 37.4 (20 Feb 2025 06:08), Max: 38.4 (20 Feb 2025 00:42)  T(F): 99.4 (20 Feb 2025 06:08), Max: 101.1 (20 Feb 2025 00:42)  HR: 103 (20 Feb 2025 06:08) (83 - 111)  BP: 157/76 (20 Feb 2025 06:08) (137/72 - 157/76)  BP(mean): --  RR: 18 (20 Feb 2025 06:08) (18 - 18)  SpO2: 97% (20 Feb 2025 06:08) (96% - 100%)    Parameters below as of 20 Feb 2025 06:08  Patient On (Oxygen Delivery Method): room air      I&O's Detail    19 Feb 2025 07:01  -  20 Feb 2025 07:00  --------------------------------------------------------  IN:    Heparin: 120 mL    IV PiggyBack: 350 mL    Oral Fluid: 970 mL  Total IN: 1440 mL    OUT:    Voided (mL): 1300 mL  Total OUT: 1300 mL    Total NET: 140 mL          LABS:                        8.8    19.69 )-----------( 582      ( 20 Feb 2025 07:05 )             28.0     02-20    133[L]  |  98  |  6[L]  ----------------------------<  232[H]  3.6   |  21[L]  |  0.65    Ca    8.6      20 Feb 2025 07:05  Phos  2.4     02-20  Mg     2.2     02-20        PT/INR - ( 19 Feb 2025 07:13 )   PT: 13.0 sec;   INR: 1.14 ratio         PTT - ( 20 Feb 2025 07:05 )  PTT:89.3 sec  Urinalysis Basic - ( 20 Feb 2025 07:05 )    Color: x / Appearance: x / SG: x / pH: x  Gluc: 232 mg/dL / Ketone: x  / Bili: x / Urobili: x   Blood: x / Protein: x / Nitrite: x   Leuk Esterase: x / RBC: x / WBC x   Sq Epi: x / Non Sq Epi: x / Bacteria: x      Physical Exam:  Constitutional: NAD  Respiratory: Breathing comfortably on RA  Gastrointestinal: Soft, nontender, nondistended  Extremities: no LLE lateral signal on AM exam, no distal signals. Big toe, 2nd-3rd, and 5th digit with ischemic appearing changes and up the calf. L foot mild swelling.  Psychiatric:  A&Ox3, appropriate affect

## 2025-02-20 NOTE — PROVIDER CONTACT NOTE (SEPSIS SCREENING) - SEPSIS CRITERIA TO COINCIDE WITH MEWS
WBC less than 4 or greater than 12/Heart Rate greater than 90/Temperature less than 96.8 or greater than 100.4
WBC less than 4 or greater than 12/Heart Rate greater than 90/Temperature less than 96.8 or greater than 100.4

## 2025-02-20 NOTE — PROGRESS NOTE ADULT - ASSESSMENT
69M PM of former smoker (1.5 PPD % 50 years; quit 2 years ago),  HTN, HLD, DM, PAD and aortoiliac occlusive disease, who previously underwent an outpatient angiography complicated by right iliac artery dissection. Now S/P Creation of bypass from left femoral artery to distal tibial artery with RSVG and completion angiogram on 2/7/25 s/p 2u pRBC 7.4 from 7.8l not responded to 2nd unit on 2/12/25. EGD completed 2/13 found to have bleeding gastric ulcer w/ hemostasis achieved with cautery. Hgb found to be 6.1 and s/p 2u pRBC. Patient now w/ ischemic changes to LLE.     Plan:   - Antibx: Empiric vanc + zosyn due to fever overnight   - possible plan for amp inpatient   - Hep gtt resumed, aspirin/plavix   - Diet: Regular   - 18U Lantus, 6U Lispro; appreciate endocrine recs   - Statin   - PT recs -> YANELY        Vascular Surgery  [id number], [phone]

## 2025-02-21 LAB
APTT BLD: 134.8 SEC — CRITICAL HIGH (ref 24.5–35.6)
APTT BLD: 48.1 SEC — HIGH (ref 24.5–35.6)
APTT BLD: 69.2 SEC — HIGH (ref 24.5–35.6)
GLUCOSE BLDC GLUCOMTR-MCNC: 167 MG/DL — HIGH (ref 70–99)
GLUCOSE BLDC GLUCOMTR-MCNC: 171 MG/DL — HIGH (ref 70–99)
GLUCOSE BLDC GLUCOMTR-MCNC: 183 MG/DL — HIGH (ref 70–99)
GLUCOSE BLDC GLUCOMTR-MCNC: 267 MG/DL — HIGH (ref 70–99)
HCT VFR BLD CALC: 27.6 % — LOW (ref 39–50)
HGB BLD-MCNC: 8.7 G/DL — LOW (ref 13–17)
MCHC RBC-ENTMCNC: 28.1 PG — SIGNIFICANT CHANGE UP (ref 27–34)
MCHC RBC-ENTMCNC: 31.5 G/DL — LOW (ref 32–36)
MCV RBC AUTO: 89 FL — SIGNIFICANT CHANGE UP (ref 80–100)
NRBC BLD AUTO-RTO: 0 /100 WBCS — SIGNIFICANT CHANGE UP (ref 0–0)
PLATELET # BLD AUTO: 655 K/UL — HIGH (ref 150–400)
RBC # BLD: 3.1 M/UL — LOW (ref 4.2–5.8)
RBC # FLD: 17.1 % — HIGH (ref 10.3–14.5)
VANCOMYCIN TROUGH SERPL-MCNC: 14 UG/ML — SIGNIFICANT CHANGE UP (ref 10–20)
WBC # BLD: 20.84 K/UL — HIGH (ref 3.8–10.5)
WBC # FLD AUTO: 20.84 K/UL — HIGH (ref 3.8–10.5)

## 2025-02-21 RX ORDER — HEPARIN SODIUM 1000 [USP'U]/ML
600 INJECTION INTRAVENOUS; SUBCUTANEOUS
Qty: 25000 | Refills: 0 | Status: DISCONTINUED | OUTPATIENT
Start: 2025-02-21 | End: 2025-02-21

## 2025-02-21 RX ORDER — HEPARIN SODIUM 1000 [USP'U]/ML
700 INJECTION INTRAVENOUS; SUBCUTANEOUS
Qty: 25000 | Refills: 0 | Status: DISCONTINUED | OUTPATIENT
Start: 2025-02-21 | End: 2025-02-23

## 2025-02-21 RX ADMIN — HEPARIN SODIUM 6 UNIT(S)/HR: 1000 INJECTION INTRAVENOUS; SUBCUTANEOUS at 23:22

## 2025-02-21 RX ADMIN — OXYCODONE HYDROCHLORIDE 10 MILLIGRAM(S): 30 TABLET ORAL at 22:15

## 2025-02-21 RX ADMIN — Medication 975 MILLIGRAM(S): at 13:00

## 2025-02-21 RX ADMIN — OXYCODONE HYDROCHLORIDE 10 MILLIGRAM(S): 30 TABLET ORAL at 03:08

## 2025-02-21 RX ADMIN — Medication 25 GRAM(S): at 05:32

## 2025-02-21 RX ADMIN — Medication 1 TABLET(S): at 21:16

## 2025-02-21 RX ADMIN — Medication 975 MILLIGRAM(S): at 23:28

## 2025-02-21 RX ADMIN — Medication 975 MILLIGRAM(S): at 14:00

## 2025-02-21 RX ADMIN — CYANOCOBALAMIN 1000 MICROGRAM(S): 1000 INJECTION INTRAMUSCULAR; SUBCUTANEOUS at 13:01

## 2025-02-21 RX ADMIN — INSULIN LISPRO 7 UNIT(S): 100 INJECTION, SOLUTION INTRAVENOUS; SUBCUTANEOUS at 18:03

## 2025-02-21 RX ADMIN — INSULIN LISPRO 1: 100 INJECTION, SOLUTION INTRAVENOUS; SUBCUTANEOUS at 13:42

## 2025-02-21 RX ADMIN — Medication 1 TABLET(S): at 17:10

## 2025-02-21 RX ADMIN — OXYCODONE HYDROCHLORIDE 10 MILLIGRAM(S): 30 TABLET ORAL at 21:15

## 2025-02-21 RX ADMIN — INSULIN LISPRO 1: 100 INJECTION, SOLUTION INTRAVENOUS; SUBCUTANEOUS at 18:02

## 2025-02-21 RX ADMIN — Medication 81 MILLIGRAM(S): at 13:00

## 2025-02-21 RX ADMIN — INSULIN LISPRO 3: 100 INJECTION, SOLUTION INTRAVENOUS; SUBCUTANEOUS at 10:09

## 2025-02-21 RX ADMIN — ATORVASTATIN CALCIUM 40 MILLIGRAM(S): 80 TABLET, FILM COATED ORAL at 21:15

## 2025-02-21 RX ADMIN — OXYCODONE HYDROCHLORIDE 10 MILLIGRAM(S): 30 TABLET ORAL at 02:08

## 2025-02-21 RX ADMIN — CLOPIDOGREL BISULFATE 75 MILLIGRAM(S): 75 TABLET, FILM COATED ORAL at 13:00

## 2025-02-21 RX ADMIN — HEPARIN SODIUM 6 UNIT(S)/HR: 1000 INJECTION INTRAVENOUS; SUBCUTANEOUS at 15:13

## 2025-02-21 RX ADMIN — Medication 25 GRAM(S): at 22:55

## 2025-02-21 RX ADMIN — Medication 25 GRAM(S): at 13:02

## 2025-02-21 RX ADMIN — Medication 975 MILLIGRAM(S): at 00:32

## 2025-02-21 RX ADMIN — OXYCODONE HYDROCHLORIDE 10 MILLIGRAM(S): 30 TABLET ORAL at 15:15

## 2025-02-21 RX ADMIN — Medication 6 MILLIGRAM(S): at 21:15

## 2025-02-21 RX ADMIN — HEPARIN SODIUM 6 UNIT(S)/HR: 1000 INJECTION INTRAVENOUS; SUBCUTANEOUS at 08:24

## 2025-02-21 RX ADMIN — HEPARIN SODIUM 6 UNIT(S)/HR: 1000 INJECTION INTRAVENOUS; SUBCUTANEOUS at 19:22

## 2025-02-21 RX ADMIN — GABAPENTIN 100 MILLIGRAM(S): 400 CAPSULE ORAL at 13:01

## 2025-02-21 RX ADMIN — Medication 250 MILLIGRAM(S): at 21:21

## 2025-02-21 RX ADMIN — LISINOPRIL 25 MILLIGRAM(S): 30 TABLET ORAL at 05:32

## 2025-02-21 RX ADMIN — Medication 975 MILLIGRAM(S): at 06:03

## 2025-02-21 RX ADMIN — Medication 250 MILLIGRAM(S): at 06:49

## 2025-02-21 RX ADMIN — Medication 975 MILLIGRAM(S): at 05:33

## 2025-02-21 RX ADMIN — POLYETHYLENE GLYCOL 3350 17 GRAM(S): 17 POWDER, FOR SOLUTION ORAL at 13:01

## 2025-02-21 RX ADMIN — OXYCODONE HYDROCHLORIDE 10 MILLIGRAM(S): 30 TABLET ORAL at 16:15

## 2025-02-21 RX ADMIN — INSULIN LISPRO 7 UNIT(S): 100 INJECTION, SOLUTION INTRAVENOUS; SUBCUTANEOUS at 13:41

## 2025-02-21 RX ADMIN — Medication 250 MILLIGRAM(S): at 13:02

## 2025-02-21 RX ADMIN — Medication 40 MILLIGRAM(S): at 13:01

## 2025-02-21 RX ADMIN — Medication 975 MILLIGRAM(S): at 18:09

## 2025-02-21 RX ADMIN — Medication 975 MILLIGRAM(S): at 17:09

## 2025-02-21 RX ADMIN — INSULIN LISPRO 7 UNIT(S): 100 INJECTION, SOLUTION INTRAVENOUS; SUBCUTANEOUS at 10:10

## 2025-02-21 RX ADMIN — HEPARIN SODIUM 7 UNIT(S)/HR: 1000 INJECTION INTRAVENOUS; SUBCUTANEOUS at 23:29

## 2025-02-21 RX ADMIN — INSULIN GLARGINE-YFGN 8 UNIT(S): 100 INJECTION, SOLUTION SUBCUTANEOUS at 21:14

## 2025-02-21 RX ADMIN — HEPARIN SODIUM 10 UNIT(S)/HR: 1000 INJECTION INTRAVENOUS; SUBCUTANEOUS at 07:26

## 2025-02-21 NOTE — PROGRESS NOTE ADULT - SUBJECTIVE AND OBJECTIVE BOX
SURGERY DAILY PROGRESS NOTE    24 Hour/Overnight Events: No acute events overnight    SUBJECTIVE: Patient seen and evaluated on AM rounds. Pt is resting comfortably in bed with no acute complaints. Tolerating diet. Denies fevers/chills, chest pain, dyspnea, abdominal pain, nausea, vomiting, and diarrhea    ------------------------------------------------------------------------------------------------------------  OBJECTIVE:  Vital Signs Last 24 Hrs  T(C): 37.6 (21 Feb 2025 06:25), Max: 37.8 (21 Feb 2025 00:56)  T(F): 99.6 (21 Feb 2025 06:25), Max: 100 (21 Feb 2025 00:56)  HR: 101 (21 Feb 2025 06:25) (83 - 101)  BP: 168/96 (21 Feb 2025 06:25) (143/67 - 168/96)  BP(mean): --  RR: 18 (21 Feb 2025 06:25) (18 - 18)  SpO2: 99% (21 Feb 2025 06:25) (97% - 99%)    Parameters below as of 21 Feb 2025 06:25  Patient On (Oxygen Delivery Method): room air      I&O's Detail    20 Feb 2025 07:01  -  21 Feb 2025 07:00  --------------------------------------------------------  IN:    Heparin: 120 mL    IV PiggyBack: 700 mL    Oral Fluid: 970 mL  Total IN: 1790 mL    OUT:    Voided (mL): 700 mL  Total OUT: 700 mL    Total NET: 1090 mL          LABS:                        8.7    20.84 )-----------( 655      ( 21 Feb 2025 05:57 )             27.6     02-20    133[L]  |  98  |  6[L]  ----------------------------<  232[H]  3.6   |  21[L]  |  0.65    Ca    8.6      20 Feb 2025 07:05  Phos  2.4     02-20  Mg     2.2     02-20        PTT - ( 21 Feb 2025 05:57 )  PTT:134.8 sec  Urinalysis Basic - ( 20 Feb 2025 07:05 )    Color: x / Appearance: x / SG: x / pH: x  Gluc: 232 mg/dL / Ketone: x  / Bili: x / Urobili: x   Blood: x / Protein: x / Nitrite: x   Leuk Esterase: x / RBC: x / WBC x   Sq Epi: x / Non Sq Epi: x / Bacteria: x        Physical Exam:  Constitutional: NAD  Respiratory: Breathing comfortably on RA  Gastrointestinal: Soft, nontender, nondistended  Extremities: no LLE lateral signal on AM exam, no distal signals. Big toe, 2nd-3rd, and 5th digit with ischemic appearing changes and up the calf. L foot mild swelling.  Psychiatric:  A&Ox3, appropriate affect

## 2025-02-21 NOTE — PROGRESS NOTE ADULT - ASSESSMENT
69M PM of former smoker (1.5 PPD % 50 years; quit 2 years ago),  HTN, HLD, DM, PAD and aortoiliac occlusive disease, who previously underwent an outpatient angiography complicated by right iliac artery dissection. Now S/P Creation of bypass from left femoral artery to distal tibial artery with RSVG and completion angiogram on 2/7/25 s/p 2u pRBC 7.4 from 7.8l not responded to 2nd unit on 2/12/25. EGD completed 2/13 found to have bleeding gastric ulcer w/ hemostasis achieved with cautery. Hgb found to be 6.1 and s/p 2u pRBC. Patient now w/ ischemic changes to LLE.     Plan:   - Antibx: Empiric vanc + zosyn   - will need a L BKA this admission possibly   - Hep gtt adjusted, aspirin/plavix; fu aptt @ 330 pm   - Diet: Regular   - 18U Lantus, 6U Lispro; appreciate endocrine recs   - Statin   - PT recs -> YANELY      Vascular Surgery  [id number]

## 2025-02-22 LAB
ANION GAP SERPL CALC-SCNC: 15 MMOL/L — SIGNIFICANT CHANGE UP (ref 5–17)
APTT BLD: 61.7 SEC — HIGH (ref 24.5–35.6)
APTT BLD: 67.1 SEC — HIGH (ref 24.5–35.6)
BUN SERPL-MCNC: 12 MG/DL — SIGNIFICANT CHANGE UP (ref 7–23)
CALCIUM SERPL-MCNC: 9.3 MG/DL — SIGNIFICANT CHANGE UP (ref 8.4–10.5)
CHLORIDE SERPL-SCNC: 99 MMOL/L — SIGNIFICANT CHANGE UP (ref 96–108)
CO2 SERPL-SCNC: 22 MMOL/L — SIGNIFICANT CHANGE UP (ref 22–31)
CREAT SERPL-MCNC: 1.05 MG/DL — SIGNIFICANT CHANGE UP (ref 0.5–1.3)
EGFR: 75 ML/MIN/1.73M2 — SIGNIFICANT CHANGE UP
EGFR: 75 ML/MIN/1.73M2 — SIGNIFICANT CHANGE UP
GLUCOSE BLDC GLUCOMTR-MCNC: 120 MG/DL — HIGH (ref 70–99)
GLUCOSE BLDC GLUCOMTR-MCNC: 159 MG/DL — HIGH (ref 70–99)
GLUCOSE BLDC GLUCOMTR-MCNC: 180 MG/DL — HIGH (ref 70–99)
GLUCOSE BLDC GLUCOMTR-MCNC: 250 MG/DL — HIGH (ref 70–99)
GLUCOSE SERPL-MCNC: 181 MG/DL — HIGH (ref 70–99)
HCT VFR BLD CALC: 27.8 % — LOW (ref 39–50)
HGB BLD-MCNC: 8.6 G/DL — LOW (ref 13–17)
MAGNESIUM SERPL-MCNC: 2.3 MG/DL — SIGNIFICANT CHANGE UP (ref 1.6–2.6)
MCHC RBC-ENTMCNC: 27 PG — SIGNIFICANT CHANGE UP (ref 27–34)
MCHC RBC-ENTMCNC: 30.9 G/DL — LOW (ref 32–36)
MCV RBC AUTO: 87.4 FL — SIGNIFICANT CHANGE UP (ref 80–100)
NRBC BLD AUTO-RTO: 0 /100 WBCS — SIGNIFICANT CHANGE UP (ref 0–0)
PHOSPHATE SERPL-MCNC: 3.2 MG/DL — SIGNIFICANT CHANGE UP (ref 2.5–4.5)
PLATELET # BLD AUTO: 692 K/UL — HIGH (ref 150–400)
POTASSIUM SERPL-MCNC: 3.7 MMOL/L — SIGNIFICANT CHANGE UP (ref 3.5–5.3)
POTASSIUM SERPL-SCNC: 3.7 MMOL/L — SIGNIFICANT CHANGE UP (ref 3.5–5.3)
RBC # BLD: 3.18 M/UL — LOW (ref 4.2–5.8)
RBC # FLD: 17.2 % — HIGH (ref 10.3–14.5)
SODIUM SERPL-SCNC: 136 MMOL/L — SIGNIFICANT CHANGE UP (ref 135–145)
VANCOMYCIN TROUGH SERPL-MCNC: 19.2 UG/ML — SIGNIFICANT CHANGE UP (ref 10–20)
WBC # BLD: 20.27 K/UL — HIGH (ref 3.8–10.5)
WBC # FLD AUTO: 20.27 K/UL — HIGH (ref 3.8–10.5)

## 2025-02-22 RX ORDER — OXYCODONE HYDROCHLORIDE 30 MG/1
10 TABLET ORAL EVERY 4 HOURS
Refills: 0 | Status: DISCONTINUED | OUTPATIENT
Start: 2025-02-22 | End: 2025-02-27

## 2025-02-22 RX ORDER — BISACODYL 5 MG
5 TABLET, DELAYED RELEASE (ENTERIC COATED) ORAL DAILY
Refills: 0 | Status: DISCONTINUED | OUTPATIENT
Start: 2025-02-22 | End: 2025-02-27

## 2025-02-22 RX ORDER — OXYCODONE HYDROCHLORIDE 30 MG/1
5 TABLET ORAL EVERY 4 HOURS
Refills: 0 | Status: DISCONTINUED | OUTPATIENT
Start: 2025-02-22 | End: 2025-02-27

## 2025-02-22 RX ADMIN — Medication 250 MILLIGRAM(S): at 13:09

## 2025-02-22 RX ADMIN — OXYCODONE HYDROCHLORIDE 10 MILLIGRAM(S): 30 TABLET ORAL at 14:40

## 2025-02-22 RX ADMIN — ATORVASTATIN CALCIUM 40 MILLIGRAM(S): 80 TABLET, FILM COATED ORAL at 22:10

## 2025-02-22 RX ADMIN — Medication 5 MILLIGRAM(S): at 11:34

## 2025-02-22 RX ADMIN — Medication 25 GRAM(S): at 13:09

## 2025-02-22 RX ADMIN — Medication 1 TABLET(S): at 22:10

## 2025-02-22 RX ADMIN — Medication 975 MILLIGRAM(S): at 18:26

## 2025-02-22 RX ADMIN — OXYCODONE HYDROCHLORIDE 10 MILLIGRAM(S): 30 TABLET ORAL at 13:40

## 2025-02-22 RX ADMIN — POLYETHYLENE GLYCOL 3350 17 GRAM(S): 17 POWDER, FOR SOLUTION ORAL at 11:34

## 2025-02-22 RX ADMIN — INSULIN LISPRO 7 UNIT(S): 100 INJECTION, SOLUTION INTRAVENOUS; SUBCUTANEOUS at 17:34

## 2025-02-22 RX ADMIN — Medication 250 MILLIGRAM(S): at 08:05

## 2025-02-22 RX ADMIN — INSULIN LISPRO 7 UNIT(S): 100 INJECTION, SOLUTION INTRAVENOUS; SUBCUTANEOUS at 12:11

## 2025-02-22 RX ADMIN — Medication 40 MILLIGRAM(S): at 11:35

## 2025-02-22 RX ADMIN — OXYCODONE HYDROCHLORIDE 10 MILLIGRAM(S): 30 TABLET ORAL at 19:22

## 2025-02-22 RX ADMIN — CYANOCOBALAMIN 1000 MICROGRAM(S): 1000 INJECTION INTRAMUSCULAR; SUBCUTANEOUS at 11:34

## 2025-02-22 RX ADMIN — HEPARIN SODIUM 7 UNIT(S)/HR: 1000 INJECTION INTRAVENOUS; SUBCUTANEOUS at 19:21

## 2025-02-22 RX ADMIN — INSULIN GLARGINE-YFGN 8 UNIT(S): 100 INJECTION, SOLUTION SUBCUTANEOUS at 22:10

## 2025-02-22 RX ADMIN — Medication 250 MILLIGRAM(S): at 22:09

## 2025-02-22 RX ADMIN — Medication 25 GRAM(S): at 22:09

## 2025-02-22 RX ADMIN — Medication 975 MILLIGRAM(S): at 06:45

## 2025-02-22 RX ADMIN — OXYCODONE HYDROCHLORIDE 10 MILLIGRAM(S): 30 TABLET ORAL at 03:59

## 2025-02-22 RX ADMIN — INSULIN LISPRO 1: 100 INJECTION, SOLUTION INTRAVENOUS; SUBCUTANEOUS at 12:11

## 2025-02-22 RX ADMIN — Medication 975 MILLIGRAM(S): at 12:33

## 2025-02-22 RX ADMIN — OXYCODONE HYDROCHLORIDE 10 MILLIGRAM(S): 30 TABLET ORAL at 02:59

## 2025-02-22 RX ADMIN — HEPARIN SODIUM 7 UNIT(S)/HR: 1000 INJECTION INTRAVENOUS; SUBCUTANEOUS at 07:31

## 2025-02-22 RX ADMIN — OXYCODONE HYDROCHLORIDE 10 MILLIGRAM(S): 30 TABLET ORAL at 18:27

## 2025-02-22 RX ADMIN — Medication 25 GRAM(S): at 06:44

## 2025-02-22 RX ADMIN — Medication 6 MILLIGRAM(S): at 22:10

## 2025-02-22 RX ADMIN — INSULIN LISPRO 2: 100 INJECTION, SOLUTION INTRAVENOUS; SUBCUTANEOUS at 08:39

## 2025-02-22 RX ADMIN — INSULIN LISPRO 7 UNIT(S): 100 INJECTION, SOLUTION INTRAVENOUS; SUBCUTANEOUS at 08:40

## 2025-02-22 RX ADMIN — CLOPIDOGREL BISULFATE 75 MILLIGRAM(S): 75 TABLET, FILM COATED ORAL at 11:34

## 2025-02-22 RX ADMIN — Medication 1 TABLET(S): at 17:33

## 2025-02-22 RX ADMIN — Medication 81 MILLIGRAM(S): at 11:34

## 2025-02-22 RX ADMIN — HEPARIN SODIUM 7 UNIT(S)/HR: 1000 INJECTION INTRAVENOUS; SUBCUTANEOUS at 13:30

## 2025-02-22 RX ADMIN — GABAPENTIN 100 MILLIGRAM(S): 400 CAPSULE ORAL at 11:34

## 2025-02-22 RX ADMIN — Medication 975 MILLIGRAM(S): at 11:33

## 2025-02-22 RX ADMIN — Medication 975 MILLIGRAM(S): at 17:33

## 2025-02-22 RX ADMIN — LISINOPRIL 25 MILLIGRAM(S): 30 TABLET ORAL at 06:44

## 2025-02-22 NOTE — PROGRESS NOTE ADULT - SUBJECTIVE AND OBJECTIVE BOX
Vascular Surgery Daily Progress Note    Last 24 hours events: Febrile this AM to 101.1. Tylenol given. AM labs overall stable.    Subjective: Patient examined at bedside this morning. Denies fevers, chills, nausea, vomiting. Still endorsing LLE pain but overall denies motor/sensory changes to LLE.      piperacillin/tazobactam IVPB.. 3.375  vancomycin  IVPB 1000  aspirin  chewable 81  atenolol  Tablet 25  clopidogrel Tablet 75  heparin  Infusion 700  piperacillin/tazobactam IVPB.. 3.375  vancomycin  IVPB 1000        Vital Signs Last 24 Hrs  T(C): 37.1 (22 Feb 2025 09:01), Max: 38.4 (22 Feb 2025 06:00)  T(F): 98.7 (22 Feb 2025 09:01), Max: 101.1 (22 Feb 2025 06:00)  HR: 88 (22 Feb 2025 09:01) (83 - 98)  BP: 164/74 (22 Feb 2025 09:01) (147/74 - 175/81)  BP(mean): --  RR: 18 (22 Feb 2025 09:01) (18 - 18)  SpO2: 97% (22 Feb 2025 09:01) (96% - 98%)    Parameters below as of 22 Feb 2025 09:01  Patient On (Oxygen Delivery Method): room air      I&O's Summary    21 Feb 2025 07:01  -  22 Feb 2025 07:00  --------------------------------------------------------  IN: 956 mL / OUT: 1880 mL / NET: -924 mL    22 Feb 2025 07:01  -  22 Feb 2025 11:41  --------------------------------------------------------  IN: 504 mL / OUT: 200 mL / NET: 304 mL        Physical Exam:  Constitutional: NAD  Respiratory: Breathing comfortably on RA  Gastrointestinal: Soft, nontender, nondistended  Extremities: no LLE lateral signal on AM exam, no distal signals. Big toe, 2nd-3rd, and 5th digit with ischemic appearing changes and up the calf. L foot mild swelling. Gradually increased signs of demarcation of L foot with intact blisters on medial aspect.   Psychiatric:  A&Ox3, appropriate affect      LABS:                        8.6    20.27 )-----------( 692      ( 22 Feb 2025 07:00 )             27.8     02-22    136  |  99  |  12  ----------------------------<  181[H]  3.7   |  22  |  1.05    Ca    9.3      22 Feb 2025 07:00  Phos  3.2     02-22  Mg     2.3     02-22      PTT - ( 22 Feb 2025 07:00 )  PTT:61.7 sec

## 2025-02-22 NOTE — PROGRESS NOTE ADULT - ASSESSMENT
69M PM of former smoker (1.5 PPD % 50 years; quit 2 years ago),  HTN, HLD, DM, PAD and aortoiliac occlusive disease, who previously underwent an outpatient angiography complicated by right iliac artery dissection. Now S/P Creation of bypass from left femoral artery to distal tibial artery with RSVG and completion angiogram on 2/7/25 s/p 2u pRBC 7.4 from 7.8l not responded to 2nd unit on 2/12/25. EGD completed 2/13 found to have bleeding gastric ulcer w/ hemostasis achieved with cautery. Hgb found to be 6.1 and s/p 2u pRBC. Patient now w/ ischemic changes to LLE.     Plan:   - L BKA planning tentatively for Tuesday 2/25. Patient amenable, though daughter is hesitant and would like to continue thinking about surgery. Pt daughter inquires why only his foot cannot be amputated rather than below the knee. Providers s/w daughter regarding procedure is better for ambulatory status in the future for prosthesis among other r/b discussed.   - Antibx: Empiric vanc + zosyn   - Hep gtt adjusted, aspirin/plavix  - Diet: Regular   - 18U Lantus, 6U Lispro; appreciate endocrine recs   - ASA/Plavix/Statin   - HTN possibly 2/2 pain   - Pain regimen: Tylenol ATC, oxycodone PRN  - Bowel regimen; dulcolax added 2/22  - PT recs -> YANELY      Vascular Surgery  [id number]

## 2025-02-23 LAB
ANION GAP SERPL CALC-SCNC: 15 MMOL/L — SIGNIFICANT CHANGE UP (ref 5–17)
APTT BLD: 57.3 SEC — HIGH (ref 24.5–35.6)
APTT BLD: 79.8 SEC — HIGH (ref 24.5–35.6)
BLD GP AB SCN SERPL QL: NEGATIVE — SIGNIFICANT CHANGE UP
BUN SERPL-MCNC: 14 MG/DL — SIGNIFICANT CHANGE UP (ref 7–23)
CALCIUM SERPL-MCNC: 9 MG/DL — SIGNIFICANT CHANGE UP (ref 8.4–10.5)
CHLORIDE SERPL-SCNC: 100 MMOL/L — SIGNIFICANT CHANGE UP (ref 96–108)
CO2 SERPL-SCNC: 20 MMOL/L — LOW (ref 22–31)
CREAT SERPL-MCNC: 1 MG/DL — SIGNIFICANT CHANGE UP (ref 0.5–1.3)
EGFR: 80 ML/MIN/1.73M2 — SIGNIFICANT CHANGE UP
EGFR: 80 ML/MIN/1.73M2 — SIGNIFICANT CHANGE UP
GLUCOSE BLDC GLUCOMTR-MCNC: 233 MG/DL — HIGH (ref 70–99)
GLUCOSE BLDC GLUCOMTR-MCNC: 242 MG/DL — HIGH (ref 70–99)
GLUCOSE BLDC GLUCOMTR-MCNC: 249 MG/DL — HIGH (ref 70–99)
GLUCOSE BLDC GLUCOMTR-MCNC: 291 MG/DL — HIGH (ref 70–99)
GLUCOSE SERPL-MCNC: 203 MG/DL — HIGH (ref 70–99)
HCT VFR BLD CALC: 25.7 % — LOW (ref 39–50)
HGB BLD-MCNC: 7.9 G/DL — LOW (ref 13–17)
MAGNESIUM SERPL-MCNC: 2.3 MG/DL — SIGNIFICANT CHANGE UP (ref 1.6–2.6)
MCHC RBC-ENTMCNC: 27.7 PG — SIGNIFICANT CHANGE UP (ref 27–34)
MCHC RBC-ENTMCNC: 30.7 G/DL — LOW (ref 32–36)
MCV RBC AUTO: 90.2 FL — SIGNIFICANT CHANGE UP (ref 80–100)
NRBC BLD AUTO-RTO: 0 /100 WBCS — SIGNIFICANT CHANGE UP (ref 0–0)
PHOSPHATE SERPL-MCNC: 3.5 MG/DL — SIGNIFICANT CHANGE UP (ref 2.5–4.5)
PLATELET # BLD AUTO: 657 K/UL — HIGH (ref 150–400)
POTASSIUM SERPL-MCNC: 3.7 MMOL/L — SIGNIFICANT CHANGE UP (ref 3.5–5.3)
POTASSIUM SERPL-SCNC: 3.7 MMOL/L — SIGNIFICANT CHANGE UP (ref 3.5–5.3)
RBC # BLD: 2.85 M/UL — LOW (ref 4.2–5.8)
RBC # FLD: 17.2 % — HIGH (ref 10.3–14.5)
RH IG SCN BLD-IMP: NEGATIVE — SIGNIFICANT CHANGE UP
SODIUM SERPL-SCNC: 135 MMOL/L — SIGNIFICANT CHANGE UP (ref 135–145)
VANCOMYCIN TROUGH SERPL-MCNC: 23.2 UG/ML — HIGH (ref 10–20)
WBC # BLD: 19.33 K/UL — HIGH (ref 3.8–10.5)
WBC # FLD AUTO: 19.33 K/UL — HIGH (ref 3.8–10.5)

## 2025-02-23 RX ORDER — HEPARIN SODIUM 1000 [USP'U]/ML
800 INJECTION INTRAVENOUS; SUBCUTANEOUS
Qty: 25000 | Refills: 0 | Status: DISCONTINUED | OUTPATIENT
Start: 2025-02-23 | End: 2025-02-24

## 2025-02-23 RX ORDER — VANCOMYCIN HCL IN 5 % DEXTROSE 1.5G/250ML
1000 PLASTIC BAG, INJECTION (ML) INTRAVENOUS EVERY 12 HOURS
Refills: 0 | Status: DISCONTINUED | OUTPATIENT
Start: 2025-02-23 | End: 2025-02-27

## 2025-02-23 RX ADMIN — OXYCODONE HYDROCHLORIDE 10 MILLIGRAM(S): 30 TABLET ORAL at 11:06

## 2025-02-23 RX ADMIN — INSULIN LISPRO 7 UNIT(S): 100 INJECTION, SOLUTION INTRAVENOUS; SUBCUTANEOUS at 10:05

## 2025-02-23 RX ADMIN — POLYETHYLENE GLYCOL 3350 17 GRAM(S): 17 POWDER, FOR SOLUTION ORAL at 11:55

## 2025-02-23 RX ADMIN — HEPARIN SODIUM 8 UNIT(S)/HR: 1000 INJECTION INTRAVENOUS; SUBCUTANEOUS at 18:30

## 2025-02-23 RX ADMIN — Medication 975 MILLIGRAM(S): at 19:26

## 2025-02-23 RX ADMIN — OXYCODONE HYDROCHLORIDE 10 MILLIGRAM(S): 30 TABLET ORAL at 19:26

## 2025-02-23 RX ADMIN — OXYCODONE HYDROCHLORIDE 10 MILLIGRAM(S): 30 TABLET ORAL at 10:06

## 2025-02-23 RX ADMIN — HEPARIN SODIUM 8 UNIT(S)/HR: 1000 INJECTION INTRAVENOUS; SUBCUTANEOUS at 09:03

## 2025-02-23 RX ADMIN — Medication 250 MILLIGRAM(S): at 18:30

## 2025-02-23 RX ADMIN — Medication 1 TABLET(S): at 18:27

## 2025-02-23 RX ADMIN — INSULIN LISPRO 2: 100 INJECTION, SOLUTION INTRAVENOUS; SUBCUTANEOUS at 10:05

## 2025-02-23 RX ADMIN — INSULIN LISPRO 7 UNIT(S): 100 INJECTION, SOLUTION INTRAVENOUS; SUBCUTANEOUS at 14:33

## 2025-02-23 RX ADMIN — Medication 81 MILLIGRAM(S): at 11:56

## 2025-02-23 RX ADMIN — Medication 25 GRAM(S): at 15:00

## 2025-02-23 RX ADMIN — INSULIN GLARGINE-YFGN 8 UNIT(S): 100 INJECTION, SOLUTION SUBCUTANEOUS at 22:06

## 2025-02-23 RX ADMIN — INSULIN LISPRO 2: 100 INJECTION, SOLUTION INTRAVENOUS; SUBCUTANEOUS at 14:33

## 2025-02-23 RX ADMIN — CLOPIDOGREL BISULFATE 75 MILLIGRAM(S): 75 TABLET, FILM COATED ORAL at 11:56

## 2025-02-23 RX ADMIN — Medication 25 GRAM(S): at 05:42

## 2025-02-23 RX ADMIN — Medication 975 MILLIGRAM(S): at 23:33

## 2025-02-23 RX ADMIN — Medication 975 MILLIGRAM(S): at 01:08

## 2025-02-23 RX ADMIN — Medication 6 MILLIGRAM(S): at 22:05

## 2025-02-23 RX ADMIN — INSULIN LISPRO 3: 100 INJECTION, SOLUTION INTRAVENOUS; SUBCUTANEOUS at 18:48

## 2025-02-23 RX ADMIN — Medication 25 GRAM(S): at 22:05

## 2025-02-23 RX ADMIN — Medication 1 TABLET(S): at 22:06

## 2025-02-23 RX ADMIN — GABAPENTIN 100 MILLIGRAM(S): 400 CAPSULE ORAL at 11:59

## 2025-02-23 RX ADMIN — Medication 250 MILLIGRAM(S): at 05:42

## 2025-02-23 RX ADMIN — INSULIN LISPRO 7 UNIT(S): 100 INJECTION, SOLUTION INTRAVENOUS; SUBCUTANEOUS at 18:49

## 2025-02-23 RX ADMIN — Medication 975 MILLIGRAM(S): at 18:26

## 2025-02-23 RX ADMIN — ATORVASTATIN CALCIUM 40 MILLIGRAM(S): 80 TABLET, FILM COATED ORAL at 22:05

## 2025-02-23 RX ADMIN — OXYCODONE HYDROCHLORIDE 10 MILLIGRAM(S): 30 TABLET ORAL at 18:26

## 2025-02-23 RX ADMIN — Medication 975 MILLIGRAM(S): at 12:59

## 2025-02-23 RX ADMIN — Medication 975 MILLIGRAM(S): at 11:59

## 2025-02-23 RX ADMIN — Medication 40 MILLIGRAM(S): at 11:56

## 2025-02-23 RX ADMIN — CYANOCOBALAMIN 1000 MICROGRAM(S): 1000 INJECTION INTRAMUSCULAR; SUBCUTANEOUS at 11:56

## 2025-02-23 RX ADMIN — Medication 975 MILLIGRAM(S): at 05:42

## 2025-02-23 RX ADMIN — LISINOPRIL 25 MILLIGRAM(S): 30 TABLET ORAL at 05:43

## 2025-02-23 RX ADMIN — HEPARIN SODIUM 8 UNIT(S)/HR: 1000 INJECTION INTRAVENOUS; SUBCUTANEOUS at 19:30

## 2025-02-23 NOTE — PROGRESS NOTE ADULT - ASSESSMENT
69M PM of former smoker (1.5 PPD % 50 years; quit 2 years ago),  HTN, HLD, DM, PAD and aortoiliac occlusive disease, who previously underwent an outpatient angiography complicated by right iliac artery dissection. Now S/P Creation of bypass from left femoral artery to distal tibial artery with RSVG and completion angiogram on 2/7/25 s/p 2u pRBC 7.4 from 7.8l not responded to 2nd unit on 2/12/25. EGD completed 2/13 found to have bleeding gastric ulcer w/ hemostasis achieved with cautery. Hgb found to be 6.1 and s/p 2u pRBC. Patient now w/ ischemic changes to LLE.     Plan:   - L BKA planning tentatively for Tuesday 2/25. Patient amenable, though daughter is hesitant and would like to continue thinking about surgery. Pt daughter inquires why only his foot cannot be amputated rather than below the knee. Providers s/w daughter regarding procedure is better for ambulatory status in the future for prosthesis among other r/b discussed.   - cardiology reach out for clearance; appreciate cards recs   - Antibx: Empiric vanc + zosyn   - Hep gtt adjusted, aspirin/plavix  - Diet: Regular   - 18U Lantus, 6U Lispro; appreciate endocrine recs   - ASA/Plavix/Statin   - HTN possibly 2/2 pain   - Pain regimen: Tylenol ATC, oxycodone PRN  - Bowel regimen; dulcolax added 2/22  - PT recs -> YANELY      Vascular Surgery  [id number]

## 2025-02-23 NOTE — PROGRESS NOTE ADULT - SUBJECTIVE AND OBJECTIVE BOX
SURGERY DAILY PROGRESS NOTE    24 Hour/Overnight Events: No acute events overnight    SUBJECTIVE: Patient seen and evaluated on AM rounds. Pt is resting comfortably in bed with no acute complaints.   Denies fevers/chills, chest pain, dyspnea, abdominal pain, nausea, vomiting, and diarrhea    ------------------------------------------------------------------------------------------------------------  OBJECTIVE:  Vital Signs Last 24 Hrs  T(C): 36.8 (23 Feb 2025 09:23), Max: 37.1 (22 Feb 2025 17:50)  T(F): 98.2 (23 Feb 2025 09:23), Max: 98.7 (22 Feb 2025 17:50)  HR: 87 (23 Feb 2025 09:23) (82 - 99)  BP: 163/83 (23 Feb 2025 09:23) (149/83 - 171/74)  BP(mean): --  RR: 18 (23 Feb 2025 09:23) (18 - 18)  SpO2: 97% (23 Feb 2025 09:23) (96% - 100%)    Parameters below as of 23 Feb 2025 09:23  Patient On (Oxygen Delivery Method): room air      I&O's Detail    22 Feb 2025 07:01  -  23 Feb 2025 07:00  --------------------------------------------------------  IN:    Heparin: 84 mL    IV PiggyBack: 1050 mL    Oral Fluid: 560 mL  Total IN: 1694 mL    OUT:    Voided (mL): 2330 mL  Total OUT: 2330 mL    Total NET: -636 mL          LABS:                        7.9    19.33 )-----------( 657      ( 23 Feb 2025 06:55 )             25.7     02-23    135  |  100  |  14  ----------------------------<  203[H]  3.7   |  20[L]  |  1.00    Ca    9.0      23 Feb 2025 06:55  Phos  3.5     02-23  Mg     2.3     02-23        PTT - ( 23 Feb 2025 06:55 )  PTT:57.3 sec  Urinalysis Basic - ( 23 Feb 2025 06:55 )    Color: x / Appearance: x / SG: x / pH: x  Gluc: 203 mg/dL / Ketone: x  / Bili: x / Urobili: x   Blood: x / Protein: x / Nitrite: x   Leuk Esterase: x / RBC: x / WBC x   Sq Epi: x / Non Sq Epi: x / Bacteria: x        Physical Exam:  Constitutional: NAD  Respiratory: Breathing comfortably on RA  Gastrointestinal: Soft, nontender, nondistended  Extremities: no LLE lateral signal on AM exam, no distal signals. Big toe, 2nd-3rd, and 5th digit with ischemic appearing changes and up the calf. L foot mild swelling. Gradually increased signs of demarcation of L foot with intact blisters on medial aspect.   Psychiatric:  A&Ox3, appropriate affect

## 2025-02-23 NOTE — CONSULT NOTE ADULT - SUBJECTIVE AND OBJECTIVE BOX
DATE OF SERVICE: 02-23-25 @ 15:20    CHIEF COMPLAINT:Patient is a 70y old  Male who presents with a chief complaint of Vascular surgery (13 Feb 2025 15:26)      HISTORY OF PRESENT ILLNESS:HPI: 69M PM of former smoker (1.5 PPD % 50 years; quit 2 years ago),  HTN, HLD, DM, PAD and aortoiliac occlusive disease, who previously underwent an outpatient angiography complicated by right iliac artery dissection. Now S/P Creation of bypass from left femoral artery to distal tibial artery with RSVG and completion angiogram on 2/7/25 s/p 2u pRBC 7.4 from 7.8l not responded to 2nd unit on 2/12/25. EGD completed 2/13 found to have bleeding gastric ulcer w/ hemostasis achieved with cautery. Hgb found to be 6.1 and s/p 2u pRBC. Patient now w/ ischemic changes to LLE.        PAST MEDICAL & SURGICAL HISTORY:  HTN (hypertension)      HLD (hyperlipidemia)      DM (diabetes mellitus)      PAD (peripheral artery disease)      H/O iron deficiency      [place] (hard of hearing)      Peripheral neuropathy      Peripheral vascular disease, unspecified      Former smoker      CVA (cerebrovascular accident)      S/P appendectomy      S/P peripheral artery angioplasty with stent placement      S/P angiography      S/P cataract surgery              MEDICATIONS:  aspirin  chewable 81 milliGRAM(s) Oral daily  atenolol  Tablet 25 milliGRAM(s) Oral daily  clopidogrel Tablet 75 milliGRAM(s) Oral daily  heparin  Infusion 800 Unit(s)/Hr IV Continuous <Continuous>    piperacillin/tazobactam IVPB.. 3.375 Gram(s) IV Intermittent every 8 hours  vancomycin  IVPB 1000 milliGRAM(s) IV Intermittent every 8 hours      acetaminophen     Tablet .. 975 milliGRAM(s) Oral every 6 hours  gabapentin 100 milliGRAM(s) Oral daily  melatonin 6 milliGRAM(s) Oral at bedtime  oxyCODONE    IR 5 milliGRAM(s) Oral every 4 hours PRN  oxyCODONE    IR 10 milliGRAM(s) Oral every 4 hours PRN    bisacodyl 5 milliGRAM(s) Oral daily PRN  pantoprazole  Injectable 40 milliGRAM(s) IV Push daily  polyethylene glycol 3350 17 Gram(s) Oral daily  senna 1 Tablet(s) Oral at bedtime    atorvastatin 40 milliGRAM(s) Oral at bedtime  dextrose 50% Injectable 25 Gram(s) IV Push once  dextrose 50% Injectable 12.5 Gram(s) IV Push once  dextrose 50% Injectable 25 Gram(s) IV Push once  dextrose 50% Injectable 25 Gram(s) IV Push once  dextrose 50% Injectable 12.5 Gram(s) IV Push once  dextrose 50% Injectable 25 Gram(s) IV Push once  dextrose Oral Gel 15 Gram(s) Oral once PRN  dextrose Oral Gel 15 Gram(s) Oral once PRN  glucagon  Injectable 1 milliGRAM(s) IntraMuscular once  glucagon  Injectable 1 milliGRAM(s) IntraMuscular once  insulin glargine Injectable (LANTUS) 8 Unit(s) SubCutaneous at bedtime  insulin lispro (ADMELOG) corrective regimen sliding scale   SubCutaneous three times a day before meals  insulin lispro (ADMELOG) corrective regimen sliding scale   SubCutaneous at bedtime  insulin lispro Injectable (ADMELOG) 7 Unit(s) SubCutaneous three times a day before meals    cyanocobalamin 1000 MICROGram(s) Oral daily  dextrose 5%. 1000 milliLiter(s) IV Continuous <Continuous>  dextrose 5%. 1000 milliLiter(s) IV Continuous <Continuous>  dextrose 5%. 1000 milliLiter(s) IV Continuous <Continuous>  dextrose 5%. 1000 milliLiter(s) IV Continuous <Continuous>  influenza  Vaccine (HIGH DOSE) 0.5 milliLiter(s) IntraMuscular once      FAMILY HISTORY:      Non-contributory    SOCIAL HISTORY:    [ ] Tobacco  [ ] Drugs  [ ] Alcohol    Allergies    Advil (Rash)    Intolerances    	    REVIEW OF SYSTEMS:  CONSTITUTIONAL: No fever  EYES: No eye pain, visual disturbances, or discharge  ENMT:  No difficulty hearing, tinnitus  NECK: No pain or stiffness  RESPIRATORY: No cough, wheezing,  CARDIOVASCULAR: No chest pain, palpitations, passing out, dizziness, or leg swelling  GASTROINTESTINAL:  No nausea, vomiting, diarrhea or constipation. No melena.  GENITOURINARY: No dysuria, hematuria  NEUROLOGICAL: No stroke like symptoms  SKIN: No burning or lesions   ENDOCRINE: No heat or cold intolerance  MUSCULOSKELETAL: No joint pain or swelling  PSYCHIATRIC: No  anxiety, mood swings  HEME/LYMPH: No bleeding gums  ALLERGY AND IMMUNOLOGIC: No hives or eczema	    All other ROS negative    PHYSICAL EXAM:  T(C): 36.9 (02-23-25 @ 13:04), Max: 37.1 (02-22-25 @ 17:50)  HR: 80 (02-23-25 @ 13:04) (80 - 99)  BP: 150/89 (02-23-25 @ 13:04) (149/83 - 171/74)  RR: 18 (02-23-25 @ 13:04) (18 - 18)  SpO2: 98% (02-23-25 @ 13:04) (96% - 100%)  Wt(kg): --  I&O's Summary    22 Feb 2025 07:01  -  23 Feb 2025 07:00  --------------------------------------------------------  IN: 1694 mL / OUT: 2330 mL / NET: -636 mL    23 Feb 2025 07:01  -  23 Feb 2025 15:20  --------------------------------------------------------  IN: 280 mL / OUT: 0 mL / NET: 280 mL        Appearance: Normal	  HEENT:   Normal oral mucosa, EOMI	  Cardiovascular:  S1 S2, No JVD,    Respiratory: Lungs clear to auscultation	  Psychiatry: Alert  Gastrointestinal:  Soft, Non-tender, + BS	  Skin: No rashes   Neurologic: Non-focal  Extremities:  No edema  Vascular: Peripheral pulses palpable    	    	  	  CARDIAC MARKERS:  Labs personally reviewed by me                                  7.9    19.33 )-----------( 657      ( 23 Feb 2025 06:55 )             25.7     02-23    135  |  100  |  14  ----------------------------<  203[H]  3.7   |  20[L]  |  1.00    Ca    9.0      23 Feb 2025 06:55  Phos  3.5     02-23  Mg     2.3     02-23            EKG: Personally reviewed by me - Sinus tach   Radiology: Personally reviewed by me - < from: Xray Chest 1 View- PORTABLE-Urgent (Xray Chest 1 View- PORTABLE-Urgent .) (02.20.25 @ 03:47) >  Bibasilar linear atelectasis otherwise lungs are clear.         Assessment /Plan:   69M PM of former smoker (1.5 PPD % 50 years; quit 2 years ago),  HTN, HLD, DM, PAD and aortoiliac occlusive disease, who previously underwent an outpatient angiography complicated by right iliac artery dissection. Now S/P Creation of bypass from left femoral artery to distal tibial artery with RSVG and completion angiogram on 2/7/25 s/p 2u pRBC 7.4 from 7.8l not responded to 2nd unit on 2/12/25. EGD completed 2/13 found to have bleeding gastric ulcer w/ hemostasis achieved with cautery. Hgb found to be 6.1 and s/p 2u pRBC. Patient now w/ ischemic changes to LLE.    1. Cardiac Risk Stratification   - No chest pain/SOB  - EKG Sinus tach 117 bpm no ischemic changes   - TTE ordered  - Not in decompensated HF   - No hx of tachy/timothy arrhythmias   - No valvular abnormalities  - Moderate risk for moderate risk vascular surg. No contraindication to proceed pending TTE    2. Hypertension  - BP stable c/w home meds    3. Hyperlipidemia   - Lipitor 40mg   - lipid profile     4. Diabetes Mellitus Type II   - FBG per protocol  - ISS   - A1C: 7.7%       Differential diagnosis and plan of care discussed with patient after the evaluation. Counseling on diet, nutritional counseling, weight management, exercise and medication compliance was done.   Advanced care planning/advanced directives discussed with patient/family. DNR status including forceful chest compressions to attempt to restart the heart, ventilator support/artificial breathing, electric shock, artificial nutrition, health care proxy, Molst form all discussed with pt. Pt wishes to consider. More than fifteen minutes spent on discussing advanced directives.     JAVIER Rodriguez DO [facility]  Cardiovascular Medicine  [address] [place] [address], [address]  Office [phone]  Available via call or text on Microsoft Teams  DATE OF SERVICE: 02-23-25 @ 15:20    CHIEF COMPLAINT:Patient is a 70y old  Male who presents with a chief complaint of Vascular surgery (13 Feb 2025 15:26)      HISTORY OF PRESENT ILLNESS:HPI: 69M PM of former smoker (1.5 PPD % 50 years; quit 2 years ago),  HTN, HLD, DM, PAD and aortoiliac occlusive disease, who previously underwent an outpatient angiography complicated by right iliac artery dissection. Now S/P Creation of bypass from left femoral artery to distal tibial artery with RSVG and completion angiogram on 2/7/25 s/p 2u pRBC 7.4 from 7.8l not responded to 2nd unit on 2/12/25. EGD completed 2/13 found to have bleeding gastric ulcer w/ hemostasis achieved with cautery. Hgb found to be 6.1 and s/p 2u pRBC. Patient now w/ ischemic changes to LLE.        PAST MEDICAL & SURGICAL HISTORY:  HTN (hypertension)      HLD (hyperlipidemia)      DM (diabetes mellitus)      PAD (peripheral artery disease)      H/O iron deficiency      [place] (hard of hearing)      Peripheral neuropathy      Peripheral vascular disease, unspecified      Former smoker      CVA (cerebrovascular accident)      S/P appendectomy      S/P peripheral artery angioplasty with stent placement      S/P angiography      S/P cataract surgery              MEDICATIONS:  aspirin  chewable 81 milliGRAM(s) Oral daily  atenolol  Tablet 25 milliGRAM(s) Oral daily  clopidogrel Tablet 75 milliGRAM(s) Oral daily  heparin  Infusion 800 Unit(s)/Hr IV Continuous <Continuous>    piperacillin/tazobactam IVPB.. 3.375 Gram(s) IV Intermittent every 8 hours  vancomycin  IVPB 1000 milliGRAM(s) IV Intermittent every 8 hours      acetaminophen     Tablet .. 975 milliGRAM(s) Oral every 6 hours  gabapentin 100 milliGRAM(s) Oral daily  melatonin 6 milliGRAM(s) Oral at bedtime  oxyCODONE    IR 5 milliGRAM(s) Oral every 4 hours PRN  oxyCODONE    IR 10 milliGRAM(s) Oral every 4 hours PRN    bisacodyl 5 milliGRAM(s) Oral daily PRN  pantoprazole  Injectable 40 milliGRAM(s) IV Push daily  polyethylene glycol 3350 17 Gram(s) Oral daily  senna 1 Tablet(s) Oral at bedtime    atorvastatin 40 milliGRAM(s) Oral at bedtime  dextrose 50% Injectable 25 Gram(s) IV Push once  dextrose 50% Injectable 12.5 Gram(s) IV Push once  dextrose 50% Injectable 25 Gram(s) IV Push once  dextrose 50% Injectable 25 Gram(s) IV Push once  dextrose 50% Injectable 12.5 Gram(s) IV Push once  dextrose 50% Injectable 25 Gram(s) IV Push once  dextrose Oral Gel 15 Gram(s) Oral once PRN  dextrose Oral Gel 15 Gram(s) Oral once PRN  glucagon  Injectable 1 milliGRAM(s) IntraMuscular once  glucagon  Injectable 1 milliGRAM(s) IntraMuscular once  insulin glargine Injectable (LANTUS) 8 Unit(s) SubCutaneous at bedtime  insulin lispro (ADMELOG) corrective regimen sliding scale   SubCutaneous three times a day before meals  insulin lispro (ADMELOG) corrective regimen sliding scale   SubCutaneous at bedtime  insulin lispro Injectable (ADMELOG) 7 Unit(s) SubCutaneous three times a day before meals    cyanocobalamin 1000 MICROGram(s) Oral daily  dextrose 5%. 1000 milliLiter(s) IV Continuous <Continuous>  dextrose 5%. 1000 milliLiter(s) IV Continuous <Continuous>  dextrose 5%. 1000 milliLiter(s) IV Continuous <Continuous>  dextrose 5%. 1000 milliLiter(s) IV Continuous <Continuous>  influenza  Vaccine (HIGH DOSE) 0.5 milliLiter(s) IntraMuscular once      FAMILY HISTORY:      Non-contributory    SOCIAL HISTORY:    [ ] not a smoker  Allergies    Advil (Rash)    Intolerances    	    REVIEW OF SYSTEMS:  CONSTITUTIONAL: No fever  EYES: No eye pain, visual disturbances, or discharge  ENMT:  No difficulty hearing, tinnitus  NECK: No pain or stiffness  RESPIRATORY: No cough, wheezing,  CARDIOVASCULAR: No chest pain, palpitations, passing out, dizziness, or leg swelling  GASTROINTESTINAL:  No nausea, vomiting, diarrhea or constipation. No melena.  GENITOURINARY: No dysuria, hematuria  NEUROLOGICAL: No stroke like symptoms  SKIN: No burning or lesions   ENDOCRINE: No heat or cold intolerance  MUSCULOSKELETAL: No joint pain or swelling  PSYCHIATRIC: No  anxiety, mood swings  HEME/LYMPH: No bleeding gums  ALLERGY AND IMMUNOLOGIC: No hives or eczema	    All other ROS negative    PHYSICAL EXAM:  T(C): 36.9 (02-23-25 @ 13:04), Max: 37.1 (02-22-25 @ 17:50)  HR: 80 (02-23-25 @ 13:04) (80 - 99)  BP: 150/89 (02-23-25 @ 13:04) (149/83 - 171/74)  RR: 18 (02-23-25 @ 13:04) (18 - 18)  SpO2: 98% (02-23-25 @ 13:04) (96% - 100%)  Wt(kg): --  I&O's Summary    22 Feb 2025 07:01  -  23 Feb 2025 07:00  --------------------------------------------------------  IN: 1694 mL / OUT: 2330 mL / NET: -636 mL    23 Feb 2025 07:01  -  23 Feb 2025 15:20  --------------------------------------------------------  IN: 280 mL / OUT: 0 mL / NET: 280 mL        Appearance: Normal	  HEENT:   Normal oral mucosa, EOMI	  Cardiovascular:  S1 S2, No JVD,    Respiratory: Lungs clear to auscultation	  Psychiatry: Alert  Gastrointestinal:  Soft, Non-tender, + BS	  Skin: No rashes   Neurologic: Non-focal  Extremities:  No edema  Vascular: Peripheral pulses palpable    	    	  	  CARDIAC MARKERS:  Labs personally reviewed by me                                  7.9    19.33 )-----------( 657      ( 23 Feb 2025 06:55 )             25.7     02-23    135  |  100  |  14  ----------------------------<  203[H]  3.7   |  20[L]  |  1.00    Ca    9.0      23 Feb 2025 06:55  Phos  3.5     02-23  Mg     2.3     02-23            EKG: Personally reviewed by me - Sinus tach   Radiology: Personally reviewed by me - < from: Xray Chest 1 View- PORTABLE-Urgent (Xray Chest 1 View- PORTABLE-Urgent .) (02.20.25 @ 03:47) >  Bibasilar linear atelectasis otherwise lungs are clear.         Assessment /Plan:   69M PM of former smoker (1.5 PPD % 50 years; quit 2 years ago),  HTN, HLD, DM, PAD and aortoiliac occlusive disease, who previously underwent an outpatient angiography complicated by right iliac artery dissection. Now S/P Creation of bypass from left femoral artery to distal tibial artery with RSVG and completion angiogram on 2/7/25 s/p 2u pRBC 7.4 from 7.8l not responded to 2nd unit on 2/12/25. EGD completed 2/13 found to have bleeding gastric ulcer w/ hemostasis achieved with cautery. Hgb found to be 6.1 and s/p 2u pRBC. Patient now w/ ischemic changes to LLE.    1. Cardiac Risk Stratification   - No chest pain/SOB  - EKG Sinus tach 117 bpm no ischemic changes   - TTE ordered (last one in our system 2023)  - Not in decompensated HF   - No hx of tachy/timothy arrhythmias   - No valvular abnormalities  - Moderate risk for moderate risk vascular surg. No contraindication to proceed pending TTE    2. Hypertension  - BP stable c/w home meds    3. Hyperlipidemia   - Lipitor 40mg   - lipid profile     4. Diabetes Mellitus Type II   - FBG per protocol  - ISS   - A1C: 7.7%           Differential diagnosis and plan of care discussed with patient after the evaluation. Counseling on diet, nutritional counseling, weight management, exercise and medication compliance was done.   Advanced care planning/advanced directives discussed with patient/family. DNR status including forceful chest compressions to attempt to restart the heart, ventilator support/artificial breathing, electric shock, artificial nutrition, health care proxy, Molst form all discussed with pt. Pt wishes to consider. More than fifteen minutes spent on discussing advanced directives.     JAVIER Rodriguez DO [facility]  Cardiovascular Medicine  [address] [place] [address], [address]  Office [phone]  Available via call or text on [employer] Teams

## 2025-02-24 ENCOUNTER — RESULT REVIEW (OUTPATIENT)
Age: 70
End: 2025-02-24

## 2025-02-24 LAB
ANION GAP SERPL CALC-SCNC: 14 MMOL/L — SIGNIFICANT CHANGE UP (ref 5–17)
APTT BLD: 52.5 SEC — HIGH (ref 24.5–35.6)
APTT BLD: 61.8 SEC — HIGH (ref 24.5–35.6)
APTT BLD: 64.9 SEC — HIGH (ref 24.5–35.6)
BUN SERPL-MCNC: 11 MG/DL — SIGNIFICANT CHANGE UP (ref 7–23)
CALCIUM SERPL-MCNC: 9.4 MG/DL — SIGNIFICANT CHANGE UP (ref 8.4–10.5)
CHLORIDE SERPL-SCNC: 99 MMOL/L — SIGNIFICANT CHANGE UP (ref 96–108)
CO2 SERPL-SCNC: 23 MMOL/L — SIGNIFICANT CHANGE UP (ref 22–31)
CREAT SERPL-MCNC: 0.98 MG/DL — SIGNIFICANT CHANGE UP (ref 0.5–1.3)
EGFR: 82 ML/MIN/1.73M2 — SIGNIFICANT CHANGE UP
EGFR: 82 ML/MIN/1.73M2 — SIGNIFICANT CHANGE UP
GLUCOSE BLDC GLUCOMTR-MCNC: 170 MG/DL — HIGH (ref 70–99)
GLUCOSE BLDC GLUCOMTR-MCNC: 229 MG/DL — HIGH (ref 70–99)
GLUCOSE BLDC GLUCOMTR-MCNC: 271 MG/DL — HIGH (ref 70–99)
GLUCOSE BLDC GLUCOMTR-MCNC: 281 MG/DL — HIGH (ref 70–99)
GLUCOSE SERPL-MCNC: 177 MG/DL — HIGH (ref 70–99)
HCT VFR BLD CALC: 29.1 % — LOW (ref 39–50)
HGB BLD-MCNC: 8.7 G/DL — LOW (ref 13–17)
INR BLD: 1.22 RATIO — HIGH (ref 0.85–1.16)
MAGNESIUM SERPL-MCNC: 2.4 MG/DL — SIGNIFICANT CHANGE UP (ref 1.6–2.6)
MCHC RBC-ENTMCNC: 27.3 PG — SIGNIFICANT CHANGE UP (ref 27–34)
MCHC RBC-ENTMCNC: 29.9 G/DL — LOW (ref 32–36)
MCV RBC AUTO: 91.2 FL — SIGNIFICANT CHANGE UP (ref 80–100)
NRBC BLD AUTO-RTO: 0 /100 WBCS — SIGNIFICANT CHANGE UP (ref 0–0)
PHOSPHATE SERPL-MCNC: 3.2 MG/DL — SIGNIFICANT CHANGE UP (ref 2.5–4.5)
PLATELET # BLD AUTO: 677 K/UL — HIGH (ref 150–400)
POTASSIUM SERPL-MCNC: 3.7 MMOL/L — SIGNIFICANT CHANGE UP (ref 3.5–5.3)
POTASSIUM SERPL-SCNC: 3.7 MMOL/L — SIGNIFICANT CHANGE UP (ref 3.5–5.3)
PROTHROM AB SERPL-ACNC: 14 SEC — HIGH (ref 9.9–13.4)
RBC # BLD: 3.19 M/UL — LOW (ref 4.2–5.8)
RBC # FLD: 17.2 % — HIGH (ref 10.3–14.5)
SODIUM SERPL-SCNC: 136 MMOL/L — SIGNIFICANT CHANGE UP (ref 135–145)
WBC # BLD: 18.42 K/UL — HIGH (ref 3.8–10.5)
WBC # FLD AUTO: 18.42 K/UL — HIGH (ref 3.8–10.5)

## 2025-02-24 PROCEDURE — 99232 SBSQ HOSP IP/OBS MODERATE 35: CPT

## 2025-02-24 PROCEDURE — 93306 TTE W/DOPPLER COMPLETE: CPT | Mod: 26

## 2025-02-24 PROCEDURE — 93356 MYOCRD STRAIN IMG SPCKL TRCK: CPT

## 2025-02-24 RX ORDER — INSULIN GLARGINE-YFGN 100 [IU]/ML
10 INJECTION, SOLUTION SUBCUTANEOUS AT BEDTIME
Refills: 0 | Status: DISCONTINUED | OUTPATIENT
Start: 2025-02-24 | End: 2025-02-26

## 2025-02-24 RX ORDER — INSULIN LISPRO 100 U/ML
8 INJECTION, SOLUTION INTRAVENOUS; SUBCUTANEOUS
Refills: 0 | Status: DISCONTINUED | OUTPATIENT
Start: 2025-02-24 | End: 2025-02-26

## 2025-02-24 RX ORDER — HEPARIN SODIUM 1000 [USP'U]/ML
900 INJECTION INTRAVENOUS; SUBCUTANEOUS
Qty: 25000 | Refills: 0 | Status: DISCONTINUED | OUTPATIENT
Start: 2025-02-24 | End: 2025-02-27

## 2025-02-24 RX ORDER — INSULIN LISPRO 100 U/ML
INJECTION, SOLUTION INTRAVENOUS; SUBCUTANEOUS
Refills: 0 | Status: DISCONTINUED | OUTPATIENT
Start: 2025-02-24 | End: 2025-02-27

## 2025-02-24 RX ADMIN — Medication 1 TABLET(S): at 18:49

## 2025-02-24 RX ADMIN — GABAPENTIN 100 MILLIGRAM(S): 400 CAPSULE ORAL at 14:24

## 2025-02-24 RX ADMIN — Medication 975 MILLIGRAM(S): at 06:03

## 2025-02-24 RX ADMIN — HEPARIN SODIUM 9 UNIT(S)/HR: 1000 INJECTION INTRAVENOUS; SUBCUTANEOUS at 20:04

## 2025-02-24 RX ADMIN — INSULIN LISPRO 2: 100 INJECTION, SOLUTION INTRAVENOUS; SUBCUTANEOUS at 14:28

## 2025-02-24 RX ADMIN — Medication 975 MILLIGRAM(S): at 00:03

## 2025-02-24 RX ADMIN — INSULIN LISPRO 1: 100 INJECTION, SOLUTION INTRAVENOUS; SUBCUTANEOUS at 22:15

## 2025-02-24 RX ADMIN — Medication 975 MILLIGRAM(S): at 15:24

## 2025-02-24 RX ADMIN — Medication 975 MILLIGRAM(S): at 18:49

## 2025-02-24 RX ADMIN — ATORVASTATIN CALCIUM 40 MILLIGRAM(S): 80 TABLET, FILM COATED ORAL at 21:25

## 2025-02-24 RX ADMIN — Medication 25 GRAM(S): at 14:28

## 2025-02-24 RX ADMIN — OXYCODONE HYDROCHLORIDE 10 MILLIGRAM(S): 30 TABLET ORAL at 14:24

## 2025-02-24 RX ADMIN — CLOPIDOGREL BISULFATE 75 MILLIGRAM(S): 75 TABLET, FILM COATED ORAL at 14:25

## 2025-02-24 RX ADMIN — CYANOCOBALAMIN 1000 MICROGRAM(S): 1000 INJECTION INTRAMUSCULAR; SUBCUTANEOUS at 14:25

## 2025-02-24 RX ADMIN — INSULIN GLARGINE-YFGN 10 UNIT(S): 100 INJECTION, SOLUTION SUBCUTANEOUS at 22:14

## 2025-02-24 RX ADMIN — INSULIN LISPRO 6: 100 INJECTION, SOLUTION INTRAVENOUS; SUBCUTANEOUS at 18:51

## 2025-02-24 RX ADMIN — Medication 975 MILLIGRAM(S): at 05:33

## 2025-02-24 RX ADMIN — Medication 975 MILLIGRAM(S): at 19:49

## 2025-02-24 RX ADMIN — Medication 6 MILLIGRAM(S): at 21:24

## 2025-02-24 RX ADMIN — Medication 40 MILLIGRAM(S): at 14:26

## 2025-02-24 RX ADMIN — Medication 975 MILLIGRAM(S): at 14:24

## 2025-02-24 RX ADMIN — Medication 25 GRAM(S): at 05:32

## 2025-02-24 RX ADMIN — INSULIN LISPRO 8 UNIT(S): 100 INJECTION, SOLUTION INTRAVENOUS; SUBCUTANEOUS at 09:30

## 2025-02-24 RX ADMIN — Medication 250 MILLIGRAM(S): at 05:32

## 2025-02-24 RX ADMIN — Medication 81 MILLIGRAM(S): at 14:25

## 2025-02-24 RX ADMIN — INSULIN LISPRO 4: 100 INJECTION, SOLUTION INTRAVENOUS; SUBCUTANEOUS at 09:29

## 2025-02-24 RX ADMIN — LISINOPRIL 25 MILLIGRAM(S): 30 TABLET ORAL at 05:33

## 2025-02-24 RX ADMIN — HEPARIN SODIUM 9 UNIT(S)/HR: 1000 INJECTION INTRAVENOUS; SUBCUTANEOUS at 19:05

## 2025-02-24 RX ADMIN — HEPARIN SODIUM 9 UNIT(S)/HR: 1000 INJECTION INTRAVENOUS; SUBCUTANEOUS at 12:22

## 2025-02-24 RX ADMIN — Medication 25 GRAM(S): at 21:23

## 2025-02-24 RX ADMIN — Medication 975 MILLIGRAM(S): at 23:34

## 2025-02-24 RX ADMIN — INSULIN LISPRO 8 UNIT(S): 100 INJECTION, SOLUTION INTRAVENOUS; SUBCUTANEOUS at 18:52

## 2025-02-24 RX ADMIN — Medication 250 MILLIGRAM(S): at 18:49

## 2025-02-24 RX ADMIN — OXYCODONE HYDROCHLORIDE 10 MILLIGRAM(S): 30 TABLET ORAL at 15:24

## 2025-02-24 NOTE — PROGRESS NOTE ADULT - SUBJECTIVE AND OBJECTIVE BOX
VASCULAR SURGERY DAILY PROGRESS NOTE:     SUBJECTIVE/ROS: Patient seen and examined, he is resting comfortably, no new events/complaints. Pain is controlled.     MEDICATIONS  (STANDING):  acetaminophen     Tablet .. 975 milliGRAM(s) Oral every 6 hours  aspirin  chewable 81 milliGRAM(s) Oral daily  atenolol  Tablet 25 milliGRAM(s) Oral daily  atorvastatin 40 milliGRAM(s) Oral at bedtime  clopidogrel Tablet 75 milliGRAM(s) Oral daily  cyanocobalamin 1000 MICROGram(s) Oral daily  dextrose 5%. 1000 milliLiter(s) (50 mL/Hr) IV Continuous <Continuous>  dextrose 5%. 1000 milliLiter(s) (100 mL/Hr) IV Continuous <Continuous>  dextrose 5%. 1000 milliLiter(s) (100 mL/Hr) IV Continuous <Continuous>  dextrose 5%. 1000 milliLiter(s) (50 mL/Hr) IV Continuous <Continuous>  dextrose 50% Injectable 25 Gram(s) IV Push once  dextrose 50% Injectable 12.5 Gram(s) IV Push once  dextrose 50% Injectable 25 Gram(s) IV Push once  dextrose 50% Injectable 25 Gram(s) IV Push once  dextrose 50% Injectable 12.5 Gram(s) IV Push once  dextrose 50% Injectable 25 Gram(s) IV Push once  gabapentin 100 milliGRAM(s) Oral daily  glucagon  Injectable 1 milliGRAM(s) IntraMuscular once  glucagon  Injectable 1 milliGRAM(s) IntraMuscular once  heparin  Infusion 800 Unit(s)/Hr (8 mL/Hr) IV Continuous <Continuous>  influenza  Vaccine (HIGH DOSE) 0.5 milliLiter(s) IntraMuscular once  insulin glargine Injectable (LANTUS) 10 Unit(s) SubCutaneous at bedtime  insulin lispro (ADMELOG) corrective regimen sliding scale   SubCutaneous three times a day before meals  insulin lispro (ADMELOG) corrective regimen sliding scale   SubCutaneous at bedtime  insulin lispro Injectable (ADMELOG) 8 Unit(s) SubCutaneous three times a day before meals  lactobacillus acidophilus 1 Tablet(s) Oral with dinner  melatonin 6 milliGRAM(s) Oral at bedtime  pantoprazole  Injectable 40 milliGRAM(s) IV Push daily  piperacillin/tazobactam IVPB.. 3.375 Gram(s) IV Intermittent every 8 hours  polyethylene glycol 3350 17 Gram(s) Oral daily  senna 1 Tablet(s) Oral at bedtime  vancomycin  IVPB 1000 milliGRAM(s) IV Intermittent every 12 hours    MEDICATIONS  (PRN):  bisacodyl 5 milliGRAM(s) Oral daily PRN Constipation  dextrose Oral Gel 15 Gram(s) Oral once PRN Blood Glucose LESS THAN 70 milliGRAM(s)/deciliter  dextrose Oral Gel 15 Gram(s) Oral once PRN Blood Glucose LESS THAN 70 milliGRAM(s)/deciliter  oxyCODONE    IR 5 milliGRAM(s) Oral every 4 hours PRN Moderate Pain (4 - 6)  oxyCODONE    IR 10 milliGRAM(s) Oral every 4 hours PRN Severe Pain (7 - 10)      OBJECTIVE:    Vital Signs Last 24 Hrs  T(C): 36.9 (24 Feb 2025 05:25), Max: 37.1 (23 Feb 2025 21:14)  T(F): 98.5 (24 Feb 2025 05:25), Max: 98.8 (23 Feb 2025 21:14)  HR: 90 (24 Feb 2025 05:25) (80 - 90)  BP: 157/81 (24 Feb 2025 05:25) (150/72 - 163/83)  BP(mean): --  RR: 18 (24 Feb 2025 05:25) (18 - 18)  SpO2: 98% (24 Feb 2025 05:25) (97% - 99%)    Parameters below as of 24 Feb 2025 05:25  Patient On (Oxygen Delivery Method): room air            I&O's Detail    23 Feb 2025 07:01  -  24 Feb 2025 07:00  --------------------------------------------------------  IN:    IV PiggyBack: 450 mL    Oral Fluid: 520 mL  Total IN: 970 mL    OUT:    Voided (mL): 800 mL  Total OUT: 800 mL    Total NET: 170 mL          Daily     Daily     LABS:                        8.7    18.42 )-----------( 677      ( 24 Feb 2025 06:26 )             29.1     02-24    136  |  99  |  11  ----------------------------<  177[H]  3.7   |  23  |  0.98    Ca    9.4      24 Feb 2025 06:25  Phos  3.2     02-24  Mg     2.4     02-24      PT/INR - ( 24 Feb 2025 06:26 )   PT: 14.0 sec;   INR: 1.22 ratio         PTT - ( 24 Feb 2025 06:26 )  PTT:52.5 sec  Urinalysis Basic - ( 24 Feb 2025 06:25 )    Color: x / Appearance: x / SG: x / pH: x  Gluc: 177 mg/dL / Ketone: x  / Bili: x / Urobili: x   Blood: x / Protein: x / Nitrite: x   Leuk Esterase: x / RBC: x / WBC x   Sq Epi: x / Non Sq Epi: x / Bacteria: x      PHYSICAL EXAM:    Constitutional: NAD  Respiratory: Breathing comfortably on RA  Gastrointestinal: Soft, nontender, nondistended  Extremities: no LLE lateral signal on AM exam, no distal signals. Big toe, 2nd-3rd, and 5th digit with ischemic appearing changes and up the calf. L foot mild swelling. Gradually increased signs of demarcation of L foot with intact blisters on medial aspect.   Psychiatric:  A&Ox3, appropriate affect

## 2025-02-24 NOTE — PROGRESS NOTE ADULT - SUBJECTIVE AND OBJECTIVE BOX
DATE OF SERVICE: 02-24-25 @ 21:50    Patient is a 70y old  Male who presents with a chief complaint of PAD (23 Feb 2025 15:19)      INTERVAL HISTORY: feels well    TELEMETRY Personally reviewed: no events  	  MEDICATIONS:  atenolol  Tablet 25 milliGRAM(s) Oral daily        PHYSICAL EXAM:  T(C): 37.6 (02-24-25 @ 17:24), Max: 37.6 (02-24-25 @ 17:24)  HR: 91 (02-24-25 @ 17:24) (79 - 91)  BP: 158/71 (02-24-25 @ 17:24) (150/72 - 168/61)  RR: 18 (02-24-25 @ 17:24) (18 - 18)  SpO2: 96% (02-24-25 @ 17:24) (96% - 99%)  Wt(kg): --  I&O's Summary    23 Feb 2025 07:01  -  24 Feb 2025 07:00  --------------------------------------------------------  IN: 970 mL / OUT: 800 mL / NET: 170 mL    24 Feb 2025 07:01  -  24 Feb 2025 21:50  --------------------------------------------------------  IN: 696 mL / OUT: 800 mL / NET: -104 mL          Appearance: In no distress	  HEENT:    PERRL, EOMI	  Cardiovascular:  S1 S2, No JVD  Respiratory: Lungs clear to auscultation	  Gastrointestinal:  Soft, Non-tender, + BS	  Vascularature:  No edema of LE  Psychiatric: Appropriate affect   Neuro: no acute focal deficits                               8.7    18.42 )-----------( 677      ( 24 Feb 2025 06:26 )             29.1     02-24    136  |  99  |  11  ----------------------------<  177[H]  3.7   |  23  |  0.98    Ca    9.4      24 Feb 2025 06:25  Phos  3.2     02-24  Mg     2.4     02-24          Labs personally reviewed      TTE  1. Left ventricular cavity is normal in size. Left ventricular systolic function is normal with an ejection fraction visually estimated at 50 to 55 %. Regional wall motion abnormalities present.   2. Basal and mid inferior wall and basal inferoseptal segment are abnormal.   3. Elevated left ventricular filling pressure.   4. Normal right ventricular cavity size and normal right ventricular systolic function.   5. No significant valvular disease.   6. Moderate pericardial effusion with raised intra-pericardial pressures.   7. Compared to the transthoracic echocardiogram performed on 11/16/2023, LVEF has decreased and there are new regional wall motion abnormalities. The pericardial effusion appears similar in size. Findings were discussed with Dr. Dayton Dawkins on 2/24/2025 at 10:00.        EKG: Personally reviewed by me - Sinus tach   Radiology: Personally reviewed by me - < from: Xray Chest 1 View- PORTABLE-Urgent (Xray Chest 1 View- PORTABLE-Urgent .) (02.20.25 @ 03:47) >  Bibasilar linear atelectasis otherwise lungs are clear.         Assessment /Plan:   69M PM of former smoker (1.5 PPD % 50 years; quit 2 years ago),  HTN, HLD, DM, PAD and aortoiliac occlusive disease, who previously underwent an outpatient angiography complicated by right iliac artery dissection. Now S/P Creation of bypass from left femoral artery to distal tibial artery with RSVG and completion angiogram on 2/7/25 s/p 2u pRBC 7.4 from 7.8l not responded to 2nd unit on 2/12/25. EGD completed 2/13 found to have bleeding gastric ulcer w/ hemostasis achieved with cautery. Hgb found to be 6.1 and s/p 2u pRBC. Patient now w/ ischemic changes to LLE.    1. Cardiac Risk Stratification   - No chest pain/SOB  - EKG Sinus tach 117 bpm no ischemic changes   - TTE reviewed with pericardial effusion, unchanged compared to 2023  - Not in decompensated HF   - No hx of tachy/timothy arrhythmias   - No valvular abnormalities  - Moderate risk for moderate risk vascular surgery    2. Hypertension  - BP stable c/w home meds    3. Hyperlipidemia   - Lipitor 40mg   - lipid profile     4. Diabetes Mellitus Type II   - FBG per protocol  - ISS   - A1C: 7.7%     5. CAD - TTE with Basal and mid inferior wall and basal inferoseptal segment are abnormal  - Discussed with pt, spouse and daughter in person, they do not know full name or number of his cardiologist to obtain his prior records from  - They deny he has any h/o MI, CP or SOB  - Advised OP follow up with his cards           Dayton Dawkins DO [facility]  Cardiovascular Medicine  [address] [place] [address], [place] [address]  Office: [phone]  Available via Text/call on [employer] Teams

## 2025-02-24 NOTE — PROGRESS NOTE ADULT - ASSESSMENT
69M PM of former smoker (1.5 PPD % 50 years; quit 2 years ago),  HTN, HLD, DM, PAD and aortoiliac occlusive disease, who previously underwent an outpatient angiography complicated by right iliac artery dissection. Now S/P Creation of bypass from left femoral artery to distal tibial artery with RSVG and completion angiogram on 2/7/25 s/p 2u pRBC 7.4 from 7.8l not responded to 2nd unit on 2/12/25. EGD completed 2/13 found to have bleeding gastric ulcer w/ hemostasis achieved with cautery. Hgb found to be 6.1 and s/p 2u pRBC. Patient now w/ ischemic changes to LLE.     Plan:   - L BKA planning, date to be determined. Patient amenable, though daughter is hesitant and would like to continue thinking about surgery. Pt daughter inquires why only his foot cannot be amputated rather than below the knee. Providers s/w daughter regarding procedure is better for ambulatory status in the future for prosthesis among other r/b discussed.   - cardiology reach out for clearance; appreciate cards recs   - Antibx: Empiric vanc + zosyn   - Hep gtt adjusted, aspirin/plavix  - Diet: Regular   - 18U Lantus, 6U Lispro; appreciate endocrine recs   - ASA/Plavix/Statin   - HTN possibly 2/2 pain   - Pain regimen: Tylenol ATC, oxycodone PRN  - Bowel regimen; dulcolax added 2/22  - PT recs -> YANELY    Vascular Surgery  [id number]

## 2025-02-25 LAB
ANION GAP SERPL CALC-SCNC: 13 MMOL/L — SIGNIFICANT CHANGE UP (ref 5–17)
APTT BLD: 46.4 SEC — HIGH (ref 24.5–35.6)
APTT BLD: 60.8 SEC — HIGH (ref 24.5–35.6)
APTT BLD: 73.8 SEC — HIGH (ref 24.5–35.6)
BUN SERPL-MCNC: 12 MG/DL — SIGNIFICANT CHANGE UP (ref 7–23)
CALCIUM SERPL-MCNC: 9 MG/DL — SIGNIFICANT CHANGE UP (ref 8.4–10.5)
CHLORIDE SERPL-SCNC: 100 MMOL/L — SIGNIFICANT CHANGE UP (ref 96–108)
CO2 SERPL-SCNC: 23 MMOL/L — SIGNIFICANT CHANGE UP (ref 22–31)
CREAT SERPL-MCNC: 0.98 MG/DL — SIGNIFICANT CHANGE UP (ref 0.5–1.3)
CULTURE RESULTS: SIGNIFICANT CHANGE UP
CULTURE RESULTS: SIGNIFICANT CHANGE UP
EGFR: 82 ML/MIN/1.73M2 — SIGNIFICANT CHANGE UP
EGFR: 82 ML/MIN/1.73M2 — SIGNIFICANT CHANGE UP
GLUCOSE BLDC GLUCOMTR-MCNC: 198 MG/DL — HIGH (ref 70–99)
GLUCOSE BLDC GLUCOMTR-MCNC: 211 MG/DL — HIGH (ref 70–99)
GLUCOSE BLDC GLUCOMTR-MCNC: 290 MG/DL — HIGH (ref 70–99)
GLUCOSE BLDC GLUCOMTR-MCNC: 294 MG/DL — HIGH (ref 70–99)
GLUCOSE BLDC GLUCOMTR-MCNC: 306 MG/DL — HIGH (ref 70–99)
GLUCOSE SERPL-MCNC: 218 MG/DL — HIGH (ref 70–99)
HCT VFR BLD CALC: 25.6 % — LOW (ref 39–50)
HGB BLD-MCNC: 7.9 G/DL — LOW (ref 13–17)
MAGNESIUM SERPL-MCNC: 2.2 MG/DL — SIGNIFICANT CHANGE UP (ref 1.6–2.6)
MCHC RBC-ENTMCNC: 27.5 PG — SIGNIFICANT CHANGE UP (ref 27–34)
MCHC RBC-ENTMCNC: 30.9 G/DL — LOW (ref 32–36)
MCV RBC AUTO: 89.2 FL — SIGNIFICANT CHANGE UP (ref 80–100)
NRBC BLD AUTO-RTO: 0 /100 WBCS — SIGNIFICANT CHANGE UP (ref 0–0)
PHOSPHATE SERPL-MCNC: 3.1 MG/DL — SIGNIFICANT CHANGE UP (ref 2.5–4.5)
PLATELET # BLD AUTO: 628 K/UL — HIGH (ref 150–400)
POTASSIUM SERPL-MCNC: 3.6 MMOL/L — SIGNIFICANT CHANGE UP (ref 3.5–5.3)
POTASSIUM SERPL-SCNC: 3.6 MMOL/L — SIGNIFICANT CHANGE UP (ref 3.5–5.3)
RBC # BLD: 2.87 M/UL — LOW (ref 4.2–5.8)
RBC # FLD: 16.8 % — HIGH (ref 10.3–14.5)
SODIUM SERPL-SCNC: 136 MMOL/L — SIGNIFICANT CHANGE UP (ref 135–145)
SPECIMEN SOURCE: SIGNIFICANT CHANGE UP
SPECIMEN SOURCE: SIGNIFICANT CHANGE UP
VANCOMYCIN TROUGH SERPL-MCNC: 15.7 UG/ML — SIGNIFICANT CHANGE UP (ref 10–20)
WBC # BLD: 18.62 K/UL — HIGH (ref 3.8–10.5)
WBC # FLD AUTO: 18.62 K/UL — HIGH (ref 3.8–10.5)

## 2025-02-25 RX ORDER — LOSARTAN POTASSIUM 100 MG/1
50 TABLET, FILM COATED ORAL DAILY
Refills: 0 | Status: DISCONTINUED | OUTPATIENT
Start: 2025-02-25 | End: 2025-02-27

## 2025-02-25 RX ADMIN — Medication 40 MILLIEQUIVALENT(S): at 09:32

## 2025-02-25 RX ADMIN — HEPARIN SODIUM 10 UNIT(S)/HR: 1000 INJECTION INTRAVENOUS; SUBCUTANEOUS at 20:00

## 2025-02-25 RX ADMIN — INSULIN LISPRO 8 UNIT(S): 100 INJECTION, SOLUTION INTRAVENOUS; SUBCUTANEOUS at 13:25

## 2025-02-25 RX ADMIN — ATORVASTATIN CALCIUM 40 MILLIGRAM(S): 80 TABLET, FILM COATED ORAL at 21:28

## 2025-02-25 RX ADMIN — LOSARTAN POTASSIUM 50 MILLIGRAM(S): 100 TABLET, FILM COATED ORAL at 13:21

## 2025-02-25 RX ADMIN — INSULIN LISPRO 8 UNIT(S): 100 INJECTION, SOLUTION INTRAVENOUS; SUBCUTANEOUS at 18:17

## 2025-02-25 RX ADMIN — OXYCODONE HYDROCHLORIDE 10 MILLIGRAM(S): 30 TABLET ORAL at 15:09

## 2025-02-25 RX ADMIN — CYANOCOBALAMIN 1000 MICROGRAM(S): 1000 INJECTION INTRAMUSCULAR; SUBCUTANEOUS at 13:21

## 2025-02-25 RX ADMIN — Medication 975 MILLIGRAM(S): at 18:22

## 2025-02-25 RX ADMIN — HEPARIN SODIUM 10 UNIT(S)/HR: 1000 INJECTION INTRAVENOUS; SUBCUTANEOUS at 14:33

## 2025-02-25 RX ADMIN — INSULIN LISPRO 8 UNIT(S): 100 INJECTION, SOLUTION INTRAVENOUS; SUBCUTANEOUS at 09:16

## 2025-02-25 RX ADMIN — OXYCODONE HYDROCHLORIDE 10 MILLIGRAM(S): 30 TABLET ORAL at 16:09

## 2025-02-25 RX ADMIN — OXYCODONE HYDROCHLORIDE 10 MILLIGRAM(S): 30 TABLET ORAL at 09:31

## 2025-02-25 RX ADMIN — OXYCODONE HYDROCHLORIDE 10 MILLIGRAM(S): 30 TABLET ORAL at 21:41

## 2025-02-25 RX ADMIN — Medication 25 GRAM(S): at 21:27

## 2025-02-25 RX ADMIN — CLOPIDOGREL BISULFATE 75 MILLIGRAM(S): 75 TABLET, FILM COATED ORAL at 13:21

## 2025-02-25 RX ADMIN — Medication 975 MILLIGRAM(S): at 05:29

## 2025-02-25 RX ADMIN — HEPARIN SODIUM 10 UNIT(S)/HR: 1000 INJECTION INTRAVENOUS; SUBCUTANEOUS at 03:14

## 2025-02-25 RX ADMIN — OXYCODONE HYDROCHLORIDE 10 MILLIGRAM(S): 30 TABLET ORAL at 10:31

## 2025-02-25 RX ADMIN — OXYCODONE HYDROCHLORIDE 10 MILLIGRAM(S): 30 TABLET ORAL at 22:11

## 2025-02-25 RX ADMIN — GABAPENTIN 100 MILLIGRAM(S): 400 CAPSULE ORAL at 13:21

## 2025-02-25 RX ADMIN — HEPARIN SODIUM 10 UNIT(S)/HR: 1000 INJECTION INTRAVENOUS; SUBCUTANEOUS at 07:37

## 2025-02-25 RX ADMIN — Medication 81 MILLIGRAM(S): at 13:22

## 2025-02-25 RX ADMIN — Medication 975 MILLIGRAM(S): at 19:22

## 2025-02-25 RX ADMIN — Medication 6 MILLIGRAM(S): at 21:28

## 2025-02-25 RX ADMIN — Medication 250 MILLIGRAM(S): at 18:22

## 2025-02-25 RX ADMIN — Medication 975 MILLIGRAM(S): at 06:29

## 2025-02-25 RX ADMIN — INSULIN LISPRO 2: 100 INJECTION, SOLUTION INTRAVENOUS; SUBCUTANEOUS at 13:22

## 2025-02-25 RX ADMIN — Medication 975 MILLIGRAM(S): at 00:34

## 2025-02-25 RX ADMIN — Medication 1 TABLET(S): at 18:22

## 2025-02-25 RX ADMIN — INSULIN LISPRO 4: 100 INJECTION, SOLUTION INTRAVENOUS; SUBCUTANEOUS at 18:17

## 2025-02-25 RX ADMIN — Medication 975 MILLIGRAM(S): at 14:20

## 2025-02-25 RX ADMIN — Medication 250 MILLIGRAM(S): at 06:46

## 2025-02-25 RX ADMIN — Medication 25 GRAM(S): at 05:29

## 2025-02-25 RX ADMIN — LISINOPRIL 25 MILLIGRAM(S): 30 TABLET ORAL at 05:28

## 2025-02-25 RX ADMIN — Medication 25 GRAM(S): at 13:26

## 2025-02-25 RX ADMIN — Medication 40 MILLIGRAM(S): at 13:25

## 2025-02-25 RX ADMIN — INSULIN GLARGINE-YFGN 10 UNIT(S): 100 INJECTION, SOLUTION SUBCUTANEOUS at 22:35

## 2025-02-25 RX ADMIN — INSULIN LISPRO 2: 100 INJECTION, SOLUTION INTRAVENOUS; SUBCUTANEOUS at 22:35

## 2025-02-25 RX ADMIN — Medication 975 MILLIGRAM(S): at 13:20

## 2025-02-25 RX ADMIN — INSULIN LISPRO 6: 100 INJECTION, SOLUTION INTRAVENOUS; SUBCUTANEOUS at 09:16

## 2025-02-25 NOTE — DIETITIAN INITIAL EVALUATION ADULT - PERTINENT MEDS FT
MEDICATIONS  (STANDING):  acetaminophen     Tablet .. 975 milliGRAM(s) Oral every 6 hours  aspirin  chewable 81 milliGRAM(s) Oral daily  atenolol  Tablet 25 milliGRAM(s) Oral daily  atorvastatin 40 milliGRAM(s) Oral at bedtime  clopidogrel Tablet 75 milliGRAM(s) Oral daily  cyanocobalamin 1000 MICROGram(s) Oral daily  dextrose 5%. 1000 milliLiter(s) (50 mL/Hr) IV Continuous <Continuous>  dextrose 5%. 1000 milliLiter(s) (100 mL/Hr) IV Continuous <Continuous>  dextrose 5%. 1000 milliLiter(s) (50 mL/Hr) IV Continuous <Continuous>  dextrose 5%. 1000 milliLiter(s) (100 mL/Hr) IV Continuous <Continuous>  dextrose 50% Injectable 25 Gram(s) IV Push once  dextrose 50% Injectable 12.5 Gram(s) IV Push once  dextrose 50% Injectable 25 Gram(s) IV Push once  dextrose 50% Injectable 25 Gram(s) IV Push once  dextrose 50% Injectable 12.5 Gram(s) IV Push once  dextrose 50% Injectable 25 Gram(s) IV Push once  gabapentin 100 milliGRAM(s) Oral daily  glucagon  Injectable 1 milliGRAM(s) IntraMuscular once  glucagon  Injectable 1 milliGRAM(s) IntraMuscular once  heparin  Infusion 900 Unit(s)/Hr (10 mL/Hr) IV Continuous <Continuous>  influenza  Vaccine (HIGH DOSE) 0.5 milliLiter(s) IntraMuscular once  insulin glargine Injectable (LANTUS) 10 Unit(s) SubCutaneous at bedtime  insulin lispro (ADMELOG) corrective regimen sliding scale   SubCutaneous three times a day before meals  insulin lispro (ADMELOG) corrective regimen sliding scale   SubCutaneous at bedtime  insulin lispro Injectable (ADMELOG) 8 Unit(s) SubCutaneous three times a day before meals  lactobacillus acidophilus 1 Tablet(s) Oral with dinner  losartan 50 milliGRAM(s) Oral daily  melatonin 6 milliGRAM(s) Oral at bedtime  pantoprazole  Injectable 40 milliGRAM(s) IV Push daily  piperacillin/tazobactam IVPB.. 3.375 Gram(s) IV Intermittent every 8 hours  polyethylene glycol 3350 17 Gram(s) Oral daily  senna 1 Tablet(s) Oral at bedtime  vancomycin  IVPB 1000 milliGRAM(s) IV Intermittent every 12 hours    MEDICATIONS  (PRN):  bisacodyl 5 milliGRAM(s) Oral daily PRN Constipation  dextrose Oral Gel 15 Gram(s) Oral once PRN Blood Glucose LESS THAN 70 milliGRAM(s)/deciliter  dextrose Oral Gel 15 Gram(s) Oral once PRN Blood Glucose LESS THAN 70 milliGRAM(s)/deciliter  oxyCODONE    IR 5 milliGRAM(s) Oral every 4 hours PRN Moderate Pain (4 - 6)  oxyCODONE    IR 10 milliGRAM(s) Oral every 4 hours PRN Severe Pain (7 - 10)

## 2025-02-25 NOTE — DIETITIAN INITIAL EVALUATION ADULT - ADD RECOMMEND
1. Recommend to continue Carbohydrate Consistent Diet with no snacks as ordered  2. Recommend Glucerna 1x daily (220kcals, 10g protein) to optimize PO intake  3. Encourage use of daily menus. Honor dietary preferences as expressed as able.   4. Continue cyanocobalamin as ordered  5. Continue to monitor PO intake, weight trend, electrolytes, blood glucose, labs, BMs in-house

## 2025-02-25 NOTE — PROGRESS NOTE ADULT - ASSESSMENT
69M PM of former smoker (1.5 PPD % 50 years; quit 2 years ago),  HTN, HLD, DM, PAD and aortoiliac occlusive disease, who previously underwent an outpatient angiography complicated by right iliac artery dissection. Now S/P Creation of bypass from left femoral artery to distal tibial artery with RSVG and completion angiogram on 2/7/25 s/p 2u pRBC 7.4 from 7.8l not responded to 2nd unit on 2/12/25. EGD completed 2/13 found to have bleeding gastric ulcer w/ hemostasis achieved with cautery. Hgb found to be 6.1 and s/p 2u pRBC. Patient now w/ ischemic changes to LLE with pending L BKA date.     Plan:   - L BKA planning, date to be determined  - Cardiology clearance - moderate risk  - Antibx: Empiric vanc + zosyn   - Hep gtt adjusted, aspirin/plavix -> f/u PM PTT for therapeutic hep gtt rate  - Diet: Regular   - 18U Lantus, 6U Lispro; appreciate endocrine recs   - ASA/Plavix/Statin   - HTN possibly 2/2 pain   - Pain regimen: Tylenol ATC, oxycodone PRN  - Bowel regimen; dulcolax added 2/22  - PT recs -> YANELY    Vascular Surgery  [id number]

## 2025-02-25 NOTE — PROGRESS NOTE ADULT - SUBJECTIVE AND OBJECTIVE BOX
DATE OF SERVICE: 02-25-25 @ 21:15    Patient is a 70y old  Male who presents with a chief complaint of Peripheral vascular disease     (25 Feb 2025 12:33)      INTERVAL HISTORY: feels ok     	  MEDICATIONS:  atenolol  Tablet 25 milliGRAM(s) Oral daily  losartan 50 milliGRAM(s) Oral daily        PHYSICAL EXAM:  T(C): 36.8 (02-25-25 @ 17:03), Max: 37.3 (02-25-25 @ 05:27)  HR: 81 (02-25-25 @ 17:03) (81 - 97)  BP: 154/72 (02-25-25 @ 17:03) (154/72 - 186/80)  RR: 18 (02-25-25 @ 17:03) (18 - 18)  SpO2: 98% (02-25-25 @ 17:03) (95% - 99%)  Wt(kg): --  I&O's Summary    24 Feb 2025 07:01  -  25 Feb 2025 07:00  --------------------------------------------------------  IN: 836 mL / OUT: 1660 mL / NET: -824 mL    25 Feb 2025 07:01  -  25 Feb 2025 21:15  --------------------------------------------------------  IN: 360 mL / OUT: 300 mL / NET: 60 mL          Appearance: In no distress	  HEENT:    PERRL, EOMI	  Cardiovascular:  S1 S2, No JVD  Respiratory: Lungs clear to auscultation	  Gastrointestinal:  Soft, Non-tender, + BS	  Vascularature:  No edema of LE  Psychiatric: Appropriate affect   Neuro: no acute focal deficits                               7.9    18.62 )-----------( 628      ( 25 Feb 2025 05:58 )             25.6     02-25    136  |  100  |  12  ----------------------------<  218[H]  3.6   |  23  |  0.98    Ca    9.0      25 Feb 2025 05:57  Phos  3.1     02-25  Mg     2.2     02-25          Labs personally reviewed      TTE  1. Left ventricular cavity is normal in size. Left ventricular systolic function is normal with an ejection fraction visually estimated at 50 to 55 %. Regional wall motion abnormalities present.   2. Basal and mid inferior wall and basal inferoseptal segment are abnormal.   3. Elevated left ventricular filling pressure.   4. Normal right ventricular cavity size and normal right ventricular systolic function.   5. No significant valvular disease.   6. Moderate pericardial effusion with raised intra-pericardial pressures.   7. Compared to the transthoracic echocardiogram performed on 11/16/2023, LVEF has decreased and there are new regional wall motion abnormalities. The pericardial effusion appears similar in size. Findings were discussed with Dr. Dayton Dawkins on 2/24/2025 at 10:00.        EKG: Personally reviewed by me - Sinus tach   Radiology: Personally reviewed by me - < from: Xray Chest 1 View- PORTABLE-Urgent (Xray Chest 1 View- PORTABLE-Urgent .) (02.20.25 @ 03:47) >  Bibasilar linear atelectasis otherwise lungs are clear.         Assessment /Plan:   69M PM of former smoker (1.5 PPD % 50 years; quit 2 years ago),  HTN, HLD, DM, PAD and aortoiliac occlusive disease, who previously underwent an outpatient angiography complicated by right iliac artery dissection. Now S/P Creation of bypass from left femoral artery to distal tibial artery with RSVG and completion angiogram on 2/7/25 s/p 2u pRBC 7.4 from 7.8l not responded to 2nd unit on 2/12/25. EGD completed 2/13 found to have bleeding gastric ulcer w/ hemostasis achieved with cautery. Hgb found to be 6.1 and s/p 2u pRBC. Patient now w/ ischemic changes to LLE.    1. Cardiac Risk Stratification   - No chest pain/SOB  - EKG Sinus tach 117 bpm no ischemic changes   - TTE reviewed with pericardial effusion, unchanged compared to 2023  - Not in decompensated HF   - No hx of tachy/timothy arrhythmias   - No valvular abnormalities  - Moderate risk for moderate risk vascular surgery    2. Hypertension  - BP stable c/w home meds    3. Hyperlipidemia   - Lipitor 40mg   - lipid profile     4. Diabetes Mellitus Type II   - FBG per protocol  - ISS   - A1C: 7.7%     5. CAD - TTE with Basal and mid inferior wall and basal inferoseptal segment are abnormal  - Discussed with pt, spouse and daughter in person, they do not know full name or number of his cardiologist to obtain his prior records from  - They deny he has any h/o MI, CP or SOB  - Advised OP follow up with his cards           Dayton Dawkins DO [facility]  Cardiovascular Medicine  [address] [place] [address], [address] [facility]  Office: [phone]  Available via Text/call on [employer] Teams

## 2025-02-25 NOTE — DIETITIAN INITIAL EVALUATION ADULT - PHYSCIAL ASSESSMENT
Nutrition focused physical exam deferred at this time (patient present with lethargy). Patient reports UBW~ 145lbs, no recent weight loss reported.     [facility]: ([facility]155l[facility]; (9/2024)145lbs; (3/2024)145lbs;    IBW: 142 lbs  IBW%: 109%

## 2025-02-25 NOTE — DIETITIAN INITIAL EVALUATION ADULT - PERTINENT LABORATORY DATA
02-25    136  |  100  |  12  ----------------------------<  218[H]  3.6   |  23  |  0.98    Ca    9.0      25 Feb 2025 05:57  Phos  3.1     02-25  Mg     2.2     02-25    POCT Blood Glucose.: 290 mg/dL (02-25-25 @ 08:47)  A1C with Estimated Average Glucose Result: 7.7 % (01-28-25 @ 10:05)  A1C with Estimated Average Glucose Result: 7.4 % (12-06-24 @ 09:06)

## 2025-02-25 NOTE — DIETITIAN INITIAL EVALUATION ADULT - OTHER INFO
- Endo: hx of type 2 diabetes, A1C(1/28), Finger stick x 24hrs 170-290mg/dL, ordered for insulin for glycemic control  - Ordered for cyanocobalamin   - GI: ordered for lactobacillus acidophilus, miralax, senna

## 2025-02-25 NOTE — DIETITIAN INITIAL EVALUATION ADULT - ORAL INTAKE PTA/DIET HISTORY
Writer met patient at the bedside. Patient reports good appetite/PO intake PTA. Usually consumes 3 meals daily. Follows a Regular diet, does not follow any dietary restrictions. Confirms no known food allergies. Denies micronutrient supplement uses, denies liquid oral supplement uses. Denies chewing/swallowing difficulties. No nausea/vomiting reported. States having regular bowel movements daily at home.

## 2025-02-25 NOTE — PROGRESS NOTE ADULT - SUBJECTIVE AND OBJECTIVE BOX
Vascular Surgery Daily Progress Note    Last 24 hours events: TTE completed    Subjective: Patient examined at bedside this morning. Denies fevers, chills, nausea, vomiting, or motor/sensory changes to LLE.      piperacillin/tazobactam IVPB.. 3.375  vancomycin  IVPB 1000  aspirin  chewable 81  atenolol  Tablet 25  clopidogrel Tablet 75  heparin  Infusion 900  piperacillin/tazobactam IVPB.. 3.375  vancomycin  IVPB 1000        Vital Signs Last 24 Hrs  T(C): 37.3 (25 Feb 2025 05:27), Max: 37.6 (24 Feb 2025 17:24)  T(F): 99.1 (25 Feb 2025 05:27), Max: 99.6 (24 Feb 2025 17:24)  HR: 83 (25 Feb 2025 07:00) (79 - 97)  BP: 174/73 (25 Feb 2025 07:00) (146/71 - 186/80)  BP(mean): --  RR: 18 (25 Feb 2025 05:27) (18 - 18)  SpO2: 99% (25 Feb 2025 05:27) (95% - 99%)    Parameters below as of 25 Feb 2025 05:27  Patient On (Oxygen Delivery Method): room air      I&O's Summary    24 Feb 2025 07:01  -  25 Feb 2025 07:00  --------------------------------------------------------  IN: 836 mL / OUT: 1660 mL / NET: -824 mL        Physical Exam:  General: NAD, resting comfortably in bed  Pulmonary: Nonlabored breathing, no respiratory distress  Abdominal: soft, non distended, non tender  Extremities: no LLE lateral signal, no distal signals. Big toe, 2nd-3rd, and 5th digit with ischemic appearing changes and up the calf. L foot mild swelling. Gradually increased signs of demarcation of L foot up to middle calf with intact blisters on medial aspect.       LABS:                        7.9    18.62 )-----------( 628      ( 25 Feb 2025 05:58 )             25.6     02-25    136  |  100  |  12  ----------------------------<  218[H]  3.6   |  23  |  0.98    Ca    9.0      25 Feb 2025 05:57  Phos  3.1     02-25  Mg     2.2     02-25      PT/INR - ( 24 Feb 2025 06:26 )   PT: 14.0 sec;   INR: 1.22 ratio         PTT - ( 25 Feb 2025 05:58 )  PTT:60.8 sec

## 2025-02-25 NOTE — DIETITIAN INITIAL EVALUATION ADULT - ENERGY INTAKE
Patient reports slightly decreased appetite/PO intake in-house secondary to lethargy. Breakfast tray observed at the bedside, only noted had a few bite of Croat[place] toast this AM. Pt likely meeting <75% of estimated nutritional needs. Amenable to trial Glucerna 1x daily to optimize PO intake.  Fair (50-75%)

## 2025-02-26 LAB
ANION GAP SERPL CALC-SCNC: 13 MMOL/L — SIGNIFICANT CHANGE UP (ref 5–17)
APTT BLD: 67.7 SEC — HIGH (ref 24.5–35.6)
BUN SERPL-MCNC: 10 MG/DL — SIGNIFICANT CHANGE UP (ref 7–23)
CALCIUM SERPL-MCNC: 9.1 MG/DL — SIGNIFICANT CHANGE UP (ref 8.4–10.5)
CHLORIDE SERPL-SCNC: 98 MMOL/L — SIGNIFICANT CHANGE UP (ref 96–108)
CO2 SERPL-SCNC: 22 MMOL/L — SIGNIFICANT CHANGE UP (ref 22–31)
CREAT SERPL-MCNC: 0.98 MG/DL — SIGNIFICANT CHANGE UP (ref 0.5–1.3)
EGFR: 82 ML/MIN/1.73M2 — SIGNIFICANT CHANGE UP
EGFR: 82 ML/MIN/1.73M2 — SIGNIFICANT CHANGE UP
GLUCOSE BLDC GLUCOMTR-MCNC: 201 MG/DL — HIGH (ref 70–99)
GLUCOSE BLDC GLUCOMTR-MCNC: 261 MG/DL — HIGH (ref 70–99)
GLUCOSE BLDC GLUCOMTR-MCNC: 273 MG/DL — HIGH (ref 70–99)
GLUCOSE SERPL-MCNC: 246 MG/DL — HIGH (ref 70–99)
HCT VFR BLD CALC: 24.1 % — LOW (ref 39–50)
HGB BLD-MCNC: 7.2 G/DL — LOW (ref 13–17)
MAGNESIUM SERPL-MCNC: 2.1 MG/DL — SIGNIFICANT CHANGE UP (ref 1.6–2.6)
MCHC RBC-ENTMCNC: 26.8 PG — LOW (ref 27–34)
MCHC RBC-ENTMCNC: 29.9 G/DL — LOW (ref 32–36)
MCV RBC AUTO: 89.6 FL — SIGNIFICANT CHANGE UP (ref 80–100)
NRBC BLD AUTO-RTO: 0 /100 WBCS — SIGNIFICANT CHANGE UP (ref 0–0)
PHOSPHATE SERPL-MCNC: 2.8 MG/DL — SIGNIFICANT CHANGE UP (ref 2.5–4.5)
PLATELET # BLD AUTO: 540 K/UL — HIGH (ref 150–400)
POTASSIUM SERPL-MCNC: 4 MMOL/L — SIGNIFICANT CHANGE UP (ref 3.5–5.3)
POTASSIUM SERPL-SCNC: 4 MMOL/L — SIGNIFICANT CHANGE UP (ref 3.5–5.3)
RBC # BLD: 2.69 M/UL — LOW (ref 4.2–5.8)
RBC # FLD: 17.4 % — HIGH (ref 10.3–14.5)
SODIUM SERPL-SCNC: 133 MMOL/L — LOW (ref 135–145)
VANCOMYCIN TROUGH SERPL-MCNC: 16.7 UG/ML — SIGNIFICANT CHANGE UP (ref 10–20)
WBC # BLD: 18.14 K/UL — HIGH (ref 3.8–10.5)
WBC # FLD AUTO: 18.14 K/UL — HIGH (ref 3.8–10.5)

## 2025-02-26 PROCEDURE — 99233 SBSQ HOSP IP/OBS HIGH 50: CPT

## 2025-02-26 PROCEDURE — 99232 SBSQ HOSP IP/OBS MODERATE 35: CPT

## 2025-02-26 RX ORDER — INSULIN GLARGINE-YFGN 100 [IU]/ML
14 INJECTION, SOLUTION SUBCUTANEOUS AT BEDTIME
Refills: 0 | Status: DISCONTINUED | OUTPATIENT
Start: 2025-02-26 | End: 2025-02-27

## 2025-02-26 RX ORDER — INSULIN LISPRO 100 U/ML
10 INJECTION, SOLUTION INTRAVENOUS; SUBCUTANEOUS
Refills: 0 | Status: DISCONTINUED | OUTPATIENT
Start: 2025-02-26 | End: 2025-02-27

## 2025-02-26 RX ADMIN — Medication 975 MILLIGRAM(S): at 06:07

## 2025-02-26 RX ADMIN — Medication 250 MILLIGRAM(S): at 18:24

## 2025-02-26 RX ADMIN — Medication 975 MILLIGRAM(S): at 13:25

## 2025-02-26 RX ADMIN — LISINOPRIL 25 MILLIGRAM(S): 30 TABLET ORAL at 05:08

## 2025-02-26 RX ADMIN — Medication 25 GRAM(S): at 13:30

## 2025-02-26 RX ADMIN — Medication 1 TABLET(S): at 17:48

## 2025-02-26 RX ADMIN — OXYCODONE HYDROCHLORIDE 10 MILLIGRAM(S): 30 TABLET ORAL at 22:31

## 2025-02-26 RX ADMIN — Medication 6 MILLIGRAM(S): at 21:52

## 2025-02-26 RX ADMIN — CLOPIDOGREL BISULFATE 75 MILLIGRAM(S): 75 TABLET, FILM COATED ORAL at 13:27

## 2025-02-26 RX ADMIN — Medication 975 MILLIGRAM(S): at 17:48

## 2025-02-26 RX ADMIN — INSULIN LISPRO 6: 100 INJECTION, SOLUTION INTRAVENOUS; SUBCUTANEOUS at 18:18

## 2025-02-26 RX ADMIN — Medication 25 GRAM(S): at 05:07

## 2025-02-26 RX ADMIN — INSULIN LISPRO 6: 100 INJECTION, SOLUTION INTRAVENOUS; SUBCUTANEOUS at 11:07

## 2025-02-26 RX ADMIN — HEPARIN SODIUM 10 UNIT(S)/HR: 1000 INJECTION INTRAVENOUS; SUBCUTANEOUS at 08:43

## 2025-02-26 RX ADMIN — OXYCODONE HYDROCHLORIDE 10 MILLIGRAM(S): 30 TABLET ORAL at 17:48

## 2025-02-26 RX ADMIN — HEPARIN SODIUM 10 UNIT(S)/HR: 1000 INJECTION INTRAVENOUS; SUBCUTANEOUS at 07:31

## 2025-02-26 RX ADMIN — INSULIN LISPRO 10 UNIT(S): 100 INJECTION, SOLUTION INTRAVENOUS; SUBCUTANEOUS at 11:08

## 2025-02-26 RX ADMIN — OXYCODONE HYDROCHLORIDE 10 MILLIGRAM(S): 30 TABLET ORAL at 22:01

## 2025-02-26 RX ADMIN — Medication 975 MILLIGRAM(S): at 18:40

## 2025-02-26 RX ADMIN — Medication 25 GRAM(S): at 21:51

## 2025-02-26 RX ADMIN — INSULIN GLARGINE-YFGN 14 UNIT(S): 100 INJECTION, SOLUTION SUBCUTANEOUS at 21:51

## 2025-02-26 RX ADMIN — INSULIN LISPRO 10 UNIT(S): 100 INJECTION, SOLUTION INTRAVENOUS; SUBCUTANEOUS at 18:19

## 2025-02-26 RX ADMIN — Medication 40 MILLIGRAM(S): at 13:28

## 2025-02-26 RX ADMIN — CYANOCOBALAMIN 1000 MICROGRAM(S): 1000 INJECTION INTRAMUSCULAR; SUBCUTANEOUS at 13:27

## 2025-02-26 RX ADMIN — ATORVASTATIN CALCIUM 40 MILLIGRAM(S): 80 TABLET, FILM COATED ORAL at 21:53

## 2025-02-26 RX ADMIN — Medication 81 MILLIGRAM(S): at 13:27

## 2025-02-26 RX ADMIN — GABAPENTIN 100 MILLIGRAM(S): 400 CAPSULE ORAL at 13:27

## 2025-02-26 RX ADMIN — Medication 975 MILLIGRAM(S): at 14:20

## 2025-02-26 RX ADMIN — Medication 975 MILLIGRAM(S): at 05:07

## 2025-02-26 RX ADMIN — OXYCODONE HYDROCHLORIDE 10 MILLIGRAM(S): 30 TABLET ORAL at 18:40

## 2025-02-26 RX ADMIN — Medication 250 MILLIGRAM(S): at 05:09

## 2025-02-26 RX ADMIN — HEPARIN SODIUM 10 UNIT(S)/HR: 1000 INJECTION INTRAVENOUS; SUBCUTANEOUS at 19:31

## 2025-02-26 RX ADMIN — LOSARTAN POTASSIUM 50 MILLIGRAM(S): 100 TABLET, FILM COATED ORAL at 05:07

## 2025-02-26 NOTE — PROGRESS NOTE ADULT - ASSESSMENT
69M PM of former smoker (1.5 PPD % 50 years; quit 2 years ago),  HTN, HLD, DM, PAD and aortoiliac occlusive disease, who previously underwent an outpatient angiography complicated by right iliac artery dissection. Now S/P Creation of bypass from left femoral artery to distal tibial artery with RSVG and completion angiogram on 2/7/25 s/p 2u pRBC 7.4 from 7.8l not responded to 2nd unit on 2/12/25. EGD completed 2/13 found to have bleeding gastric ulcer w/ hemostasis achieved with cautery. Hgb found to be 6.1 and s/p 2u pRBC. Patient now w/ ischemic changes to LLE with pending L BKA date.     Plan:   - L BKA planning, date to be determined  - Cardiology cleared - moderate risk  - Antibx: Empiric vanc + zosyn   - Hep gtt adjusted, aspirin/plavix -> f/u AM PTT for therapeutic hep gtt rate  - Diet: Regular   - 18U Lantus, 6U Lispro; appreciate endocrine recs --> reached out to endocrine for better hyperglycemia control   - ASA/Plavix/Statin   - HTN possibly 2/2 pain   - Pain regimen: Tylenol ATC, oxycodone PRN  - Bowel regimen; dulcolax added 2/22  - PT recs -> YANELY    Vascular Surg  [id number]  69M PM of former smoker (1.5 PPD % 50 years; quit 2 years ago),  HTN, HLD, DM, PAD and aortoiliac occlusive disease, who previously underwent an outpatient angiography complicated by right iliac artery dissection. Now S/P Creation of bypass from left femoral artery to distal tibial artery with RSVG and completion angiogram on 2/7/25 s/p 2u pRBC 7.4 from 7.8l not responded to 2nd unit on 2/12/25. EGD completed 2/13 found to have bleeding gastric ulcer w/ hemostasis achieved with cautery. Hgb found to be 6.1 and s/p 2u pRBC. Patient now w/ ischemic changes to LLE with pending L BKA date.     Plan:   - L BKA tmr 2/27   - Cardiology cleared - moderate risk  - Antibx: Empiric vanc + zosyn   - Hep gtt adjusted, aspirin/plavix -> f/u AM PTT for therapeutic hep gtt rate  - Diet: Regular   - 18U Lantus, 6U Lispro; appreciate endocrine recs --> reached out to endocrine for better hyperglycemia control   - ASA/Plavix/Statin   - HTN possibly 2/2 pain   - Pain regimen: Tylenol ATC, oxycodone PRN  - Bowel regimen; dulcolax added 2/22  - PT recs -> YANELY    Vascular Surg  [id number]

## 2025-02-26 NOTE — PROGRESS NOTE ADULT - SUBJECTIVE AND OBJECTIVE BOX
HPI:     Patient is a 70y old  Male who presents with a chief complaint of Peripheral vascular disease     (25 Feb 2025 12:33)    Endocrinology consulted for diabetes management.   Most recent HbA1C: A1C with Estimated Average Glucose Result: 7.7 % (01-28-25 @ 10:05)      INTERVAL HPI/OVERNIGHT EVENTS:    Currently denies polydipsia, polyuria , visual changes, numbness in feet.    atorvastatin   40 milliGRAM(s) Oral (02-25-25 @ 21:28)   40 milliGRAM(s) Oral (02-24-25 @ 21:25)    insulin glargine Injectable (LANTUS)   10 Unit(s) SubCutaneous (02-25-25 @ 22:35)   10 Unit(s) SubCutaneous (02-24-25 @ 22:14)    insulin lispro (ADMELOG) corrective regimen sliding scale   6 Unit(s) SubCutaneous (02-26-25 @ 18:18)   6 Unit(s) SubCutaneous (02-26-25 @ 11:07)   4 Unit(s) SubCutaneous (02-25-25 @ 18:17)   2 Unit(s) SubCutaneous (02-25-25 @ 13:22)   6 Unit(s) SubCutaneous (02-25-25 @ 09:16)    insulin lispro (ADMELOG) corrective regimen sliding scale   2 Unit(s) SubCutaneous (02-25-25 @ 22:35)   1 Unit(s) SubCutaneous (02-24-25 @ 22:15)    insulin lispro Injectable (ADMELOG)   8 Unit(s) SubCutaneous (02-25-25 @ 18:17)   8 Unit(s) SubCutaneous (02-25-25 @ 13:25)   8 Unit(s) SubCutaneous (02-25-25 @ 09:16)    insulin lispro Injectable (ADMELOG)   10 Unit(s) SubCutaneous (02-26-25 @ 18:19)   10 Unit(s) SubCutaneous (02-26-25 @ 11:08)      Review of systems:   CONSTITUTIONAL:  Feels well, good appetite  CARDIOVASCULAR:  Negative for chest pain or palpitations  RESPIRATORY:  Negative for cough, or SOB   GASTROINTESTINAL:  Negative for nausea, vomiting, or abdominal pain  GENITOURINARY:  Negative frequency, urgency or dysuria     CAPILLARY BLOOD GLUCOSE      POCT Blood Glucose.: 261 mg/dL (26 Feb 2025 17:21)  POCT Blood Glucose.: 273 mg/dL (26 Feb 2025 11:03)  POCT Blood Glucose.: 306 mg/dL (25 Feb 2025 22:24)  POCT Blood Glucose.: 294 mg/dL (25 Feb 2025 21:35)       MEDICATIONS  (STANDING):  acetaminophen     Tablet .. 975 milliGRAM(s) Oral every 6 hours  aspirin  chewable 81 milliGRAM(s) Oral daily  atenolol  Tablet 25 milliGRAM(s) Oral daily  atorvastatin 40 milliGRAM(s) Oral at bedtime  clopidogrel Tablet 75 milliGRAM(s) Oral daily  cyanocobalamin 1000 MICROGram(s) Oral daily  dextrose 5%. 1000 milliLiter(s) (50 mL/Hr) IV Continuous <Continuous>  dextrose 5%. 1000 milliLiter(s) (100 mL/Hr) IV Continuous <Continuous>  dextrose 5%. 1000 milliLiter(s) (50 mL/Hr) IV Continuous <Continuous>  dextrose 5%. 1000 milliLiter(s) (100 mL/Hr) IV Continuous <Continuous>  dextrose 50% Injectable 25 Gram(s) IV Push once  dextrose 50% Injectable 12.5 Gram(s) IV Push once  dextrose 50% Injectable 25 Gram(s) IV Push once  dextrose 50% Injectable 25 Gram(s) IV Push once  dextrose 50% Injectable 12.5 Gram(s) IV Push once  dextrose 50% Injectable 25 Gram(s) IV Push once  gabapentin 100 milliGRAM(s) Oral daily  glucagon  Injectable 1 milliGRAM(s) IntraMuscular once  glucagon  Injectable 1 milliGRAM(s) IntraMuscular once  heparin  Infusion 900 Unit(s)/Hr (10 mL/Hr) IV Continuous <Continuous>  influenza  Vaccine (HIGH DOSE) 0.5 milliLiter(s) IntraMuscular once  insulin glargine Injectable (LANTUS) 14 Unit(s) SubCutaneous at bedtime  insulin lispro (ADMELOG) corrective regimen sliding scale   SubCutaneous at bedtime  insulin lispro (ADMELOG) corrective regimen sliding scale   SubCutaneous three times a day before meals  insulin lispro Injectable (ADMELOG) 10 Unit(s) SubCutaneous three times a day before meals  lactobacillus acidophilus 1 Tablet(s) Oral with dinner  losartan 50 milliGRAM(s) Oral daily  melatonin 6 milliGRAM(s) Oral at bedtime  pantoprazole  Injectable 40 milliGRAM(s) IV Push daily  piperacillin/tazobactam IVPB.. 3.375 Gram(s) IV Intermittent every 8 hours  polyethylene glycol 3350 17 Gram(s) Oral daily  senna 1 Tablet(s) Oral at bedtime  vancomycin  IVPB 1000 milliGRAM(s) IV Intermittent every 12 hours    MEDICATIONS  (PRN):  bisacodyl 5 milliGRAM(s) Oral daily PRN Constipation  dextrose Oral Gel 15 Gram(s) Oral once PRN Blood Glucose LESS THAN 70 milliGRAM(s)/deciliter  dextrose Oral Gel 15 Gram(s) Oral once PRN Blood Glucose LESS THAN 70 milliGRAM(s)/deciliter  oxyCODONE    IR 5 milliGRAM(s) Oral every 4 hours PRN Moderate Pain (4 - 6)  oxyCODONE    IR 10 milliGRAM(s) Oral every 4 hours PRN Severe Pain (7 - 10)      PHYSICAL EXAM  Vital Signs Last 24 Hrs  T(C): 37 (26 Feb 2025 17:10), Max: 37.6 (26 Feb 2025 02:30)  T(F): 98.6 (26 Feb 2025 17:10), Max: 99.6 (26 Feb 2025 02:30)  HR: 79 (26 Feb 2025 17:10) (75 - 100)  BP: 153/71 (26 Feb 2025 17:10) (152/71 - 175/80)  BP(mean): --  RR: 18 (26 Feb 2025 17:10) (18 - 18)  SpO2: 99% (26 Feb 2025 17:10) (97% - 99%)    Parameters below as of 26 Feb 2025 17:10  Patient On (Oxygen Delivery Method): room air        GENERAL: laying in bed in NAD  RESPIRATORY: nonlabored breathing, no accessory muscle use  Extremities: Warm, no edema in all 4 exts   NEURO: A&O X3    LABS:                        7.2    18.14 )-----------( 540      ( 26 Feb 2025 07:13 )             24.1     02-26    133[L]  |  98  |  10  ----------------------------<  246[H]  4.0   |  22  |  0.98    Ca    9.1      26 Feb 2025 07:16  Phos  2.8     02-26  Mg     2.1     02-26      PTT - ( 26 Feb 2025 07:14 )  PTT:67.7 sec  Urinalysis Basic - ( 26 Feb 2025 07:16 )    Color: x / Appearance: x / SG: x / pH: x  Gluc: 246 mg/dL / Ketone: x  / Bili: x / Urobili: x   Blood: x / Protein: x / Nitrite: x   Leuk Esterase: x / RBC: x / WBC x   Sq Epi: x / Non Sq Epi: x / Bacteria: x

## 2025-02-26 NOTE — PROGRESS NOTE ADULT - ASSESSMENT
69M PM of former smoker (1.5 PPD % 50 years; quit 2 years ago),  HTN, HLD, DM, PAD and aortoiliac occlusive disease, who previously underwent an outpatient angiography complicated by right iliac artery dissection. Now S/P Creation of bypass from left femoral artery to distal tibial artery with RSVG and completion angiogram on 2/7/25 s/p 2u pRBC 7.4 from 7.8l not responded to 2nd unit on 2/12/25. EGD completed 2/13 found to have bleeding gastric ulcer w/ hemostasis achieved with cautery. Hgb found to be 6.1 and s/p 2u pRBC. Patient now w/ ischemic changes to LLE.    1. Cardiac Risk Stratification   - No chest pain/SOB  - EKG Sinus tach 117 bpm no ischemic changes   - TTE reviewed with pericardial effusion, unchanged compared to 2023  - Not in decompensated HF   - No hx of tachy/timothy arrhythmias   - No valvular abnormalities  - Moderate risk for moderate risk vascular surgery    2. Hypertension  - BP stable c/w home meds    3. Hyperlipidemia   - Lipitor 40mg   - lipid profile     4. Diabetes Mellitus Type II   - FBG per protocol  - ISS   - A1C: 7.7%     5. CAD - TTE with Basal and mid inferior wall and basal inferoseptal segment are abnormal  - Discussed with pt, spouse and daughter in person, they do not know full name or number of his cardiologist to obtain his prior records from  - They deny he has any h/o MI, CP or SOB  - Advised OP follow up with his cards     Mellisa Simmons MD [facility]  Attending Interventional Cardiologist, [facility].   Avaliable on ICU Metrix Team

## 2025-02-26 NOTE — PROGRESS NOTE ADULT - SUBJECTIVE AND OBJECTIVE BOX
[facility] Attending Follow-up Note     Patient seen and examined at bedside.    Overnight Events:     for L BKA with vascular tomorrow   no cardiac sxs     REVIEW OF SYSTEMS:  Constitutional:     [x ] negative [ ] fevers [ ] chills [ ] weight loss [ ] weight gain  HEENT:                  [x ] negative [ ] dry eyes [ ] eye irritation [ ] postnasal drip [ ] nasal congestion  CV:                         [ x] negative  [ ] chest pain [ ] orthopnea [ ] palpitations [ ] murmur  Resp:                     [ x] negative [ ] cough [ ] shortness of breath [ ] dyspnea [ ] wheezing [ ] sputum [ ]hemoptysis  GI:                          [ x] negative [ ] nausea [ ] vomiting [ ] diarrhea [ ] constipation [ ] abd pain [ ] dysphagia   :                        [ x] negative [ ] dysuria [ ] nocturia [ ] hematuria [ ] increased urinary frequency  Musculoskeletal: [ x] negative [ ] back pain [ ] myalgias [ ] arthralgias [ ] fracture  Skin:                       [ x] negative [ ] rash [ ] itch  Neurological:        [x ] negative [ ] headache [ ] dizziness [ ] syncope [ ] weakness [ ] numbness  Psychiatric:           [ x] negative [ ] anxiety [ ] depression  Endocrine:            [ x] negative [ ] diabetes [ ] thyroid problem  Heme/Lymph:      [ x] negative [ ] anemia [ ] bleeding problem  Allergic/Immune: [ x] negative [ ] itchy eyes [ ] nasal discharge [ ] hives [ ] angioedema    [ x] All other systems negative  [ ] Unable to assess ROS due to    Current Meds:  acetaminophen     Tablet .. 975 milliGRAM(s) Oral every 6 hours  aspirin  chewable 81 milliGRAM(s) Oral daily  atenolol  Tablet 25 milliGRAM(s) Oral daily  atorvastatin 40 milliGRAM(s) Oral at bedtime  bisacodyl 5 milliGRAM(s) Oral daily PRN  clopidogrel Tablet 75 milliGRAM(s) Oral daily  cyanocobalamin 1000 MICROGram(s) Oral daily  dextrose 5%. 1000 milliLiter(s) IV Continuous <Continuous>  dextrose 5%. 1000 milliLiter(s) IV Continuous <Continuous>  dextrose 5%. 1000 milliLiter(s) IV Continuous <Continuous>  dextrose 5%. 1000 milliLiter(s) IV Continuous <Continuous>  dextrose 50% Injectable 25 Gram(s) IV Push once  dextrose 50% Injectable 12.5 Gram(s) IV Push once  dextrose 50% Injectable 25 Gram(s) IV Push once  dextrose 50% Injectable 25 Gram(s) IV Push once  dextrose 50% Injectable 12.5 Gram(s) IV Push once  dextrose 50% Injectable 25 Gram(s) IV Push once  dextrose Oral Gel 15 Gram(s) Oral once PRN  dextrose Oral Gel 15 Gram(s) Oral once PRN  gabapentin 100 milliGRAM(s) Oral daily  glucagon  Injectable 1 milliGRAM(s) IntraMuscular once  glucagon  Injectable 1 milliGRAM(s) IntraMuscular once  heparin  Infusion 900 Unit(s)/Hr IV Continuous <Continuous>  influenza  Vaccine (HIGH DOSE) 0.5 milliLiter(s) IntraMuscular once  insulin glargine Injectable (LANTUS) 14 Unit(s) SubCutaneous at bedtime  insulin lispro (ADMELOG) corrective regimen sliding scale   SubCutaneous three times a day before meals  insulin lispro (ADMELOG) corrective regimen sliding scale   SubCutaneous at bedtime  insulin lispro Injectable (ADMELOG) 10 Unit(s) SubCutaneous three times a day before meals  lactobacillus acidophilus 1 Tablet(s) Oral with dinner  losartan 50 milliGRAM(s) Oral daily  melatonin 6 milliGRAM(s) Oral at bedtime  oxyCODONE    IR 10 milliGRAM(s) Oral every 4 hours PRN  oxyCODONE    IR 5 milliGRAM(s) Oral every 4 hours PRN  pantoprazole  Injectable 40 milliGRAM(s) IV Push daily  polyethylene glycol 3350 17 Gram(s) Oral daily  senna 1 Tablet(s) Oral at bedtime  vancomycin  IVPB 1000 milliGRAM(s) IV Intermittent every 12 hours      PAST MEDICAL & SURGICAL HISTORY:  HTN (hypertension)      HLD (hyperlipidemia)      DM (diabetes mellitus)      PAD (peripheral artery disease)      H/O iron deficiency      [place] (hard of hearing)      Peripheral neuropathy      Peripheral vascular disease, unspecified      Former smoker      CVA (cerebrovascular accident)      S/P appendectomy      S/P peripheral artery angioplasty with stent placement      S/P angiography      S/P cataract surgery          Vitals:  T(F): 99.7 (02-27), Max: 99.7 (02-27)  HR: 95 (02-27) (62 - 100)  BP: 165/80 (02-27) (110/62 - 165/80)  RR: 18 (02-27)  SpO2: 100% (02-27)  I&O's Summary    25 Feb 2025 07:01  -  26 Feb 2025 07:00  --------------------------------------------------------  IN: 1130 mL / OUT: 1250 mL / NET: -120 mL    26 Feb 2025 07:01  -  27 Feb 2025 02:33  --------------------------------------------------------  IN: 610 mL / OUT: 950 mL / NET: -340 mL        Physical Exam:  Appearance: No acute distress  HENT: No JVD   Cardiovascular: RRR, S1/S2, no murmurs  Respiratory: CTABL  Gastrointestinal: soft, NT ND, +BS  Musculoskeletal: No clubbing, no edema   Neurologic: Non-focal  Skin: No rashes, ecchymoses, or cyanosis                          7.2    18.14 )-----------( 540      ( 26 Feb 2025 07:13 )             24.1     02-26    133[L]  |  98  |  10  ----------------------------<  246[H]  4.0   |  22  |  0.98    Ca    9.1      26 Feb 2025 07:16  Phos  2.8     02-26  Mg     2.1     02-26      PTT - ( 26 Feb 2025 07:14 )  PTT:67.7 sec              Cardiovascular Testings:

## 2025-02-26 NOTE — PROGRESS NOTE ADULT - SUBJECTIVE AND OBJECTIVE BOX
SURGERY DAILY PROGRESS NOTE    24 Hour/Overnight Events: No acute events overnight    SUBJECTIVE: Patient seen and evaluated on AM rounds. Pt is resting in bed with no acute complaints. Tolerating diet. Denies fevers/chills, chest pain, dyspnea, abdominal pain, nausea, vomiting, and diarrhea    ------------------------------------------------------------------------------------------------------------  OBJECTIVE:  Vital Signs Last 24 Hrs  T(C): 37.1 (26 Feb 2025 05:04), Max: 37.6 (26 Feb 2025 02:30)  T(F): 98.8 (26 Feb 2025 05:04), Max: 99.6 (26 Feb 2025 02:30)  HR: 100 (26 Feb 2025 05:04) (81 - 100)  BP: 164/81 (26 Feb 2025 05:04) (152/71 - 186/79)  BP(mean): --  RR: 18 (26 Feb 2025 05:04) (18 - 18)  SpO2: 97% (26 Feb 2025 05:04) (97% - 98%)    Parameters below as of 26 Feb 2025 05:04  Patient On (Oxygen Delivery Method): room air      I&O's Detail    25 Feb 2025 07:01  -  26 Feb 2025 07:00  --------------------------------------------------------  IN:    Heparin: 240 mL    IV PiggyBack: 350 mL    Oral Fluid: 540 mL  Total IN: 1130 mL    OUT:    Voided (mL): 950 mL  Total OUT: 950 mL    Total NET: 180 mL          LABS:                        7.9    18.62 )-----------( 628      ( 25 Feb 2025 05:58 )             25.6     02-25    136  |  100  |  12  ----------------------------<  218[H]  3.6   |  23  |  0.98    Ca    9.0      25 Feb 2025 05:57  Phos  3.1     02-25  Mg     2.2     02-25        PTT - ( 25 Feb 2025 13:58 )  PTT:73.8 sec  Urinalysis Basic - ( 25 Feb 2025 05:57 )    Color: x / Appearance: x / SG: x / pH: x  Gluc: 218 mg/dL / Ketone: x  / Bili: x / Urobili: x   Blood: x / Protein: x / Nitrite: x   Leuk Esterase: x / RBC: x / WBC x   Sq Epi: x / Non Sq Epi: x / Bacteria: x        Physical Exam:  General: NAD, resting comfortably in bed  Pulmonary: Nonlabored breathing, no respiratory distress  Abdominal: soft, non distended, non tender  Extremities: no LLE lateral signal, no distal signals. Big toe, 2nd-3rd, and 5th digit with ischemic appearing changes and up the calf. L foot mild swelling. Gradually increased signs of demarcation of L foot up to middle calf with intact blisters on medial aspect.

## 2025-02-27 ENCOUNTER — APPOINTMENT (OUTPATIENT)
Dept: VASCULAR SURGERY | Facility: HOSPITAL | Age: 70
End: 2025-02-27

## 2025-02-27 LAB
ANION GAP SERPL CALC-SCNC: 15 MMOL/L — SIGNIFICANT CHANGE UP (ref 5–17)
APTT BLD: 67.5 SEC — HIGH (ref 24.5–35.6)
BLD GP AB SCN SERPL QL: NEGATIVE — SIGNIFICANT CHANGE UP
BUN SERPL-MCNC: 10 MG/DL — SIGNIFICANT CHANGE UP (ref 7–23)
CALCIUM SERPL-MCNC: 9.4 MG/DL — SIGNIFICANT CHANGE UP (ref 8.4–10.5)
CHLORIDE SERPL-SCNC: 100 MMOL/L — SIGNIFICANT CHANGE UP (ref 96–108)
CO2 SERPL-SCNC: 21 MMOL/L — LOW (ref 22–31)
CREAT SERPL-MCNC: 0.99 MG/DL — SIGNIFICANT CHANGE UP (ref 0.5–1.3)
EGFR: 81 ML/MIN/1.73M2 — SIGNIFICANT CHANGE UP
EGFR: 81 ML/MIN/1.73M2 — SIGNIFICANT CHANGE UP
GLUCOSE BLDC GLUCOMTR-MCNC: 199 MG/DL — HIGH (ref 70–99)
GLUCOSE BLDC GLUCOMTR-MCNC: 201 MG/DL — HIGH (ref 70–99)
GLUCOSE BLDC GLUCOMTR-MCNC: 212 MG/DL — HIGH (ref 70–99)
GLUCOSE BLDC GLUCOMTR-MCNC: 238 MG/DL — HIGH (ref 70–99)
GLUCOSE BLDC GLUCOMTR-MCNC: 244 MG/DL — HIGH (ref 70–99)
GLUCOSE SERPL-MCNC: 177 MG/DL — HIGH (ref 70–99)
HCT VFR BLD CALC: 23.2 % — LOW (ref 39–50)
HCT VFR BLD CALC: 23.9 % — LOW (ref 39–50)
HGB BLD-MCNC: 7.1 G/DL — LOW (ref 13–17)
HGB BLD-MCNC: 7.5 G/DL — LOW (ref 13–17)
INR BLD: 1.27 RATIO — HIGH (ref 0.85–1.16)
MAGNESIUM SERPL-MCNC: 2.1 MG/DL — SIGNIFICANT CHANGE UP (ref 1.6–2.6)
MCHC RBC-ENTMCNC: 26.9 PG — LOW (ref 27–34)
MCHC RBC-ENTMCNC: 27 PG — SIGNIFICANT CHANGE UP (ref 27–34)
MCHC RBC-ENTMCNC: 30.6 G/DL — LOW (ref 32–36)
MCHC RBC-ENTMCNC: 31.4 G/DL — LOW (ref 32–36)
MCV RBC AUTO: 86 FL — SIGNIFICANT CHANGE UP (ref 80–100)
MCV RBC AUTO: 87.9 FL — SIGNIFICANT CHANGE UP (ref 80–100)
NRBC BLD AUTO-RTO: 0 /100 WBCS — SIGNIFICANT CHANGE UP (ref 0–0)
NRBC BLD AUTO-RTO: 0 /100 WBCS — SIGNIFICANT CHANGE UP (ref 0–0)
PHOSPHATE SERPL-MCNC: 3.4 MG/DL — SIGNIFICANT CHANGE UP (ref 2.5–4.5)
PLATELET # BLD AUTO: 348 K/UL — SIGNIFICANT CHANGE UP (ref 150–400)
PLATELET # BLD AUTO: 469 K/UL — HIGH (ref 150–400)
POTASSIUM SERPL-MCNC: 3.7 MMOL/L — SIGNIFICANT CHANGE UP (ref 3.5–5.3)
POTASSIUM SERPL-SCNC: 3.7 MMOL/L — SIGNIFICANT CHANGE UP (ref 3.5–5.3)
PROTHROM AB SERPL-ACNC: 14.4 SEC — HIGH (ref 9.9–13.4)
RBC # BLD: 2.64 M/UL — LOW (ref 4.2–5.8)
RBC # BLD: 2.78 M/UL — LOW (ref 4.2–5.8)
RBC # FLD: 17.2 % — HIGH (ref 10.3–14.5)
RBC # FLD: 17.2 % — HIGH (ref 10.3–14.5)
RH IG SCN BLD-IMP: NEGATIVE — SIGNIFICANT CHANGE UP
SODIUM SERPL-SCNC: 136 MMOL/L — SIGNIFICANT CHANGE UP (ref 135–145)
VANCOMYCIN TROUGH SERPL-MCNC: 13.5 UG/ML — SIGNIFICANT CHANGE UP (ref 10–20)
WBC # BLD: 15.4 K/UL — HIGH (ref 3.8–10.5)
WBC # BLD: 18.07 K/UL — HIGH (ref 3.8–10.5)
WBC # FLD AUTO: 15.4 K/UL — HIGH (ref 3.8–10.5)
WBC # FLD AUTO: 18.07 K/UL — HIGH (ref 3.8–10.5)

## 2025-02-27 PROCEDURE — 27882 AMPUTATION OF LOWER LEG: CPT | Mod: LT,58

## 2025-02-27 PROCEDURE — 88307 TISSUE EXAM BY PATHOLOGIST: CPT | Mod: 26

## 2025-02-27 RX ORDER — SENNA 187 MG
1 TABLET ORAL AT BEDTIME
Refills: 0 | Status: DISCONTINUED | OUTPATIENT
Start: 2025-02-27 | End: 2025-03-18

## 2025-02-27 RX ORDER — OXYCODONE HYDROCHLORIDE 30 MG/1
5 TABLET ORAL EVERY 4 HOURS
Refills: 0 | Status: DISCONTINUED | OUTPATIENT
Start: 2025-02-27 | End: 2025-02-27

## 2025-02-27 RX ORDER — HYDROMORPHONE/SOD CHLOR,ISO/PF 2 MG/10 ML
0.5 SYRINGE (ML) INJECTION
Refills: 0 | Status: DISCONTINUED | OUTPATIENT
Start: 2025-02-27 | End: 2025-02-27

## 2025-02-27 RX ORDER — DEXTROSE 50 % IN WATER 50 %
12.5 SYRINGE (ML) INTRAVENOUS ONCE
Refills: 0 | Status: DISCONTINUED | OUTPATIENT
Start: 2025-02-27 | End: 2025-03-18

## 2025-02-27 RX ORDER — OXYCODONE HYDROCHLORIDE 30 MG/1
10 TABLET ORAL EVERY 4 HOURS
Refills: 0 | Status: DISCONTINUED | OUTPATIENT
Start: 2025-02-27 | End: 2025-03-06

## 2025-02-27 RX ORDER — INSULIN GLARGINE-YFGN 100 [IU]/ML
16 INJECTION, SOLUTION SUBCUTANEOUS AT BEDTIME
Refills: 0 | Status: DISCONTINUED | OUTPATIENT
Start: 2025-02-27 | End: 2025-02-27

## 2025-02-27 RX ORDER — INSULIN LISPRO 100 U/ML
INJECTION, SOLUTION INTRAVENOUS; SUBCUTANEOUS
Refills: 0 | Status: DISCONTINUED | OUTPATIENT
Start: 2025-02-27 | End: 2025-03-18

## 2025-02-27 RX ORDER — PIPERACILLIN-TAZO-DEXTROSE,ISO 3.375G/5
3.38 IV SOLUTION, PIGGYBACK PREMIX FROZEN(ML) INTRAVENOUS EVERY 8 HOURS
Refills: 0 | Status: DISCONTINUED | OUTPATIENT
Start: 2025-02-27 | End: 2025-02-27

## 2025-02-27 RX ORDER — LACTOBACILLUS ACIDOPHILUS/PECT 75 MM-100
1 CAPSULE ORAL
Refills: 0 | Status: DISCONTINUED | OUTPATIENT
Start: 2025-02-27 | End: 2025-03-18

## 2025-02-27 RX ORDER — FENTANYL CITRATE-0.9 % NACL/PF 100MCG/2ML
25 SYRINGE (ML) INTRAVENOUS
Refills: 0 | Status: DISCONTINUED | OUTPATIENT
Start: 2025-02-27 | End: 2025-02-27

## 2025-02-27 RX ORDER — VANCOMYCIN HCL IN 5 % DEXTROSE 1.5G/250ML
1000 PLASTIC BAG, INJECTION (ML) INTRAVENOUS EVERY 8 HOURS
Refills: 0 | Status: DISCONTINUED | OUTPATIENT
Start: 2025-02-27 | End: 2025-02-28

## 2025-02-27 RX ORDER — ASPIRIN 325 MG
81 TABLET ORAL DAILY
Refills: 0 | Status: DISCONTINUED | OUTPATIENT
Start: 2025-02-27 | End: 2025-03-18

## 2025-02-27 RX ORDER — MELATONIN 5 MG
6 TABLET ORAL AT BEDTIME
Refills: 0 | Status: DISCONTINUED | OUTPATIENT
Start: 2025-02-27 | End: 2025-03-18

## 2025-02-27 RX ORDER — INSULIN GLARGINE-YFGN 100 [IU]/ML
16 INJECTION, SOLUTION SUBCUTANEOUS AT BEDTIME
Refills: 0 | Status: DISCONTINUED | OUTPATIENT
Start: 2025-02-27 | End: 2025-03-01

## 2025-02-27 RX ORDER — LOSARTAN POTASSIUM 100 MG/1
50 TABLET, FILM COATED ORAL DAILY
Refills: 0 | Status: DISCONTINUED | OUTPATIENT
Start: 2025-02-27 | End: 2025-03-02

## 2025-02-27 RX ORDER — ENOXAPARIN SODIUM 100 MG/ML
40 INJECTION SUBCUTANEOUS EVERY 24 HOURS
Refills: 0 | Status: DISCONTINUED | OUTPATIENT
Start: 2025-02-27 | End: 2025-03-18

## 2025-02-27 RX ORDER — BISACODYL 5 MG
5 TABLET, DELAYED RELEASE (ENTERIC COATED) ORAL DAILY
Refills: 0 | Status: DISCONTINUED | OUTPATIENT
Start: 2025-02-27 | End: 2025-03-18

## 2025-02-27 RX ORDER — INSULIN LISPRO 100 U/ML
14 INJECTION, SOLUTION INTRAVENOUS; SUBCUTANEOUS
Refills: 0 | Status: DISCONTINUED | OUTPATIENT
Start: 2025-02-27 | End: 2025-02-27

## 2025-02-27 RX ORDER — ONDANSETRON HCL/PF 4 MG/2 ML
4 VIAL (ML) INJECTION ONCE
Refills: 0 | Status: DISCONTINUED | OUTPATIENT
Start: 2025-02-27 | End: 2025-02-27

## 2025-02-27 RX ORDER — INSULIN LISPRO 100 U/ML
INJECTION, SOLUTION INTRAVENOUS; SUBCUTANEOUS EVERY 6 HOURS
Refills: 0 | Status: DISCONTINUED | OUTPATIENT
Start: 2025-02-27 | End: 2025-02-27

## 2025-02-27 RX ORDER — INSULIN LISPRO 100 U/ML
14 INJECTION, SOLUTION INTRAVENOUS; SUBCUTANEOUS
Refills: 0 | Status: DISCONTINUED | OUTPATIENT
Start: 2025-02-27 | End: 2025-02-28

## 2025-02-27 RX ORDER — DEXTROSE 50 % IN WATER 50 %
25 SYRINGE (ML) INTRAVENOUS ONCE
Refills: 0 | Status: DISCONTINUED | OUTPATIENT
Start: 2025-02-27 | End: 2025-03-18

## 2025-02-27 RX ORDER — POLYETHYLENE GLYCOL 3350 17 G/17G
17 POWDER, FOR SOLUTION ORAL DAILY
Refills: 0 | Status: DISCONTINUED | OUTPATIENT
Start: 2025-02-27 | End: 2025-03-18

## 2025-02-27 RX ORDER — PIPERACILLIN-TAZO-DEXTROSE,ISO 3.375G/5
3.38 IV SOLUTION, PIGGYBACK PREMIX FROZEN(ML) INTRAVENOUS EVERY 8 HOURS
Refills: 0 | Status: DISCONTINUED | OUTPATIENT
Start: 2025-02-27 | End: 2025-02-28

## 2025-02-27 RX ORDER — ACETAMINOPHEN 500 MG/5ML
975 LIQUID (ML) ORAL EVERY 6 HOURS
Refills: 0 | Status: DISCONTINUED | OUTPATIENT
Start: 2025-02-27 | End: 2025-03-18

## 2025-02-27 RX ORDER — DEXTROSE 50 % IN WATER 50 %
15 SYRINGE (ML) INTRAVENOUS ONCE
Refills: 0 | Status: DISCONTINUED | OUTPATIENT
Start: 2025-02-27 | End: 2025-03-18

## 2025-02-27 RX ORDER — INSULIN LISPRO 100 U/ML
INJECTION, SOLUTION INTRAVENOUS; SUBCUTANEOUS AT BEDTIME
Refills: 0 | Status: DISCONTINUED | OUTPATIENT
Start: 2025-02-27 | End: 2025-03-18

## 2025-02-27 RX ORDER — SODIUM CHLORIDE 9 G/1000ML
1000 INJECTION, SOLUTION INTRAVENOUS
Refills: 0 | Status: DISCONTINUED | OUTPATIENT
Start: 2025-02-27 | End: 2025-03-18

## 2025-02-27 RX ADMIN — INSULIN LISPRO 4: 100 INJECTION, SOLUTION INTRAVENOUS; SUBCUTANEOUS at 16:06

## 2025-02-27 RX ADMIN — OXYCODONE HYDROCHLORIDE 10 MILLIGRAM(S): 30 TABLET ORAL at 18:32

## 2025-02-27 RX ADMIN — Medication 0.5 MILLIGRAM(S): at 14:53

## 2025-02-27 RX ADMIN — Medication 25 GRAM(S): at 16:36

## 2025-02-27 RX ADMIN — Medication 975 MILLIGRAM(S): at 19:30

## 2025-02-27 RX ADMIN — OXYCODONE HYDROCHLORIDE 10 MILLIGRAM(S): 30 TABLET ORAL at 22:33

## 2025-02-27 RX ADMIN — Medication 250 MILLIGRAM(S): at 05:16

## 2025-02-27 RX ADMIN — Medication 975 MILLIGRAM(S): at 23:39

## 2025-02-27 RX ADMIN — LISINOPRIL 25 MILLIGRAM(S): 30 TABLET ORAL at 05:15

## 2025-02-27 RX ADMIN — ENOXAPARIN SODIUM 40 MILLIGRAM(S): 100 INJECTION SUBCUTANEOUS at 18:33

## 2025-02-27 RX ADMIN — Medication 1 TABLET(S): at 22:19

## 2025-02-27 RX ADMIN — INSULIN LISPRO 4: 100 INJECTION, SOLUTION INTRAVENOUS; SUBCUTANEOUS at 06:21

## 2025-02-27 RX ADMIN — Medication 975 MILLIGRAM(S): at 05:16

## 2025-02-27 RX ADMIN — Medication 250 MILLIGRAM(S): at 23:39

## 2025-02-27 RX ADMIN — INSULIN GLARGINE-YFGN 16 UNIT(S): 100 INJECTION, SOLUTION SUBCUTANEOUS at 22:19

## 2025-02-27 RX ADMIN — Medication 25 GRAM(S): at 22:18

## 2025-02-27 RX ADMIN — Medication 975 MILLIGRAM(S): at 06:16

## 2025-02-27 RX ADMIN — Medication 40 MILLIGRAM(S): at 18:33

## 2025-02-27 RX ADMIN — OXYCODONE HYDROCHLORIDE 10 MILLIGRAM(S): 30 TABLET ORAL at 19:30

## 2025-02-27 RX ADMIN — LOSARTAN POTASSIUM 50 MILLIGRAM(S): 100 TABLET, FILM COATED ORAL at 05:16

## 2025-02-27 RX ADMIN — Medication 25 GRAM(S): at 06:09

## 2025-02-27 RX ADMIN — Medication 6 MILLIGRAM(S): at 22:19

## 2025-02-27 RX ADMIN — Medication 0.5 MILLIGRAM(S): at 15:15

## 2025-02-27 RX ADMIN — Medication 81 MILLIGRAM(S): at 18:33

## 2025-02-27 RX ADMIN — Medication 1 TABLET(S): at 18:33

## 2025-02-27 RX ADMIN — Medication 975 MILLIGRAM(S): at 18:34

## 2025-02-27 RX ADMIN — OXYCODONE HYDROCHLORIDE 10 MILLIGRAM(S): 30 TABLET ORAL at 23:33

## 2025-02-27 NOTE — BRIEF OPERATIVE NOTE - COMMENTS
Wound covered with NuKnit, betadine soaked gauze, abd pads, kerlix, and ace wrapped. Very minimal blood loss.

## 2025-02-27 NOTE — PRE-ANESTHESIA EVALUATION ADULT - NSANTHPMHFT_GEN_ALL_CORE
69yoM, A&Ox4. Limited ROM of LLE 2/2 pain.
Medical History discussed with patient. All concerns were addressed.

## 2025-02-27 NOTE — CHART NOTE - NSCHARTNOTEFT_GEN_A_CORE
POST-OP NOTE    COLBY KENNEDY | [id number] | [facility] 9MON 929 W1    Procedure: s/p LLE BK guillotine amputation     Subjective: Patient resting in bed, complains of some post operative pain but states the Tylenol and oxycodone help. Patient noted to have strikethrough on dressing. denies chest pain, SOB, dizziness, lightheadedness.     Vital Signs Last 24 Hrs  T(C): 37.3 (27 Feb 2025 19:15), Max: 37.6 (27 Feb 2025 02:02)  T(F): 99.2 (27 Feb 2025 19:15), Max: 99.7 (27 Feb 2025 02:02)  HR: 77 (27 Feb 2025 19:15) (62 - 100)  BP: 148/68 (27 Feb 2025 19:15) (110/62 - 181/79)  BP(mean): 103 (27 Feb 2025 16:30) (98 - 115)  RR: 18 (27 Feb 2025 19:15) (12 - 18)  SpO2: 98% (27 Feb 2025 19:15) (96% - 100%)    Parameters below as of 27 Feb 2025 19:15  Patient On (Oxygen Delivery Method): room air      I&O's Summary    26 Feb 2025 07:01  -  27 Feb 2025 07:00  --------------------------------------------------------  IN: 1060 mL / OUT: 1750 mL / NET: -690 mL    27 Feb 2025 07:01  -  27 Feb 2025 19:48  --------------------------------------------------------  IN: 380 mL / OUT: 1100 mL / NET: -720 mL                            7.1    18.07 )-----------( 469      ( 27 Feb 2025 04:49 )             23.2     02-27    136  |  100  |  10  ----------------------------<  177[H]  3.7   |  21[L]  |  0.99    Ca    9.4      27 Feb 2025 04:49  Phos  3.4     02-27  Mg     2.1     02-27     PT/INR - ( 27 Feb 2025 04:49 )   PT: 14.4 sec;   INR: 1.27 ratio         PTT - ( 27 Feb 2025 04:49 )  PTT:67.5 sec    PHYSICAL EXAM:  Gen: NAD  Resp: breathing easily, no stridor  CV: RRR  Extremities: RLE in knee immobilizer, stump wrapped in ACE, soaked through with blood.  Skin: Normal color, no rashes or lesions    Assessment:  69 yo male s/p LLE BK guillotine amp, Stump dressing noted to be sanguinous. Pre surgery hemoglobin noted to be 7.1, given 1 unit PRBC before OR. Will obtain stat cbc.    plan:  - f/u CBC, transfuse as needed.   - ASA, Lovenox  - Vanc, Zosyn  - CC diet   - Tylenol oxy for pain

## 2025-02-27 NOTE — PROGRESS NOTE ADULT - SUBJECTIVE AND OBJECTIVE BOX
DATE OF SERVICE: 02-27-25      Patient is a 70y old  Male who presents with a chief complaint of Peripheral vascular disease     (25 Feb 2025 12:33)      INTERVAL HISTORY: feels ok    TELEMETRY Personally reviewed: no events  	  MEDICATIONS:  losartan 50 milliGRAM(s) Oral daily        PHYSICAL EXAM:  T(C): 37.2 (02-27-25 @ 20:15), Max: 37.6 (02-27-25 @ 02:02)  HR: 84 (02-27-25 @ 20:15) (77 - 100)  BP: 147/72 (02-27-25 @ 20:15) (147/69 - 181/79)  RR: 18 (02-27-25 @ 20:15) (12 - 18)  SpO2: 98% (02-27-25 @ 20:15) (96% - 100%)  Wt(kg): --  I&O's Summary    26 Feb 2025 07:01  -  27 Feb 2025 07:00  --------------------------------------------------------  IN: 1060 mL / OUT: 1750 mL / NET: -690 mL    27 Feb 2025 07:01  -  27 Feb 2025 21:37  --------------------------------------------------------  IN: 380 mL / OUT: 1100 mL / NET: -720 mL      Height (cm): 167.6 (02-27 @ 12:25)  Weight (kg): 70.3 (02-27 @ 12:25)  BMI (kg/m2): 25 (02-27 @ 12:25)  BSA (m2): 1.79 (02-27 @ 12:25)    Appearance: In no distress	  HEENT:    PERRL, EOMI	  Cardiovascular:  S1 S2, No JVD  Respiratory: Lungs clear to auscultation	  Gastrointestinal:  Soft, Non-tender, + BS	  Vascularature:  No edema of LE  Psychiatric: Appropriate affect   Neuro: no acute focal deficits                               7.5    15.40 )-----------( 348      ( 27 Feb 2025 20:11 )             23.9     02-27    136  |  100  |  10  ----------------------------<  177[H]  3.7   |  21[L]  |  0.99    Ca    9.4      27 Feb 2025 04:49  Phos  3.4     02-27  Mg     2.1     02-27          Labs personally reviewed      Assessment and Plan:     69M PM of former smoker (1.5 PPD % 50 years; quit 2 years ago),  HTN, HLD, DM, PAD and aortoiliac occlusive disease, who previously underwent an outpatient angiography complicated by right iliac artery dissection. Now S/P Creation of bypass from left femoral artery to distal tibial artery with RSVG and completion angiogram on 2/7/25 s/p 2u pRBC 7.4 from 7.8l not responded to 2nd unit on 2/12/25. EGD completed 2/13 found to have bleeding gastric ulcer w/ hemostasis achieved with cautery. Hgb found to be 6.1 and s/p 2u pRBC. Patient now w/ ischemic changes to LLE.    1. Cardiac Risk Stratification   - No chest pain/SOB  - EKG Sinus tach 117 bpm no ischemic changes   - TTE reviewed with pericardial effusion, unchanged compared to 2023  - Not in decompensated HF   - No hx of tachy/timothy arrhythmias   - No valvular abnormalities  - Moderate risk for moderate risk vascular surgery    2. Hypertension  - BP stable c/w home meds    3. Hyperlipidemia   - Lipitor 40mg   - lipid profile     4. Diabetes Mellitus Type II   - FBG per protocol  - ISS   - A1C: 7.7%     5. CAD - TTE with Basal and mid inferior wall and basal inferoseptal segment are abnormal  - Discussed with pt, spouse and daughter in person, they do not know full name or number of his cardiologist to obtain his prior records from  - They deny he has any h/o MI, CP or SOB  - Advised OP follow up with his cards           Dayton Dawkins DO [facility]  Cardiovascular Medicine  [address], [address]  Office: [phone]  Available via Text/call on [employer] Teams

## 2025-02-27 NOTE — PROGRESS NOTE ADULT - SUBJECTIVE AND OBJECTIVE BOX
VASCULAR SURGERY DAILY PROGRESS NOTE:     SUBJECTIVE/ROS: Patient seen and examined, no new events/complaints. pain is controlled.     MEDICATIONS  (STANDING):  acetaminophen     Tablet .. 975 milliGRAM(s) Oral every 6 hours  aspirin  chewable 81 milliGRAM(s) Oral daily  atenolol  Tablet 25 milliGRAM(s) Oral daily  atorvastatin 40 milliGRAM(s) Oral at bedtime  clopidogrel Tablet 75 milliGRAM(s) Oral daily  cyanocobalamin 1000 MICROGram(s) Oral daily  dextrose 5%. 1000 milliLiter(s) (50 mL/Hr) IV Continuous <Continuous>  dextrose 5%. 1000 milliLiter(s) (100 mL/Hr) IV Continuous <Continuous>  dextrose 5%. 1000 milliLiter(s) (50 mL/Hr) IV Continuous <Continuous>  dextrose 5%. 1000 milliLiter(s) (100 mL/Hr) IV Continuous <Continuous>  dextrose 50% Injectable 25 Gram(s) IV Push once  dextrose 50% Injectable 12.5 Gram(s) IV Push once  dextrose 50% Injectable 25 Gram(s) IV Push once  dextrose 50% Injectable 25 Gram(s) IV Push once  dextrose 50% Injectable 12.5 Gram(s) IV Push once  dextrose 50% Injectable 25 Gram(s) IV Push once  gabapentin 100 milliGRAM(s) Oral daily  glucagon  Injectable 1 milliGRAM(s) IntraMuscular once  glucagon  Injectable 1 milliGRAM(s) IntraMuscular once  influenza  Vaccine (HIGH DOSE) 0.5 milliLiter(s) IntraMuscular once  insulin glargine Injectable (LANTUS) 14 Unit(s) SubCutaneous at bedtime  insulin lispro (ADMELOG) corrective regimen sliding scale   SubCutaneous every 6 hours  insulin lispro Injectable (ADMELOG) 10 Unit(s) SubCutaneous three times a day before meals  lactobacillus acidophilus 1 Tablet(s) Oral with dinner  losartan 50 milliGRAM(s) Oral daily  melatonin 6 milliGRAM(s) Oral at bedtime  pantoprazole  Injectable 40 milliGRAM(s) IV Push daily  piperacillin/tazobactam IVPB.. 3.375 Gram(s) IV Intermittent every 8 hours  polyethylene glycol 3350 17 Gram(s) Oral daily  senna 1 Tablet(s) Oral at bedtime  vancomycin  IVPB 1000 milliGRAM(s) IV Intermittent every 12 hours    MEDICATIONS  (PRN):  bisacodyl 5 milliGRAM(s) Oral daily PRN Constipation  dextrose Oral Gel 15 Gram(s) Oral once PRN Blood Glucose LESS THAN 70 milliGRAM(s)/deciliter  dextrose Oral Gel 15 Gram(s) Oral once PRN Blood Glucose LESS THAN 70 milliGRAM(s)/deciliter  oxyCODONE    IR 10 milliGRAM(s) Oral every 4 hours PRN Severe Pain (7 - 10)  oxyCODONE    IR 5 milliGRAM(s) Oral every 4 hours PRN Moderate Pain (4 - 6)      OBJECTIVE:    Vital Signs Last 24 Hrs  T(C): 37.4 (27 Feb 2025 05:05), Max: 37.6 (27 Feb 2025 02:02)  T(F): 99.4 (27 Feb 2025 05:05), Max: 99.7 (27 Feb 2025 02:02)  HR: 100 (27 Feb 2025 05:05) (62 - 100)  BP: 163/80 (27 Feb 2025 05:05) (110/62 - 165/80)  BP(mean): --  RR: 18 (27 Feb 2025 05:05) (18 - 18)  SpO2: 96% (27 Feb 2025 05:05) (96% - 100%)    Parameters below as of 27 Feb 2025 05:05  Patient On (Oxygen Delivery Method): room air            I&O's Detail    26 Feb 2025 07:01  -  27 Feb 2025 07:00  --------------------------------------------------------  IN:    Heparin: 120 mL    IV PiggyBack: 600 mL    Oral Fluid: 340 mL  Total IN: 1060 mL    OUT:    Voided (mL): 1750 mL  Total OUT: 1750 mL    Total NET: -690 mL          Daily     Daily     LABS:                        7.1    18.07 )-----------( 469      ( 27 Feb 2025 04:49 )             23.2     02-27    136  |  100  |  10  ----------------------------<  177[H]  3.7   |  21[L]  |  0.99    Ca    9.4      27 Feb 2025 04:49  Phos  3.4     02-27  Mg     2.1     02-27      PT/INR - ( 27 Feb 2025 04:49 )   PT: 14.4 sec;   INR: 1.27 ratio         PTT - ( 27 Feb 2025 04:49 )  PTT:67.5 sec  Urinalysis Basic - ( 27 Feb 2025 04:49 )    Color: x / Appearance: x / SG: x / pH: x  Gluc: 177 mg/dL / Ketone: x  / Bili: x / Urobili: x   Blood: x / Protein: x / Nitrite: x   Leuk Esterase: x / RBC: x / WBC x   Sq Epi: x / Non Sq Epi: x / Bacteria: x          PHYSICAL EXAM:    General: NAD, resting comfortably in bed  Pulmonary: Nonlabored breathing, no respiratory distress  Abdominal: soft, non distended, non tender  Extremities: no LLE lateral signal, no distal signals. Big toe, 2nd-3rd, and 5th digit with ischemic appearing changes and up the calf. L foot mild swelling. Gradually increased signs of demarcation of L foot up to middle calf with intact blisters on medial aspect.

## 2025-02-27 NOTE — CHART NOTE - NSCHARTNOTEFT_GEN_A_CORE
COLBY KENNEDY  Gender: Male  70y  [facility] 9MON 929 W1    Patient not seen today, chart reviewed.     POCT Blood Glucose:  238 mg/dL (02-27-25 @ 06:15)  201 mg/dL (02-26-25 @ 21:49)  261 mg/dL (02-26-25 @ 17:21)      eMAR:atorvastatin   40 milliGRAM(s) Oral (02-26-25 @ 21:53)    insulin glargine Injectable (LANTUS)   14 Unit(s) SubCutaneous (02-26-25 @ 21:51)    insulin lispro (ADMELOG) corrective regimen sliding scale   4 Unit(s) SubCutaneous (02-27-25 @ 06:21)    insulin lispro (ADMELOG) corrective regimen sliding scale   6 Unit(s) SubCutaneous (02-26-25 @ 18:18)    insulin lispro Injectable (ADMELOG)   10 Unit(s) SubCutaneous (02-26-25 @ 18:19)    Endocrinology following patient for T2DM management. In the last 24hrs BG levels have been 201-273mg/dL. FBG elevated this am to 238mg/dL -> will increase Lantus to 16u QHS for tighter BG control. Noted patient has also been NPO since midnight for L BKA today. Noted persistent postpranidal hyperglycemia with higher insulin requirements, will increase mealtime insulin slightly for now as well for when patient eating again. No hypoglycemia. BG goal 100-180mg/dL. Endocrine will closely monitor BG levels.

## 2025-02-27 NOTE — PROGRESS NOTE ADULT - ASSESSMENT
69M PM of former smoker (1.5 PPD % 50 years; quit 2 years ago),  HTN, HLD, DM, PAD and aortoiliac occlusive disease, who previously underwent an outpatient angiography complicated by right iliac artery dissection. Now S/P Creation of bypass from left femoral artery to distal tibial artery with RSVG and completion angiogram on 2/7/25 s/p 2u pRBC 7.4 from 7.8l not responded to 2nd unit on 2/12/25. EGD completed 2/13 found to have bleeding gastric ulcer w/ hemostasis achieved with cautery. Hgb found to be 6.1 and s/p 2u pRBC. Patient now w/ ischemic changes to LLE, OR today for L BKA     Plan:   - L BKA today, NPO   - Cardiology cleared - moderate risk  - Antibx: Empiric vanc + zosyn   - Hep gtt adjusted, aspirin/plavix -> f/u AM PTT for therapeutic hep gtt rate  - Diet: Regular   - 18U Lantus, 6U Lispro; appreciate endocrine recs --> reached out to endocrine for better hyperglycemia control   - ASA/Plavix/Statin   - HTN possibly 2/2 pain   - Pain regimen: Tylenol ATC, oxycodone PRN  - Bowel regimen; dulcolax added 2/22  - PT recs -> [facility]    Vascular Surg  [id number]

## 2025-02-28 DIAGNOSIS — E11.65 TYPE 2 DIABETES MELLITUS WITH HYPERGLYCEMIA: ICD-10-CM

## 2025-02-28 LAB
ANION GAP SERPL CALC-SCNC: 12 MMOL/L — SIGNIFICANT CHANGE UP (ref 5–17)
BUN SERPL-MCNC: 10 MG/DL — SIGNIFICANT CHANGE UP (ref 7–23)
CALCIUM SERPL-MCNC: 9.1 MG/DL — SIGNIFICANT CHANGE UP (ref 8.4–10.5)
CHLORIDE SERPL-SCNC: 105 MMOL/L — SIGNIFICANT CHANGE UP (ref 96–108)
CO2 SERPL-SCNC: 22 MMOL/L — SIGNIFICANT CHANGE UP (ref 22–31)
CREAT SERPL-MCNC: 0.94 MG/DL — SIGNIFICANT CHANGE UP (ref 0.5–1.3)
EGFR: 86 ML/MIN/1.73M2 — SIGNIFICANT CHANGE UP
EGFR: 86 ML/MIN/1.73M2 — SIGNIFICANT CHANGE UP
GLUCOSE BLDC GLUCOMTR-MCNC: 129 MG/DL — HIGH (ref 70–99)
GLUCOSE BLDC GLUCOMTR-MCNC: 158 MG/DL — HIGH (ref 70–99)
GLUCOSE BLDC GLUCOMTR-MCNC: 163 MG/DL — HIGH (ref 70–99)
GLUCOSE BLDC GLUCOMTR-MCNC: 172 MG/DL — HIGH (ref 70–99)
GLUCOSE BLDC GLUCOMTR-MCNC: 82 MG/DL — SIGNIFICANT CHANGE UP (ref 70–99)
GLUCOSE SERPL-MCNC: 140 MG/DL — HIGH (ref 70–99)
HCT VFR BLD CALC: 21.5 % — LOW (ref 39–50)
HCT VFR BLD CALC: 24.6 % — LOW (ref 39–50)
HGB BLD-MCNC: 6.6 G/DL — CRITICAL LOW (ref 13–17)
HGB BLD-MCNC: 7.9 G/DL — LOW (ref 13–17)
INR BLD: 1.14 RATIO — SIGNIFICANT CHANGE UP (ref 0.85–1.16)
MAGNESIUM SERPL-MCNC: 2.1 MG/DL — SIGNIFICANT CHANGE UP (ref 1.6–2.6)
MCHC RBC-ENTMCNC: 26.9 PG — LOW (ref 27–34)
MCHC RBC-ENTMCNC: 27.6 PG — SIGNIFICANT CHANGE UP (ref 27–34)
MCHC RBC-ENTMCNC: 30.7 G/DL — LOW (ref 32–36)
MCHC RBC-ENTMCNC: 32.1 G/DL — SIGNIFICANT CHANGE UP (ref 32–36)
MCV RBC AUTO: 86 FL — SIGNIFICANT CHANGE UP (ref 80–100)
MCV RBC AUTO: 87.8 FL — SIGNIFICANT CHANGE UP (ref 80–100)
NRBC BLD AUTO-RTO: 0 /100 WBCS — SIGNIFICANT CHANGE UP (ref 0–0)
NRBC BLD AUTO-RTO: 0 /100 WBCS — SIGNIFICANT CHANGE UP (ref 0–0)
PHOSPHATE SERPL-MCNC: 3.8 MG/DL — SIGNIFICANT CHANGE UP (ref 2.5–4.5)
PLATELET # BLD AUTO: 279 K/UL — SIGNIFICANT CHANGE UP (ref 150–400)
PLATELET # BLD AUTO: 329 K/UL — SIGNIFICANT CHANGE UP (ref 150–400)
POTASSIUM SERPL-MCNC: 3.8 MMOL/L — SIGNIFICANT CHANGE UP (ref 3.5–5.3)
POTASSIUM SERPL-SCNC: 3.8 MMOL/L — SIGNIFICANT CHANGE UP (ref 3.5–5.3)
PROTHROM AB SERPL-ACNC: 13.1 SEC — SIGNIFICANT CHANGE UP (ref 9.9–13.4)
RBC # BLD: 2.45 M/UL — LOW (ref 4.2–5.8)
RBC # BLD: 2.86 M/UL — LOW (ref 4.2–5.8)
RBC # FLD: 16.7 % — HIGH (ref 10.3–14.5)
RBC # FLD: 17.6 % — HIGH (ref 10.3–14.5)
SODIUM SERPL-SCNC: 139 MMOL/L — SIGNIFICANT CHANGE UP (ref 135–145)
VANCOMYCIN TROUGH SERPL-MCNC: 21.9 UG/ML — HIGH (ref 10–20)
WBC # BLD: 1.46 K/UL — LOW (ref 3.8–10.5)
WBC # BLD: 12.19 K/UL — HIGH (ref 3.8–10.5)
WBC # FLD AUTO: 1.46 K/UL — LOW (ref 3.8–10.5)
WBC # FLD AUTO: 12.19 K/UL — HIGH (ref 3.8–10.5)

## 2025-02-28 PROCEDURE — 99232 SBSQ HOSP IP/OBS MODERATE 35: CPT

## 2025-02-28 RX ORDER — HYDROMORPHONE/SOD CHLOR,ISO/PF 2 MG/10 ML
0.5 SYRINGE (ML) INJECTION EVERY 6 HOURS
Refills: 0 | Status: DISCONTINUED | OUTPATIENT
Start: 2025-02-28 | End: 2025-03-07

## 2025-02-28 RX ORDER — HYDROMORPHONE/SOD CHLOR,ISO/PF 2 MG/10 ML
0.5 SYRINGE (ML) INJECTION ONCE
Refills: 0 | Status: DISCONTINUED | OUTPATIENT
Start: 2025-02-28 | End: 2025-02-28

## 2025-02-28 RX ORDER — PIPERACILLIN-TAZO-DEXTROSE,ISO 3.375G/5
3.38 IV SOLUTION, PIGGYBACK PREMIX FROZEN(ML) INTRAVENOUS EVERY 8 HOURS
Refills: 0 | Status: DISCONTINUED | OUTPATIENT
Start: 2025-02-28 | End: 2025-03-05

## 2025-02-28 RX ORDER — GABAPENTIN 400 MG/1
100 CAPSULE ORAL DAILY
Refills: 0 | Status: DISCONTINUED | OUTPATIENT
Start: 2025-02-28 | End: 2025-03-18

## 2025-02-28 RX ORDER — INSULIN LISPRO 100 U/ML
14 INJECTION, SOLUTION INTRAVENOUS; SUBCUTANEOUS
Refills: 0 | Status: DISCONTINUED | OUTPATIENT
Start: 2025-02-28 | End: 2025-03-01

## 2025-02-28 RX ORDER — CYANOCOBALAMIN 1000 UG/ML
1000 INJECTION INTRAMUSCULAR; SUBCUTANEOUS DAILY
Refills: 0 | Status: DISCONTINUED | OUTPATIENT
Start: 2025-02-28 | End: 2025-03-18

## 2025-02-28 RX ADMIN — Medication 1 TABLET(S): at 21:22

## 2025-02-28 RX ADMIN — OXYCODONE HYDROCHLORIDE 10 MILLIGRAM(S): 30 TABLET ORAL at 12:28

## 2025-02-28 RX ADMIN — INSULIN LISPRO 14 UNIT(S): 100 INJECTION, SOLUTION INTRAVENOUS; SUBCUTANEOUS at 09:41

## 2025-02-28 RX ADMIN — GABAPENTIN 100 MILLIGRAM(S): 400 CAPSULE ORAL at 11:28

## 2025-02-28 RX ADMIN — OXYCODONE HYDROCHLORIDE 10 MILLIGRAM(S): 30 TABLET ORAL at 20:54

## 2025-02-28 RX ADMIN — Medication 975 MILLIGRAM(S): at 07:30

## 2025-02-28 RX ADMIN — OXYCODONE HYDROCHLORIDE 10 MILLIGRAM(S): 30 TABLET ORAL at 06:34

## 2025-02-28 RX ADMIN — Medication 975 MILLIGRAM(S): at 17:42

## 2025-02-28 RX ADMIN — Medication 81 MILLIGRAM(S): at 11:29

## 2025-02-28 RX ADMIN — Medication 40 MILLIGRAM(S): at 11:29

## 2025-02-28 RX ADMIN — Medication 0.5 MILLIGRAM(S): at 14:49

## 2025-02-28 RX ADMIN — OXYCODONE HYDROCHLORIDE 10 MILLIGRAM(S): 30 TABLET ORAL at 03:33

## 2025-02-28 RX ADMIN — Medication 975 MILLIGRAM(S): at 23:28

## 2025-02-28 RX ADMIN — Medication 1 TABLET(S): at 17:42

## 2025-02-28 RX ADMIN — Medication 0.5 MILLIGRAM(S): at 09:38

## 2025-02-28 RX ADMIN — Medication 975 MILLIGRAM(S): at 11:28

## 2025-02-28 RX ADMIN — Medication 0.5 MILLIGRAM(S): at 08:38

## 2025-02-28 RX ADMIN — OXYCODONE HYDROCHLORIDE 10 MILLIGRAM(S): 30 TABLET ORAL at 11:28

## 2025-02-28 RX ADMIN — Medication 25 GRAM(S): at 14:49

## 2025-02-28 RX ADMIN — LOSARTAN POTASSIUM 50 MILLIGRAM(S): 100 TABLET, FILM COATED ORAL at 06:43

## 2025-02-28 RX ADMIN — OXYCODONE HYDROCHLORIDE 10 MILLIGRAM(S): 30 TABLET ORAL at 07:30

## 2025-02-28 RX ADMIN — INSULIN LISPRO 2: 100 INJECTION, SOLUTION INTRAVENOUS; SUBCUTANEOUS at 09:42

## 2025-02-28 RX ADMIN — INSULIN LISPRO 14 UNIT(S): 100 INJECTION, SOLUTION INTRAVENOUS; SUBCUTANEOUS at 13:43

## 2025-02-28 RX ADMIN — INSULIN LISPRO 14 UNIT(S): 100 INJECTION, SOLUTION INTRAVENOUS; SUBCUTANEOUS at 17:42

## 2025-02-28 RX ADMIN — ENOXAPARIN SODIUM 40 MILLIGRAM(S): 100 INJECTION SUBCUTANEOUS at 17:42

## 2025-02-28 RX ADMIN — POLYETHYLENE GLYCOL 3350 17 GRAM(S): 17 POWDER, FOR SOLUTION ORAL at 11:28

## 2025-02-28 RX ADMIN — OXYCODONE HYDROCHLORIDE 10 MILLIGRAM(S): 30 TABLET ORAL at 02:33

## 2025-02-28 RX ADMIN — OXYCODONE HYDROCHLORIDE 10 MILLIGRAM(S): 30 TABLET ORAL at 17:44

## 2025-02-28 RX ADMIN — Medication 25 GRAM(S): at 06:31

## 2025-02-28 RX ADMIN — INSULIN LISPRO 2: 100 INJECTION, SOLUTION INTRAVENOUS; SUBCUTANEOUS at 13:44

## 2025-02-28 RX ADMIN — Medication 975 MILLIGRAM(S): at 12:28

## 2025-02-28 RX ADMIN — Medication 250 MILLIGRAM(S): at 06:31

## 2025-02-28 RX ADMIN — Medication 25 GRAM(S): at 21:22

## 2025-02-28 RX ADMIN — Medication 975 MILLIGRAM(S): at 18:42

## 2025-02-28 RX ADMIN — Medication 0.5 MILLIGRAM(S): at 15:49

## 2025-02-28 RX ADMIN — Medication 6 MILLIGRAM(S): at 21:22

## 2025-02-28 RX ADMIN — OXYCODONE HYDROCHLORIDE 10 MILLIGRAM(S): 30 TABLET ORAL at 16:44

## 2025-02-28 RX ADMIN — Medication 975 MILLIGRAM(S): at 00:13

## 2025-02-28 RX ADMIN — INSULIN GLARGINE-YFGN 16 UNIT(S): 100 INJECTION, SOLUTION SUBCUTANEOUS at 22:17

## 2025-02-28 RX ADMIN — Medication 975 MILLIGRAM(S): at 06:34

## 2025-02-28 RX ADMIN — OXYCODONE HYDROCHLORIDE 10 MILLIGRAM(S): 30 TABLET ORAL at 21:54

## 2025-02-28 RX ADMIN — CYANOCOBALAMIN 1000 MICROGRAM(S): 1000 INJECTION INTRAMUSCULAR; SUBCUTANEOUS at 11:29

## 2025-02-28 NOTE — PROGRESS NOTE ADULT - SUBJECTIVE AND OBJECTIVE BOX
DATE OF SERVICE: 02-28-25      Patient is a 70y old  Male who presents with a chief complaint of S/p L BKA (28 Feb 2025 12:03)      INTERVAL HISTORY: feels ok    TELEMETRY Personally reviewed: no events  	  MEDICATIONS:  losartan 50 milliGRAM(s) Oral daily        PHYSICAL EXAM:  T(C): 36.9 (02-28-25 @ 13:31), Max: 37.3 (02-27-25 @ 19:15)  HR: 88 (02-28-25 @ 13:31) (66 - 91)  BP: 149/73 (02-28-25 @ 13:31) (129/72 - 166/72)  RR: 18 (02-28-25 @ 13:31) (12 - 18)  SpO2: 95% (02-28-25 @ 13:31) (95% - 100%)  Wt(kg): --  I&O's Summary    27 Feb 2025 07:01  -  28 Feb 2025 07:00  --------------------------------------------------------  IN: 1380 mL / OUT: 2050 mL / NET: -670 mL    28 Feb 2025 07:01  -  28 Feb 2025 16:00  --------------------------------------------------------  IN: 540 mL / OUT: 600 mL / NET: -60 mL          Appearance: In no distress	  HEENT:    PERRL, EOMI	  Cardiovascular:  S1 S2, No JVD  Respiratory: Lungs clear to auscultation	  Gastrointestinal:  Soft, Non-tender, + BS	  Vascularature:  No edema of LE  Psychiatric: Appropriate affect   Neuro: no acute focal deficits                               7.9    13.26 )-----------( 279      ( 28 Feb 2025 13:23 )             24.6     02-28    139  |  105  |  10  ----------------------------<  140[H]  3.8   |  22  |  0.94    Ca    9.1      28 Feb 2025 08:19  Phos  3.8     02-28  Mg     2.1     02-28          Labs personally reviewed      Assessment and Plan:     69M PM of former smoker (1.5 PPD % 50 years; quit 2 years ago),  HTN, HLD, DM, PAD and aortoiliac occlusive disease, who previously underwent an outpatient angiography complicated by right iliac artery dissection. Now S/P Creation of bypass from left femoral artery to distal tibial artery with RSVG and completion angiogram on 2/7/25 s/p 2u pRBC 7.4 from 7.8l not responded to 2nd unit on 2/12/25. EGD completed 2/13 found to have bleeding gastric ulcer w/ hemostasis achieved with cautery. Hgb found to be 6.1 and s/p 2u pRBC. Patient now w/ ischemic changes to LLE.    1. Cardiac Risk Stratification   - No chest pain/SOB  - EKG Sinus tach 117 bpm no ischemic changes   - TTE reviewed with pericardial effusion, unchanged compared to 2023  - Not in decompensated HF   - No hx of tachy/timothy arrhythmias   - No valvular abnormalities  - Moderate risk for moderate risk vascular surgery    2. Hypertension  - BP stable c/w home meds    3. Hyperlipidemia   - Lipitor 40mg   - lipid profile     4. Diabetes Mellitus Type II   - FBG per protocol  - ISS   - A1C: 7.7%     5. CAD - TTE with Basal and mid inferior wall and basal inferoseptal segment are abnormal  - Discussed with pt, spouse and daughter in person, they do not know full name or number of his cardiologist to obtain his prior records from  - They deny he has any h/o MI, CP or SOB  - Advised OP follow up with his cards           Dayton Dawkins DO [facility]  Cardiovascular Medicine  [address], [address]  Office: [phone]  Available via Text/call on [employer] Teams

## 2025-02-28 NOTE — PROVIDER CONTACT NOTE (OTHER) - BACKGROUND
S/P L BKA
S/p L fem-tibial artery bypass w/ RSVG and angiogram.
Pt s/p L fem-tibial artery bypass w/ RSVG and angiogram on 2/7.
Patient s/p left femoral bypass w/ RSVG and angiogram
Left Fem Tibial Artery bypass
PMH: CVA, DM, HLD, HTN, PVD, peripheral neuropathy, former smoker  Pt had Left fem-tibial bypass.

## 2025-02-28 NOTE — PROVIDER CONTACT NOTE (OTHER) - REASON
Pt hypertensive
LLE cooler to touch and dusky
Left 1st-3rd toes appear cyanotic
LLE dressing saturated
Temperature 101.1,  Blood Pressure 175/81
Family concerned about patient's mental status

## 2025-02-28 NOTE — PROGRESS NOTE ADULT - SUBJECTIVE AND OBJECTIVE BOX
SURGERY DAILY PROGRESS NOTE:       SUBJECTIVE/ROS: Patient seen and evaluated on AM rounds.   Pain controlled.     OBJECTIVE:    Vital Signs Last 24 Hrs  T(C): 36.7 (28 Feb 2025 05:20), Max: 37.3 (27 Feb 2025 19:15)  T(F): 98 (28 Feb 2025 05:20), Max: 99.2 (27 Feb 2025 19:15)  HR: 66 (28 Feb 2025 07:45) (66 - 91)  BP: 129/72 (28 Feb 2025 07:45) (129/72 - 181/79)  BP(mean): 103 (27 Feb 2025 16:30) (98 - 115)  RR: 18 (28 Feb 2025 05:20) (12 - 18)  SpO2: 98% (28 Feb 2025 05:20) (96% - 100%)    Parameters below as of 28 Feb 2025 05:20  Patient On (Oxygen Delivery Method): room air      I&O's Detail    27 Feb 2025 07:01  -  28 Feb 2025 07:00  --------------------------------------------------------  IN:    IV PiggyBack: 100 mL    Oral Fluid: 1000 mL    PRBCs (Packed Red Blood Cells): 280 mL  Total IN: 1380 mL    OUT:    Voided (mL): 2050 mL  Total OUT: 2050 mL    Total NET: -670 mL        Daily Height in cm: 167.6 (27 Feb 2025 12:25)    Daily   MEDICATIONS  (STANDING):  acetaminophen     Tablet .. 975 milliGRAM(s) Oral every 6 hours  aspirin  chewable 81 milliGRAM(s) Oral daily  dextrose 5%. 1000 milliLiter(s) (50 mL/Hr) IV Continuous <Continuous>  dextrose 50% Injectable 25 Gram(s) IV Push once  dextrose 50% Injectable 12.5 Gram(s) IV Push once  enoxaparin Injectable 40 milliGRAM(s) SubCutaneous every 24 hours  influenza  Vaccine (HIGH DOSE) 0.5 milliLiter(s) IntraMuscular once  insulin glargine Injectable (LANTUS) 16 Unit(s) SubCutaneous at bedtime  insulin lispro (ADMELOG) corrective regimen sliding scale   SubCutaneous three times a day before meals  insulin lispro (ADMELOG) corrective regimen sliding scale   SubCutaneous at bedtime  insulin lispro Injectable (ADMELOG) 14 Unit(s) SubCutaneous three times a day before meals  lactobacillus acidophilus 1 Tablet(s) Oral with dinner  losartan 50 milliGRAM(s) Oral daily  melatonin 6 milliGRAM(s) Oral at bedtime  pantoprazole  Injectable 40 milliGRAM(s) IV Push daily  piperacillin/tazobactam IVPB.. 3.375 Gram(s) IV Intermittent every 8 hours  polyethylene glycol 3350 17 Gram(s) Oral daily  senna 1 Tablet(s) Oral at bedtime  vancomycin  IVPB 1000 milliGRAM(s) IV Intermittent every 8 hours    MEDICATIONS  (PRN):  bisacodyl 5 milliGRAM(s) Oral daily PRN Constipation  dextrose Oral Gel 15 Gram(s) Oral once PRN Blood Glucose LESS THAN 70 milliGRAM(s)/deciliter  oxyCODONE    IR 5 milliGRAM(s) Oral every 4 hours PRN Moderate Pain (4 - 6)  oxyCODONE    IR 10 milliGRAM(s) Oral every 4 hours PRN Severe Pain (7 - 10)      LABS:                        6.6    12.19 )-----------( 329      ( 28 Feb 2025 08:19 )             21.5     02-27    136  |  100  |  10  ----------------------------<  177[H]  3.7   |  21[L]  |  0.99    Ca    9.4      27 Feb 2025 04:49  Phos  3.4     02-27  Mg     2.1     02-27      PT/INR - ( 28 Feb 2025 08:19 )   PT: 13.1 sec;   INR: 1.14 ratio         PTT - ( 27 Feb 2025 04:49 )  PTT:67.5 sec  Urinalysis Basic - ( 27 Feb 2025 04:49 )    Color: x / Appearance: x / SG: x / pH: x  Gluc: 177 mg/dL / Ketone: x  / Bili: x / Urobili: x   Blood: x / Protein: x / Nitrite: x   Leuk Esterase: x / RBC: x / WBC x   Sq Epi: x / Non Sq Epi: x / Bacteria: x        PHYSICAL EXAM:    General: NAD, resting comfortably in bed  Pulmonary: Nonlabored breathing, no respiratory distress  Abdominal: soft, non distended, non tender  Extremities: LLE guillotine amp in knee immobilzer, some strikethrough on dressing

## 2025-02-28 NOTE — PROVIDER CONTACT NOTE (OTHER) - SITUATION
LLE dressing saturated
Family concerned about patient's mental status
Left 1st-3rd toes appear cyanotic
Pt is Hypertensive and febrile
Pt hypertensive
LLE cooler to touch and dusky

## 2025-02-28 NOTE — PROGRESS NOTE ADULT - ASSESSMENT
69M PM of former smoker (1.5 PPD % 50 years; quit 2 years ago),  HTN, HLD, DM, PAD and aortoiliac occlusive disease, who previously underwent an outpatient angiography complicated by right iliac artery dissection. Now S/P Creation of bypass from left femoral artery to distal tibial artery with RSVG and completion angiogram on 2/7/25 s/p 2u pRBC 7.4 from 7.8l not responded to 2nd unit on 2/12/25. EGD completed 2/13 found to have bleeding gastric ulcer w/ hemostasis achieved with cautery. Hgb found to be 6.1 and s/p 2u pRBC. 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      Plan:   - CC diet   - Cardiology cleared - moderate risk  - Antibx: Empiric vanc + zosyn   - Hep gtt adjusted, aspirin/plavix -> f/u AM PTT for therapeutic hep gtt rate  - Diet: Regular   - on insulin appreciate endocrine recs  - ASA  - Pain regimen: Tylenol ATC, oxycodone PRN  - Bowel regimen  - PT recs -> YANELY    Vascular Surg  [id number]

## 2025-02-28 NOTE — PROVIDER CONTACT NOTE (OTHER) - ACTION/TREATMENT ORDERED:
Melanie Caro MD made aware. Okay to give oxy 10mg early. No further interventions at this time.
Tylenol administered as ordered
Faheem Singh MD made aware and to bedside for assessment/to reinforce dressing. CBC ordered and sent. No further interventions at this time.
No interventions at this time. Continue to monitor
MD Ary Thompson made aware at bedside during AM rounds.
Dacia Thompson notified, pt ordered for losartan. Pt given losartan.

## 2025-02-28 NOTE — PROGRESS NOTE ADULT - ASSESSMENT
A 70-year-old male with a medical history of hypertension, hyperlipidemia, diabetes mellitus, peripheral artery disease, and aortoiliac occlusive disease, previously underwent outpatient angiography that resulted in a right iliac artery dissection. He is now status post for the creation of a bypass from the left femoral artery to the distal tibial artery using a reversed saphenous vein graft, with a completion angiogram completed on February 7, 2025. The Endocrine team has been consulted due to his uncontrolled diabetes. Endocrine re-consulted for hyperglycemia. Patient reports feeling okay, low appetite post-op L BKA done yesterday, and started on PO diet last evening. FBG stable, no hypoglycemia. Noted hyperglycemia last evening. Patient ate about 50% of his breakfast this am, will closely watch BG and adjust mealtime insulin depending on postprandial BG at 1200 today. Endocrine will closely monitor BG and adjust insulin as needed for BG goal 100-180mg/dL inpatient.     #Type 2 diabetes mellitus   A1C with Estimated Average Glucose Result: 7.7 % (01-28-25 @ 10:05)  A1C with Estimated Average Glucose Result: 7.4 % (12-06-24 @ 09:06)  Home regimen: Jardiance 10mg once daily, Actos 30mg once daily, Jentadueto 2.5-2g BID

## 2025-02-28 NOTE — PROGRESS NOTE ADULT - SUBJECTIVE AND OBJECTIVE BOX
Patient seen today for follow up inpatient Diabetes Mellitus management.    Chief Complaint: Type 2 Diabetes Mellitus     INTERVAL HX:  Patient seen in [facility] 9MON 929 W1. Patient is alert and oriented, resting in bed with daughter at bedside. Patient reports he is feeling okay, s/p L BKA done yesterday. Patient reports he is not eating full meals yet, low appetite post-op but tolerating POs, eating about 50%, only had cereal from breakfast tray this am. FBG stable this am to 163mg/dL. No hypoglycemia. Noted hyperglycemia last evening to 244mg/dL -> patient reports he ate <25% of his dinner late last night after surgery. Blood glucose levels in the last 24hrs have been 163-244mg/dL.     Review of Systems:  General: As above.  Respiratory: Denies any SOB, BETH, or cough.  Gastrointestinal: Denies any n/v/d or abdominal pain.   Endocrine: Denies any polyuria, polydipsia, polyphagia, visual changes, or numbness in feet.     Allergies  Advil (Rash)      Intolerances  None.       MEDICATIONS  (STANDING):  acetaminophen     Tablet .. 975 milliGRAM(s) Oral every 6 hours  aspirin  chewable 81 milliGRAM(s) Oral daily  bisacodyl 5 milliGRAM(s) Oral daily PRN  cyanocobalamin 1000 MICROGram(s) Oral daily  dextrose 5%. 1000 milliLiter(s) IV Continuous <Continuous>  dextrose 50% Injectable 25 Gram(s) IV Push once  dextrose 50% Injectable 12.5 Gram(s) IV Push once  dextrose Oral Gel 15 Gram(s) Oral once PRN  enoxaparin Injectable 40 milliGRAM(s) SubCutaneous every 24 hours  gabapentin 100 milliGRAM(s) Oral daily  influenza  Vaccine (HIGH DOSE) 0.5 milliLiter(s) IntraMuscular once  insulin glargine Injectable (LANTUS) 16 Unit(s) SubCutaneous at bedtime  insulin lispro (ADMELOG) corrective regimen sliding scale   SubCutaneous three times a day before meals  insulin lispro (ADMELOG) corrective regimen sliding scale   SubCutaneous at bedtime  lactobacillus acidophilus 1 Tablet(s) Oral with dinner  losartan 50 milliGRAM(s) Oral daily  melatonin 6 milliGRAM(s) Oral at bedtime  oxyCODONE    IR 5 milliGRAM(s) Oral every 4 hours PRN  oxyCODONE    IR 10 milliGRAM(s) Oral every 4 hours PRN  pantoprazole  Injectable 40 milliGRAM(s) IV Push daily  piperacillin/tazobactam IVPB.. 3.375 Gram(s) IV Intermittent every 8 hours  polyethylene glycol 3350 17 Gram(s) Oral daily  senna 1 Tablet(s) Oral at bedtime  vancomycin  IVPB 1000 milliGRAM(s) IV Intermittent every 8 hours      dextrose 50% Injectable 25 Gram(s) IV Push once  dextrose 50% Injectable 12.5 Gram(s) IV Push once  dextrose Oral Gel 15 Gram(s) Oral once PRN  insulin glargine Injectable (LANTUS) 16 Unit(s) SubCutaneous at bedtime  insulin lispro (ADMELOG) corrective regimen sliding scale   SubCutaneous three times a day before meals  insulin lispro (ADMELOG) corrective regimen sliding scale   SubCutaneous at bedtime      insulin lispro (ADMELOG) corrective regimen sliding scale   SubCutaneous three times a day before meals  insulin lispro (ADMELOG) corrective regimen sliding scale   SubCutaneous at bedtime      PHYSICAL EXAM:  VITALS:   T(C): 36.7 (02-28-25 @ 05:20), Max: 37.3 (02-27-25 @ 19:15)  HR: 66 (02-28-25 @ 07:45) (66 - 91)  BP: 129/72 (02-28-25 @ 07:45) (129/72 - 181/79)  RR: 18 (02-28-25 @ 05:20) (12 - 18)  SpO2: 98% (02-28-25 @ 05:20) (96% - 100%)    GENERAL: In no acute distress  Respiratory: Respirations unlabored  Extremities: Warm and dry, no edema  NEURO: Alert and oriented, appropriate     LABS:  POCT Blood Glucose.: 163 mg/dL (02-28-25 @ 09:40)  POCT Blood Glucose.: 244 mg/dL (02-27-25 @ 21:24)  POCT Blood Glucose.: 201 mg/dL (02-27-25 @ 16:03)  POCT Blood Glucose.: 212 mg/dL (02-27-25 @ 14:48)  POCT Blood Glucose.: 199 mg/dL (02-27-25 @ 11:58)  POCT Blood Glucose.: 238 mg/dL (02-27-25 @ 06:15)  POCT Blood Glucose.: 201 mg/dL (02-26-25 @ 21:49)  POCT Blood Glucose.: 261 mg/dL (02-26-25 @ 17:21)  POCT Blood Glucose.: 273 mg/dL (02-26-25 @ 11:03)  POCT Blood Glucose.: 306 mg/dL (02-25-25 @ 22:24)  POCT Blood Glucose.: 294 mg/dL (02-25-25 @ 21:35)  POCT Blood Glucose.: 211 mg/dL (02-25-25 @ 17:45)  POCT Blood Glucose.: 198 mg/dL (02-25-25 @ 12:56)                          6.6    12.19 )-----------( 329      ( 28 Feb 2025 08:19 )             21.5     02-28    139  |  105  |  10  ----------------------------<  140[H]  3.8   |  22  |  0.94    Ca    9.1      28 Feb 2025 08:19  Phos  3.8     02-28  Mg     2.1     02-28        PT/INR - ( 28 Feb 2025 08:19 )   PT: 13.1 sec;   INR: 1.14 ratio         PTT - ( 27 Feb 2025 04:49 )  PTT:67.5 sec      Urinalysis Basic - ( 28 Feb 2025 08:19 )    Color: x / Appearance: x / SG: x / pH: x  Gluc: 140 mg/dL / Ketone: x  / Bili: x / Urobili: x   Blood: x / Protein: x / Nitrite: x   Leuk Esterase: x / RBC: x / WBC x   Sq Epi: x / Non Sq Epi: x / Bacteria: x      A1C with Estimated Average Glucose Result: A1C with Estimated Average Glucose Result: 7.7 % (01-28-25 @ 10:05)  A1C with Estimated Average Glucose Result: 7.4 % (12-06-24 @ 09:06)

## 2025-02-28 NOTE — PROVIDER CONTACT NOTE (OTHER) - RECOMMENDATIONS
Dr. Thompson stated she will adjust Blood pressure medication.
Per provider
Notify provider. CBC.
Notify provider.
Notify MD Melanie aCro
Notify provider

## 2025-03-01 LAB
ANION GAP SERPL CALC-SCNC: 12 MMOL/L — SIGNIFICANT CHANGE UP (ref 5–17)
BUN SERPL-MCNC: 9 MG/DL — SIGNIFICANT CHANGE UP (ref 7–23)
CALCIUM SERPL-MCNC: 8.8 MG/DL — SIGNIFICANT CHANGE UP (ref 8.4–10.5)
CHLORIDE SERPL-SCNC: 105 MMOL/L — SIGNIFICANT CHANGE UP (ref 96–108)
CO2 SERPL-SCNC: 22 MMOL/L — SIGNIFICANT CHANGE UP (ref 22–31)
CREAT SERPL-MCNC: 0.98 MG/DL — SIGNIFICANT CHANGE UP (ref 0.5–1.3)
EGFR: 82 ML/MIN/1.73M2 — SIGNIFICANT CHANGE UP
EGFR: 82 ML/MIN/1.73M2 — SIGNIFICANT CHANGE UP
GLUCOSE BLDC GLUCOMTR-MCNC: 126 MG/DL — HIGH (ref 70–99)
GLUCOSE BLDC GLUCOMTR-MCNC: 137 MG/DL — HIGH (ref 70–99)
GLUCOSE BLDC GLUCOMTR-MCNC: 204 MG/DL — HIGH (ref 70–99)
GLUCOSE BLDC GLUCOMTR-MCNC: 92 MG/DL — SIGNIFICANT CHANGE UP (ref 70–99)
GLUCOSE SERPL-MCNC: 85 MG/DL — SIGNIFICANT CHANGE UP (ref 70–99)
HCT VFR BLD CALC: 23.1 % — LOW (ref 39–50)
HGB BLD-MCNC: 7.4 G/DL — LOW (ref 13–17)
INR BLD: 1.09 RATIO — SIGNIFICANT CHANGE UP (ref 0.85–1.16)
MAGNESIUM SERPL-MCNC: 2 MG/DL — SIGNIFICANT CHANGE UP (ref 1.6–2.6)
MCHC RBC-ENTMCNC: 28.8 PG — SIGNIFICANT CHANGE UP (ref 27–34)
MCHC RBC-ENTMCNC: 32 G/DL — SIGNIFICANT CHANGE UP (ref 32–36)
MCV RBC AUTO: 89.9 FL — SIGNIFICANT CHANGE UP (ref 80–100)
MRSA PCR RESULT.: SIGNIFICANT CHANGE UP
NRBC BLD AUTO-RTO: 0 /100 WBCS — SIGNIFICANT CHANGE UP (ref 0–0)
PHOSPHATE SERPL-MCNC: 3.6 MG/DL — SIGNIFICANT CHANGE UP (ref 2.5–4.5)
PLATELET # BLD AUTO: 308 K/UL — SIGNIFICANT CHANGE UP (ref 150–400)
POTASSIUM SERPL-MCNC: 3.4 MMOL/L — LOW (ref 3.5–5.3)
POTASSIUM SERPL-SCNC: 3.4 MMOL/L — LOW (ref 3.5–5.3)
PROTHROM AB SERPL-ACNC: 12.4 SEC — SIGNIFICANT CHANGE UP (ref 9.9–13.4)
RBC # BLD: 2.57 M/UL — LOW (ref 4.2–5.8)
RBC # FLD: 17.4 % — HIGH (ref 10.3–14.5)
S AUREUS DNA NOSE QL NAA+PROBE: SIGNIFICANT CHANGE UP
SODIUM SERPL-SCNC: 139 MMOL/L — SIGNIFICANT CHANGE UP (ref 135–145)
WBC # BLD: 12.69 K/UL — HIGH (ref 3.8–10.5)
WBC # FLD AUTO: 12.69 K/UL — HIGH (ref 3.8–10.5)

## 2025-03-01 RX ORDER — INSULIN GLARGINE-YFGN 100 [IU]/ML
14 INJECTION, SOLUTION SUBCUTANEOUS AT BEDTIME
Refills: 0 | Status: DISCONTINUED | OUTPATIENT
Start: 2025-03-01 | End: 2025-03-05

## 2025-03-01 RX ORDER — INSULIN LISPRO 100 U/ML
12 INJECTION, SOLUTION INTRAVENOUS; SUBCUTANEOUS
Refills: 0 | Status: DISCONTINUED | OUTPATIENT
Start: 2025-03-01 | End: 2025-03-03

## 2025-03-01 RX ADMIN — Medication 975 MILLIGRAM(S): at 06:17

## 2025-03-01 RX ADMIN — OXYCODONE HYDROCHLORIDE 10 MILLIGRAM(S): 30 TABLET ORAL at 10:18

## 2025-03-01 RX ADMIN — Medication 975 MILLIGRAM(S): at 00:28

## 2025-03-01 RX ADMIN — CYANOCOBALAMIN 1000 MICROGRAM(S): 1000 INJECTION INTRAMUSCULAR; SUBCUTANEOUS at 13:07

## 2025-03-01 RX ADMIN — Medication 1 TABLET(S): at 21:27

## 2025-03-01 RX ADMIN — Medication 0.5 MILLIGRAM(S): at 20:16

## 2025-03-01 RX ADMIN — Medication 25 GRAM(S): at 05:16

## 2025-03-01 RX ADMIN — GABAPENTIN 100 MILLIGRAM(S): 400 CAPSULE ORAL at 13:06

## 2025-03-01 RX ADMIN — OXYCODONE HYDROCHLORIDE 10 MILLIGRAM(S): 30 TABLET ORAL at 02:08

## 2025-03-01 RX ADMIN — Medication 0.5 MILLIGRAM(S): at 03:33

## 2025-03-01 RX ADMIN — Medication 40 MILLIEQUIVALENT(S): at 09:18

## 2025-03-01 RX ADMIN — Medication 0.5 MILLIGRAM(S): at 12:13

## 2025-03-01 RX ADMIN — OXYCODONE HYDROCHLORIDE 10 MILLIGRAM(S): 30 TABLET ORAL at 18:09

## 2025-03-01 RX ADMIN — OXYCODONE HYDROCHLORIDE 10 MILLIGRAM(S): 30 TABLET ORAL at 09:18

## 2025-03-01 RX ADMIN — OXYCODONE HYDROCHLORIDE 10 MILLIGRAM(S): 30 TABLET ORAL at 06:17

## 2025-03-01 RX ADMIN — Medication 975 MILLIGRAM(S): at 13:06

## 2025-03-01 RX ADMIN — Medication 975 MILLIGRAM(S): at 23:54

## 2025-03-01 RX ADMIN — Medication 975 MILLIGRAM(S): at 05:17

## 2025-03-01 RX ADMIN — OXYCODONE HYDROCHLORIDE 10 MILLIGRAM(S): 30 TABLET ORAL at 01:08

## 2025-03-01 RX ADMIN — Medication 0.5 MILLIGRAM(S): at 11:13

## 2025-03-01 RX ADMIN — OXYCODONE HYDROCHLORIDE 10 MILLIGRAM(S): 30 TABLET ORAL at 13:06

## 2025-03-01 RX ADMIN — POLYETHYLENE GLYCOL 3350 17 GRAM(S): 17 POWDER, FOR SOLUTION ORAL at 13:07

## 2025-03-01 RX ADMIN — Medication 6 MILLIGRAM(S): at 21:28

## 2025-03-01 RX ADMIN — Medication 975 MILLIGRAM(S): at 18:09

## 2025-03-01 RX ADMIN — Medication 81 MILLIGRAM(S): at 13:06

## 2025-03-01 RX ADMIN — LOSARTAN POTASSIUM 50 MILLIGRAM(S): 100 TABLET, FILM COATED ORAL at 05:17

## 2025-03-01 RX ADMIN — OXYCODONE HYDROCHLORIDE 10 MILLIGRAM(S): 30 TABLET ORAL at 14:06

## 2025-03-01 RX ADMIN — Medication 0.5 MILLIGRAM(S): at 19:46

## 2025-03-01 RX ADMIN — OXYCODONE HYDROCHLORIDE 10 MILLIGRAM(S): 30 TABLET ORAL at 17:08

## 2025-03-01 RX ADMIN — Medication 25 GRAM(S): at 13:06

## 2025-03-01 RX ADMIN — Medication 0.5 MILLIGRAM(S): at 04:05

## 2025-03-01 RX ADMIN — Medication 25 GRAM(S): at 21:27

## 2025-03-01 RX ADMIN — Medication 1 TABLET(S): at 17:09

## 2025-03-01 RX ADMIN — Medication 40 MILLIGRAM(S): at 13:06

## 2025-03-01 RX ADMIN — Medication 975 MILLIGRAM(S): at 14:06

## 2025-03-01 RX ADMIN — OXYCODONE HYDROCHLORIDE 10 MILLIGRAM(S): 30 TABLET ORAL at 05:17

## 2025-03-01 RX ADMIN — Medication 975 MILLIGRAM(S): at 17:09

## 2025-03-01 RX ADMIN — INSULIN GLARGINE-YFGN 14 UNIT(S): 100 INJECTION, SOLUTION SUBCUTANEOUS at 22:03

## 2025-03-01 RX ADMIN — ENOXAPARIN SODIUM 40 MILLIGRAM(S): 100 INJECTION SUBCUTANEOUS at 17:09

## 2025-03-01 NOTE — CHART NOTE - NSCHARTNOTEFT_GEN_A_CORE
COLBY KENNEDY  Gender: Male  70y  [facility] 9MON 929 W1    Patient not seen today, chart reviewed.     POCT Blood Glucose:  137 mg/dL (03-01-25 @ 16:24)  92 mg/dL (03-01-25 @ 09:43)  158 mg/dL (02-28-25 @ 22:05)  82 mg/dL (02-28-25 @ 21:18)      eMAR:  insulin glargine Injectable (LANTUS)   16 Unit(s) SubCutaneous (02-28-25 @ 22:17)    insulin lispro Injectable (ADMELOG)   14 Unit(s) SubCutaneous (02-28-25 @ 17:42)     POC glucose, insulin requirements, lab values reviewed.   BGs  today, will adjust insulin doses to prevent potential hypoglycemia  Decrease Lantus to 14 units QHS  Decrease admelog to 12 units QHS    Contact via [employer] Teams during business hours  To reach covering provider access AMION via sunrise tools  For Urgent matters/after-hours/weekends/holidays please page endocrine fellow on call   For nonurgent matters please email [email or web address]    Please note that this patient may be followed by different provider tomorrow.  Notify endocrine 24 hours prior to discharge for final recommendations

## 2025-03-01 NOTE — PROGRESS NOTE ADULT - SUBJECTIVE AND OBJECTIVE BOX
Vascular Surgery Daily Progress Note    Last 24 hours events: dressing changed w/ no ooziness noted. NuKnit changed on wound. s/p 1 upRBC for hgb 6.6 w/ appropriate response. Pain control w/ IV dilaudid for SBT pain    Subjective: Patient examined at bedside this morning. Denies fevers, chills, nausea, vomiting, or motor/sensory changes to LLE.      piperacillin/tazobactam IVPB.. 3.375  aspirin  chewable 81  enoxaparin Injectable 40  losartan 50  piperacillin/tazobactam IVPB.. 3.375        Vital Signs Last 24 Hrs  T(C): 36.9 (01 Mar 2025 05:11), Max: 37.1 (28 Feb 2025 17:05)  T(F): 98.5 (01 Mar 2025 05:11), Max: 98.8 (01 Mar 2025 01:27)  HR: 96 (01 Mar 2025 05:11) (88 - 100)  BP: 164/80 (01 Mar 2025 05:11) (132/69 - 164/80)  BP(mean): --  RR: 18 (01 Mar 2025 05:11) (18 - 18)  SpO2: 97% (01 Mar 2025 05:11) (95% - 98%)    Parameters below as of 01 Mar 2025 05:11  Patient On (Oxygen Delivery Method): room air      I&O's Summary    28 Feb 2025 07:01  -  01 Mar 2025 07:00  --------------------------------------------------------  IN: 1440 mL / OUT: 1350 mL / NET: 90 mL        Physical Exam:  General: NAD, resting comfortably in bed  Pulmonary: Nonlabored breathing, no respiratory distress  Abdominal: soft, non distended, non tender  Extremities: Extremities: LLE guillotine amp in knee immobilzer, some strikethrough on dressing       LABS:                        7.9    13.26 )-----------( 279      ( 28 Feb 2025 13:23 )             24.6     03-01    139  |  105  |  9   ----------------------------<  85  3.4[L]   |  22  |  0.98    Ca    8.8      01 Mar 2025 07:02  Phos  3.6     03-01  Mg     2.0     03-01      PT/INR - ( 01 Mar 2025 07:02 )   PT: 12.4 sec;   INR: 1.09 ratio

## 2025-03-01 NOTE — PROGRESS NOTE ADULT - ASSESSMENT
69M PM of former smoker (1.5 PPD % 50 years; quit 2 years ago),  HTN, HLD, DM, PAD and aortoiliac occlusive disease, who previously underwent an outpatient angiography complicated by right iliac artery dissection. Now S/P Creation of bypass from left femoral artery to distal tibial artery with RSVG and completion angiogram on 2/7/25 s/p 2u pRBC 7.4 from 7.8l not responded to 2nd unit on 2/12/25. EGD completed 2/13 found to have bleeding gastric ulcer w/ hemostasis achieved with cautery. Hgb found to be 6.1 and s/p 2u pRBC. 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       Plan:   - f/u AM CBC  - dressing change  - pain control, IV dilaudid added for SBT pain  - CC diet   - Antibx: Empiric zosyn; vanc d/c 2/28  - Hep gtt adjusted, aspirin/plavix -> f/u AM PTT for therapeutic hep gtt rate  - Diet: Regular   - on insulin appreciate endocrine recs  - ASA  - Pain regimen: Tylenol ATC, oxycodone PRN  - Bowel regimen  - PT recs -> YANELY    Vascular Surg  [id number]

## 2025-03-01 NOTE — PROGRESS NOTE ADULT - SUBJECTIVE AND OBJECTIVE BOX
DATE OF SERVICE: 03-01-25 @ 15:18    Patient is a 70y old  Male who presents with a chief complaint of S/p L BKA (28 Feb 2025 12:03)      INTERVAL HISTORY: Feels ok.     REVIEW OF SYSTEMS:  CONSTITUTIONAL: No weakness  EYES/ENT: No visual changes;  No throat pain   NECK: No pain or stiffness  RESPIRATORY: No cough, wheezing; No shortness of breath  CARDIOVASCULAR: No chest pain or palpitations  GASTROINTESTINAL: No abdominal  pain. No nausea, vomiting, or hematemesis  GENITOURINARY: No dysuria, frequency or hematuria  NEUROLOGICAL: No stroke like symptoms  SKIN: No rashes    	  MEDICATIONS:  losartan 50 milliGRAM(s) Oral daily        PHYSICAL EXAM:  T(C): 37.6 (03-01-25 @ 13:33), Max: 37.6 (03-01-25 @ 13:33)  HR: 96 (03-01-25 @ 13:33) (92 - 100)  BP: 156/81 (03-01-25 @ 13:33) (132/69 - 168/76)  RR: 18 (03-01-25 @ 13:33) (18 - 18)  SpO2: 93% (03-01-25 @ 13:33) (93% - 98%)  Wt(kg): --  I&O's Summary    28 Feb 2025 07:01  -  01 Mar 2025 07:00  --------------------------------------------------------  IN: 1440 mL / OUT: 1350 mL / NET: 90 mL    01 Mar 2025 07:01  -  01 Mar 2025 15:18  --------------------------------------------------------  IN: 0 mL / OUT: 350 mL / NET: -350 mL          Appearance: In no distress	  HEENT:    PERRL, EOMI	  Cardiovascular:  S1 S2, No JVD  Respiratory: Lungs clear to auscultation	  Gastrointestinal:  Soft, Non-tender, + BS	  Vascularature:  L BKA  Psychiatric: Appropriate affect   Neuro: no acute focal deficits                               7.4    12.69 )-----------( 308      ( 01 Mar 2025 07:02 )             23.1     03-01    139  |  105  |  9   ----------------------------<  85  3.4[L]   |  22  |  0.98    Ca    8.8      01 Mar 2025 07:02  Phos  3.6     03-01  Mg     2.0     03-01          Labs personally reviewed      ASSESSMENT/PLAN: 	    69M PM of former smoker (1.5 PPD % 50 years; quit 2 years ago),  HTN, HLD, DM, PAD and aortoiliac occlusive disease, who previously underwent an outpatient angiography complicated by right iliac artery dissection. Now S/P Creation of bypass from left femoral artery to distal tibial artery with RSVG and completion angiogram on 2/7/25 s/p 2u pRBC 7.4 from 7.8l not responded to 2nd unit on 2/12/25. EGD completed 2/13 found to have bleeding gastric ulcer w/ hemostasis achieved with cautery. Hgb found to be 6.1 and s/p 2u pRBC. Patient now w/ ischemic changes to LLE.    1. Cardiac Risk Stratification   - No chest pain/SOB  - EKG Sinus tach 117 bpm no ischemic changes   - TTE reviewed with pericardial effusion, unchanged compared to 2023  - Not in decompensated HF   - No hx of tachy/timothy arrhythmias   - No valvular abnormalities  - Moderate risk for moderate risk vascular surgery  - Tolerated well.     2. Hypertension  - Increase losartan if BP remains above target    3. Hyperlipidemia   - Lipitor 40mg   - lipid profile     4. Diabetes Mellitus Type II   - FBG per protocol  - ISS   - A1C: 7.7%     5. CAD - TTE with Basal and mid inferior wall and basal inferoseptal segment are abnormal  - Discussed with pt, spouse and daughter in person, they do not know full name or number of his cardiologist to obtain his prior records from  - They deny he has any h/o MI, CP or SOB  - Advised OP follow up with his cards             Jillian Bauer, JUDE-RINA Dawkins DO [facility]  Cardiovascular Medicine  [address] [place] [address], [address]  Available through call or text on [employer]  Office: [phone]

## 2025-03-02 LAB
ANION GAP SERPL CALC-SCNC: 12 MMOL/L — SIGNIFICANT CHANGE UP (ref 5–17)
BLD GP AB SCN SERPL QL: NEGATIVE — SIGNIFICANT CHANGE UP
BUN SERPL-MCNC: 12 MG/DL — SIGNIFICANT CHANGE UP (ref 7–23)
CALCIUM SERPL-MCNC: 9 MG/DL — SIGNIFICANT CHANGE UP (ref 8.4–10.5)
CHLORIDE SERPL-SCNC: 103 MMOL/L — SIGNIFICANT CHANGE UP (ref 96–108)
CO2 SERPL-SCNC: 21 MMOL/L — LOW (ref 22–31)
CREAT SERPL-MCNC: 1.02 MG/DL — SIGNIFICANT CHANGE UP (ref 0.5–1.3)
EGFR: 78 ML/MIN/1.73M2 — SIGNIFICANT CHANGE UP
EGFR: 78 ML/MIN/1.73M2 — SIGNIFICANT CHANGE UP
GLUCOSE BLDC GLUCOMTR-MCNC: 155 MG/DL — HIGH (ref 70–99)
GLUCOSE BLDC GLUCOMTR-MCNC: 197 MG/DL — HIGH (ref 70–99)
GLUCOSE BLDC GLUCOMTR-MCNC: 207 MG/DL — HIGH (ref 70–99)
GLUCOSE BLDC GLUCOMTR-MCNC: 281 MG/DL — HIGH (ref 70–99)
GLUCOSE SERPL-MCNC: 124 MG/DL — HIGH (ref 70–99)
HCT VFR BLD CALC: 22.1 % — LOW (ref 39–50)
HCT VFR BLD CALC: 27.9 % — LOW (ref 39–50)
HGB BLD-MCNC: 7 G/DL — CRITICAL LOW (ref 13–17)
HGB BLD-MCNC: 9 G/DL — LOW (ref 13–17)
MAGNESIUM SERPL-MCNC: 2.2 MG/DL — SIGNIFICANT CHANGE UP (ref 1.6–2.6)
MCHC RBC-ENTMCNC: 28.2 PG — SIGNIFICANT CHANGE UP (ref 27–34)
MCHC RBC-ENTMCNC: 28.7 PG — SIGNIFICANT CHANGE UP (ref 27–34)
MCHC RBC-ENTMCNC: 31.7 G/DL — LOW (ref 32–36)
MCHC RBC-ENTMCNC: 32.3 G/DL — SIGNIFICANT CHANGE UP (ref 32–36)
MCV RBC AUTO: 88.9 FL — SIGNIFICANT CHANGE UP (ref 80–100)
MCV RBC AUTO: 89.1 FL — SIGNIFICANT CHANGE UP (ref 80–100)
NRBC BLD AUTO-RTO: 0 /100 WBCS — SIGNIFICANT CHANGE UP (ref 0–0)
NRBC BLD AUTO-RTO: 0 /100 WBCS — SIGNIFICANT CHANGE UP (ref 0–0)
PHOSPHATE SERPL-MCNC: 3.5 MG/DL — SIGNIFICANT CHANGE UP (ref 2.5–4.5)
PLATELET # BLD AUTO: 309 K/UL — SIGNIFICANT CHANGE UP (ref 150–400)
PLATELET # BLD AUTO: 315 K/UL — SIGNIFICANT CHANGE UP (ref 150–400)
POTASSIUM SERPL-MCNC: 3.8 MMOL/L — SIGNIFICANT CHANGE UP (ref 3.5–5.3)
POTASSIUM SERPL-SCNC: 3.8 MMOL/L — SIGNIFICANT CHANGE UP (ref 3.5–5.3)
RBC # BLD: 2.48 M/UL — LOW (ref 4.2–5.8)
RBC # BLD: 3.14 M/UL — LOW (ref 4.2–5.8)
RBC # FLD: 16.1 % — HIGH (ref 10.3–14.5)
RBC # FLD: 17.2 % — HIGH (ref 10.3–14.5)
RH IG SCN BLD-IMP: NEGATIVE — SIGNIFICANT CHANGE UP
SODIUM SERPL-SCNC: 136 MMOL/L — SIGNIFICANT CHANGE UP (ref 135–145)
WBC # BLD: 12.28 K/UL — HIGH (ref 3.8–10.5)
WBC # BLD: 13.47 K/UL — HIGH (ref 3.8–10.5)
WBC # FLD AUTO: 12.28 K/UL — HIGH (ref 3.8–10.5)
WBC # FLD AUTO: 13.47 K/UL — HIGH (ref 3.8–10.5)

## 2025-03-02 RX ORDER — LABETALOL HYDROCHLORIDE 200 MG/1
10 TABLET, FILM COATED ORAL ONCE
Refills: 0 | Status: COMPLETED | OUTPATIENT
Start: 2025-03-02 | End: 2025-03-02

## 2025-03-02 RX ORDER — LOSARTAN POTASSIUM 100 MG/1
50 TABLET, FILM COATED ORAL ONCE
Refills: 0 | Status: COMPLETED | OUTPATIENT
Start: 2025-03-02 | End: 2025-03-02

## 2025-03-02 RX ORDER — LOSARTAN POTASSIUM 100 MG/1
100 TABLET, FILM COATED ORAL DAILY
Refills: 0 | Status: DISCONTINUED | OUTPATIENT
Start: 2025-03-03 | End: 2025-03-18

## 2025-03-02 RX ORDER — LABETALOL HYDROCHLORIDE 200 MG/1
5 TABLET, FILM COATED ORAL ONCE
Refills: 0 | Status: COMPLETED | OUTPATIENT
Start: 2025-03-02 | End: 2025-03-02

## 2025-03-02 RX ORDER — LISINOPRIL 30 MG/1
25 TABLET ORAL DAILY
Refills: 0 | Status: DISCONTINUED | OUTPATIENT
Start: 2025-03-02 | End: 2025-03-04

## 2025-03-02 RX ADMIN — LABETALOL HYDROCHLORIDE 5 MILLIGRAM(S): 200 TABLET, FILM COATED ORAL at 21:05

## 2025-03-02 RX ADMIN — Medication 40 MILLIGRAM(S): at 10:33

## 2025-03-02 RX ADMIN — OXYCODONE HYDROCHLORIDE 10 MILLIGRAM(S): 30 TABLET ORAL at 22:13

## 2025-03-02 RX ADMIN — POLYETHYLENE GLYCOL 3350 17 GRAM(S): 17 POWDER, FOR SOLUTION ORAL at 10:33

## 2025-03-02 RX ADMIN — Medication 975 MILLIGRAM(S): at 18:09

## 2025-03-02 RX ADMIN — OXYCODONE HYDROCHLORIDE 10 MILLIGRAM(S): 30 TABLET ORAL at 15:57

## 2025-03-02 RX ADMIN — OXYCODONE HYDROCHLORIDE 10 MILLIGRAM(S): 30 TABLET ORAL at 12:42

## 2025-03-02 RX ADMIN — Medication 975 MILLIGRAM(S): at 06:08

## 2025-03-02 RX ADMIN — LOSARTAN POTASSIUM 50 MILLIGRAM(S): 100 TABLET, FILM COATED ORAL at 14:57

## 2025-03-02 RX ADMIN — OXYCODONE HYDROCHLORIDE 10 MILLIGRAM(S): 30 TABLET ORAL at 09:32

## 2025-03-02 RX ADMIN — Medication 0.5 MILLIGRAM(S): at 11:03

## 2025-03-02 RX ADMIN — Medication 25 GRAM(S): at 05:07

## 2025-03-02 RX ADMIN — OXYCODONE HYDROCHLORIDE 10 MILLIGRAM(S): 30 TABLET ORAL at 01:35

## 2025-03-02 RX ADMIN — INSULIN GLARGINE-YFGN 14 UNIT(S): 100 INJECTION, SOLUTION SUBCUTANEOUS at 22:06

## 2025-03-02 RX ADMIN — LOSARTAN POTASSIUM 50 MILLIGRAM(S): 100 TABLET, FILM COATED ORAL at 05:08

## 2025-03-02 RX ADMIN — INSULIN LISPRO 4: 100 INJECTION, SOLUTION INTRAVENOUS; SUBCUTANEOUS at 17:40

## 2025-03-02 RX ADMIN — INSULIN LISPRO 2: 100 INJECTION, SOLUTION INTRAVENOUS; SUBCUTANEOUS at 13:13

## 2025-03-02 RX ADMIN — Medication 975 MILLIGRAM(S): at 05:08

## 2025-03-02 RX ADMIN — GABAPENTIN 100 MILLIGRAM(S): 400 CAPSULE ORAL at 10:33

## 2025-03-02 RX ADMIN — OXYCODONE HYDROCHLORIDE 10 MILLIGRAM(S): 30 TABLET ORAL at 11:42

## 2025-03-02 RX ADMIN — Medication 25 GRAM(S): at 22:06

## 2025-03-02 RX ADMIN — Medication 1 TABLET(S): at 17:39

## 2025-03-02 RX ADMIN — Medication 975 MILLIGRAM(S): at 11:31

## 2025-03-02 RX ADMIN — Medication 1 TABLET(S): at 21:13

## 2025-03-02 RX ADMIN — INSULIN LISPRO 2: 100 INJECTION, SOLUTION INTRAVENOUS; SUBCUTANEOUS at 22:08

## 2025-03-02 RX ADMIN — LISINOPRIL 25 MILLIGRAM(S): 30 TABLET ORAL at 23:43

## 2025-03-02 RX ADMIN — OXYCODONE HYDROCHLORIDE 10 MILLIGRAM(S): 30 TABLET ORAL at 21:13

## 2025-03-02 RX ADMIN — Medication 975 MILLIGRAM(S): at 11:01

## 2025-03-02 RX ADMIN — Medication 25 GRAM(S): at 13:12

## 2025-03-02 RX ADMIN — OXYCODONE HYDROCHLORIDE 10 MILLIGRAM(S): 30 TABLET ORAL at 02:05

## 2025-03-02 RX ADMIN — Medication 975 MILLIGRAM(S): at 23:43

## 2025-03-02 RX ADMIN — Medication 81 MILLIGRAM(S): at 10:34

## 2025-03-02 RX ADMIN — OXYCODONE HYDROCHLORIDE 10 MILLIGRAM(S): 30 TABLET ORAL at 14:57

## 2025-03-02 RX ADMIN — Medication 6 MILLIGRAM(S): at 22:06

## 2025-03-02 RX ADMIN — Medication 975 MILLIGRAM(S): at 00:54

## 2025-03-02 RX ADMIN — CYANOCOBALAMIN 1000 MICROGRAM(S): 1000 INJECTION INTRAMUSCULAR; SUBCUTANEOUS at 10:33

## 2025-03-02 RX ADMIN — Medication 0.5 MILLIGRAM(S): at 10:33

## 2025-03-02 RX ADMIN — Medication 975 MILLIGRAM(S): at 17:39

## 2025-03-02 RX ADMIN — Medication 5 MILLIGRAM(S): at 14:57

## 2025-03-02 RX ADMIN — OXYCODONE HYDROCHLORIDE 10 MILLIGRAM(S): 30 TABLET ORAL at 08:32

## 2025-03-02 RX ADMIN — LABETALOL HYDROCHLORIDE 10 MILLIGRAM(S): 200 TABLET, FILM COATED ORAL at 22:42

## 2025-03-02 RX ADMIN — ENOXAPARIN SODIUM 40 MILLIGRAM(S): 100 INJECTION SUBCUTANEOUS at 17:39

## 2025-03-02 NOTE — PROGRESS NOTE ADULT - SUBJECTIVE AND OBJECTIVE BOX
Chief Complaint: Diabetes Mellitus follow up    INTERVAL HX:  Patient seen at bedside with vascular surgery present, dressing changed to left BKA in progress.   Reports decreased appetite, variable eating patterns causing variable BGs levels. ranging from 92-204mg/dl.   Goal range 100-180mg/dl. No hypoglycemia.     Review of Systems:  General: As above  GI: No nausea, vomiting  Endocrine: no  S&Sx of hypoglycemia    Allergies    Advil (Rash)    Intolerances      MEDICATIONS  (STANDING):  acetaminophen     Tablet .. 975 milliGRAM(s) Oral every 6 hours  aspirin  chewable 81 milliGRAM(s) Oral daily  cyanocobalamin 1000 MICROGram(s) Oral daily  dextrose 5%. 1000 milliLiter(s) (50 mL/Hr) IV Continuous <Continuous>  dextrose 50% Injectable 25 Gram(s) IV Push once  dextrose 50% Injectable 12.5 Gram(s) IV Push once  enoxaparin Injectable 40 milliGRAM(s) SubCutaneous every 24 hours  gabapentin 100 milliGRAM(s) Oral daily  influenza  Vaccine (HIGH DOSE) 0.5 milliLiter(s) IntraMuscular once  insulin glargine Injectable (LANTUS) 14 Unit(s) SubCutaneous at bedtime  insulin lispro (ADMELOG) corrective regimen sliding scale   SubCutaneous three times a day before meals  insulin lispro (ADMELOG) corrective regimen sliding scale   SubCutaneous at bedtime  insulin lispro Injectable (ADMELOG) 12 Unit(s) SubCutaneous three times a day before meals  lactobacillus acidophilus 1 Tablet(s) Oral with dinner  losartan 50 milliGRAM(s) Oral daily  melatonin 6 milliGRAM(s) Oral at bedtime  pantoprazole  Injectable 40 milliGRAM(s) IV Push daily  piperacillin/tazobactam IVPB.. 3.375 Gram(s) IV Intermittent every 8 hours  polyethylene glycol 3350 17 Gram(s) Oral daily  senna 1 Tablet(s) Oral at bedtime        insulin glargine Injectable (LANTUS)   14 Unit(s) SubCutaneous (03-01-25 @ 22:03)        PHYSICAL EXAM:  VITALS: T(C): 36.9 (03-02-25 @ 09:30)  T(F): 98.5 (03-02-25 @ 09:30), Max: 99.6 (03-01-25 @ 13:33)  HR: 94 (03-02-25 @ 09:30) (94 - 100)  BP: 174/75 (03-02-25 @ 09:30) (148/79 - 177/75)  RR:  (16 - 18)  SpO2:  (93% - 98%)  Wt(kg): --  GENERAL: NAD  Respiratory: Respirations unlabored   Extremities: Warm, Left guillotine amputation  NEURO: Alert , appropriate     LABS:  POCT Blood Glucose.: 155 mg/dL (03-02-25 @ 10:30)  POCT Blood Glucose.: 204 mg/dL (03-01-25 @ 21:49)  POCT Blood Glucose.: 126 mg/dL (03-01-25 @ 17:30)  POCT Blood Glucose.: 137 mg/dL (03-01-25 @ 16:24)  POCT Blood Glucose.: 92 mg/dL (03-01-25 @ 09:43)  POCT Blood Glucose.: 158 mg/dL (02-28-25 @ 22:05)  POCT Blood Glucose.: 82 mg/dL (02-28-25 @ 21:18)  POCT Blood Glucose.: 129 mg/dL (02-28-25 @ 17:20)  POCT Blood Glucose.: 172 mg/dL (02-28-25 @ 12:49)  POCT Blood Glucose.: 163 mg/dL (02-28-25 @ 09:40)  POCT Blood Glucose.: 244 mg/dL (02-27-25 @ 21:24)  POCT Blood Glucose.: 201 mg/dL (02-27-25 @ 16:03)  POCT Blood Glucose.: 212 mg/dL (02-27-25 @ 14:48)  POCT Blood Glucose.: 199 mg/dL (02-27-25 @ 11:58)                          7.0    13.47 )-----------( 315      ( 02 Mar 2025 07:24 )             22.1     03-02    136  |  103  |  12  ----------------------------<  124[H]  3.8   |  21[L]  |  1.02    Ca    9.0      02 Mar 2025 07:22  Phos  3.5     03-02  Mg     2.2     03-02      eGFR: 78 mL/min/1.73m2 (02 Mar 2025 07:22)      Thyroid Function Tests:      A1C with Estimated Average Glucose Result: 7.7 % (01-28-25 @ 10:05)  A1C with Estimated Average Glucose Result: 7.4 % (12-06-24 @ 09:06)    Estimated Average Glucose: 174 mg/dL (01-28-25 @ 10:05)  Estimated Average Glucose: 166 mg/dL (12-06-24 @ 09:06)        Diet, Consistent Carbohydrate w/Evening Snack:   Supplement Feeding Modality:  Oral  Glucerna Shake Cans or Servings Per Day:  1       Frequency:  Daily (02-28-25 @ 09:51) [Active]

## 2025-03-02 NOTE — PROVIDER CONTACT NOTE (CRITICAL VALUE NOTIFICATION) - ASSESSMENT
Pt denies any pain or distress at this time.  No s/s of bleeding or bruising noted.  L BKA dressing remains c/d/i.

## 2025-03-02 NOTE — PROGRESS NOTE ADULT - ASSESSMENT
A 70-year-old male with a medical history of hypertension, hyperlipidemia, diabetes mellitus, peripheral artery disease, and aortoiliac occlusive disease, previously underwent outpatient angiography that resulted in a right iliac artery dissection. He is now status post for the creation of a bypass from the left femoral artery to the distal tibial artery using a reversed saphenous vein graft, with a completion angiogram completed on February 7, 2025. T    The Endocrine team has been consulted due to his uncontrolled diabetes. Endocrine re-consulted for hyperglycemia. Patient reports feeling okay, low appetite post-op L BKA done on 2/27/25.    Insulin doses were adjusted to prevent hypoglycemia since patient has variable/low oral intake. Endocrine will closely monitor BG and adjust insulin as needed for BG goal 100-180mg/dL inpatient.     #Type 2 diabetes mellitus   A1C with Estimated Average Glucose Result: 7.7 % (01-28-25 @ 10:05)  A1C with Estimated Average Glucose Result: 7.4 % (12-06-24 @ 09:06)  Home regimen: Jardiance 10mg once daily, Actos 30mg once daily, Jentadueto 2.5-2g BID

## 2025-03-02 NOTE — CHART NOTE - NSCHARTNOTEFT_GEN_A_CORE
NUTRITION FOLLOW UP NOTE    PATIENT SEEN FOR: assessment, education     SOURCE: [x] Patient  [x] Current Medical Record  [] RN  [x] Family/support person at bedside-daughter   [] Patient unavailable/inappropriate  [] Other:    CHART REVIEWED/EVENTS NOTED.  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    DIET ORDER:   Diet, Consistent Carbohydrate w/Evening Snack:   Supplement Feeding Modality:  Oral  Glucerna Shake Cans or Servings Per Day:  3       Frequency:  Daily (03-02-25)      CURRENT DIET ORDER IS:  [x] Appropriate:  [] Inadequate:  [] Other:    NUTRITION INTAKE/PROVISION:  [x] PO: pt reports general reduction in appetite over the last few days, denies any nausea, emesis. Daughter reports pt favoring mostly liquids such as soup and Glucerna shakes. Shakes increased from 1 daily to 3 daily. Offered to obtain food preferences but pt declining at this time, appearing fatigue. Daughter encouraged to assist with ordering meals as able to optimize preference to meal.  Daughter is agreeable to patient receiving high protein jello to help supplement PO intake. Pt dislikes yogurt.   [] Enteral Nutrition:  [] Parenteral Nutrition:    ANTHROPOMETRICS:  Drug Dosing Weight  Height (cm): 167.6 (27 Feb 2025 12:25)  Weight (kg): 70.3 (27 Feb 2025 12:25)  BMI (kg/m2): 25 (27 Feb 2025 12:25)  BSA (m2): 1.79 (27 Feb 2025 12:25)  no new weight to assess     MEDICATIONS:  MEDICATIONS  (STANDING):  acetaminophen     Tablet .. 975 milliGRAM(s) Oral every 6 hours  aspirin  chewable 81 milliGRAM(s) Oral daily  cyanocobalamin 1000 MICROGram(s) Oral daily  dextrose 5%. 1000 milliLiter(s) (50 mL/Hr) IV Continuous <Continuous>  dextrose 50% Injectable 25 Gram(s) IV Push once  dextrose 50% Injectable 12.5 Gram(s) IV Push once  enoxaparin Injectable 40 milliGRAM(s) SubCutaneous every 24 hours  gabapentin 100 milliGRAM(s) Oral daily  influenza  Vaccine (HIGH DOSE) 0.5 milliLiter(s) IntraMuscular once  insulin glargine Injectable (LANTUS) 14 Unit(s) SubCutaneous at bedtime  insulin lispro (ADMELOG) corrective regimen sliding scale   SubCutaneous three times a day before meals  insulin lispro (ADMELOG) corrective regimen sliding scale   SubCutaneous at bedtime  insulin lispro Injectable (ADMELOG) 12 Unit(s) SubCutaneous three times a day before meals  lactobacillus acidophilus 1 Tablet(s) Oral with dinner  melatonin 6 milliGRAM(s) Oral at bedtime  pantoprazole  Injectable 40 milliGRAM(s) IV Push daily  piperacillin/tazobactam IVPB.. 3.375 Gram(s) IV Intermittent every 8 hours  polyethylene glycol 3350 17 Gram(s) Oral daily  senna 1 Tablet(s) Oral at bedtime    MEDICATIONS  (PRN):  bisacodyl 5 milliGRAM(s) Oral daily PRN Constipation  dextrose Oral Gel 15 Gram(s) Oral once PRN Blood Glucose LESS THAN 70 milliGRAM(s)/deciliter  HYDROmorphone  Injectable 0.5 milliGRAM(s) IV Push every 6 hours PRN Breakthrough pain  oxyCODONE    IR 10 milliGRAM(s) Oral every 4 hours PRN Severe Pain (7 - 10)  oxyCODONE    IR 5 milliGRAM(s) Oral every 4 hours PRN Moderate Pain (4 - 6)      NUTRITIONALLY PERTINENT LABS:  03-02 Na136 mmol/L Glu 124 mg/dL[H] K+ 3.8 mmol/L Cr  1.02 mg/dL BUN 12 mg/dL 03-02 Phos 3.5 mg/dL    A1C with Estimated Average Glucose Result: 7.7 % (01-28-25 @ 10:05)  A1C with Estimated Average Glucose Result: 7.4 % (12-06-24 @ 09:06)      Finger Sticks:  POCT Blood Glucose.: 197 mg/dL (03-02 @ 12:16)  POCT Blood Glucose.: 155 mg/dL (03-02 @ 10:30)  POCT Blood Glucose.: 204 mg/dL (03-01 @ 21:49)  POCT Blood Glucose.: 126 mg/dL (03-01 @ 17:30)  POCT Blood Glucose.: 137 mg/dL (03-01 @ 16:24)      NUTRITIONALLY PERTINENT MEDICATIONS/LABS:  [x] Reviewed  [] Relevant notes on medications/labs:    EDEMA:  [x] Reviewed  [] Relevant notes:    GI/ I&O:  [x] Reviewed  [] Relevant notes:  [x] Other: last bowel movement 2/27    SKIN:   [x] No pressure injuries documented, per nursing flow-sheet  [] Pressure injury previously noted  [] Change in pressure injury documentation:  [x] Other: pt s/p L BKA on 2/27    ESTIMATED NEEDS:  [] No change:  [x] Updated: with consideration for BKA and wound healing   Energy:  6833-5175 kcal/day (28-32kcal/kg)  Protein: 84-98 g/day (1.2-1.4 g/kg)  Fluid:   ml/day or [x] defer to team  Based on: dosing weight 70.2Kg       NUTRITION DIAGNOSIS:  [x] Prior Dx:   Inadequate Protein Energy Intake  [x] New Dx: Increased Nutrient needs related to increased physiological demand for nutrients as evidenced by patient s/p BKA    EDUCATION:  [x] Yes: encourage intake of protein-rich foods.  [] Not appropriate/warranted    NUTRITION CARE PLAN:  1. Diet: Continue carbohydrate consistent diet   2. Supplements: Continue Glucerna TID, RD to add high protein jello 2x daily   3. Multivitamin/mineral supplementation: Recommend addition of multivitamin and ascorbic acid daily to promote wound healing if no contraindications.   4. RD to remain available and follow-up as medically appropriate.     MONITORING AND EVALUATION:   RD remains available upon request and will follow up per protocol.    Chula Garduno RD, CDN, CDCES, Available on Teams

## 2025-03-02 NOTE — PROGRESS NOTE ADULT - SUBJECTIVE AND OBJECTIVE BOX
DATE OF SERVICE: 03-02-25      Patient is a 70y old  Male who presents with a chief complaint of S/p L BKA (02 Mar 2025 11:29)      INTERVAL HISTORY: feels ok      PHYSICAL EXAM:  T(C): 36.8 (03-02-25 @ 17:20), Max: 37.3 (03-01-25 @ 22:10)  HR: 102 (03-02-25 @ 17:20) (94 - 102)  BP: 162/80 (03-02-25 @ 17:20) (148/79 - 181/84)  RR: 18 (03-02-25 @ 17:20) (16 - 18)  SpO2: 98% (03-02-25 @ 17:20) (96% - 98%)  Wt(kg): --  I&O's Summary    01 Mar 2025 07:01  -  02 Mar 2025 07:00  --------------------------------------------------------  IN: 660 mL / OUT: 1200 mL / NET: -540 mL    02 Mar 2025 07:01  -  02 Mar 2025 21:19  --------------------------------------------------------  IN: 360 mL / OUT: 250 mL / NET: 110 mL          Appearance: In no distress	  HEENT:    PERRL, EOMI	  Cardiovascular:  S1 S2, No JVD  Respiratory: Lungs clear to auscultation	  Gastrointestinal:  Soft, Non-tender, + BS	  Vascularature:  No edema of LE  Psychiatric: Appropriate affect   Neuro: no acute focal deficits                               9.0    12.28 )-----------( 309      ( 02 Mar 2025 14:05 )             27.9     03-02    136  |  103  |  12  ----------------------------<  124[H]  3.8   |  21[L]  |  1.02    Ca    9.0      02 Mar 2025 07:22  Phos  3.5     03-02  Mg     2.2     03-02          Labs personally reviewed      ASSESSMENT/PLAN: 	    69M PM of former smoker (1.5 PPD % 50 years; quit 2 years ago),  HTN, HLD, DM, PAD and aortoiliac occlusive disease, who previously underwent an outpatient angiography complicated by right iliac artery dissection. Now S/P Creation of bypass from left femoral artery to distal tibial artery with RSVG and completion angiogram on 2/7/25 s/p 2u pRBC 7.4 from 7.8l not responded to 2nd unit on 2/12/25. EGD completed 2/13 found to have bleeding gastric ulcer w/ hemostasis achieved with cautery. Hgb found to be 6.1 and s/p 2u pRBC. Patient now w/ ischemic changes to LLE.    1. Cardiac Risk Stratification   - No chest pain/SOB  - EKG Sinus tach 117 bpm no ischemic changes   - TTE reviewed with pericardial effusion, unchanged compared to 2023  - Not in decompensated HF   - No hx of tachy/timothy arrhythmias   - No valvular abnormalities  - Moderate risk for moderate risk vascular surgery  - Tolerated well.     2. Hypertension  - Increase losartan to 100mg as BP remains above target    3. Hyperlipidemia   - Lipitor 40mg   - lipid profile     4. Diabetes Mellitus Type II   - FBG per protocol  - ISS   - A1C: 7.7%     5. CAD - TTE with Basal and mid inferior wall and basal inferoseptal segment are abnormal  - Discussed with pt, spouse and daughter in person, they do not know full name or number of his cardiologist to obtain his prior records from  - They deny he has any h/o MI, CP or SOB  - Advised OP follow up with his cards       Dr Dawkins will be covered by Dr Mellisa Simmons between Monday March 3rd and Sunday March 9th. Dr Simmons is available on JOSE Dawkins DO [facility]  Cardiovascular Medicine  [address], [address]  Office: [phone]  Available via Text/call on [employer] Teams

## 2025-03-02 NOTE — PROGRESS NOTE ADULT - SUBJECTIVE AND OBJECTIVE BOX
SURGERY DAILY PROGRESS NOTE    24 Hour/Overnight Events: No acute events overnight    SUBJECTIVE: Patient seen and evaluated on AM rounds. Pt is resting comfortably in bed with no acute complaints. Tolerating diet. Denies fevers/chills, chest pain, dyspnea, abdominal pain, nausea, vomiting, and diarrhea    ------------------------------------------------------------------------------------------------------------  OBJECTIVE:  Vital Signs Last 24 Hrs  T(C): 36.9 (02 Mar 2025 09:30), Max: 37.6 (01 Mar 2025 13:33)  T(F): 98.5 (02 Mar 2025 09:30), Max: 99.6 (01 Mar 2025 13:33)  HR: 94 (02 Mar 2025 09:30) (94 - 100)  BP: 174/75 (02 Mar 2025 09:30) (148/79 - 177/75)  BP(mean): --  RR: 16 (02 Mar 2025 09:30) (16 - 18)  SpO2: 97% (02 Mar 2025 09:30) (93% - 98%)    Parameters below as of 02 Mar 2025 09:30  Patient On (Oxygen Delivery Method): room air      I&O's Detail    01 Mar 2025 07:01  -  02 Mar 2025 07:00  --------------------------------------------------------  IN:    IV PiggyBack: 100 mL    Oral Fluid: 560 mL  Total IN: 660 mL    OUT:    Voided (mL): 1200 mL  Total OUT: 1200 mL    Total NET: -540 mL          LABS:                        7.0    13.47 )-----------( 315      ( 02 Mar 2025 07:24 )             22.1     03-02    136  |  103  |  12  ----------------------------<  124[H]  3.8   |  21[L]  |  1.02    Ca    9.0      02 Mar 2025 07:22  Phos  3.5     03-02  Mg     2.2     03-02        PT/INR - ( 01 Mar 2025 07:02 )   PT: 12.4 sec;   INR: 1.09 ratio           Urinalysis Basic - ( 02 Mar 2025 07:22 )    Color: x / Appearance: x / SG: x / pH: x  Gluc: 124 mg/dL / Ketone: x  / Bili: x / Urobili: x   Blood: x / Protein: x / Nitrite: x   Leuk Esterase: x / RBC: x / WBC x   Sq Epi: x / Non Sq Epi: x / Bacteria: x    Physical Exam:  General: NAD, resting comfortably in bed  Pulmonary: Nonlabored breathing, no respiratory distress  Abdominal: soft, non distended, non tender  Extremities: Extremities: LLE tiffany amp in knee immobilzer, some strikethrough on dressing

## 2025-03-02 NOTE — PROGRESS NOTE ADULT - ASSESSMENT
69M PM of former smoker (1.5 PPD % 50 years; quit 2 years ago),  HTN, HLD, DM, PAD and aortoiliac occlusive disease, who previously underwent an outpatient angiography complicated by right iliac artery dissection. Now S/P Creation of bypass from left femoral artery to distal tibial artery with RSVG and completion angiogram on 2/7/25 s/p 2u pRBC 7.4 from 7.8l not responded to 2nd unit on 2/12/25. EGD completed 2/13 found to have bleeding gastric ulcer w/ hemostasis achieved with cautery. Hgb found to be 6.1 and s/p 2u pRBC. 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       Plan:   - Patient transfused 1U for Hgb of 7 this AM, fu tmr AM CBC   - dressing change  - pain control, IV dilaudid added for SBT pain  - CC diet   - Antibx: Empiric zosyn; vanc d/c 2/28  - Lovenox/Aspirin   - Diet: Regular   - on insulin appreciate endocrine recs  - ASA  - Pain regimen: Tylenol ATC, oxycodone PRN  - Bowel regimen  - PT recs -> YANELY    Vascular Surg  [id number]

## 2025-03-03 LAB
ANION GAP SERPL CALC-SCNC: 12 MMOL/L — SIGNIFICANT CHANGE UP (ref 5–17)
APTT BLD: 36.4 SEC — HIGH (ref 24.5–35.6)
BUN SERPL-MCNC: 14 MG/DL — SIGNIFICANT CHANGE UP (ref 7–23)
CALCIUM SERPL-MCNC: 9 MG/DL — SIGNIFICANT CHANGE UP (ref 8.4–10.5)
CHLORIDE SERPL-SCNC: 100 MMOL/L — SIGNIFICANT CHANGE UP (ref 96–108)
CO2 SERPL-SCNC: 23 MMOL/L — SIGNIFICANT CHANGE UP (ref 22–31)
CREAT SERPL-MCNC: 0.89 MG/DL — SIGNIFICANT CHANGE UP (ref 0.5–1.3)
EGFR: 91 ML/MIN/1.73M2 — SIGNIFICANT CHANGE UP
EGFR: 91 ML/MIN/1.73M2 — SIGNIFICANT CHANGE UP
GLUCOSE BLDC GLUCOMTR-MCNC: 132 MG/DL — HIGH (ref 70–99)
GLUCOSE BLDC GLUCOMTR-MCNC: 154 MG/DL — HIGH (ref 70–99)
GLUCOSE BLDC GLUCOMTR-MCNC: 168 MG/DL — HIGH (ref 70–99)
GLUCOSE BLDC GLUCOMTR-MCNC: 263 MG/DL — HIGH (ref 70–99)
GLUCOSE SERPL-MCNC: 162 MG/DL — HIGH (ref 70–99)
HCT VFR BLD CALC: 26.7 % — LOW (ref 39–50)
HGB BLD-MCNC: 8.5 G/DL — LOW (ref 13–17)
INR BLD: 1.09 RATIO — SIGNIFICANT CHANGE UP (ref 0.85–1.16)
MAGNESIUM SERPL-MCNC: 2.2 MG/DL — SIGNIFICANT CHANGE UP (ref 1.6–2.6)
MCHC RBC-ENTMCNC: 28.5 PG — SIGNIFICANT CHANGE UP (ref 27–34)
MCHC RBC-ENTMCNC: 31.8 G/DL — LOW (ref 32–36)
MCV RBC AUTO: 89.6 FL — SIGNIFICANT CHANGE UP (ref 80–100)
NRBC BLD AUTO-RTO: 0 /100 WBCS — SIGNIFICANT CHANGE UP (ref 0–0)
OB PNL STL: POSITIVE
PHOSPHATE SERPL-MCNC: 3.4 MG/DL — SIGNIFICANT CHANGE UP (ref 2.5–4.5)
PLATELET # BLD AUTO: 326 K/UL — SIGNIFICANT CHANGE UP (ref 150–400)
POTASSIUM SERPL-MCNC: 3.9 MMOL/L — SIGNIFICANT CHANGE UP (ref 3.5–5.3)
POTASSIUM SERPL-SCNC: 3.9 MMOL/L — SIGNIFICANT CHANGE UP (ref 3.5–5.3)
PROTHROM AB SERPL-ACNC: 12.5 SEC — SIGNIFICANT CHANGE UP (ref 9.9–13.4)
RBC # BLD: 2.98 M/UL — LOW (ref 4.2–5.8)
RBC # FLD: 16.5 % — HIGH (ref 10.3–14.5)
SODIUM SERPL-SCNC: 135 MMOL/L — SIGNIFICANT CHANGE UP (ref 135–145)
WBC # BLD: 11.21 K/UL — HIGH (ref 3.8–10.5)
WBC # FLD AUTO: 11.21 K/UL — HIGH (ref 3.8–10.5)

## 2025-03-03 PROCEDURE — 99232 SBSQ HOSP IP/OBS MODERATE 35: CPT

## 2025-03-03 RX ORDER — INSULIN LISPRO 100 U/ML
6 INJECTION, SOLUTION INTRAVENOUS; SUBCUTANEOUS
Refills: 0 | Status: DISCONTINUED | OUTPATIENT
Start: 2025-03-03 | End: 2025-03-05

## 2025-03-03 RX ORDER — NIFEDIPINE 30 MG
30 TABLET, EXTENDED RELEASE 24 HR ORAL DAILY
Refills: 0 | Status: DISCONTINUED | OUTPATIENT
Start: 2025-03-03 | End: 2025-03-04

## 2025-03-03 RX ORDER — NIFEDIPINE 30 MG
30 TABLET, EXTENDED RELEASE 24 HR ORAL ONCE
Refills: 0 | Status: COMPLETED | OUTPATIENT
Start: 2025-03-03 | End: 2025-03-03

## 2025-03-03 RX ADMIN — Medication 0.5 MILLIGRAM(S): at 05:05

## 2025-03-03 RX ADMIN — OXYCODONE HYDROCHLORIDE 10 MILLIGRAM(S): 30 TABLET ORAL at 01:57

## 2025-03-03 RX ADMIN — Medication 0.5 MILLIGRAM(S): at 06:05

## 2025-03-03 RX ADMIN — CYANOCOBALAMIN 1000 MICROGRAM(S): 1000 INJECTION INTRAMUSCULAR; SUBCUTANEOUS at 11:30

## 2025-03-03 RX ADMIN — Medication 1 TABLET(S): at 18:16

## 2025-03-03 RX ADMIN — INSULIN LISPRO 2: 100 INJECTION, SOLUTION INTRAVENOUS; SUBCUTANEOUS at 10:04

## 2025-03-03 RX ADMIN — Medication 0.5 MILLIGRAM(S): at 23:31

## 2025-03-03 RX ADMIN — Medication 0.5 MILLIGRAM(S): at 09:52

## 2025-03-03 RX ADMIN — Medication 975 MILLIGRAM(S): at 06:05

## 2025-03-03 RX ADMIN — Medication 6 MILLIGRAM(S): at 21:43

## 2025-03-03 RX ADMIN — Medication 0.5 MILLIGRAM(S): at 08:52

## 2025-03-03 RX ADMIN — POLYETHYLENE GLYCOL 3350 17 GRAM(S): 17 POWDER, FOR SOLUTION ORAL at 11:22

## 2025-03-03 RX ADMIN — Medication 0.5 MILLIGRAM(S): at 23:01

## 2025-03-03 RX ADMIN — OXYCODONE HYDROCHLORIDE 10 MILLIGRAM(S): 30 TABLET ORAL at 12:22

## 2025-03-03 RX ADMIN — OXYCODONE HYDROCHLORIDE 10 MILLIGRAM(S): 30 TABLET ORAL at 11:21

## 2025-03-03 RX ADMIN — Medication 25 MILLIGRAM(S): at 16:31

## 2025-03-03 RX ADMIN — GABAPENTIN 100 MILLIGRAM(S): 400 CAPSULE ORAL at 11:31

## 2025-03-03 RX ADMIN — Medication 975 MILLIGRAM(S): at 05:06

## 2025-03-03 RX ADMIN — Medication 25 GRAM(S): at 05:10

## 2025-03-03 RX ADMIN — Medication 40 MILLIGRAM(S): at 18:19

## 2025-03-03 RX ADMIN — INSULIN LISPRO 12 UNIT(S): 100 INJECTION, SOLUTION INTRAVENOUS; SUBCUTANEOUS at 14:49

## 2025-03-03 RX ADMIN — OXYCODONE HYDROCHLORIDE 10 MILLIGRAM(S): 30 TABLET ORAL at 15:30

## 2025-03-03 RX ADMIN — INSULIN LISPRO 6: 100 INJECTION, SOLUTION INTRAVENOUS; SUBCUTANEOUS at 14:49

## 2025-03-03 RX ADMIN — Medication 975 MILLIGRAM(S): at 11:22

## 2025-03-03 RX ADMIN — Medication 975 MILLIGRAM(S): at 18:17

## 2025-03-03 RX ADMIN — OXYCODONE HYDROCHLORIDE 10 MILLIGRAM(S): 30 TABLET ORAL at 21:41

## 2025-03-03 RX ADMIN — OXYCODONE HYDROCHLORIDE 10 MILLIGRAM(S): 30 TABLET ORAL at 16:30

## 2025-03-03 RX ADMIN — Medication 0.5 MILLIGRAM(S): at 16:47

## 2025-03-03 RX ADMIN — Medication 40 MILLIGRAM(S): at 11:22

## 2025-03-03 RX ADMIN — ENOXAPARIN SODIUM 40 MILLIGRAM(S): 100 INJECTION SUBCUTANEOUS at 18:17

## 2025-03-03 RX ADMIN — Medication 975 MILLIGRAM(S): at 12:22

## 2025-03-03 RX ADMIN — Medication 25 GRAM(S): at 21:42

## 2025-03-03 RX ADMIN — LOSARTAN POTASSIUM 100 MILLIGRAM(S): 100 TABLET, FILM COATED ORAL at 05:07

## 2025-03-03 RX ADMIN — INSULIN GLARGINE-YFGN 14 UNIT(S): 100 INJECTION, SOLUTION SUBCUTANEOUS at 22:17

## 2025-03-03 RX ADMIN — Medication 975 MILLIGRAM(S): at 19:17

## 2025-03-03 RX ADMIN — Medication 1 TABLET(S): at 21:42

## 2025-03-03 RX ADMIN — Medication 0.5 MILLIGRAM(S): at 17:47

## 2025-03-03 RX ADMIN — Medication 975 MILLIGRAM(S): at 00:43

## 2025-03-03 RX ADMIN — Medication 30 MILLIGRAM(S): at 08:31

## 2025-03-03 RX ADMIN — Medication 25 GRAM(S): at 14:04

## 2025-03-03 RX ADMIN — OXYCODONE HYDROCHLORIDE 10 MILLIGRAM(S): 30 TABLET ORAL at 22:41

## 2025-03-03 RX ADMIN — OXYCODONE HYDROCHLORIDE 10 MILLIGRAM(S): 30 TABLET ORAL at 01:27

## 2025-03-03 RX ADMIN — Medication 81 MILLIGRAM(S): at 11:30

## 2025-03-03 NOTE — PROGRESS NOTE ADULT - NS ATTEND AMEND GEN_ALL_CORE FT
Agree with above. Patient with no signs of bleeding. Has known gastric ulcer on recent EGD, which is likely cause of occult blood. Note that stool occult blood is NOT indicated for evaluation of acute GI bleeding and is used for colon cancer screening. Patient should continue PPI, and repeat EGD as outpatient in 6-8 weeks to ensure healing of ulcer, and to consider colonoscopy for screening. No repeat endoscopic evaluation planned at this time. Agree with above. Patient with no signs of bleeding. Has known gastric ulcer on recent EGD, which is likely cause of occult blood. Note that stool occult blood is NOT indicated for evaluation of acute GI bleeding and is used for colon cancer screening. Patient should continue PPI, and repeat EGD as outpatient in 6-8 weeks to ensure healing of ulcer, and to consider colonoscopy for screening. No repeat endoscopic evaluation planned at this time. D/w patient's daughter over the phone.

## 2025-03-03 NOTE — PROGRESS NOTE ADULT - ASSESSMENT
69M PM of former smoker (1.5 PPD % 50 years; quit 2 years ago),  HTN, HLD, DM, PAD and aortoiliac occlusive disease, who previously underwent an outpatient angiography complicated by right iliac artery dissection. Now S/P Creation of bypass from left femoral artery to distal tibial artery with RSVG and completion angiogram on 2/7/25 s/p 2u pRBC 7.4 from 7.8l not responded to 2nd unit on 2/12/25. EGD completed 2/13 found to have bleeding gastric ulcer w/ hemostasis achieved with cautery. Hgb found to be 6.1 and s/p 2u pRBC. Patient now w/ ischemic changes to LLE.    1. Cardiac Risk Stratification   - No chest pain/SOB  - EKG Sinus tach 117 bpm no ischemic changes   - TTE reviewed with pericardial effusion, unchanged compared to 2023  - Not in decompensated HF   - No hx of tachy/timothy arrhythmias   - No valvular abnormalities  - Moderate risk for moderate risk vascular surgery  - Tolerated well.     2. Hypertension  - cont losartan 100 mg QD   - increase procardial to 60 mg QD   - add Coreg 6.25 mg BID   - cont hydralazine 25 mg BID, can give an extra dose for SBP>170     3. Hyperlipidemia   - Lipitor 40mg   - lipid profile     4. Diabetes Mellitus Type II   - FBG per protocol  - ISS   - A1C: 7.7%     5. CAD - TTE with Basal and mid inferior wall and basal inferoseptal segment are abnormal  - Discussed with pt, spouse and daughter in person, they do not know full name or number of his cardiologist to obtain his prior records from  - They deny he has any h/o MI, CP or SOB  - Advised OP follow up with his cards     Mellisa Simmons MD FAC  Attending Interventional Cardiologist, [facility]/SATH[facility].   Avaliable on [employer] Team

## 2025-03-03 NOTE — PROGRESS NOTE ADULT - ASSESSMENT
69M PM of former smoker (1.5 PPD % 50 years; quit 2 years ago),  HTN, HLD, DM, PAD and aortoiliac occlusive disease, who previously underwent an outpatient angiography complicated by right iliac artery dissection. Now S/P Creation of bypass from left femoral artery to distal tibial artery with RSVG and completion angiogram on 2/7/25 s/p 2u pRBC 7.4 from 7.8l not responded to 2nd unit on 2/12/25. EGD completed 2/13 found to have bleeding gastric ulcer w/ hemostasis achieved with cautery. Hgb found to be 6.1 and s/p 2u pRBC. 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       Plan:   - s/p 1u pRBC w/ appropriate response  - +FOBT -> f/u GI  - Uncontrolled HTN requiring adjustments to BP regimen; s/p 5mg and 10mg IV push Labetalol overnight  - dressing change  - pain control, IV dilaudid added for SBT pain  - CC diet   - Antibx: Empiric zosyn; vanc d/c 2/28  - Lovenox/Aspirin   - Diet: Regular   - on insulin appreciate endocrine recs  - ASA  - Pain regimen: Tylenol ATC, oxycodone PRN  - Bowel regimen  - PT recs -> YANELY    Vascular Surg  [id number]

## 2025-03-03 NOTE — PROGRESS NOTE ADULT - SUBJECTIVE AND OBJECTIVE BOX
Chief Complaint: Diabetes Mellitus follow up    INTERVAL HX:  Patient seen at bedside. patient is not eating much, usually less than 50% of the tray but does drink glucerna drinks which have 27g CHO.    BG over the last 24 hrs have been [id number]/dl, due to variable intake.  goal range 100-180mg/dl. No hypoglycemia.     Review of Systems:  General: As above  GI: No nausea, vomiting  Endocrine: no  S&Sx of hypoglycemia    Allergies    Advil (Rash)    Intolerances      MEDICATIONS  (STANDING):  acetaminophen     Tablet .. 975 milliGRAM(s) Oral every 6 hours  aspirin  chewable 81 milliGRAM(s) Oral daily  atenolol  Tablet 25 milliGRAM(s) Oral daily  cyanocobalamin 1000 MICROGram(s) Oral daily  dextrose 5%. 1000 milliLiter(s) (50 mL/Hr) IV Continuous <Continuous>  dextrose 50% Injectable 25 Gram(s) IV Push once  dextrose 50% Injectable 12.5 Gram(s) IV Push once  enoxaparin Injectable 40 milliGRAM(s) SubCutaneous every 24 hours  gabapentin 100 milliGRAM(s) Oral daily  hydrALAZINE 25 milliGRAM(s) Oral every 12 hours  influenza  Vaccine (HIGH DOSE) 0.5 milliLiter(s) IntraMuscular once  insulin glargine Injectable (LANTUS) 14 Unit(s) SubCutaneous at bedtime  insulin lispro (ADMELOG) corrective regimen sliding scale   SubCutaneous three times a day before meals  insulin lispro (ADMELOG) corrective regimen sliding scale   SubCutaneous at bedtime  insulin lispro Injectable (ADMELOG) 12 Unit(s) SubCutaneous three times a day before meals  lactobacillus acidophilus 1 Tablet(s) Oral with dinner  losartan 100 milliGRAM(s) Oral daily  melatonin 6 milliGRAM(s) Oral at bedtime  NIFEdipine XL 30 milliGRAM(s) Oral daily  pantoprazole    Tablet 40 milliGRAM(s) Oral two times a day  piperacillin/tazobactam IVPB.. 3.375 Gram(s) IV Intermittent every 8 hours  polyethylene glycol 3350 17 Gram(s) Oral daily  senna 1 Tablet(s) Oral at bedtime        insulin glargine Injectable (LANTUS)   14 Unit(s) SubCutaneous (03-02-25 @ 22:06)    insulin lispro (ADMELOG) corrective regimen sliding scale   6 Unit(s) SubCutaneous (03-03-25 @ 14:49)   2 Unit(s) SubCutaneous (03-03-25 @ 10:04)   4 Unit(s) SubCutaneous (03-02-25 @ 17:40)    insulin lispro (ADMELOG) corrective regimen sliding scale   2 Unit(s) SubCutaneous (03-02-25 @ 22:08)    insulin lispro Injectable (ADMELOG)   12 Unit(s) SubCutaneous (03-03-25 @ 14:49)        PHYSICAL EXAM:  VITALS: T(C): 36.7 (03-03-25 @ 15:38)  T(F): 98 (03-03-25 @ 15:38), Max: 98.8 (03-03-25 @ 13:28)  HR: 88 (03-03-25 @ 15:38) (73 - 102)  BP: 168/68 (03-03-25 @ 15:38) (153/72 - 194/76)  RR:  (18 - 18)  SpO2:  (95% - 98%)  Wt(kg): --  GENERAL: NAD  Respiratory: Respirations unlabored   Extremities: Warm, no edema  NEURO: Alert , appropriate     LABS:  POCT Blood Glucose.: 263 mg/dL (03-03-25 @ 13:55)  POCT Blood Glucose.: 154 mg/dL (03-03-25 @ 10:00)  POCT Blood Glucose.: 281 mg/dL (03-02-25 @ 21:15)  POCT Blood Glucose.: 207 mg/dL (03-02-25 @ 17:29)  POCT Blood Glucose.: 197 mg/dL (03-02-25 @ 12:16)  POCT Blood Glucose.: 155 mg/dL (03-02-25 @ 10:30)  POCT Blood Glucose.: 204 mg/dL (03-01-25 @ 21:49)  POCT Blood Glucose.: 126 mg/dL (03-01-25 @ 17:30)  POCT Blood Glucose.: 137 mg/dL (03-01-25 @ 16:24)  POCT Blood Glucose.: 92 mg/dL (03-01-25 @ 09:43)  POCT Blood Glucose.: 158 mg/dL (02-28-25 @ 22:05)  POCT Blood Glucose.: 82 mg/dL (02-28-25 @ 21:18)  POCT Blood Glucose.: 129 mg/dL (02-28-25 @ 17:20)                          8.5    11.21 )-----------( 326      ( 03 Mar 2025 05:30 )             26.7     03-03    135  |  100  |  14  ----------------------------<  162[H]  3.9   |  23  |  0.89    Ca    9.0      03 Mar 2025 05:30  Phos  3.4     03-03  Mg     2.2     03-03      eGFR: 91 mL/min/1.73m2 (03 Mar 2025 05:30)      Thyroid Function Tests:      A1C with Estimated Average Glucose Result: 7.7 % (01-28-25 @ 10:05)  A1C with Estimated Average Glucose Result: 7.4 % (12-06-24 @ 09:06)    Estimated Average Glucose: 174 mg/dL (01-28-25 @ 10:05)  Estimated Average Glucose: 166 mg/dL (12-06-24 @ 09:06)        Diet, Consistent Carbohydrate w/Evening Snack:   Supplement Feeding Modality:  Oral  Glucerna Shake Cans or Servings Per Day:  3       Frequency:  Daily (03-02-25 @ 12:58) [Active]

## 2025-03-03 NOTE — PROGRESS NOTE ADULT - ASSESSMENT
69M M of former smoker (1.5 PPD % 50 years; quit 2 years ago),  HTN, HLD, DM, PAD and aortoiliac occlusive disease, who previously underwent an outpatient angiography complicated by right iliac artery dissection. Now s/p creation of bypass from left femoral artery to distal tibial artery with RSVG and completion angiogram on 2/7/25. GI is consulted for acute on chronic anemia. He follows with a gastroenterologist in [place], [place], for this. He underwent colonoscopy/endoscopy and video capsule endoscopy in the outpatient setting during summer of last year for chronic unexplained iron deficiency anemia, and reports that there was no bleeding found. He reports that he has had a gastric ulcer in the past, but it was not bleeding on this most recent outpt endoscopic evaluation.    #acute on chronic anemia  2/2   2/13/25 EGD : Oral Class Ib on edges in antrum, 10m, treated with coagulation with bipolar probe  3/3/25 GI re-called for FOBT+ dark stool 3/2  #constipation - improved  #PAD s/p BKA LLE 2/27/25    RECS:  -appropriate Hgb response w/ PRBCs 3/2  -pt noted to have significant constipation and only 2 documented BMs since EGD; dark stool could be 2/2 passage of "old"/residual blood from GI tract. Currently with brown stool on EDDY  -continue PPI, can increase to BID dosing x 4 weeks then downgrade to daily dosing  -no plans for repeat EGD at this time. Will need repeat EGD in 2 months to assess healing (outpt)  -monitor BMs  -bowel regimen  -keep active type and screen; transfuse PRN goal Hgb >7  -NO GI objection to anti-platelet rx i/s/o severe PAD    Loco Graves PA-C  [facility]  After hours and weekend coverage [phone]     69M M of former smoker (1.5 PPD % 50 years; quit 2 years ago),  HTN, HLD, DM, PAD and aortoiliac occlusive disease, who previously underwent an outpatient angiography complicated by right iliac artery dissection. Now s/p creation of bypass from left femoral artery to distal tibial artery with RSVG and completion angiogram on 2/7/25. GI is consulted for acute on chronic anemia. He follows with a gastroenterologist in [place], [place], for this. He underwent colonoscopy/endoscopy and video capsule endoscopy in the outpatient setting during summer of last year for chronic unexplained iron deficiency anemia, and reports that there was no bleeding found. He reports that he has had a gastric ulcer in the past, but it was not bleeding on this most recent outpt endoscopic evaluation.    #acute on chronic anemia  2/2   2/13/25 EGD : Oral Class Ib on edges in antrum, 10m, treated with coagulation with bipolar probe  3/3/25 GI re-called for FOBT+ dark stool 3/2  #constipation - improved  #PAD s/p BKA LLE 2/27/25    RECS:  -appropriate Hgb response w/ PRBCs 3/2  -pt noted to have significant constipation and only 2 documented BMs since EGD; dark stool could be 2/2 passage of "old"/residual blood from GI tract. Currently with brown stool on EDDY  -continue PPI, increase to BID dosing x 4 weeks then downgrade to daily dosing  -no plans for repeat EGD at this time. Will need repeat EGD in 2 months to assess healing (outpt)  -monitor BMs  -bowel regimen  -keep active type and screen; transfuse PRN goal Hgb >7  -NO GI objection to anti-platelet rx i/s/o severe PAD    d/w pt and family (daughter called per family request at bedside); all questions answered    Please e-mail [email or web address] if there are any additional questions or concerns, during weekdays from 8 am to 5 pm.   Please call answering service for on-call GI fellow ([phone]) after 5pm and before 8am, and on weekends.      Loco Graves PA-C  [facility] GI [facility]  After hours and weekend coverage [phone]

## 2025-03-03 NOTE — PROGRESS NOTE ADULT - SUBJECTIVE AND OBJECTIVE BOX
Vascular Surgery Daily Progress Note    Last 24 hours events: hgb 7.0 - s/p 1u pRBC -> appropriate response to 9.0  - melanotic appearing BM, FOBT obtained and positive    Subjective: Patient examined at bedside this morning. Denies fevers, chills, nausea, vomiting, or motor/sensory changes to _      piperacillin/tazobactam IVPB.. 3.375  aspirin  chewable 81  atenolol  Tablet 25  enoxaparin Injectable 40  losartan 100  piperacillin/tazobactam IVPB.. 3.375        Vital Signs Last 24 Hrs  T(C): 36.9 (03 Mar 2025 05:04), Max: 37.1 (02 Mar 2025 11:50)  T(F): 98.4 (03 Mar 2025 05:04), Max: 98.8 (02 Mar 2025 11:50)  HR: 75 (03 Mar 2025 05:04) (75 - 102)  BP: 180/78 (03 Mar 2025 05:04) (153/72 - 194/76)  BP(mean): --  RR: 18 (03 Mar 2025 05:04) (16 - 18)  SpO2: 98% (03 Mar 2025 05:04) (95% - 98%)    Parameters below as of 03 Mar 2025 01:07  Patient On (Oxygen Delivery Method): room air      I&O's Summary    01 Mar 2025 07:01  -  02 Mar 2025 07:00  --------------------------------------------------------  IN: 660 mL / OUT: 1200 mL / NET: -540 mL    02 Mar 2025 07:01  -  03 Mar 2025 06:12  --------------------------------------------------------  IN: 860 mL / OUT: 1020 mL / NET: -160 mL        Physical Exam:  General: NAD, resting comfortably in bed  Pulmonary: Nonlabored breathing, no respiratory distress  Abdominal: soft, non distended, non tender  Extremities:   Vascular:      LABS:                        9.0    12.28 )-----------( 309      ( 02 Mar 2025 14:05 )             27.9     03-02    136  |  103  |  12  ----------------------------<  124[H]  3.8   |  21[L]  |  1.02    Ca    9.0      02 Mar 2025 07:22  Phos  3.5     03-02  Mg     2.2     03-02      PT/INR - ( 01 Mar 2025 07:02 )   PT: 12.4 sec;   INR: 1.09 ratio            Vascular Surgery Daily Progress Note    Last 24 hours events:   - uncontrolled htn, regimen adjusted by cardiology tho remains elevated overnight  - hgb 7.0 ->  s/p 1u pRBC -> appropriate response to 9.0  - melanotic appearing BM, FOBT obtained and positive    Subjective: Patient examined at bedside this morning. Denies fevers, chills, nausea, vomiting, or motor/sensory changes to LLE      piperacillin/tazobactam IVPB.. 3.375  aspirin  chewable 81  atenolol  Tablet 25  enoxaparin Injectable 40  losartan 100  piperacillin/tazobactam IVPB.. 3.375        Vital Signs Last 24 Hrs  T(C): 36.9 (03 Mar 2025 05:04), Max: 37.1 (02 Mar 2025 11:50)  T(F): 98.4 (03 Mar 2025 05:04), Max: 98.8 (02 Mar 2025 11:50)  HR: 75 (03 Mar 2025 05:04) (75 - 102)  BP: 180/78 (03 Mar 2025 05:04) (153/72 - 194/76)  BP(mean): --  RR: 18 (03 Mar 2025 05:04) (16 - 18)  SpO2: 98% (03 Mar 2025 05:04) (95% - 98%)    Parameters below as of 03 Mar 2025 01:07  Patient On (Oxygen Delivery Method): room air      I&O's Summary    01 Mar 2025 07:01  -  02 Mar 2025 07:00  --------------------------------------------------------  IN: 660 mL / OUT: 1200 mL / NET: -540 mL    02 Mar 2025 07:01  -  03 Mar 2025 06:12  --------------------------------------------------------  IN: 860 mL / OUT: 1020 mL / NET: -160 mL        Physical Exam:  General: NAD, resting comfortably in bed  Pulmonary: Nonlabored breathing, no respiratory distress  Abdominal: soft, non distended, non tender  Extremities: LLE guillotine amp in knee immobilzer, some strikethrough on dressing       LABS:                        9.0    12.28 )-----------( 309      ( 02 Mar 2025 14:05 )             27.9     03-02    136  |  103  |  12  ----------------------------<  124[H]  3.8   |  21[L]  |  1.02    Ca    9.0      02 Mar 2025 07:22  Phos  3.5     03-02  Mg     2.2     03-02      PT/INR - ( 01 Mar 2025 07:02 )   PT: 12.4 sec;   INR: 1.09 ratio

## 2025-03-03 NOTE — PROGRESS NOTE ADULT - SUBJECTIVE AND OBJECTIVE BOX
[facility] Attending Follow-up Note     Covering for Dr. Dawkins     Patient seen and examined at bedside.    Overnight Events:     no complaints     REVIEW OF SYSTEMS:  Constitutional:     [x ] negative [ ] fevers [ ] chills [ ] weight loss [ ] weight gain  HEENT:                  [x ] negative [ ] dry eyes [ ] eye irritation [ ] postnasal drip [ ] nasal congestion  CV:                         [ x] negative  [ ] chest pain [ ] orthopnea [ ] palpitations [ ] murmur  Resp:                     [ x] negative [ ] cough [ ] shortness of breath [ ] dyspnea [ ] wheezing [ ] sputum [ ]hemoptysis  GI:                          [ x] negative [ ] nausea [ ] vomiting [ ] diarrhea [ ] constipation [ ] abd pain [ ] dysphagia   :                        [ x] negative [ ] dysuria [ ] nocturia [ ] hematuria [ ] increased urinary frequency  Musculoskeletal: [ x] negative [ ] back pain [ ] myalgias [ ] arthralgias [ ] fracture  Skin:                       [ x] negative [ ] rash [ ] itch  Neurological:        [x ] negative [ ] headache [ ] dizziness [ ] syncope [ ] weakness [ ] numbness  Psychiatric:           [ x] negative [ ] anxiety [ ] depression  Endocrine:            [ x] negative [ ] diabetes [ ] thyroid problem  Heme/Lymph:      [ x] negative [ ] anemia [ ] bleeding problem  Allergic/Immune: [ x] negative [ ] itchy eyes [ ] nasal discharge [ ] hives [ ] angioedema    [ x] All other systems negative  [ ] Unable to assess ROS due to    Current Meds:  acetaminophen     Tablet .. 975 milliGRAM(s) Oral every 6 hours  aspirin  chewable 81 milliGRAM(s) Oral daily  atenolol  Tablet 25 milliGRAM(s) Oral daily  bisacodyl 5 milliGRAM(s) Oral daily PRN  cyanocobalamin 1000 MICROGram(s) Oral daily  dextrose 5%. 1000 milliLiter(s) IV Continuous <Continuous>  dextrose 50% Injectable 25 Gram(s) IV Push once  dextrose 50% Injectable 12.5 Gram(s) IV Push once  dextrose Oral Gel 15 Gram(s) Oral once PRN  enoxaparin Injectable 40 milliGRAM(s) SubCutaneous every 24 hours  gabapentin 100 milliGRAM(s) Oral daily  hydrALAZINE 25 milliGRAM(s) Oral every 12 hours  HYDROmorphone  Injectable 0.5 milliGRAM(s) IV Push every 6 hours PRN  influenza  Vaccine (HIGH DOSE) 0.5 milliLiter(s) IntraMuscular once  insulin glargine Injectable (LANTUS) 14 Unit(s) SubCutaneous at bedtime  insulin lispro (ADMELOG) corrective regimen sliding scale   SubCutaneous three times a day before meals  insulin lispro (ADMELOG) corrective regimen sliding scale   SubCutaneous at bedtime  insulin lispro Injectable (ADMELOG) 12 Unit(s) SubCutaneous three times a day before meals  lactobacillus acidophilus 1 Tablet(s) Oral with dinner  losartan 100 milliGRAM(s) Oral daily  melatonin 6 milliGRAM(s) Oral at bedtime  NIFEdipine XL 30 milliGRAM(s) Oral daily  oxyCODONE    IR 5 milliGRAM(s) Oral every 4 hours PRN  oxyCODONE    IR 10 milliGRAM(s) Oral every 4 hours PRN  pantoprazole    Tablet 40 milliGRAM(s) Oral two times a day  piperacillin/tazobactam IVPB.. 3.375 Gram(s) IV Intermittent every 8 hours  polyethylene glycol 3350 17 Gram(s) Oral daily  senna 1 Tablet(s) Oral at bedtime      PAST MEDICAL & SURGICAL HISTORY:  HTN (hypertension)      HLD (hyperlipidemia)      DM (diabetes mellitus)      PAD (peripheral artery disease)      H/O iron deficiency      [place] (hard of hearing)      Peripheral neuropathy      Peripheral vascular disease, unspecified      Former smoker      CVA (cerebrovascular accident)      S/P appendectomy      S/P peripheral artery angioplasty with stent placement      S/P angiography      S/P cataract surgery          Vitals:  T(F): 98 (03-03), Max: 98.8 (03-03)  HR: 88 (03-03) (73 - 102)  BP: 168/68 (03-03) (153/72 - 194/76)  RR: 18 (03-03)  SpO2: 98% (03-03)  I&O's Summary    02 Mar 2025 07:01  -  03 Mar 2025 07:00  --------------------------------------------------------  IN: 860 mL / OUT: 1020 mL / NET: -160 mL    03 Mar 2025 07:01  -  03 Mar 2025 16:07  --------------------------------------------------------  IN: 240 mL / OUT: 700 mL / NET: -460 mL        Physical Exam:  Appearance: No acute distress  HENT: No JVD   Cardiovascular: RRR, S1/S2, no murmurs  Respiratory: CTABL  Gastrointestinal: soft, NT ND, +BS  Musculoskeletal: No clubbing, no edema, L BKA   Neurologic: Non-focal  Skin: No rashes, ecchymoses, or cyanosis                          8.5    11.21 )-----------( 326      ( 03 Mar 2025 05:30 )             26.7     03-03    135  |  100  |  14  ----------------------------<  162[H]  3.9   |  23  |  0.89    Ca    9.0      03 Mar 2025 05:30  Phos  3.4     03-03  Mg     2.2     03-03      PT/INR - ( 03 Mar 2025 05:30 )   PT: 12.5 sec;   INR: 1.09 ratio         PTT - ( 03 Mar 2025 05:30 )  PTT:36.4 sec              Cardiovascular Testings:

## 2025-03-03 NOTE — PROGRESS NOTE ADULT - SUBJECTIVE AND OBJECTIVE BOX
INTERVAL HPI/OVERNIGHT EVENTS:  GI recalled 2/2 FOBT+ stool    Pt reports constipation with >5 days between BMs  Chart review notes 2 BMs since EGD 2/13  now s/p VERONICAALYCE Kirk ROLAND 2/27/25; dressing noted to be saturated post-op (overnight 2/28/25)    MEDICATIONS  (STANDING):  acetaminophen     Tablet .. 975 milliGRAM(s) Oral every 6 hours  aspirin  chewable 81 milliGRAM(s) Oral daily  atenolol  Tablet 25 milliGRAM(s) Oral daily  cyanocobalamin 1000 MICROGram(s) Oral daily  dextrose 5%. 1000 milliLiter(s) (50 mL/Hr) IV Continuous <Continuous>  dextrose 50% Injectable 25 Gram(s) IV Push once  dextrose 50% Injectable 12.5 Gram(s) IV Push once  enoxaparin Injectable 40 milliGRAM(s) SubCutaneous every 24 hours  gabapentin 100 milliGRAM(s) Oral daily  influenza  Vaccine (HIGH DOSE) 0.5 milliLiter(s) IntraMuscular once  insulin glargine Injectable (LANTUS) 14 Unit(s) SubCutaneous at bedtime  insulin lispro (ADMELOG) corrective regimen sliding scale   SubCutaneous three times a day before meals  insulin lispro (ADMELOG) corrective regimen sliding scale   SubCutaneous at bedtime  insulin lispro Injectable (ADMELOG) 12 Unit(s) SubCutaneous three times a day before meals  lactobacillus acidophilus 1 Tablet(s) Oral with dinner  losartan 100 milliGRAM(s) Oral daily  melatonin 6 milliGRAM(s) Oral at bedtime  NIFEdipine XL 30 milliGRAM(s) Oral daily  pantoprazole  Injectable 40 milliGRAM(s) IV Push daily  piperacillin/tazobactam IVPB.. 3.375 Gram(s) IV Intermittent every 8 hours  polyethylene glycol 3350 17 Gram(s) Oral daily  senna 1 Tablet(s) Oral at bedtime    MEDICATIONS  (PRN):  bisacodyl 5 milliGRAM(s) Oral daily PRN Constipation  dextrose Oral Gel 15 Gram(s) Oral once PRN Blood Glucose LESS THAN 70 milliGRAM(s)/deciliter  HYDROmorphone  Injectable 0.5 milliGRAM(s) IV Push every 6 hours PRN Breakthrough pain  oxyCODONE    IR 5 milliGRAM(s) Oral every 4 hours PRN Moderate Pain (4 - 6)  oxyCODONE    IR 10 milliGRAM(s) Oral every 4 hours PRN Severe Pain (7 - 10)      Allergies  Advil (Rash)      Review of Systems:  see HPI- remainder 10 point ROS negative    Vital Signs Last 24 Hrs  T(C): 36.8 (03 Mar 2025 08:39), Max: 37.1 (02 Mar 2025 11:50)  T(F): 98.2 (03 Mar 2025 08:39), Max: 98.8 (02 Mar 2025 11:50)  HR: 73 (03 Mar 2025 10:00) (73 - 102)  BP: 192/71 (03 Mar 2025 10:00) (153/72 - 194/76)  BP(mean): --  RR: 18 (03 Mar 2025 08:39) (18 - 18)  SpO2: 95% (03 Mar 2025 08:39) (95% - 98%)    Parameters below as of 03 Mar 2025 08:39  Patient On (Oxygen Delivery Method): room air      PHYSICAL EXAM:  GENERAL:  No acute distress sitting up in bed  HEENT:  NCAT, no scleral icterus  CHEST: no resp distress  HEART:  RRR  ABDOMEN:  Soft, non-tender, non-distended, normoactive bowel sounds, no masses  RN at bedside to assist wi/ pt positioning and as chaperone  Rectal: no lesions noted, normal tone. residue brown stool in vault, no fecal impaction, no palpable  lesions, no rectal bleeding or melena  EXTREMITIES:  LLE in immobilizer w/ dressing intact  SKIN:  No rash/erythema/ecchymoses/petechiae/wounds/abscess/warm/dry  NEURO:  Alert and oriented x 3, no asterixis, no tremor    LABS:                        8.5    11.21 )-----------( 326      ( 03 Mar 2025 05:30 )             26.7   Hemoglobin: 9.0 g/dL (03.02.25 @ 14:05)   s/p 1 unit PRBCs  Hemoglobin: 7.0 g/dL (03.02.25 @ 07:24)   Hemoglobin: 7.4 g/dL (03.01.25 @ 07:02)   Hemoglobin: 7.9 g/dL (02.28.25 @ 13:23)   s/p 1 unit PRBCs  Hemoglobin: 6.6 g/dL (02.28.25 @ 08:19)   Hemoglobin: 7.5 g/dL (02.27.25 @ 20:11)     Occult Blood, Feces: Positive (03.02.25 @ 18:04)     03-03    135  |  100  |  14  ----------------------------<  162[H]  3.9   |  23  |  0.89    Ca    9.0      03 Mar 2025 05:30  Phos  3.4     03-03  Mg     2.2     03-03      PT/INR - ( 03 Mar 2025 05:30 )   PT: 12.5 sec;   INR: 1.09 ratio      PTT - ( 03 Mar 2025 05:30 )  PTT:36.4 sec            RADIOLOGY & ADDITIONAL TESTS:   "I tried to call Dr. Francisco's office again-left message.  I was unable to get a hold of a live person on the phone.  I called and spoke to patient-leg swelling quite a bit improved.  He does report muscle cramping in hands, legs with standing/at night. Better with sleeping in chair.  As far as shortness of air: No inhaler needed for 2 weeks. Not worse with activity or lying flat.    His complete PFT testing ordered by Dr. Francisco, pulmonologist, showed \"pattern of normal lung volumes and airflow with a reduced diffusion capacity suggest the possibility of a pulmonary vascular process.\"  This would be consistent with the pulmonary hypertension.  Patient has appointment with Dr. Francisco on Tuesday 9/17 to go over results.    He sounds stable enough to follow-up with Dr. Francisco next week.  He has follow-up with me the week after and we will recheck labs at that time.  Of note-he has EGD and colonoscopy scheduled Dr. Méndez on 10/3/2024-not clear that he will be able to go forth with this given his pulmonary hypertension but we can reevaluate at 9/25/2024 appointment.    I will have the office staff call for copies of office records from Dr. Francisco next week.  Will also send referral to nephrology as outpatient given evidence of CKD with fluctuating creatinine level on blood work.  "   INTERVAL HPI/OVERNIGHT EVENTS:  GI recalled 2/2 FOBT+ stool    Pt reports constipation with >5 days between BMs  Chart review notes 2 BMs since EGD 2/13, all brown without any bleeding  now s/p LLE Reagan BKA 2/27/25; dressing noted to be saturated post-op (overnight 2/28/25)    MEDICATIONS  (STANDING):  acetaminophen     Tablet .. 975 milliGRAM(s) Oral every 6 hours  aspirin  chewable 81 milliGRAM(s) Oral daily  atenolol  Tablet 25 milliGRAM(s) Oral daily  cyanocobalamin 1000 MICROGram(s) Oral daily  dextrose 5%. 1000 milliLiter(s) (50 mL/Hr) IV Continuous <Continuous>  dextrose 50% Injectable 25 Gram(s) IV Push once  dextrose 50% Injectable 12.5 Gram(s) IV Push once  enoxaparin Injectable 40 milliGRAM(s) SubCutaneous every 24 hours  gabapentin 100 milliGRAM(s) Oral daily  influenza  Vaccine (HIGH DOSE) 0.5 milliLiter(s) IntraMuscular once  insulin glargine Injectable (LANTUS) 14 Unit(s) SubCutaneous at bedtime  insulin lispro (ADMELOG) corrective regimen sliding scale   SubCutaneous three times a day before meals  insulin lispro (ADMELOG) corrective regimen sliding scale   SubCutaneous at bedtime  insulin lispro Injectable (ADMELOG) 12 Unit(s) SubCutaneous three times a day before meals  lactobacillus acidophilus 1 Tablet(s) Oral with dinner  losartan 100 milliGRAM(s) Oral daily  melatonin 6 milliGRAM(s) Oral at bedtime  NIFEdipine XL 30 milliGRAM(s) Oral daily  pantoprazole  Injectable 40 milliGRAM(s) IV Push daily  piperacillin/tazobactam IVPB.. 3.375 Gram(s) IV Intermittent every 8 hours  polyethylene glycol 3350 17 Gram(s) Oral daily  senna 1 Tablet(s) Oral at bedtime    MEDICATIONS  (PRN):  bisacodyl 5 milliGRAM(s) Oral daily PRN Constipation  dextrose Oral Gel 15 Gram(s) Oral once PRN Blood Glucose LESS THAN 70 milliGRAM(s)/deciliter  HYDROmorphone  Injectable 0.5 milliGRAM(s) IV Push every 6 hours PRN Breakthrough pain  oxyCODONE    IR 5 milliGRAM(s) Oral every 4 hours PRN Moderate Pain (4 - 6)  oxyCODONE    IR 10 milliGRAM(s) Oral every 4 hours PRN Severe Pain (7 - 10)      Allergies  Advil (Rash)      Review of Systems:  see HPI- remainder 10 point ROS negative    Vital Signs Last 24 Hrs  T(C): 36.8 (03 Mar 2025 08:39), Max: 37.1 (02 Mar 2025 11:50)  T(F): 98.2 (03 Mar 2025 08:39), Max: 98.8 (02 Mar 2025 11:50)  HR: 73 (03 Mar 2025 10:00) (73 - 102)  BP: 192/71 (03 Mar 2025 10:00) (153/72 - 194/76)  BP(mean): --  RR: 18 (03 Mar 2025 08:39) (18 - 18)  SpO2: 95% (03 Mar 2025 08:39) (95% - 98%)    Parameters below as of 03 Mar 2025 08:39  Patient On (Oxygen Delivery Method): room air      PHYSICAL EXAM:  GENERAL:  No acute distress sitting up in bed  HEENT:  NCAT, no scleral icterus  CHEST: no resp distress  HEART:  RRR  ABDOMEN:  Soft, non-tender, non-distended, normoactive bowel sounds, no masses  RN at bedside to assist wi/ pt positioning and as chaperone  Rectal: no lesions noted, normal tone. residue brown stool in vault, no fecal impaction, no palpable  lesions, no rectal bleeding or melena  EXTREMITIES:  LLE in immobilizer w/ dressing intact  SKIN:  No rash/erythema/ecchymoses/petechiae/wounds/abscess/warm/dry  NEURO:  Alert and oriented x 3, no asterixis, no tremor    LABS:                        8.5    11.21 )-----------( 326      ( 03 Mar 2025 05:30 )             26.7   Hemoglobin: 9.0 g/dL (03.02.25 @ 14:05)   s/p 1 unit PRBCs  Hemoglobin: 7.0 g/dL (03.02.25 @ 07:24)   Hemoglobin: 7.4 g/dL (03.01.25 @ 07:02)   Hemoglobin: 7.9 g/dL (02.28.25 @ 13:23)   s/p 1 unit PRBCs  Hemoglobin: 6.6 g/dL (02.28.25 @ 08:19)   Hemoglobin: 7.5 g/dL (02.27.25 @ 20:11)     Occult Blood, Feces: Positive (03.02.25 @ 18:04)     03-03    135  |  100  |  14  ----------------------------<  162[H]  3.9   |  23  |  0.89    Ca    9.0      03 Mar 2025 05:30  Phos  3.4     03-03  Mg     2.2     03-03      PT/INR - ( 03 Mar 2025 05:30 )   PT: 12.5 sec;   INR: 1.09 ratio      PTT - ( 03 Mar 2025 05:30 )  PTT:36.4 sec            RADIOLOGY & ADDITIONAL TESTS:

## 2025-03-03 NOTE — PROGRESS NOTE ADULT - ASSESSMENT
A 70-year-old male with a medical history of hypertension, hyperlipidemia, diabetes mellitus, peripheral artery disease, and aortoiliac occlusive disease, previously underwent outpatient angiography that resulted in a right iliac artery dissection. He is now status post for the creation of a bypass from the left femoral artery to the distal tibial artery using a reversed saphenous vein graft, with a completion angiogram completed on February 7, 2025. T    The Endocrine team has been consulted due to his uncontrolled diabetes. Endocrine re-consulted for hyperglycemia. Patient reports  decreased appetite, however does drink Glucerna drink which has 27g CHO per serving.     Since patient is not eating much but having glucerna shakes, will decrease premeal insulin.  Endocrine will closely monitor BG and adjust insulin as needed for BG goal 100-180mg/dL inpatient.     #Type 2 diabetes mellitus   A1C with Estimated Average Glucose Result: 7.7 % (01-28-25 @ 10:05)  A1C with Estimated Average Glucose Result: 7.4 % (12-06-24 @ 09:06)  Home regimen: Jardiance 10mg once daily, Actos 30mg once daily, Jentadueto 2.5-2g BID

## 2025-03-04 LAB
ANION GAP SERPL CALC-SCNC: 15 MMOL/L — SIGNIFICANT CHANGE UP (ref 5–17)
BUN SERPL-MCNC: 11 MG/DL — SIGNIFICANT CHANGE UP (ref 7–23)
CALCIUM SERPL-MCNC: 9.5 MG/DL — SIGNIFICANT CHANGE UP (ref 8.4–10.5)
CHLORIDE SERPL-SCNC: 101 MMOL/L — SIGNIFICANT CHANGE UP (ref 96–108)
CO2 SERPL-SCNC: 22 MMOL/L — SIGNIFICANT CHANGE UP (ref 22–31)
CREAT SERPL-MCNC: 0.85 MG/DL — SIGNIFICANT CHANGE UP (ref 0.5–1.3)
EGFR: 92 ML/MIN/1.73M2 — SIGNIFICANT CHANGE UP
EGFR: 92 ML/MIN/1.73M2 — SIGNIFICANT CHANGE UP
GLUCOSE BLDC GLUCOMTR-MCNC: 171 MG/DL — HIGH (ref 70–99)
GLUCOSE BLDC GLUCOMTR-MCNC: 175 MG/DL — HIGH (ref 70–99)
GLUCOSE BLDC GLUCOMTR-MCNC: 191 MG/DL — HIGH (ref 70–99)
GLUCOSE BLDC GLUCOMTR-MCNC: 88 MG/DL — SIGNIFICANT CHANGE UP (ref 70–99)
GLUCOSE SERPL-MCNC: 144 MG/DL — HIGH (ref 70–99)
HCT VFR BLD CALC: 30.4 % — LOW (ref 39–50)
HGB BLD-MCNC: 9.6 G/DL — LOW (ref 13–17)
INR BLD: 1.15 RATIO — SIGNIFICANT CHANGE UP (ref 0.85–1.16)
MAGNESIUM SERPL-MCNC: 2.2 MG/DL — SIGNIFICANT CHANGE UP (ref 1.6–2.6)
MCHC RBC-ENTMCNC: 28.6 PG — SIGNIFICANT CHANGE UP (ref 27–34)
MCHC RBC-ENTMCNC: 31.6 G/DL — LOW (ref 32–36)
MCV RBC AUTO: 90.5 FL — SIGNIFICANT CHANGE UP (ref 80–100)
NRBC BLD AUTO-RTO: 0 /100 WBCS — SIGNIFICANT CHANGE UP (ref 0–0)
PHOSPHATE SERPL-MCNC: 2.5 MG/DL — SIGNIFICANT CHANGE UP (ref 2.5–4.5)
PLATELET # BLD AUTO: 387 K/UL — SIGNIFICANT CHANGE UP (ref 150–400)
POTASSIUM SERPL-MCNC: 3.5 MMOL/L — SIGNIFICANT CHANGE UP (ref 3.5–5.3)
POTASSIUM SERPL-SCNC: 3.5 MMOL/L — SIGNIFICANT CHANGE UP (ref 3.5–5.3)
PROTHROM AB SERPL-ACNC: 13.1 SEC — SIGNIFICANT CHANGE UP (ref 9.9–13.4)
RBC # BLD: 3.36 M/UL — LOW (ref 4.2–5.8)
RBC # FLD: 16.8 % — HIGH (ref 10.3–14.5)
SODIUM SERPL-SCNC: 138 MMOL/L — SIGNIFICANT CHANGE UP (ref 135–145)
WBC # BLD: 13.17 K/UL — HIGH (ref 3.8–10.5)
WBC # FLD AUTO: 13.17 K/UL — HIGH (ref 3.8–10.5)

## 2025-03-04 PROCEDURE — 99232 SBSQ HOSP IP/OBS MODERATE 35: CPT

## 2025-03-04 RX ORDER — NIFEDIPINE 30 MG
60 TABLET, EXTENDED RELEASE 24 HR ORAL DAILY
Refills: 0 | Status: DISCONTINUED | OUTPATIENT
Start: 2025-03-04 | End: 2025-03-17

## 2025-03-04 RX ORDER — CARVEDILOL 3.12 MG/1
6.25 TABLET, FILM COATED ORAL EVERY 12 HOURS
Refills: 0 | Status: DISCONTINUED | OUTPATIENT
Start: 2025-03-04 | End: 2025-03-18

## 2025-03-04 RX ORDER — SOD PHOS DI, MONO/K PHOS MONO 250 MG
1 TABLET ORAL ONCE
Refills: 0 | Status: COMPLETED | OUTPATIENT
Start: 2025-03-04 | End: 2025-03-04

## 2025-03-04 RX ADMIN — INSULIN LISPRO 6 UNIT(S): 100 INJECTION, SOLUTION INTRAVENOUS; SUBCUTANEOUS at 18:40

## 2025-03-04 RX ADMIN — Medication 25 GRAM(S): at 22:50

## 2025-03-04 RX ADMIN — Medication 40 MILLIGRAM(S): at 06:07

## 2025-03-04 RX ADMIN — Medication 25 GRAM(S): at 06:07

## 2025-03-04 RX ADMIN — Medication 81 MILLIGRAM(S): at 11:33

## 2025-03-04 RX ADMIN — Medication 1 PACKET(S): at 09:49

## 2025-03-04 RX ADMIN — Medication 975 MILLIGRAM(S): at 06:09

## 2025-03-04 RX ADMIN — INSULIN LISPRO 6 UNIT(S): 100 INJECTION, SOLUTION INTRAVENOUS; SUBCUTANEOUS at 09:51

## 2025-03-04 RX ADMIN — Medication 975 MILLIGRAM(S): at 07:09

## 2025-03-04 RX ADMIN — Medication 975 MILLIGRAM(S): at 23:50

## 2025-03-04 RX ADMIN — OXYCODONE HYDROCHLORIDE 10 MILLIGRAM(S): 30 TABLET ORAL at 18:39

## 2025-03-04 RX ADMIN — OXYCODONE HYDROCHLORIDE 10 MILLIGRAM(S): 30 TABLET ORAL at 23:50

## 2025-03-04 RX ADMIN — Medication 25 MILLIGRAM(S): at 18:40

## 2025-03-04 RX ADMIN — OXYCODONE HYDROCHLORIDE 10 MILLIGRAM(S): 30 TABLET ORAL at 09:54

## 2025-03-04 RX ADMIN — OXYCODONE HYDROCHLORIDE 10 MILLIGRAM(S): 30 TABLET ORAL at 14:19

## 2025-03-04 RX ADMIN — Medication 975 MILLIGRAM(S): at 11:33

## 2025-03-04 RX ADMIN — GABAPENTIN 100 MILLIGRAM(S): 400 CAPSULE ORAL at 11:32

## 2025-03-04 RX ADMIN — Medication 975 MILLIGRAM(S): at 22:50

## 2025-03-04 RX ADMIN — Medication 40 MILLIGRAM(S): at 18:39

## 2025-03-04 RX ADMIN — Medication 1 TABLET(S): at 18:39

## 2025-03-04 RX ADMIN — INSULIN LISPRO 2: 100 INJECTION, SOLUTION INTRAVENOUS; SUBCUTANEOUS at 18:40

## 2025-03-04 RX ADMIN — OXYCODONE HYDROCHLORIDE 10 MILLIGRAM(S): 30 TABLET ORAL at 10:24

## 2025-03-04 RX ADMIN — OXYCODONE HYDROCHLORIDE 10 MILLIGRAM(S): 30 TABLET ORAL at 13:49

## 2025-03-04 RX ADMIN — Medication 975 MILLIGRAM(S): at 12:10

## 2025-03-04 RX ADMIN — Medication 25 MILLIGRAM(S): at 06:09

## 2025-03-04 RX ADMIN — POLYETHYLENE GLYCOL 3350 17 GRAM(S): 17 POWDER, FOR SOLUTION ORAL at 11:32

## 2025-03-04 RX ADMIN — LOSARTAN POTASSIUM 100 MILLIGRAM(S): 100 TABLET, FILM COATED ORAL at 08:32

## 2025-03-04 RX ADMIN — Medication 0.5 MILLIGRAM(S): at 12:10

## 2025-03-04 RX ADMIN — ENOXAPARIN SODIUM 40 MILLIGRAM(S): 100 INJECTION SUBCUTANEOUS at 18:40

## 2025-03-04 RX ADMIN — INSULIN LISPRO 2: 100 INJECTION, SOLUTION INTRAVENOUS; SUBCUTANEOUS at 09:51

## 2025-03-04 RX ADMIN — Medication 6 MILLIGRAM(S): at 22:50

## 2025-03-04 RX ADMIN — Medication 40 MILLIEQUIVALENT(S): at 09:49

## 2025-03-04 RX ADMIN — INSULIN LISPRO 2: 100 INJECTION, SOLUTION INTRAVENOUS; SUBCUTANEOUS at 13:49

## 2025-03-04 RX ADMIN — INSULIN GLARGINE-YFGN 14 UNIT(S): 100 INJECTION, SOLUTION SUBCUTANEOUS at 22:51

## 2025-03-04 RX ADMIN — Medication 25 GRAM(S): at 13:50

## 2025-03-04 RX ADMIN — Medication 975 MILLIGRAM(S): at 01:31

## 2025-03-04 RX ADMIN — INSULIN LISPRO 6 UNIT(S): 100 INJECTION, SOLUTION INTRAVENOUS; SUBCUTANEOUS at 13:50

## 2025-03-04 RX ADMIN — CARVEDILOL 6.25 MILLIGRAM(S): 3.12 TABLET, FILM COATED ORAL at 18:39

## 2025-03-04 RX ADMIN — OXYCODONE HYDROCHLORIDE 10 MILLIGRAM(S): 30 TABLET ORAL at 22:50

## 2025-03-04 RX ADMIN — Medication 975 MILLIGRAM(S): at 18:40

## 2025-03-04 RX ADMIN — Medication 0.5 MILLIGRAM(S): at 11:40

## 2025-03-04 RX ADMIN — Medication 975 MILLIGRAM(S): at 19:10

## 2025-03-04 RX ADMIN — OXYCODONE HYDROCHLORIDE 10 MILLIGRAM(S): 30 TABLET ORAL at 19:10

## 2025-03-04 RX ADMIN — LISINOPRIL 25 MILLIGRAM(S): 30 TABLET ORAL at 06:07

## 2025-03-04 RX ADMIN — Medication 1 TABLET(S): at 22:51

## 2025-03-04 RX ADMIN — CYANOCOBALAMIN 1000 MICROGRAM(S): 1000 INJECTION INTRAMUSCULAR; SUBCUTANEOUS at 11:33

## 2025-03-04 RX ADMIN — Medication 975 MILLIGRAM(S): at 00:31

## 2025-03-04 RX ADMIN — Medication 30 MILLIGRAM(S): at 06:07

## 2025-03-04 NOTE — PROGRESS NOTE ADULT - SUBJECTIVE AND OBJECTIVE BOX
[facility] Attending Follow-up Note     Covering for Dr. Dawkins     Patient seen and examined at bedside.    Overnight Events:     no events     REVIEW OF SYSTEMS:  Constitutional:     [x ] negative [ ] fevers [ ] chills [ ] weight loss [ ] weight gain  HEENT:                  [x ] negative [ ] dry eyes [ ] eye irritation [ ] postnasal drip [ ] nasal congestion  CV:                         [ x] negative  [ ] chest pain [ ] orthopnea [ ] palpitations [ ] murmur  Resp:                     [ x] negative [ ] cough [ ] shortness of breath [ ] dyspnea [ ] wheezing [ ] sputum [ ]hemoptysis  GI:                          [ x] negative [ ] nausea [ ] vomiting [ ] diarrhea [ ] constipation [ ] abd pain [ ] dysphagia   :                        [ x] negative [ ] dysuria [ ] nocturia [ ] hematuria [ ] increased urinary frequency  Musculoskeletal: [ x] negative [ ] back pain [ ] myalgias [ ] arthralgias [ ] fracture  Skin:                       [ x] negative [ ] rash [ ] itch  Neurological:        [x ] negative [ ] headache [ ] dizziness [ ] syncope [ ] weakness [ ] numbness  Psychiatric:           [ x] negative [ ] anxiety [ ] depression  Endocrine:            [ x] negative [ ] diabetes [ ] thyroid problem  Heme/Lymph:      [ x] negative [ ] anemia [ ] bleeding problem  Allergic/Immune: [ x] negative [ ] itchy eyes [ ] nasal discharge [ ] hives [ ] angioedema    [ x] All other systems negative  [ ] Unable to assess ROS due to    Current Meds:  acetaminophen     Tablet .. 975 milliGRAM(s) Oral every 6 hours  aspirin  chewable 81 milliGRAM(s) Oral daily  bisacodyl 5 milliGRAM(s) Oral daily PRN  carvedilol 6.25 milliGRAM(s) Oral every 12 hours  cyanocobalamin 1000 MICROGram(s) Oral daily  dextrose 5%. 1000 milliLiter(s) IV Continuous <Continuous>  dextrose 50% Injectable 25 Gram(s) IV Push once  dextrose 50% Injectable 12.5 Gram(s) IV Push once  dextrose Oral Gel 15 Gram(s) Oral once PRN  enoxaparin Injectable 40 milliGRAM(s) SubCutaneous every 24 hours  gabapentin 100 milliGRAM(s) Oral daily  hydrALAZINE 25 milliGRAM(s) Oral every 12 hours  HYDROmorphone  Injectable 0.5 milliGRAM(s) IV Push every 6 hours PRN  influenza  Vaccine (HIGH DOSE) 0.5 milliLiter(s) IntraMuscular once  insulin glargine Injectable (LANTUS) 14 Unit(s) SubCutaneous at bedtime  insulin lispro (ADMELOG) corrective regimen sliding scale   SubCutaneous three times a day before meals  insulin lispro (ADMELOG) corrective regimen sliding scale   SubCutaneous at bedtime  insulin lispro Injectable (ADMELOG) 6 Unit(s) SubCutaneous three times a day before meals  lactobacillus acidophilus 1 Tablet(s) Oral with dinner  losartan 100 milliGRAM(s) Oral daily  melatonin 6 milliGRAM(s) Oral at bedtime  NIFEdipine XL 60 milliGRAM(s) Oral daily  oxyCODONE    IR 5 milliGRAM(s) Oral every 4 hours PRN  oxyCODONE    IR 10 milliGRAM(s) Oral every 4 hours PRN  pantoprazole    Tablet 40 milliGRAM(s) Oral two times a day  piperacillin/tazobactam IVPB.. 3.375 Gram(s) IV Intermittent every 8 hours  polyethylene glycol 3350 17 Gram(s) Oral daily  senna 1 Tablet(s) Oral at bedtime      PAST MEDICAL & SURGICAL HISTORY:  HTN (hypertension)      HLD (hyperlipidemia)      DM (diabetes mellitus)      PAD (peripheral artery disease)      H/O iron deficiency      [place] (hard of hearing)      Peripheral neuropathy      Peripheral vascular disease, unspecified      Former smoker      CVA (cerebrovascular accident)      S/P appendectomy      S/P peripheral artery angioplasty with stent placement      S/P angiography      S/P cataract surgery          Vitals:  T(F): 99.2 (03-04), Max: 99.2 (03-04)  HR: 82 (03-04) (82 - 95)  BP: 148/62 (03-04) (147/80 - 173/80)  RR: 18 (03-04)  SpO2: 98% (03-04)  I&O's Summary    03 Mar 2025 07:01  -  04 Mar 2025 07:00  --------------------------------------------------------  IN: 920 mL / OUT: 2600 mL / NET: -1680 mL    04 Mar 2025 07:01  -  04 Mar 2025 21:40  --------------------------------------------------------  IN: 1000 mL / OUT: 800 mL / NET: 200 mL        Physical Exam:  Appearance: No acute distress  HENT: No JVD   Cardiovascular: RRR, S1/S2, no murmurs  Respiratory: CTABL  Gastrointestinal: soft, NT ND, +BS  Musculoskeletal: No clubbing, no edema   Neurologic: Non-focal  Skin: No rashes, ecchymoses, or cyanosis                          9.6    13.17 )-----------( 387      ( 04 Mar 2025 07:31 )             30.4     03-04    138  |  101  |  11  ----------------------------<  144[H]  3.5   |  22  |  0.85    Ca    9.5      04 Mar 2025 07:31  Phos  2.5     03-04  Mg     2.2     03-04      PT/INR - ( 04 Mar 2025 07:31 )   PT: 13.1 sec;   INR: 1.15 ratio         PTT - ( 03 Mar 2025 05:30 )  PTT:36.4 sec              Cardiovascular Testings:

## 2025-03-04 NOTE — PROGRESS NOTE ADULT - ASSESSMENT
69M PM of former smoker (1.5 PPD % 50 years; quit 2 years ago),  HTN, HLD, DM, PAD and aortoiliac occlusive disease, who previously underwent an outpatient angiography complicated by right iliac artery dissection. Now S/P Creation of bypass from left femoral artery to distal tibial artery with RSVG and completion angiogram on 2/7/25 s/p 2u pRBC 7.4 from 7.8l not responded to 2nd unit on 2/12/25. EGD completed 2/13 found to have bleeding gastric ulcer w/ hemostasis achieved with cautery. Hgb found to be 6.1 and s/p 2u pRBC. Patient now w/ ischemic changes to LLE.    1. Cardiac Risk Stratification   - No chest pain/SOB  - EKG Sinus tach 117 bpm no ischemic changes   - TTE reviewed with pericardial effusion, unchanged compared to 2023  - Not in decompensated HF   - No hx of tachy/timothy arrhythmias   - No valvular abnormalities  - Moderate risk for moderate risk vascular surgery  - Tolerated well.     2. Hypertension- bp better   - cont losartan 100 mg QD   - cont procardial 60 mg QD   - cont Coreg 6.25 mg BID   - cont hydralazine 25 mg BID, can give an extra dose for SBP>170     3. Hyperlipidemia   - Lipitor 40mg   - lipid profile     4. Diabetes Mellitus Type II   - FBG per protocol  - ISS   - A1C: 7.7%     5. CAD - TTE with Basal and mid inferior wall and basal inferoseptal segment are abnormal  - Discussed with pt, spouse and daughter in person, they do not know full name or number of his cardiologist to obtain his prior records from  - They deny he has any h/o MI, CP or SOB  - Advised OP follow up with his cards     Mellisa Simmons MD FAC  Attending Interventional Cardiologist, [facility].   [employer] on [employer] Team

## 2025-03-04 NOTE — PROGRESS NOTE ADULT - ASSESSMENT
69M PM of former smoker (1.5 PPD % 50 years; quit 2 years ago),  HTN, HLD, DM, PAD and aortoiliac occlusive disease, who previously underwent an outpatient angiography complicated by right iliac artery dissection. Now S/P Creation of bypass from left femoral artery to distal tibial artery with RSVG and completion angiogram on 2/7/25 s/p 2u pRBC 7.4 from 7.8l not responded to 2nd unit on 2/12/25. EGD completed 2/13 found to have bleeding gastric ulcer w/ hemostasis achieved with cautery. Hgb found to be 6.1 and s/p 2u pRBC. 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       Plan:   - Appreciate GI recs: Continue PPI increase to BID x4 weeks then downgrade to daily dosing; no repeat EGD, repeat EGD in 2 months outpatient   - Uncontrolled HTN requiring adjustments to BP regimen  - Dressing change today   - pain control, IV dilaudid added for SBT pain  - CC diet   - Antibx: Empiric zosyn; vanc d/c 2/28  - Lovenox/Aspirin   - Diet: Regular   - on insulin appreciate endocrine recs  - ASA  - Pain regimen: Tylenol ATC, oxycodone PRN  - Bowel regimen  - PT recs -> YANELY    Vascular Surg  [id number]

## 2025-03-04 NOTE — PROGRESS NOTE ADULT - SUBJECTIVE AND OBJECTIVE BOX
SURGERY DAILY PROGRESS NOTE:       Subjective / Overnight events: Pt seen during AM rounds. Pt resting comfortably in bed without complaints, reports to be pain controlled. Had 1 BM overnight - non bloody.       Objective:      MEDICATIONS  (STANDING):  acetaminophen     Tablet .. 975 milliGRAM(s) Oral every 6 hours  aspirin  chewable 81 milliGRAM(s) Oral daily  atenolol  Tablet 25 milliGRAM(s) Oral daily  cyanocobalamin 1000 MICROGram(s) Oral daily  dextrose 5%. 1000 milliLiter(s) (50 mL/Hr) IV Continuous <Continuous>  dextrose 50% Injectable 25 Gram(s) IV Push once  dextrose 50% Injectable 12.5 Gram(s) IV Push once  enoxaparin Injectable 40 milliGRAM(s) SubCutaneous every 24 hours  gabapentin 100 milliGRAM(s) Oral daily  hydrALAZINE 25 milliGRAM(s) Oral every 12 hours  influenza  Vaccine (HIGH DOSE) 0.5 milliLiter(s) IntraMuscular once  insulin glargine Injectable (LANTUS) 14 Unit(s) SubCutaneous at bedtime  insulin lispro (ADMELOG) corrective regimen sliding scale   SubCutaneous three times a day before meals  insulin lispro (ADMELOG) corrective regimen sliding scale   SubCutaneous at bedtime  insulin lispro Injectable (ADMELOG) 6 Unit(s) SubCutaneous three times a day before meals  lactobacillus acidophilus 1 Tablet(s) Oral with dinner  losartan 100 milliGRAM(s) Oral daily  melatonin 6 milliGRAM(s) Oral at bedtime  NIFEdipine XL 30 milliGRAM(s) Oral daily  pantoprazole    Tablet 40 milliGRAM(s) Oral two times a day  piperacillin/tazobactam IVPB.. 3.375 Gram(s) IV Intermittent every 8 hours  polyethylene glycol 3350 17 Gram(s) Oral daily  senna 1 Tablet(s) Oral at bedtime    MEDICATIONS  (PRN):  bisacodyl 5 milliGRAM(s) Oral daily PRN Constipation  dextrose Oral Gel 15 Gram(s) Oral once PRN Blood Glucose LESS THAN 70 milliGRAM(s)/deciliter  HYDROmorphone  Injectable 0.5 milliGRAM(s) IV Push every 6 hours PRN Breakthrough pain  oxyCODONE    IR 5 milliGRAM(s) Oral every 4 hours PRN Moderate Pain (4 - 6)  oxyCODONE    IR 10 milliGRAM(s) Oral every 4 hours PRN Severe Pain (7 - 10)      Vital Signs Last 24 Hrs  T(C): 36.9 (04 Mar 2025 05:15), Max: 37.1 (03 Mar 2025 13:28)  T(F): 98.4 (04 Mar 2025 05:15), Max: 98.8 (03 Mar 2025 13:28)  HR: 95 (04 Mar 2025 05:15) (73 - 95)  BP: 173/80 (04 Mar 2025 05:15) (162/74 - 192/71)  BP(mean): --  RR: 18 (04 Mar 2025 05:15) (18 - 18)  SpO2: 95% (04 Mar 2025 05:15) (95% - 98%)    Parameters below as of 04 Mar 2025 05:15  Patient On (Oxygen Delivery Method): room air        I&O's Detail    03 Mar 2025 07:01  -  04 Mar 2025 07:00  --------------------------------------------------------  IN:    IV PiggyBack: 100 mL    Oral Fluid: 720 mL  Total IN: 820 mL    OUT:    Voided (mL): 2600 mL  Total OUT: 2600 mL    Total NET: -1780 mL          Daily     Daily     LABS:                        8.5    11.21 )-----------( 326      ( 03 Mar 2025 05:30 )             26.7     03-03    135  |  100  |  14  ----------------------------<  162[H]  3.9   |  23  |  0.89    Ca    9.0      03 Mar 2025 05:30  Phos  3.4     03-03  Mg     2.2     03-03      PT/INR - ( 03 Mar 2025 05:30 )   PT: 12.5 sec;   INR: 1.09 ratio         PTT - ( 03 Mar 2025 05:30 )  PTT:36.4 sec  Urinalysis Basic - ( 03 Mar 2025 05:30 )    Color: x / Appearance: x / SG: x / pH: x  Gluc: 162 mg/dL / Ketone: x  / Bili: x / Urobili: x   Blood: x / Protein: x / Nitrite: x   Leuk Esterase: x / RBC: x / WBC x   Sq Epi: x / Non Sq Epi: x / Bacteria: x          PHYSICAL EXAM  --------------------------------------------------------------------------------    Physical Exam:  General: NAD, resting comfortably in bed  Pulmonary: Nonlabored breathing, no respiratory distress  Abdominal: soft, non distended, non tender  Extremities: VERONICAE guillotine amp in knee immobilzer, some strikethrough on dressing

## 2025-03-05 LAB
ANION GAP SERPL CALC-SCNC: 13 MMOL/L — SIGNIFICANT CHANGE UP (ref 5–17)
BUN SERPL-MCNC: 14 MG/DL — SIGNIFICANT CHANGE UP (ref 7–23)
CALCIUM SERPL-MCNC: 9.2 MG/DL — SIGNIFICANT CHANGE UP (ref 8.4–10.5)
CHLORIDE SERPL-SCNC: 105 MMOL/L — SIGNIFICANT CHANGE UP (ref 96–108)
CO2 SERPL-SCNC: 21 MMOL/L — LOW (ref 22–31)
CREAT SERPL-MCNC: 0.89 MG/DL — SIGNIFICANT CHANGE UP (ref 0.5–1.3)
EGFR: 91 ML/MIN/1.73M2 — SIGNIFICANT CHANGE UP
EGFR: 91 ML/MIN/1.73M2 — SIGNIFICANT CHANGE UP
GLUCOSE BLDC GLUCOMTR-MCNC: 100 MG/DL — HIGH (ref 70–99)
GLUCOSE BLDC GLUCOMTR-MCNC: 116 MG/DL — HIGH (ref 70–99)
GLUCOSE BLDC GLUCOMTR-MCNC: 174 MG/DL — HIGH (ref 70–99)
GLUCOSE BLDC GLUCOMTR-MCNC: 178 MG/DL — HIGH (ref 70–99)
GLUCOSE BLDC GLUCOMTR-MCNC: 204 MG/DL — HIGH (ref 70–99)
GLUCOSE SERPL-MCNC: 106 MG/DL — HIGH (ref 70–99)
HCT VFR BLD CALC: 29.9 % — LOW (ref 39–50)
HGB BLD-MCNC: 9.8 G/DL — LOW (ref 13–17)
INR BLD: 1.08 RATIO — SIGNIFICANT CHANGE UP (ref 0.85–1.16)
MAGNESIUM SERPL-MCNC: 2.3 MG/DL — SIGNIFICANT CHANGE UP (ref 1.6–2.6)
MCHC RBC-ENTMCNC: 30.2 PG — SIGNIFICANT CHANGE UP (ref 27–34)
MCHC RBC-ENTMCNC: 32.8 G/DL — SIGNIFICANT CHANGE UP (ref 32–36)
MCV RBC AUTO: 92 FL — SIGNIFICANT CHANGE UP (ref 80–100)
NRBC BLD AUTO-RTO: 0 /100 WBCS — SIGNIFICANT CHANGE UP (ref 0–0)
PHOSPHATE SERPL-MCNC: 3.1 MG/DL — SIGNIFICANT CHANGE UP (ref 2.5–4.5)
PLATELET # BLD AUTO: 436 K/UL — HIGH (ref 150–400)
POTASSIUM SERPL-MCNC: 3.9 MMOL/L — SIGNIFICANT CHANGE UP (ref 3.5–5.3)
POTASSIUM SERPL-SCNC: 3.9 MMOL/L — SIGNIFICANT CHANGE UP (ref 3.5–5.3)
PROTHROM AB SERPL-ACNC: 12.3 SEC — SIGNIFICANT CHANGE UP (ref 9.9–13.4)
RBC # BLD: 3.25 M/UL — LOW (ref 4.2–5.8)
RBC # FLD: 16.9 % — HIGH (ref 10.3–14.5)
SODIUM SERPL-SCNC: 139 MMOL/L — SIGNIFICANT CHANGE UP (ref 135–145)
WBC # BLD: 12.32 K/UL — HIGH (ref 3.8–10.5)
WBC # FLD AUTO: 12.32 K/UL — HIGH (ref 3.8–10.5)

## 2025-03-05 PROCEDURE — 99232 SBSQ HOSP IP/OBS MODERATE 35: CPT

## 2025-03-05 RX ORDER — INSULIN LISPRO 100 U/ML
5 INJECTION, SOLUTION INTRAVENOUS; SUBCUTANEOUS
Refills: 0 | Status: DISCONTINUED | OUTPATIENT
Start: 2025-03-05 | End: 2025-03-05

## 2025-03-05 RX ORDER — INSULIN LISPRO 100 U/ML
5 INJECTION, SOLUTION INTRAVENOUS; SUBCUTANEOUS
Refills: 0 | Status: DISCONTINUED | OUTPATIENT
Start: 2025-03-05 | End: 2025-03-12

## 2025-03-05 RX ORDER — INSULIN GLARGINE-YFGN 100 [IU]/ML
13 INJECTION, SOLUTION SUBCUTANEOUS AT BEDTIME
Refills: 0 | Status: DISCONTINUED | OUTPATIENT
Start: 2025-03-05 | End: 2025-03-10

## 2025-03-05 RX ADMIN — Medication 6 MILLIGRAM(S): at 21:32

## 2025-03-05 RX ADMIN — LOSARTAN POTASSIUM 100 MILLIGRAM(S): 100 TABLET, FILM COATED ORAL at 06:00

## 2025-03-05 RX ADMIN — Medication 25 MILLIGRAM(S): at 06:00

## 2025-03-05 RX ADMIN — OXYCODONE HYDROCHLORIDE 10 MILLIGRAM(S): 30 TABLET ORAL at 21:31

## 2025-03-05 RX ADMIN — Medication 1 TABLET(S): at 17:08

## 2025-03-05 RX ADMIN — INSULIN LISPRO 5 UNIT(S): 100 INJECTION, SOLUTION INTRAVENOUS; SUBCUTANEOUS at 18:57

## 2025-03-05 RX ADMIN — OXYCODONE HYDROCHLORIDE 10 MILLIGRAM(S): 30 TABLET ORAL at 17:08

## 2025-03-05 RX ADMIN — CARVEDILOL 6.25 MILLIGRAM(S): 3.12 TABLET, FILM COATED ORAL at 05:59

## 2025-03-05 RX ADMIN — Medication 975 MILLIGRAM(S): at 23:16

## 2025-03-05 RX ADMIN — Medication 25 MILLIGRAM(S): at 17:09

## 2025-03-05 RX ADMIN — Medication 81 MILLIGRAM(S): at 11:41

## 2025-03-05 RX ADMIN — Medication 40 MILLIGRAM(S): at 06:00

## 2025-03-05 RX ADMIN — OXYCODONE HYDROCHLORIDE 10 MILLIGRAM(S): 30 TABLET ORAL at 12:41

## 2025-03-05 RX ADMIN — Medication 0.5 MILLIGRAM(S): at 18:13

## 2025-03-05 RX ADMIN — Medication 40 MILLIGRAM(S): at 17:09

## 2025-03-05 RX ADMIN — Medication 975 MILLIGRAM(S): at 05:59

## 2025-03-05 RX ADMIN — Medication 0.5 MILLIGRAM(S): at 19:00

## 2025-03-05 RX ADMIN — INSULIN GLARGINE-YFGN 13 UNIT(S): 100 INJECTION, SOLUTION SUBCUTANEOUS at 22:05

## 2025-03-05 RX ADMIN — Medication 975 MILLIGRAM(S): at 18:08

## 2025-03-05 RX ADMIN — CARVEDILOL 6.25 MILLIGRAM(S): 3.12 TABLET, FILM COATED ORAL at 17:09

## 2025-03-05 RX ADMIN — Medication 25 GRAM(S): at 05:59

## 2025-03-05 RX ADMIN — OXYCODONE HYDROCHLORIDE 10 MILLIGRAM(S): 30 TABLET ORAL at 18:08

## 2025-03-05 RX ADMIN — ENOXAPARIN SODIUM 40 MILLIGRAM(S): 100 INJECTION SUBCUTANEOUS at 18:13

## 2025-03-05 RX ADMIN — Medication 60 MILLIGRAM(S): at 06:00

## 2025-03-05 RX ADMIN — INSULIN LISPRO 4: 100 INJECTION, SOLUTION INTRAVENOUS; SUBCUTANEOUS at 14:39

## 2025-03-05 RX ADMIN — Medication 1 TABLET(S): at 21:32

## 2025-03-05 RX ADMIN — POLYETHYLENE GLYCOL 3350 17 GRAM(S): 17 POWDER, FOR SOLUTION ORAL at 11:42

## 2025-03-05 RX ADMIN — Medication 975 MILLIGRAM(S): at 17:09

## 2025-03-05 RX ADMIN — OXYCODONE HYDROCHLORIDE 10 MILLIGRAM(S): 30 TABLET ORAL at 11:42

## 2025-03-05 RX ADMIN — Medication 975 MILLIGRAM(S): at 12:41

## 2025-03-05 RX ADMIN — Medication 975 MILLIGRAM(S): at 06:59

## 2025-03-05 RX ADMIN — INSULIN LISPRO 5 UNIT(S): 100 INJECTION, SOLUTION INTRAVENOUS; SUBCUTANEOUS at 14:40

## 2025-03-05 RX ADMIN — OXYCODONE HYDROCHLORIDE 10 MILLIGRAM(S): 30 TABLET ORAL at 23:16

## 2025-03-05 RX ADMIN — Medication 975 MILLIGRAM(S): at 11:41

## 2025-03-05 RX ADMIN — CYANOCOBALAMIN 1000 MICROGRAM(S): 1000 INJECTION INTRAMUSCULAR; SUBCUTANEOUS at 11:41

## 2025-03-05 RX ADMIN — INSULIN LISPRO 6 UNIT(S): 100 INJECTION, SOLUTION INTRAVENOUS; SUBCUTANEOUS at 10:17

## 2025-03-05 RX ADMIN — GABAPENTIN 100 MILLIGRAM(S): 400 CAPSULE ORAL at 11:41

## 2025-03-05 NOTE — PROGRESS NOTE ADULT - SUBJECTIVE AND OBJECTIVE BOX
[facility] Attending Follow-up Note     Patient seen and examined at bedside.    Overnight Events:     no events     REVIEW OF SYSTEMS:  Constitutional:     [x ] negative [ ] fevers [ ] chills [ ] weight loss [ ] weight gain  HEENT:                  [x ] negative [ ] dry eyes [ ] eye irritation [ ] postnasal drip [ ] nasal congestion  CV:                         [ x] negative  [ ] chest pain [ ] orthopnea [ ] palpitations [ ] murmur  Resp:                     [ x] negative [ ] cough [ ] shortness of breath [ ] dyspnea [ ] wheezing [ ] sputum [ ]hemoptysis  GI:                          [ x] negative [ ] nausea [ ] vomiting [ ] diarrhea [ ] constipation [ ] abd pain [ ] dysphagia   :                        [ x] negative [ ] dysuria [ ] nocturia [ ] hematuria [ ] increased urinary frequency  Musculoskeletal: [ x] negative [ ] back pain [ ] myalgias [ ] arthralgias [ ] fracture  Skin:                       [ x] negative [ ] rash [ ] itch  Neurological:        [x ] negative [ ] headache [ ] dizziness [ ] syncope [ ] weakness [ ] numbness  Psychiatric:           [ x] negative [ ] anxiety [ ] depression  Endocrine:            [ x] negative [ ] diabetes [ ] thyroid problem  Heme/Lymph:      [ x] negative [ ] anemia [ ] bleeding problem  Allergic/Immune: [ x] negative [ ] itchy eyes [ ] nasal discharge [ ] hives [ ] angioedema    [ x] All other systems negative  [ ] Unable to assess ROS due to    Current Meds:  acetaminophen     Tablet .. 975 milliGRAM(s) Oral every 6 hours  aspirin  chewable 81 milliGRAM(s) Oral daily  bisacodyl 5 milliGRAM(s) Oral daily PRN  carvedilol 6.25 milliGRAM(s) Oral every 12 hours  cyanocobalamin 1000 MICROGram(s) Oral daily  dextrose 5%. 1000 milliLiter(s) IV Continuous <Continuous>  dextrose 50% Injectable 25 Gram(s) IV Push once  dextrose 50% Injectable 12.5 Gram(s) IV Push once  dextrose Oral Gel 15 Gram(s) Oral once PRN  enoxaparin Injectable 40 milliGRAM(s) SubCutaneous every 24 hours  gabapentin 100 milliGRAM(s) Oral daily  hydrALAZINE 25 milliGRAM(s) Oral every 12 hours  HYDROmorphone  Injectable 0.5 milliGRAM(s) IV Push every 6 hours PRN  influenza  Vaccine (HIGH DOSE) 0.5 milliLiter(s) IntraMuscular once  insulin glargine Injectable (LANTUS) 13 Unit(s) SubCutaneous at bedtime  insulin lispro (ADMELOG) corrective regimen sliding scale   SubCutaneous three times a day before meals  insulin lispro (ADMELOG) corrective regimen sliding scale   SubCutaneous at bedtime  insulin lispro Injectable (ADMELOG) 5 Unit(s) SubCutaneous three times a day with meals  lactobacillus acidophilus 1 Tablet(s) Oral with dinner  losartan 100 milliGRAM(s) Oral daily  melatonin 6 milliGRAM(s) Oral at bedtime  NIFEdipine XL 60 milliGRAM(s) Oral daily  oxyCODONE    IR 5 milliGRAM(s) Oral every 4 hours PRN  oxyCODONE    IR 10 milliGRAM(s) Oral every 4 hours PRN  pantoprazole    Tablet 40 milliGRAM(s) Oral two times a day  polyethylene glycol 3350 17 Gram(s) Oral daily  senna 1 Tablet(s) Oral at bedtime      PAST MEDICAL & SURGICAL HISTORY:  HTN (hypertension)      HLD (hyperlipidemia)      DM (diabetes mellitus)      PAD (peripheral artery disease)      H/O iron deficiency      [place] (hard of hearing)      Peripheral neuropathy      Peripheral vascular disease, unspecified      Former smoker      CVA (cerebrovascular accident)      S/P appendectomy      S/P peripheral artery angioplasty with stent placement      S/P angiography      S/P cataract surgery          Vitals:  T(F): 98.8 (03-06), Max: 99.2 (03-06)  HR: 102 (03-06) (91 - 102)  BP: 145/73 (03-06) (123/65 - 163/75)  RR: 18 (03-06)  SpO2: 97% (03-06)  I&O's Summary    05 Mar 2025 07:01  -  06 Mar 2025 07:00  --------------------------------------------------------  IN: 600 mL / OUT: 1025 mL / NET: -425 mL    06 Mar 2025 07:01  -  06 Mar 2025 10:34  --------------------------------------------------------  IN: 120 mL / OUT: 400 mL / NET: -280 mL        Physical Exam:  Appearance: No acute distress  HENT: No JVD   Cardiovascular: RRR, S1/S2, no murmurs  Respiratory: CTABL  Gastrointestinal: soft, NT ND, +BS  Musculoskeletal: No clubbing, no edema   Neurologic: Non-focal  Skin: No rashes, ecchymoses, or cyanosis                          8.7    15.03 )-----------( 473      ( 06 Mar 2025 06:51 )             27.6     03-06    137  |  101  |  14  ----------------------------<  96  3.8   |  21[L]  |  0.86    Ca    9.2      06 Mar 2025 06:51  Phos  3.1     03-06  Mg     2.2     03-06      PT/INR - ( 05 Mar 2025 07:16 )   PT: 12.3 sec;   INR: 1.08 ratio                       Cardiovascular Testings:

## 2025-03-05 NOTE — PROGRESS NOTE ADULT - SUBJECTIVE AND OBJECTIVE BOX
Patient seen today for follow up inpatient Diabetes Mellitus management.    Chief Complaint: Type 2 Diabetes Mellitus     INTERVAL HX:  Patient seen in [facility] 9MON 929 W1. Patient is alert and oriented, resting in bed. Patient reports feeling good, eating mostly full meals and tolerating POs. FBG stable this am to 116mg/dL, 106mg/dL on blood serum. No hypoglycemia. Noted postprandial BG last evening normal but low to 88mg/dL -> patient sometimes eating full meals and sometimes not or having Glucerna drink instead; of note, patient did not eat breakfast yet at bedside around 10am since he was not hungry yet, stated he will eat it 'later'. Blood glucose levels in the last 24hrs have been 88-191mg/dL.     Review of Systems:  General: As above.  Respiratory: Denies any SOB, BETH, or cough.  Gastrointestinal: Denies any n/v/d or abdominal pain.   Endocrine: Denies any polyuria, polydipsia, polyphagia, visual changes, or numbness in feet.     Allergies  Advil (Rash)      Intolerances  None.       MEDICATIONS  (STANDING):  acetaminophen     Tablet .. 975 milliGRAM(s) Oral every 6 hours  aspirin  chewable 81 milliGRAM(s) Oral daily  bisacodyl 5 milliGRAM(s) Oral daily PRN  carvedilol 6.25 milliGRAM(s) Oral every 12 hours  cyanocobalamin 1000 MICROGram(s) Oral daily  dextrose 5%. 1000 milliLiter(s) IV Continuous <Continuous>  dextrose 50% Injectable 25 Gram(s) IV Push once  dextrose 50% Injectable 12.5 Gram(s) IV Push once  dextrose Oral Gel 15 Gram(s) Oral once PRN  enoxaparin Injectable 40 milliGRAM(s) SubCutaneous every 24 hours  gabapentin 100 milliGRAM(s) Oral daily  hydrALAZINE 25 milliGRAM(s) Oral every 12 hours  HYDROmorphone  Injectable 0.5 milliGRAM(s) IV Push every 6 hours PRN  influenza  Vaccine (HIGH DOSE) 0.5 milliLiter(s) IntraMuscular once  insulin glargine Injectable (LANTUS) 13 Unit(s) SubCutaneous at bedtime  insulin lispro (ADMELOG) corrective regimen sliding scale   SubCutaneous three times a day before meals  insulin lispro (ADMELOG) corrective regimen sliding scale   SubCutaneous at bedtime  insulin lispro Injectable (ADMELOG) 5 Unit(s) SubCutaneous three times a day before meals  lactobacillus acidophilus 1 Tablet(s) Oral with dinner  losartan 100 milliGRAM(s) Oral daily  melatonin 6 milliGRAM(s) Oral at bedtime  NIFEdipine XL 60 milliGRAM(s) Oral daily  oxyCODONE    IR 5 milliGRAM(s) Oral every 4 hours PRN  oxyCODONE    IR 10 milliGRAM(s) Oral every 4 hours PRN  pantoprazole    Tablet 40 milliGRAM(s) Oral two times a day  piperacillin/tazobactam IVPB.. 3.375 Gram(s) IV Intermittent every 8 hours  polyethylene glycol 3350 17 Gram(s) Oral daily  senna 1 Tablet(s) Oral at bedtime      dextrose 50% Injectable 25 Gram(s) IV Push once  dextrose 50% Injectable 12.5 Gram(s) IV Push once  dextrose Oral Gel 15 Gram(s) Oral once PRN  insulin glargine Injectable (LANTUS) 13 Unit(s) SubCutaneous at bedtime  insulin lispro (ADMELOG) corrective regimen sliding scale   SubCutaneous three times a day before meals  insulin lispro (ADMELOG) corrective regimen sliding scale   SubCutaneous at bedtime  insulin lispro Injectable (ADMELOG) 5 Unit(s) SubCutaneous three times a day before meals      insulin lispro (ADMELOG) corrective regimen sliding scale   SubCutaneous three times a day before meals  insulin lispro (ADMELOG) corrective regimen sliding scale   SubCutaneous at bedtime  insulin lispro Injectable (ADMELOG) 5 Unit(s) SubCutaneous three times a day before meals      PHYSICAL EXAM:  VITALS:   T(C): 37 (03-05-25 @ 05:56), Max: 37.3 (03-04-25 @ 17:08)  HR: 95 (03-05-25 @ 09:44) (82 - 97)  BP: 131/72 (03-05-25 @ 09:44) (131/72 - 164/78)  RR: 18 (03-05-25 @ 09:44) (18 - 18)  SpO2: 97% (03-05-25 @ 09:44) (95% - 99%)    GENERAL: In no acute distress  Respiratory: Respirations unlabored  Extremities: Warm and dry, no edema  NEURO: Alert and oriented, appropriate     LABS:  POCT Blood Glucose.: 116 mg/dL (03-05-25 @ 09:58)  POCT Blood Glucose.: 88 mg/dL (03-04-25 @ 22:11)  POCT Blood Glucose.: 171 mg/dL (03-04-25 @ 18:32)  POCT Blood Glucose.: 191 mg/dL (03-04-25 @ 13:38)  POCT Blood Glucose.: 175 mg/dL (03-04-25 @ 09:49)  POCT Blood Glucose.: 168 mg/dL (03-03-25 @ 22:04)  POCT Blood Glucose.: 132 mg/dL (03-03-25 @ 18:41)  POCT Blood Glucose.: 263 mg/dL (03-03-25 @ 13:55)  POCT Blood Glucose.: 154 mg/dL (03-03-25 @ 10:00)  POCT Blood Glucose.: 281 mg/dL (03-02-25 @ 21:15)  POCT Blood Glucose.: 207 mg/dL (03-02-25 @ 17:29)  POCT Blood Glucose.: 197 mg/dL (03-02-25 @ 12:16)                          9.8    12.32 )-----------( 436      ( 05 Mar 2025 07:16 )             29.9     03-05    139  |  105  |  14  ----------------------------<  106[H]  3.9   |  21[L]  |  0.89    Ca    9.2      05 Mar 2025 07:16  Phos  3.1     03-05  Mg     2.3     03-05        PT/INR - ( 05 Mar 2025 07:16 )   PT: 12.3 sec;   INR: 1.08 ratio          Urinalysis Basic - ( 05 Mar 2025 07:16 )    Color: x / Appearance: x / SG: x / pH: x  Gluc: 106 mg/dL / Ketone: x  / Bili: x / Urobili: x   Blood: x / Protein: x / Nitrite: x   Leuk Esterase: x / RBC: x / WBC x   Sq Epi: x / Non Sq Epi: x / Bacteria: x        A1C with Estimated Average Glucose Result: A1C with Estimated Average Glucose Result: 7.7 % (01-28-25 @ 10:05)  A1C with Estimated Average Glucose Result: 7.4 % (12-06-24 @ 09:06)

## 2025-03-05 NOTE — PROGRESS NOTE ADULT - ASSESSMENT
A 70-year-old male with a medical history of hypertension, hyperlipidemia, diabetes mellitus, peripheral artery disease, and aortoiliac occlusive disease, previously underwent outpatient angiography that resulted in a right iliac artery dissection. He is now status post for the creation of a bypass from the left femoral artery to the distal tibial artery using a reversed saphenous vein graft, with a completion angiogram completed on February 7, 2025. T    The Endocrine team has been consulted due to his uncontrolled diabetes. Endocrine re-consulted for hyperglycemia. Patient reports decreased appetite at times and sometimes eating mostly full meals, however does drink Glucerna drink which has 27g CHO per serving if not eating full meal. FBG stable but on lower side this am, will slightly lower Lantus to 13u QHS to prevent hypoglycemia. No hypoglycemia. Given postprandial BG 88mg/dL last evening, will slightly lower mealtime insulin as well to prevent hypoglycemia. Please make sure mealtime insulin if given when patient is eating meal!!! Endocrine will closely monitor BG and adjust insulin as needed for BG goal 100-180mg/dL inpatient.     #Type 2 diabetes mellitus   A1C with Estimated Average Glucose Result: 7.7 % (01-28-25 @ 10:05)  A1C with Estimated Average Glucose Result: 7.4 % (12-06-24 @ 09:06)  Home regimen: Jardiance 10mg once daily, Actos 30mg once daily, Jentadueto 2.5-2g BID

## 2025-03-05 NOTE — PROGRESS NOTE ADULT - SUBJECTIVE AND OBJECTIVE BOX
SURGERY DAILY PROGRESS NOTE    STATUS POST:  Creation of bypass from left femoral artery to distal tibial artery        SUBJECTIVE: Pt seen and examined at bedside. Pt doing well, no complaints.      OBJECTIVE:  Vital Signs Last 24 Hrs  T(C): 37 (05 Mar 2025 05:56), Max: 37.3 (04 Mar 2025 17:08)  T(F): 98.6 (05 Mar 2025 05:56), Max: 99.2 (04 Mar 2025 17:08)  HR: 95 (05 Mar 2025 09:44) (82 - 97)  BP: 131/72 (05 Mar 2025 09:44) (131/72 - 164/78)  BP(mean): --  RR: 18 (05 Mar 2025 09:44) (18 - 18)  SpO2: 97% (05 Mar 2025 09:44) (95% - 99%)    Parameters below as of 05 Mar 2025 09:44  Patient On (Oxygen Delivery Method): room air        I&O's Summary    04 Mar 2025 07:01  -  05 Mar 2025 07:00  --------------------------------------------------------  IN: 1440 mL / OUT: 1250 mL / NET: 190 mL        Physical Exam:  General: NAD, resting comfortably in bed  Pulmonary: Nonlabored breathing, no respiratory distress  Abdominal: soft, non distended, non tender  Extremities: LLE guillotine amp in knee immobilzer, some strikethrough on dressing     LABS:                        9.8    12.32 )-----------( 436      ( 05 Mar 2025 07:16 )             29.9     03-05    139  |  105  |  14  ----------------------------<  106[H]  3.9   |  21[L]  |  0.89    Ca    9.2      05 Mar 2025 07:16  Phos  3.1     03-05  Mg     2.3     03-05      PT/INR - ( 05 Mar 2025 07:16 )   PT: 12.3 sec;   INR: 1.08 ratio           Urinalysis Basic - ( 05 Mar 2025 07:16 )    Color: x / Appearance: x / SG: x / pH: x  Gluc: 106 mg/dL / Ketone: x  / Bili: x / Urobili: x   Blood: x / Protein: x / Nitrite: x   Leuk Esterase: x / RBC: x / WBC x   Sq Epi: x / Non Sq Epi: x / Bacteria: x        RADIOLOGY & ADDITIONAL STUDIES:

## 2025-03-05 NOTE — PHARMACOTHERAPY INTERVENTION NOTE - COMMENTS
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     Pt has been on pip/tazo (+/- vancomycin) since 2/16. Reached out to vascular team a few times between last week and this week to try to clarify indication for continued antibiotics. Since no clear indication, recommended consider ID consult last week. Today, discussed with vascular PA and she agreed to d/c antibiotics.     Thank you,  Gabrielle Farmer, PharmD, BCIDP  Clinical Pharmacist, Infectious Diseases  Available via Teams   Cell: [phone]

## 2025-03-05 NOTE — PROGRESS NOTE ADULT - ASSESSMENT
69M PM of former smoker (1.5 PPD % 50 years; quit 2 years ago),  HTN, HLD, DM, PAD and aortoiliac occlusive disease, who previously underwent an outpatient angiography complicated by right iliac artery dissection. Now S/P Creation of bypass from left femoral artery to distal tibial artery with RSVG and completion angiogram on 2/7/25 s/p 2u pRBC 7.4 from 7.8l not responded to 2nd unit on 2/12/25. EGD completed 2/13 found to have bleeding gastric ulcer w/ hemostasis achieved with cautery. Hgb found to be 6.1 and s/p 2u pRBC. Patient now w/ ischemic changes to LLE.    1. Cardiac Risk Stratification   - No chest pain/SOB  - EKG Sinus tach 117 bpm no ischemic changes   - TTE reviewed with pericardial effusion, unchanged compared to 2023  - Not in decompensated HF   - No hx of tachy/timothy arrhythmias   - No valvular abnormalities  - Moderate risk for moderate risk vascular surgery  - Tolerated well.     2. Hypertension- bp better   - cont losartan 100 mg QD   - cont procardial 60 mg QD   - rec to increase Coreg to 12.5 mg BID   - cont hydralazine 25 mg BID, can give an extra dose for SBP>170     3. Hyperlipidemia   - Lipitor 40mg   - lipid profile     4. Diabetes Mellitus Type II   - FBG per protocol  - ISS   - A1C: 7.7%     5. CAD - TTE with Basal and mid inferior wall and basal inferoseptal segment are abnormal  - Discussed with pt, spouse and daughter in person, they do not know full name or number of his cardiologist to obtain his prior records from  - They deny he has any h/o MI, CP or SOB  - Advised OP follow up with his cards     Mellisa Simmons MD [facility]  Attending Interventional Cardiologist, [facility]/SATHYA.   Avaliable on [employer] Team

## 2025-03-05 NOTE — PROGRESS NOTE ADULT - ASSESSMENT
69M PM of former smoker (1.5 PPD % 50 years; quit 2 years ago),  HTN, HLD, DM, PAD and aortoiliac occlusive disease, who previously underwent an outpatient angiography complicated by right iliac artery dissection. Now S/P Creation of bypass from left femoral artery to distal tibial artery with RSVG and completion angiogram on 2/7/25 s/p 2u pRBC 7.4 from 7.8l not responded to 2nd unit on 2/12/25. EGD completed 2/13 found to have bleeding gastric ulcer w/ hemostasis achieved with cautery. Hgb found to be 6.1 and s/p 2u pRBC. 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       Plan:   - Appreciate GI recs: Continue PPI increase to BID x4 weeks then downgrade to daily dosing; no repeat EGD, repeat EGD in 2 months outpatient   - Uncontrolled HTN requiring adjustments to BP regimen  - Dressing change today   - pain control, IV dilaudid added for SBT pain  - CC diet   - Antibx: Empiric zosyn; vanc d/c 2/28  - Lovenox/Aspirin   - Diet: Regular   - on insulin appreciate endocrine recs  - ASA  - Pain regimen: Tylenol ATC, oxycodone PRN  - Bowel regimen  - PT recs -> YANELY  -Pt pending formalization, plan for next week.     Vascular Surg  [phone]

## 2025-03-05 NOTE — H&P ADULT - RESPIRATORY
Patient discharged home with  providing safe transportation home. DC AVS reviewed with patient and spouse at bedside. Educated about new medications started. Educated that they were to  [facility] [place]. Educated about upcoming follow up appointments. Patient's peripheral iv was removed with pressure dressing applied. Patient sent home with belongings. Patient had no further questions.    no wheezes/no rales/no rhonchi/no respiratory distress

## 2025-03-06 LAB
ANION GAP SERPL CALC-SCNC: 15 MMOL/L — SIGNIFICANT CHANGE UP (ref 5–17)
BUN SERPL-MCNC: 14 MG/DL — SIGNIFICANT CHANGE UP (ref 7–23)
CALCIUM SERPL-MCNC: 9.2 MG/DL — SIGNIFICANT CHANGE UP (ref 8.4–10.5)
CHLORIDE SERPL-SCNC: 101 MMOL/L — SIGNIFICANT CHANGE UP (ref 96–108)
CO2 SERPL-SCNC: 21 MMOL/L — LOW (ref 22–31)
CREAT SERPL-MCNC: 0.86 MG/DL — SIGNIFICANT CHANGE UP (ref 0.5–1.3)
EGFR: 92 ML/MIN/1.73M2 — SIGNIFICANT CHANGE UP
EGFR: 92 ML/MIN/1.73M2 — SIGNIFICANT CHANGE UP
GLUCOSE BLDC GLUCOMTR-MCNC: 109 MG/DL — HIGH (ref 70–99)
GLUCOSE BLDC GLUCOMTR-MCNC: 170 MG/DL — HIGH (ref 70–99)
GLUCOSE BLDC GLUCOMTR-MCNC: 190 MG/DL — HIGH (ref 70–99)
GLUCOSE BLDC GLUCOMTR-MCNC: 215 MG/DL — HIGH (ref 70–99)
GLUCOSE SERPL-MCNC: 96 MG/DL — SIGNIFICANT CHANGE UP (ref 70–99)
HCT VFR BLD CALC: 27.6 % — LOW (ref 39–50)
HGB BLD-MCNC: 8.7 G/DL — LOW (ref 13–17)
MAGNESIUM SERPL-MCNC: 2.2 MG/DL — SIGNIFICANT CHANGE UP (ref 1.6–2.6)
MCHC RBC-ENTMCNC: 29 PG — SIGNIFICANT CHANGE UP (ref 27–34)
MCHC RBC-ENTMCNC: 31.5 G/DL — LOW (ref 32–36)
MCV RBC AUTO: 92 FL — SIGNIFICANT CHANGE UP (ref 80–100)
NRBC BLD AUTO-RTO: 0 /100 WBCS — SIGNIFICANT CHANGE UP (ref 0–0)
PHOSPHATE SERPL-MCNC: 3.1 MG/DL — SIGNIFICANT CHANGE UP (ref 2.5–4.5)
PLATELET # BLD AUTO: 473 K/UL — HIGH (ref 150–400)
POTASSIUM SERPL-MCNC: 3.8 MMOL/L — SIGNIFICANT CHANGE UP (ref 3.5–5.3)
POTASSIUM SERPL-SCNC: 3.8 MMOL/L — SIGNIFICANT CHANGE UP (ref 3.5–5.3)
RBC # BLD: 3 M/UL — LOW (ref 4.2–5.8)
RBC # FLD: 16.9 % — HIGH (ref 10.3–14.5)
SODIUM SERPL-SCNC: 137 MMOL/L — SIGNIFICANT CHANGE UP (ref 135–145)
WBC # BLD: 15.03 K/UL — HIGH (ref 3.8–10.5)
WBC # FLD AUTO: 15.03 K/UL — HIGH (ref 3.8–10.5)

## 2025-03-06 PROCEDURE — 99232 SBSQ HOSP IP/OBS MODERATE 35: CPT

## 2025-03-06 RX ORDER — PIPERACILLIN-TAZO-DEXTROSE,ISO 3.375G/5
3.38 IV SOLUTION, PIGGYBACK PREMIX FROZEN(ML) INTRAVENOUS EVERY 8 HOURS
Refills: 0 | Status: DISCONTINUED | OUTPATIENT
Start: 2025-03-06 | End: 2025-03-12

## 2025-03-06 RX ADMIN — Medication 60 MILLIGRAM(S): at 05:16

## 2025-03-06 RX ADMIN — ENOXAPARIN SODIUM 40 MILLIGRAM(S): 100 INJECTION SUBCUTANEOUS at 17:53

## 2025-03-06 RX ADMIN — Medication 40 MILLIGRAM(S): at 05:15

## 2025-03-06 RX ADMIN — Medication 40 MILLIGRAM(S): at 17:52

## 2025-03-06 RX ADMIN — POLYETHYLENE GLYCOL 3350 17 GRAM(S): 17 POWDER, FOR SOLUTION ORAL at 11:17

## 2025-03-06 RX ADMIN — Medication 81 MILLIGRAM(S): at 11:16

## 2025-03-06 RX ADMIN — OXYCODONE HYDROCHLORIDE 10 MILLIGRAM(S): 30 TABLET ORAL at 07:39

## 2025-03-06 RX ADMIN — INSULIN LISPRO 2: 100 INJECTION, SOLUTION INTRAVENOUS; SUBCUTANEOUS at 13:24

## 2025-03-06 RX ADMIN — Medication 975 MILLIGRAM(S): at 00:16

## 2025-03-06 RX ADMIN — CARVEDILOL 6.25 MILLIGRAM(S): 3.12 TABLET, FILM COATED ORAL at 05:15

## 2025-03-06 RX ADMIN — Medication 6 MILLIGRAM(S): at 22:29

## 2025-03-06 RX ADMIN — Medication 975 MILLIGRAM(S): at 17:52

## 2025-03-06 RX ADMIN — INSULIN LISPRO 2: 100 INJECTION, SOLUTION INTRAVENOUS; SUBCUTANEOUS at 17:52

## 2025-03-06 RX ADMIN — INSULIN LISPRO 5 UNIT(S): 100 INJECTION, SOLUTION INTRAVENOUS; SUBCUTANEOUS at 13:24

## 2025-03-06 RX ADMIN — INSULIN LISPRO 5 UNIT(S): 100 INJECTION, SOLUTION INTRAVENOUS; SUBCUTANEOUS at 09:43

## 2025-03-06 RX ADMIN — Medication 25 MILLIGRAM(S): at 05:16

## 2025-03-06 RX ADMIN — INSULIN GLARGINE-YFGN 13 UNIT(S): 100 INJECTION, SOLUTION SUBCUTANEOUS at 22:28

## 2025-03-06 RX ADMIN — Medication 25 MILLIGRAM(S): at 17:53

## 2025-03-06 RX ADMIN — Medication 975 MILLIGRAM(S): at 05:15

## 2025-03-06 RX ADMIN — Medication 1 TABLET(S): at 22:29

## 2025-03-06 RX ADMIN — OXYCODONE HYDROCHLORIDE 10 MILLIGRAM(S): 30 TABLET ORAL at 08:39

## 2025-03-06 RX ADMIN — OXYCODONE HYDROCHLORIDE 10 MILLIGRAM(S): 30 TABLET ORAL at 17:51

## 2025-03-06 RX ADMIN — Medication 975 MILLIGRAM(S): at 06:15

## 2025-03-06 RX ADMIN — LOSARTAN POTASSIUM 100 MILLIGRAM(S): 100 TABLET, FILM COATED ORAL at 05:15

## 2025-03-06 RX ADMIN — OXYCODONE HYDROCHLORIDE 10 MILLIGRAM(S): 30 TABLET ORAL at 22:28

## 2025-03-06 RX ADMIN — OXYCODONE HYDROCHLORIDE 10 MILLIGRAM(S): 30 TABLET ORAL at 18:51

## 2025-03-06 RX ADMIN — OXYCODONE HYDROCHLORIDE 10 MILLIGRAM(S): 30 TABLET ORAL at 12:16

## 2025-03-06 RX ADMIN — INSULIN LISPRO 5 UNIT(S): 100 INJECTION, SOLUTION INTRAVENOUS; SUBCUTANEOUS at 17:53

## 2025-03-06 RX ADMIN — OXYCODONE HYDROCHLORIDE 10 MILLIGRAM(S): 30 TABLET ORAL at 11:16

## 2025-03-06 RX ADMIN — CYANOCOBALAMIN 1000 MICROGRAM(S): 1000 INJECTION INTRAMUSCULAR; SUBCUTANEOUS at 11:16

## 2025-03-06 RX ADMIN — Medication 975 MILLIGRAM(S): at 11:16

## 2025-03-06 RX ADMIN — Medication 975 MILLIGRAM(S): at 12:16

## 2025-03-06 RX ADMIN — Medication 1 TABLET(S): at 18:01

## 2025-03-06 RX ADMIN — Medication 25 GRAM(S): at 13:21

## 2025-03-06 RX ADMIN — Medication 25 GRAM(S): at 22:30

## 2025-03-06 RX ADMIN — CARVEDILOL 6.25 MILLIGRAM(S): 3.12 TABLET, FILM COATED ORAL at 17:52

## 2025-03-06 RX ADMIN — GABAPENTIN 100 MILLIGRAM(S): 400 CAPSULE ORAL at 11:16

## 2025-03-06 RX ADMIN — Medication 975 MILLIGRAM(S): at 18:51

## 2025-03-06 NOTE — PROGRESS NOTE ADULT - ASSESSMENT
69M PM of former smoker (1.5 PPD % 50 years; quit 2 years ago),  HTN, HLD, DM, PAD and aortoiliac occlusive disease, who previously underwent an outpatient angiography complicated by right iliac artery dissection. Now S/P Creation of bypass from left femoral artery to distal tibial artery with RSVG and completion angiogram on 2/7/25 s/p 2u pRBC 7.4 from 7.8l not responded to 2nd unit on 2/12/25. EGD completed 2/13 found to have bleeding gastric ulcer w/ hemostasis achieved with cautery. Hgb found to be 6.1 and s/p 2u pRBC. Patient now w/ ischemic changes to LLE.    1. Cardiac Risk Stratification   - No chest pain/SOB  - EKG Sinus tach 117 bpm no ischemic changes   - TTE reviewed with pericardial effusion, unchanged compared to 2023  - Not in decompensated HF   - No hx of tachy/timothy arrhythmias   - No valvular abnormalities  - Moderate risk for moderate risk vascular surgery  - Tolerated well  - pending formalization next week     2. Hypertension- bp better   - cont losartan 100 mg QD   - cont procardial 60 mg QD   - rec to increase Coreg to 12.5 mg BID   - cont hydralazine 25 mg BID, can give an extra dose for SBP>170     3. Hyperlipidemia   - Lipitor 40mg   - lipid profile     4. Diabetes Mellitus Type II   - FBG per protocol  - ISS   - A1C: 7.7%     5. CAD - TTE with Basal and mid inferior wall and basal inferoseptal segment are abnormal  - Discussed with pt, spouse and daughter in person, they do not know full name or number of his cardiologist to obtain his prior records from  - They deny he has any h/o MI, CP or SOB  - Advised OP follow up with his cards     Mellisa Simmons MD [facility]  Attending Interventional Cardiologist, [facility]ATH[facility].   Avaliable on [employer] Team

## 2025-03-06 NOTE — PROGRESS NOTE ADULT - SUBJECTIVE AND OBJECTIVE BOX
SURGERY DAILY PROGRESS NOTE    24 Hour/Overnight Events: No acute events overnight    SUBJECTIVE: Patient seen and evaluated on AM rounds. Pt is resting comfortably in bed with no acute complaints. Tolerating diet.   Denies fevers/chills, chest pain, dyspnea, abdominal pain, nausea, vomiting, and diarrhea    ------------------------------------------------------------------------------------------------------------  OBJECTIVE:  Vital Signs Last 24 Hrs  T(C): 37.3 (06 Mar 2025 05:10), Max: 37.3 (06 Mar 2025 05:10)  T(F): 99.2 (06 Mar 2025 05:10), Max: 99.2 (06 Mar 2025 05:10)  HR: 97 (06 Mar 2025 05:10) (91 - 102)  BP: 157/75 (06 Mar 2025 05:10) (123/65 - 163/75)  BP(mean): --  RR: 18 (06 Mar 2025 05:10) (18 - 18)  SpO2: 96% (06 Mar 2025 05:10) (96% - 98%)    Parameters below as of 06 Mar 2025 05:10  Patient On (Oxygen Delivery Method): room air      I&O's Detail    05 Mar 2025 07:01  -  06 Mar 2025 07:00  --------------------------------------------------------  IN:    Oral Fluid: 480 mL  Total IN: 480 mL    OUT:    Voided (mL): 1025 mL  Total OUT: 1025 mL    Total NET: -545 mL          LABS:                        8.7    15.03 )-----------( 473      ( 06 Mar 2025 06:51 )             27.6     03-06    137  |  101  |  14  ----------------------------<  96  3.8   |  21[L]  |  0.86    Ca    9.2      06 Mar 2025 06:51  Phos  3.1     03-06  Mg     2.2     03-06        PT/INR - ( 05 Mar 2025 07:16 )   PT: 12.3 sec;   INR: 1.08 ratio           Urinalysis Basic - ( 06 Mar 2025 06:51 )    Color: x / Appearance: x / SG: x / pH: x  Gluc: 96 mg/dL / Ketone: x  / Bili: x / Urobili: x   Blood: x / Protein: x / Nitrite: x   Leuk Esterase: x / RBC: x / WBC x   Sq Epi: x / Non Sq Epi: x / Bacteria: x    Physical Exam:  General: NAD, resting comfortably in bed  Pulmonary: Nonlabored breathing, no respiratory distress  Abdominal: soft, non distended, non tender  Extremities: LLE tiffany amp in knee immobilzer, some strikethrough on dressing

## 2025-03-06 NOTE — PROGRESS NOTE ADULT - ASSESSMENT
69M PM of former smoker (1.5 PPD % 50 years; quit 2 years ago),  HTN, HLD, DM, PAD and aortoiliac occlusive disease, who previously underwent an outpatient angiography complicated by right iliac artery dissection. Now S/P Creation of bypass from left femoral artery to distal tibial artery with RSVG and completion angiogram on 2/7/25 s/p 2u pRBC 7.4 from 7.8l not responded to 2nd unit on 2/12/25. EGD completed 2/13 found to have bleeding gastric ulcer w/ hemostasis achieved with cautery. Hgb found to be 6.1 and s/p 2u pRBC. 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       Plan:   - Pending formalization next week   - Appreciate GI recs: Continue PPI increase to BID x4 weeks then downgrade to daily dosing; no repeat EGD, repeat EGD in 2 months outpatient   - BP regimen adjusted and better controlled; appreciate cards recs   - Dressing change today   - CC diet   - Antibx: zosyn dc on 3/5/25; nanette d/c 2/28, no need for abx at this time given source control   - Lovenox/Aspirin   - Diet: Regular   - on insulin appreciate endocrine recs  - ASA  - Pain regimen: Tylenol ATC, oxycodone PRN  - Bowel regimen  - PT recs -> YANELY    Vascular Surg  [id number]

## 2025-03-06 NOTE — PROGRESS NOTE ADULT - SUBJECTIVE AND OBJECTIVE BOX
[facility] Attending Follow-up Note     Covering for Dr. Dawkins     Patient seen and examined at bedside.    Overnight Events:     events noted     REVIEW OF SYSTEMS:  Constitutional:     [x ] negative [ ] fevers [ ] chills [ ] weight loss [ ] weight gain  HEENT:                  [x ] negative [ ] dry eyes [ ] eye irritation [ ] postnasal drip [ ] nasal congestion  CV:                         [ x] negative  [ ] chest pain [ ] orthopnea [ ] palpitations [ ] murmur  Resp:                     [ x] negative [ ] cough [ ] shortness of breath [ ] dyspnea [ ] wheezing [ ] sputum [ ]hemoptysis  GI:                          [ x] negative [ ] nausea [ ] vomiting [ ] diarrhea [ ] constipation [ ] abd pain [ ] dysphagia   :                        [ x] negative [ ] dysuria [ ] nocturia [ ] hematuria [ ] increased urinary frequency  Musculoskeletal: [ x] negative [ ] back pain [ ] myalgias [ ] arthralgias [ ] fracture  Skin:                       [ x] negative [ ] rash [ ] itch  Neurological:        [x ] negative [ ] headache [ ] dizziness [ ] syncope [ ] weakness [ ] numbness  Psychiatric:           [ x] negative [ ] anxiety [ ] depression  Endocrine:            [ x] negative [ ] diabetes [ ] thyroid problem  Heme/Lymph:      [ x] negative [ ] anemia [ ] bleeding problem  Allergic/Immune: [ x] negative [ ] itchy eyes [ ] nasal discharge [ ] hives [ ] angioedema    [ x] All other systems negative  [ ] Unable to assess ROS due to    Current Meds:  acetaminophen     Tablet .. 975 milliGRAM(s) Oral every 6 hours  aspirin  chewable 81 milliGRAM(s) Oral daily  bisacodyl 5 milliGRAM(s) Oral daily PRN  carvedilol 6.25 milliGRAM(s) Oral every 12 hours  cyanocobalamin 1000 MICROGram(s) Oral daily  dextrose 5%. 1000 milliLiter(s) IV Continuous <Continuous>  dextrose 50% Injectable 25 Gram(s) IV Push once  dextrose 50% Injectable 12.5 Gram(s) IV Push once  dextrose Oral Gel 15 Gram(s) Oral once PRN  enoxaparin Injectable 40 milliGRAM(s) SubCutaneous every 24 hours  gabapentin 100 milliGRAM(s) Oral daily  hydrALAZINE 25 milliGRAM(s) Oral every 12 hours  HYDROmorphone  Injectable 0.5 milliGRAM(s) IV Push every 6 hours PRN  influenza  Vaccine (HIGH DOSE) 0.5 milliLiter(s) IntraMuscular once  insulin glargine Injectable (LANTUS) 13 Unit(s) SubCutaneous at bedtime  insulin lispro (ADMELOG) corrective regimen sliding scale   SubCutaneous three times a day before meals  insulin lispro (ADMELOG) corrective regimen sliding scale   SubCutaneous at bedtime  insulin lispro Injectable (ADMELOG) 5 Unit(s) SubCutaneous three times a day with meals  lactobacillus acidophilus 1 Tablet(s) Oral with dinner  losartan 100 milliGRAM(s) Oral daily  melatonin 6 milliGRAM(s) Oral at bedtime  NIFEdipine XL 60 milliGRAM(s) Oral daily  oxyCODONE    IR 10 milliGRAM(s) Oral every 4 hours PRN  oxyCODONE    IR 5 milliGRAM(s) Oral every 6 hours PRN  pantoprazole    Tablet 40 milliGRAM(s) Oral two times a day  piperacillin/tazobactam IVPB.. 3.375 Gram(s) IV Intermittent every 8 hours  polyethylene glycol 3350 17 Gram(s) Oral daily  senna 1 Tablet(s) Oral at bedtime      PAST MEDICAL & SURGICAL HISTORY:  HTN (hypertension)      HLD (hyperlipidemia)      DM (diabetes mellitus)      PAD (peripheral artery disease)      H/O iron deficiency      [place] (hard of hearing)      Peripheral neuropathy      Peripheral vascular disease, unspecified      Former smoker      CVA (cerebrovascular accident)      S/P appendectomy      S/P peripheral artery angioplasty with stent placement      S/P angiography      S/P cataract surgery          Vitals:  T(F): 98.9 (03-10), Max: 98.9 (03-10)  HR: 105 (03-10) (98 - 105)  BP: 134/72 (03-10) (121/62 - 143/74)  RR: 18 (03-10)  SpO2: 97% (03-10)  I&O's Summary    08 Mar 2025 06:01  -  09 Mar 2025 07:00  --------------------------------------------------------  IN: 910 mL / OUT: 1775 mL / NET: -865 mL    09 Mar 2025 07:01  -  10 Mar 2025 01:45  --------------------------------------------------------  IN: 830 mL / OUT: 975 mL / NET: -145 mL        Physical Exam:  Appearance: No acute distress  HENT: No JVD   Cardiovascular: RRR, S1/S2, no murmurs  Respiratory: CTABL  Gastrointestinal: soft, NT ND, +BS  Musculoskeletal: No clubbing, no edema   Neurologic: Non-focal  Skin: No rashes, ecchymoses, or cyanosis                          7.5    12.13 )-----------( 628      ( 09 Mar 2025 07:50 )             23.8     03-09    133[L]  |  96  |  14  ----------------------------<  202[H]  3.8   |  22  |  0.91    Ca    9.5      09 Mar 2025 07:50  Phos  3.0     03-09  Mg     2.3     03-09      PT/INR - ( 09 Mar 2025 07:50 )   PT: 12.4 sec;   INR: 1.08 ratio                       Cardiovascular Testings:

## 2025-03-07 LAB
ANION GAP SERPL CALC-SCNC: 15 MMOL/L — SIGNIFICANT CHANGE UP (ref 5–17)
BUN SERPL-MCNC: 18 MG/DL — SIGNIFICANT CHANGE UP (ref 7–23)
CALCIUM SERPL-MCNC: 9.2 MG/DL — SIGNIFICANT CHANGE UP (ref 8.4–10.5)
CHLORIDE SERPL-SCNC: 99 MMOL/L — SIGNIFICANT CHANGE UP (ref 96–108)
CO2 SERPL-SCNC: 21 MMOL/L — LOW (ref 22–31)
CREAT SERPL-MCNC: 0.95 MG/DL — SIGNIFICANT CHANGE UP (ref 0.5–1.3)
EGFR: 85 ML/MIN/1.73M2 — SIGNIFICANT CHANGE UP
EGFR: 85 ML/MIN/1.73M2 — SIGNIFICANT CHANGE UP
GLUCOSE BLDC GLUCOMTR-MCNC: 183 MG/DL — HIGH (ref 70–99)
GLUCOSE BLDC GLUCOMTR-MCNC: 183 MG/DL — HIGH (ref 70–99)
GLUCOSE BLDC GLUCOMTR-MCNC: 199 MG/DL — HIGH (ref 70–99)
GLUCOSE BLDC GLUCOMTR-MCNC: 212 MG/DL — HIGH (ref 70–99)
GLUCOSE BLDC GLUCOMTR-MCNC: 232 MG/DL — HIGH (ref 70–99)
GLUCOSE SERPL-MCNC: 217 MG/DL — HIGH (ref 70–99)
HCT VFR BLD CALC: 24.4 % — LOW (ref 39–50)
HGB BLD-MCNC: 7.8 G/DL — LOW (ref 13–17)
INR BLD: 1.1 RATIO — SIGNIFICANT CHANGE UP (ref 0.85–1.16)
MAGNESIUM SERPL-MCNC: 2.4 MG/DL — SIGNIFICANT CHANGE UP (ref 1.6–2.6)
MCHC RBC-ENTMCNC: 29 PG — SIGNIFICANT CHANGE UP (ref 27–34)
MCHC RBC-ENTMCNC: 32 G/DL — SIGNIFICANT CHANGE UP (ref 32–36)
MCV RBC AUTO: 90.7 FL — SIGNIFICANT CHANGE UP (ref 80–100)
NRBC BLD AUTO-RTO: 0 /100 WBCS — SIGNIFICANT CHANGE UP (ref 0–0)
PHOSPHATE SERPL-MCNC: 3.1 MG/DL — SIGNIFICANT CHANGE UP (ref 2.5–4.5)
PLATELET # BLD AUTO: 476 K/UL — HIGH (ref 150–400)
POTASSIUM SERPL-MCNC: 3.7 MMOL/L — SIGNIFICANT CHANGE UP (ref 3.5–5.3)
POTASSIUM SERPL-SCNC: 3.7 MMOL/L — SIGNIFICANT CHANGE UP (ref 3.5–5.3)
PROTHROM AB SERPL-ACNC: 12.5 SEC — SIGNIFICANT CHANGE UP (ref 9.9–13.4)
RBC # BLD: 2.69 M/UL — LOW (ref 4.2–5.8)
RBC # FLD: 17 % — HIGH (ref 10.3–14.5)
SODIUM SERPL-SCNC: 135 MMOL/L — SIGNIFICANT CHANGE UP (ref 135–145)
WBC # BLD: 13.95 K/UL — HIGH (ref 3.8–10.5)
WBC # FLD AUTO: 13.95 K/UL — HIGH (ref 3.8–10.5)

## 2025-03-07 PROCEDURE — 99232 SBSQ HOSP IP/OBS MODERATE 35: CPT

## 2025-03-07 RX ORDER — OXYCODONE HYDROCHLORIDE 30 MG/1
10 TABLET ORAL EVERY 4 HOURS
Refills: 0 | Status: DISCONTINUED | OUTPATIENT
Start: 2025-03-07 | End: 2025-03-14

## 2025-03-07 RX ADMIN — CYANOCOBALAMIN 1000 MICROGRAM(S): 1000 INJECTION INTRAMUSCULAR; SUBCUTANEOUS at 11:23

## 2025-03-07 RX ADMIN — INSULIN LISPRO 4: 100 INJECTION, SOLUTION INTRAVENOUS; SUBCUTANEOUS at 18:16

## 2025-03-07 RX ADMIN — OXYCODONE HYDROCHLORIDE 10 MILLIGRAM(S): 30 TABLET ORAL at 06:35

## 2025-03-07 RX ADMIN — Medication 40 MILLIGRAM(S): at 18:14

## 2025-03-07 RX ADMIN — OXYCODONE HYDROCHLORIDE 10 MILLIGRAM(S): 30 TABLET ORAL at 13:51

## 2025-03-07 RX ADMIN — Medication 40 MILLIGRAM(S): at 05:35

## 2025-03-07 RX ADMIN — INSULIN LISPRO 5 UNIT(S): 100 INJECTION, SOLUTION INTRAVENOUS; SUBCUTANEOUS at 12:57

## 2025-03-07 RX ADMIN — INSULIN LISPRO 2: 100 INJECTION, SOLUTION INTRAVENOUS; SUBCUTANEOUS at 12:57

## 2025-03-07 RX ADMIN — Medication 81 MILLIGRAM(S): at 11:23

## 2025-03-07 RX ADMIN — OXYCODONE HYDROCHLORIDE 10 MILLIGRAM(S): 30 TABLET ORAL at 10:35

## 2025-03-07 RX ADMIN — Medication 0.5 MILLIGRAM(S): at 11:12

## 2025-03-07 RX ADMIN — Medication 975 MILLIGRAM(S): at 19:13

## 2025-03-07 RX ADMIN — Medication 975 MILLIGRAM(S): at 00:07

## 2025-03-07 RX ADMIN — OXYCODONE HYDROCHLORIDE 10 MILLIGRAM(S): 30 TABLET ORAL at 18:13

## 2025-03-07 RX ADMIN — Medication 975 MILLIGRAM(S): at 11:53

## 2025-03-07 RX ADMIN — Medication 0.5 MILLIGRAM(S): at 11:27

## 2025-03-07 RX ADMIN — Medication 25 GRAM(S): at 13:49

## 2025-03-07 RX ADMIN — Medication 6 MILLIGRAM(S): at 23:40

## 2025-03-07 RX ADMIN — LOSARTAN POTASSIUM 100 MILLIGRAM(S): 100 TABLET, FILM COATED ORAL at 05:35

## 2025-03-07 RX ADMIN — INSULIN LISPRO 2: 100 INJECTION, SOLUTION INTRAVENOUS; SUBCUTANEOUS at 10:37

## 2025-03-07 RX ADMIN — Medication 1 TABLET(S): at 23:41

## 2025-03-07 RX ADMIN — INSULIN LISPRO 5 UNIT(S): 100 INJECTION, SOLUTION INTRAVENOUS; SUBCUTANEOUS at 10:37

## 2025-03-07 RX ADMIN — Medication 25 MILLIGRAM(S): at 05:35

## 2025-03-07 RX ADMIN — Medication 975 MILLIGRAM(S): at 11:23

## 2025-03-07 RX ADMIN — Medication 975 MILLIGRAM(S): at 01:07

## 2025-03-07 RX ADMIN — Medication 25 GRAM(S): at 23:33

## 2025-03-07 RX ADMIN — OXYCODONE HYDROCHLORIDE 10 MILLIGRAM(S): 30 TABLET ORAL at 19:13

## 2025-03-07 RX ADMIN — Medication 25 GRAM(S): at 05:32

## 2025-03-07 RX ADMIN — OXYCODONE HYDROCHLORIDE 10 MILLIGRAM(S): 30 TABLET ORAL at 09:35

## 2025-03-07 RX ADMIN — Medication 975 MILLIGRAM(S): at 23:39

## 2025-03-07 RX ADMIN — INSULIN LISPRO 5 UNIT(S): 100 INJECTION, SOLUTION INTRAVENOUS; SUBCUTANEOUS at 18:16

## 2025-03-07 RX ADMIN — Medication 25 MILLIGRAM(S): at 18:14

## 2025-03-07 RX ADMIN — Medication 60 MILLIGRAM(S): at 05:35

## 2025-03-07 RX ADMIN — OXYCODONE HYDROCHLORIDE 10 MILLIGRAM(S): 30 TABLET ORAL at 05:35

## 2025-03-07 RX ADMIN — INSULIN LISPRO 0: 100 INJECTION, SOLUTION INTRAVENOUS; SUBCUTANEOUS at 22:34

## 2025-03-07 RX ADMIN — CARVEDILOL 6.25 MILLIGRAM(S): 3.12 TABLET, FILM COATED ORAL at 05:35

## 2025-03-07 RX ADMIN — ENOXAPARIN SODIUM 40 MILLIGRAM(S): 100 INJECTION SUBCUTANEOUS at 18:14

## 2025-03-07 RX ADMIN — Medication 975 MILLIGRAM(S): at 06:35

## 2025-03-07 RX ADMIN — INSULIN GLARGINE-YFGN 13 UNIT(S): 100 INJECTION, SOLUTION SUBCUTANEOUS at 22:33

## 2025-03-07 RX ADMIN — Medication 975 MILLIGRAM(S): at 18:13

## 2025-03-07 RX ADMIN — OXYCODONE HYDROCHLORIDE 10 MILLIGRAM(S): 30 TABLET ORAL at 00:07

## 2025-03-07 RX ADMIN — Medication 975 MILLIGRAM(S): at 05:35

## 2025-03-07 RX ADMIN — GABAPENTIN 100 MILLIGRAM(S): 400 CAPSULE ORAL at 11:23

## 2025-03-07 RX ADMIN — OXYCODONE HYDROCHLORIDE 10 MILLIGRAM(S): 30 TABLET ORAL at 23:38

## 2025-03-07 RX ADMIN — CARVEDILOL 6.25 MILLIGRAM(S): 3.12 TABLET, FILM COATED ORAL at 18:13

## 2025-03-07 RX ADMIN — Medication 1 TABLET(S): at 18:13

## 2025-03-07 RX ADMIN — OXYCODONE HYDROCHLORIDE 10 MILLIGRAM(S): 30 TABLET ORAL at 14:51

## 2025-03-07 NOTE — PROGRESS NOTE ADULT - ASSESSMENT
A 70-year-old male with a medical history of hypertension, hyperlipidemia, diabetes mellitus, peripheral artery disease, and aortoiliac occlusive disease, previously underwent outpatient angiography that resulted in a right iliac artery dissection. He is now status post for the creation of a bypass from the left femoral artery to the distal tibial artery using a reversed saphenous vein graft, with a completion angiogram completed on February 7, 2025. T    The Endocrine team has been consulted due to his uncontrolled diabetes. Endocrine re-consulted for hyperglycemia. Patient reports decreased appetite at times and sometimes eating mostly full meals, however does drink Glucerna drink which has 27g CHO per serving if not eating full meal. FBG stable Please make sure mealtime insulin if given when patient is eating meal!!! Endocrine will closely monitor BG and adjust insulin as needed for BG goal 100-180mg/dL inpatient.     #Type 2 diabetes mellitus   A1C with Estimated Average Glucose Result: 7.7 % (01-28-25 @ 10:05)  A1C with Estimated Average Glucose Result: 7.4 % (12-06-24 @ 09:06)  Home regimen: Jardiance 10mg once daily, Actos 30mg once daily, Jentadueto 2.5-2g BID

## 2025-03-07 NOTE — PROGRESS NOTE ADULT - SUBJECTIVE AND OBJECTIVE BOX
Chief Complaint: Diabetes Mellitus follow up    INTERVAL HX:  Patient seen at bedside.   Reports his appetite is improving and eating some food on each tray.   BG over the last 24 hrs have been within goal range 100-180mg/dl. No hypoglycemia.     Review of Systems:  General: As above  GI: No nausea, vomiting  Endocrine: no  S&Sx of hypoglycemia    Allergies    Advil (Rash)    Intolerances      MEDICATIONS  (STANDING):  acetaminophen     Tablet .. 975 milliGRAM(s) Oral every 6 hours  aspirin  chewable 81 milliGRAM(s) Oral daily  carvedilol 6.25 milliGRAM(s) Oral every 12 hours  cyanocobalamin 1000 MICROGram(s) Oral daily  dextrose 5%. 1000 milliLiter(s) (50 mL/Hr) IV Continuous <Continuous>  dextrose 50% Injectable 25 Gram(s) IV Push once  dextrose 50% Injectable 12.5 Gram(s) IV Push once  enoxaparin Injectable 40 milliGRAM(s) SubCutaneous every 24 hours  gabapentin 100 milliGRAM(s) Oral daily  hydrALAZINE 25 milliGRAM(s) Oral every 12 hours  influenza  Vaccine (HIGH DOSE) 0.5 milliLiter(s) IntraMuscular once  insulin glargine Injectable (LANTUS) 13 Unit(s) SubCutaneous at bedtime  insulin lispro (ADMELOG) corrective regimen sliding scale   SubCutaneous three times a day before meals  insulin lispro (ADMELOG) corrective regimen sliding scale   SubCutaneous at bedtime  insulin lispro Injectable (ADMELOG) 5 Unit(s) SubCutaneous three times a day with meals  lactobacillus acidophilus 1 Tablet(s) Oral with dinner  losartan 100 milliGRAM(s) Oral daily  melatonin 6 milliGRAM(s) Oral at bedtime  NIFEdipine XL 60 milliGRAM(s) Oral daily  pantoprazole    Tablet 40 milliGRAM(s) Oral two times a day  piperacillin/tazobactam IVPB.. 3.375 Gram(s) IV Intermittent every 8 hours  polyethylene glycol 3350 17 Gram(s) Oral daily  senna 1 Tablet(s) Oral at bedtime        insulin glargine Injectable (LANTUS)   13 Unit(s) SubCutaneous (03-06-25 @ 22:28)    insulin lispro (ADMELOG) corrective regimen sliding scale   2 Unit(s) SubCutaneous (03-07-25 @ 12:57)   2 Unit(s) SubCutaneous (03-07-25 @ 10:37)   2 Unit(s) SubCutaneous (03-06-25 @ 17:52)    insulin lispro Injectable (ADMELOG)   5 Unit(s) SubCutaneous (03-07-25 @ 12:57)   5 Unit(s) SubCutaneous (03-07-25 @ 10:37)   5 Unit(s) SubCutaneous (03-06-25 @ 17:53)        PHYSICAL EXAM:  VITALS: T(C): 37 (03-07-25 @ 13:24)  T(F): 98.6 (03-07-25 @ 13:24), Max: 99.3 (03-06-25 @ 21:36)  HR: 93 (03-07-25 @ 13:24) (93 - 103)  BP: 104/64 (03-07-25 @ 13:24) (104/64 - 150/74)  RR:  (18 - 18)  SpO2:  (97% - 98%)  Wt(kg): --  GENERAL: NAD  Respiratory: Respirations unlabored   Extremities: Warm, left guillotine amputation  NEURO: Alert , appropriate     LABS:  POCT Blood Glucose.: 183 mg/dL (03-07-25 @ 12:55)  POCT Blood Glucose.: 183 mg/dL (03-07-25 @ 10:28)  POCT Blood Glucose.: 215 mg/dL (03-06-25 @ 22:12)  POCT Blood Glucose.: 190 mg/dL (03-06-25 @ 17:28)  POCT Blood Glucose.: 170 mg/dL (03-06-25 @ 13:17)  POCT Blood Glucose.: 109 mg/dL (03-06-25 @ 09:42)  POCT Blood Glucose.: 178 mg/dL (03-05-25 @ 21:44)  POCT Blood Glucose.: 100 mg/dL (03-05-25 @ 18:44)  POCT Blood Glucose.: 204 mg/dL (03-05-25 @ 14:37)  POCT Blood Glucose.: 174 mg/dL (03-05-25 @ 12:45)  POCT Blood Glucose.: 116 mg/dL (03-05-25 @ 09:58)  POCT Blood Glucose.: 88 mg/dL (03-04-25 @ 22:11)  POCT Blood Glucose.: 171 mg/dL (03-04-25 @ 18:32)                          7.8    13.95 )-----------( 476      ( 07 Mar 2025 07:06 )             24.4     03-07    135  |  99  |  18  ----------------------------<  217[H]  3.7   |  21[L]  |  0.95    Ca    9.2      07 Mar 2025 07:06  Phos  3.1     03-07  Mg     2.4     03-07      eGFR: 85 mL/min/1.73m2 (07 Mar 2025 07:06)      Thyroid Function Tests:      A1C with Estimated Average Glucose Result: 7.7 % (01-28-25 @ 10:05)    Estimated Average Glucose: 174 mg/dL (01-28-25 @ 10:05)        Diet, Consistent Carbohydrate w/Evening Snack:   Supplement Feeding Modality:  Oral  Glucerna Shake Cans or Servings Per Day:  3       Frequency:  Daily (03-02-25 @ 12:58) [Active]

## 2025-03-07 NOTE — PROGRESS NOTE ADULT - SUBJECTIVE AND OBJECTIVE BOX
[facility] Attending Follow-up Note     Covering for Dr. Dawkins     Patient seen and examined at bedside.    Overnight Events:     events noted     REVIEW OF SYSTEMS:  Constitutional:     [x ] negative [ ] fevers [ ] chills [ ] weight loss [ ] weight gain  HEENT:                  [x ] negative [ ] dry eyes [ ] eye irritation [ ] postnasal drip [ ] nasal congestion  CV:                         [ x] negative  [ ] chest pain [ ] orthopnea [ ] palpitations [ ] murmur  Resp:                     [ x] negative [ ] cough [ ] shortness of breath [ ] dyspnea [ ] wheezing [ ] sputum [ ]hemoptysis  GI:                          [ x] negative [ ] nausea [ ] vomiting [ ] diarrhea [ ] constipation [ ] abd pain [ ] dysphagia   :                        [ x] negative [ ] dysuria [ ] nocturia [ ] hematuria [ ] increased urinary frequency  Musculoskeletal: [ x] negative [ ] back pain [ ] myalgias [ ] arthralgias [ ] fracture  Skin:                       [ x] negative [ ] rash [ ] itch  Neurological:        [x ] negative [ ] headache [ ] dizziness [ ] syncope [ ] weakness [ ] numbness  Psychiatric:           [ x] negative [ ] anxiety [ ] depression  Endocrine:            [ x] negative [ ] diabetes [ ] thyroid problem  Heme/Lymph:      [ x] negative [ ] anemia [ ] bleeding problem  Allergic/Immune: [ x] negative [ ] itchy eyes [ ] nasal discharge [ ] hives [ ] angioedema    [ x] All other systems negative  [ ] Unable to assess ROS due to    Current Meds:  acetaminophen     Tablet .. 975 milliGRAM(s) Oral every 6 hours  aspirin  chewable 81 milliGRAM(s) Oral daily  bisacodyl 5 milliGRAM(s) Oral daily PRN  carvedilol 6.25 milliGRAM(s) Oral every 12 hours  cyanocobalamin 1000 MICROGram(s) Oral daily  dextrose 5%. 1000 milliLiter(s) IV Continuous <Continuous>  dextrose 50% Injectable 25 Gram(s) IV Push once  dextrose 50% Injectable 12.5 Gram(s) IV Push once  dextrose Oral Gel 15 Gram(s) Oral once PRN  enoxaparin Injectable 40 milliGRAM(s) SubCutaneous every 24 hours  gabapentin 100 milliGRAM(s) Oral daily  hydrALAZINE 25 milliGRAM(s) Oral every 12 hours  HYDROmorphone  Injectable 0.5 milliGRAM(s) IV Push every 6 hours PRN  influenza  Vaccine (HIGH DOSE) 0.5 milliLiter(s) IntraMuscular once  insulin glargine Injectable (LANTUS) 13 Unit(s) SubCutaneous at bedtime  insulin lispro (ADMELOG) corrective regimen sliding scale   SubCutaneous three times a day before meals  insulin lispro (ADMELOG) corrective regimen sliding scale   SubCutaneous at bedtime  insulin lispro Injectable (ADMELOG) 5 Unit(s) SubCutaneous three times a day with meals  lactobacillus acidophilus 1 Tablet(s) Oral with dinner  losartan 100 milliGRAM(s) Oral daily  melatonin 6 milliGRAM(s) Oral at bedtime  NIFEdipine XL 60 milliGRAM(s) Oral daily  oxyCODONE    IR 5 milliGRAM(s) Oral every 6 hours PRN  oxyCODONE    IR 10 milliGRAM(s) Oral every 4 hours PRN  pantoprazole    Tablet 40 milliGRAM(s) Oral two times a day  piperacillin/tazobactam IVPB.. 3.375 Gram(s) IV Intermittent every 8 hours  polyethylene glycol 3350 17 Gram(s) Oral daily  senna 1 Tablet(s) Oral at bedtime      PAST MEDICAL & SURGICAL HISTORY:  HTN (hypertension)      HLD (hyperlipidemia)      DM (diabetes mellitus)      PAD (peripheral artery disease)      H/O iron deficiency      [place] (hard of hearing)      Peripheral neuropathy      Peripheral vascular disease, unspecified      Former smoker      CVA (cerebrovascular accident)      S/P appendectomy      S/P peripheral artery angioplasty with stent placement      S/P angiography      S/P cataract surgery          Vitals:  T(F): 98.9 (03-10), Max: 98.9 (03-10)  HR: 105 (03-10) (98 - 105)  BP: 134/72 (03-10) (121/62 - 143/74)  RR: 18 (03-10)  SpO2: 97% (03-10)  I&O's Summary    08 Mar 2025 06:01  -  09 Mar 2025 07:00  --------------------------------------------------------  IN: 910 mL / OUT: 1775 mL / NET: -865 mL    09 Mar 2025 07:01  -  10 Mar 2025 01:47  --------------------------------------------------------  IN: 830 mL / OUT: 975 mL / NET: -145 mL        Physical Exam:  Appearance: No acute distress  HENT: No JVD   Cardiovascular: RRR, S1/S2, no murmurs  Respiratory: CTABL  Gastrointestinal: soft, NT ND, +BS  Musculoskeletal: No clubbing, no edema   Neurologic: Non-focal  Skin: No rashes, ecchymoses, or cyanosis                          7.5    12.13 )-----------( 628      ( 09 Mar 2025 07:50 )             23.8     03-09    133[L]  |  96  |  14  ----------------------------<  202[H]  3.8   |  22  |  0.91    Ca    9.5      09 Mar 2025 07:50  Phos  3.0     03-09  Mg     2.3     03-09      PT/INR - ( 09 Mar 2025 07:50 )   PT: 12.4 sec;   INR: 1.08 ratio                       Cardiovascular Testings:

## 2025-03-07 NOTE — PROGRESS NOTE ADULT - ASSESSMENT
69M PM of former smoker (1.5 PPD % 50 years; quit 2 years ago),  HTN, HLD, DM, PAD and aortoiliac occlusive disease, who previously underwent an outpatient angiography complicated by right iliac artery dissection. Now S/P Creation of bypass from left femoral artery to distal tibial artery with RSVG and completion angiogram on 2/7/25 s/p 2u pRBC 7.4 from 7.8l not responded to 2nd unit on 2/12/25. EGD completed 2/13 found to have bleeding gastric ulcer w/ hemostasis achieved with cautery. Hgb found to be 6.1 and s/p 2u pRBC. Patient now w/ ischemic changes to LLE.    1. Cardiac Risk Stratification   - No chest pain/SOB  - EKG Sinus tach 117 bpm no ischemic changes   - TTE reviewed with pericardial effusion, unchanged compared to 2023  - Not in decompensated HF   - No hx of tachy/timothy arrhythmias   - No valvular abnormalities  - Moderate risk for moderate risk vascular surgery  - Tolerated well  - pending formalization next week     2. Hypertension- bp better   - cont losartan 100 mg QD   - cont procardial 60 mg QD   - rec to increase Coreg to 12.5 mg BID   - cont hydralazine 25 mg BID, can give an extra dose for SBP>170     3. Hyperlipidemia   - Lipitor 40mg   - lipid profile     4. Diabetes Mellitus Type II   - FBG per protocol  - ISS   - A1C: 7.7%     5. CAD - TTE with Basal and mid inferior wall and basal inferoseptal segment are abnormal  - Discussed with pt, spouse and daughter in person, they do not know full name or number of his cardiologist to obtain his prior records from  - They deny he has any h/o MI, CP or SOB  - Advised OP follow up with his cards     Mellisa Simmons MD [facility]  Attending Interventional Cardiologist, [facility].   Avaliable on [employer] Team

## 2025-03-07 NOTE — PROGRESS NOTE ADULT - ASSESSMENT
69M PM of former smoker (1.5 PPD % 50 years; quit 2 years ago),  HTN, HLD, DM, PAD and aortoiliac occlusive disease, who previously underwent an outpatient angiography complicated by right iliac artery dissection. Now S/P Creation of bypass from left femoral artery to distal tibial artery with RSVG and completion angiogram on 2/7/25 s/p 2u pRBC 7.4 from 7.8l not responded to 2nd unit on 2/12/25. EGD completed 2/13 found to have bleeding gastric ulcer w/ hemostasis achieved with cautery. Hgb found to be 6.1 and s/p 2u pRBC. 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       Plan:   - Pending formalization next week   - Appreciate GI recs: Continue PPI increase to BID x4 weeks then downgrade to daily dosing; no repeat EGD, repeat EGD in 2 months outpatient   - BP regimen adjusted and better controlled; appreciate cards recs   - Dressing change today   - CC diet   - Antibx: zosyn dc on 3/5/25; vanc d/c 2/28, zosyn started back up on 3/6 given rising WBC; fu AM CBC   - Lovenox/Aspirin   - Diet: Regular   - on insulin appreciate endocrine recs  - ASA  - Pain regimen: Tylenol ATC, oxycodone PRN  - Bowel regimen  - PT recs -> YANELY    Vascular Surg  [id number]

## 2025-03-07 NOTE — PROGRESS NOTE ADULT - SUBJECTIVE AND OBJECTIVE BOX
SURGERY DAILY PROGRESS NOTE    24 Hour/Overnight Events: No acute events overnight    SUBJECTIVE: Patient seen and evaluated on AM rounds. Pt is resting comfortably in bed with no acute complaints. Tolerating diet.  Denies fevers/chills, chest pain, dyspnea, abdominal pain, nausea, vomiting, and diarrhea    ------------------------------------------------------------------------------------------------------------  OBJECTIVE:  Vital Signs Last 24 Hrs  T(C): 37.3 (07 Mar 2025 05:24), Max: 37.4 (06 Mar 2025 21:36)  T(F): 99.1 (07 Mar 2025 05:24), Max: 99.3 (06 Mar 2025 21:36)  HR: 99 (07 Mar 2025 05:24) (98 - 103)  BP: 150/74 (07 Mar 2025 05:24) (119/67 - 150/74)  BP(mean): --  RR: 18 (07 Mar 2025 05:24) (18 - 18)  SpO2: 97% (07 Mar 2025 05:24) (97% - 98%)    Parameters below as of 07 Mar 2025 05:24  Patient On (Oxygen Delivery Method): room air      I&O's Detail    06 Mar 2025 07:01  -  07 Mar 2025 07:00  --------------------------------------------------------  IN:    IV PiggyBack: 200 mL    Oral Fluid: 720 mL  Total IN: 920 mL    OUT:    Voided (mL): 1350 mL  Total OUT: 1350 mL    Total NET: -430 mL          LABS:                        8.7    15.03 )-----------( 473      ( 06 Mar 2025 06:51 )             27.6     03-07    135  |  99  |  18  ----------------------------<  217[H]  3.7   |  21[L]  |  0.95    Ca    9.2      07 Mar 2025 07:06  Phos  3.1     03-07  Mg     2.4     03-07        PT/INR - ( 07 Mar 2025 07:06 )   PT: 12.5 sec;   INR: 1.10 ratio           Urinalysis Basic - ( 07 Mar 2025 07:06 )    Color: x / Appearance: x / SG: x / pH: x  Gluc: 217 mg/dL / Ketone: x  / Bili: x / Urobili: x   Blood: x / Protein: x / Nitrite: x   Leuk Esterase: x / RBC: x / WBC x   Sq Epi: x / Non Sq Epi: x / Bacteria: x    Physical Exam:  General: NAD, resting comfortably in bed  Pulmonary: Nonlabored breathing, no respiratory distress  Abdominal: soft, non distended, non tender  Extremities: LLE tiffany amp in knee immobilzer, some strikethrough on dressing

## 2025-03-08 LAB
ANION GAP SERPL CALC-SCNC: 16 MMOL/L — SIGNIFICANT CHANGE UP (ref 5–17)
BUN SERPL-MCNC: 15 MG/DL — SIGNIFICANT CHANGE UP (ref 7–23)
CALCIUM SERPL-MCNC: 9.3 MG/DL — SIGNIFICANT CHANGE UP (ref 8.4–10.5)
CHLORIDE SERPL-SCNC: 99 MMOL/L — SIGNIFICANT CHANGE UP (ref 96–108)
CO2 SERPL-SCNC: 22 MMOL/L — SIGNIFICANT CHANGE UP (ref 22–31)
CREAT SERPL-MCNC: 0.95 MG/DL — SIGNIFICANT CHANGE UP (ref 0.5–1.3)
EGFR: 85 ML/MIN/1.73M2 — SIGNIFICANT CHANGE UP
EGFR: 85 ML/MIN/1.73M2 — SIGNIFICANT CHANGE UP
GLUCOSE BLDC GLUCOMTR-MCNC: 135 MG/DL — HIGH (ref 70–99)
GLUCOSE BLDC GLUCOMTR-MCNC: 163 MG/DL — HIGH (ref 70–99)
GLUCOSE BLDC GLUCOMTR-MCNC: 214 MG/DL — HIGH (ref 70–99)
GLUCOSE BLDC GLUCOMTR-MCNC: 223 MG/DL — HIGH (ref 70–99)
GLUCOSE SERPL-MCNC: 155 MG/DL — HIGH (ref 70–99)
HCT VFR BLD CALC: 23.4 % — LOW (ref 39–50)
HGB BLD-MCNC: 7.4 G/DL — LOW (ref 13–17)
INR BLD: 1.11 RATIO — SIGNIFICANT CHANGE UP (ref 0.85–1.16)
MAGNESIUM SERPL-MCNC: 2.4 MG/DL — SIGNIFICANT CHANGE UP (ref 1.6–2.6)
MCHC RBC-ENTMCNC: 28.5 PG — SIGNIFICANT CHANGE UP (ref 27–34)
MCHC RBC-ENTMCNC: 31.6 G/DL — LOW (ref 32–36)
MCV RBC AUTO: 90 FL — SIGNIFICANT CHANGE UP (ref 80–100)
NRBC BLD AUTO-RTO: 0 /100 WBCS — SIGNIFICANT CHANGE UP (ref 0–0)
PHOSPHATE SERPL-MCNC: 2.9 MG/DL — SIGNIFICANT CHANGE UP (ref 2.5–4.5)
PLATELET # BLD AUTO: 530 K/UL — HIGH (ref 150–400)
POTASSIUM SERPL-MCNC: 3.6 MMOL/L — SIGNIFICANT CHANGE UP (ref 3.5–5.3)
POTASSIUM SERPL-SCNC: 3.6 MMOL/L — SIGNIFICANT CHANGE UP (ref 3.5–5.3)
PROTHROM AB SERPL-ACNC: 12.7 SEC — SIGNIFICANT CHANGE UP (ref 9.9–13.4)
RBC # BLD: 2.6 M/UL — LOW (ref 4.2–5.8)
RBC # FLD: 16.8 % — HIGH (ref 10.3–14.5)
SODIUM SERPL-SCNC: 137 MMOL/L — SIGNIFICANT CHANGE UP (ref 135–145)
WBC # BLD: 12.6 K/UL — HIGH (ref 3.8–10.5)
WBC # FLD AUTO: 12.6 K/UL — HIGH (ref 3.8–10.5)

## 2025-03-08 PROCEDURE — 99232 SBSQ HOSP IP/OBS MODERATE 35: CPT

## 2025-03-08 RX ORDER — OXYCODONE HYDROCHLORIDE 30 MG/1
5 TABLET ORAL EVERY 6 HOURS
Refills: 0 | Status: DISCONTINUED | OUTPATIENT
Start: 2025-03-08 | End: 2025-03-16

## 2025-03-08 RX ORDER — HYDROMORPHONE/SOD CHLOR,ISO/PF 2 MG/10 ML
0.5 SYRINGE (ML) INJECTION EVERY 6 HOURS
Refills: 0 | Status: DISCONTINUED | OUTPATIENT
Start: 2025-03-08 | End: 2025-03-16

## 2025-03-08 RX ORDER — HYDROMORPHONE/SOD CHLOR,ISO/PF 2 MG/10 ML
0.25 SYRINGE (ML) INJECTION
Refills: 0 | Status: DISCONTINUED | OUTPATIENT
Start: 2025-03-08 | End: 2025-03-08

## 2025-03-08 RX ADMIN — Medication 0.5 MILLIGRAM(S): at 13:30

## 2025-03-08 RX ADMIN — Medication 1 TABLET(S): at 17:44

## 2025-03-08 RX ADMIN — OXYCODONE HYDROCHLORIDE 10 MILLIGRAM(S): 30 TABLET ORAL at 20:46

## 2025-03-08 RX ADMIN — LOSARTAN POTASSIUM 100 MILLIGRAM(S): 100 TABLET, FILM COATED ORAL at 06:12

## 2025-03-08 RX ADMIN — Medication 975 MILLIGRAM(S): at 17:29

## 2025-03-08 RX ADMIN — Medication 975 MILLIGRAM(S): at 18:20

## 2025-03-08 RX ADMIN — OXYCODONE HYDROCHLORIDE 10 MILLIGRAM(S): 30 TABLET ORAL at 00:09

## 2025-03-08 RX ADMIN — Medication 40 MILLIGRAM(S): at 06:13

## 2025-03-08 RX ADMIN — INSULIN LISPRO 5 UNIT(S): 100 INJECTION, SOLUTION INTRAVENOUS; SUBCUTANEOUS at 11:05

## 2025-03-08 RX ADMIN — INSULIN LISPRO 4: 100 INJECTION, SOLUTION INTRAVENOUS; SUBCUTANEOUS at 11:04

## 2025-03-08 RX ADMIN — ENOXAPARIN SODIUM 40 MILLIGRAM(S): 100 INJECTION SUBCUTANEOUS at 17:28

## 2025-03-08 RX ADMIN — Medication 25 MILLIGRAM(S): at 06:12

## 2025-03-08 RX ADMIN — GABAPENTIN 100 MILLIGRAM(S): 400 CAPSULE ORAL at 11:16

## 2025-03-08 RX ADMIN — OXYCODONE HYDROCHLORIDE 10 MILLIGRAM(S): 30 TABLET ORAL at 20:16

## 2025-03-08 RX ADMIN — Medication 40 MILLIGRAM(S): at 17:28

## 2025-03-08 RX ADMIN — Medication 975 MILLIGRAM(S): at 06:11

## 2025-03-08 RX ADMIN — Medication 6 MILLIGRAM(S): at 21:19

## 2025-03-08 RX ADMIN — CYANOCOBALAMIN 1000 MICROGRAM(S): 1000 INJECTION INTRAMUSCULAR; SUBCUTANEOUS at 11:16

## 2025-03-08 RX ADMIN — INSULIN LISPRO 5 UNIT(S): 100 INJECTION, SOLUTION INTRAVENOUS; SUBCUTANEOUS at 14:25

## 2025-03-08 RX ADMIN — INSULIN GLARGINE-YFGN 13 UNIT(S): 100 INJECTION, SOLUTION SUBCUTANEOUS at 22:03

## 2025-03-08 RX ADMIN — Medication 25 GRAM(S): at 14:24

## 2025-03-08 RX ADMIN — Medication 0.5 MILLIGRAM(S): at 03:07

## 2025-03-08 RX ADMIN — Medication 60 MILLIGRAM(S): at 06:13

## 2025-03-08 RX ADMIN — Medication 25 MILLIGRAM(S): at 17:28

## 2025-03-08 RX ADMIN — INSULIN LISPRO 5 UNIT(S): 100 INJECTION, SOLUTION INTRAVENOUS; SUBCUTANEOUS at 17:31

## 2025-03-08 RX ADMIN — INSULIN LISPRO 2: 100 INJECTION, SOLUTION INTRAVENOUS; SUBCUTANEOUS at 14:24

## 2025-03-08 RX ADMIN — Medication 25 GRAM(S): at 21:18

## 2025-03-08 RX ADMIN — Medication 975 MILLIGRAM(S): at 12:00

## 2025-03-08 RX ADMIN — Medication 975 MILLIGRAM(S): at 06:41

## 2025-03-08 RX ADMIN — Medication 0.5 MILLIGRAM(S): at 12:31

## 2025-03-08 RX ADMIN — Medication 0.5 MILLIGRAM(S): at 03:37

## 2025-03-08 RX ADMIN — Medication 81 MILLIGRAM(S): at 11:16

## 2025-03-08 RX ADMIN — Medication 25 GRAM(S): at 06:13

## 2025-03-08 RX ADMIN — Medication 1 TABLET(S): at 21:18

## 2025-03-08 RX ADMIN — CARVEDILOL 6.25 MILLIGRAM(S): 3.12 TABLET, FILM COATED ORAL at 06:12

## 2025-03-08 RX ADMIN — Medication 975 MILLIGRAM(S): at 11:16

## 2025-03-08 RX ADMIN — Medication 975 MILLIGRAM(S): at 00:09

## 2025-03-08 RX ADMIN — POLYETHYLENE GLYCOL 3350 17 GRAM(S): 17 POWDER, FOR SOLUTION ORAL at 11:05

## 2025-03-08 RX ADMIN — CARVEDILOL 6.25 MILLIGRAM(S): 3.12 TABLET, FILM COATED ORAL at 17:28

## 2025-03-08 NOTE — PROGRESS NOTE ADULT - SUBJECTIVE AND OBJECTIVE BOX
SURGERY DAILY PROGRESS NOTE    24 Hour/Overnight Events: No acute events overnight    SUBJECTIVE: Patient seen and evaluated on AM rounds. Pt is resting comfortably in bed with no acute complaints. Tolerating diet. Denies fevers/chills, chest pain, dyspnea, abdominal pain, nausea, vomiting, and diarrhea    ------------------------------------------------------------------------------------------------------------  OBJECTIVE:  Vital Signs Last 24 Hrs  T(C): 36.9 (08 Mar 2025 08:32), Max: 37.1 (08 Mar 2025 05:47)  T(F): 98.5 (08 Mar 2025 08:32), Max: 98.7 (08 Mar 2025 05:47)  HR: 102 (08 Mar 2025 08:32) (62 - 108)  BP: 145/74 (08 Mar 2025 08:32) (104/64 - 149/70)  BP(mean): --  RR: 18 (08 Mar 2025 08:32) (18 - 18)  SpO2: 96% (08 Mar 2025 08:32) (95% - 97%)    Parameters below as of 08 Mar 2025 08:32  Patient On (Oxygen Delivery Method): room air      I&O's Detail    07 Mar 2025 07:01  -  08 Mar 2025 07:00  --------------------------------------------------------  IN:    Oral Fluid: 700 mL  Total IN: 700 mL    OUT:    Voided (mL): 850 mL  Total OUT: 850 mL    Total NET: -150 mL      08 Mar 2025 06:01  -  08 Mar 2025 11:39  --------------------------------------------------------  IN:  Total IN: 0 mL    OUT:    Voided (mL): 500 mL  Total OUT: 500 mL    Total NET: -500 mL          LABS:                        7.4    12.60 )-----------( 530      ( 08 Mar 2025 07:22 )             23.4     03-08    137  |  99  |  15  ----------------------------<  155[H]  3.6   |  22  |  0.95    Ca    9.3      08 Mar 2025 07:22  Phos  2.9     03-08  Mg     2.4     03-08        PT/INR - ( 08 Mar 2025 07:22 )   PT: 12.7 sec;   INR: 1.11 ratio           Urinalysis Basic - ( 08 Mar 2025 07:22 )    Color: x / Appearance: x / SG: x / pH: x  Gluc: 155 mg/dL / Ketone: x  / Bili: x / Urobili: x   Blood: x / Protein: x / Nitrite: x   Leuk Esterase: x / RBC: x / WBC x   Sq Epi: x / Non Sq Epi: x / Bacteria: x    Physical Exam:  General: NAD, resting comfortably in bed  Pulmonary: Nonlabored breathing, no respiratory distress  Abdominal: soft, non distended, non tender  Extremities: LLE guilbreeine amp in knee immobilzer, some strikethrough on dressing

## 2025-03-08 NOTE — PROGRESS NOTE ADULT - ASSESSMENT
69M PM of former smoker (1.5 PPD % 50 years; quit 2 years ago),  HTN, HLD, DM, PAD and aortoiliac occlusive disease, who previously underwent an outpatient angiography complicated by right iliac artery dissection. Now S/P Creation of bypass from left femoral artery to distal tibial artery with RSVG and completion angiogram on 2/7/25 s/p 2u pRBC 7.4 from 7.8l not responded to 2nd unit on 2/12/25. EGD completed 2/13 found to have bleeding gastric ulcer w/ hemostasis achieved with cautery. Hgb found to be 6.1 and s/p 2u pRBC. Patient now w/ ischemic changes to LLE.    1. Cardiac Risk Stratification   - No chest pain/SOB  - EKG Sinus tach 117 bpm no ischemic changes   - TTE reviewed with pericardial effusion, unchanged compared to 2023  - Not in decompensated HF   - No hx of tachy/timothy arrhythmias   - No valvular abnormalities  - Moderate risk for moderate risk vascular surgery  - Tolerated well  - pending formalization next week - pt is optimized from cardiac perspective to proceed     2. Hypertension- bp better   - cont losartan 100 mg QD   - cont procardial 60 mg QD   - rec to increase Coreg to 12.5 mg BID   - cont hydralazine 25 mg BID, can give an extra dose for SBP>170     3. Hyperlipidemia   - Lipitor 40mg   - lipid profile     4. Diabetes Mellitus Type II   - FBG per protocol  - ISS   - A1C: 7.7%     5. CAD - TTE with Basal and mid inferior wall and basal inferoseptal segment are abnormal  - Discussed with pt, spouse and daughter in person, they do not know full name or number of his cardiologist to obtain his prior records from  - They deny he has any h/o MI, CP or SOB  - Advised OP follow up with his cards     Mellisa Simmons MD FAC  Attending Interventional Cardiologist, [facility].   Avaliable on [employer] Class Team

## 2025-03-08 NOTE — PROGRESS NOTE ADULT - ASSESSMENT
69M PM of former smoker (1.5 PPD % 50 years; quit 2 years ago),  HTN, HLD, DM, PAD and aortoiliac occlusive disease, who previously underwent an outpatient angiography complicated by right iliac artery dissection. Now S/P Creation of bypass from left femoral artery to distal tibial artery with RSVG and completion angiogram on 2/7/25 s/p 2u pRBC 7.4 from 7.8l not responded to 2nd unit on 2/12/25. EGD completed 2/13 found to have bleeding gastric ulcer w/ hemostasis achieved with cautery. Hgb found to be 6.1 and s/p 2u pRBC. 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       Plan:   - Pending formalization next week   - Appreciate GI recs: Continue PPI increase to BID x4 weeks then downgrade to daily dosing; no repeat EGD, repeat EGD in 2 months outpatient   - BP regimen adjusted and better controlled; appreciate cards recs   - Dressing change today   - CC diet   - Antibx: zosyn dc on 3/5/25; vanc d/c 2/28, zosyn started back up on 3/6 given rising WBC; fu AM CBC   - Lovenox/Aspirin   - Diet: Regular   - on insulin appreciate endocrine recs  - ASA  - Pain regimen: Tylenol ATC, oxycodone PRN  - Bowel regimen  - PT recs -> YANELY    Vascular Surg  [id number]

## 2025-03-08 NOTE — PROGRESS NOTE ADULT - SUBJECTIVE AND OBJECTIVE BOX
[facility] Attending Follow-up Note     Patient seen and examined at bedside.    Overnight Events:     events noted     REVIEW OF SYSTEMS:  Constitutional:     [x ] negative [ ] fevers [ ] chills [ ] weight loss [ ] weight gain  HEENT:                  [x ] negative [ ] dry eyes [ ] eye irritation [ ] postnasal drip [ ] nasal congestion  CV:                         [ x] negative  [ ] chest pain [ ] orthopnea [ ] palpitations [ ] murmur  Resp:                     [ x] negative [ ] cough [ ] shortness of breath [ ] dyspnea [ ] wheezing [ ] sputum [ ]hemoptysis  GI:                          [ x] negative [ ] nausea [ ] vomiting [ ] diarrhea [ ] constipation [ ] abd pain [ ] dysphagia   :                        [ x] negative [ ] dysuria [ ] nocturia [ ] hematuria [ ] increased urinary frequency  Musculoskeletal: [ x] negative [ ] back pain [ ] myalgias [ ] arthralgias [ ] fracture  Skin:                       [ x] negative [ ] rash [ ] itch  Neurological:        [x ] negative [ ] headache [ ] dizziness [ ] syncope [ ] weakness [ ] numbness  Psychiatric:           [ x] negative [ ] anxiety [ ] depression  Endocrine:            [ x] negative [ ] diabetes [ ] thyroid problem  Heme/Lymph:      [ x] negative [ ] anemia [ ] bleeding problem  Allergic/Immune: [ x] negative [ ] itchy eyes [ ] nasal discharge [ ] hives [ ] angioedema    [ x] All other systems negative  [ ] Unable to assess ROS due to    Current Meds:  acetaminophen     Tablet .. 975 milliGRAM(s) Oral every 6 hours  aspirin  chewable 81 milliGRAM(s) Oral daily  bisacodyl 5 milliGRAM(s) Oral daily PRN  carvedilol 6.25 milliGRAM(s) Oral every 12 hours  cyanocobalamin 1000 MICROGram(s) Oral daily  dextrose 5%. 1000 milliLiter(s) IV Continuous <Continuous>  dextrose 50% Injectable 25 Gram(s) IV Push once  dextrose 50% Injectable 12.5 Gram(s) IV Push once  dextrose Oral Gel 15 Gram(s) Oral once PRN  enoxaparin Injectable 40 milliGRAM(s) SubCutaneous every 24 hours  gabapentin 100 milliGRAM(s) Oral daily  hydrALAZINE 25 milliGRAM(s) Oral every 12 hours  HYDROmorphone  Injectable 0.5 milliGRAM(s) IV Push every 6 hours PRN  influenza  Vaccine (HIGH DOSE) 0.5 milliLiter(s) IntraMuscular once  insulin glargine Injectable (LANTUS) 13 Unit(s) SubCutaneous at bedtime  insulin lispro (ADMELOG) corrective regimen sliding scale   SubCutaneous three times a day before meals  insulin lispro (ADMELOG) corrective regimen sliding scale   SubCutaneous at bedtime  insulin lispro Injectable (ADMELOG) 5 Unit(s) SubCutaneous three times a day with meals  lactobacillus acidophilus 1 Tablet(s) Oral with dinner  losartan 100 milliGRAM(s) Oral daily  melatonin 6 milliGRAM(s) Oral at bedtime  NIFEdipine XL 60 milliGRAM(s) Oral daily  oxyCODONE    IR 10 milliGRAM(s) Oral every 4 hours PRN  oxyCODONE    IR 5 milliGRAM(s) Oral every 6 hours PRN  pantoprazole    Tablet 40 milliGRAM(s) Oral two times a day  piperacillin/tazobactam IVPB.. 3.375 Gram(s) IV Intermittent every 8 hours  polyethylene glycol 3350 17 Gram(s) Oral daily  senna 1 Tablet(s) Oral at bedtime      PAST MEDICAL & SURGICAL HISTORY:  HTN (hypertension)      HLD (hyperlipidemia)      DM (diabetes mellitus)      PAD (peripheral artery disease)      H/O iron deficiency      [place] (hard of hearing)      Peripheral neuropathy      Peripheral vascular disease, unspecified      Former smoker      CVA (cerebrovascular accident)      S/P appendectomy      S/P peripheral artery angioplasty with stent placement      S/P angiography      S/P cataract surgery          Vitals:  T(F): 98.9 (03-10), Max: 98.9 (03-10)  HR: 105 (03-10) (98 - 105)  BP: 134/72 (03-10) (121/62 - 143/74)  RR: 18 (03-10)  SpO2: 97% (03-10)  I&O's Summary    08 Mar 2025 06:01  -  09 Mar 2025 07:00  --------------------------------------------------------  IN: 910 mL / OUT: 1775 mL / NET: -865 mL    09 Mar 2025 07:01  -  10 Mar 2025 01:48  --------------------------------------------------------  IN: 830 mL / OUT: 975 mL / NET: -145 mL        Physical Exam:  Appearance: No acute distress  HENT: No JVD   Cardiovascular: RRR, S1/S2, no murmurs  Respiratory: CTABL  Gastrointestinal: soft, NT ND, +BS  Musculoskeletal: No clubbing, no edema   Neurologic: Non-focal  Skin: No rashes, ecchymoses, or cyanosis                          7.5    12.13 )-----------( 628      ( 09 Mar 2025 07:50 )             23.8     03-09    133[L]  |  96  |  14  ----------------------------<  202[H]  3.8   |  22  |  0.91    Ca    9.5      09 Mar 2025 07:50  Phos  3.0     03-09  Mg     2.3     03-09      PT/INR - ( 09 Mar 2025 07:50 )   PT: 12.4 sec;   INR: 1.08 ratio                       Cardiovascular Testings:

## 2025-03-09 LAB
ANION GAP SERPL CALC-SCNC: 15 MMOL/L — SIGNIFICANT CHANGE UP (ref 5–17)
BUN SERPL-MCNC: 14 MG/DL — SIGNIFICANT CHANGE UP (ref 7–23)
CALCIUM SERPL-MCNC: 9.5 MG/DL — SIGNIFICANT CHANGE UP (ref 8.4–10.5)
CHLORIDE SERPL-SCNC: 96 MMOL/L — SIGNIFICANT CHANGE UP (ref 96–108)
CO2 SERPL-SCNC: 22 MMOL/L — SIGNIFICANT CHANGE UP (ref 22–31)
CREAT SERPL-MCNC: 0.91 MG/DL — SIGNIFICANT CHANGE UP (ref 0.5–1.3)
EGFR: 90 ML/MIN/1.73M2 — SIGNIFICANT CHANGE UP
EGFR: 90 ML/MIN/1.73M2 — SIGNIFICANT CHANGE UP
GLUCOSE BLDC GLUCOMTR-MCNC: 158 MG/DL — HIGH (ref 70–99)
GLUCOSE BLDC GLUCOMTR-MCNC: 196 MG/DL — HIGH (ref 70–99)
GLUCOSE BLDC GLUCOMTR-MCNC: 228 MG/DL — HIGH (ref 70–99)
GLUCOSE BLDC GLUCOMTR-MCNC: 236 MG/DL — HIGH (ref 70–99)
GLUCOSE SERPL-MCNC: 202 MG/DL — HIGH (ref 70–99)
HCT VFR BLD CALC: 23.8 % — LOW (ref 39–50)
HGB BLD-MCNC: 7.5 G/DL — LOW (ref 13–17)
INR BLD: 1.08 RATIO — SIGNIFICANT CHANGE UP (ref 0.85–1.16)
MAGNESIUM SERPL-MCNC: 2.3 MG/DL — SIGNIFICANT CHANGE UP (ref 1.6–2.6)
MCHC RBC-ENTMCNC: 29.1 PG — SIGNIFICANT CHANGE UP (ref 27–34)
MCHC RBC-ENTMCNC: 31.5 G/DL — LOW (ref 32–36)
MCV RBC AUTO: 92.2 FL — SIGNIFICANT CHANGE UP (ref 80–100)
NRBC BLD AUTO-RTO: 0 /100 WBCS — SIGNIFICANT CHANGE UP (ref 0–0)
PHOSPHATE SERPL-MCNC: 3 MG/DL — SIGNIFICANT CHANGE UP (ref 2.5–4.5)
PLATELET # BLD AUTO: 628 K/UL — HIGH (ref 150–400)
POTASSIUM SERPL-MCNC: 3.8 MMOL/L — SIGNIFICANT CHANGE UP (ref 3.5–5.3)
POTASSIUM SERPL-SCNC: 3.8 MMOL/L — SIGNIFICANT CHANGE UP (ref 3.5–5.3)
PROTHROM AB SERPL-ACNC: 12.4 SEC — SIGNIFICANT CHANGE UP (ref 9.9–13.4)
RBC # BLD: 2.58 M/UL — LOW (ref 4.2–5.8)
RBC # FLD: 16.5 % — HIGH (ref 10.3–14.5)
SODIUM SERPL-SCNC: 133 MMOL/L — LOW (ref 135–145)
WBC # BLD: 12.13 K/UL — HIGH (ref 3.8–10.5)
WBC # FLD AUTO: 12.13 K/UL — HIGH (ref 3.8–10.5)

## 2025-03-09 PROCEDURE — 99232 SBSQ HOSP IP/OBS MODERATE 35: CPT

## 2025-03-09 RX ORDER — HYDROMORPHONE/SOD CHLOR,ISO/PF 2 MG/10 ML
0.2 SYRINGE (ML) INJECTION ONCE
Refills: 0 | Status: DISCONTINUED | OUTPATIENT
Start: 2025-03-09 | End: 2025-03-09

## 2025-03-09 RX ADMIN — Medication 1 TABLET(S): at 21:03

## 2025-03-09 RX ADMIN — Medication 975 MILLIGRAM(S): at 12:53

## 2025-03-09 RX ADMIN — Medication 25 GRAM(S): at 05:06

## 2025-03-09 RX ADMIN — Medication 975 MILLIGRAM(S): at 00:16

## 2025-03-09 RX ADMIN — Medication 25 MILLIGRAM(S): at 17:09

## 2025-03-09 RX ADMIN — Medication 975 MILLIGRAM(S): at 01:16

## 2025-03-09 RX ADMIN — Medication 975 MILLIGRAM(S): at 06:03

## 2025-03-09 RX ADMIN — CARVEDILOL 6.25 MILLIGRAM(S): 3.12 TABLET, FILM COATED ORAL at 05:05

## 2025-03-09 RX ADMIN — Medication 40 MILLIGRAM(S): at 17:09

## 2025-03-09 RX ADMIN — INSULIN LISPRO 2: 100 INJECTION, SOLUTION INTRAVENOUS; SUBCUTANEOUS at 17:12

## 2025-03-09 RX ADMIN — Medication 81 MILLIGRAM(S): at 12:54

## 2025-03-09 RX ADMIN — LOSARTAN POTASSIUM 100 MILLIGRAM(S): 100 TABLET, FILM COATED ORAL at 05:05

## 2025-03-09 RX ADMIN — POLYETHYLENE GLYCOL 3350 17 GRAM(S): 17 POWDER, FOR SOLUTION ORAL at 12:53

## 2025-03-09 RX ADMIN — Medication 25 GRAM(S): at 21:02

## 2025-03-09 RX ADMIN — INSULIN GLARGINE-YFGN 13 UNIT(S): 100 INJECTION, SOLUTION SUBCUTANEOUS at 21:44

## 2025-03-09 RX ADMIN — Medication 6 MILLIGRAM(S): at 21:03

## 2025-03-09 RX ADMIN — GABAPENTIN 100 MILLIGRAM(S): 400 CAPSULE ORAL at 12:54

## 2025-03-09 RX ADMIN — INSULIN LISPRO 4: 100 INJECTION, SOLUTION INTRAVENOUS; SUBCUTANEOUS at 12:57

## 2025-03-09 RX ADMIN — OXYCODONE HYDROCHLORIDE 10 MILLIGRAM(S): 30 TABLET ORAL at 00:16

## 2025-03-09 RX ADMIN — INSULIN LISPRO 5 UNIT(S): 100 INJECTION, SOLUTION INTRAVENOUS; SUBCUTANEOUS at 08:39

## 2025-03-09 RX ADMIN — Medication 975 MILLIGRAM(S): at 13:53

## 2025-03-09 RX ADMIN — CARVEDILOL 6.25 MILLIGRAM(S): 3.12 TABLET, FILM COATED ORAL at 17:10

## 2025-03-09 RX ADMIN — INSULIN LISPRO 2: 100 INJECTION, SOLUTION INTRAVENOUS; SUBCUTANEOUS at 08:38

## 2025-03-09 RX ADMIN — Medication 1 TABLET(S): at 17:09

## 2025-03-09 RX ADMIN — Medication 60 MILLIGRAM(S): at 05:05

## 2025-03-09 RX ADMIN — Medication 25 MILLIGRAM(S): at 05:04

## 2025-03-09 RX ADMIN — Medication 975 MILLIGRAM(S): at 05:03

## 2025-03-09 RX ADMIN — Medication 975 MILLIGRAM(S): at 17:08

## 2025-03-09 RX ADMIN — OXYCODONE HYDROCHLORIDE 10 MILLIGRAM(S): 30 TABLET ORAL at 08:43

## 2025-03-09 RX ADMIN — OXYCODONE HYDROCHLORIDE 10 MILLIGRAM(S): 30 TABLET ORAL at 23:03

## 2025-03-09 RX ADMIN — Medication 25 GRAM(S): at 13:30

## 2025-03-09 RX ADMIN — OXYCODONE HYDROCHLORIDE 10 MILLIGRAM(S): 30 TABLET ORAL at 09:43

## 2025-03-09 RX ADMIN — Medication 0.2 MILLIGRAM(S): at 16:51

## 2025-03-09 RX ADMIN — ENOXAPARIN SODIUM 40 MILLIGRAM(S): 100 INJECTION SUBCUTANEOUS at 18:35

## 2025-03-09 RX ADMIN — CYANOCOBALAMIN 1000 MICROGRAM(S): 1000 INJECTION INTRAMUSCULAR; SUBCUTANEOUS at 12:54

## 2025-03-09 RX ADMIN — Medication 40 MILLIGRAM(S): at 05:04

## 2025-03-09 RX ADMIN — Medication 975 MILLIGRAM(S): at 18:08

## 2025-03-09 RX ADMIN — OXYCODONE HYDROCHLORIDE 10 MILLIGRAM(S): 30 TABLET ORAL at 01:16

## 2025-03-09 RX ADMIN — Medication 0.5 MILLIGRAM(S): at 14:43

## 2025-03-09 RX ADMIN — Medication 975 MILLIGRAM(S): at 23:03

## 2025-03-09 RX ADMIN — Medication 0.2 MILLIGRAM(S): at 16:21

## 2025-03-09 RX ADMIN — Medication 0.5 MILLIGRAM(S): at 14:13

## 2025-03-09 NOTE — PROGRESS NOTE ADULT - ASSESSMENT
Attempted to reach patient with INR result and warfarin dosing instructions. Left msg to call back.    69M PM of former smoker (1.5 PPD % 50 years; quit 2 years ago),  HTN, HLD, DM, PAD and aortoiliac occlusive disease, who previously underwent an outpatient angiography complicated by right iliac artery dissection. Now S/P Creation of bypass from left femoral artery to distal tibial artery with RSVG and completion angiogram on 2/7/25 s/p 2u pRBC 7.4 from 7.8l not responded to 2nd unit on 2/12/25. EGD completed 2/13 found to have bleeding gastric ulcer w/ hemostasis achieved with cautery. Hgb found to be 6.1 and s/p 2u pRBC. Patient now w/ ischemic changes to LLE.    1. Cardiac Risk Stratification   - No chest pain/SOB  - EKG Sinus tach 117 bpm no ischemic changes   - TTE reviewed with pericardial effusion, unchanged compared to 2023  - Not in decompensated HF   - No hx of tachy/timothy arrhythmias   - No valvular abnormalities  - Moderate risk for moderate risk vascular surgery  - Tolerated well  - pending formalization next week - pt is optimized from cardiac perspective to proceed     2. Hypertension- bp better   - cont losartan 100 mg QD   - cont procardial 60 mg QD   - rec to increase Coreg to 12.5 mg BID   - cont hydralazine 25 mg BID, can give an extra dose for SBP>170     3. Hyperlipidemia   - Lipitor 40mg   - lipid profile     4. Diabetes Mellitus Type II   - FBG per protocol  - ISS   - A1C: 7.7%     5. CAD - TTE with Basal and mid inferior wall and basal inferoseptal segment are abnormal  - Discussed with pt, spouse and daughter in person, they do not know full name or number of his cardiologist to obtain his prior records from  - They deny he has any h/o MI, CP or SOB  - Advised OP follow up with his cards     Mellisa Simmons MD FAC  Attending Interventional Cardiologist, [facility].   Avaliable on [employer] Team

## 2025-03-09 NOTE — PROGRESS NOTE ADULT - SUBJECTIVE AND OBJECTIVE BOX
GENERAL SURGERY PROGRESS NOTE    INTERVAL EVENTS: no acute events      OBJECTIVE:    Vital Signs Last 24 Hrs  T(C): 37.1 (09 Mar 2025 01:00), Max: 37.1 (08 Mar 2025 05:47)  T(F): 98.7 (09 Mar 2025 01:00), Max: 98.7 (08 Mar 2025 05:47)  HR: 105 (09 Mar 2025 01:00) (100 - 105)  BP: 128/61 (09 Mar 2025 01:00) (125/70 - 149/70)  BP(mean): --  RR: 18 (09 Mar 2025 01:00) (18 - 18)  SpO2: 97% (09 Mar 2025 01:00) (95% - 97%)    Parameters below as of 09 Mar 2025 01:00  Patient On (Oxygen Delivery Method): room air        Physical Exam:  General: NAD, resting comfortably in bed  Pulmonary: Nonlabored breathing, no respiratory distress  Abdominal: soft, non distended, non tender  Extremities: VERONICAE guillotine amp in knee immobilzer    I&O's Summary    07 Mar 2025 07:01  -  08 Mar 2025 07:00  --------------------------------------------------------  IN: 700 mL / OUT: 850 mL / NET: -150 mL    08 Mar 2025 06:01  -  09 Mar 2025 03:42  --------------------------------------------------------  IN: 810 mL / OUT: 1475 mL / NET: -665 mL      I&O's Detail    07 Mar 2025 07:01  -  08 Mar 2025 07:00  --------------------------------------------------------  IN:    Oral Fluid: 700 mL  Total IN: 700 mL    OUT:    Voided (mL): 850 mL  Total OUT: 850 mL    Total NET: -150 mL      08 Mar 2025 06:01  -  09 Mar 2025 03:42  --------------------------------------------------------  IN:    IV PiggyBack: 100 mL    Oral Fluid: 710 mL  Total IN: 810 mL    OUT:    Voided (mL): 1475 mL  Total OUT: 1475 mL    Total NET: -665 mL            LABS:                        7.4    12.60 )-----------( 530      ( 08 Mar 2025 07:22 )             23.4     03-08    137  |  99  |  15  ----------------------------<  155[H]  3.6   |  22  |  0.95    Ca    9.3      08 Mar 2025 07:22  Phos  2.9     03-08  Mg     2.4     03-08      PT/INR - ( 08 Mar 2025 07:22 )   PT: 12.7 sec;   INR: 1.11 ratio           Urinalysis Basic - ( 08 Mar 2025 07:22 )    Color: x / Appearance: x / SG: x / pH: x  Gluc: 155 mg/dL / Ketone: x  / Bili: x / Urobili: x   Blood: x / Protein: x / Nitrite: x   Leuk Esterase: x / RBC: x / WBC x   Sq Epi: x / Non Sq Epi: x / Bacteria: x        RADIOLOGY & ADDITIONAL STUDIES:

## 2025-03-09 NOTE — PROGRESS NOTE ADULT - SUBJECTIVE AND OBJECTIVE BOX
[facility] Attending Follow-up Note     Patient seen and examined at bedside.    Overnight Events:      events noted     REVIEW OF SYSTEMS:  Constitutional:     [x ] negative [ ] fevers [ ] chills [ ] weight loss [ ] weight gain  HEENT:                  [x ] negative [ ] dry eyes [ ] eye irritation [ ] postnasal drip [ ] nasal congestion  CV:                         [ x] negative  [ ] chest pain [ ] orthopnea [ ] palpitations [ ] murmur  Resp:                     [ x] negative [ ] cough [ ] shortness of breath [ ] dyspnea [ ] wheezing [ ] sputum [ ]hemoptysis  GI:                          [ x] negative [ ] nausea [ ] vomiting [ ] diarrhea [ ] constipation [ ] abd pain [ ] dysphagia   :                        [ x] negative [ ] dysuria [ ] nocturia [ ] hematuria [ ] increased urinary frequency  Musculoskeletal: [ x] negative [ ] back pain [ ] myalgias [ ] arthralgias [ ] fracture  Skin:                       [ x] negative [ ] rash [ ] itch  Neurological:        [x ] negative [ ] headache [ ] dizziness [ ] syncope [ ] weakness [ ] numbness  Psychiatric:           [ x] negative [ ] anxiety [ ] depression  Endocrine:            [ x] negative [ ] diabetes [ ] thyroid problem  Heme/Lymph:      [ x] negative [ ] anemia [ ] bleeding problem  Allergic/Immune: [ x] negative [ ] itchy eyes [ ] nasal discharge [ ] hives [ ] angioedema    [ x] All other systems negative  [ ] Unable to assess ROS due to    Current Meds:  acetaminophen     Tablet .. 975 milliGRAM(s) Oral every 6 hours  aspirin  chewable 81 milliGRAM(s) Oral daily  bisacodyl 5 milliGRAM(s) Oral daily PRN  carvedilol 6.25 milliGRAM(s) Oral every 12 hours  cyanocobalamin 1000 MICROGram(s) Oral daily  dextrose 5%. 1000 milliLiter(s) IV Continuous <Continuous>  dextrose 50% Injectable 25 Gram(s) IV Push once  dextrose 50% Injectable 12.5 Gram(s) IV Push once  dextrose Oral Gel 15 Gram(s) Oral once PRN  enoxaparin Injectable 40 milliGRAM(s) SubCutaneous every 24 hours  gabapentin 100 milliGRAM(s) Oral daily  hydrALAZINE 25 milliGRAM(s) Oral every 12 hours  HYDROmorphone  Injectable 0.5 milliGRAM(s) IV Push every 6 hours PRN  influenza  Vaccine (HIGH DOSE) 0.5 milliLiter(s) IntraMuscular once  insulin glargine Injectable (LANTUS) 13 Unit(s) SubCutaneous at bedtime  insulin lispro (ADMELOG) corrective regimen sliding scale   SubCutaneous three times a day before meals  insulin lispro (ADMELOG) corrective regimen sliding scale   SubCutaneous at bedtime  insulin lispro Injectable (ADMELOG) 5 Unit(s) SubCutaneous three times a day with meals  lactobacillus acidophilus 1 Tablet(s) Oral with dinner  losartan 100 milliGRAM(s) Oral daily  melatonin 6 milliGRAM(s) Oral at bedtime  NIFEdipine XL 60 milliGRAM(s) Oral daily  oxyCODONE    IR 10 milliGRAM(s) Oral every 4 hours PRN  oxyCODONE    IR 5 milliGRAM(s) Oral every 6 hours PRN  pantoprazole    Tablet 40 milliGRAM(s) Oral two times a day  piperacillin/tazobactam IVPB.. 3.375 Gram(s) IV Intermittent every 8 hours  polyethylene glycol 3350 17 Gram(s) Oral daily  senna 1 Tablet(s) Oral at bedtime      PAST MEDICAL & SURGICAL HISTORY:  HTN (hypertension)      HLD (hyperlipidemia)      DM (diabetes mellitus)      PAD (peripheral artery disease)      H/O iron deficiency      [place] (hard of hearing)      Peripheral neuropathy      Peripheral vascular disease, unspecified      Former smoker      CVA (cerebrovascular accident)      S/P appendectomy      S/P peripheral artery angioplasty with stent placement      S/P angiography      S/P cataract surgery          Vitals:  T(F): 98.9 (03-10), Max: 98.9 (03-10)  HR: 105 (03-10) (98 - 105)  BP: 134/72 (03-10) (121/62 - 143/74)  RR: 18 (03-10)  SpO2: 97% (03-10)  I&O's Summary    08 Mar 2025 06:01  -  09 Mar 2025 07:00  --------------------------------------------------------  IN: 910 mL / OUT: 1775 mL / NET: -865 mL    09 Mar 2025 07:01  -  10 Mar 2025 01:49  --------------------------------------------------------  IN: 830 mL / OUT: 975 mL / NET: -145 mL        Physical Exam:  Appearance: No acute distress  HENT: No JVD   Cardiovascular: RRR, S1/S2, no murmurs  Respiratory: CTABL  Gastrointestinal: soft, NT ND, +BS  Musculoskeletal: No clubbing, no edema   Neurologic: Non-focal  Skin: No rashes, ecchymoses, or cyanosis                          7.5    12.13 )-----------( 628      ( 09 Mar 2025 07:50 )             23.8     03-09    133[L]  |  96  |  14  ----------------------------<  202[H]  3.8   |  22  |  0.91    Ca    9.5      09 Mar 2025 07:50  Phos  3.0     03-09  Mg     2.3     03-09      PT/INR - ( 09 Mar 2025 07:50 )   PT: 12.4 sec;   INR: 1.08 ratio                       Cardiovascular Testings:

## 2025-03-09 NOTE — PROGRESS NOTE ADULT - ASSESSMENT
69M PM of former smoker (1.5 PPD % 50 years; quit 2 years ago),  HTN, HLD, DM, PAD and aortoiliac occlusive disease, who previously underwent an outpatient angiography complicated by right iliac artery dissection. Now S/P Creation of bypass from left femoral artery to distal tibial artery with RSVG and completion angiogram on 2/7/25 s/p 2u pRBC 7.4 from 7.8l not responded to 2nd unit on 2/12/25. EGD completed 2/13 found to have bleeding gastric ulcer w/ hemostasis achieved with cautery. Hgb found to be 6.1 and s/p 2u pRBC. 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       Plan:   - Pending formalization next week   - Appreciate GI recs: Continue PPI increase to BID x4 weeks then downgrade to daily dosing; no repeat EGD, repeat EGD in 2 months outpatient   - BP regimen adjusted and better controlled; appreciate cards recs   - Dressing change daily  - CC diet   - Antibx: zosyn dc on 3/5/25; vanc d/c 2/28, zosyn started back up on 3/6 given rising WBC and evidence of necrosis at stump  - Lovenox/Aspirin   - Diet: Regular   - on insulin appreciate endocrine recs  - ASA  - Pain regimen: Tylenol ATC, oxycodone PRN  - Bowel regimen  - PT recs -> YANELY    Vascular Surg  [id number]

## 2025-03-10 LAB
ANION GAP SERPL CALC-SCNC: 15 MMOL/L — SIGNIFICANT CHANGE UP (ref 5–17)
BUN SERPL-MCNC: 11 MG/DL — SIGNIFICANT CHANGE UP (ref 7–23)
CALCIUM SERPL-MCNC: 9.4 MG/DL — SIGNIFICANT CHANGE UP (ref 8.4–10.5)
CHLORIDE SERPL-SCNC: 98 MMOL/L — SIGNIFICANT CHANGE UP (ref 96–108)
CO2 SERPL-SCNC: 21 MMOL/L — LOW (ref 22–31)
CREAT SERPL-MCNC: 0.8 MG/DL — SIGNIFICANT CHANGE UP (ref 0.5–1.3)
EGFR: 94 ML/MIN/1.73M2 — SIGNIFICANT CHANGE UP
EGFR: 94 ML/MIN/1.73M2 — SIGNIFICANT CHANGE UP
GLUCOSE BLDC GLUCOMTR-MCNC: 191 MG/DL — HIGH (ref 70–99)
GLUCOSE BLDC GLUCOMTR-MCNC: 258 MG/DL — HIGH (ref 70–99)
GLUCOSE BLDC GLUCOMTR-MCNC: 276 MG/DL — HIGH (ref 70–99)
GLUCOSE BLDC GLUCOMTR-MCNC: 299 MG/DL — HIGH (ref 70–99)
GLUCOSE SERPL-MCNC: 147 MG/DL — HIGH (ref 70–99)
HCT VFR BLD CALC: 22.9 % — LOW (ref 39–50)
HGB BLD-MCNC: 7.2 G/DL — LOW (ref 13–17)
MAGNESIUM SERPL-MCNC: 2.1 MG/DL — SIGNIFICANT CHANGE UP (ref 1.6–2.6)
MCHC RBC-ENTMCNC: 28.6 PG — SIGNIFICANT CHANGE UP (ref 27–34)
MCHC RBC-ENTMCNC: 31.4 G/DL — LOW (ref 32–36)
MCV RBC AUTO: 90.9 FL — SIGNIFICANT CHANGE UP (ref 80–100)
NRBC BLD AUTO-RTO: 0 /100 WBCS — SIGNIFICANT CHANGE UP (ref 0–0)
PHOSPHATE SERPL-MCNC: 3.4 MG/DL — SIGNIFICANT CHANGE UP (ref 2.5–4.5)
PLATELET # BLD AUTO: 627 K/UL — HIGH (ref 150–400)
POTASSIUM SERPL-MCNC: 3.8 MMOL/L — SIGNIFICANT CHANGE UP (ref 3.5–5.3)
POTASSIUM SERPL-SCNC: 3.8 MMOL/L — SIGNIFICANT CHANGE UP (ref 3.5–5.3)
RBC # BLD: 2.52 M/UL — LOW (ref 4.2–5.8)
RBC # FLD: 16.6 % — HIGH (ref 10.3–14.5)
SODIUM SERPL-SCNC: 134 MMOL/L — LOW (ref 135–145)
SURGICAL PATHOLOGY STUDY: SIGNIFICANT CHANGE UP
WBC # BLD: 12.64 K/UL — HIGH (ref 3.8–10.5)
WBC # FLD AUTO: 12.64 K/UL — HIGH (ref 3.8–10.5)

## 2025-03-10 PROCEDURE — 99232 SBSQ HOSP IP/OBS MODERATE 35: CPT

## 2025-03-10 RX ORDER — INSULIN GLARGINE-YFGN 100 [IU]/ML
14 INJECTION, SOLUTION SUBCUTANEOUS AT BEDTIME
Refills: 0 | Status: DISCONTINUED | OUTPATIENT
Start: 2025-03-10 | End: 2025-03-17

## 2025-03-10 RX ADMIN — OXYCODONE HYDROCHLORIDE 10 MILLIGRAM(S): 30 TABLET ORAL at 16:47

## 2025-03-10 RX ADMIN — OXYCODONE HYDROCHLORIDE 10 MILLIGRAM(S): 30 TABLET ORAL at 16:17

## 2025-03-10 RX ADMIN — Medication 25 GRAM(S): at 05:08

## 2025-03-10 RX ADMIN — LOSARTAN POTASSIUM 100 MILLIGRAM(S): 100 TABLET, FILM COATED ORAL at 05:08

## 2025-03-10 RX ADMIN — INSULIN LISPRO 6: 100 INJECTION, SOLUTION INTRAVENOUS; SUBCUTANEOUS at 09:07

## 2025-03-10 RX ADMIN — Medication 975 MILLIGRAM(S): at 17:55

## 2025-03-10 RX ADMIN — Medication 1 TABLET(S): at 21:50

## 2025-03-10 RX ADMIN — Medication 975 MILLIGRAM(S): at 06:06

## 2025-03-10 RX ADMIN — CYANOCOBALAMIN 1000 MICROGRAM(S): 1000 INJECTION INTRAMUSCULAR; SUBCUTANEOUS at 11:34

## 2025-03-10 RX ADMIN — Medication 25 GRAM(S): at 12:43

## 2025-03-10 RX ADMIN — OXYCODONE HYDROCHLORIDE 10 MILLIGRAM(S): 30 TABLET ORAL at 00:03

## 2025-03-10 RX ADMIN — Medication 975 MILLIGRAM(S): at 23:02

## 2025-03-10 RX ADMIN — GABAPENTIN 100 MILLIGRAM(S): 400 CAPSULE ORAL at 11:39

## 2025-03-10 RX ADMIN — Medication 975 MILLIGRAM(S): at 12:30

## 2025-03-10 RX ADMIN — Medication 6 MILLIGRAM(S): at 21:50

## 2025-03-10 RX ADMIN — OXYCODONE HYDROCHLORIDE 10 MILLIGRAM(S): 30 TABLET ORAL at 12:30

## 2025-03-10 RX ADMIN — INSULIN LISPRO 6: 100 INJECTION, SOLUTION INTRAVENOUS; SUBCUTANEOUS at 12:40

## 2025-03-10 RX ADMIN — CARVEDILOL 6.25 MILLIGRAM(S): 3.12 TABLET, FILM COATED ORAL at 17:24

## 2025-03-10 RX ADMIN — Medication 81 MILLIGRAM(S): at 11:34

## 2025-03-10 RX ADMIN — Medication 975 MILLIGRAM(S): at 11:34

## 2025-03-10 RX ADMIN — ENOXAPARIN SODIUM 40 MILLIGRAM(S): 100 INJECTION SUBCUTANEOUS at 17:26

## 2025-03-10 RX ADMIN — OXYCODONE HYDROCHLORIDE 10 MILLIGRAM(S): 30 TABLET ORAL at 11:34

## 2025-03-10 RX ADMIN — Medication 25 GRAM(S): at 21:56

## 2025-03-10 RX ADMIN — Medication 40 MILLIGRAM(S): at 17:24

## 2025-03-10 RX ADMIN — CARVEDILOL 6.25 MILLIGRAM(S): 3.12 TABLET, FILM COATED ORAL at 05:07

## 2025-03-10 RX ADMIN — INSULIN LISPRO 5 UNIT(S): 100 INJECTION, SOLUTION INTRAVENOUS; SUBCUTANEOUS at 18:49

## 2025-03-10 RX ADMIN — POLYETHYLENE GLYCOL 3350 17 GRAM(S): 17 POWDER, FOR SOLUTION ORAL at 11:34

## 2025-03-10 RX ADMIN — Medication 1 TABLET(S): at 17:25

## 2025-03-10 RX ADMIN — Medication 975 MILLIGRAM(S): at 05:06

## 2025-03-10 RX ADMIN — INSULIN LISPRO 5 UNIT(S): 100 INJECTION, SOLUTION INTRAVENOUS; SUBCUTANEOUS at 09:08

## 2025-03-10 RX ADMIN — Medication 40 MILLIGRAM(S): at 05:07

## 2025-03-10 RX ADMIN — INSULIN LISPRO 6: 100 INJECTION, SOLUTION INTRAVENOUS; SUBCUTANEOUS at 18:49

## 2025-03-10 RX ADMIN — Medication 975 MILLIGRAM(S): at 17:25

## 2025-03-10 RX ADMIN — Medication 60 MILLIGRAM(S): at 05:07

## 2025-03-10 RX ADMIN — Medication 20 MILLIEQUIVALENT(S): at 09:07

## 2025-03-10 RX ADMIN — INSULIN GLARGINE-YFGN 14 UNIT(S): 100 INJECTION, SOLUTION SUBCUTANEOUS at 21:50

## 2025-03-10 RX ADMIN — INSULIN LISPRO 5 UNIT(S): 100 INJECTION, SOLUTION INTRAVENOUS; SUBCUTANEOUS at 12:41

## 2025-03-10 RX ADMIN — Medication 975 MILLIGRAM(S): at 00:03

## 2025-03-10 RX ADMIN — Medication 25 MILLIGRAM(S): at 05:07

## 2025-03-10 NOTE — PROGRESS NOTE ADULT - ASSESSMENT
69M PM of former smoker (1.5 PPD % 50 years; quit 2 years ago),  HTN, HLD, DM, PAD and aortoiliac occlusive disease, who previously underwent an outpatient angiography complicated by right iliac artery dissection. Now S/P Creation of bypass from left femoral artery to distal tibial artery with RSVG and completion angiogram on 2/7/25 s/p 2u pRBC 7.4 from 7.8l not responded to 2nd unit on 2/12/25. EGD completed 2/13 found to have bleeding gastric ulcer w/ hemostasis achieved with cautery. Hgb found to be 6.1 and s/p 2u pRBC. 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       Plan:   - Pending formalization next week   - Appreciate GI recs: Continue PPI increase to BID x4 weeks then downgrade to daily dosing; no repeat EGD, repeat EGD in 2 months outpatient   - BP regimen adjusted and better controlled; appreciate cards recs   - Dressing change daily  - CC diet   - Antibx: zosyn dc on 3/5/25; vanc d/c 2/28, zosyn started back up on 3/6 given rising WBC and evidence of necrosis at stump  - Lovenox/Aspirin   - Diet: Regular   - on insulin appreciate endocrine recs  - ASA  - Pain regimen: Tylenol ATC, oxycodone PRN  - Bowel regimen  - PT recs -> YANELY    Vascular Surg  [id number]

## 2025-03-10 NOTE — PROGRESS NOTE ADULT - SUBJECTIVE AND OBJECTIVE BOX
SURGERY DAILY PROGRESS NOTE:       Subjective / Overnight events: Pt seen during AM rounds. Pt resting comfortably in chair without complaints, pain controlled. Pt denies fever, chills, SOB, CP, nausea, vomiting      Objective:      MEDICATIONS  (STANDING):  acetaminophen     Tablet .. 975 milliGRAM(s) Oral every 6 hours  aspirin  chewable 81 milliGRAM(s) Oral daily  carvedilol 6.25 milliGRAM(s) Oral every 12 hours  cyanocobalamin 1000 MICROGram(s) Oral daily  dextrose 5%. 1000 milliLiter(s) (50 mL/Hr) IV Continuous <Continuous>  dextrose 50% Injectable 25 Gram(s) IV Push once  dextrose 50% Injectable 12.5 Gram(s) IV Push once  enoxaparin Injectable 40 milliGRAM(s) SubCutaneous every 24 hours  gabapentin 100 milliGRAM(s) Oral daily  hydrALAZINE 25 milliGRAM(s) Oral every 12 hours  influenza  Vaccine (HIGH DOSE) 0.5 milliLiter(s) IntraMuscular once  insulin glargine Injectable (LANTUS) 13 Unit(s) SubCutaneous at bedtime  insulin lispro (ADMELOG) corrective regimen sliding scale   SubCutaneous three times a day before meals  insulin lispro (ADMELOG) corrective regimen sliding scale   SubCutaneous at bedtime  insulin lispro Injectable (ADMELOG) 5 Unit(s) SubCutaneous three times a day with meals  lactobacillus acidophilus 1 Tablet(s) Oral with dinner  losartan 100 milliGRAM(s) Oral daily  melatonin 6 milliGRAM(s) Oral at bedtime  NIFEdipine XL 60 milliGRAM(s) Oral daily  pantoprazole    Tablet 40 milliGRAM(s) Oral two times a day  piperacillin/tazobactam IVPB.. 3.375 Gram(s) IV Intermittent every 8 hours  polyethylene glycol 3350 17 Gram(s) Oral daily  senna 1 Tablet(s) Oral at bedtime    MEDICATIONS  (PRN):  bisacodyl 5 milliGRAM(s) Oral daily PRN Constipation  dextrose Oral Gel 15 Gram(s) Oral once PRN Blood Glucose LESS THAN 70 milliGRAM(s)/deciliter  HYDROmorphone  Injectable 0.5 milliGRAM(s) IV Push every 6 hours PRN Breakthrough pain  oxyCODONE    IR 10 milliGRAM(s) Oral every 4 hours PRN Severe Pain (7 - 10)  oxyCODONE    IR 5 milliGRAM(s) Oral every 6 hours PRN Moderate Pain (4 - 6)      Vital Signs Last 24 Hrs  T(C): 37.1 (10 Mar 2025 05:03), Max: 37.2 (10 Mar 2025 00:54)  T(F): 98.8 (10 Mar 2025 05:03), Max: 98.9 (10 Mar 2025 00:54)  HR: 103 (10 Mar 2025 05:03) (98 - 105)  BP: 142/69 (10 Mar 2025 05:03) (121/62 - 143/74)  BP(mean): --  RR: 18 (10 Mar 2025 05:03) (18 - 18)  SpO2: 96% (10 Mar 2025 05:03) (96% - 98%)    Parameters below as of 10 Mar 2025 05:03  Patient On (Oxygen Delivery Method): room air        I&O's Detail    09 Mar 2025 07:01  -  10 Mar 2025 07:00  --------------------------------------------------------  IN:    IV PiggyBack: 100 mL    Oral Fluid: 830 mL  Total IN: 930 mL    OUT:    Voided (mL): 1625 mL  Total OUT: 1625 mL    Total NET: -695 mL          Daily     Daily     LABS:                        7.2    12.64 )-----------( 627      ( 10 Mar 2025 06:34 )             22.9     03-10    134[L]  |  98  |  11  ----------------------------<  147[H]  3.8   |  21[L]  |  0.80    Ca    9.4      10 Mar 2025 06:34  Phos  3.4     03-10  Mg     2.1     03-10      PT/INR - ( 09 Mar 2025 07:50 )   PT: 12.4 sec;   INR: 1.08 ratio           Urinalysis Basic - ( 10 Mar 2025 06:34 )    Color: x / Appearance: x / SG: x / pH: x  Gluc: 147 mg/dL / Ketone: x  / Bili: x / Urobili: x   Blood: x / Protein: x / Nitrite: x   Leuk Esterase: x / RBC: x / WBC x   Sq Epi: x / Non Sq Epi: x / Bacteria: x          PHYSICAL EXAM  --------------------------------------------------------------------------------    Physical Exam:  General: NAD, resting comfortably in chair  Pulmonary: Nonlabored breathing, no respiratory distress  Abdominal: soft, non distended, non tender  Extremities: VERONICAE guillotine amp in knee immobilizer

## 2025-03-10 NOTE — PROGRESS NOTE ADULT - NS ATTEND AMEND GEN_ALL_CORE FT
Patient care and plan discussed and reviewed with Advanced Care Provider. Plan as outlined above edited by me to reflect our discussion.   In addition, I participated in    - Ordering, reviewing, and interpreting labs, testing, and imaging.  - Reviewing prior hospitalization and outpatient records when necessary  - Counselling and educating patient and/or family regarding interpretation of aforementioned items and plan of care.  - Communicating with other health professionals (when not separately reported), and documenting clinical information in the electronic health record.

## 2025-03-10 NOTE — PROGRESS NOTE ADULT - SUBJECTIVE AND OBJECTIVE BOX
DATE OF SERVICE: 03-10-25 @ 16:01    Patient is a 70y old  Male who presents with a chief complaint of S/p L BKA (07 Mar 2025 15:32)      INTERVAL HISTORY: feels okay, oob in chair, family at bedside    REVIEW OF SYSTEMS:  CONSTITUTIONAL: No weakness  EYES/ENT: No visual changes;  No throat pain   NECK: No pain or stiffness  RESPIRATORY: No cough, wheezing; No shortness of breath  CARDIOVASCULAR: No chest pain or palpitations  GASTROINTESTINAL: No abdominal  pain. No nausea, vomiting, or hematemesis  GENITOURINARY: No dysuria, frequency or hematuria  NEUROLOGICAL: No stroke like symptoms  SKIN: No rashes    	  MEDICATIONS:  carvedilol 6.25 milliGRAM(s) Oral every 12 hours  hydrALAZINE 25 milliGRAM(s) Oral every 12 hours  losartan 100 milliGRAM(s) Oral daily  NIFEdipine XL 60 milliGRAM(s) Oral daily        PHYSICAL EXAM:  T(C): 36.5 (03-10-25 @ 13:38), Max: 37.2 (03-10-25 @ 00:54)  HR: 105 (03-10-25 @ 13:38) (98 - 105)  BP: 114/68 (03-10-25 @ 13:38) (106/69 - 143/74)  RR: 18 (03-10-25 @ 13:38) (18 - 18)  SpO2: 95% (03-10-25 @ 13:38) (95% - 97%)  Wt(kg): --  I&O's Summary    09 Mar 2025 07:01  -  10 Mar 2025 07:00  --------------------------------------------------------  IN: 930 mL / OUT: 1625 mL / NET: -695 mL    10 Mar 2025 07:01  -  10 Mar 2025 16:01  --------------------------------------------------------  IN: 480 mL / OUT: 400 mL / NET: 80 mL          Appearance: In no distress	  HEENT:    PERRL, EOMI	  Cardiovascular:  S1 S2, No JVD  Respiratory: Lungs clear to auscultation	  Gastrointestinal:  Soft, Non-tender, + BS	  Vascularature:  No edema of LE  Psychiatric: Appropriate affect   Neuro: no acute focal deficits                               7.2    12.64 )-----------( 627      ( 10 Mar 2025 06:34 )             22.9     03-10    134[L]  |  98  |  11  ----------------------------<  147[H]  3.8   |  21[L]  |  0.80    Ca    9.4      10 Mar 2025 06:34  Phos  3.4     03-10  Mg     2.1     03-10          Labs personally reviewed      ASSESSMENT/PLAN: 	    69M PM of former smoker (1.5 PPD % 50 years; quit 2 years ago),  HTN, HLD, DM, PAD and aortoiliac occlusive disease, who previously underwent an outpatient angiography complicated by right iliac artery dissection. Now S/P Creation of bypass from left femoral artery to distal tibial artery with RSVG and completion angiogram on 2/7/25 s/p 2u pRBC 7.4 from 7.8l not responded to 2nd unit on 2/12/25. EGD completed 2/13 found to have bleeding gastric ulcer w/ hemostasis achieved with cautery. Hgb found to be 6.1 and s/p 2u pRBC. Patient now w/ ischemic changes to LLE.    1. Cardiac Risk Stratification   - No chest pain/SOB  - EKG Sinus tach 117 bpm no ischemic changes   - TTE reviewed with pericardial effusion, unchanged compared to 2023  - Not in decompensated HF   - No hx of tachy/timothy arrhythmias   - No valvular abnormalities  - Moderate risk for moderate risk vascular surgery  - Tolerated well  - pending formalization next week - pt is optimized from cardiac perspective to proceed     2. Hypertension- bp better   - cont losartan 100 mg QD   - cont procardial 60 mg QD   - rec to increase Coreg to 12.5 mg BID   - cont hydralazine 25 mg BID, can give an extra dose for SBP>170     3. Hyperlipidemia   - Lipitor 40mg   - lipid profile     4. Diabetes Mellitus Type II   - FBG per protocol  - ISS   - A1C: 7.7%     5. CAD - TTE with Basal and mid inferior wall and basal inferoseptal segment are abnormal  - Discussed with pt, spouse and daughter in person, they do not know full name or number of his cardiologist to obtain his prior records from  - They deny he has any h/o MI, CP or SOB  - Advised OP follow up with his cards         Iolani Behrbom, AG-NP Omid Javdan, DO [facility]  Cardiovascular Medicine  [address] [place] [address], [address]  Available through call or text on [employer] TE[employer]  Office: [phone]   DATE OF SERVICE: 03-10-25 @ 16:01    Patient is a 70y old  Male who presents with a chief complaint of S/p L BKA (07 Mar 2025 15:32)      INTERVAL HISTORY: feels okay, oob in chair, family at bedside    REVIEW OF SYSTEMS:  CONSTITUTIONAL: No weakness  EYES/ENT: No visual changes;  No throat pain   NECK: No pain or stiffness  RESPIRATORY: No cough, wheezing; No shortness of breath  CARDIOVASCULAR: No chest pain or palpitations  GASTROINTESTINAL: No abdominal  pain. No nausea, vomiting, or hematemesis  GENITOURINARY: No dysuria, frequency or hematuria  NEUROLOGICAL: No stroke like symptoms  SKIN: No rashes    	  MEDICATIONS:  carvedilol 6.25 milliGRAM(s) Oral every 12 hours  hydrALAZINE 25 milliGRAM(s) Oral every 12 hours  losartan 100 milliGRAM(s) Oral daily  NIFEdipine XL 60 milliGRAM(s) Oral daily        PHYSICAL EXAM:  T(C): 36.5 (03-10-25 @ 13:38), Max: 37.2 (03-10-25 @ 00:54)  HR: 105 (03-10-25 @ 13:38) (98 - 105)  BP: 114/68 (03-10-25 @ 13:38) (106/69 - 143/74)  RR: 18 (03-10-25 @ 13:38) (18 - 18)  SpO2: 95% (03-10-25 @ 13:38) (95% - 97%)  Wt(kg): --  I&O's Summary    09 Mar 2025 07:01  -  10 Mar 2025 07:00  --------------------------------------------------------  IN: 930 mL / OUT: 1625 mL / NET: -695 mL    10 Mar 2025 07:01  -  10 Mar 2025 16:01  --------------------------------------------------------  IN: 480 mL / OUT: 400 mL / NET: 80 mL          Appearance: In no distress	  HEENT:    PERRL, EOMI	  Cardiovascular:  S1 S2, No JVD  Respiratory: Lungs clear to auscultation	  Gastrointestinal:  Soft, Non-tender, + BS	  Vascularature:  No edema of LE  Psychiatric: Appropriate affect   Neuro: no acute focal deficits                               7.2    12.64 )-----------( 627      ( 10 Mar 2025 06:34 )             22.9     03-10    134[L]  |  98  |  11  ----------------------------<  147[H]  3.8   |  21[L]  |  0.80    Ca    9.4      10 Mar 2025 06:34  Phos  3.4     03-10  Mg     2.1     03-10          Labs personally reviewed      ASSESSMENT/PLAN: 	    69M PM of former smoker (1.5 PPD % 50 years; quit 2 years ago),  HTN, HLD, DM, PAD and aortoiliac occlusive disease, who previously underwent an outpatient angiography complicated by right iliac artery dissection. Now S/P Creation of bypass from left femoral artery to distal tibial artery with RSVG and completion angiogram on 2/7/25 s/p 2u pRBC 7.4 from 7.8l not responded to 2nd unit on 2/12/25. EGD completed 2/13 found to have bleeding gastric ulcer w/ hemostasis achieved with cautery. Hgb found to be 6.1 and s/p 2u pRBC. Patient now w/ ischemic changes to LLE.    1. Cardiac Risk Stratification   - No chest pain/SOB  - EKG Sinus tach 117 bpm no ischemic changes   - TTE reviewed with pericardial effusion, unchanged compared to 2023  - Not in decompensated HF   - No hx of tachy/timothy arrhythmias   - No valvular abnormalities  - Moderate risk for moderate risk vascular surgery  - Tolerated well  - pending formalization next week - pt is optimized from cardiac perspective to proceed     2. Hypertension- bp better   - Losartan 100 mg QD   - Nifedipine 60 mg QD   - Coreg to 6.25mg BID   - d/c hydralazine 25 mg BID    3. Hyperlipidemia   - Lipitor 40mg   - lipid profile     4. Diabetes Mellitus Type II   - FBG per protocol  - ISS   - A1C: 7.7%     5. CAD - TTE with Basal and mid inferior wall and basal inferoseptal segment are abnormal  - Discussed with pt, spouse and daughter in person, they do not know full name or number of his cardiologist to obtain his prior records from  - They deny he has any h/o MI, CP or SOB  - Advised OP follow up with his cards         Iolani Behrbom, AG-RINA Dawkins DO [facility]  Cardiovascular Medicine  [address] [place] [address], [address]  Available through call or text on [employer] TEAM[employer]  Office: [phone]

## 2025-03-10 NOTE — PROGRESS NOTE ADULT - ASSESSMENT
A 70-year-old male with a medical history of hypertension, hyperlipidemia, diabetes mellitus, peripheral artery disease, and aortoiliac occlusive disease, previously underwent outpatient angiography that resulted in a right iliac artery dissection. He is now status post for the creation of a bypass from the left femoral artery to the distal tibial artery using a reversed saphenous vein graft, with a completion angiogram completed on February 7, 2025.     The Endocrine team has been consulted due to his uncontrolled diabetes. Endocrine re-consulted for hyperglycemia. Patient reports decreased appetite at times, variable oral intake but  does drink Glucerna drink which has 27g CHO per serving if not eating full meal. FBG elevated this morning 276mg/dl.  BGs variable between 158-299mg/dl. Endocrine will closely monitor BG and adjust insulin as needed for BG goal 100-180mg/dL inpatient.     #Type 2 diabetes mellitus   A1C with Estimated Average Glucose Result: 7.7 % (01-28-25 @ 10:05)  A1C with Estimated Average Glucose Result: 7.4 % (12-06-24 @ 09:06)  Home regimen: Jardiance 10mg once daily, Actos 30mg once daily, Jentadueto 2.5-2g BID

## 2025-03-10 NOTE — PROGRESS NOTE ADULT - SUBJECTIVE AND OBJECTIVE BOX
Chief Complaint: Diabetes Mellitus follow up    INTERVAL HX:  Patient seen at bedside.  Reports variable oral intake but does drink glucerna at each meal.  BG over the last 24 hrs have been varialbe from 158-299mg/dl.   Goal range 100-180mg/dl. No hypoglycemia.     Review of Systems:  General: As above  GI: No nausea, vomiting  Endocrine: no  S&Sx of hypoglycemia    Allergies    Advil (Rash)    Intolerances      MEDICATIONS  (STANDING):  acetaminophen     Tablet .. 975 milliGRAM(s) Oral every 6 hours  aspirin  chewable 81 milliGRAM(s) Oral daily  carvedilol 6.25 milliGRAM(s) Oral every 12 hours  cyanocobalamin 1000 MICROGram(s) Oral daily  dextrose 5%. 1000 milliLiter(s) (50 mL/Hr) IV Continuous <Continuous>  dextrose 50% Injectable 25 Gram(s) IV Push once  dextrose 50% Injectable 12.5 Gram(s) IV Push once  enoxaparin Injectable 40 milliGRAM(s) SubCutaneous every 24 hours  gabapentin 100 milliGRAM(s) Oral daily  influenza  Vaccine (HIGH DOSE) 0.5 milliLiter(s) IntraMuscular once  insulin glargine Injectable (LANTUS) 13 Unit(s) SubCutaneous at bedtime  insulin lispro (ADMELOG) corrective regimen sliding scale   SubCutaneous three times a day before meals  insulin lispro (ADMELOG) corrective regimen sliding scale   SubCutaneous at bedtime  insulin lispro Injectable (ADMELOG) 5 Unit(s) SubCutaneous three times a day with meals  lactobacillus acidophilus 1 Tablet(s) Oral with dinner  losartan 100 milliGRAM(s) Oral daily  melatonin 6 milliGRAM(s) Oral at bedtime  NIFEdipine XL 60 milliGRAM(s) Oral daily  pantoprazole    Tablet 40 milliGRAM(s) Oral two times a day  piperacillin/tazobactam IVPB.. 3.375 Gram(s) IV Intermittent every 8 hours  polyethylene glycol 3350 17 Gram(s) Oral daily  senna 1 Tablet(s) Oral at bedtime        insulin glargine Injectable (LANTUS)   13 Unit(s) SubCutaneous (03-09-25 @ 21:44)    insulin lispro (ADMELOG) corrective regimen sliding scale   6 Unit(s) SubCutaneous (03-10-25 @ 12:40)   6 Unit(s) SubCutaneous (03-10-25 @ 09:07)   2 Unit(s) SubCutaneous (03-09-25 @ 17:12)    insulin lispro Injectable (ADMELOG)   5 Unit(s) SubCutaneous (03-10-25 @ 12:41)   5 Unit(s) SubCutaneous (03-10-25 @ 09:08)        PHYSICAL EXAM:  VITALS: T(C): 36.5 (03-10-25 @ 13:38)  T(F): 97.7 (03-10-25 @ 13:38), Max: 98.9 (03-10-25 @ 00:54)  HR: 105 (03-10-25 @ 13:38) (98 - 105)  BP: 114/68 (03-10-25 @ 13:38) (106/69 - 143/74)  RR:  (18 - 18)  SpO2:  (95% - 97%)  Wt(kg): --  GENERAL: NAD  Respiratory: Respirations unlabored   Extremities: Warm, Left guillotine amputation in immobilizer.  NEURO: Alert , appropriate     LABS:  POCT Blood Glucose.: 299 mg/dL (03-10-25 @ 12:37)  POCT Blood Glucose.: 276 mg/dL (03-10-25 @ 09:02)  POCT Blood Glucose.: 228 mg/dL (03-09-25 @ 21:42)  POCT Blood Glucose.: 158 mg/dL (03-09-25 @ 17:10)  POCT Blood Glucose.: 236 mg/dL (03-09-25 @ 12:56)  POCT Blood Glucose.: 196 mg/dL (03-09-25 @ 08:13)  POCT Blood Glucose.: 214 mg/dL (03-08-25 @ 21:53)  POCT Blood Glucose.: 135 mg/dL (03-08-25 @ 17:26)  POCT Blood Glucose.: 163 mg/dL (03-08-25 @ 14:02)  POCT Blood Glucose.: 223 mg/dL (03-08-25 @ 11:03)  POCT Blood Glucose.: 199 mg/dL (03-07-25 @ 21:37)  POCT Blood Glucose.: 212 mg/dL (03-07-25 @ 18:12)                          7.2    12.64 )-----------( 627      ( 10 Mar 2025 06:34 )             22.9     03-10    134[L]  |  98  |  11  ----------------------------<  147[H]  3.8   |  21[L]  |  0.80    Ca    9.4      10 Mar 2025 06:34  Phos  3.4     03-10  Mg     2.1     03-10      eGFR: 94 mL/min/1.73m2 (10 Mar 2025 06:34)      Thyroid Function Tests:      A1C with Estimated Average Glucose Result: 7.7 % (01-28-25 @ 10:05)    Estimated Average Glucose: 174 mg/dL (01-28-25 @ 10:05)        Diet, Consistent Carbohydrate w/Evening Snack:   Supplement Feeding Modality:  Oral  Glucerna Shake Cans or Servings Per Day:  3       Frequency:  Daily (03-02-25 @ 12:58) [Active]

## 2025-03-11 LAB
ANION GAP SERPL CALC-SCNC: 13 MMOL/L — SIGNIFICANT CHANGE UP (ref 5–17)
BLD GP AB SCN SERPL QL: NEGATIVE — SIGNIFICANT CHANGE UP
BUN SERPL-MCNC: 15 MG/DL — SIGNIFICANT CHANGE UP (ref 7–23)
CALCIUM SERPL-MCNC: 9.4 MG/DL — SIGNIFICANT CHANGE UP (ref 8.4–10.5)
CHLORIDE SERPL-SCNC: 98 MMOL/L — SIGNIFICANT CHANGE UP (ref 96–108)
CO2 SERPL-SCNC: 22 MMOL/L — SIGNIFICANT CHANGE UP (ref 22–31)
CREAT SERPL-MCNC: 0.82 MG/DL — SIGNIFICANT CHANGE UP (ref 0.5–1.3)
EGFR: 93 ML/MIN/1.73M2 — SIGNIFICANT CHANGE UP
EGFR: 93 ML/MIN/1.73M2 — SIGNIFICANT CHANGE UP
GLUCOSE BLDC GLUCOMTR-MCNC: 182 MG/DL — HIGH (ref 70–99)
GLUCOSE BLDC GLUCOMTR-MCNC: 193 MG/DL — HIGH (ref 70–99)
GLUCOSE BLDC GLUCOMTR-MCNC: 282 MG/DL — HIGH (ref 70–99)
GLUCOSE BLDC GLUCOMTR-MCNC: 327 MG/DL — HIGH (ref 70–99)
GLUCOSE SERPL-MCNC: 192 MG/DL — HIGH (ref 70–99)
HCT VFR BLD CALC: 21.2 % — LOW (ref 39–50)
HGB BLD-MCNC: 6.7 G/DL — CRITICAL LOW (ref 13–17)
MAGNESIUM SERPL-MCNC: 2.1 MG/DL — SIGNIFICANT CHANGE UP (ref 1.6–2.6)
MCHC RBC-ENTMCNC: 28.5 PG — SIGNIFICANT CHANGE UP (ref 27–34)
MCHC RBC-ENTMCNC: 31.6 G/DL — LOW (ref 32–36)
MCV RBC AUTO: 90.2 FL — SIGNIFICANT CHANGE UP (ref 80–100)
NRBC BLD AUTO-RTO: 0 /100 WBCS — SIGNIFICANT CHANGE UP (ref 0–0)
PHOSPHATE SERPL-MCNC: 3.2 MG/DL — SIGNIFICANT CHANGE UP (ref 2.5–4.5)
PLATELET # BLD AUTO: 709 K/UL — HIGH (ref 150–400)
POTASSIUM SERPL-MCNC: 3.7 MMOL/L — SIGNIFICANT CHANGE UP (ref 3.5–5.3)
POTASSIUM SERPL-SCNC: 3.7 MMOL/L — SIGNIFICANT CHANGE UP (ref 3.5–5.3)
RBC # BLD: 2.35 M/UL — LOW (ref 4.2–5.8)
RBC # FLD: 16.7 % — HIGH (ref 10.3–14.5)
RH IG SCN BLD-IMP: NEGATIVE — SIGNIFICANT CHANGE UP
SODIUM SERPL-SCNC: 133 MMOL/L — LOW (ref 135–145)
WBC # BLD: 14.44 K/UL — HIGH (ref 3.8–10.5)
WBC # FLD AUTO: 14.44 K/UL — HIGH (ref 3.8–10.5)

## 2025-03-11 RX ADMIN — Medication 975 MILLIGRAM(S): at 12:00

## 2025-03-11 RX ADMIN — CYANOCOBALAMIN 1000 MICROGRAM(S): 1000 INJECTION INTRAMUSCULAR; SUBCUTANEOUS at 11:30

## 2025-03-11 RX ADMIN — Medication 25 GRAM(S): at 22:20

## 2025-03-11 RX ADMIN — ENOXAPARIN SODIUM 40 MILLIGRAM(S): 100 INJECTION SUBCUTANEOUS at 17:40

## 2025-03-11 RX ADMIN — Medication 25 GRAM(S): at 05:22

## 2025-03-11 RX ADMIN — CARVEDILOL 6.25 MILLIGRAM(S): 3.12 TABLET, FILM COATED ORAL at 05:23

## 2025-03-11 RX ADMIN — Medication 975 MILLIGRAM(S): at 06:23

## 2025-03-11 RX ADMIN — Medication 975 MILLIGRAM(S): at 23:33

## 2025-03-11 RX ADMIN — Medication 60 MILLIGRAM(S): at 05:23

## 2025-03-11 RX ADMIN — INSULIN LISPRO 2: 100 INJECTION, SOLUTION INTRAVENOUS; SUBCUTANEOUS at 09:09

## 2025-03-11 RX ADMIN — Medication 975 MILLIGRAM(S): at 11:29

## 2025-03-11 RX ADMIN — INSULIN LISPRO 5 UNIT(S): 100 INJECTION, SOLUTION INTRAVENOUS; SUBCUTANEOUS at 19:03

## 2025-03-11 RX ADMIN — INSULIN LISPRO 4: 100 INJECTION, SOLUTION INTRAVENOUS; SUBCUTANEOUS at 22:21

## 2025-03-11 RX ADMIN — CARVEDILOL 6.25 MILLIGRAM(S): 3.12 TABLET, FILM COATED ORAL at 17:40

## 2025-03-11 RX ADMIN — Medication 975 MILLIGRAM(S): at 17:40

## 2025-03-11 RX ADMIN — Medication 975 MILLIGRAM(S): at 00:02

## 2025-03-11 RX ADMIN — Medication 25 GRAM(S): at 17:40

## 2025-03-11 RX ADMIN — POLYETHYLENE GLYCOL 3350 17 GRAM(S): 17 POWDER, FOR SOLUTION ORAL at 11:30

## 2025-03-11 RX ADMIN — INSULIN LISPRO 5 UNIT(S): 100 INJECTION, SOLUTION INTRAVENOUS; SUBCUTANEOUS at 13:44

## 2025-03-11 RX ADMIN — Medication 81 MILLIGRAM(S): at 11:30

## 2025-03-11 RX ADMIN — Medication 6 MILLIGRAM(S): at 22:20

## 2025-03-11 RX ADMIN — Medication 40 MILLIGRAM(S): at 05:23

## 2025-03-11 RX ADMIN — LOSARTAN POTASSIUM 100 MILLIGRAM(S): 100 TABLET, FILM COATED ORAL at 05:23

## 2025-03-11 RX ADMIN — Medication 1 TABLET(S): at 22:21

## 2025-03-11 RX ADMIN — INSULIN GLARGINE-YFGN 14 UNIT(S): 100 INJECTION, SOLUTION SUBCUTANEOUS at 22:21

## 2025-03-11 RX ADMIN — Medication 975 MILLIGRAM(S): at 18:10

## 2025-03-11 RX ADMIN — Medication 1 TABLET(S): at 17:40

## 2025-03-11 RX ADMIN — Medication 975 MILLIGRAM(S): at 05:23

## 2025-03-11 RX ADMIN — INSULIN LISPRO 6: 100 INJECTION, SOLUTION INTRAVENOUS; SUBCUTANEOUS at 19:03

## 2025-03-11 RX ADMIN — OXYCODONE HYDROCHLORIDE 10 MILLIGRAM(S): 30 TABLET ORAL at 11:11

## 2025-03-11 RX ADMIN — GABAPENTIN 100 MILLIGRAM(S): 400 CAPSULE ORAL at 11:30

## 2025-03-11 RX ADMIN — Medication 40 MILLIGRAM(S): at 17:40

## 2025-03-11 RX ADMIN — INSULIN LISPRO 5 UNIT(S): 100 INJECTION, SOLUTION INTRAVENOUS; SUBCUTANEOUS at 09:09

## 2025-03-11 RX ADMIN — OXYCODONE HYDROCHLORIDE 10 MILLIGRAM(S): 30 TABLET ORAL at 10:41

## 2025-03-11 RX ADMIN — Medication 975 MILLIGRAM(S): at 23:03

## 2025-03-11 RX ADMIN — INSULIN LISPRO 2: 100 INJECTION, SOLUTION INTRAVENOUS; SUBCUTANEOUS at 13:44

## 2025-03-11 NOTE — PROGRESS NOTE ADULT - SUBJECTIVE AND OBJECTIVE BOX
SURGERY DAILY PROGRESS NOTE:       Subjective / Overnight events: Pt seen during AM rounds. Pt resting comfortably in chair. Requested to team to speak with his niece for update on pt's progress - updates proved, questions answered.       Objective:      MEDICATIONS  (STANDING):  acetaminophen     Tablet .. 975 milliGRAM(s) Oral every 6 hours  aspirin  chewable 81 milliGRAM(s) Oral daily  carvedilol 6.25 milliGRAM(s) Oral every 12 hours  cyanocobalamin 1000 MICROGram(s) Oral daily  dextrose 5%. 1000 milliLiter(s) (50 mL/Hr) IV Continuous <Continuous>  dextrose 50% Injectable 25 Gram(s) IV Push once  dextrose 50% Injectable 12.5 Gram(s) IV Push once  enoxaparin Injectable 40 milliGRAM(s) SubCutaneous every 24 hours  gabapentin 100 milliGRAM(s) Oral daily  influenza  Vaccine (HIGH DOSE) 0.5 milliLiter(s) IntraMuscular once  insulin glargine Injectable (LANTUS) 14 Unit(s) SubCutaneous at bedtime  insulin lispro (ADMELOG) corrective regimen sliding scale   SubCutaneous three times a day before meals  insulin lispro (ADMELOG) corrective regimen sliding scale   SubCutaneous at bedtime  insulin lispro Injectable (ADMELOG) 5 Unit(s) SubCutaneous three times a day with meals  lactobacillus acidophilus 1 Tablet(s) Oral with dinner  losartan 100 milliGRAM(s) Oral daily  melatonin 6 milliGRAM(s) Oral at bedtime  NIFEdipine XL 60 milliGRAM(s) Oral daily  pantoprazole    Tablet 40 milliGRAM(s) Oral two times a day  piperacillin/tazobactam IVPB.. 3.375 Gram(s) IV Intermittent every 8 hours  polyethylene glycol 3350 17 Gram(s) Oral daily  senna 1 Tablet(s) Oral at bedtime    MEDICATIONS  (PRN):  bisacodyl 5 milliGRAM(s) Oral daily PRN Constipation  dextrose Oral Gel 15 Gram(s) Oral once PRN Blood Glucose LESS THAN 70 milliGRAM(s)/deciliter  HYDROmorphone  Injectable 0.5 milliGRAM(s) IV Push every 6 hours PRN Breakthrough pain  oxyCODONE    IR 10 milliGRAM(s) Oral every 4 hours PRN Severe Pain (7 - 10)  oxyCODONE    IR 5 milliGRAM(s) Oral every 6 hours PRN Moderate Pain (4 - 6)      Vital Signs Last 24 Hrs  T(C): 36.7 (11 Mar 2025 05:19), Max: 37.1 (10 Mar 2025 22:09)  T(F): 98.1 (11 Mar 2025 05:19), Max: 98.8 (10 Mar 2025 22:09)  HR: 109 (11 Mar 2025 05:19) (102 - 109)  BP: 149/75 (11 Mar 2025 05:19) (106/69 - 149/75)  BP(mean): --  RR: 18 (11 Mar 2025 05:19) (18 - 20)  SpO2: 98% (11 Mar 2025 05:19) (95% - 98%)    Parameters below as of 11 Mar 2025 05:19  Patient On (Oxygen Delivery Method): room air        I&O's Detail    10 Mar 2025 07:01  -  11 Mar 2025 07:00  --------------------------------------------------------  IN:    IV PiggyBack: 200 mL    Oral Fluid: 960 mL  Total IN: 1160 mL    OUT:    Voided (mL): 950 mL  Total OUT: 950 mL    Total NET: 210 mL          Daily     Daily     LABS:                        6.7    14.44 )-----------( 709      ( 11 Mar 2025 06:38 )             21.2     03-11    133[L]  |  98  |  15  ----------------------------<  192[H]  3.7   |  22  |  0.82    Ca    9.4      11 Mar 2025 06:38  Phos  3.2     03-11  Mg     2.1     03-11        Urinalysis Basic - ( 11 Mar 2025 06:38 )    Color: x / Appearance: x / SG: x / pH: x  Gluc: 192 mg/dL / Ketone: x  / Bili: x / Urobili: x   Blood: x / Protein: x / Nitrite: x   Leuk Esterase: x / RBC: x / WBC x   Sq Epi: x / Non Sq Epi: x / Bacteria: x          PHYSICAL EXAM  --------------------------------------------------------------------------------    Physical Exam:  General: NAD, resting comfortably in chair  Pulmonary: Nonlabored breathing, no respiratory distress  Abdominal: soft, non distended, non tender  Extremities: LLE guillotine amp in knee immobilizer

## 2025-03-11 NOTE — PROGRESS NOTE ADULT - ASSESSMENT
69M PM of former smoker (1.5 PPD % 50 years; quit 2 years ago),  HTN, HLD, DM, PAD and aortoiliac occlusive disease, who previously underwent an outpatient angiography complicated by right iliac artery dissection. Now S/P Creation of bypass from left femoral artery to distal tibial artery with RSVG and completion angiogram on 2/7/25 s/p 2u pRBC 7.4 from 7.8l not responded to 2nd unit on 2/12/25. EGD completed 2/13 found to have bleeding gastric ulcer w/ hemostasis achieved with cautery. Hgb found to be 6.1 and s/p 2u pRBC. 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       Plan:   - 1uPRBC in AM, follow up post transfusion CBC  - Pending formalization next week   - Appreciate GI recs: Continue PPI increase to BID x4 weeks then downgrade to daily dosing; no repeat EGD, repeat EGD in 2 months outpatient   - BP regimen adjusted and better controlled; appreciate cards recs   - Dressing change daily  - CC diet   - Antibx: zosyn dc on 3/5/25; vanc d/c 2/28, zosyn started back up on 3/6 given rising WBC and evidence of necrosis at stump  - Lovenox/Aspirin   - Diet: Regular   - on insulin appreciate endocrine recs  - ASA  - Pain regimen: Tylenol ATC, oxycodone PRN  - Bowel regimen  - PT recs -> YANELY    Vascular Surg  [id number]

## 2025-03-11 NOTE — PROGRESS NOTE ADULT - SUBJECTIVE AND OBJECTIVE BOX
DATE OF SERVICE: 03-11-25 @ 10:32    Patient is a 70y old  Male who presents with a chief complaint of S/p L BKA (10 Mar 2025 17:08)      INTERVAL HISTORY: Feels ok. OOB to chair.     REVIEW OF SYSTEMS:  CONSTITUTIONAL: No weakness  EYES/ENT: No visual changes;  No throat pain   NECK: No pain or stiffness  RESPIRATORY: No cough, wheezing; No shortness of breath  CARDIOVASCULAR: No chest pain or palpitations  GASTROINTESTINAL: No abdominal  pain. No nausea, vomiting, or hematemesis  GENITOURINARY: No dysuria, frequency or hematuria  NEUROLOGICAL: No stroke like symptoms  SKIN: No rashes    	  MEDICATIONS:  carvedilol 6.25 milliGRAM(s) Oral every 12 hours  losartan 100 milliGRAM(s) Oral daily  NIFEdipine XL 60 milliGRAM(s) Oral daily        PHYSICAL EXAM:  T(C): 36.7 (03-11-25 @ 05:19), Max: 37.1 (03-10-25 @ 22:09)  HR: 109 (03-11-25 @ 05:19) (102 - 109)  BP: 149/75 (03-11-25 @ 05:19) (114/68 - 149/75)  RR: 18 (03-11-25 @ 05:19) (18 - 20)  SpO2: 98% (03-11-25 @ 05:19) (95% - 98%)  Wt(kg): --  I&O's Summary    10 Mar 2025 07:01  -  11 Mar 2025 07:00  --------------------------------------------------------  IN: 1160 mL / OUT: 950 mL / NET: 210 mL          Appearance: In no distress	  HEENT:    PERRL, EOMI	  Cardiovascular:  S1 S2, No JVD  Respiratory: Lungs clear to auscultation	  Gastrointestinal:  Soft, Non-tender, + BS	  Vascularature:  No edema of LE  Psychiatric: Appropriate affect   Neuro: no acute focal deficits                               6.7    14.44 )-----------( 709      ( 11 Mar 2025 06:38 )             21.2     03-11    133[L]  |  98  |  15  ----------------------------<  192[H]  3.7   |  22  |  0.82    Ca    9.4      11 Mar 2025 06:38  Phos  3.2     03-11  Mg     2.1     03-11          Labs personally reviewed      ASSESSMENT/PLAN: 	      69M PM of former smoker (1.5 PPD % 50 years; quit 2 years ago),  HTN, HLD, DM, PAD and aortoiliac occlusive disease, who previously underwent an outpatient angiography complicated by right iliac artery dissection. Now S/P Creation of bypass from left femoral artery to distal tibial artery with RSVG and completion angiogram on 2/7/25 s/p 2u pRBC 7.4 from 7.8l not responded to 2nd unit on 2/12/25. EGD completed 2/13 found to have bleeding gastric ulcer w/ hemostasis achieved with cautery. Hgb found to be 6.1 and s/p 2u pRBC. Patient now w/ ischemic changes to LLE.    1. Cardiac Risk Stratification   - No chest pain/SOB  - EKG Sinus tach 117 bpm no ischemic changes   - TTE reviewed with pericardial effusion, unchanged compared to 2023  - Not in decompensated HF   - No hx of tachy/timothy arrhythmias   - No valvular abnormalities  - Moderate risk for moderate risk vascular surgery  - Tolerated well  - pending formalization next week - pt is optimized from cardiac perspective to proceed     2. Hypertension- bp better   - Losartan 100 mg QD   - Nifedipine 60 mg QD   - Coreg to 6.25mg BID   - d/c hydralazine 25 mg BID    3. Hyperlipidemia   - Lipitor 40mg   - lipid profile     4. Diabetes Mellitus Type II   - FBG per protocol  - ISS   - A1C: 7.7%     5. CAD - TTE with Basal and mid inferior wall and basal inferoseptal segment are abnormal  - Discussed with pt, spouse and daughter in person, they do not know full name or number of his cardiologist to obtain his prior records from  - They deny he has any h/o MI, CP or SOB  - Advised OP follow up with his cards         JUDE Hill-RINA Dawkins DO [facility]  Cardiovascular Medicine  [address], [address]  Available through call or text on [employer]  Office: [phone]

## 2025-03-12 LAB
ANION GAP SERPL CALC-SCNC: 16 MMOL/L — SIGNIFICANT CHANGE UP (ref 5–17)
BUN SERPL-MCNC: 15 MG/DL — SIGNIFICANT CHANGE UP (ref 7–23)
CALCIUM SERPL-MCNC: 9.4 MG/DL — SIGNIFICANT CHANGE UP (ref 8.4–10.5)
CHLORIDE SERPL-SCNC: 98 MMOL/L — SIGNIFICANT CHANGE UP (ref 96–108)
CO2 SERPL-SCNC: 21 MMOL/L — LOW (ref 22–31)
CREAT SERPL-MCNC: 0.77 MG/DL — SIGNIFICANT CHANGE UP (ref 0.5–1.3)
EGFR: 95 ML/MIN/1.73M2 — SIGNIFICANT CHANGE UP
EGFR: 95 ML/MIN/1.73M2 — SIGNIFICANT CHANGE UP
GLUCOSE BLDC GLUCOMTR-MCNC: 160 MG/DL — HIGH (ref 70–99)
GLUCOSE BLDC GLUCOMTR-MCNC: 165 MG/DL — HIGH (ref 70–99)
GLUCOSE BLDC GLUCOMTR-MCNC: 219 MG/DL — HIGH (ref 70–99)
GLUCOSE BLDC GLUCOMTR-MCNC: 302 MG/DL — HIGH (ref 70–99)
GLUCOSE SERPL-MCNC: 189 MG/DL — HIGH (ref 70–99)
HCT VFR BLD CALC: 26.6 % — LOW (ref 39–50)
HGB BLD-MCNC: 8.5 G/DL — LOW (ref 13–17)
INR BLD: 1.11 RATIO — SIGNIFICANT CHANGE UP (ref 0.85–1.16)
MAGNESIUM SERPL-MCNC: 2 MG/DL — SIGNIFICANT CHANGE UP (ref 1.6–2.6)
MCHC RBC-ENTMCNC: 28.1 PG — SIGNIFICANT CHANGE UP (ref 27–34)
MCHC RBC-ENTMCNC: 32 G/DL — SIGNIFICANT CHANGE UP (ref 32–36)
MCV RBC AUTO: 87.8 FL — SIGNIFICANT CHANGE UP (ref 80–100)
NRBC BLD AUTO-RTO: 0 /100 WBCS — SIGNIFICANT CHANGE UP (ref 0–0)
PHOSPHATE SERPL-MCNC: 3.2 MG/DL — SIGNIFICANT CHANGE UP (ref 2.5–4.5)
PLATELET # BLD AUTO: 676 K/UL — HIGH (ref 150–400)
POTASSIUM SERPL-MCNC: 3.8 MMOL/L — SIGNIFICANT CHANGE UP (ref 3.5–5.3)
POTASSIUM SERPL-SCNC: 3.8 MMOL/L — SIGNIFICANT CHANGE UP (ref 3.5–5.3)
PROTHROM AB SERPL-ACNC: 12.8 SEC — SIGNIFICANT CHANGE UP (ref 9.9–13.4)
RBC # BLD: 3.03 M/UL — LOW (ref 4.2–5.8)
RBC # FLD: 17.2 % — HIGH (ref 10.3–14.5)
SODIUM SERPL-SCNC: 135 MMOL/L — SIGNIFICANT CHANGE UP (ref 135–145)
WBC # BLD: 12.52 K/UL — HIGH (ref 3.8–10.5)
WBC # FLD AUTO: 12.52 K/UL — HIGH (ref 3.8–10.5)

## 2025-03-12 PROCEDURE — 99232 SBSQ HOSP IP/OBS MODERATE 35: CPT

## 2025-03-12 PROCEDURE — G0545: CPT

## 2025-03-12 PROCEDURE — 99223 1ST HOSP IP/OBS HIGH 75: CPT

## 2025-03-12 RX ORDER — INSULIN LISPRO 100 U/ML
8 INJECTION, SOLUTION INTRAVENOUS; SUBCUTANEOUS
Refills: 0 | Status: DISCONTINUED | OUTPATIENT
Start: 2025-03-12 | End: 2025-03-18

## 2025-03-12 RX ORDER — PIPERACILLIN-TAZO-DEXTROSE,ISO 3.375G/5
3.38 IV SOLUTION, PIGGYBACK PREMIX FROZEN(ML) INTRAVENOUS EVERY 8 HOURS
Refills: 0 | Status: DISCONTINUED | OUTPATIENT
Start: 2025-03-12 | End: 2025-03-18

## 2025-03-12 RX ADMIN — CARVEDILOL 6.25 MILLIGRAM(S): 3.12 TABLET, FILM COATED ORAL at 05:23

## 2025-03-12 RX ADMIN — POLYETHYLENE GLYCOL 3350 17 GRAM(S): 17 POWDER, FOR SOLUTION ORAL at 11:59

## 2025-03-12 RX ADMIN — Medication 81 MILLIGRAM(S): at 11:59

## 2025-03-12 RX ADMIN — INSULIN LISPRO 4: 100 INJECTION, SOLUTION INTRAVENOUS; SUBCUTANEOUS at 22:13

## 2025-03-12 RX ADMIN — INSULIN LISPRO 4: 100 INJECTION, SOLUTION INTRAVENOUS; SUBCUTANEOUS at 14:13

## 2025-03-12 RX ADMIN — OXYCODONE HYDROCHLORIDE 10 MILLIGRAM(S): 30 TABLET ORAL at 09:17

## 2025-03-12 RX ADMIN — INSULIN LISPRO 2: 100 INJECTION, SOLUTION INTRAVENOUS; SUBCUTANEOUS at 09:04

## 2025-03-12 RX ADMIN — INSULIN GLARGINE-YFGN 14 UNIT(S): 100 INJECTION, SOLUTION SUBCUTANEOUS at 22:13

## 2025-03-12 RX ADMIN — Medication 975 MILLIGRAM(S): at 17:54

## 2025-03-12 RX ADMIN — GABAPENTIN 100 MILLIGRAM(S): 400 CAPSULE ORAL at 11:59

## 2025-03-12 RX ADMIN — INSULIN LISPRO 2: 100 INJECTION, SOLUTION INTRAVENOUS; SUBCUTANEOUS at 18:49

## 2025-03-12 RX ADMIN — Medication 1 TABLET(S): at 17:54

## 2025-03-12 RX ADMIN — Medication 975 MILLIGRAM(S): at 05:53

## 2025-03-12 RX ADMIN — INSULIN LISPRO 8 UNIT(S): 100 INJECTION, SOLUTION INTRAVENOUS; SUBCUTANEOUS at 18:50

## 2025-03-12 RX ADMIN — Medication 40 MILLIGRAM(S): at 05:22

## 2025-03-12 RX ADMIN — OXYCODONE HYDROCHLORIDE 10 MILLIGRAM(S): 30 TABLET ORAL at 04:11

## 2025-03-12 RX ADMIN — Medication 975 MILLIGRAM(S): at 18:24

## 2025-03-12 RX ADMIN — Medication 25 GRAM(S): at 05:23

## 2025-03-12 RX ADMIN — Medication 975 MILLIGRAM(S): at 23:48

## 2025-03-12 RX ADMIN — INSULIN LISPRO 5 UNIT(S): 100 INJECTION, SOLUTION INTRAVENOUS; SUBCUTANEOUS at 09:05

## 2025-03-12 RX ADMIN — Medication 60 MILLIGRAM(S): at 05:22

## 2025-03-12 RX ADMIN — Medication 25 GRAM(S): at 14:15

## 2025-03-12 RX ADMIN — Medication 975 MILLIGRAM(S): at 11:58

## 2025-03-12 RX ADMIN — Medication 6 MILLIGRAM(S): at 22:12

## 2025-03-12 RX ADMIN — CYANOCOBALAMIN 1000 MICROGRAM(S): 1000 INJECTION INTRAMUSCULAR; SUBCUTANEOUS at 11:59

## 2025-03-12 RX ADMIN — ENOXAPARIN SODIUM 40 MILLIGRAM(S): 100 INJECTION SUBCUTANEOUS at 17:53

## 2025-03-12 RX ADMIN — LOSARTAN POTASSIUM 100 MILLIGRAM(S): 100 TABLET, FILM COATED ORAL at 05:22

## 2025-03-12 RX ADMIN — INSULIN LISPRO 8 UNIT(S): 100 INJECTION, SOLUTION INTRAVENOUS; SUBCUTANEOUS at 14:14

## 2025-03-12 RX ADMIN — Medication 975 MILLIGRAM(S): at 05:23

## 2025-03-12 RX ADMIN — OXYCODONE HYDROCHLORIDE 10 MILLIGRAM(S): 30 TABLET ORAL at 03:41

## 2025-03-12 RX ADMIN — OXYCODONE HYDROCHLORIDE 10 MILLIGRAM(S): 30 TABLET ORAL at 10:00

## 2025-03-12 RX ADMIN — OXYCODONE HYDROCHLORIDE 10 MILLIGRAM(S): 30 TABLET ORAL at 23:31

## 2025-03-12 RX ADMIN — Medication 975 MILLIGRAM(S): at 12:30

## 2025-03-12 RX ADMIN — Medication 40 MILLIGRAM(S): at 17:54

## 2025-03-12 RX ADMIN — Medication 1 TABLET(S): at 22:13

## 2025-03-12 RX ADMIN — Medication 25 GRAM(S): at 22:12

## 2025-03-12 RX ADMIN — CARVEDILOL 6.25 MILLIGRAM(S): 3.12 TABLET, FILM COATED ORAL at 17:54

## 2025-03-12 RX ADMIN — Medication 975 MILLIGRAM(S): at 23:18

## 2025-03-12 NOTE — CONSULT NOTE ADULT - CONSULT REASON
Edwin, Jeremiah   [id number]   Persistent leukocytosis, s/p left lower extremity guillotine amputation, now with necrosis at stump

## 2025-03-12 NOTE — PROGRESS NOTE ADULT - ASSESSMENT
Health Maintenance   Topic Date Due    DTaP/Tdap/Td vaccine (6 - Tdap) 01/12/2023 (Originally 7/14/2000)    COVID-19 Vaccine (3 - Booster for Pfizer series) 01/12/2023 (Originally 7/14/2021)    Flu vaccine (1) 10/11/2023 (Originally 8/1/2022)    Depression Monitoring  10/11/2023    Cervical cancer screen  02/17/2027    Hib vaccine  Completed    Pneumococcal 0-64 years Vaccine  Completed    Hepatitis C screen  Completed    HIV screen  Completed    Hepatitis A vaccine  Aged Out    Meningococcal (ACWY) vaccine  Aged Out    Varicella vaccine  Discontinued             (applicable per patient's age: Cancer Screenings, Depression Screening, Fall Risk Screening, Immunizations)    LDL Cholesterol (mg/dL)   Date Value   01/27/2012 102 (H)     AST (U/L)   Date Value   10/23/2020 18     ALT (U/L)   Date Value   10/23/2020 8     BUN (mg/dL)   Date Value   01/24/2021 7      (goal A1C is < 7)   (goal LDL is <100) need 30-50% reduction from baseline     BP Readings from Last 3 Encounters:   11/01/22 116/72   10/11/22 118/86   06/15/22 118/68    (goal /80)      All Future Testing planned in [facility]:  Lab Frequency Next Occurrence   PAP SMEAR Once 02/17/2022   XR CERVICAL SPINE (2-3 VIEWS) Once 06/15/2022       Next Visit Date:  Future Appointments   Date Time Provider Roxy Lam   11/9/2022  9:00 AM P [facility] ULTRASOUND [facility]   11/9/2022  9:30 AM BLAINE Vera CNM [facility]   12/5/2022 11:20 AM BLAINE Vera CNMF OB[facility]GY[facility]   2/23/2023  9:15 AM BLAINE Vera CNMF [facility]            Patient Active Problem List:     Amenorrhea     FH: hypercholesterolemia     Abnormal cytology finding     Insomnia     Allergic rhinitis     Alopecia areata     Antinuclear factor positive     Anxiety and depression     Alcohol problem drinking     Social problem     Prolonged Q-T interval on ECG     Hypernatremia     Suicidal ideations     Mild intermittent asthma     Fatigue     Fibromyalgia     Mass of shoulder region     Raised antibody titer     Abdominal cramping     Encounter for planned induction of labor     36 weeks gestation of pregnancy     False labor     Uterine contractions during pregnancy     Normal delivery 69M PM of former smoker (1.5 PPD % 50 years; quit 2 years ago),  HTN, HLD, DM, PAD and aortoiliac occlusive disease, who previously underwent an outpatient angiography complicated by right iliac artery dissection. Now S/P Creation of bypass from left femoral artery to distal tibial artery with RSVG and completion angiogram on 2/7/25 s/p 2u pRBC 7.4 from 7.8l not responded to 2nd unit on 2/12/25. EGD completed 2/13 found to have bleeding gastric ulcer w/ hemostasis achieved with cautery. Hgb found to be 6.1 and s/p 2u pRBC. 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  Received 1uPRBC yesterday (hgb 6.7) with appropriate response.      Plan:     - Planning formalization/LT AKA Friday vs Monday  - Appreciate GI recs: Continue PPI increase to BID x4 weeks then downgrade to daily dosing; no repeat EGD, repeat EGD in 2 months outpatient   - BP regimen adjusted and better controlled; appreciate cards recs   - Dressing change daily  - CC diet   - Antibx: zosyn dc on 3/5/25; vanc d/c 2/28, zosyn started back up on 3/6 given rising WBC and evidence of necrosis at stump  - Lovenox/Aspirin   - Diet: Regular   - on insulin appreciate endocrine recs  - ASA  - Pain regimen: Tylenol ATC, oxycodone PRN  - Bowel regimen  - PT recs -> YANELY    Vascular Surg  [id number]

## 2025-03-12 NOTE — CONSULT NOTE ADULT - SUBJECTIVE AND OBJECTIVE BOX
Patient is a 70y old  Male who presents with a chief complaint of S/p L BKA (10 Mar 2025 17:08)    HPI:     REVIEW OF SYSTEMS  [  ] ROS unobtainable because:    [ x ] All other systems negative except as noted below  Constitutional:  [ ] fever [ ] chills  [ ] weight loss  [ ]night sweat  [ ]poor appetite/PO intake [ ]fatigue   Skin:  [ ] rash [ ] phlebitis	  Eyes: [ ] icterus [ ] pain  [ ] discharge	  ENMT: [ ] sore throat  [ ] thrush [ ] ulcers [ ] exudates [ ]anosmia  Respiratory: [ ] dyspnea [ ] hemoptysis [ ] cough [ ] sputum	  Cardiovascular:  [ ] chest pain [ ] palpitations [ ] edema	  Gastrointestinal:  [ ] nausea [ ] vomiting [ ] diarrhea [ ] constipation [ ] pain	  Genitourinary:  [ ] dysuria [ ] frequency [ ] hematuria [ ] discharge [ ] flank pain  [ ] incontinence  Musculoskeletal:  [ ] myalgias [ ] arthralgias [ ] arthritis  [ ] back pain  Neurological:  [ ] headache [ ] weakness [ ] seizures  [ ] confusion/altered mental status  prior hospital charts reviewed [V]  primary team notes reviewed [V]  other consultant notes reviewed [V]    PAST MEDICAL & SURGICAL HISTORY:  HTN (hypertension)      HLD (hyperlipidemia)      DM (diabetes mellitus)      PAD (peripheral artery disease)      H/O iron deficiency      [place] (hard of hearing)      Peripheral neuropathy      Peripheral vascular disease, unspecified      Former smoker      CVA (cerebrovascular accident)      S/P appendectomy      S/P peripheral artery angioplasty with stent placement      S/P angiography      S/P cataract surgery          SOCIAL HISTORY:  - Denied smoking/vaping/alcohol/recreational drug use    FAMILY HISTORY:      Allergies  Advil (Rash)        ANTIMICROBIALS:  piperacillin/tazobactam IVPB.. 3.375 every 8 hours      ANTIMICROBIALS (past 90 days):  MEDICATIONS  (STANDING):  amoxicillin  875 milliGRAM(s)/clavulanate   1 Tablet(s) Oral (02-19-25 @ 17:53)    piperacillin/tazobactam IVPB.   200 mL/Hr IV Intermittent (02-16-25 @ 11:56)    piperacillin/tazobactam IVPB.-   25 mL/Hr IV Intermittent (02-16-25 @ 16:05)    piperacillin/tazobactam IVPB..   25 mL/Hr IV Intermittent (02-19-25 @ 05:05)   25 mL/Hr IV Intermittent (02-18-25 @ 21:03)   25 mL/Hr IV Intermittent (02-18-25 @ 14:37)   25 mL/Hr IV Intermittent (02-18-25 @ 05:35)   25 mL/Hr IV Intermittent (02-17-25 @ 21:21)   25 mL/Hr IV Intermittent (02-17-25 @ 13:33)   25 mL/Hr IV Intermittent (02-17-25 @ 05:20)   25 mL/Hr IV Intermittent (02-16-25 @ 21:49)    piperacillin/tazobactam IVPB..   25 mL/Hr IV Intermittent (02-28-25 @ 06:31)   25 mL/Hr IV Intermittent (02-27-25 @ 22:18)   25 mL/Hr IV Intermittent (02-27-25 @ 16:36)    piperacillin/tazobactam IVPB..   25 mL/Hr IV Intermittent (03-05-25 @ 05:59)   25 mL/Hr IV Intermittent (03-04-25 @ 22:50)   25 mL/Hr IV Intermittent (03-04-25 @ 13:50)   25 mL/Hr IV Intermittent (03-04-25 @ 06:07)   25 mL/Hr IV Intermittent (03-03-25 @ 21:42)   25 mL/Hr IV Intermittent (03-03-25 @ 14:04)   25 mL/Hr IV Intermittent (03-03-25 @ 05:10)   25 mL/Hr IV Intermittent (03-02-25 @ 22:06)   25 mL/Hr IV Intermittent (03-02-25 @ 13:12)   25 mL/Hr IV Intermittent (03-02-25 @ 05:07)   25 mL/Hr IV Intermittent (03-01-25 @ 21:27)   25 mL/Hr IV Intermittent (03-01-25 @ 13:06)   25 mL/Hr IV Intermittent (03-01-25 @ 05:16)   25 mL/Hr IV Intermittent (02-28-25 @ 21:22)   25 mL/Hr IV Intermittent (02-28-25 @ 14:49)    piperacillin/tazobactam IVPB..   25 mL/Hr IV Intermittent (02-26-25 @ 21:51)   25 mL/Hr IV Intermittent (02-26-25 @ 13:30)   25 mL/Hr IV Intermittent (02-26-25 @ 05:07)   25 mL/Hr IV Intermittent (02-25-25 @ 21:27)   25 mL/Hr IV Intermittent (02-25-25 @ 13:26)   25 mL/Hr IV Intermittent (02-25-25 @ 05:29)   25 mL/Hr IV Intermittent (02-24-25 @ 21:23)   25 mL/Hr IV Intermittent (02-24-25 @ 14:28)   25 mL/Hr IV Intermittent (02-24-25 @ 05:32)   25 mL/Hr IV Intermittent (02-23-25 @ 22:05)   25 mL/Hr IV Intermittent (02-23-25 @ 15:00)   25 mL/Hr IV Intermittent (02-23-25 @ 05:42)   25 mL/Hr IV Intermittent (02-22-25 @ 22:09)   25 mL/Hr IV Intermittent (02-22-25 @ 13:09)   25 mL/Hr IV Intermittent (02-22-25 @ 06:44)   25 mL/Hr IV Intermittent (02-21-25 @ 22:55)   25 mL/Hr IV Intermittent (02-21-25 @ 13:02)   25 mL/Hr IV Intermittent (02-21-25 @ 05:32)   25 mL/Hr IV Intermittent (02-20-25 @ 21:59)   25 mL/Hr IV Intermittent (02-20-25 @ 13:33)   25 mL/Hr IV Intermittent (02-20-25 @ 03:23)    piperacillin/tazobactam IVPB..   25 mL/Hr IV Intermittent (02-27-25 @ 06:09)    piperacillin/tazobactam IVPB..   25 mL/Hr IV Intermittent (03-12-25 @ 05:23)   25 mL/Hr IV Intermittent (03-11-25 @ 22:20)   25 mL/Hr IV Intermittent (03-11-25 @ 17:40)   25 mL/Hr IV Intermittent (03-11-25 @ 05:22)   25 mL/Hr IV Intermittent (03-10-25 @ 21:56)   25 mL/Hr IV Intermittent (03-10-25 @ 12:43)   25 mL/Hr IV Intermittent (03-10-25 @ 05:08)   25 mL/Hr IV Intermittent (03-09-25 @ 21:02)   25 mL/Hr IV Intermittent (03-09-25 @ 13:30)   25 mL/Hr IV Intermittent (03-09-25 @ 05:06)   25 mL/Hr IV Intermittent (03-08-25 @ 21:18)   25 mL/Hr IV Intermittent (03-08-25 @ 14:24)   25 mL/Hr IV Intermittent (03-08-25 @ 06:13)   25 mL/Hr IV Intermittent (03-07-25 @ 23:33)   25 mL/Hr IV Intermittent (03-07-25 @ 13:49)   25 mL/Hr IV Intermittent (03-07-25 @ 05:32)   25 mL/Hr IV Intermittent (03-06-25 @ 22:30)   25 mL/Hr IV Intermittent (03-06-25 @ 13:21)    vancomycin  IVPB   250 mL/Hr IV Intermittent (02-17-25 @ 11:57)   250 mL/Hr IV Intermittent (02-16-25 @ 23:49)   250 mL/Hr IV Intermittent (02-16-25 @ 11:56)    vancomycin  IVPB   250 mL/Hr IV Intermittent (02-28-25 @ 06:31)   250 mL/Hr IV Intermittent (02-27-25 @ 23:39)    vancomycin  IVPB   250 mL/Hr IV Intermittent (02-23-25 @ 05:42)   250 mL/Hr IV Intermittent (02-22-25 @ 22:09)   250 mL/Hr IV Intermittent (02-22-25 @ 13:09)   250 mL/Hr IV Intermittent (02-22-25 @ 08:05)   250 mL/Hr IV Intermittent (02-21-25 @ 21:21)   250 mL/Hr IV Intermittent (02-21-25 @ 13:02)   250 mL/Hr IV Intermittent (02-21-25 @ 06:49)   250 mL/Hr IV Intermittent (02-20-25 @ 21:58)   250 mL/Hr IV Intermittent (02-20-25 @ 13:33)   250 mL/Hr IV Intermittent (02-20-25 @ 03:22)    vancomycin  IVPB   250 mL/Hr IV Intermittent (02-19-25 @ 08:18)   250 mL/Hr IV Intermittent (02-18-25 @ 21:33)   250 mL/Hr IV Intermittent (02-18-25 @ 14:37)   250 mL/Hr IV Intermittent (02-18-25 @ 05:35)    vancomycin  IVPB   250 mL/Hr IV Intermittent (02-27-25 @ 05:16)   250 mL/Hr IV Intermittent (02-26-25 @ 18:24)   250 mL/Hr IV Intermittent (02-26-25 @ 05:09)   250 mL/Hr IV Intermittent (02-25-25 @ 18:22)   250 mL/Hr IV Intermittent (02-25-25 @ 06:46)   250 mL/Hr IV Intermittent (02-24-25 @ 18:49)   250 mL/Hr IV Intermittent (02-24-25 @ 05:32)   250 mL/Hr IV Intermittent (02-23-25 @ 18:30)        OTHER MEDS:   MEDICATIONS  (STANDING):  acetaminophen     Tablet .. 975 every 6 hours  aspirin  chewable 81 daily  bisacodyl 5 daily PRN  carvedilol 6.25 every 12 hours  dextrose 50% Injectable 25 once  dextrose 50% Injectable 12.5 once  dextrose Oral Gel 15 once PRN  enoxaparin Injectable 40 every 24 hours  gabapentin 100 daily  HYDROmorphone  Injectable 0.5 every 6 hours PRN  influenza  Vaccine (HIGH DOSE) 0.5 once  insulin glargine Injectable (LANTUS) 14 at bedtime  insulin lispro (ADMELOG) corrective regimen sliding scale  three times a day before meals  insulin lispro (ADMELOG) corrective regimen sliding scale  at bedtime  insulin lispro Injectable (ADMELOG) 8 three times a day with meals  losartan 100 daily  melatonin 6 at bedtime  NIFEdipine XL 60 daily  oxyCODONE    IR 10 every 4 hours PRN  oxyCODONE    IR 5 every 6 hours PRN  pantoprazole    Tablet 40 two times a day  polyethylene glycol 3350 17 daily  senna 1 at bedtime      VITALS:  Vital Signs Last 24 Hrs  T(F): 98.5 (03-12-25 @ 10:06), Max: 99.3 (03-06-25 @ 21:36)    Vital Signs Last 24 Hrs  HR: 68 (03-12-25 @ 10:06) (68 - 110)  BP: 147/73 (03-12-25 @ 10:06) (120/62 - 163/76)  RR: 18 (03-12-25 @ 10:06)  SpO2: 99% (03-12-25 @ 10:06) (95% - 99%)  Wt(kg): --    EXAM:    GA: NAD, AOx3  HEENT: oral cavity no lesion  CV: nl S1/S2, no RMG  Lungs: CTAB, No distress  Abd: BS+, soft, nontender, no rebounding pain  Ext: no edema  Neuro: No focal deficits  Skin: Intact  IV: no phlebitis  Labs:                        8.5    12.52 )-----------( 676      ( 12 Mar 2025 05:14 )             26.6     03-12    135  |  98  |  15  ----------------------------<  189[H]  3.8   |  21[L]  |  0.77    Ca    9.4      12 Mar 2025 05:14  Phos  3.2     03-12  Mg     2.0     03-12      WBC Trend:  WBC Count: 12.52 (03-12-25 @ 05:14)  WBC Count: 14.44 (03-11-25 @ 06:38)  WBC Count: 12.64 (03-10-25 @ 06:34)  WBC Count: 12.13 (03-09-25 @ 07:50)      Creatine Trend:  Creatinine: 0.77 (03-12)  Creatinine: 0.82 (03-11)  Creatinine: 0.80 (03-10)  Creatinine: 0.91 (03-09)    Liver Biochemical Testing Trend:  Bilirubin Total: 0.4 (02-13)  Bilirubin Direct: 0.1 (02-13)    Trend LDH  02-13-25 @ 07:28  382[H]  02-28-24 @ 16:27  348[H]      Urinalysis Basic - ( 12 Mar 2025 05:14 )    Color: x / Appearance: x / SG: x / pH: x  Gluc: 189 mg/dL / Ketone: x  / Bili: x / Urobili: x   Blood: x / Protein: x / Nitrite: x   Leuk Esterase: x / RBC: x / WBC x   Sq Epi: x / Non Sq Epi: x / Bacteria: x      MICROBIOLOGY:  Vancomycin Level, Trough: 21.9 (02-28 @ 13:23)  Vancomycin Level, Trough: 13.5 (02-27 @ 22:40)  Vancomycin Level, Trough: 16.7 (02-26 @ 17:05)  Vancomycin Level, Trough: 15.7 (02-25 @ 05:58)  Vancomycin Level, Trough: 23.2 (02-23 @ 13:50)  Vancomycin Level, Trough: 19.2 (02-22 @ 07:00)  Vancomycin Level, Trough: 14.0 (02-21 @ 05:57)    MRSA PCR Result.: NotDetec (02-28-25 @ 16:26)      Culture - Blood (collected 20 Feb 2025 07:05)  Source: .Blood Blood-Peripheral  Final Report:    No growth at 5 days    Culture - Blood (collected 20 Feb 2025 01:44)  Source: .Blood Blood-Peripheral  Final Report:    No growth at 5 days    Culture - Blood (collected 09 Feb 2025 22:35)  Source: .Blood BLOOD  Final Report:    No growth at 5 days    Culture - Blood (collected 09 Feb 2025 22:10)  Source: .Blood BLOOD  Final Report:    No growth at 5 days    Culture - Blood (collected 14 Nov 2023 06:10)  Source: .Blood Blood  Final Report:    No growth at 5 days    Culture - Blood (collected 14 Nov 2023 06:00)  Source: .Blood Blood-Peripheral  Final Report:    No growth at 5 days    Culture - Blood (collected 11 Nov 2023 19:20)  Source: .Blood Blood-Peripheral  Final Report:    No growth at 5 days    Culture - Blood (collected 11 Nov 2023 19:00)  Source: .Blood Blood-Peripheral  Final Report:    No growth at 5 days    Culture - Urine (collected 11 Nov 2023 18:51)  Source: Clean Catch Clean Catch (Midstream)  Final Report:    <10,000 CFU/mL Normal Urogenital Luisa    Culture - Blood (collected 22 Oct 2023 14:37)  Source: .Blood Blood-Venous  Final Report:    No growth at 5 days      A1C with Estimated Average Glucose Result: 7.7 % (01-28-25 @ 10:05)  A1C with Estimated Average Glucose Result: 7.4 % (12-06-24 @ 09:06)      CSF:      RADIOLOGY:   Patient is a 70y old  Male who presents with a chief complaint of S/p L BKA (10 Mar 2025 17:08)    HPI:     REVIEW OF SYSTEMS  [  ] ROS unobtainable because:    [ x ] All other systems negative except as noted below  Constitutional:  [ ] fever [ ] chills  [ ] weight loss  [ ]night sweat  [ ]poor appetite/PO intake [ ]fatigue   Skin:  [ ] rash [ ] phlebitis	  Eyes: [ ] icterus [ ] pain  [ ] discharge	  ENMT: [ ] sore throat  [ ] thrush [ ] ulcers [ ] exudates [ ]anosmia  Respiratory: [ ] dyspnea [ ] hemoptysis [ ] cough [ ] sputum	  Cardiovascular:  [ ] chest pain [ ] palpitations [ ] edema	  Gastrointestinal:  [ ] nausea [ ] vomiting [ ] diarrhea [ ] constipation [ ] pain	  Genitourinary:  [ ] dysuria [ ] frequency [ ] hematuria [ ] discharge [ ] flank pain  [ ] incontinence  Musculoskeletal:  [ ] myalgias [ ] arthralgias [ ] arthritis  [ ] back pain  Neurological:  [ ] headache [ ] weakness [ ] seizures  [ ] confusion/altered mental status  prior hospital charts reviewed [V]  primary team notes reviewed [V]  other consultant notes reviewed [V]    PAST MEDICAL & SURGICAL HISTORY:  HTN (hypertension)      HLD (hyperlipidemia)      DM (diabetes mellitus)      PAD (peripheral artery disease)      H/O iron deficiency      [place] (hard of hearing)      Peripheral neuropathy      Peripheral vascular disease, unspecified      Former smoker      CVA (cerebrovascular accident)      S/P appendectomy      S/P peripheral artery angioplasty with stent placement      S/P angiography      S/P cataract surgery          SOCIAL HISTORY:  - Denied smoking/vaping/alcohol/recreational drug use    FAMILY HISTORY:      Allergies  Advil (Rash)        ANTIMICROBIALS:  piperacillin/tazobactam IVPB.. 3.375 every 8 hours      ANTIMICROBIALS (past 90 days):  MEDICATIONS  (STANDING):  amoxicillin  875 milliGRAM(s)/clavulanate   1 Tablet(s) Oral (02-19-25 @ 17:53)    piperacillin/tazobactam IVPB.   200 mL/Hr IV Intermittent (02-16-25 @ 11:56)    piperacillin/tazobactam IVPB.-   25 mL/Hr IV Intermittent (02-16-25 @ 16:05)    piperacillin/tazobactam IVPB..   25 mL/Hr IV Intermittent (02-19-25 @ 05:05)   25 mL/Hr IV Intermittent (02-18-25 @ 21:03)   25 mL/Hr IV Intermittent (02-18-25 @ 14:37)   25 mL/Hr IV Intermittent (02-18-25 @ 05:35)   25 mL/Hr IV Intermittent (02-17-25 @ 21:21)   25 mL/Hr IV Intermittent (02-17-25 @ 13:33)   25 mL/Hr IV Intermittent (02-17-25 @ 05:20)   25 mL/Hr IV Intermittent (02-16-25 @ 21:49)    piperacillin/tazobactam IVPB..   25 mL/Hr IV Intermittent (02-28-25 @ 06:31)   25 mL/Hr IV Intermittent (02-27-25 @ 22:18)   25 mL/Hr IV Intermittent (02-27-25 @ 16:36)    piperacillin/tazobactam IVPB..   25 mL/Hr IV Intermittent (03-05-25 @ 05:59)   25 mL/Hr IV Intermittent (03-04-25 @ 22:50)   25 mL/Hr IV Intermittent (03-04-25 @ 13:50)   25 mL/Hr IV Intermittent (03-04-25 @ 06:07)   25 mL/Hr IV Intermittent (03-03-25 @ 21:42)   25 mL/Hr IV Intermittent (03-03-25 @ 14:04)   25 mL/Hr IV Intermittent (03-03-25 @ 05:10)   25 mL/Hr IV Intermittent (03-02-25 @ 22:06)   25 mL/Hr IV Intermittent (03-02-25 @ 13:12)   25 mL/Hr IV Intermittent (03-02-25 @ 05:07)   25 mL/Hr IV Intermittent (03-01-25 @ 21:27)   25 mL/Hr IV Intermittent (03-01-25 @ 13:06)   25 mL/Hr IV Intermittent (03-01-25 @ 05:16)   25 mL/Hr IV Intermittent (02-28-25 @ 21:22)   25 mL/Hr IV Intermittent (02-28-25 @ 14:49)    piperacillin/tazobactam IVPB..   25 mL/Hr IV Intermittent (02-26-25 @ 21:51)   25 mL/Hr IV Intermittent (02-26-25 @ 13:30)   25 mL/Hr IV Intermittent (02-26-25 @ 05:07)   25 mL/Hr IV Intermittent (02-25-25 @ 21:27)   25 mL/Hr IV Intermittent (02-25-25 @ 13:26)   25 mL/Hr IV Intermittent (02-25-25 @ 05:29)   25 mL/Hr IV Intermittent (02-24-25 @ 21:23)   25 mL/Hr IV Intermittent (02-24-25 @ 14:28)   25 mL/Hr IV Intermittent (02-24-25 @ 05:32)   25 mL/Hr IV Intermittent (02-23-25 @ 22:05)   25 mL/Hr IV Intermittent (02-23-25 @ 15:00)   25 mL/Hr IV Intermittent (02-23-25 @ 05:42)   25 mL/Hr IV Intermittent (02-22-25 @ 22:09)   25 mL/Hr IV Intermittent (02-22-25 @ 13:09)   25 mL/Hr IV Intermittent (02-22-25 @ 06:44)   25 mL/Hr IV Intermittent (02-21-25 @ 22:55)   25 mL/Hr IV Intermittent (02-21-25 @ 13:02)   25 mL/Hr IV Intermittent (02-21-25 @ 05:32)   25 mL/Hr IV Intermittent (02-20-25 @ 21:59)   25 mL/Hr IV Intermittent (02-20-25 @ 13:33)   25 mL/Hr IV Intermittent (02-20-25 @ 03:23)    piperacillin/tazobactam IVPB..   25 mL/Hr IV Intermittent (02-27-25 @ 06:09)    piperacillin/tazobactam IVPB..   25 mL/Hr IV Intermittent (03-12-25 @ 05:23)   25 mL/Hr IV Intermittent (03-11-25 @ 22:20)   25 mL/Hr IV Intermittent (03-11-25 @ 17:40)   25 mL/Hr IV Intermittent (03-11-25 @ 05:22)   25 mL/Hr IV Intermittent (03-10-25 @ 21:56)   25 mL/Hr IV Intermittent (03-10-25 @ 12:43)   25 mL/Hr IV Intermittent (03-10-25 @ 05:08)   25 mL/Hr IV Intermittent (03-09-25 @ 21:02)   25 mL/Hr IV Intermittent (03-09-25 @ 13:30)   25 mL/Hr IV Intermittent (03-09-25 @ 05:06)   25 mL/Hr IV Intermittent (03-08-25 @ 21:18)   25 mL/Hr IV Intermittent (03-08-25 @ 14:24)   25 mL/Hr IV Intermittent (03-08-25 @ 06:13)   25 mL/Hr IV Intermittent (03-07-25 @ 23:33)   25 mL/Hr IV Intermittent (03-07-25 @ 13:49)   25 mL/Hr IV Intermittent (03-07-25 @ 05:32)   25 mL/Hr IV Intermittent (03-06-25 @ 22:30)   25 mL/Hr IV Intermittent (03-06-25 @ 13:21)    vancomycin  IVPB   250 mL/Hr IV Intermittent (02-17-25 @ 11:57)   250 mL/Hr IV Intermittent (02-16-25 @ 23:49)   250 mL/Hr IV Intermittent (02-16-25 @ 11:56)    vancomycin  IVPB   250 mL/Hr IV Intermittent (02-28-25 @ 06:31)   250 mL/Hr IV Intermittent (02-27-25 @ 23:39)    vancomycin  IVPB   250 mL/Hr IV Intermittent (02-23-25 @ 05:42)   250 mL/Hr IV Intermittent (02-22-25 @ 22:09)   250 mL/Hr IV Intermittent (02-22-25 @ 13:09)   250 mL/Hr IV Intermittent (02-22-25 @ 08:05)   250 mL/Hr IV Intermittent (02-21-25 @ 21:21)   250 mL/Hr IV Intermittent (02-21-25 @ 13:02)   250 mL/Hr IV Intermittent (02-21-25 @ 06:49)   250 mL/Hr IV Intermittent (02-20-25 @ 21:58)   250 mL/Hr IV Intermittent (02-20-25 @ 13:33)   250 mL/Hr IV Intermittent (02-20-25 @ 03:22)    vancomycin  IVPB   250 mL/Hr IV Intermittent (02-19-25 @ 08:18)   250 mL/Hr IV Intermittent (02-18-25 @ 21:33)   250 mL/Hr IV Intermittent (02-18-25 @ 14:37)   250 mL/Hr IV Intermittent (02-18-25 @ 05:35)    vancomycin  IVPB   250 mL/Hr IV Intermittent (02-27-25 @ 05:16)   250 mL/Hr IV Intermittent (02-26-25 @ 18:24)   250 mL/Hr IV Intermittent (02-26-25 @ 05:09)   250 mL/Hr IV Intermittent (02-25-25 @ 18:22)   250 mL/Hr IV Intermittent (02-25-25 @ 06:46)   250 mL/Hr IV Intermittent (02-24-25 @ 18:49)   250 mL/Hr IV Intermittent (02-24-25 @ 05:32)   250 mL/Hr IV Intermittent (02-23-25 @ 18:30)        OTHER MEDS:   MEDICATIONS  (STANDING):  acetaminophen     Tablet .. 975 every 6 hours  aspirin  chewable 81 daily  bisacodyl 5 daily PRN  carvedilol 6.25 every 12 hours  dextrose 50% Injectable 25 once  dextrose 50% Injectable 12.5 once  dextrose Oral Gel 15 once PRN  enoxaparin Injectable 40 every 24 hours  gabapentin 100 daily  HYDROmorphone  Injectable 0.5 every 6 hours PRN  influenza  Vaccine (HIGH DOSE) 0.5 once  insulin glargine Injectable (LANTUS) 14 at bedtime  insulin lispro (ADMELOG) corrective regimen sliding scale  three times a day before meals  insulin lispro (ADMELOG) corrective regimen sliding scale  at bedtime  insulin lispro Injectable (ADMELOG) 8 three times a day with meals  losartan 100 daily  melatonin 6 at bedtime  NIFEdipine XL 60 daily  oxyCODONE    IR 10 every 4 hours PRN  oxyCODONE    IR 5 every 6 hours PRN  pantoprazole    Tablet 40 two times a day  polyethylene glycol 3350 17 daily  senna 1 at bedtime      VITALS:  Vital Signs Last 24 Hrs  T(F): 98.5 (03-12-25 @ 10:06), Max: 99.3 (03-06-25 @ 21:36)    Vital Signs Last 24 Hrs  HR: 68 (03-12-25 @ 10:06) (68 - 110)  BP: 147/73 (03-12-25 @ 10:06) (120/62 - 163/76)  RR: 18 (03-12-25 @ 10:06)  SpO2: 99% (03-12-25 @ 10:06) (95% - 99%)  Wt(kg): --    EXAM:    GA: NAD, AOx3  HEENT: oral cavity no lesion  CV: nl S1/S2, no RMG  Lungs: CTAB, No distress  Abd: BS+, soft, nontender, no rebounding pain  Ext: no edema  Neuro: No focal deficits  Skin: L stump necrosis  IV: no phlebitis  Labs:                        8.5    12.52 )-----------( 676      ( 12 Mar 2025 05:14 )             26.6     03-12    135  |  98  |  15  ----------------------------<  189[H]  3.8   |  21[L]  |  0.77    Ca    9.4      12 Mar 2025 05:14  Phos  3.2     03-12  Mg     2.0     03-12      WBC Trend:  WBC Count: 12.52 (03-12-25 @ 05:14)  WBC Count: 14.44 (03-11-25 @ 06:38)  WBC Count: 12.64 (03-10-25 @ 06:34)  WBC Count: 12.13 (03-09-25 @ 07:50)      Creatine Trend:  Creatinine: 0.77 (03-12)  Creatinine: 0.82 (03-11)  Creatinine: 0.80 (03-10)  Creatinine: 0.91 (03-09)    Liver Biochemical Testing Trend:  Bilirubin Total: 0.4 (02-13)  Bilirubin Direct: 0.1 (02-13)    Trend LDH  02-13-25 @ 07:28  382[H]  02-28-24 @ 16:27  348[H]      Urinalysis Basic - ( 12 Mar 2025 05:14 )    Color: x / Appearance: x / SG: x / pH: x  Gluc: 189 mg/dL / Ketone: x  / Bili: x / Urobili: x   Blood: x / Protein: x / Nitrite: x   Leuk Esterase: x / RBC: x / WBC x   Sq Epi: x / Non Sq Epi: x / Bacteria: x      MICROBIOLOGY:  Vancomycin Level, Trough: 21.9 (02-28 @ 13:23)  Vancomycin Level, Trough: 13.5 (02-27 @ 22:40)  Vancomycin Level, Trough: 16.7 (02-26 @ 17:05)  Vancomycin Level, Trough: 15.7 (02-25 @ 05:58)  Vancomycin Level, Trough: 23.2 (02-23 @ 13:50)  Vancomycin Level, Trough: 19.2 (02-22 @ 07:00)  Vancomycin Level, Trough: 14.0 (02-21 @ 05:57)    MRSA PCR Result.: NotDetec (02-28-25 @ 16:26)      Culture - Blood (collected 20 Feb 2025 07:05)  Source: .Blood Blood-Peripheral  Final Report:    No growth at 5 days    Culture - Blood (collected 20 Feb 2025 01:44)  Source: .Blood Blood-Peripheral  Final Report:    No growth at 5 days    Culture - Blood (collected 09 Feb 2025 22:35)  Source: .Blood BLOOD  Final Report:    No growth at 5 days    Culture - Blood (collected 09 Feb 2025 22:10)  Source: .Blood BLOOD  Final Report:    No growth at 5 days    Culture - Blood (collected 14 Nov 2023 06:10)  Source: .Blood Blood  Final Report:    No growth at 5 days    Culture - Blood (collected 14 Nov 2023 06:00)  Source: .Blood Blood-Peripheral  Final Report:    No growth at 5 days    Culture - Blood (collected 11 Nov 2023 19:20)  Source: .Blood Blood-Peripheral  Final Report:    No growth at 5 days    Culture - Blood (collected 11 Nov 2023 19:00)  Source: .Blood Blood-Peripheral  Final Report:    No growth at 5 days    Culture - Urine (collected 11 Nov 2023 18:51)  Source: Clean Catch Clean Catch (Midstream)  Final Report:    <10,000 CFU/mL Normal Urogenital Luisa    Culture - Blood (collected 22 Oct 2023 14:37)  Source: .Blood Blood-Venous  Final Report:    No growth at 5 days      A1C with Estimated Average Glucose Result: 7.7 % (01-28-25 @ 10:05)  A1C with Estimated Average Glucose Result: 7.4 % (12-06-24 @ 09:06)      CSF:      RADIOLOGY:

## 2025-03-12 NOTE — CONSULT NOTE ADULT - ASSESSMENT
69 year old male with PMH of former smoker (1.5 PPD x 50 years; quit 2 years ago), CVA? (2 years ago, dx through imaging - no residual deficits), HTN, HLD, DM, PAD and aortoiliac occlusive disease, who previously underwent an outpatient angiography complicated by right iliac artery dissection and  presented for scheduled left femoral to anterior tibial artery bypass  with RSVG and  angiogram on 2/7/25. Post op recovery beginning on POD#2 with post op fevers 102,(fever work up negative) leukocytosis, acute anemia requiring multiple PRBC transfusions-> s/p-EGD completed 2/13 found to have bleeding gastric ulcer w/ hemostasis achieved with cautery, watery non bloody diarrhea-GI PCR neg hyperglycemia, ischemic changes to L leg-left foot and ankle continue to demarcate  OR 2/27/25 for L Left Lower Extremity Guillotine Below Knee, now Planning formalization/LT AKA Friday vs Monday. ID now consulted for abx management in the setting of persistent leukocytosis.    Afebrile (last temp 101.1 on 2/22)   Bld cx 2/20 neg x2 ua neg  Vanco 2/16-->2/23 and 2/27-->2/28  Zosyn 2/16-->2/19 and 2/27--> present  Persistent Leukocytosis between 11-15 (peaked at 21 on 2/16)  # left femoral to anterior tibial artery bypass  with RSVG and  angiogram on 2/7/25  #L Left Lower Extremity Guillotine Below Knee, 2/27/25   #necrotic L stump  # formalization/LT AKA Friday vs Monday    INCOMPLETE############################################### 69 year old male with PMH of former smoker (1.5 PPD x 50 years; quit 2 years ago), CVA? (2 years ago, dx through imaging - no residual deficits), HTN, HLD, DM, PAD and aortoiliac occlusive disease, who previously underwent an outpatient angiography complicated by right iliac artery dissection and  presented for scheduled left femoral to anterior tibial artery bypass  with RSVG and  angiogram on 2/7/25. Post op recovery beginning on POD#2 with post op fevers 102,(fever work up negative) leukocytosis, acute anemia requiring multiple PRBC transfusions-> s/p-EGD completed 2/13 found to have bleeding gastric ulcer w/ hemostasis achieved with cautery, watery non bloody diarrhea-GI PCR neg hyperglycemia, ischemic changes to L leg-left foot and ankle continue to demarcate  OR 2/27/25 for L Left Lower Extremity Guillotine Below Knee, now Planning formalization/LT AKA Friday vs Monday. ID now consulted for abx management in the setting of persistent leukocytosis.    Afebrile (last temp 101.1 on 2/22)   Bld cx 2/20 neg x2 ua neg  Vanco 2/16-->2/23 and 2/27-->2/28  Zosyn 2/16-->2/19 and 2/27--> present  Persistent Leukocytosis between 11-15 (peaked at 21 on 2/16)  # left femoral to anterior tibial artery bypass  with RSVG and  angiogram on 2/7/25  #L Left Lower Extremity Guillotine Below Knee, 2/27/25   #necrotic L stump  # formalization/LT AKA Friday vs Monday    Plan   Overall nontoxic appearing without fevers, +persistent leukocytosis in the setting of left lower necrotic stump s/p prolonged empiric course of broad spectrum abx  D/c abx and watch off   Pending formalization/AKA of left stump  Periop abx as needed  If febrile low threshold to restart broad coverage as needed  Continue to trend WBC to norm  Continue to monitor for fevers  Plan d/w Dr. Arthur and floor Provider

## 2025-03-12 NOTE — PROGRESS NOTE ADULT - SUBJECTIVE AND OBJECTIVE BOX
Patient seen today for follow up inpatient Diabetes Mellitus management.    Chief Complaint: Type 2 Diabetes Mellitus     INTERVAL HX:  Patient seen in [facility] 9MON 929 W1. Patient is alert and oriented, sitting up in chair. Patient reports feeling okay, eating about 50% or less of meals but is having a Glucerna protein shake with each meal (x3), tolerating POs. FBG stable this am to 160mg/dL. No hypoglycemia. Noted severe persistent postprandial hyperglycemia to 327mg/dL last evening. Patient denies eating snacks overnight or after dinner or OSH food. Noted IV abx in D5% solution. Blood glucose levels in the last 24hrs have been 160-327mg/dL.     Review of Systems:  General: As above.  Respiratory: Denies any SOB, BETH, or cough.  Gastrointestinal: Denies any n/v/d or abdominal pain.   Endocrine: Denies any polyuria, polydipsia, polyphagia, visual changes, or numbness in feet.     Allergies  Advil (Rash)      Intolerances  None.       MEDICATIONS  (STANDING):  acetaminophen     Tablet .. 975 milliGRAM(s) Oral every 6 hours  aspirin  chewable 81 milliGRAM(s) Oral daily  bisacodyl 5 milliGRAM(s) Oral daily PRN  carvedilol 6.25 milliGRAM(s) Oral every 12 hours  cyanocobalamin 1000 MICROGram(s) Oral daily  dextrose 5%. 1000 milliLiter(s) IV Continuous <Continuous>  dextrose 50% Injectable 25 Gram(s) IV Push once  dextrose 50% Injectable 12.5 Gram(s) IV Push once  dextrose Oral Gel 15 Gram(s) Oral once PRN  enoxaparin Injectable 40 milliGRAM(s) SubCutaneous every 24 hours  gabapentin 100 milliGRAM(s) Oral daily  HYDROmorphone  Injectable 0.5 milliGRAM(s) IV Push every 6 hours PRN  influenza  Vaccine (HIGH DOSE) 0.5 milliLiter(s) IntraMuscular once  insulin glargine Injectable (LANTUS) 14 Unit(s) SubCutaneous at bedtime  insulin lispro (ADMELOG) corrective regimen sliding scale   SubCutaneous three times a day before meals  insulin lispro (ADMELOG) corrective regimen sliding scale   SubCutaneous at bedtime  insulin lispro Injectable (ADMELOG) 8 Unit(s) SubCutaneous three times a day with meals  lactobacillus acidophilus 1 Tablet(s) Oral with dinner  losartan 100 milliGRAM(s) Oral daily  melatonin 6 milliGRAM(s) Oral at bedtime  NIFEdipine XL 60 milliGRAM(s) Oral daily  oxyCODONE    IR 10 milliGRAM(s) Oral every 4 hours PRN  oxyCODONE    IR 5 milliGRAM(s) Oral every 6 hours PRN  pantoprazole    Tablet 40 milliGRAM(s) Oral two times a day  piperacillin/tazobactam IVPB.. 3.375 Gram(s) IV Intermittent every 8 hours  polyethylene glycol 3350 17 Gram(s) Oral daily  senna 1 Tablet(s) Oral at bedtime      dextrose 50% Injectable 25 Gram(s) IV Push once  dextrose 50% Injectable 12.5 Gram(s) IV Push once  dextrose Oral Gel 15 Gram(s) Oral once PRN  insulin glargine Injectable (LANTUS) 14 Unit(s) SubCutaneous at bedtime  insulin lispro (ADMELOG) corrective regimen sliding scale   SubCutaneous three times a day before meals  insulin lispro (ADMELOG) corrective regimen sliding scale   SubCutaneous at bedtime  insulin lispro Injectable (ADMELOG) 8 Unit(s) SubCutaneous three times a day with meals      insulin lispro (ADMELOG) corrective regimen sliding scale   SubCutaneous three times a day before meals  insulin lispro (ADMELOG) corrective regimen sliding scale   SubCutaneous at bedtime  insulin lispro Injectable (ADMELOG) 8 Unit(s) SubCutaneous three times a day with meals      PHYSICAL EXAM:  VITALS:   T(C): 36.9 (03-12-25 @ 10:06), Max: 37.4 (03-11-25 @ 17:14)  HR: 68 (03-12-25 @ 10:06) (68 - 109)  BP: 147/73 (03-12-25 @ 10:06) (120/62 - 163/76)  RR: 18 (03-12-25 @ 10:06) (18 - 18)  SpO2: 99% (03-12-25 @ 10:06) (95% - 99%)    GENERAL: In no acute distress  Respiratory: Respirations unlabored  Extremities: Warm and dry, no edema  NEURO: Alert and oriented, appropriate     LABS:  POCT Blood Glucose.: 160 mg/dL (03-12-25 @ 08:59)  POCT Blood Glucose.: 327 mg/dL (03-11-25 @ 21:59)  POCT Blood Glucose.: 282 mg/dL (03-11-25 @ 18:59)  POCT Blood Glucose.: 182 mg/dL (03-11-25 @ 12:59)  POCT Blood Glucose.: 193 mg/dL (03-11-25 @ 08:46)  POCT Blood Glucose.: 191 mg/dL (03-10-25 @ 21:29)  POCT Blood Glucose.: 258 mg/dL (03-10-25 @ 18:12)  POCT Blood Glucose.: 299 mg/dL (03-10-25 @ 12:37)  POCT Blood Glucose.: 276 mg/dL (03-10-25 @ 09:02)  POCT Blood Glucose.: 228 mg/dL (03-09-25 @ 21:42)  POCT Blood Glucose.: 158 mg/dL (03-09-25 @ 17:10)  POCT Blood Glucose.: 236 mg/dL (03-09-25 @ 12:56)                          8.5    12.52 )-----------( 676      ( 12 Mar 2025 05:14 )             26.6     03-12    135  |  98  |  15  ----------------------------<  189[H]  3.8   |  21[L]  |  0.77    Ca    9.4      12 Mar 2025 05:14  Phos  3.2     03-12  Mg     2.0     03-12        PT/INR - ( 12 Mar 2025 05:14 )   PT: 12.8 sec;   INR: 1.11 ratio           Urinalysis Basic - ( 12 Mar 2025 05:14 )    Color: x / Appearance: x / SG: x / pH: x  Gluc: 189 mg/dL / Ketone: x  / Bili: x / Urobili: x   Blood: x / Protein: x / Nitrite: x   Leuk Esterase: x / RBC: x / WBC x   Sq Epi: x / Non Sq Epi: x / Bacteria: x      A1C with Estimated Average Glucose Result: A1C with Estimated Average Glucose Result: 7.7 % (01-28-25 @ 10:05)  A1C with Estimated Average Glucose Result: 7.4 % (12-06-24 @ 09:06)

## 2025-03-12 NOTE — PROGRESS NOTE ADULT - ASSESSMENT
A 70-year-old male with a medical history of hypertension, hyperlipidemia, diabetes mellitus, peripheral artery disease, and aortoiliac occlusive disease, previously underwent outpatient angiography that resulted in a right iliac artery dissection. He is now status post for the creation of a bypass from the left femoral artery to the distal tibial artery using a reversed saphenous vein graft, with a completion angiogram completed on February 7, 2025. The Endocrine team has been consulted due to his uncontrolled diabetes. Endocrine re-consulted for hyperglycemia. Patient reports decreased appetite at times, variable oral intake but does drink Glucerna drink which has 27g CHO per serving if not eating full meal. FBG stable, no hypoglycemia. Noted persistent postprandial hyperglycemia, will increase mealtime insulin for tighter BG control. Endocrine will closely monitor BG and adjust insulin as needed for BG goal 100-180mg/dL inpatient.       #Type 2 diabetes mellitus   A1C with Estimated Average Glucose Result: 7.7 % (01-28-25 @ 10:05)  A1C with Estimated Average Glucose Result: 7.4 % (12-06-24 @ 09:06)  Home regimen: Jardiance 10mg once daily, Actos 30mg once daily, Jentadueto 2.5-2g BID

## 2025-03-12 NOTE — CONSULT NOTE ADULT - NS ATTEND AMEND GEN_ALL_CORE FT
Patient care and plan discussed and reviewed with Advanced Care Provider. Plan as outlined above edited by me to reflect our discussion.   In addition, I participated in    - Ordering, reviewing, and interpreting labs, testing, and imaging.  - Reviewing prior hospitalization and where necessary, outpatient records.  - Counselling and educating patient and/or family regarding interpretation of aforementioned items and plan of care.  - Communicating with other health professionals (when not separately reported), and documenting clinical information in the electronic health record.
69-year-old male with a past medical history of tobacco use, CVA, hypertension, hyperlipidemia, PAD who is presenting to the hospital for left femoral to anterior tibial bypass surgery and angiogram.  Patient previously described left ankle pain.  Patient underwent attempts for an office angiogram however this was unsuccessful.    Patient underwent left common femoral artery to anterior tibial artery bypass, angiogram and balloon angioplasty and stent placement within the bypass graft upon admission due to concern for chronic limb threatening ischemia and PAD.  Procedure was on 2/7/2025 shortly after admission.  Postoperative course was complicated by fevers, tachycardia.  Infectious workup at that time was negative.  Postoperatively also patient found to be anemic without response to red blood cell transfusions, GI was consulted and EGD was completed on 2/13/2025 with evidence for bleeding gastric ulcer status post hemostasis.  Hemoglobin stabilized over the course of the next few days.  Patient continues to have fevers.  Started on vancomycin and piperacillin/Tazo bacterium.  Patient underwent left lower extremity BKA on 2/27/2025.  Antibiotics discontinued on 3/5/2025.  Restarted on 3/6/2025 due to development of leukocytosis and evidence for necrosis at BKA stump.  Plan for formalization of BKA this week.    Patient currently afebrile.  Otherwise hemodynamically stable on room air.  Latest labs labs show leukocytosis of 12.5, anemia 8.5/26.6, thrombocytosis 676, renal function within normal limits.  Previous blood cultures obtained following admission and on 2/20/2025 remain negative.  MRSA nares PCR is negative on 2/28/2025.  Patient currently on piperacillin/tazobactam    #Leukocytosis  #Chronic limb ischemia status post bypass surgery and eventual BKA, now with stump infection and necrosis  #PAD  #Fever, resolved    Recommendations  Patient appears clinically stable, not septic with resolved fevers  Patient continues to have leukocytosis in setting of recent BKA complicated by necrosis of stump  At this time, patient has received an adequate course of antibiotics if infection present however right lower extremity appears with gangrenous changes rather than active infection  Therefore would discontinue antibiotics at this time and monitor off  Low threshold to resume antibiotics patient develops fever, new evidence for infection  If fever recurs–obtain blood cultures  Plan for further surgery later this week, will plan for perioperative antibiotic course  Follow fever curve and WBC count    Zuhair Arthur MD  Division of Infectious Diseases
hard copy, drawn during this pregnancy

## 2025-03-12 NOTE — PROGRESS NOTE ADULT - SUBJECTIVE AND OBJECTIVE BOX
DATE OF SERVICE: 03-12-25 @ 11:50    Patient is a 70y old  Male who presents with a chief complaint of S/p L BKA (10 Mar 2025 17:08)      INTERVAL HISTORY: in no acute distress    REVIEW OF SYSTEMS:  CONSTITUTIONAL: No weakness  EYES/ENT: No visual changes;  No throat pain   NECK: No pain or stiffness  RESPIRATORY: No cough, wheezing; No shortness of breath  CARDIOVASCULAR: No chest pain or palpitations  GASTROINTESTINAL: No abdominal  pain. No nausea, vomiting, or hematemesis  GENITOURINARY: No dysuria, frequency or hematuria  NEUROLOGICAL: No stroke like symptoms  SKIN: No rashes      MEDICATIONS:  carvedilol 6.25 milliGRAM(s) Oral every 12 hours  losartan 100 milliGRAM(s) Oral daily  NIFEdipine XL 60 milliGRAM(s) Oral daily        PHYSICAL EXAM:  T(C): 36.9 (03-12-25 @ 10:06), Max: 37.4 (03-11-25 @ 17:14)  HR: 68 (03-12-25 @ 10:06) (68 - 109)  BP: 147/73 (03-12-25 @ 10:06) (120/62 - 163/76)  RR: 18 (03-12-25 @ 10:06) (18 - 18)  SpO2: 99% (03-12-25 @ 10:06) (95% - 99%)  Wt(kg): --  I&O's Summary    11 Mar 2025 07:01  -  12 Mar 2025 07:00  --------------------------------------------------------  IN: 1650 mL / OUT: 1850 mL / NET: -200 mL    12 Mar 2025 07:01  -  12 Mar 2025 11:50  --------------------------------------------------------  IN: 160 mL / OUT: 0 mL / NET: 160 mL          Appearance: In no distress	  HEENT:    PERRL, EOMI	  Cardiovascular:  S1 S2, No JVD  Respiratory: Lungs clear to auscultation	  Gastrointestinal:  Soft, Non-tender, + BS	  Vascularature:  No edema of LE  Psychiatric: Appropriate affect   Neuro: no acute focal deficits                               8.5    12.52 )-----------( 676      ( 12 Mar 2025 05:14 )             26.6     03-12    135  |  98  |  15  ----------------------------<  189[H]  3.8   |  21[L]  |  0.77    Ca    9.4      12 Mar 2025 05:14  Phos  3.2     03-12  Mg     2.0     03-12          Labs personally reviewed      ASSESSMENT/PLAN: 	    69M PM of former smoker (1.5 PPD % 50 years; quit 2 years ago),  HTN, HLD, DM, PAD and aortoiliac occlusive disease, who previously underwent an outpatient angiography complicated by right iliac artery dissection. Now S/P Creation of bypass from left femoral artery to distal tibial artery with RSVG and completion angiogram on 2/7/25 s/p 2u pRBC 7.4 from 7.8l not responded to 2nd unit on 2/12/25. EGD completed 2/13 found to have bleeding gastric ulcer w/ hemostasis achieved with cautery. Hgb found to be 6.1 and s/p 2u pRBC. Patient now w/ ischemic changes to LLE.    1. Cardiac Risk Stratification   - No chest pain/SOB  - EKG Sinus tach 117 bpm no ischemic changes   - TTE reviewed with pericardial effusion, unchanged compared to 2023  - Not in decompensated HF   - No hx of tachy/timothy arrhythmias   - No valvular abnormalities  - Moderate risk for moderate risk vascular surgery  - Tolerated well  - pending formalization next week - pt is optimized from cardiac perspective to proceed     2. Hypertension- bp better   - Losartan 100 mg QD   - Nifedipine 60 mg QD   - Coreg to 6.25mg BID   - d/c hydralazine 25 mg BID    3. Hyperlipidemia   - Lipitor 40mg   - lipid profile     4. Diabetes Mellitus Type II   - FBG per protocol  - ISS   - A1C: 7.7%     5. CAD - TTE with Basal and mid inferior wall and basal inferoseptal segment are abnormal  - Discussed with pt, spouse and daughter in person, they do not know full name or number of his cardiologist to obtain his prior records from  - They deny he has any h/o MI, CP or SOB  - Advised OP follow up with his cards         JAVIER Haas,  [facility]  Cardiovascular Medicine  [address] [place] [address], [address]  Available through call or text on [employer]  Office: [phone]

## 2025-03-12 NOTE — PROGRESS NOTE ADULT - SUBJECTIVE AND OBJECTIVE BOX
SURGERY DAILY PROGRESS NOTE:       Subjective / Overnight events: Pt seen during AM. Resting comfortably in chair without complaints.       Objective:      MEDICATIONS  (STANDING):  acetaminophen     Tablet .. 975 milliGRAM(s) Oral every 6 hours  aspirin  chewable 81 milliGRAM(s) Oral daily  carvedilol 6.25 milliGRAM(s) Oral every 12 hours  cyanocobalamin 1000 MICROGram(s) Oral daily  dextrose 5%. 1000 milliLiter(s) (50 mL/Hr) IV Continuous <Continuous>  dextrose 50% Injectable 25 Gram(s) IV Push once  dextrose 50% Injectable 12.5 Gram(s) IV Push once  enoxaparin Injectable 40 milliGRAM(s) SubCutaneous every 24 hours  gabapentin 100 milliGRAM(s) Oral daily  influenza  Vaccine (HIGH DOSE) 0.5 milliLiter(s) IntraMuscular once  insulin glargine Injectable (LANTUS) 14 Unit(s) SubCutaneous at bedtime  insulin lispro (ADMELOG) corrective regimen sliding scale   SubCutaneous three times a day before meals  insulin lispro (ADMELOG) corrective regimen sliding scale   SubCutaneous at bedtime  insulin lispro Injectable (ADMELOG) 5 Unit(s) SubCutaneous three times a day with meals  lactobacillus acidophilus 1 Tablet(s) Oral with dinner  losartan 100 milliGRAM(s) Oral daily  melatonin 6 milliGRAM(s) Oral at bedtime  NIFEdipine XL 60 milliGRAM(s) Oral daily  pantoprazole    Tablet 40 milliGRAM(s) Oral two times a day  piperacillin/tazobactam IVPB.. 3.375 Gram(s) IV Intermittent every 8 hours  polyethylene glycol 3350 17 Gram(s) Oral daily  senna 1 Tablet(s) Oral at bedtime    MEDICATIONS  (PRN):  bisacodyl 5 milliGRAM(s) Oral daily PRN Constipation  dextrose Oral Gel 15 Gram(s) Oral once PRN Blood Glucose LESS THAN 70 milliGRAM(s)/deciliter  HYDROmorphone  Injectable 0.5 milliGRAM(s) IV Push every 6 hours PRN Breakthrough pain  oxyCODONE    IR 10 milliGRAM(s) Oral every 4 hours PRN Severe Pain (7 - 10)  oxyCODONE    IR 5 milliGRAM(s) Oral every 6 hours PRN Moderate Pain (4 - 6)      Vital Signs Last 24 Hrs  T(C): 37 (12 Mar 2025 06:34), Max: 37.4 (11 Mar 2025 17:14)  T(F): 98.6 (12 Mar 2025 06:34), Max: 99.3 (11 Mar 2025 17:14)  HR: 108 (12 Mar 2025 06:34) (83 - 110)  BP: 160/78 (12 Mar 2025 06:34) (120/62 - 163/76)  BP(mean): --  RR: 18 (12 Mar 2025 06:34) (18 - 18)  SpO2: 99% (12 Mar 2025 06:34) (95% - 99%)    Parameters below as of 12 Mar 2025 06:34  Patient On (Oxygen Delivery Method): room air        I&O's Detail    11 Mar 2025 07:01  -  12 Mar 2025 07:00  --------------------------------------------------------  IN:    IV PiggyBack: 200 mL    Oral Fluid: 1150 mL    PRBCs (Packed Red Blood Cells): 300 mL  Total IN: 1650 mL    OUT:    Voided (mL): 1850 mL  Total OUT: 1850 mL    Total NET: -200 mL          Daily     Daily     LABS:                        8.5    12.52 )-----------( 676      ( 12 Mar 2025 05:14 )             26.6     03-12    135  |  98  |  15  ----------------------------<  189[H]  3.8   |  21[L]  |  0.77    Ca    9.4      12 Mar 2025 05:14  Phos  3.2     03-12  Mg     2.0     03-12      PT/INR - ( 12 Mar 2025 05:14 )   PT: 12.8 sec;   INR: 1.11 ratio           Urinalysis Basic - ( 12 Mar 2025 05:14 )    Color: x / Appearance: x / SG: x / pH: x  Gluc: 189 mg/dL / Ketone: x  / Bili: x / Urobili: x   Blood: x / Protein: x / Nitrite: x   Leuk Esterase: x / RBC: x / WBC x   Sq Epi: x / Non Sq Epi: x / Bacteria: x          PHYSICAL EXAM  --------------------------------------------------------------------------------    Physical Exam:  General: NAD, resting comfortably in chair  Pulmonary: Nonlabored breathing, no respiratory distress  Abdominal: soft, non distended, non tender  Extremities: VERONICAE guillotine amp in knee immobilizer

## 2025-03-13 LAB
ANION GAP SERPL CALC-SCNC: 14 MMOL/L — SIGNIFICANT CHANGE UP (ref 5–17)
BUN SERPL-MCNC: 12 MG/DL — SIGNIFICANT CHANGE UP (ref 7–23)
CALCIUM SERPL-MCNC: 9.2 MG/DL — SIGNIFICANT CHANGE UP (ref 8.4–10.5)
CHLORIDE SERPL-SCNC: 99 MMOL/L — SIGNIFICANT CHANGE UP (ref 96–108)
CO2 SERPL-SCNC: 22 MMOL/L — SIGNIFICANT CHANGE UP (ref 22–31)
CREAT SERPL-MCNC: 0.7 MG/DL — SIGNIFICANT CHANGE UP (ref 0.5–1.3)
EGFR: 98 ML/MIN/1.73M2 — SIGNIFICANT CHANGE UP
EGFR: 98 ML/MIN/1.73M2 — SIGNIFICANT CHANGE UP
GLUCOSE BLDC GLUCOMTR-MCNC: 106 MG/DL — HIGH (ref 70–99)
GLUCOSE BLDC GLUCOMTR-MCNC: 163 MG/DL — HIGH (ref 70–99)
GLUCOSE BLDC GLUCOMTR-MCNC: 181 MG/DL — HIGH (ref 70–99)
GLUCOSE BLDC GLUCOMTR-MCNC: 181 MG/DL — HIGH (ref 70–99)
GLUCOSE SERPL-MCNC: 120 MG/DL — HIGH (ref 70–99)
HCT VFR BLD CALC: 24.6 % — LOW (ref 39–50)
HCT VFR BLD CALC: 24.9 % — LOW (ref 39–50)
HGB BLD-MCNC: 7.8 G/DL — LOW (ref 13–17)
HGB BLD-MCNC: 8.1 G/DL — LOW (ref 13–17)
INR BLD: 1.13 RATIO — SIGNIFICANT CHANGE UP (ref 0.85–1.16)
MAGNESIUM SERPL-MCNC: 1.9 MG/DL — SIGNIFICANT CHANGE UP (ref 1.6–2.6)
MCHC RBC-ENTMCNC: 27.9 PG — SIGNIFICANT CHANGE UP (ref 27–34)
MCHC RBC-ENTMCNC: 28.8 PG — SIGNIFICANT CHANGE UP (ref 27–34)
MCHC RBC-ENTMCNC: 31.7 G/DL — LOW (ref 32–36)
MCHC RBC-ENTMCNC: 32.5 G/DL — SIGNIFICANT CHANGE UP (ref 32–36)
MCV RBC AUTO: 87.9 FL — SIGNIFICANT CHANGE UP (ref 80–100)
MCV RBC AUTO: 88.6 FL — SIGNIFICANT CHANGE UP (ref 80–100)
NRBC BLD AUTO-RTO: 0 /100 WBCS — SIGNIFICANT CHANGE UP (ref 0–0)
NRBC BLD AUTO-RTO: 0 /100 WBCS — SIGNIFICANT CHANGE UP (ref 0–0)
PHOSPHATE SERPL-MCNC: 3.1 MG/DL — SIGNIFICANT CHANGE UP (ref 2.5–4.5)
PLATELET # BLD AUTO: 659 K/UL — HIGH (ref 150–400)
PLATELET # BLD AUTO: 673 K/UL — HIGH (ref 150–400)
POTASSIUM SERPL-MCNC: 3.6 MMOL/L — SIGNIFICANT CHANGE UP (ref 3.5–5.3)
POTASSIUM SERPL-SCNC: 3.6 MMOL/L — SIGNIFICANT CHANGE UP (ref 3.5–5.3)
PROTHROM AB SERPL-ACNC: 12.9 SEC — SIGNIFICANT CHANGE UP (ref 9.9–13.4)
RBC # BLD: 2.8 M/UL — LOW (ref 4.2–5.8)
RBC # BLD: 2.81 M/UL — LOW (ref 4.2–5.8)
RBC # FLD: 16.8 % — HIGH (ref 10.3–14.5)
RBC # FLD: 16.8 % — HIGH (ref 10.3–14.5)
SODIUM SERPL-SCNC: 135 MMOL/L — SIGNIFICANT CHANGE UP (ref 135–145)
WBC # BLD: 10.97 K/UL — HIGH (ref 3.8–10.5)
WBC # BLD: 12.28 K/UL — HIGH (ref 3.8–10.5)
WBC # FLD AUTO: 10.97 K/UL — HIGH (ref 3.8–10.5)
WBC # FLD AUTO: 12.28 K/UL — HIGH (ref 3.8–10.5)

## 2025-03-13 PROCEDURE — 99232 SBSQ HOSP IP/OBS MODERATE 35: CPT

## 2025-03-13 PROCEDURE — G0545: CPT

## 2025-03-13 RX ADMIN — Medication 25 GRAM(S): at 22:13

## 2025-03-13 RX ADMIN — OXYCODONE HYDROCHLORIDE 10 MILLIGRAM(S): 30 TABLET ORAL at 23:27

## 2025-03-13 RX ADMIN — CARVEDILOL 6.25 MILLIGRAM(S): 3.12 TABLET, FILM COATED ORAL at 17:05

## 2025-03-13 RX ADMIN — Medication 975 MILLIGRAM(S): at 12:53

## 2025-03-13 RX ADMIN — OXYCODONE HYDROCHLORIDE 10 MILLIGRAM(S): 30 TABLET ORAL at 12:55

## 2025-03-13 RX ADMIN — Medication 81 MILLIGRAM(S): at 11:54

## 2025-03-13 RX ADMIN — OXYCODONE HYDROCHLORIDE 10 MILLIGRAM(S): 30 TABLET ORAL at 13:55

## 2025-03-13 RX ADMIN — INSULIN GLARGINE-YFGN 14 UNIT(S): 100 INJECTION, SOLUTION SUBCUTANEOUS at 22:14

## 2025-03-13 RX ADMIN — Medication 975 MILLIGRAM(S): at 05:51

## 2025-03-13 RX ADMIN — Medication 40 MILLIGRAM(S): at 05:20

## 2025-03-13 RX ADMIN — INSULIN LISPRO 8 UNIT(S): 100 INJECTION, SOLUTION INTRAVENOUS; SUBCUTANEOUS at 18:09

## 2025-03-13 RX ADMIN — CARVEDILOL 6.25 MILLIGRAM(S): 3.12 TABLET, FILM COATED ORAL at 05:20

## 2025-03-13 RX ADMIN — ENOXAPARIN SODIUM 40 MILLIGRAM(S): 100 INJECTION SUBCUTANEOUS at 18:09

## 2025-03-13 RX ADMIN — INSULIN LISPRO 2: 100 INJECTION, SOLUTION INTRAVENOUS; SUBCUTANEOUS at 12:55

## 2025-03-13 RX ADMIN — Medication 975 MILLIGRAM(S): at 05:21

## 2025-03-13 RX ADMIN — INSULIN LISPRO 2: 100 INJECTION, SOLUTION INTRAVENOUS; SUBCUTANEOUS at 09:35

## 2025-03-13 RX ADMIN — Medication 975 MILLIGRAM(S): at 18:05

## 2025-03-13 RX ADMIN — Medication 975 MILLIGRAM(S): at 17:05

## 2025-03-13 RX ADMIN — POLYETHYLENE GLYCOL 3350 17 GRAM(S): 17 POWDER, FOR SOLUTION ORAL at 11:55

## 2025-03-13 RX ADMIN — INSULIN LISPRO 8 UNIT(S): 100 INJECTION, SOLUTION INTRAVENOUS; SUBCUTANEOUS at 09:36

## 2025-03-13 RX ADMIN — Medication 1 TABLET(S): at 17:05

## 2025-03-13 RX ADMIN — OXYCODONE HYDROCHLORIDE 10 MILLIGRAM(S): 30 TABLET ORAL at 00:01

## 2025-03-13 RX ADMIN — OXYCODONE HYDROCHLORIDE 10 MILLIGRAM(S): 30 TABLET ORAL at 22:27

## 2025-03-13 RX ADMIN — INSULIN LISPRO 2: 100 INJECTION, SOLUTION INTRAVENOUS; SUBCUTANEOUS at 18:08

## 2025-03-13 RX ADMIN — OXYCODONE HYDROCHLORIDE 10 MILLIGRAM(S): 30 TABLET ORAL at 09:45

## 2025-03-13 RX ADMIN — Medication 975 MILLIGRAM(S): at 11:53

## 2025-03-13 RX ADMIN — GABAPENTIN 100 MILLIGRAM(S): 400 CAPSULE ORAL at 11:54

## 2025-03-13 RX ADMIN — LOSARTAN POTASSIUM 100 MILLIGRAM(S): 100 TABLET, FILM COATED ORAL at 05:20

## 2025-03-13 RX ADMIN — Medication 975 MILLIGRAM(S): at 23:21

## 2025-03-13 RX ADMIN — Medication 25 GRAM(S): at 13:27

## 2025-03-13 RX ADMIN — CYANOCOBALAMIN 1000 MICROGRAM(S): 1000 INJECTION INTRAMUSCULAR; SUBCUTANEOUS at 11:54

## 2025-03-13 RX ADMIN — OXYCODONE HYDROCHLORIDE 10 MILLIGRAM(S): 30 TABLET ORAL at 08:43

## 2025-03-13 RX ADMIN — Medication 60 MILLIGRAM(S): at 05:20

## 2025-03-13 RX ADMIN — INSULIN LISPRO 8 UNIT(S): 100 INJECTION, SOLUTION INTRAVENOUS; SUBCUTANEOUS at 12:55

## 2025-03-13 RX ADMIN — Medication 40 MILLIGRAM(S): at 17:05

## 2025-03-13 RX ADMIN — Medication 25 GRAM(S): at 05:21

## 2025-03-13 NOTE — PROGRESS NOTE ADULT - ASSESSMENT
69-year-old male with a past medical history of tobacco use, CVA, hypertension, hyperlipidemia, PAD who is presenting to the hospital for left femoral to anterior tibial bypass surgery and angiogram.  Patient previously described left ankle pain.  Patient underwent attempts for an office angiogram however this was unsuccessful.    Patient underwent left common femoral artery to anterior tibial artery bypass, angiogram and balloon angioplasty and stent placement within the bypass graft upon admission due to concern for chronic limb threatening ischemia and PAD.  Procedure was on 2/7/2025 shortly after admission.  Postoperative course was complicated by fevers, tachycardia.  Infectious workup at that time was negative.  Postoperatively also patient found to be anemic without response to red blood cell transfusions, GI was consulted and EGD was completed on 2/13/2025 with evidence for bleeding gastric ulcer status post hemostasis.  Hemoglobin stabilized over the course of the next few days.  Patient continues to have fevers.  Started on vancomycin and piperacillin/Tazo bacterium.  Patient underwent left lower extremity BKA on 2/27/2025.  Antibiotics discontinued on 3/5/2025.  Restarted on 3/6/2025 due to development of leukocytosis and evidence for necrosis at BKA stump.  Plan for formalization of BKA this week.    Patient currently afebrile.  Otherwise hemodynamically stable on room air.  Latest labs labs show leukocytosis of 12.5, anemia 8.5/26.6, thrombocytosis 676, renal function within normal limits.  Previous blood cultures obtained following admission and on 2/20/2025 remain negative.  MRSA nares PCR is negative on 2/28/2025.  Patient currently on piperacillin/tazobactam    #Leukocytosis  #Chronic limb ischemia status post bypass surgery and eventual BKA, now with stump infection and necrosis  #PAD  #Fever, resolved    Recommendations  Patient continues to appear clinically stable, not septic with resolved fevers  Patient continues to have leukocytosis in setting of recent BKA complicated by necrosis of stump, worsening today  At this time, patient has received an adequate course of antibiotics if infection present however right lower extremity appears with gangrenous changes rather than active infection  Would discontinue piperacillin/tazobactam  Low threshold to resume antibiotics patient develops fever, new evidence for infection  If fever recurs–obtain blood cultures  Plan for further surgery later this week, will plan for perioperative antibiotic course  Follow fever curve and WBC count    Zuhair Arthur MD  Division of Infectious Diseases

## 2025-03-13 NOTE — PROGRESS NOTE ADULT - SUBJECTIVE AND OBJECTIVE BOX
DATE OF SERVICE: 03-13-25     Patient is a 70y old  Male who presents with a chief complaint of S/p L BKA (12 Mar 2025 12:48)      INTERVAL HISTORY: feels ok    TELEMETRY Personally reviewed: no events  	  MEDICATIONS:  carvedilol 6.25 milliGRAM(s) Oral every 12 hours  losartan 100 milliGRAM(s) Oral daily  NIFEdipine XL 60 milliGRAM(s) Oral daily        PHYSICAL EXAM:  T(C): 36.8 (03-13-25 @ 22:10), Max: 37.3 (03-13-25 @ 05:59)  HR: 101 (03-13-25 @ 22:10) (69 - 109)  BP: 133/71 (03-13-25 @ 22:10) (121/65 - 163/74)  RR: 18 (03-13-25 @ 22:10) (18 - 18)  SpO2: 99% (03-13-25 @ 22:10) (96% - 99%)  Wt(kg): --  I&O's Summary    12 Mar 2025 07:01  -  13 Mar 2025 07:00  --------------------------------------------------------  IN: 1380 mL / OUT: 2100 mL / NET: -720 mL    13 Mar 2025 07:01  -  13 Mar 2025 23:14  --------------------------------------------------------  IN: 660 mL / OUT: 600 mL / NET: 60 mL          Appearance: In no distress	  HEENT:    PERRL, EOMI	  Cardiovascular:  S1 S2, No JVD  Respiratory: Lungs clear to auscultation	  Gastrointestinal:  Soft, Non-tender, + BS	  Vascularature:  No edema of LE  Psychiatric: Appropriate affect   Neuro: no acute focal deficits                               8.1    12.28 )-----------( 673      ( 13 Mar 2025 14:00 )             24.9     03-13    135  |  99  |  12  ----------------------------<  120[H]  3.6   |  22  |  0.70    Ca    9.2      13 Mar 2025 06:23  Phos  3.1     03-13  Mg     1.9     03-13          Labs personally reviewed      ASSESSMENT/PLAN: 	    69M PM of former smoker (1.5 PPD % 50 years; quit 2 years ago),  HTN, HLD, DM, PAD and aortoiliac occlusive disease, who previously underwent an outpatient angiography complicated by right iliac artery dissection. Now S/P Creation of bypass from left femoral artery to distal tibial artery with RSVG and completion angiogram on 2/7/25 s/p 2u pRBC 7.4 from 7.8l not responded to 2nd unit on 2/12/25. EGD completed 2/13 found to have bleeding gastric ulcer w/ hemostasis achieved with cautery. Hgb found to be 6.1 and s/p 2u pRBC. Patient now w/ ischemic changes to LLE.    1. Cardiac Risk Stratification   - No chest pain/SOB  - EKG Sinus tach 117 bpm no ischemic changes   - TTE reviewed with pericardial effusion, unchanged compared to 2023  - Not in decompensated HF   - No hx of tachy/timothy arrhythmias   - No valvular abnormalities  - Moderate risk for moderate risk vascular surgery  - Optimized from cardiac perspective to proceed     2. Hypertension- bp better   - Losartan 100 mg QD   - Nifedipine 60 mg QD   - Coreg to 6.25mg BID   - d/c hydralazine 25 mg BID    3. Hyperlipidemia   - Lipitor 40mg   - lipid profile     4. Diabetes Mellitus Type II   - FBG per protocol  - ISS   - A1C: 7.7%     5. CAD - TTE with Basal and mid inferior wall and basal inferoseptal segment are abnormal  - Discussed with pt, spouse and daughter in person, they do not know full name or number of his cardiologist to obtain his prior records from  - They deny he has any h/o MI, CP or SOB  - Advised OP follow up with his cards             Dayton Dawkins DO [facility]  Cardiovascular Medicine  [address], [address]  Office: [phone]  Available via Text/call on Microsoft Teams

## 2025-03-13 NOTE — PROGRESS NOTE ADULT - SUBJECTIVE AND OBJECTIVE BOX
Follow Up:  BKA gangrene    Interval History/ROS:  Overnight: No acute events.  Patient remains afebrile.  Otherwise hemodynamically stable on room air.  Latest labs show leukocytosis of 12.2, anemia 8.1/24.9, thrombocytosis 673.  BMP with renal function within normal limits.    Patient seen examined at bedside.  No new complaints.    Allergies  Advil (Rash)        ANTIMICROBIALS:  piperacillin/tazobactam IVPB.. 3.375 every 8 hours      OTHER MEDS:  MEDICATIONS  (STANDING):  acetaminophen     Tablet .. 975 every 6 hours  aspirin  chewable 81 daily  bisacodyl 5 daily PRN  carvedilol 6.25 every 12 hours  dextrose 50% Injectable 25 once  dextrose 50% Injectable 12.5 once  dextrose Oral Gel 15 once PRN  enoxaparin Injectable 40 every 24 hours  gabapentin 100 daily  HYDROmorphone  Injectable 0.5 every 6 hours PRN  influenza  Vaccine (HIGH DOSE) 0.5 once  insulin glargine Injectable (LANTUS) 14 at bedtime  insulin lispro (ADMELOG) corrective regimen sliding scale  three times a day before meals  insulin lispro (ADMELOG) corrective regimen sliding scale  at bedtime  insulin lispro Injectable (ADMELOG) 8 three times a day with meals  losartan 100 daily  melatonin 6 at bedtime  NIFEdipine XL 60 daily  oxyCODONE    IR 10 every 4 hours PRN  oxyCODONE    IR 5 every 6 hours PRN  pantoprazole    Tablet 40 two times a day  polyethylene glycol 3350 17 daily  senna 1 at bedtime      Vital Signs Last 24 Hrs  T(C): 36.8 (13 Mar 2025 17:04), Max: 37.3 (13 Mar 2025 05:59)  T(F): 98.3 (13 Mar 2025 17:04), Max: 99.2 (13 Mar 2025 05:59)  HR: 69 (13 Mar 2025 17:04) (69 - 109)  BP: 132/73 (13 Mar 2025 17:04) (121/65 - 163/74)  BP(mean): --  RR: 18 (13 Mar 2025 17:04) (18 - 18)  SpO2: 97% (13 Mar 2025 17:04) (96% - 99%)    Parameters below as of 13 Mar 2025 17:04  Patient On (Oxygen Delivery Method): room air        PHYSICAL EXAM:  GA: NAD, AOx3  HEENT: oral cavity no lesion  CV: nl S1/S2, no RMG  Lungs: CTAB, No distress  Abd: BS+, soft, nontender, no rebounding pain  Ext: no edema  Neuro: No focal deficits  Skin: L stump necrosis  IV: no phlebitis                            8.1    12.28 )-----------( 673      ( 13 Mar 2025 14:00 )             24.9       03-13    135  |  99  |  12  ----------------------------<  120[H]  3.6   |  22  |  0.70    Ca    9.2      13 Mar 2025 06:23  Phos  3.1     03-13  Mg     1.9     03-13        Urinalysis Basic - ( 13 Mar 2025 06:23 )    Color: x / Appearance: x / SG: x / pH: x  Gluc: 120 mg/dL / Ketone: x  / Bili: x / Urobili: x   Blood: x / Protein: x / Nitrite: x   Leuk Esterase: x / RBC: x / WBC x   Sq Epi: x / Non Sq Epi: x / Bacteria: x        MICROBIOLOGY:  v                  RADIOLOGY:  Imaging reviewed

## 2025-03-13 NOTE — PROGRESS NOTE ADULT - ASSESSMENT
69M PM of former smoker (1.5 PPD % 50 years; quit 2 years ago),  HTN, HLD, DM, PAD and aortoiliac occlusive disease, who previously underwent an outpatient angiography complicated by right iliac artery dissection. Now S/P Creation of bypass from left femoral artery to distal tibial artery with RSVG and completion angiogram on 2/7/25 s/p 2u pRBC 7.4 from 7.8l not responded to 2nd unit on 2/12/25. EGD completed 2/13 found to have bleeding gastric ulcer w/ hemostasis achieved with cautery. Hgb found to be 6.1 and s/p 2u pRBC. 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  Received 1uPRBC yesterday (hgb 6.7) with appropriate response.      Plan:     - Planning formalization/LT AKA Friday vs Monday  - Appreciate GI recs: Continue PPI increase to BID x4 weeks then downgrade to daily dosing; no repeat EGD, repeat EGD in 2 months outpatient   - BP regimen adjusted and better controlled; appreciate cards recs   - Dressing change daily  - CC diet   - Antibx: zosyn dc on 3/5/25; vanc d/c 2/28, zosyn started back up on 3/6 given rising WBC and evidence of necrosis at stump; appreciate ID recs   - Lovenox/Aspirin   - Diet: Regular   - on insulin appreciate endocrine recs  - ASA  - Pain regimen: Tylenol ATC, oxycodone PRN  - Bowel regimen  - PT recs -> YANELY    Vascular Surg  [id number]

## 2025-03-13 NOTE — PROGRESS NOTE ADULT - SUBJECTIVE AND OBJECTIVE BOX
SURGERY DAILY PROGRESS NOTE    24 Hour/Overnight Events: No acute events overnight    SUBJECTIVE: Patient seen and evaluated on AM rounds. Pt is resting comfortably in bed with no acute complaints. Tolerating diet. Denies fevers/chills, chest pain, dyspnea, abdominal pain, nausea, vomiting, and diarrhea    ------------------------------------------------------------------------------------------------------------  OBJECTIVE:  Vital Signs Last 24 Hrs  T(C): 37.3 (13 Mar 2025 05:59), Max: 37.3 (13 Mar 2025 05:59)  T(F): 99.2 (13 Mar 2025 05:59), Max: 99.2 (13 Mar 2025 05:59)  HR: 108 (13 Mar 2025 05:59) (68 - 108)  BP: 124/68 (13 Mar 2025 05:59) (124/68 - 163/74)  BP(mean): --  RR: 18 (13 Mar 2025 05:59) (18 - 18)  SpO2: 97% (13 Mar 2025 05:59) (96% - 99%)    Parameters below as of 13 Mar 2025 05:59  Patient On (Oxygen Delivery Method): room air      I&O's Detail    12 Mar 2025 07:01  -  13 Mar 2025 07:00  --------------------------------------------------------  IN:    IV PiggyBack: 300 mL    Oral Fluid: 1080 mL  Total IN: 1380 mL    OUT:    Voided (mL): 2100 mL  Total OUT: 2100 mL    Total NET: -720 mL          LABS:                        7.8    10.97 )-----------( 659      ( 13 Mar 2025 06:23 )             24.6     03-13    135  |  99  |  12  ----------------------------<  120[H]  3.6   |  22  |  0.70    Ca    9.2      13 Mar 2025 06:23  Phos  3.1     03-13  Mg     1.9     03-13        PT/INR - ( 13 Mar 2025 06:24 )   PT: 12.9 sec;   INR: 1.13 ratio           Urinalysis Basic - ( 13 Mar 2025 06:23 )    Color: x / Appearance: x / SG: x / pH: x  Gluc: 120 mg/dL / Ketone: x  / Bili: x / Urobili: x   Blood: x / Protein: x / Nitrite: x   Leuk Esterase: x / RBC: x / WBC x   Sq Epi: x / Non Sq Epi: x / Bacteria: x    PHYSICAL EXAM  --------------------------------------------------------------------------------    Physical Exam:  General: NAD, resting comfortably in chair  Pulmonary: Nonlabored breathing, no respiratory distress  Abdominal: soft, non distended, non tender  Extremities: LLE guillotine amp in knee immobilizer

## 2025-03-14 LAB
ANION GAP SERPL CALC-SCNC: 14 MMOL/L — SIGNIFICANT CHANGE UP (ref 5–17)
BUN SERPL-MCNC: 13 MG/DL — SIGNIFICANT CHANGE UP (ref 7–23)
CALCIUM SERPL-MCNC: 9.6 MG/DL — SIGNIFICANT CHANGE UP (ref 8.4–10.5)
CHLORIDE SERPL-SCNC: 99 MMOL/L — SIGNIFICANT CHANGE UP (ref 96–108)
CO2 SERPL-SCNC: 22 MMOL/L — SIGNIFICANT CHANGE UP (ref 22–31)
CREAT SERPL-MCNC: 0.8 MG/DL — SIGNIFICANT CHANGE UP (ref 0.5–1.3)
EGFR: 94 ML/MIN/1.73M2 — SIGNIFICANT CHANGE UP
EGFR: 94 ML/MIN/1.73M2 — SIGNIFICANT CHANGE UP
GLUCOSE BLDC GLUCOMTR-MCNC: 130 MG/DL — HIGH (ref 70–99)
GLUCOSE BLDC GLUCOMTR-MCNC: 150 MG/DL — HIGH (ref 70–99)
GLUCOSE BLDC GLUCOMTR-MCNC: 200 MG/DL — HIGH (ref 70–99)
GLUCOSE BLDC GLUCOMTR-MCNC: 250 MG/DL — HIGH (ref 70–99)
GLUCOSE SERPL-MCNC: 108 MG/DL — HIGH (ref 70–99)
HCT VFR BLD CALC: 24.2 % — LOW (ref 39–50)
HGB BLD-MCNC: 7.6 G/DL — LOW (ref 13–17)
INR BLD: 1.04 RATIO — SIGNIFICANT CHANGE UP (ref 0.85–1.16)
MAGNESIUM SERPL-MCNC: 2.1 MG/DL — SIGNIFICANT CHANGE UP (ref 1.6–2.6)
MCHC RBC-ENTMCNC: 28.3 PG — SIGNIFICANT CHANGE UP (ref 27–34)
MCHC RBC-ENTMCNC: 31.4 G/DL — LOW (ref 32–36)
MCV RBC AUTO: 90 FL — SIGNIFICANT CHANGE UP (ref 80–100)
NRBC BLD AUTO-RTO: 0 /100 WBCS — SIGNIFICANT CHANGE UP (ref 0–0)
PHOSPHATE SERPL-MCNC: 3.8 MG/DL — SIGNIFICANT CHANGE UP (ref 2.5–4.5)
PLATELET # BLD AUTO: 684 K/UL — HIGH (ref 150–400)
POTASSIUM SERPL-MCNC: 3.8 MMOL/L — SIGNIFICANT CHANGE UP (ref 3.5–5.3)
POTASSIUM SERPL-SCNC: 3.8 MMOL/L — SIGNIFICANT CHANGE UP (ref 3.5–5.3)
PROTHROM AB SERPL-ACNC: 12 SEC — SIGNIFICANT CHANGE UP (ref 9.9–13.4)
RBC # BLD: 2.69 M/UL — LOW (ref 4.2–5.8)
RBC # FLD: 16.8 % — HIGH (ref 10.3–14.5)
SODIUM SERPL-SCNC: 135 MMOL/L — SIGNIFICANT CHANGE UP (ref 135–145)
WBC # BLD: 10.09 K/UL — SIGNIFICANT CHANGE UP (ref 3.8–10.5)
WBC # FLD AUTO: 10.09 K/UL — SIGNIFICANT CHANGE UP (ref 3.8–10.5)

## 2025-03-14 PROCEDURE — G0545: CPT

## 2025-03-14 PROCEDURE — 99232 SBSQ HOSP IP/OBS MODERATE 35: CPT

## 2025-03-14 RX ADMIN — Medication 975 MILLIGRAM(S): at 00:21

## 2025-03-14 RX ADMIN — OXYCODONE HYDROCHLORIDE 10 MILLIGRAM(S): 30 TABLET ORAL at 20:24

## 2025-03-14 RX ADMIN — Medication 975 MILLIGRAM(S): at 18:12

## 2025-03-14 RX ADMIN — Medication 60 MILLIGRAM(S): at 07:02

## 2025-03-14 RX ADMIN — Medication 6 MILLIGRAM(S): at 23:24

## 2025-03-14 RX ADMIN — Medication 975 MILLIGRAM(S): at 19:12

## 2025-03-14 RX ADMIN — OXYCODONE HYDROCHLORIDE 10 MILLIGRAM(S): 30 TABLET ORAL at 08:05

## 2025-03-14 RX ADMIN — Medication 975 MILLIGRAM(S): at 23:23

## 2025-03-14 RX ADMIN — Medication 40 MILLIGRAM(S): at 07:02

## 2025-03-14 RX ADMIN — Medication 81 MILLIGRAM(S): at 11:25

## 2025-03-14 RX ADMIN — INSULIN LISPRO 8 UNIT(S): 100 INJECTION, SOLUTION INTRAVENOUS; SUBCUTANEOUS at 13:12

## 2025-03-14 RX ADMIN — INSULIN LISPRO 8 UNIT(S): 100 INJECTION, SOLUTION INTRAVENOUS; SUBCUTANEOUS at 08:16

## 2025-03-14 RX ADMIN — Medication 975 MILLIGRAM(S): at 12:24

## 2025-03-14 RX ADMIN — Medication 40 MILLIGRAM(S): at 18:13

## 2025-03-14 RX ADMIN — Medication 1 TABLET(S): at 18:17

## 2025-03-14 RX ADMIN — INSULIN LISPRO 8 UNIT(S): 100 INJECTION, SOLUTION INTRAVENOUS; SUBCUTANEOUS at 18:12

## 2025-03-14 RX ADMIN — OXYCODONE HYDROCHLORIDE 10 MILLIGRAM(S): 30 TABLET ORAL at 21:20

## 2025-03-14 RX ADMIN — Medication 25 GRAM(S): at 07:01

## 2025-03-14 RX ADMIN — Medication 0.5 MILLIGRAM(S): at 08:15

## 2025-03-14 RX ADMIN — Medication 975 MILLIGRAM(S): at 08:05

## 2025-03-14 RX ADMIN — ENOXAPARIN SODIUM 40 MILLIGRAM(S): 100 INJECTION SUBCUTANEOUS at 18:13

## 2025-03-14 RX ADMIN — Medication 25 GRAM(S): at 22:21

## 2025-03-14 RX ADMIN — INSULIN GLARGINE-YFGN 14 UNIT(S): 100 INJECTION, SOLUTION SUBCUTANEOUS at 22:21

## 2025-03-14 RX ADMIN — Medication 0.5 MILLIGRAM(S): at 01:11

## 2025-03-14 RX ADMIN — CYANOCOBALAMIN 1000 MICROGRAM(S): 1000 INJECTION INTRAMUSCULAR; SUBCUTANEOUS at 11:25

## 2025-03-14 RX ADMIN — CARVEDILOL 6.25 MILLIGRAM(S): 3.12 TABLET, FILM COATED ORAL at 18:13

## 2025-03-14 RX ADMIN — Medication 975 MILLIGRAM(S): at 07:05

## 2025-03-14 RX ADMIN — INSULIN LISPRO 0: 100 INJECTION, SOLUTION INTRAVENOUS; SUBCUTANEOUS at 22:23

## 2025-03-14 RX ADMIN — OXYCODONE HYDROCHLORIDE 10 MILLIGRAM(S): 30 TABLET ORAL at 07:05

## 2025-03-14 RX ADMIN — Medication 975 MILLIGRAM(S): at 11:24

## 2025-03-14 RX ADMIN — CARVEDILOL 6.25 MILLIGRAM(S): 3.12 TABLET, FILM COATED ORAL at 07:03

## 2025-03-14 RX ADMIN — GABAPENTIN 100 MILLIGRAM(S): 400 CAPSULE ORAL at 11:24

## 2025-03-14 RX ADMIN — OXYCODONE HYDROCHLORIDE 10 MILLIGRAM(S): 30 TABLET ORAL at 13:13

## 2025-03-14 RX ADMIN — POLYETHYLENE GLYCOL 3350 17 GRAM(S): 17 POWDER, FOR SOLUTION ORAL at 11:25

## 2025-03-14 RX ADMIN — OXYCODONE HYDROCHLORIDE 10 MILLIGRAM(S): 30 TABLET ORAL at 14:13

## 2025-03-14 RX ADMIN — Medication 25 GRAM(S): at 13:17

## 2025-03-14 RX ADMIN — Medication 0.5 MILLIGRAM(S): at 08:45

## 2025-03-14 RX ADMIN — INSULIN LISPRO 2: 100 INJECTION, SOLUTION INTRAVENOUS; SUBCUTANEOUS at 13:12

## 2025-03-14 RX ADMIN — LOSARTAN POTASSIUM 100 MILLIGRAM(S): 100 TABLET, FILM COATED ORAL at 07:02

## 2025-03-14 RX ADMIN — Medication 0.5 MILLIGRAM(S): at 02:11

## 2025-03-14 NOTE — PROGRESS NOTE ADULT - SUBJECTIVE AND OBJECTIVE BOX
Follow Up:  gangrene    Interval History/ROS:  Overnight: No acute events.  Patient remains afebrile.  Otherwise hemodynamically stable on room air.  Latest labs show resolved leukocytosis, anemia 7.6/24.2, BMP with renal function within normal limits.    Patient seen examined at bedside.  Continues to complain of left lower extremity pain and area of gangrenous change.  Denies any other pain or discomfort.    Allergies  Advil (Rash)        ANTIMICROBIALS:  piperacillin/tazobactam IVPB.. 3.375 every 8 hours      OTHER MEDS:  MEDICATIONS  (STANDING):  acetaminophen     Tablet .. 975 every 6 hours  aspirin  chewable 81 daily  bisacodyl 5 daily PRN  carvedilol 6.25 every 12 hours  dextrose 50% Injectable 25 once  dextrose 50% Injectable 12.5 once  dextrose Oral Gel 15 once PRN  enoxaparin Injectable 40 every 24 hours  gabapentin 100 daily  HYDROmorphone  Injectable 0.5 every 6 hours PRN  influenza  Vaccine (HIGH DOSE) 0.5 once  insulin glargine Injectable (LANTUS) 14 at bedtime  insulin lispro (ADMELOG) corrective regimen sliding scale  three times a day before meals  insulin lispro (ADMELOG) corrective regimen sliding scale  at bedtime  insulin lispro Injectable (ADMELOG) 8 three times a day with meals  losartan 100 daily  melatonin 6 at bedtime  NIFEdipine XL 60 daily  oxyCODONE    IR 10 every 4 hours PRN  oxyCODONE    IR 5 every 6 hours PRN  pantoprazole    Tablet 40 two times a day  polyethylene glycol 3350 17 daily  senna 1 at bedtime      Vital Signs Last 24 Hrs  T(C): 36.7 (14 Mar 2025 17:30), Max: 36.9 (14 Mar 2025 14:43)  T(F): 98 (14 Mar 2025 17:30), Max: 98.5 (14 Mar 2025 14:43)  HR: 102 (14 Mar 2025 17:30) (97 - 102)  BP: 159/68 (14 Mar 2025 17:30) (133/71 - 159/68)  BP(mean): --  RR: 17 (14 Mar 2025 17:30) (17 - 18)  SpO2: 97% (14 Mar 2025 17:30) (96% - 99%)    Parameters below as of 14 Mar 2025 17:30  Patient On (Oxygen Delivery Method): room air        PHYSICAL EXAM:  GA: NAD, AOx3  HEENT: oral cavity no lesion  CV: nl S1/S2, no RMG  Lungs: CTAB, No distress  Abd: BS+, soft, nontender, no rebounding pain  Ext: no edema  Neuro: No focal deficits  Skin: L stump necrosis  IV: no phlebitis                                  7.6    10.09 )-----------( 684      ( 14 Mar 2025 06:53 )             24.2       03-14    135  |  99  |  13  ----------------------------<  108[H]  3.8   |  22  |  0.80    Ca    9.6      14 Mar 2025 06:53  Phos  3.8     03-14  Mg     2.1     03-14        Urinalysis Basic - ( 14 Mar 2025 06:53 )    Color: x / Appearance: x / SG: x / pH: x  Gluc: 108 mg/dL / Ketone: x  / Bili: x / Urobili: x   Blood: x / Protein: x / Nitrite: x   Leuk Esterase: x / RBC: x / WBC x   Sq Epi: x / Non Sq Epi: x / Bacteria: x        MICROBIOLOGY:  v                  RADIOLOGY:  Imaging reviewed

## 2025-03-14 NOTE — PROGRESS NOTE ADULT - ATTENDING COMMENTS
Agree w above. Having brown stools. s/p EGD with gastric ulcer. PPI tx. Trend CBC and transfuse as needed.
left foot and ankle continue to demarcate  left below knee amputation discussed extensively with patient and daughter  they will discuss over weekend  will re-evaluate on Monday, 02/24
patient has no further revascularization options due to significant small vessel disease in the both the outflow vessel for the bypass as well as in the foot  will allow demarcation but patient remains at very high risk for proximal amputation
skin continues to demarcate in left leg   unclear still whether he will need knee disarticulation followed by above knee amputation versus single-stage above knee amputation  tentatively scheduled for OR on this upcoming Tuesday  continue hydration  pain control as needed

## 2025-03-14 NOTE — PROGRESS NOTE ADULT - SUBJECTIVE AND OBJECTIVE BOX
OVERNIGHT EVENTS: NAEO    SUBJECTIVE: Pt seen and examined at bedside. Patient comfortable and in no-apparent distress. Pain is controlled.     MEDICATIONS  (STANDING):  acetaminophen     Tablet .. 975 milliGRAM(s) Oral every 6 hours  aspirin  chewable 81 milliGRAM(s) Oral daily  carvedilol 6.25 milliGRAM(s) Oral every 12 hours  cyanocobalamin 1000 MICROGram(s) Oral daily  dextrose 5%. 1000 milliLiter(s) (50 mL/Hr) IV Continuous <Continuous>  dextrose 50% Injectable 25 Gram(s) IV Push once  dextrose 50% Injectable 12.5 Gram(s) IV Push once  enoxaparin Injectable 40 milliGRAM(s) SubCutaneous every 24 hours  gabapentin 100 milliGRAM(s) Oral daily  influenza  Vaccine (HIGH DOSE) 0.5 milliLiter(s) IntraMuscular once  insulin glargine Injectable (LANTUS) 14 Unit(s) SubCutaneous at bedtime  insulin lispro (ADMELOG) corrective regimen sliding scale   SubCutaneous three times a day before meals  insulin lispro (ADMELOG) corrective regimen sliding scale   SubCutaneous at bedtime  insulin lispro Injectable (ADMELOG) 8 Unit(s) SubCutaneous three times a day with meals  lactobacillus acidophilus 1 Tablet(s) Oral with dinner  losartan 100 milliGRAM(s) Oral daily  melatonin 6 milliGRAM(s) Oral at bedtime  NIFEdipine XL 60 milliGRAM(s) Oral daily  pantoprazole    Tablet 40 milliGRAM(s) Oral two times a day  piperacillin/tazobactam IVPB.. 3.375 Gram(s) IV Intermittent every 8 hours  polyethylene glycol 3350 17 Gram(s) Oral daily  senna 1 Tablet(s) Oral at bedtime    MEDICATIONS  (PRN):  bisacodyl 5 milliGRAM(s) Oral daily PRN Constipation  dextrose Oral Gel 15 Gram(s) Oral once PRN Blood Glucose LESS THAN 70 milliGRAM(s)/deciliter  HYDROmorphone  Injectable 0.5 milliGRAM(s) IV Push every 6 hours PRN Breakthrough pain  oxyCODONE    IR 10 milliGRAM(s) Oral every 4 hours PRN Severe Pain (7 - 10)  oxyCODONE    IR 5 milliGRAM(s) Oral every 6 hours PRN Moderate Pain (4 - 6)    T(C): 36.7 (03-14-25 @ 08:25), Max: 36.8 (03-13-25 @ 13:57)  HR: 100 (03-14-25 @ 08:25) (69 - 109)  BP: 150/73 (03-14-25 @ 08:25) (121/65 - 150/73)  RR: 18 (03-14-25 @ 08:25) (18 - 18)  SpO2: 97% (03-14-25 @ 08:25) (96% - 99%)    03-13-25 @ 07:01  -  03-14-25 @ 07:00  --------------------------------------------------------  IN: 1380 mL / OUT: 1050 mL / NET: 330 mL      LABS:                        7.6    10.09 )-----------( 684      ( 14 Mar 2025 06:53 )             24.2     03-14    135  |  99  |  13  ----------------------------<  108[H]  3.8   |  22  |  0.80    Ca    9.6      14 Mar 2025 06:53  Phos  3.8     03-14  Mg     2.1     03-14      PT/INR - ( 14 Mar 2025 06:53 )   PT: 12.0 sec;   INR: 1.04 ratio           Urinalysis Basic - ( 14 Mar 2025 06:53 )    Color: x / Appearance: x / SG: x / pH: x  Gluc: 108 mg/dL / Ketone: x  / Bili: x / Urobili: x   Blood: x / Protein: x / Nitrite: x   Leuk Esterase: x / RBC: x / WBC x   Sq Epi: x / Non Sq Epi: x / Bacteria: x      PE:  Gen: NAD  CV: Pulse regular present  Resp: Nonlabored  Abdomen: Soft, nontender  Extremities: LLE guillotine amp in knee immobilizer

## 2025-03-14 NOTE — PROGRESS NOTE ADULT - SUBJECTIVE AND OBJECTIVE BOX
DATE OF SERVICE: 03-14-25 @ 10:50    Patient is a 70y old  Male who presents with a chief complaint of S/p L BKA (12 Mar 2025 12:48)      INTERVAL HISTORY: in no acute distress    REVIEW OF SYSTEMS:  CONSTITUTIONAL: No weakness  EYES/ENT: No visual changes;  No throat pain   NECK: No pain or stiffness  RESPIRATORY: No cough, wheezing; No shortness of breath  CARDIOVASCULAR: No chest pain or palpitations  GASTROINTESTINAL: No abdominal  pain. No nausea, vomiting, or hematemesis  GENITOURINARY: No dysuria, frequency or hematuria  NEUROLOGICAL: No stroke like symptoms  SKIN: No rashes      MEDICATIONS:  carvedilol 6.25 milliGRAM(s) Oral every 12 hours  losartan 100 milliGRAM(s) Oral daily  NIFEdipine XL 60 milliGRAM(s) Oral daily        PHYSICAL EXAM:  T(C): 36.7 (03-14-25 @ 08:25), Max: 36.8 (03-13-25 @ 13:57)  HR: 100 (03-14-25 @ 08:25) (69 - 109)  BP: 150/73 (03-14-25 @ 08:25) (121/65 - 150/73)  RR: 18 (03-14-25 @ 08:25) (18 - 18)  SpO2: 97% (03-14-25 @ 08:25) (96% - 99%)  Wt(kg): --  I&O's Summary    13 Mar 2025 07:01  -  14 Mar 2025 07:00  --------------------------------------------------------  IN: 1380 mL / OUT: 1050 mL / NET: 330 mL          Appearance: In no distress	  HEENT:    PERRL, EOMI	  Cardiovascular:  S1 S2, No JVD  Respiratory: Lungs clear to auscultation	  Gastrointestinal:  Soft, Non-tender, + BS	  Vascularature:  No edema of LE  Psychiatric: Appropriate affect   Neuro: no acute focal deficits                               7.6    10.09 )-----------( 684      ( 14 Mar 2025 06:53 )             24.2     03-14    135  |  99  |  13  ----------------------------<  108[H]  3.8   |  22  |  0.80    Ca    9.6      14 Mar 2025 06:53  Phos  3.8     03-14  Mg     2.1     03-14          Labs personally reviewed      ASSESSMENT/PLAN: 	        69M PM of former smoker (1.5 PPD % 50 years; quit 2 years ago),  HTN, HLD, DM, PAD and aortoiliac occlusive disease, who previously underwent an outpatient angiography complicated by right iliac artery dissection. Now S/P Creation of bypass from left femoral artery to distal tibial artery with RSVG and completion angiogram on 2/7/25 s/p 2u pRBC 7.4 from 7.8l not responded to 2nd unit on 2/12/25. EGD completed 2/13 found to have bleeding gastric ulcer w/ hemostasis achieved with cautery. Hgb found to be 6.1 and s/p 2u pRBC. Patient now w/ ischemic changes to LLE.    1. Cardiac Risk Stratification   - No chest pain/SOB  - EKG Sinus tach 117 bpm no ischemic changes   - TTE reviewed with pericardial effusion, unchanged compared to 2023  - Not in decompensated HF   - No hx of tachy/timothy arrhythmias   - No valvular abnormalities  - Moderate risk for moderate risk vascular surgery  - Optimized from cardiac perspective to proceed     2. Hypertension- bp better   - Losartan 100 mg QD   - Nifedipine 60 mg QD   - Coreg to 6.25mg BID   - d/c hydralazine 25 mg BID    3. Hyperlipidemia   - Lipitor 40mg   - lipid profile     4. Diabetes Mellitus Type II   - FBG per protocol  - ISS   - A1C: 7.7%     5. CAD - TTE with Basal and mid inferior wall and basal inferoseptal segment are abnormal  - Discussed with pt, spouse and daughter in person, they do not know full name or number of his cardiologist to obtain his prior records from  - They deny he has any h/o MI, CP or SOB  - Advised OP follow up with his cards   - c/w ASA 81mg QD    JAVIER Haas,  [facility]  Cardiovascular Medicine  [address] [place] [address], [address]  Available through call or text on [employer]  Office: [phone]

## 2025-03-14 NOTE — PROGRESS NOTE ADULT - ASSESSMENT
69-year-old male with a past medical history of tobacco use, CVA, hypertension, hyperlipidemia, PAD who is presenting to the hospital for left femoral to anterior tibial bypass surgery and angiogram.  Patient previously described left ankle pain.  Patient underwent attempts for an office angiogram however this was unsuccessful.    Patient underwent left common femoral artery to anterior tibial artery bypass, angiogram and balloon angioplasty and stent placement within the bypass graft upon admission due to concern for chronic limb threatening ischemia and PAD.  Procedure was on 2/7/2025 shortly after admission.  Postoperative course was complicated by fevers, tachycardia.  Infectious workup at that time was negative.  Postoperatively also patient found to be anemic without response to red blood cell transfusions, GI was consulted and EGD was completed on 2/13/2025 with evidence for bleeding gastric ulcer status post hemostasis.  Hemoglobin stabilized over the course of the next few days.  Patient continues to have fevers.  Started on vancomycin and piperacillin/Tazo bacterium.  Patient underwent left lower extremity BKA on 2/27/2025.  Antibiotics discontinued on 3/5/2025.  Restarted on 3/6/2025 due to development of leukocytosis and evidence for necrosis at BKA stump.  Plan for formalization of BKA this week.    Patient currently afebrile.  Otherwise hemodynamically stable on room air.  Latest labs labs show leukocytosis of 12.5, anemia 8.5/26.6, thrombocytosis 676, renal function within normal limits.  Previous blood cultures obtained following admission and on 2/20/2025 remain negative.  MRSA nares PCR is negative on 2/28/2025.  Patient currently on piperacillin/tazobactam    #Leukocytosis  #Chronic limb ischemia status post bypass surgery and eventual BKA, now with stump infection and necrosis  #PAD  #Fever, resolved    Recommendations  Patient continues to appear clinically stable, not septic with resolved fevers  Patient continues to have leukocytosis in setting of recent BKA complicated by necrosis of stump, now resolved  Plan for further surgery next week, 3/18/25  Continue piperacillin/tazobactam perioperatively  If fever recurs–obtain blood cultures  Follow fever curve and WBC count    Zuhair Arthur MD  Division of Infectious Diseases

## 2025-03-14 NOTE — PROGRESS NOTE ADULT - ASSESSMENT
69M PM of former smoker (1.5 PPD % 50 years; quit 2 years ago),  HTN, HLD, DM, PAD and aortoiliac occlusive disease, who previously underwent an outpatient angiography complicated by right iliac artery dissection. Now S/P Creation of bypass from left femoral artery to distal tibial artery with RSVG and completion angiogram on 2/7/25 s/p 2u pRBC 7.4 from 7.8l not responded to 2nd unit on 2/12/25. EGD completed 2/13 found to have bleeding gastric ulcer w/ hemostasis achieved with cautery. Hgb found to be 6.1 and s/p 2u pRBC. 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  Received 1uPRBC yesterday (hgb 6.7) with appropriate response.      Plan:   - OR Tuesday for L AKA  - Dressing change daily  - CC diet   - Antibx: zosyn dc on 3/5/25; vanc d/c 2/28, zosyn started back up on 3/6 given rising WBC and evidence of necrosis at stump; appreciate ID recs   - Lovenox/Aspirin   - Diet: Regular   - on insulin appreciate endocrine recs  - ASA  - Pain regimen: Tylenol ATC, oxycodone PRN  - Bowel regimen  - PT recs -> YANELY    Vascular Surg  [id number]

## 2025-03-15 LAB
ANION GAP SERPL CALC-SCNC: 13 MMOL/L — SIGNIFICANT CHANGE UP (ref 5–17)
BLD GP AB SCN SERPL QL: NEGATIVE — SIGNIFICANT CHANGE UP
BUN SERPL-MCNC: 11 MG/DL — SIGNIFICANT CHANGE UP (ref 7–23)
CALCIUM SERPL-MCNC: 9 MG/DL — SIGNIFICANT CHANGE UP (ref 8.4–10.5)
CHLORIDE SERPL-SCNC: 101 MMOL/L — SIGNIFICANT CHANGE UP (ref 96–108)
CO2 SERPL-SCNC: 21 MMOL/L — LOW (ref 22–31)
CREAT SERPL-MCNC: 0.74 MG/DL — SIGNIFICANT CHANGE UP (ref 0.5–1.3)
EGFR: 96 ML/MIN/1.73M2 — SIGNIFICANT CHANGE UP
EGFR: 96 ML/MIN/1.73M2 — SIGNIFICANT CHANGE UP
GLUCOSE BLDC GLUCOMTR-MCNC: 141 MG/DL — HIGH (ref 70–99)
GLUCOSE BLDC GLUCOMTR-MCNC: 153 MG/DL — HIGH (ref 70–99)
GLUCOSE BLDC GLUCOMTR-MCNC: 188 MG/DL — HIGH (ref 70–99)
GLUCOSE BLDC GLUCOMTR-MCNC: 209 MG/DL — HIGH (ref 70–99)
GLUCOSE BLDC GLUCOMTR-MCNC: 233 MG/DL — HIGH (ref 70–99)
GLUCOSE SERPL-MCNC: 145 MG/DL — HIGH (ref 70–99)
HCT VFR BLD CALC: 23.7 % — LOW (ref 39–50)
HGB BLD-MCNC: 7.3 G/DL — LOW (ref 13–17)
MAGNESIUM SERPL-MCNC: 1.9 MG/DL — SIGNIFICANT CHANGE UP (ref 1.6–2.6)
MCHC RBC-ENTMCNC: 28.3 PG — SIGNIFICANT CHANGE UP (ref 27–34)
MCHC RBC-ENTMCNC: 30.8 G/DL — LOW (ref 32–36)
MCV RBC AUTO: 91.9 FL — SIGNIFICANT CHANGE UP (ref 80–100)
NRBC BLD AUTO-RTO: 0 /100 WBCS — SIGNIFICANT CHANGE UP (ref 0–0)
PHOSPHATE SERPL-MCNC: 3.2 MG/DL — SIGNIFICANT CHANGE UP (ref 2.5–4.5)
PLATELET # BLD AUTO: 638 K/UL — HIGH (ref 150–400)
POTASSIUM SERPL-MCNC: 3.7 MMOL/L — SIGNIFICANT CHANGE UP (ref 3.5–5.3)
POTASSIUM SERPL-SCNC: 3.7 MMOL/L — SIGNIFICANT CHANGE UP (ref 3.5–5.3)
RBC # BLD: 2.58 M/UL — LOW (ref 4.2–5.8)
RBC # FLD: 16.7 % — HIGH (ref 10.3–14.5)
RH IG SCN BLD-IMP: NEGATIVE — SIGNIFICANT CHANGE UP
SODIUM SERPL-SCNC: 135 MMOL/L — SIGNIFICANT CHANGE UP (ref 135–145)
WBC # BLD: 9.82 K/UL — SIGNIFICANT CHANGE UP (ref 3.8–10.5)
WBC # FLD AUTO: 9.82 K/UL — SIGNIFICANT CHANGE UP (ref 3.8–10.5)

## 2025-03-15 RX ORDER — OXYCODONE HYDROCHLORIDE 30 MG/1
10 TABLET ORAL EVERY 4 HOURS
Refills: 0 | Status: DISCONTINUED | OUTPATIENT
Start: 2025-03-15 | End: 2025-03-18

## 2025-03-15 RX ADMIN — Medication 975 MILLIGRAM(S): at 23:19

## 2025-03-15 RX ADMIN — Medication 40 MILLIGRAM(S): at 06:20

## 2025-03-15 RX ADMIN — ENOXAPARIN SODIUM 40 MILLIGRAM(S): 100 INJECTION SUBCUTANEOUS at 18:35

## 2025-03-15 RX ADMIN — INSULIN LISPRO 2: 100 INJECTION, SOLUTION INTRAVENOUS; SUBCUTANEOUS at 09:17

## 2025-03-15 RX ADMIN — CARVEDILOL 6.25 MILLIGRAM(S): 3.12 TABLET, FILM COATED ORAL at 06:20

## 2025-03-15 RX ADMIN — Medication 1 TABLET(S): at 17:20

## 2025-03-15 RX ADMIN — LOSARTAN POTASSIUM 100 MILLIGRAM(S): 100 TABLET, FILM COATED ORAL at 06:18

## 2025-03-15 RX ADMIN — OXYCODONE HYDROCHLORIDE 10 MILLIGRAM(S): 30 TABLET ORAL at 15:00

## 2025-03-15 RX ADMIN — Medication 975 MILLIGRAM(S): at 13:07

## 2025-03-15 RX ADMIN — Medication 975 MILLIGRAM(S): at 06:59

## 2025-03-15 RX ADMIN — Medication 60 MILLIGRAM(S): at 06:18

## 2025-03-15 RX ADMIN — INSULIN LISPRO 8 UNIT(S): 100 INJECTION, SOLUTION INTRAVENOUS; SUBCUTANEOUS at 09:17

## 2025-03-15 RX ADMIN — Medication 975 MILLIGRAM(S): at 14:00

## 2025-03-15 RX ADMIN — Medication 975 MILLIGRAM(S): at 06:20

## 2025-03-15 RX ADMIN — INSULIN GLARGINE-YFGN 14 UNIT(S): 100 INJECTION, SOLUTION SUBCUTANEOUS at 22:00

## 2025-03-15 RX ADMIN — Medication 25 GRAM(S): at 06:20

## 2025-03-15 RX ADMIN — INSULIN LISPRO 4: 100 INJECTION, SOLUTION INTRAVENOUS; SUBCUTANEOUS at 18:35

## 2025-03-15 RX ADMIN — Medication 975 MILLIGRAM(S): at 00:23

## 2025-03-15 RX ADMIN — OXYCODONE HYDROCHLORIDE 10 MILLIGRAM(S): 30 TABLET ORAL at 14:28

## 2025-03-15 RX ADMIN — INSULIN LISPRO 8 UNIT(S): 100 INJECTION, SOLUTION INTRAVENOUS; SUBCUTANEOUS at 18:36

## 2025-03-15 RX ADMIN — Medication 975 MILLIGRAM(S): at 17:21

## 2025-03-15 RX ADMIN — Medication 40 MILLIGRAM(S): at 17:20

## 2025-03-15 RX ADMIN — Medication 25 GRAM(S): at 22:00

## 2025-03-15 RX ADMIN — Medication 6 MILLIGRAM(S): at 22:01

## 2025-03-15 RX ADMIN — GABAPENTIN 100 MILLIGRAM(S): 400 CAPSULE ORAL at 13:07

## 2025-03-15 RX ADMIN — Medication 25 GRAM(S): at 13:08

## 2025-03-15 RX ADMIN — Medication 975 MILLIGRAM(S): at 17:51

## 2025-03-15 RX ADMIN — Medication 81 MILLIGRAM(S): at 13:07

## 2025-03-15 RX ADMIN — CYANOCOBALAMIN 1000 MICROGRAM(S): 1000 INJECTION INTRAMUSCULAR; SUBCUTANEOUS at 13:07

## 2025-03-15 RX ADMIN — INSULIN LISPRO 8 UNIT(S): 100 INJECTION, SOLUTION INTRAVENOUS; SUBCUTANEOUS at 13:08

## 2025-03-15 RX ADMIN — CARVEDILOL 6.25 MILLIGRAM(S): 3.12 TABLET, FILM COATED ORAL at 17:20

## 2025-03-15 NOTE — PROGRESS NOTE ADULT - ASSESSMENT
69M PM of former smoker (1.5 PPD % 50 years; quit 2 years ago),  HTN, HLD, DM, PAD and aortoiliac occlusive disease, who previously underwent an outpatient angiography complicated by right iliac artery dissection. Now S/P Creation of bypass from left femoral artery to distal tibial artery with RSVG and completion angiogram on 2/7/25 s/p 2u pRBC 7.4 from 7.8l not responded to 2nd unit on 2/12/25. EGD completed 2/13 found to have bleeding gastric ulcer w/ hemostasis achieved with cautery. Hgb found to be 6.1 and s/p 2u pRBC. 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  Received 1uPRBC yesterday (hgb 6.7) with appropriate response.      Plan:   - OR Tuesday for L AKA  - Dressing change daily  - CC diet   - Antibx: zosyn dc on 3/5/25; vanc d/c 2/28, zosyn started back up on 3/6 given rising WBC and evidence of necrosis at stump; appreciate ID recs   - Lovenox/Aspirin   - Diet: Regular   - on insulin appreciate endocrine recs  - ASA  - Pain regimen: Tylenol ATC, oxycodone PRN  - Bowel regimen  - PT recs -> YANELY    Vascular Surg  [id number]

## 2025-03-15 NOTE — PROGRESS NOTE ADULT - SUBJECTIVE AND OBJECTIVE BOX
General Surgery Progress Note    Overnight: LUMA  Subjective: Patient seen and examined on rounds.    Objective:  Vitals:  T(C): 37.1 (03-15-25 @ 05:44), Max: 37.1 (03-15-25 @ 05:44)  HR: 107 (03-15-25 @ 05:44) (94 - 107)  BP: 155/79 (03-15-25 @ 05:44) (130/68 - 159/68)  RR: 18 (03-15-25 @ 05:44) (17 - 18)  SpO2: 98% (03-15-25 @ 05:44) (96% - 98%)  Wt(kg): --    03-14 @ 07:01  -  03-15 @ 07:00  --------------------------------------------------------  IN:    IV PiggyBack: 100 mL    Oral Fluid: 1260 mL  Total IN: 1360 mL    OUT:    Voided (mL): 1550 mL  Total OUT: 1550 mL    Total NET: -190 mL          Physical Exam:  General Appearance: no acute distress, NTND   Chest: airway intact, non-labored breathing  CV: no JVD, palpable pulses b/l  Extremities: LLE guillotine amp in knee immobilizer      Labs:                        7.3    9.82  )-----------( 638      ( 15 Mar 2025 07:17 )             23.7     03-15    135  |  101  |  11  ----------------------------<  145[H]  3.7   |  21[L]  |  0.74    Ca    9.0      15 Mar 2025 07:17  Phos  3.2     03-15  Mg     1.9     03-15        PT/INR - ( 14 Mar 2025 06:53 )   PT: 12.0 sec;   INR: 1.04 ratio           Urinalysis Basic - ( 15 Mar 2025 07:17 )    Color: x / Appearance: x / SG: x / pH: x  Gluc: 145 mg/dL / Ketone: x  / Bili: x / Urobili: x   Blood: x / Protein: x / Nitrite: x   Leuk Esterase: x / RBC: x / WBC x   Sq Epi: x / Non Sq Epi: x / Bacteria: x

## 2025-03-15 NOTE — PROGRESS NOTE ADULT - SUBJECTIVE AND OBJECTIVE BOX
DATE OF SERVICE: 03-15-25 @ 14:21    Patient is a 70y old  Male who presents with a chief complaint of S/p L BKA (12 Mar 2025 12:48)      INTERVAL HISTORY: Feels ok    REVIEW OF SYSTEMS:  CONSTITUTIONAL: No weakness  EYES/ENT: No visual changes;  No throat pain   NECK: No pain or stiffness  RESPIRATORY: No cough, wheezing; No shortness of breath  CARDIOVASCULAR: No chest pain or palpitations  GASTROINTESTINAL: No abdominal  pain. No nausea, vomiting, or hematemesis  GENITOURINARY: No dysuria, frequency or hematuria  NEUROLOGICAL: No stroke like symptoms  SKIN: No rashes    	  MEDICATIONS:  carvedilol 6.25 milliGRAM(s) Oral every 12 hours  losartan 100 milliGRAM(s) Oral daily  NIFEdipine XL 60 milliGRAM(s) Oral daily        PHYSICAL EXAM:  T(C): 36.6 (03-15-25 @ 13:22), Max: 37.1 (03-15-25 @ 05:44)  HR: 92 (03-15-25 @ 13:22) (92 - 107)  BP: 124/64 (03-15-25 @ 13:22) (124/64 - 159/68)  RR: 18 (03-15-25 @ 13:22) (17 - 18)  SpO2: 98% (03-15-25 @ 13:22) (96% - 98%)  Wt(kg): --  I&O's Summary    14 Mar 2025 07:01  -  15 Mar 2025 07:00  --------------------------------------------------------  IN: 1360 mL / OUT: 1550 mL / NET: -190 mL    15 Mar 2025 07:01  -  15 Mar 2025 14:21  --------------------------------------------------------  IN: 400 mL / OUT: 1300 mL / NET: -900 mL          Appearance: In no distress	  HEENT:    PERRL, EOMI	  Cardiovascular:  S1 S2, No JVD  Respiratory: Lungs clear to auscultation	  Gastrointestinal:  Soft, Non-tender, + BS	  Vascularature:  No edema of LE  Psychiatric: Appropriate affect   Neuro: no acute focal deficits                               7.3    9.82  )-----------( 638      ( 15 Mar 2025 07:17 )             23.7     03-15    135  |  101  |  11  ----------------------------<  145[H]  3.7   |  21[L]  |  0.74    Ca    9.0      15 Mar 2025 07:17  Phos  3.2     03-15  Mg     1.9     03-15          Labs personally reviewed      ASSESSMENT/PLAN: 	    69M PM of former smoker (1.5 PPD % 50 years; quit 2 years ago),  HTN, HLD, DM, PAD and aortoiliac occlusive disease, who previously underwent an outpatient angiography complicated by right iliac artery dissection. Now S/P Creation of bypass from left femoral artery to distal tibial artery with RSVG and completion angiogram on 2/7/25 s/p 2u pRBC 7.4 from 7.8l not responded to 2nd unit on 2/12/25. EGD completed 2/13 found to have bleeding gastric ulcer w/ hemostasis achieved with cautery. Hgb found to be 6.1 and s/p 2u pRBC. Patient now w/ ischemic changes to LLE.    1. Cardiac Risk Stratification   - No chest pain/SOB  - EKG Sinus tach 117 bpm no ischemic changes   - TTE reviewed with pericardial effusion, unchanged compared to 2023  - Not in decompensated HF   - No hx of tachy/timothy arrhythmias   - No valvular abnormalities  - Moderate risk for moderate risk vascular surgery  - Optimized from cardiac perspective to proceed     2. Hypertension- bp better   - Losartan 100 mg QD   - Nifedipine 60 mg QD   - Coreg to 6.25mg BID   - d/c hydralazine 25 mg BID    3. Hyperlipidemia   - Lipitor 40mg   - lipid profile     4. Diabetes Mellitus Type II   - FBG per protocol  - ISS   - A1C: 7.7%     5. CAD - TTE with Basal and mid inferior wall and basal inferoseptal segment are abnormal  - Discussed with pt, spouse and daughter in person, they do not know full name or number of his cardiologist to obtain his prior records from  - They deny he has any h/o MI, CP or SOB  - Advised OP follow up with his cards             CHRISTIANO Moe, DO [facility]  Cardiovascular Medicine  [address] [place] [address], [address]  Available through call or text on [employer] TE[employer]  Office: [phone]

## 2025-03-16 LAB
ANION GAP SERPL CALC-SCNC: 12 MMOL/L — SIGNIFICANT CHANGE UP (ref 5–17)
BUN SERPL-MCNC: 8 MG/DL — SIGNIFICANT CHANGE UP (ref 7–23)
CALCIUM SERPL-MCNC: 8.9 MG/DL — SIGNIFICANT CHANGE UP (ref 8.4–10.5)
CHLORIDE SERPL-SCNC: 99 MMOL/L — SIGNIFICANT CHANGE UP (ref 96–108)
CO2 SERPL-SCNC: 23 MMOL/L — SIGNIFICANT CHANGE UP (ref 22–31)
CREAT SERPL-MCNC: 0.75 MG/DL — SIGNIFICANT CHANGE UP (ref 0.5–1.3)
EGFR: 96 ML/MIN/1.73M2 — SIGNIFICANT CHANGE UP
EGFR: 96 ML/MIN/1.73M2 — SIGNIFICANT CHANGE UP
GLUCOSE BLDC GLUCOMTR-MCNC: 106 MG/DL — HIGH (ref 70–99)
GLUCOSE BLDC GLUCOMTR-MCNC: 153 MG/DL — HIGH (ref 70–99)
GLUCOSE BLDC GLUCOMTR-MCNC: 154 MG/DL — HIGH (ref 70–99)
GLUCOSE BLDC GLUCOMTR-MCNC: 190 MG/DL — HIGH (ref 70–99)
GLUCOSE SERPL-MCNC: 161 MG/DL — HIGH (ref 70–99)
HCT VFR BLD CALC: 22.6 % — LOW (ref 39–50)
HCT VFR BLD CALC: 29.3 % — LOW (ref 39–50)
HGB BLD-MCNC: 7 G/DL — CRITICAL LOW (ref 13–17)
HGB BLD-MCNC: 9.4 G/DL — LOW (ref 13–17)
MAGNESIUM SERPL-MCNC: 2 MG/DL — SIGNIFICANT CHANGE UP (ref 1.6–2.6)
MCHC RBC-ENTMCNC: 28.2 PG — SIGNIFICANT CHANGE UP (ref 27–34)
MCHC RBC-ENTMCNC: 28.7 PG — SIGNIFICANT CHANGE UP (ref 27–34)
MCHC RBC-ENTMCNC: 31 G/DL — LOW (ref 32–36)
MCHC RBC-ENTMCNC: 32.1 G/DL — SIGNIFICANT CHANGE UP (ref 32–36)
MCV RBC AUTO: 89.6 FL — SIGNIFICANT CHANGE UP (ref 80–100)
MCV RBC AUTO: 91.1 FL — SIGNIFICANT CHANGE UP (ref 80–100)
NRBC BLD AUTO-RTO: 0 /100 WBCS — SIGNIFICANT CHANGE UP (ref 0–0)
NRBC BLD AUTO-RTO: 0 /100 WBCS — SIGNIFICANT CHANGE UP (ref 0–0)
PHOSPHATE SERPL-MCNC: 2.6 MG/DL — SIGNIFICANT CHANGE UP (ref 2.5–4.5)
PLATELET # BLD AUTO: 601 K/UL — HIGH (ref 150–400)
PLATELET # BLD AUTO: 606 K/UL — HIGH (ref 150–400)
POTASSIUM SERPL-MCNC: 3.5 MMOL/L — SIGNIFICANT CHANGE UP (ref 3.5–5.3)
POTASSIUM SERPL-SCNC: 3.5 MMOL/L — SIGNIFICANT CHANGE UP (ref 3.5–5.3)
RBC # BLD: 2.48 M/UL — LOW (ref 4.2–5.8)
RBC # BLD: 3.27 M/UL — LOW (ref 4.2–5.8)
RBC # FLD: 16.1 % — HIGH (ref 10.3–14.5)
RBC # FLD: 16.3 % — HIGH (ref 10.3–14.5)
SODIUM SERPL-SCNC: 134 MMOL/L — LOW (ref 135–145)
WBC # BLD: 10.21 K/UL — SIGNIFICANT CHANGE UP (ref 3.8–10.5)
WBC # BLD: 12.23 K/UL — HIGH (ref 3.8–10.5)
WBC # FLD AUTO: 10.21 K/UL — SIGNIFICANT CHANGE UP (ref 3.8–10.5)
WBC # FLD AUTO: 12.23 K/UL — HIGH (ref 3.8–10.5)

## 2025-03-16 RX ORDER — OXYCODONE HYDROCHLORIDE 30 MG/1
5 TABLET ORAL EVERY 6 HOURS
Refills: 0 | Status: DISCONTINUED | OUTPATIENT
Start: 2025-03-16 | End: 2025-03-18

## 2025-03-16 RX ORDER — HYDROMORPHONE/SOD CHLOR,ISO/PF 2 MG/10 ML
0.5 SYRINGE (ML) INJECTION EVERY 6 HOURS
Refills: 0 | Status: DISCONTINUED | OUTPATIENT
Start: 2025-03-16 | End: 2025-03-18

## 2025-03-16 RX ADMIN — OXYCODONE HYDROCHLORIDE 10 MILLIGRAM(S): 30 TABLET ORAL at 09:05

## 2025-03-16 RX ADMIN — CARVEDILOL 6.25 MILLIGRAM(S): 3.12 TABLET, FILM COATED ORAL at 18:06

## 2025-03-16 RX ADMIN — Medication 25 GRAM(S): at 05:17

## 2025-03-16 RX ADMIN — Medication 975 MILLIGRAM(S): at 23:25

## 2025-03-16 RX ADMIN — INSULIN LISPRO 2: 100 INJECTION, SOLUTION INTRAVENOUS; SUBCUTANEOUS at 09:02

## 2025-03-16 RX ADMIN — GABAPENTIN 100 MILLIGRAM(S): 400 CAPSULE ORAL at 12:26

## 2025-03-16 RX ADMIN — Medication 60 MILLIGRAM(S): at 05:16

## 2025-03-16 RX ADMIN — INSULIN LISPRO 8 UNIT(S): 100 INJECTION, SOLUTION INTRAVENOUS; SUBCUTANEOUS at 18:07

## 2025-03-16 RX ADMIN — Medication 975 MILLIGRAM(S): at 18:06

## 2025-03-16 RX ADMIN — Medication 0.5 MILLIGRAM(S): at 12:38

## 2025-03-16 RX ADMIN — LOSARTAN POTASSIUM 100 MILLIGRAM(S): 100 TABLET, FILM COATED ORAL at 05:16

## 2025-03-16 RX ADMIN — OXYCODONE HYDROCHLORIDE 10 MILLIGRAM(S): 30 TABLET ORAL at 10:05

## 2025-03-16 RX ADMIN — INSULIN LISPRO 2: 100 INJECTION, SOLUTION INTRAVENOUS; SUBCUTANEOUS at 18:07

## 2025-03-16 RX ADMIN — OXYCODONE HYDROCHLORIDE 10 MILLIGRAM(S): 30 TABLET ORAL at 19:06

## 2025-03-16 RX ADMIN — INSULIN LISPRO 2: 100 INJECTION, SOLUTION INTRAVENOUS; SUBCUTANEOUS at 12:26

## 2025-03-16 RX ADMIN — Medication 0.5 MILLIGRAM(S): at 13:08

## 2025-03-16 RX ADMIN — Medication 975 MILLIGRAM(S): at 05:16

## 2025-03-16 RX ADMIN — Medication 975 MILLIGRAM(S): at 19:06

## 2025-03-16 RX ADMIN — INSULIN GLARGINE-YFGN 14 UNIT(S): 100 INJECTION, SOLUTION SUBCUTANEOUS at 21:31

## 2025-03-16 RX ADMIN — CARVEDILOL 6.25 MILLIGRAM(S): 3.12 TABLET, FILM COATED ORAL at 05:16

## 2025-03-16 RX ADMIN — Medication 6 MILLIGRAM(S): at 23:26

## 2025-03-16 RX ADMIN — Medication 975 MILLIGRAM(S): at 06:16

## 2025-03-16 RX ADMIN — ENOXAPARIN SODIUM 40 MILLIGRAM(S): 100 INJECTION SUBCUTANEOUS at 18:07

## 2025-03-16 RX ADMIN — Medication 25 GRAM(S): at 21:28

## 2025-03-16 RX ADMIN — Medication 25 GRAM(S): at 14:24

## 2025-03-16 RX ADMIN — OXYCODONE HYDROCHLORIDE 10 MILLIGRAM(S): 30 TABLET ORAL at 18:06

## 2025-03-16 RX ADMIN — Medication 975 MILLIGRAM(S): at 12:25

## 2025-03-16 RX ADMIN — Medication 40 MILLIGRAM(S): at 18:06

## 2025-03-16 RX ADMIN — Medication 975 MILLIGRAM(S): at 00:12

## 2025-03-16 RX ADMIN — Medication 40 MILLIGRAM(S): at 05:16

## 2025-03-16 RX ADMIN — Medication 81 MILLIGRAM(S): at 12:25

## 2025-03-16 RX ADMIN — INSULIN LISPRO 8 UNIT(S): 100 INJECTION, SOLUTION INTRAVENOUS; SUBCUTANEOUS at 09:03

## 2025-03-16 RX ADMIN — Medication 1 TABLET(S): at 18:06

## 2025-03-16 RX ADMIN — INSULIN LISPRO 8 UNIT(S): 100 INJECTION, SOLUTION INTRAVENOUS; SUBCUTANEOUS at 12:26

## 2025-03-16 RX ADMIN — CYANOCOBALAMIN 1000 MICROGRAM(S): 1000 INJECTION INTRAMUSCULAR; SUBCUTANEOUS at 12:27

## 2025-03-16 NOTE — PROGRESS NOTE ADULT - SUBJECTIVE AND OBJECTIVE BOX
OVERNIGHT EVENTS: NAEO    SUBJECTIVE: Pt seen and examined at bedside. Patient comfortable and in no-apparent distress. Pain is controlled. Hg 7.0 this AM. 1 pRBC ordered.    MEDICATIONS  (STANDING):  acetaminophen     Tablet .. 975 milliGRAM(s) Oral every 6 hours  aspirin  chewable 81 milliGRAM(s) Oral daily  carvedilol 6.25 milliGRAM(s) Oral every 12 hours  cyanocobalamin 1000 MICROGram(s) Oral daily  dextrose 5%. 1000 milliLiter(s) (50 mL/Hr) IV Continuous <Continuous>  dextrose 50% Injectable 25 Gram(s) IV Push once  dextrose 50% Injectable 12.5 Gram(s) IV Push once  enoxaparin Injectable 40 milliGRAM(s) SubCutaneous every 24 hours  gabapentin 100 milliGRAM(s) Oral daily  influenza  Vaccine (HIGH DOSE) 0.5 milliLiter(s) IntraMuscular once  insulin glargine Injectable (LANTUS) 14 Unit(s) SubCutaneous at bedtime  insulin lispro (ADMELOG) corrective regimen sliding scale   SubCutaneous three times a day before meals  insulin lispro (ADMELOG) corrective regimen sliding scale   SubCutaneous at bedtime  insulin lispro Injectable (ADMELOG) 8 Unit(s) SubCutaneous three times a day with meals  lactobacillus acidophilus 1 Tablet(s) Oral with dinner  losartan 100 milliGRAM(s) Oral daily  melatonin 6 milliGRAM(s) Oral at bedtime  NIFEdipine XL 60 milliGRAM(s) Oral daily  pantoprazole    Tablet 40 milliGRAM(s) Oral two times a day  piperacillin/tazobactam IVPB.. 3.375 Gram(s) IV Intermittent every 8 hours  polyethylene glycol 3350 17 Gram(s) Oral daily  senna 1 Tablet(s) Oral at bedtime    MEDICATIONS  (PRN):  bisacodyl 5 milliGRAM(s) Oral daily PRN Constipation  dextrose Oral Gel 15 Gram(s) Oral once PRN Blood Glucose LESS THAN 70 milliGRAM(s)/deciliter  HYDROmorphone  Injectable 0.5 milliGRAM(s) IV Push every 6 hours PRN Breakthrough pain  oxyCODONE    IR 5 milliGRAM(s) Oral every 6 hours PRN Moderate Pain (4 - 6)  oxyCODONE    IR 10 milliGRAM(s) Oral every 4 hours PRN Severe Pain (7 - 10)    T(C): 36.9 (03-16-25 @ 05:10), Max: 36.9 (03-16-25 @ 05:10)  HR: 99 (03-16-25 @ 05:10) (92 - 101)  BP: 160/69 (03-16-25 @ 05:10) (114/66 - 160/69)  RR: 18 (03-16-25 @ 05:10) (18 - 18)  SpO2: 97% (03-16-25 @ 05:10) (97% - 99%)    03-15-25 @ 07:01  -  03-16-25 @ 07:00  --------------------------------------------------------  IN: 1680 mL / OUT: 2750 mL / NET: -1070 mL      LABS:                        7.0    10.21 )-----------( 601      ( 16 Mar 2025 07:45 )             22.6     03-16    134[L]  |  99  |  8   ----------------------------<  161[H]  3.5   |  23  |  0.75    Ca    8.9      16 Mar 2025 07:45  Phos  2.6     03-16  Mg     2.0     03-16        Urinalysis Basic - ( 16 Mar 2025 07:45 )    Color: x / Appearance: x / SG: x / pH: x  Gluc: 161 mg/dL / Ketone: x  / Bili: x / Urobili: x   Blood: x / Protein: x / Nitrite: x   Leuk Esterase: x / RBC: x / WBC x   Sq Epi: x / Non Sq Epi: x / Bacteria: x      PE:  Gen: NAD  CV: Pulse regular present  Resp: Nonlabored  Abdomen: Soft, nontender  Extremities: LLE guillotine amp in knee immobilizer

## 2025-03-16 NOTE — PROVIDER CONTACT NOTE (CRITICAL VALUE NOTIFICATION) - SITUATION
Hgb 6.2/Hct 19.1
aPTT 199.7
Critical value notification
hgb 7.0
.3
PTT: 173.5
Hgb 7.0
Pt's Hg 6.7
hemoglobin 6.6
Hgb 7.0
aPTT 134.8

## 2025-03-16 NOTE — PROGRESS NOTE ADULT - ASSESSMENT
69M PM of former smoker (1.5 PPD % 50 years; quit 2 years ago),  HTN, HLD, DM, PAD and aortoiliac occlusive disease, who previously underwent an outpatient angiography complicated by right iliac artery dissection. Now S/P Creation of bypass from left femoral artery to distal tibial artery with RSVG and completion angiogram on 2/7/25 s/p 2u pRBC 7.4 from 7.8l not responded to 2nd unit on 2/12/25. EGD completed 2/13 found to have bleeding gastric ulcer w/ hemostasis achieved with cautery. Hgb found to be 6.1 and s/p 2u pRBC. 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  Received 1uPRBC yesterday (hgb 6.7) with appropriate response.      Plan:   - OR Tuesday for L AKA  -Transfuse 1x pRBC for Hg 7.0  - Dressing change daily  - CC diet   - Antibx: zosyn dc on 3/5/25; vanc d/c 2/28, zosyn started back up on 3/6 given rising WBC and evidence of necrosis at stump; appreciate ID recs   - Lovenox/Aspirin   - Diet: Regular   - on insulin appreciate endocrine recs  - ASA  - Pain regimen: Tylenol ATC, oxycodone PRN  - Bowel regimen  - PT recs -> YANELY    Vascular Surg  [id number]

## 2025-03-16 NOTE — PROVIDER CONTACT NOTE (CRITICAL VALUE NOTIFICATION) - RECOMMENDATIONS
Transfuse patient with PRBCs
Notify BOBBI Solano
prepare to transfuse
Notify provider
Notify provider.
Notify MD. Clarify continuation of heparin gtt and necessity for possible blood transfusion.
Notify provider.
Notify provider.

## 2025-03-17 LAB
ANION GAP SERPL CALC-SCNC: 14 MMOL/L — SIGNIFICANT CHANGE UP (ref 5–17)
BUN SERPL-MCNC: 10 MG/DL — SIGNIFICANT CHANGE UP (ref 7–23)
CALCIUM SERPL-MCNC: 9.4 MG/DL — SIGNIFICANT CHANGE UP (ref 8.4–10.5)
CHLORIDE SERPL-SCNC: 102 MMOL/L — SIGNIFICANT CHANGE UP (ref 96–108)
CO2 SERPL-SCNC: 21 MMOL/L — LOW (ref 22–31)
CREAT SERPL-MCNC: 0.71 MG/DL — SIGNIFICANT CHANGE UP (ref 0.5–1.3)
EGFR: 97 ML/MIN/1.73M2 — SIGNIFICANT CHANGE UP
EGFR: 97 ML/MIN/1.73M2 — SIGNIFICANT CHANGE UP
GLUCOSE BLDC GLUCOMTR-MCNC: 109 MG/DL — HIGH (ref 70–99)
GLUCOSE BLDC GLUCOMTR-MCNC: 136 MG/DL — HIGH (ref 70–99)
GLUCOSE BLDC GLUCOMTR-MCNC: 154 MG/DL — HIGH (ref 70–99)
GLUCOSE BLDC GLUCOMTR-MCNC: 193 MG/DL — HIGH (ref 70–99)
GLUCOSE SERPL-MCNC: 112 MG/DL — HIGH (ref 70–99)
HCT VFR BLD CALC: 28.5 % — LOW (ref 39–50)
HGB BLD-MCNC: 9 G/DL — LOW (ref 13–17)
MAGNESIUM SERPL-MCNC: 2 MG/DL — SIGNIFICANT CHANGE UP (ref 1.6–2.6)
MCHC RBC-ENTMCNC: 28.4 PG — SIGNIFICANT CHANGE UP (ref 27–34)
MCHC RBC-ENTMCNC: 31.6 G/DL — LOW (ref 32–36)
MCV RBC AUTO: 89.9 FL — SIGNIFICANT CHANGE UP (ref 80–100)
NRBC BLD AUTO-RTO: 0 /100 WBCS — SIGNIFICANT CHANGE UP (ref 0–0)
PHOSPHATE SERPL-MCNC: 3 MG/DL — SIGNIFICANT CHANGE UP (ref 2.5–4.5)
PLATELET # BLD AUTO: 624 K/UL — HIGH (ref 150–400)
POTASSIUM SERPL-MCNC: 3.6 MMOL/L — SIGNIFICANT CHANGE UP (ref 3.5–5.3)
POTASSIUM SERPL-SCNC: 3.6 MMOL/L — SIGNIFICANT CHANGE UP (ref 3.5–5.3)
RBC # BLD: 3.17 M/UL — LOW (ref 4.2–5.8)
RBC # FLD: 16.6 % — HIGH (ref 10.3–14.5)
SODIUM SERPL-SCNC: 137 MMOL/L — SIGNIFICANT CHANGE UP (ref 135–145)
WBC # BLD: 12.04 K/UL — HIGH (ref 3.8–10.5)
WBC # FLD AUTO: 12.04 K/UL — HIGH (ref 3.8–10.5)

## 2025-03-17 PROCEDURE — 99232 SBSQ HOSP IP/OBS MODERATE 35: CPT

## 2025-03-17 RX ORDER — SODIUM CHLORIDE 9 G/1000ML
1000 INJECTION, SOLUTION INTRAVENOUS
Refills: 0 | Status: DISCONTINUED | OUTPATIENT
Start: 2025-03-18 | End: 2025-03-18

## 2025-03-17 RX ORDER — NIFEDIPINE 30 MG
90 TABLET, EXTENDED RELEASE 24 HR ORAL DAILY
Refills: 0 | Status: DISCONTINUED | OUTPATIENT
Start: 2025-03-17 | End: 2025-03-18

## 2025-03-17 RX ORDER — INSULIN GLARGINE-YFGN 100 [IU]/ML
10 INJECTION, SOLUTION SUBCUTANEOUS AT BEDTIME
Refills: 0 | Status: DISCONTINUED | OUTPATIENT
Start: 2025-03-17 | End: 2025-03-18

## 2025-03-17 RX ADMIN — Medication 975 MILLIGRAM(S): at 05:15

## 2025-03-17 RX ADMIN — OXYCODONE HYDROCHLORIDE 10 MILLIGRAM(S): 30 TABLET ORAL at 14:52

## 2025-03-17 RX ADMIN — Medication 975 MILLIGRAM(S): at 11:21

## 2025-03-17 RX ADMIN — Medication 40 MILLIGRAM(S): at 05:14

## 2025-03-17 RX ADMIN — Medication 1 TABLET(S): at 22:37

## 2025-03-17 RX ADMIN — Medication 25 GRAM(S): at 22:37

## 2025-03-17 RX ADMIN — INSULIN LISPRO 8 UNIT(S): 100 INJECTION, SOLUTION INTRAVENOUS; SUBCUTANEOUS at 13:52

## 2025-03-17 RX ADMIN — Medication 1 TABLET(S): at 18:19

## 2025-03-17 RX ADMIN — INSULIN LISPRO 8 UNIT(S): 100 INJECTION, SOLUTION INTRAVENOUS; SUBCUTANEOUS at 09:21

## 2025-03-17 RX ADMIN — LOSARTAN POTASSIUM 100 MILLIGRAM(S): 100 TABLET, FILM COATED ORAL at 05:14

## 2025-03-17 RX ADMIN — INSULIN GLARGINE-YFGN 10 UNIT(S): 100 INJECTION, SOLUTION SUBCUTANEOUS at 22:39

## 2025-03-17 RX ADMIN — INSULIN LISPRO 2: 100 INJECTION, SOLUTION INTRAVENOUS; SUBCUTANEOUS at 09:20

## 2025-03-17 RX ADMIN — CARVEDILOL 6.25 MILLIGRAM(S): 3.12 TABLET, FILM COATED ORAL at 05:15

## 2025-03-17 RX ADMIN — Medication 81 MILLIGRAM(S): at 11:21

## 2025-03-17 RX ADMIN — Medication 25 GRAM(S): at 05:17

## 2025-03-17 RX ADMIN — CARVEDILOL 6.25 MILLIGRAM(S): 3.12 TABLET, FILM COATED ORAL at 18:19

## 2025-03-17 RX ADMIN — Medication 25 GRAM(S): at 13:52

## 2025-03-17 RX ADMIN — Medication 20 MILLIEQUIVALENT(S): at 09:19

## 2025-03-17 RX ADMIN — GABAPENTIN 100 MILLIGRAM(S): 400 CAPSULE ORAL at 11:21

## 2025-03-17 RX ADMIN — Medication 60 MILLIGRAM(S): at 05:14

## 2025-03-17 RX ADMIN — Medication 0.5 MILLIGRAM(S): at 09:22

## 2025-03-17 RX ADMIN — Medication 975 MILLIGRAM(S): at 06:15

## 2025-03-17 RX ADMIN — Medication 975 MILLIGRAM(S): at 00:25

## 2025-03-17 RX ADMIN — Medication 40 MILLIGRAM(S): at 18:19

## 2025-03-17 RX ADMIN — Medication 975 MILLIGRAM(S): at 18:19

## 2025-03-17 RX ADMIN — Medication 0.5 MILLIGRAM(S): at 09:52

## 2025-03-17 RX ADMIN — CYANOCOBALAMIN 1000 MICROGRAM(S): 1000 INJECTION INTRAMUSCULAR; SUBCUTANEOUS at 11:21

## 2025-03-17 RX ADMIN — Medication 6 MILLIGRAM(S): at 22:37

## 2025-03-17 RX ADMIN — INSULIN LISPRO 8 UNIT(S): 100 INJECTION, SOLUTION INTRAVENOUS; SUBCUTANEOUS at 18:19

## 2025-03-17 RX ADMIN — ENOXAPARIN SODIUM 40 MILLIGRAM(S): 100 INJECTION SUBCUTANEOUS at 18:20

## 2025-03-17 RX ADMIN — OXYCODONE HYDROCHLORIDE 10 MILLIGRAM(S): 30 TABLET ORAL at 13:52

## 2025-03-17 RX ADMIN — Medication 975 MILLIGRAM(S): at 23:39

## 2025-03-17 RX ADMIN — Medication 975 MILLIGRAM(S): at 23:09

## 2025-03-17 NOTE — PROGRESS NOTE ADULT - ASSESSMENT
69M PM of former smoker (1.5 PPD % 50 years; quit 2 years ago),  HTN, HLD, DM, PAD and aortoiliac occlusive disease, who previously underwent an outpatient angiography complicated by right iliac artery dissection. Now S/P Creation of bypass from left femoral artery to distal tibial artery with RSVG and completion angiogram on 2/7/25 s/p 2u pRBC 7.4 from 7.8l not responded to 2nd unit on 2/12/25. EGD completed 2/13 found to have bleeding gastric ulcer w/ hemostasis achieved with cautery. Hgb found to be 6.1 and s/p 2u pRBC. 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  Received 1uPRBC yesterday (hgb 6.7) with appropriate response.      Plan:   - OR Tuesday for L AKA; will pre-op and consent   - Dressing change daily  - CC diet   - Antibx: zosyn dc on 3/5/25; vanc d/c 2/28, zosyn started back up on 3/6 given rising WBC and evidence of necrosis at stump  - Lovenox/Aspirin   - Diet: Regular   - on insulin appreciate endocrine recs  - Pain regimen: Tylenol ATC, oxycodone PRN  - Bowel regimen  - PT recs -> YANELY    Vascular Surg  [id number]

## 2025-03-17 NOTE — PROGRESS NOTE ADULT - SUBJECTIVE AND OBJECTIVE BOX
OVERNIGHT EVENTS: NAEO    SUBJECTIVE: Pt seen and examined at bedside. Patient comfortable and in no-apparent distress. Pain is controlled.     MEDICATIONS  (STANDING):  acetaminophen     Tablet .. 975 milliGRAM(s) Oral every 6 hours  aspirin  chewable 81 milliGRAM(s) Oral daily  carvedilol 6.25 milliGRAM(s) Oral every 12 hours  cyanocobalamin 1000 MICROGram(s) Oral daily  dextrose 5%. 1000 milliLiter(s) (50 mL/Hr) IV Continuous <Continuous>  dextrose 50% Injectable 25 Gram(s) IV Push once  dextrose 50% Injectable 12.5 Gram(s) IV Push once  enoxaparin Injectable 40 milliGRAM(s) SubCutaneous every 24 hours  gabapentin 100 milliGRAM(s) Oral daily  influenza  Vaccine (HIGH DOSE) 0.5 milliLiter(s) IntraMuscular once  insulin glargine Injectable (LANTUS) 14 Unit(s) SubCutaneous at bedtime  insulin lispro (ADMELOG) corrective regimen sliding scale   SubCutaneous three times a day before meals  insulin lispro (ADMELOG) corrective regimen sliding scale   SubCutaneous at bedtime  insulin lispro Injectable (ADMELOG) 8 Unit(s) SubCutaneous three times a day with meals  lactobacillus acidophilus 1 Tablet(s) Oral with dinner  losartan 100 milliGRAM(s) Oral daily  melatonin 6 milliGRAM(s) Oral at bedtime  NIFEdipine XL 60 milliGRAM(s) Oral daily  pantoprazole    Tablet 40 milliGRAM(s) Oral two times a day  piperacillin/tazobactam IVPB.. 3.375 Gram(s) IV Intermittent every 8 hours  polyethylene glycol 3350 17 Gram(s) Oral daily  senna 1 Tablet(s) Oral at bedtime    MEDICATIONS  (PRN):  bisacodyl 5 milliGRAM(s) Oral daily PRN Constipation  dextrose Oral Gel 15 Gram(s) Oral once PRN Blood Glucose LESS THAN 70 milliGRAM(s)/deciliter  HYDROmorphone  Injectable 0.5 milliGRAM(s) IV Push every 6 hours PRN Breakthrough pain  oxyCODONE    IR 10 milliGRAM(s) Oral every 4 hours PRN Severe Pain (7 - 10)  oxyCODONE    IR 5 milliGRAM(s) Oral every 6 hours PRN Moderate Pain (4 - 6)    T(C): 36.5 (03-17-25 @ 05:00), Max: 37.1 (03-16-25 @ 21:11)  HR: 80 (03-17-25 @ 05:00) (80 - 103)  BP: 158/68 (03-17-25 @ 01:12) (137/70 - 170/76)  RR: 18 (03-17-25 @ 05:00) (18 - 18)  SpO2: 97% (03-17-25 @ 05:00) (97% - 99%)    03-16-25 @ 07:01  -  03-17-25 @ 07:00  --------------------------------------------------------  IN: 800 mL / OUT: 3280 mL / NET: -2480 mL      LABS:                        9.0    12.04 )-----------( 624      ( 17 Mar 2025 06:39 )             28.5     03-17    137  |  102  |  10  ----------------------------<  112[H]  3.6   |  21[L]  |  0.71    Ca    9.4      17 Mar 2025 06:39  Phos  3.0     03-17  Mg     2.0     03-17        Urinalysis Basic - ( 17 Mar 2025 06:39 )    Color: x / Appearance: x / SG: x / pH: x  Gluc: 112 mg/dL / Ketone: x  / Bili: x / Urobili: x   Blood: x / Protein: x / Nitrite: x   Leuk Esterase: x / RBC: x / WBC x   Sq Epi: x / Non Sq Epi: x / Bacteria: x      PE:  Gen: NAD  CV: Pulse regular present  Resp: Nonlabored  Abdomen: Soft, nontender  Extremities: LLE guillotine amp in knee immobilizer

## 2025-03-17 NOTE — PROGRESS NOTE ADULT - SUBJECTIVE AND OBJECTIVE BOX
DATE OF SERVICE: 03-17-25 @ 10:05    Patient is a 70y old  Male who presents with a chief complaint of S/p L BKA (12 Mar 2025 12:48)      INTERVAL HISTORY: feels okay    REVIEW OF SYSTEMS:  CONSTITUTIONAL: No weakness  EYES/ENT: No visual changes;  No throat pain   NECK: No pain or stiffness  RESPIRATORY: No cough, wheezing; No shortness of breath  CARDIOVASCULAR: No chest pain or palpitations  GASTROINTESTINAL: No abdominal  pain. No nausea, vomiting, or hematemesis  GENITOURINARY: No dysuria, frequency or hematuria  NEUROLOGICAL: No stroke like symptoms  SKIN: No rashes    TELEMETRY Personally reviewed:  	  MEDICATIONS:  carvedilol 6.25 milliGRAM(s) Oral every 12 hours  losartan 100 milliGRAM(s) Oral daily  NIFEdipine XL 60 milliGRAM(s) Oral daily        PHYSICAL EXAM:  T(C): 36.9 (03-17-25 @ 09:10), Max: 37.1 (03-16-25 @ 21:11)  HR: 99 (03-17-25 @ 09:10) (80 - 103)  BP: 164/77 (03-17-25 @ 09:10) (140/77 - 170/76)  RR: 18 (03-17-25 @ 09:10) (18 - 18)  SpO2: 98% (03-17-25 @ 09:10) (97% - 99%)  Wt(kg): --  I&O's Summary    16 Mar 2025 07:01  -  17 Mar 2025 07:00  --------------------------------------------------------  IN: 800 mL / OUT: 3280 mL / NET: -2480 mL          Appearance: In no distress	  HEENT:    PERRL, EOMI	  Cardiovascular:  S1 S2, No JVD  Respiratory: Lungs clear to auscultation	  Gastrointestinal:  Soft, Non-tender, + BS	  Vascularature:  No edema of LE  Psychiatric: Appropriate affect   Neuro: no acute focal deficits                               9.0    12.04 )-----------( 624      ( 17 Mar 2025 06:39 )             28.5     03-17    137  |  102  |  10  ----------------------------<  112[H]  3.6   |  21[L]  |  0.71    Ca    9.4      17 Mar 2025 06:39  Phos  3.0     03-17  Mg     2.0     03-17          Labs personally reviewed      ASSESSMENT/PLAN: 	      69M PM of former smoker (1.5 PPD % 50 years; quit 2 years ago),  HTN, HLD, DM, PAD and aortoiliac occlusive disease, who previously underwent an outpatient angiography complicated by right iliac artery dissection. Now S/P Creation of bypass from left femoral artery to distal tibial artery with RSVG and completion angiogram on 2/7/25 s/p 2u pRBC 7.4 from 7.8l not responded to 2nd unit on 2/12/25. EGD completed 2/13 found to have bleeding gastric ulcer w/ hemostasis achieved with cautery. Hgb found to be 6.1 and s/p 2u pRBC. Patient now w/ ischemic changes to LLE.    1. Cardiac Risk Stratification   - No chest pain/SOB  - EKG Sinus tach 117 bpm no ischemic changes   - TTE reviewed with pericardial effusion, unchanged compared to 2023  - Not in decompensated HF   - No hx of tachy/timothy arrhythmias   - No valvular abnormalities  - Moderate risk for moderate risk vascular surgery  - Optimized from cardiac perspective to proceed     2. Hypertension- bp better   - Losartan 100 mg QD   - Nifedipine 60 mg QD   - Coreg to 6.25mg BID   - d/c hydralazine 25 mg BID    3. Hyperlipidemia   - Lipitor 40mg   - lipid profile     4. Diabetes Mellitus Type II   - FBG per protocol  - ISS   - A1C: 7.7%     5. CAD - TTE with Basal and mid inferior wall and basal inferoseptal segment are abnormal  - Discussed with pt, spouse and daughter in person, they do not know full name or number of his cardiologist to obtain his prior records from  - They deny he has any h/o MI, CP or SOB  - Advised OP follow up with his cards                 Iolani Behrbom, AG-RINA Dawkins DO [facility]  Cardiovascular Medicine  [address] [place] [address], [address]  Available through call or text on [employer]  Office: [phone]   DATE OF SERVICE: 03-17-25 @ 10:05    Patient is a 70y old  Male who presents with a chief complaint of S/p L BKA (12 Mar 2025 12:48)      INTERVAL HISTORY: feels okay    REVIEW OF SYSTEMS:  CONSTITUTIONAL: No weakness  EYES/ENT: No visual changes;  No throat pain   NECK: No pain or stiffness  RESPIRATORY: No cough, wheezing; No shortness of breath  CARDIOVASCULAR: No chest pain or palpitations  GASTROINTESTINAL: No abdominal  pain. No nausea, vomiting, or hematemesis  GENITOURINARY: No dysuria, frequency or hematuria  NEUROLOGICAL: No stroke like symptoms  SKIN: No rashes      	  MEDICATIONS:  carvedilol 6.25 milliGRAM(s) Oral every 12 hours  losartan 100 milliGRAM(s) Oral daily  NIFEdipine XL 60 milliGRAM(s) Oral daily        PHYSICAL EXAM:  T(C): 36.9 (03-17-25 @ 09:10), Max: 37.1 (03-16-25 @ 21:11)  HR: 99 (03-17-25 @ 09:10) (80 - 103)  BP: 164/77 (03-17-25 @ 09:10) (140/77 - 170/76)  RR: 18 (03-17-25 @ 09:10) (18 - 18)  SpO2: 98% (03-17-25 @ 09:10) (97% - 99%)  Wt(kg): --  I&O's Summary    16 Mar 2025 07:01  -  17 Mar 2025 07:00  --------------------------------------------------------  IN: 800 mL / OUT: 3280 mL / NET: -2480 mL          Appearance: In no distress	  HEENT:    PERRL, EOMI	  Cardiovascular:  S1 S2, No JVD  Respiratory: Lungs clear to auscultation	  Gastrointestinal:  Soft, Non-tender, + BS	  Vascularature:  No edema of LE  Psychiatric: Appropriate affect   Neuro: no acute focal deficits                               9.0    12.04 )-----------( 624      ( 17 Mar 2025 06:39 )             28.5     03-17    137  |  102  |  10  ----------------------------<  112[H]  3.6   |  21[L]  |  0.71    Ca    9.4      17 Mar 2025 06:39  Phos  3.0     03-17  Mg     2.0     03-17          Labs personally reviewed      ASSESSMENT/PLAN: 	      69M PM of former smoker (1.5 PPD % 50 years; quit 2 years ago),  HTN, HLD, DM, PAD and aortoiliac occlusive disease, who previously underwent an outpatient angiography complicated by right iliac artery dissection. Now S/P Creation of bypass from left femoral artery to distal tibial artery with RSVG and completion angiogram on 2/7/25 s/p 2u pRBC 7.4 from 7.8l not responded to 2nd unit on 2/12/25. EGD completed 2/13 found to have bleeding gastric ulcer w/ hemostasis achieved with cautery. Hgb found to be 6.1 and s/p 2u pRBC. Patient now w/ ischemic changes to LLE.    1. Cardiac Risk Stratification   - No chest pain/SOB  - EKG Sinus tach 117 bpm no ischemic changes   - TTE reviewed with pericardial effusion, unchanged compared to 2023  - Not in decompensated HF   - No hx of tachy/timothy arrhythmias   - No valvular abnormalities  - Moderate risk for moderate risk vascular surgery  - Optimized from cardiac perspective to proceed     2. Hypertension- bp better   - Losartan 100 mg QD   - Nifedipine 60 mg QD   - Coreg to 6.25mg BID   - d/c hydralazine 25 mg BID  - 3/17 sbp 150s-170, will increase Nifedipine to 90mg qd    3. Hyperlipidemia   - Lipitor 40mg   - lipid profile     4. Diabetes Mellitus Type II   - FBG per protocol  - ISS   - A1C: 7.7%     5. CAD - TTE with Basal and mid inferior wall and basal inferoseptal segment are abnormal  - Discussed with pt, spouse and daughter in person, they do not know full name or number of his cardiologist to obtain his prior records from  - They deny he has any h/o MI, CP or SOB  - Advised OP follow up with his cards                 Iolani Behrbom, AG-RINA Dawkins DO [facility]  Cardiovascular Medicine  [address] [facility], [address]  Available through call or text on [employer]  Office: [phone]

## 2025-03-17 NOTE — PROGRESS NOTE ADULT - ASSESSMENT
69M PM of former smoker (1.5 PPD % 50 years; quit 2 years ago),  HTN, HLD, DM, PAD and aortoiliac occlusive disease, who previously underwent an outpatient angiography complicated by right iliac artery dissection. Now S/P Creation of bypass from left femoral artery to distal tibial artery with RSVG and completion angiogram on 2/7/25 s/p 2u pRBC 7.4 from 7.8l not responded to 2nd unit on 2/12/25. EGD completed 2/13 found to have bleeding gastric ulcer w/ hemostasis achieved with cautery. Hgb found to be 6.1 and s/p 2u pRBC. 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  Received 1uPRBC yesterday (hgb 6.7) with appropriate response.      Plan:   - OR Tuesday for L AKA; will pre-op  and consent   - Dressing change daily  - CC diet   - Antibx: zosyn dc on 3/5/25; vanc d/c 2/28, zosyn started back up on 3/6 given rising WBC and evidence of necrosis at stump  - Lovenox/Aspirin   - Diet: Regular   - on insulin appreciate endocrine recs  - ASA  - Pain regimen: Tylenol ATC, oxycodone PRN  - Bowel regimen  - PT recs -> YANELY    Vascular Surg  [id number]

## 2025-03-17 NOTE — PROGRESS NOTE ADULT - SUBJECTIVE AND OBJECTIVE BOX
SURGERY DAILY PROGRESS NOTE:    OVERNIGHT EVENTS: No acute events overnight.     SUBJECTIVE: Pt seen and examined at bedside. Patient comfortable and in no-apparent distress. Pain is controlled.     OBJECTIVE:  Vital Signs Last 24 Hrs  T(C): 36.5 (17 Mar 2025 05:00), Max: 37.1 (16 Mar 2025 21:11)  T(F): 97.7 (17 Mar 2025 05:00), Max: 98.8 (17 Mar 2025 01:06)  HR: 80 (17 Mar 2025 05:00) (80 - 103)  BP: 158/68 (17 Mar 2025 01:12) (137/70 - 170/76)  BP(mean): --  RR: 18 (17 Mar 2025 05:00) (18 - 18)  SpO2: 97% (17 Mar 2025 05:00) (97% - 99%)    Parameters below as of 17 Mar 2025 05:00  Patient On (Oxygen Delivery Method): room air    I&O's Detail    16 Mar 2025 07:01  -  17 Mar 2025 07:00  --------------------------------------------------------  IN:    Oral Fluid: 800 mL  Total IN: 800 mL    OUT:    Voided (mL): 3280 mL  Total OUT: 3280 mL    Total NET: -2480 mL    Daily     Daily     LABS:                        9.0    12.04 )-----------( 624      ( 17 Mar 2025 06:39 )             28.5     03-17    137  |  102  |  10  ----------------------------<  112[H]  3.6   |  21[L]  |  0.71    Ca    9.4      17 Mar 2025 06:39  Phos  3.0     03-17  Mg     2.0     03-17    Urinalysis Basic - ( 17 Mar 2025 06:39 )    Color: x / Appearance: x / SG: x / pH: x  Gluc: 112 mg/dL / Ketone: x  / Bili: x / Urobili: x   Blood: x / Protein: x / Nitrite: x   Leuk Esterase: x / RBC: x / WBC x   Sq Epi: x / Non Sq Epi: x / Bacteria: x    PHYSICAL EXAM:  Gen: NAD  CV: Pulse regular present  Resp: Nonlabored  Abdomen: Soft, nontender  Extremities: LLE guillotine amp in knee immobilizer

## 2025-03-17 NOTE — PRE PROCEDURE NOTE - PRE PROCEDURE EVALUATION
Preop Diagnosis: Gangrene   Planned Procedure: left above knee amputation  Surgeon: Dr. Trujillo     Vital Signs Last 24 Hrs  T(C): 36.5 (17 Mar 2025 05:00), Max: 37.1 (16 Mar 2025 21:11)  T(F): 97.7 (17 Mar 2025 05:00), Max: 98.8 (17 Mar 2025 01:06)  HR: 80 (17 Mar 2025 05:00) (80 - 103)  BP: 158/68 (17 Mar 2025 01:12) (137/70 - 170/76)  BP(mean): --  RR: 18 (17 Mar 2025 05:00) (18 - 18)  SpO2: 97% (17 Mar 2025 05:00) (97% - 99%)    Parameters below as of 17 Mar 2025 05:00  Patient On (Oxygen Delivery Method): room air      Daily     Daily     Pertinent Labs:                        9.0    12.04 )-----------( 624      ( 17 Mar 2025 06:39 )             28.5     03-17    137  |  102  |  10  ----------------------------<  112[H]  3.6   |  21[L]  |  0.71    Ca    9.4      17 Mar 2025 06:39  Phos  3.0     03-17  Mg     2.0     03-17        Assessment:  69M PM of former smoker (1.5 PPD % 50 years; quit 2 years ago),  HTN, HLD, DM, PAD and aortoiliac occlusive disease, who previously underwent an outpatient angiography complicated by right iliac artery dissection. Now S/P Creation of bypass from left femoral artery to distal tibial artery with RSVG and completion angiogram on 2/7/25 s/p 2u pRBC 7.4 from 7.8l not responded to 2nd unit on 2/12/25. EGD completed 2/13 found to have bleeding gastric ulcer w/ hemostasis achieved with cautery. Hgb found to be 6.1 and s/p 2u pRBC. 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  Received 1uPRBC yesterday (hgb 6.7) with appropriate response.     Plan:  - OR 3/18/25 for left above knee amputation with Dr. Trujillo  - NPO after midnight, except medications   - IVF while NPO  - Consent signed in chart  - Pre-op Labs at 4:00 am on /: CBC, BMP, Mag, Phos, PT/PTT/INR, T&S     Surgery  p
Preop Diagnosis: Thrombosed Bypass   Planned Procedure: VIVIANA WATKINS   Surgeon: Dr. Trujillo     Vital Signs Last 24 Hrs  T(C): 37.1 (26 Feb 2025 05:04), Max: 37.6 (26 Feb 2025 02:30)  T(F): 98.8 (26 Feb 2025 05:04), Max: 99.6 (26 Feb 2025 02:30)  HR: 100 (26 Feb 2025 05:04) (81 - 100)  BP: 164/81 (26 Feb 2025 05:04) (152/71 - 186/79)  BP(mean): --  RR: 18 (26 Feb 2025 05:04) (18 - 18)  SpO2: 97% (26 Feb 2025 05:04) (97% - 98%)    Parameters below as of 26 Feb 2025 05:04  Patient On (Oxygen Delivery Method): room air      Daily     Daily     Pertinent Labs:                        7.2    18.14 )-----------( 540      ( 26 Feb 2025 07:13 )             24.1     02-26    133[L]  |  98  |  10  ----------------------------<  246[H]  4.0   |  22  |  0.98    Ca    9.1      26 Feb 2025 07:16  Phos  2.8     02-26  Mg     2.1     02-26      PTT - ( 26 Feb 2025 07:14 )  PTT:67.7 sec  ------------------------------------------------------------------------------------------------------------------      Assessment:  69M PM of former smoker (1.5 PPD % 50 years; quit 2 years ago),  HTN, HLD, DM, PAD and aortoiliac occlusive disease, who previously underwent an outpatient angiography complicated by right iliac artery dissection. Now S/P Creation of bypass from left femoral artery to distal tibial artery with RSVG and completion angiogram on 2/7/25 s/p 2u pRBC 7.4 from 7.8l not responded to 2nd unit on 2/12/25. EGD completed 2/13 found to have bleeding gastric ulcer w/ hemostasis achieved with cautery. Hgb found to be 6.1 and s/p 2u pRBC. Patient now w/ ischemic changes to LLE with pending L BKA date.    Plan:  - OR 2/27/25 for L BKA with Dr. Trujillo   - NPO after midnight, except medications   - IVF while NPO  - Consent signed in chart  - Pre-op Labs at 4:00 am on /: CBC, BMP, Mag, Phos, PT/PTT/INR, T&S   - Cardiac clearance documented on 2/25  - Anticoagulation (Lovenox SQ) held       Surgery  p
                                                                                              Pre-Endoscopy Evaluation      Referring Physician:                                Vascular Surgery    Procedure: EGD +/- VCE    Indication for Procedure: GI bleed    Pertinent History:    69M PM of former smoker (1.5 PPD % 50 years; quit 2 years ago),  HTN, HLD, DM, PAD and aortoiliac occlusive disease, who previously underwent an outpatient angiography complicated by right iliac artery dissection. Now s/p creation of bypass from left femoral artery to distal tibial artery with RSVG and completion angiogram on 2/7/25. GI is consulted for acute on chronic anemia. He follows with a gastroenterologist in [place], [place], for this. He underwent colonoscopy/endoscopy and video capsule endoscopy in the outpatient setting during summer of last year for chronic unexplained iron deficiency anemia, and reports that there was no bleeding found. He reports that he has had a gastric ulcer in the past, but it was not bleeding on this most recent endoscopic evaluation. His Hgb had been relatively stable about 12.    He has been hemodynamically normal here, Hgb 12.2 01/28, 9.6 02/07, 7.9 2.08. Has intermittently been requiring transfusions,  6.2 2.11, s/p 1 unit prbcs, up to 7.8 2/11. He has had diarrhea that has been new since admitted, brown stools. FOBT +. He remains on heparin gtt, plavix and aspirin.     Upper GI bleed could be the cause of downtrending hemoglobin, however, would be unlikely in the setting of brown stools and acutely downtrending hemoglobin. He is at risk for PUD, with his current systemic illness, AVMs, however.     Heparin held at 2AM  NPO since MN    Sedation by Anesthesia [X]    PAST MEDICAL & SURGICAL HISTORY:  HTN (hypertension)      HLD (hyperlipidemia)      DM (diabetes mellitus)      PAD (peripheral artery disease)      H/O iron deficiency      Native (hard of hearing)      Peripheral neuropathy      Peripheral vascular disease, unspecified      Former smoker      CVA (cerebrovascular accident)      S/P appendectomy      S/P peripheral artery angioplasty with stent placement      S/P angiography      S/P cataract surgery          PMH of Gastroparesis [ ]  Gastric Surgery [ ]  Gastric Outlet Obstruction [ ]    Allergies    Advil (Rash)    Intolerances        Latex allergy: [ ] yes [X ] no    Medications:MEDICATIONS  (STANDING):  acetaminophen     Tablet .. 975 milliGRAM(s) Oral every 6 hours  aspirin  chewable 81 milliGRAM(s) Oral daily  atenolol  Tablet 25 milliGRAM(s) Oral daily  atorvastatin 40 milliGRAM(s) Oral at bedtime  clopidogrel Tablet 75 milliGRAM(s) Oral daily  cyanocobalamin 1000 MICROGram(s) Oral daily  dextrose 5%. 1000 milliLiter(s) (50 mL/Hr) IV Continuous <Continuous>  dextrose 5%. 1000 milliLiter(s) (100 mL/Hr) IV Continuous <Continuous>  dextrose 5%. 1000 milliLiter(s) (50 mL/Hr) IV Continuous <Continuous>  dextrose 5%. 1000 milliLiter(s) (100 mL/Hr) IV Continuous <Continuous>  dextrose 50% Injectable 25 Gram(s) IV Push once  dextrose 50% Injectable 12.5 Gram(s) IV Push once  dextrose 50% Injectable 25 Gram(s) IV Push once  dextrose 50% Injectable 25 Gram(s) IV Push once  dextrose 50% Injectable 12.5 Gram(s) IV Push once  dextrose 50% Injectable 25 Gram(s) IV Push once  gabapentin 100 milliGRAM(s) Oral daily  glucagon  Injectable 1 milliGRAM(s) IntraMuscular once  glucagon  Injectable 1 milliGRAM(s) IntraMuscular once  influenza  Vaccine (HIGH DOSE) 0.5 milliLiter(s) IntraMuscular once  insulin glargine Injectable (LANTUS) 18 Unit(s) SubCutaneous at bedtime  insulin lispro (ADMELOG) corrective regimen sliding scale   SubCutaneous every 6 hours  insulin lispro Injectable (ADMELOG) 6 Unit(s) SubCutaneous three times a day before meals  lactated ringers. 1000 milliLiter(s) (120 mL/Hr) IV Continuous <Continuous>  lactobacillus acidophilus 1 Tablet(s) Oral with dinner  pantoprazole  Injectable 40 milliGRAM(s) IV Push daily    MEDICATIONS  (PRN):  dextrose Oral Gel 15 Gram(s) Oral once PRN Blood Glucose LESS THAN 70 milliGRAM(s)/deciliter  dextrose Oral Gel 15 Gram(s) Oral once PRN Blood Glucose LESS THAN 70 milliGRAM(s)/deciliter  oxyCODONE    IR 2.5 milliGRAM(s) Oral every 4 hours PRN Moderate Pain (4 - 6)  oxyCODONE    IR 5 milliGRAM(s) Oral every 4 hours PRN Severe Pain (7 - 10)      Smoking: [ ] yes  [X ] no    AICD/PPM: [ ] yes   [X ] no    Pertinent lab data:                        8.0    16.75 )-----------( 159      ( 12 Feb 2025 16:03 )             24.7     02-12    138  |  104  |  26[H]  ----------------------------<  307[H]  3.6   |  20[L]  |  0.64    Ca    8.3[L]      12 Feb 2025 06:47  Phos  1.7     02-12  Mg     2.3     02-12      PT/INR - ( 12 Feb 2025 06:47 )   PT: 11.6 sec;   INR: 1.01 ratio         PTT - ( 12 Feb 2025 06:47 )  PTT:86.1 sec              Physical Examination:  Daily     Daily   Vital Signs Last 24 Hrs  T(C): 36.8 (13 Feb 2025 05:11), Max: 37.6 (12 Feb 2025 09:00)  T(F): 98.2 (13 Feb 2025 05:11), Max: 99.6 (12 Feb 2025 09:00)  HR: 100 (13 Feb 2025 05:11) (79 - 100)  BP: 149/76 (13 Feb 2025 05:11) (124/70 - 149/76)  BP(mean): --  RR: 18 (13 Feb 2025 05:11) (18 - 18)  SpO2: 98% (13 Feb 2025 05:11) (93% - 99%)    Parameters below as of 13 Feb 2025 05:11  Patient On (Oxygen Delivery Method): room air        BP:                 HR:                  SPO2:               Temperature:    Constitutional: NAD    HEENT: PERRLA, EOMI,       Neck:  No JVD    Respiratory: CTAB/L    Cardiovascular: S1 and S2    Gastrointestinal: BS+, soft, NT/ND    Extremities: No peripheral edema; great toe w/ ischemic changes    Neurological: A/O x 3, no focal deficits    Psychiatric: Normal mood, normal affect    : No Art    Skin: No rashes    Comments:    ASA Class: I [ ]  II [ ]  III [ X]  IV [ ]    The patient is a suitable candidate for the planned procedure unless box checked [ ]  No, explain:

## 2025-03-17 NOTE — PROGRESS NOTE ADULT - ASSESSMENT
A 70-year-old male with a medical history of hypertension, hyperlipidemia, diabetes mellitus, peripheral artery disease, and aortoiliac occlusive disease, previously underwent outpatient angiography that resulted in a right iliac artery dissection. He is now status post for the creation of a bypass from the left femoral artery to the distal tibial artery using a reversed saphenous vein graft, with a completion angiogram completed on February 7, 2025.     The Endocrine team has been consulted due to his uncontrolled diabetes. Endocrine re-consulted for hyperglycemia. Patient reports decreased appetite at times, variable oral intake but does drink Glucerna drink which has 27g CHO per serving if not eating full meal. FBG stable, no hypoglycemia.  Endocrine will closely monitor BG and adjust insulin as needed for BG goal 100-180mg/dL inpatient.       #Type 2 diabetes mellitus   A1C with Estimated Average Glucose Result: 7.7 % (01-28-25 @ 10:05)  A1C with Estimated Average Glucose Result: 7.4 % (12-06-24 @ 09:06)  Home regimen: Jardiance 10mg once daily, Actos 30mg once daily, Jentadueto 2.5-2g BID

## 2025-03-17 NOTE — PROGRESS NOTE ADULT - SUBJECTIVE AND OBJECTIVE BOX
Chief Complaint: Diabetes Mellitus follow up    INTERVAL HX:  Patient seen at bedside. Will be NPO after midnight for surgery tomorrow.   he is not eating much but he is drinking glucerna drinks.   BG over the last 24 hrs have been mostly within goal range 100-180mg/dl. No hypoglycemia.     Review of Systems:  General: As above  GI: No nausea, vomiting  Endocrine: no  S&Sx of hypoglycemia    Allergies    Advil (Rash)    Intolerances      MEDICATIONS  (STANDING):  acetaminophen     Tablet .. 975 milliGRAM(s) Oral every 6 hours  aspirin  chewable 81 milliGRAM(s) Oral daily  carvedilol 6.25 milliGRAM(s) Oral every 12 hours  cyanocobalamin 1000 MICROGram(s) Oral daily  dextrose 5%. 1000 milliLiter(s) (50 mL/Hr) IV Continuous <Continuous>  dextrose 50% Injectable 25 Gram(s) IV Push once  dextrose 50% Injectable 12.5 Gram(s) IV Push once  enoxaparin Injectable 40 milliGRAM(s) SubCutaneous every 24 hours  gabapentin 100 milliGRAM(s) Oral daily  influenza  Vaccine (HIGH DOSE) 0.5 milliLiter(s) IntraMuscular once  insulin glargine Injectable (LANTUS) 14 Unit(s) SubCutaneous at bedtime  insulin lispro (ADMELOG) corrective regimen sliding scale   SubCutaneous three times a day before meals  insulin lispro (ADMELOG) corrective regimen sliding scale   SubCutaneous at bedtime  insulin lispro Injectable (ADMELOG) 8 Unit(s) SubCutaneous three times a day with meals  lactobacillus acidophilus 1 Tablet(s) Oral with dinner  losartan 100 milliGRAM(s) Oral daily  melatonin 6 milliGRAM(s) Oral at bedtime  NIFEdipine XL 90 milliGRAM(s) Oral daily  pantoprazole    Tablet 40 milliGRAM(s) Oral two times a day  piperacillin/tazobactam IVPB.. 3.375 Gram(s) IV Intermittent every 8 hours  polyethylene glycol 3350 17 Gram(s) Oral daily  senna 1 Tablet(s) Oral at bedtime        insulin glargine Injectable (LANTUS)   14 Unit(s) SubCutaneous (03-16-25 @ 21:31)    insulin lispro (ADMELOG) corrective regimen sliding scale   2 Unit(s) SubCutaneous (03-17-25 @ 09:20)   2 Unit(s) SubCutaneous (03-16-25 @ 18:07)    insulin lispro Injectable (ADMELOG)   8 Unit(s) SubCutaneous (03-17-25 @ 13:52)   8 Unit(s) SubCutaneous (03-17-25 @ 09:21)   8 Unit(s) SubCutaneous (03-16-25 @ 18:07)        PHYSICAL EXAM:  VITALS: T(C): 36.9 (03-17-25 @ 13:12)  T(F): 98.4 (03-17-25 @ 13:12), Max: 98.8 (03-17-25 @ 01:06)  HR: 100 (03-17-25 @ 13:12) (80 - 103)  BP: 151/75 (03-17-25 @ 13:12) (140/77 - 170/76)  RR:  (18 - 18)  SpO2:  (97% - 99%)  Wt(kg): --  GENERAL: NAD  Respiratory: Respirations unlabored   Extremities: Warm, no edema  NEURO: Alert , appropriate     LABS:  POCT Blood Glucose.: 136 mg/dL (03-17-25 @ 13:15)  POCT Blood Glucose.: 154 mg/dL (03-17-25 @ 08:38)  POCT Blood Glucose.: 106 mg/dL (03-16-25 @ 21:08)  POCT Blood Glucose.: 190 mg/dL (03-16-25 @ 17:20)  POCT Blood Glucose.: 154 mg/dL (03-16-25 @ 11:58)  POCT Blood Glucose.: 153 mg/dL (03-16-25 @ 08:51)  POCT Blood Glucose.: 188 mg/dL (03-15-25 @ 21:18)  POCT Blood Glucose.: 209 mg/dL (03-15-25 @ 19:06)  POCT Blood Glucose.: 233 mg/dL (03-15-25 @ 18:34)  POCT Blood Glucose.: 141 mg/dL (03-15-25 @ 13:03)  POCT Blood Glucose.: 153 mg/dL (03-15-25 @ 08:48)  POCT Blood Glucose.: 250 mg/dL (03-14-25 @ 21:54)  POCT Blood Glucose.: 150 mg/dL (03-14-25 @ 17:36)                          9.0    12.04 )-----------( 624      ( 17 Mar 2025 06:39 )             28.5     03-17    137  |  102  |  10  ----------------------------<  112[H]  3.6   |  21[L]  |  0.71    Ca    9.4      17 Mar 2025 06:39  Phos  3.0     03-17  Mg     2.0     03-17      eGFR: 97 mL/min/1.73m2 (17 Mar 2025 06:39)      Thyroid Function Tests:      A1C with Estimated Average Glucose Result: 7.7 % (01-28-25 @ 10:05)    Estimated Average Glucose: 174 mg/dL (01-28-25 @ 10:05)        Diet, NPO after Midnight:      NPO Start Date: 17-Mar-2025,   NPO Start Time: 23:59 (03-17-25 @ 03:08) [Active]  Diet, Consistent Carbohydrate w/Evening Snack:   Supplement Feeding Modality:  Oral  Glucerna Shake Cans or Servings Per Day:  3       Frequency:  Daily (03-02-25 @ 12:58) [Active]

## 2025-03-18 ENCOUNTER — RESULT REVIEW (OUTPATIENT)
Age: 70
End: 2025-03-18

## 2025-03-18 ENCOUNTER — APPOINTMENT (OUTPATIENT)
Dept: VASCULAR SURGERY | Facility: HOSPITAL | Age: 70
End: 2025-03-18

## 2025-03-18 LAB
ANION GAP SERPL CALC-SCNC: 14 MMOL/L — SIGNIFICANT CHANGE UP (ref 5–17)
APTT BLD: 35.6 SEC — SIGNIFICANT CHANGE UP (ref 24.5–35.6)
BLD GP AB SCN SERPL QL: NEGATIVE — SIGNIFICANT CHANGE UP
BUN SERPL-MCNC: 11 MG/DL — SIGNIFICANT CHANGE UP (ref 7–23)
CALCIUM SERPL-MCNC: 9.3 MG/DL — SIGNIFICANT CHANGE UP (ref 8.4–10.5)
CHLORIDE SERPL-SCNC: 102 MMOL/L — SIGNIFICANT CHANGE UP (ref 96–108)
CO2 SERPL-SCNC: 20 MMOL/L — LOW (ref 22–31)
CREAT SERPL-MCNC: 0.69 MG/DL — SIGNIFICANT CHANGE UP (ref 0.5–1.3)
EGFR: 98 ML/MIN/1.73M2 — SIGNIFICANT CHANGE UP
EGFR: 98 ML/MIN/1.73M2 — SIGNIFICANT CHANGE UP
GLUCOSE BLDC GLUCOMTR-MCNC: 127 MG/DL — HIGH (ref 70–99)
GLUCOSE BLDC GLUCOMTR-MCNC: 143 MG/DL — HIGH (ref 70–99)
GLUCOSE BLDC GLUCOMTR-MCNC: 150 MG/DL — HIGH (ref 70–99)
GLUCOSE BLDC GLUCOMTR-MCNC: 153 MG/DL — HIGH (ref 70–99)
GLUCOSE BLDC GLUCOMTR-MCNC: 165 MG/DL — HIGH (ref 70–99)
GLUCOSE SERPL-MCNC: 159 MG/DL — HIGH (ref 70–99)
HCT VFR BLD CALC: 28.5 % — LOW (ref 39–50)
HGB BLD-MCNC: 9 G/DL — LOW (ref 13–17)
INR BLD: 1.1 RATIO — SIGNIFICANT CHANGE UP (ref 0.85–1.16)
MAGNESIUM SERPL-MCNC: 2 MG/DL — SIGNIFICANT CHANGE UP (ref 1.6–2.6)
MCHC RBC-ENTMCNC: 28.4 PG — SIGNIFICANT CHANGE UP (ref 27–34)
MCHC RBC-ENTMCNC: 31.6 G/DL — LOW (ref 32–36)
MCV RBC AUTO: 89.9 FL — SIGNIFICANT CHANGE UP (ref 80–100)
NRBC BLD AUTO-RTO: 0 /100 WBCS — SIGNIFICANT CHANGE UP (ref 0–0)
PHOSPHATE SERPL-MCNC: 2.9 MG/DL — SIGNIFICANT CHANGE UP (ref 2.5–4.5)
PLATELET # BLD AUTO: 575 K/UL — HIGH (ref 150–400)
POTASSIUM SERPL-MCNC: 4 MMOL/L — SIGNIFICANT CHANGE UP (ref 3.5–5.3)
POTASSIUM SERPL-SCNC: 4 MMOL/L — SIGNIFICANT CHANGE UP (ref 3.5–5.3)
PROTHROM AB SERPL-ACNC: 12.6 SEC — SIGNIFICANT CHANGE UP (ref 9.9–13.4)
RBC # BLD: 3.17 M/UL — LOW (ref 4.2–5.8)
RBC # FLD: 16.2 % — HIGH (ref 10.3–14.5)
RH IG SCN BLD-IMP: NEGATIVE — SIGNIFICANT CHANGE UP
SODIUM SERPL-SCNC: 136 MMOL/L — SIGNIFICANT CHANGE UP (ref 135–145)
WBC # BLD: 10.53 K/UL — HIGH (ref 3.8–10.5)
WBC # FLD AUTO: 10.53 K/UL — HIGH (ref 3.8–10.5)

## 2025-03-18 PROCEDURE — 27590 AMPUTATE LEG AT THIGH: CPT | Mod: LT,78

## 2025-03-18 PROCEDURE — 88307 TISSUE EXAM BY PATHOLOGIST: CPT | Mod: 26

## 2025-03-18 RX ORDER — LACTOBACILLUS ACIDOPHILUS/PECT 75 MM-100
1 CAPSULE ORAL
Refills: 0 | Status: DISCONTINUED | OUTPATIENT
Start: 2025-03-18 | End: 2025-03-25

## 2025-03-18 RX ORDER — INSULIN LISPRO 100 U/ML
INJECTION, SOLUTION INTRAVENOUS; SUBCUTANEOUS
Refills: 0 | Status: DISCONTINUED | OUTPATIENT
Start: 2025-03-18 | End: 2025-03-18

## 2025-03-18 RX ORDER — INSULIN LISPRO 100 U/ML
INJECTION, SOLUTION INTRAVENOUS; SUBCUTANEOUS AT BEDTIME
Refills: 0 | Status: DISCONTINUED | OUTPATIENT
Start: 2025-03-18 | End: 2025-03-18

## 2025-03-18 RX ORDER — LOSARTAN POTASSIUM 100 MG/1
100 TABLET, FILM COATED ORAL DAILY
Refills: 0 | Status: DISCONTINUED | OUTPATIENT
Start: 2025-03-18 | End: 2025-03-25

## 2025-03-18 RX ORDER — ENOXAPARIN SODIUM 100 MG/ML
40 INJECTION SUBCUTANEOUS EVERY 24 HOURS
Refills: 0 | Status: DISCONTINUED | OUTPATIENT
Start: 2025-03-18 | End: 2025-03-25

## 2025-03-18 RX ORDER — CEFAZOLIN SODIUM IN 0.9 % NACL 3 G/100 ML
2000 INTRAVENOUS SOLUTION, PIGGYBACK (ML) INTRAVENOUS EVERY 12 HOURS
Refills: 0 | Status: COMPLETED | OUTPATIENT
Start: 2025-03-19 | End: 2025-03-19

## 2025-03-18 RX ORDER — POLYETHYLENE GLYCOL 3350 17 G/17G
17 POWDER, FOR SOLUTION ORAL DAILY
Refills: 0 | Status: DISCONTINUED | OUTPATIENT
Start: 2025-03-18 | End: 2025-03-25

## 2025-03-18 RX ORDER — HYDROMORPHONE/SOD CHLOR,ISO/PF 2 MG/10 ML
0.5 SYRINGE (ML) INJECTION
Refills: 0 | Status: DISCONTINUED | OUTPATIENT
Start: 2025-03-18 | End: 2025-03-20

## 2025-03-18 RX ORDER — ONDANSETRON HCL/PF 4 MG/2 ML
4 VIAL (ML) INJECTION EVERY 6 HOURS
Refills: 0 | Status: DISCONTINUED | OUTPATIENT
Start: 2025-03-18 | End: 2025-03-25

## 2025-03-18 RX ORDER — INSULIN GLARGINE-YFGN 100 [IU]/ML
14 INJECTION, SOLUTION SUBCUTANEOUS AT BEDTIME
Refills: 0 | Status: DISCONTINUED | OUTPATIENT
Start: 2025-03-18 | End: 2025-03-23

## 2025-03-18 RX ORDER — HYDROMORPHONE/SOD CHLOR,ISO/PF 2 MG/10 ML
30 SYRINGE (ML) INJECTION
Refills: 0 | Status: DISCONTINUED | OUTPATIENT
Start: 2025-03-18 | End: 2025-03-20

## 2025-03-18 RX ORDER — INSULIN GLARGINE-YFGN 100 [IU]/ML
14 INJECTION, SOLUTION SUBCUTANEOUS AT BEDTIME
Refills: 0 | Status: DISCONTINUED | OUTPATIENT
Start: 2025-03-18 | End: 2025-03-18

## 2025-03-18 RX ORDER — HYDROMORPHONE/SOD CHLOR,ISO/PF 2 MG/10 ML
1 SYRINGE (ML) INJECTION
Refills: 0 | Status: DISCONTINUED | OUTPATIENT
Start: 2025-03-18 | End: 2025-03-18

## 2025-03-18 RX ORDER — MELATONIN 5 MG
6 TABLET ORAL AT BEDTIME
Refills: 0 | Status: DISCONTINUED | OUTPATIENT
Start: 2025-03-18 | End: 2025-03-25

## 2025-03-18 RX ORDER — DEXTROSE 50 % IN WATER 50 %
15 SYRINGE (ML) INTRAVENOUS ONCE
Refills: 0 | Status: DISCONTINUED | OUTPATIENT
Start: 2025-03-18 | End: 2025-03-25

## 2025-03-18 RX ORDER — BISACODYL 5 MG
5 TABLET, DELAYED RELEASE (ENTERIC COATED) ORAL DAILY
Refills: 0 | Status: DISCONTINUED | OUTPATIENT
Start: 2025-03-18 | End: 2025-03-25

## 2025-03-18 RX ORDER — INSULIN LISPRO 100 U/ML
INJECTION, SOLUTION INTRAVENOUS; SUBCUTANEOUS
Refills: 0 | Status: DISCONTINUED | OUTPATIENT
Start: 2025-03-18 | End: 2025-03-25

## 2025-03-18 RX ORDER — DEXTROSE 50 % IN WATER 50 %
12.5 SYRINGE (ML) INTRAVENOUS ONCE
Refills: 0 | Status: DISCONTINUED | OUTPATIENT
Start: 2025-03-18 | End: 2025-03-25

## 2025-03-18 RX ORDER — ONDANSETRON HCL/PF 4 MG/2 ML
4 VIAL (ML) INJECTION ONCE
Refills: 0 | Status: DISCONTINUED | OUTPATIENT
Start: 2025-03-18 | End: 2025-03-18

## 2025-03-18 RX ORDER — INSULIN LISPRO 100 U/ML
8 INJECTION, SOLUTION INTRAVENOUS; SUBCUTANEOUS
Refills: 0 | Status: DISCONTINUED | OUTPATIENT
Start: 2025-03-18 | End: 2025-03-25

## 2025-03-18 RX ORDER — SENNA 187 MG
1 TABLET ORAL AT BEDTIME
Refills: 0 | Status: DISCONTINUED | OUTPATIENT
Start: 2025-03-18 | End: 2025-03-25

## 2025-03-18 RX ORDER — ACETAMINOPHEN 500 MG/5ML
1000 LIQUID (ML) ORAL EVERY 6 HOURS
Refills: 0 | Status: COMPLETED | OUTPATIENT
Start: 2025-03-18 | End: 2025-03-19

## 2025-03-18 RX ORDER — INSULIN LISPRO 100 U/ML
INJECTION, SOLUTION INTRAVENOUS; SUBCUTANEOUS AT BEDTIME
Refills: 0 | Status: DISCONTINUED | OUTPATIENT
Start: 2025-03-18 | End: 2025-03-25

## 2025-03-18 RX ORDER — CARVEDILOL 3.12 MG/1
6.25 TABLET, FILM COATED ORAL EVERY 12 HOURS
Refills: 0 | Status: DISCONTINUED | OUTPATIENT
Start: 2025-03-18 | End: 2025-03-25

## 2025-03-18 RX ORDER — CYANOCOBALAMIN 1000 UG/ML
1000 INJECTION INTRAMUSCULAR; SUBCUTANEOUS DAILY
Refills: 0 | Status: DISCONTINUED | OUTPATIENT
Start: 2025-03-18 | End: 2025-03-25

## 2025-03-18 RX ORDER — HYDROMORPHONE/SOD CHLOR,ISO/PF 2 MG/10 ML
0.5 SYRINGE (ML) INJECTION
Refills: 0 | Status: DISCONTINUED | OUTPATIENT
Start: 2025-03-18 | End: 2025-03-18

## 2025-03-18 RX ORDER — ASPIRIN 325 MG
81 TABLET ORAL DAILY
Refills: 0 | Status: DISCONTINUED | OUTPATIENT
Start: 2025-03-18 | End: 2025-03-25

## 2025-03-18 RX ORDER — NALOXONE HYDROCHLORIDE 0.4 MG/ML
0.1 INJECTION, SOLUTION INTRAMUSCULAR; INTRAVENOUS; SUBCUTANEOUS
Refills: 0 | Status: DISCONTINUED | OUTPATIENT
Start: 2025-03-18 | End: 2025-03-25

## 2025-03-18 RX ORDER — GABAPENTIN 400 MG/1
100 CAPSULE ORAL DAILY
Refills: 0 | Status: DISCONTINUED | OUTPATIENT
Start: 2025-03-18 | End: 2025-03-25

## 2025-03-18 RX ORDER — DEXTROSE 50 % IN WATER 50 %
25 SYRINGE (ML) INTRAVENOUS ONCE
Refills: 0 | Status: DISCONTINUED | OUTPATIENT
Start: 2025-03-18 | End: 2025-03-25

## 2025-03-18 RX ORDER — ACETAMINOPHEN 500 MG/5ML
975 LIQUID (ML) ORAL EVERY 6 HOURS
Refills: 0 | Status: DISCONTINUED | OUTPATIENT
Start: 2025-03-18 | End: 2025-03-18

## 2025-03-18 RX ORDER — SODIUM CHLORIDE 9 G/1000ML
1000 INJECTION, SOLUTION INTRAVENOUS
Refills: 0 | Status: DISCONTINUED | OUTPATIENT
Start: 2025-03-18 | End: 2025-03-25

## 2025-03-18 RX ORDER — NALBUPHINE HYDROCHLORIDE 10 MG/ML
2.5 INJECTION INTRAMUSCULAR; INTRAVENOUS; SUBCUTANEOUS EVERY 6 HOURS
Refills: 0 | Status: DISCONTINUED | OUTPATIENT
Start: 2025-03-18 | End: 2025-03-25

## 2025-03-18 RX ORDER — NIFEDIPINE 30 MG
90 TABLET, EXTENDED RELEASE 24 HR ORAL DAILY
Refills: 0 | Status: DISCONTINUED | OUTPATIENT
Start: 2025-03-18 | End: 2025-03-25

## 2025-03-18 RX ORDER — INSULIN LISPRO 100 U/ML
INJECTION, SOLUTION INTRAVENOUS; SUBCUTANEOUS EVERY 6 HOURS
Refills: 0 | Status: DISCONTINUED | OUTPATIENT
Start: 2025-03-18 | End: 2025-03-18

## 2025-03-18 RX ADMIN — Medication 40 MILLIGRAM(S): at 23:29

## 2025-03-18 RX ADMIN — OXYCODONE HYDROCHLORIDE 10 MILLIGRAM(S): 30 TABLET ORAL at 13:33

## 2025-03-18 RX ADMIN — OXYCODONE HYDROCHLORIDE 10 MILLIGRAM(S): 30 TABLET ORAL at 02:29

## 2025-03-18 RX ADMIN — INSULIN LISPRO 1: 100 INJECTION, SOLUTION INTRAVENOUS; SUBCUTANEOUS at 05:06

## 2025-03-18 RX ADMIN — Medication 30 MILLILITER(S): at 16:51

## 2025-03-18 RX ADMIN — Medication 90 MILLIGRAM(S): at 05:08

## 2025-03-18 RX ADMIN — Medication 1 TABLET(S): at 23:29

## 2025-03-18 RX ADMIN — Medication 30 MILLILITER(S): at 17:58

## 2025-03-18 RX ADMIN — Medication 25 GRAM(S): at 05:08

## 2025-03-18 RX ADMIN — Medication 40 MILLIGRAM(S): at 05:08

## 2025-03-18 RX ADMIN — LOSARTAN POTASSIUM 100 MILLIGRAM(S): 100 TABLET, FILM COATED ORAL at 05:08

## 2025-03-18 RX ADMIN — Medication 81 MILLIGRAM(S): at 12:36

## 2025-03-18 RX ADMIN — CYANOCOBALAMIN 1000 MICROGRAM(S): 1000 INJECTION INTRAMUSCULAR; SUBCUTANEOUS at 12:33

## 2025-03-18 RX ADMIN — Medication 1000 MILLIGRAM(S): at 19:14

## 2025-03-18 RX ADMIN — Medication 975 MILLIGRAM(S): at 12:33

## 2025-03-18 RX ADMIN — Medication 30 MILLILITER(S): at 18:42

## 2025-03-18 RX ADMIN — Medication 975 MILLIGRAM(S): at 05:07

## 2025-03-18 RX ADMIN — Medication 400 MILLIGRAM(S): at 18:44

## 2025-03-18 RX ADMIN — INSULIN GLARGINE-YFGN 14 UNIT(S): 100 INJECTION, SOLUTION SUBCUTANEOUS at 23:41

## 2025-03-18 RX ADMIN — OXYCODONE HYDROCHLORIDE 10 MILLIGRAM(S): 30 TABLET ORAL at 03:21

## 2025-03-18 RX ADMIN — OXYCODONE HYDROCHLORIDE 10 MILLIGRAM(S): 30 TABLET ORAL at 12:33

## 2025-03-18 RX ADMIN — Medication 975 MILLIGRAM(S): at 13:33

## 2025-03-18 RX ADMIN — Medication 30 MILLILITER(S): at 19:28

## 2025-03-18 RX ADMIN — Medication 400 MILLIGRAM(S): at 23:30

## 2025-03-18 RX ADMIN — CARVEDILOL 6.25 MILLIGRAM(S): 3.12 TABLET, FILM COATED ORAL at 23:29

## 2025-03-18 RX ADMIN — CARVEDILOL 6.25 MILLIGRAM(S): 3.12 TABLET, FILM COATED ORAL at 05:08

## 2025-03-18 RX ADMIN — GABAPENTIN 100 MILLIGRAM(S): 400 CAPSULE ORAL at 12:40

## 2025-03-18 RX ADMIN — INSULIN LISPRO 0: 100 INJECTION, SOLUTION INTRAVENOUS; SUBCUTANEOUS at 23:30

## 2025-03-18 RX ADMIN — Medication 975 MILLIGRAM(S): at 05:53

## 2025-03-18 NOTE — PRE-OP CHECKLIST - LOOSE TEETH
one loose tooth on bottom right side of mouth, Yanira CH at bedside made aware/yes one loose tooth on bottom right side of mouth as per patient, Yanira CH at bedside made aware/yes

## 2025-03-18 NOTE — PRE-ANESTHESIA EVALUATION ADULT - NS MD HP INPLANTS MED DEV
B/L lower extremity stents/Vascular stents/Clips

## 2025-03-18 NOTE — PRE-ANESTHESIA EVALUATION ADULT - NSDENTALSD_ENT_ALL_CORE
left lower right rear loose tooth/loose teeth/missing teeth
no loose/missing teeth
missing teeth
many missing teeth/missing teeth

## 2025-03-18 NOTE — PRE-ANESTHESIA EVALUATION ADULT - NSANTHPEFT_GEN_ALL_CORE
Gen: No acute distress  Pulm: breathing comfortably on room air  Cor: regular rate
A&Ox3, NAD, nonfocal, CTA
RRR, resting comfortably on room air, adequate chest excursion, normal respiratory effort
Neck: FROM  CV: RRR  Lungs: CTAB

## 2025-03-18 NOTE — H&P ADULT - ENMT
Physical Therapy Visit    Visit Type: Progress Note  Visit: 13  Referring Provider: Renny Rojas MD  Medical Diagnosis (from order): M17.12 - Primary osteoarthritis of left knee   Patient alert and oriented X3.    SUBJECTIVE                                                                                                               [id number] [phone]  Patient says he did some work on his dad's house this weekend. Says right knee seems to be more sore than left knee today.  Functional Change: None known or reported.  Current Functional Limitations: Reports no limitations just not pain free, but does it.    Pain / Symptoms  - Pain rating (out of 10): Current: 3       OBJECTIVE                                                                                                                    Posture:  Patient taller than average with a more endomorphic body type and Bilateral genu varus noted right > left in a slight FORWARD BENT position with Straight cane RUE.     Skin:   Very light pink in appearance noted at this time with ongoing reports of discomfort lateral knee iliotibial band insertion and pitting edema previously noted at this site with mod depth palpation but NT today.    Incision/Wound:   - Location: No longer covered with steri-strips       Observation   Patient with no braces donned only knee high compression stocking donned and firmly laced and tied outdoor hiking type shoes as well.   Range of Motion (ROM)   (degrees unless noted; active unless noted; norms in ( ); negative=lacking to 0, positive=beyond 0)  Comments: A/Passive range of motion (degree) :  right                  Left  Sitting                                                 0-126/0-138       -10-95/NT-113, improved since last RE +7 degs  Supine                                                0-125/0-140        -/NT-NT, improved since RE 17/NT  Prone                                                  0-125/NT-NT     -/NT-109  Improved since IE 14/13 degs    Strength  (out of 5 unless noted, standard test position unless noted)   Comments / Details: MMT at IE:       Right                    Left    NT today, 2/27/25  Hamstrings      N                          N  Quadriceps       N                         N  Gluteus Cornelius N                      N         Palpation  Left knee/upper calf  region is soft to touch with out any noted irregular warmth to touch and no pitting edema noted with mod depth palpation. However as previously reported left iliotibial band insertion lateral knee presents with some very mild reports of discomfort with mod depth palpation and pitting NT today.   Joint Play   Left patella superior>inferior glides at this time moderately limited with no c/o or crepitus noted and medial/lateral glides seem to be WNL and no c/o at this time persisting and no crepitus noted also.  Muscle Flexibility  Hamstrings NT right, 70 deg L  Iliotibial band NT right and WNL left  Piriformis min restricted BLEs      Sensation/Dermatome Testing:    No current c/o of any related numbness, tingling or paresthesia   Bed Mobility  - Rolling left: independent  - Rolling right: independent  - Supine to sit: independent  - Sit to supine: independent  Transfers  Assistive devices: none  - Sit to stand: independent  - Stand to sit: independent  - Stand pivot: independent  - Car: independent  Patient driven to session per self again today in his  truck with standard cane no longer persisting but with persisting deviation of ongoing antalgic pattern and limited left knee TKE > heel strike noted today.  Ambulation / Gait  - Assistive device: none  - Assist Level: independent  - Surface: even, carpet and concrete  - Description: decreased christine/pace  - Door Management: independent    Stair Ambulation  Ascend  - Assist Level: independent  - Pattern: reciprocal  - Railing: left  - Assistive Device: none  Descend   - Assist Level: independent  -  Pattern: reciprocal  - Railing: bilateral  - Assistive Device: none  Light hold of hand railing previously noted for ascend as well as descend  Curb Step Ambulation  - Assist Level: independent  - Railing: none           Treatment     Therapeutic Exercise  Patient performed today as follows:  - SciFit seat 12, L4.4 X 10mins, FROM 2.33 miles  - stair left knee stretches X 10 LLE, 10 hold  - heel slides with gait belt behind left knee 10 X 10 hold  -TKE LLE with 1000 G ball in feet X 30, 3 hold   - bridges on tball X 30 reps   - prone hang LLE X 2 minute   - prone belt stretch 10 X 10 hold   - prone TKE X 30   - Total Gym level 20 X 30 reps       Manual Therapy   For pain relief and edema control patient received MEM/STM to his LLE which supine mins per his consent with addition today of Gr I-II left patella medial/lateral glides for assessment value as well as mobilization improvement.      Neuromuscular Re-Education  - added Balance Board X 2 minute each Anterior / Posterior and medial/lateral each  - Pulleys #8 X 3 reps each   - SLS left and tap right Yellow Tband X 30 seconds each on foam pad added today LLE  - Physical Therapist assessed stairs and further step up progress not indicated at this time.  + progress as needed prior to right Total knee arthroplasty         Skilled input: verbal instruction/cues, tactile instruction/cues, posture correction, facilitation and demonstration    Writer verbally educated and received verbal consent for hand placement, positioning of patient, and techniques to be performed today from patient for therapist position for techniques, clothing adjustments for techniques and hand placement and palpation for techniques as described above and how they are pertinent to the patient's plan of care.  Home Exercise Program  *above indicates provided as part of home exercise program      ASSESSMENT                                                                                                             Gains in skilled therapy to date as expected in decreasing pain and edema with incisional healing and increasing flexibility, A/Passive range of motion, strength and function progressing.  Patient attends therapy as recommended.  Progress toward discharge/long term goals (see below for specific status updates): good progress  Medically necessary skilled therapy interventions continue to be required to allow the patient to maximize their functional abilities as noted above.  Education:   - Results of above outlined education: Verbalizes understanding, Demonstrates understanding and Needs reinforcement    PLAN                                                                                                                           Suggestions for next session as indicated: Progress per plan of care now that patient is 5 weeks and 1 day post op pending right Total knee arthroplasty 3/31/25.   Goals  Long Term Goals: to be met by end of plan of care  1. Patient to be independent in HOME EXERCISE PROGRAM and self management options in order to attain full timely rehabilitation Status: partially met  Progressing  2. Decrease right knee edema and pain to 0-2/10 to allow patient to straighten knee fully, eliminate AM stiffness, stand upright   Status:Pain still 3/10 and EROM TKE remains actively minor limited with Bilateral genu varus  3. Attain full A/Passive range of motion, patellar mobility and flexibility LLE = RLE to allow patient to rise from sitting Status: partially met  Progressing especially patella mobility now that incision is removed.   4. MMT N all LLE = RLE to allow patient to Bend to  object off floor, go up and down stairs  Status: partially met  Stair attained and squatting NT   5. Return patient to the highest level of pain free premorbid activity especially the ability to travel and volunteer at [employer] and return to driving Status: partially met  Pending right Total knee  arthroplasty and rehabilitation to be fully attained    Therapy procedure time and total treatment time can be found documented on the Time Entry flowsheet     details… nose/mouth/pharynx/neck

## 2025-03-18 NOTE — CHART NOTE - NSCHARTNOTEFT_GEN_A_CORE
SURGERY POST OP CHECK    STATUS POST PROCEDURE: left above knee amputation    SUBJECTIVE: Pt seen and examined at bedside with a large group of family members. He has been sleepy but arousable, and his pain is well-controlled on PCA. He ate a few bites of dinner but has a low appetite. He has voided since surgery. Denies chest pain, shortness of breath.    --------------------------------------------------------------------------------------------------------------------------------------------------------------------------------------------------------------  OBJECTIVE:  Vital Signs Last 24 Hrs  T(C): 37 (18 Mar 2025 21:30), Max: 37 (18 Mar 2025 04:45)  T(F): 98.6 (18 Mar 2025 21:30), Max: 98.6 (18 Mar 2025 04:45)  HR: 90 (18 Mar 2025 21:30) (86 - 97)  BP: 142/75 (18 Mar 2025 21:30) (117/59 - 165/76)  BP(mean): 95 (18 Mar 2025 18:00) (75 - 103)  RR: 18 (18 Mar 2025 21:30) (15 - 18)  SpO2: 96% (18 Mar 2025 21:30) (96% - 99%)    Parameters below as of 18 Mar 2025 21:30  Patient On (Oxygen Delivery Method): room air      I&O's Summary    17 Mar 2025 07:01  -  18 Mar 2025 07:00  --------------------------------------------------------  IN: 1350 mL / OUT: 2950 mL / NET: -1600 mL    18 Mar 2025 07:01  -  19 Mar 2025 00:03  --------------------------------------------------------  IN: 300 mL / OUT: 750 mL / NET: -450 mL        PHYSICAL EXAM:  Constitutional: sleepy but arouses to voice, appropriately responsive  Eyes: pupils 3mm and responsive to light  Chest: Symmetric chest rise bilaterally, normal work of breathing  Abdomen: Soft, nondistended  Extremities: left AKA stump is soft and appropriately tender to palpation without evidence of hematoma, dressing is clean and dry    ----------------------------------------------------------------------------------------------------------------------------------------------------------------------------------------------------------------  ASSESSMENT:   Pt is 69M with a history of prior smoking (75 pack years), HTN, HLD, DM, and chronic limb ischemia of the left lower extremity 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 He remains hemodynamically stable, and pain is controlled on a PCA    PLAN:  - Monitor L AKA dressing, will plan to change on POD3  - Continue ASA  - Pain: PCA, Tylenol, gabapentin. Appreciate acute pain   - Diet: CC  - Ancef 2g x3 doses  - Lantus, pre-meal insulin, SSI  - Continue other home medications  - DVT ppx: LVX    Vascular Surgery  [id number]

## 2025-03-18 NOTE — PROGRESS NOTE ADULT - ASSESSMENT
69M PM of former smoker (1.5 PPD % 50 years; quit 2 years ago),  HTN, HLD, DM, PAD and aortoiliac occlusive disease, who previously underwent an outpatient angiography complicated by right iliac artery dissection. Now S/P Creation of bypass from left femoral artery to distal tibial artery with RSVG and completion angiogram on 2/7/25 s/p 2u pRBC 7.4 from 7.8l not responded to 2nd unit on 2/12/25. EGD completed 2/13 found to have bleeding gastric ulcer w/ hemostasis achieved with cautery. Hgb found to be 6.1 and s/p 2u pRBC. 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  Received 1uPRBC yesterday (hgb 6.7) with appropriate response.      Plan:   - OR Today for L AKA  - Dressing change daily  - CC diet   - Antibx: zosyn dc on 3/5/25; vanc d/c 2/28, zosyn started back up on 3/6 given rising WBC and evidence of necrosis at stump  - Lovenox/Aspirin   - Diet: NPO midnight  - on insulin appreciate endocrine recs  - Pain regimen: Tylenol ATC, oxycodone PRN  - Bowel regimen  - PT recs -> YANELY    Vascular Surg  [id number]

## 2025-03-18 NOTE — PROGRESS NOTE ADULT - SUBJECTIVE AND OBJECTIVE BOX
SURGERY DAILY PROGRESS NOTE:       Subjective / Overnight events: Pt seen during AM rounds. Pt resting comfortably in chair without complaints.       Objective:      MEDICATIONS  (STANDING):  acetaminophen     Tablet .. 975 milliGRAM(s) Oral every 6 hours  aspirin  chewable 81 milliGRAM(s) Oral daily  carvedilol 6.25 milliGRAM(s) Oral every 12 hours  cyanocobalamin 1000 MICROGram(s) Oral daily  dextrose 5%. 1000 milliLiter(s) (50 mL/Hr) IV Continuous <Continuous>  dextrose 50% Injectable 25 Gram(s) IV Push once  dextrose 50% Injectable 12.5 Gram(s) IV Push once  enoxaparin Injectable 40 milliGRAM(s) SubCutaneous every 24 hours  gabapentin 100 milliGRAM(s) Oral daily  influenza  Vaccine (HIGH DOSE) 0.5 milliLiter(s) IntraMuscular once  insulin glargine Injectable (LANTUS) 10 Unit(s) SubCutaneous at bedtime  insulin lispro (ADMELOG) corrective regimen sliding scale   SubCutaneous every 6 hours  insulin lispro Injectable (ADMELOG) 8 Unit(s) SubCutaneous three times a day with meals  lactated ringers. 1000 milliLiter(s) (100 mL/Hr) IV Continuous <Continuous>  lactobacillus acidophilus 1 Tablet(s) Oral with dinner  losartan 100 milliGRAM(s) Oral daily  melatonin 6 milliGRAM(s) Oral at bedtime  NIFEdipine XL 90 milliGRAM(s) Oral daily  pantoprazole    Tablet 40 milliGRAM(s) Oral two times a day  piperacillin/tazobactam IVPB.. 3.375 Gram(s) IV Intermittent every 8 hours  polyethylene glycol 3350 17 Gram(s) Oral daily  senna 1 Tablet(s) Oral at bedtime    MEDICATIONS  (PRN):  bisacodyl 5 milliGRAM(s) Oral daily PRN Constipation  dextrose Oral Gel 15 Gram(s) Oral once PRN Blood Glucose LESS THAN 70 milliGRAM(s)/deciliter  HYDROmorphone  Injectable 0.5 milliGRAM(s) IV Push every 6 hours PRN Breakthrough pain  oxyCODONE    IR 10 milliGRAM(s) Oral every 4 hours PRN Severe Pain (7 - 10)  oxyCODONE    IR 5 milliGRAM(s) Oral every 6 hours PRN Moderate Pain (4 - 6)      Vital Signs Last 24 Hrs  T(C): 37 (18 Mar 2025 04:45), Max: 37 (17 Mar 2025 17:25)  T(F): 98.6 (18 Mar 2025 04:45), Max: 98.6 (17 Mar 2025 17:25)  HR: 95 (18 Mar 2025 04:45) (95 - 103)  BP: 152/81 (18 Mar 2025 04:45) (134/76 - 165/76)  BP(mean): --  RR: 17 (18 Mar 2025 04:45) (17 - 18)  SpO2: 98% (18 Mar 2025 04:45) (97% - 98%)    Parameters below as of 18 Mar 2025 04:45  Patient On (Oxygen Delivery Method): room air        I&O's Detail    16 Mar 2025 07:01  -  17 Mar 2025 07:00  --------------------------------------------------------  IN:    Oral Fluid: 800 mL  Total IN: 800 mL    OUT:    Voided (mL): 3280 mL  Total OUT: 3280 mL    Total NET: -2480 mL      17 Mar 2025 07:01  -  18 Mar 2025 06:51  --------------------------------------------------------  IN:    IV PiggyBack: 200 mL    Lactated Ringers: 700 mL    Oral Fluid: 450 mL  Total IN: 1350 mL    OUT:    Voided (mL): 2950 mL  Total OUT: 2950 mL    Total NET: -1600 mL          Daily     Daily     LABS:                        9.0    10.53 )-----------( 575      ( 18 Mar 2025 05:08 )             28.5     03-18    136  |  102  |  11  ----------------------------<  159[H]  4.0   |  20[L]  |  0.69    Ca    9.3      18 Mar 2025 05:08  Phos  2.9     03-18  Mg     2.0     03-18      PT/INR - ( 18 Mar 2025 05:08 )   PT: 12.6 sec;   INR: 1.10 ratio         PTT - ( 18 Mar 2025 05:08 )  PTT:35.6 sec  Urinalysis Basic - ( 18 Mar 2025 05:08 )    Color: x / Appearance: x / SG: x / pH: x  Gluc: 159 mg/dL / Ketone: x  / Bili: x / Urobili: x   Blood: x / Protein: x / Nitrite: x   Leuk Esterase: x / RBC: x / WBC x   Sq Epi: x / Non Sq Epi: x / Bacteria: x          PHYSICAL EXAM  --------------------------------------------------------------------------------    Physical Exam:  Gen: NAD  Chest/resp: Symmetrical rise/fall chest, no increased WOB on RA  Abdomen: Soft, nontender  Extremities: LLE guillotine amp in knee immobilizer

## 2025-03-18 NOTE — PRE-OP CHECKLIST - AS BP NONINV METHOD
Preop Diagnosis: AUB  Thickened endometrium    Postop Diagnosis: same  small Endometrial Polyp    Procedure: Operative Hysteroscopy, Polypectomy with Myosure    Surgeon: Sugey Jansen M.D.    Assistant: None    Anesthesia: General     IVF:per RN    Distention media Deficit: 135 mL    EBL:minimal    Urine output: per RN    Specimen: Endometrial polyp    Complications: None    Findings:  Otherwise endometrial cavity was found be be within normal limits.  Bilateral fallopian tube ostia were visualized and appear nonobstructed.    Procedure: The patient was taken to the OR, general anesthesia was obtained without difficulty.  She was placed in the dorsal lithotomy position with Chevy stirrups.  Exam under anesthesia revealed the above mentioned findings.  She was then prepped and draped in the normal sterile fashion.  A speculum was placed in the vagina.  A single tooth tenaculum was used to grasp the anterior lip of the cervix.  The uterus sounded to 7 cm.  The cervical os was sequentially dilated to accommodate an 8mm hysteroscope using Hegar dilators.  A 8mm operative hysteroscope was introduced under direct visualization and the uterus was distended with normal saline. The above mentioned findings were noted.  The myosure device was then introduced through the scope and used to remove the abovementioned polyp. The specimen was sent to pathology.  The tenaculum was removed from the cervix and the puncture site was noted to be hemostatic after one silver nitrate stick was used. The patient tolerated the procedure well.  All instrument and sponge counts were correct times two.  The patient was awakened from general anesthesia and taken to the recovery room in stable condition.     
electronic

## 2025-03-18 NOTE — PRE-ANESTHESIA EVALUATION ADULT - LAST STRESS TEST
nayeli ST 3/2024- in chart

## 2025-03-18 NOTE — PRE-ANESTHESIA EVALUATION ADULT - NSRADCARDRESULTSFT_GEN_ALL_CORE
< from: TTE W or WO Ultrasound Enhancing Agent (02.24.25 @ 06:30) >       CONCLUSIONS:      1. Left ventricular cavity is normal in size. Left ventricular systolic function is normal with an ejection fraction visually estimated at 50 to 55 %. Regional wall motion abnormalities present.   2. Basal and mid inferior wall and basal inferoseptal segment are abnormal.   3. Elevated left ventricular filling pressure.   4. Normal right ventricular cavity size and normal right ventricular systolic function.   5. No significant valvular disease.   6. Moderate pericardial effusion with raised intra-pericardial pressures.   7. Compared to the transthoracic echocardiogram performed on 11/16/2023, LVEF has decreased and there are new regional wall motion abnormalities. The pericardial effusion appears similar in size. Findings were discussed with Dr. Dayton Dawkins on 2/24/2025 at 10:00.    < end of copied text >
< from: TTE Limited W or WO Ultrasound Enhancing Agent (10.29.23 @ 09:52) >    CONCLUSIONS:      1. Normal right ventricular cavity size, wall thickness, and systolic function.   2. There is a small pericardial effusion measuring 1.00 cm with no evidence of hemodynamic compromise. Please note that the majority of pericardial effusion is posterior to the left ventricle, loculated around the right atrium and there is a very small amount of effusion anterior to the right ventricle in the subcostal window.   3. Compared to the transthoracic echocardiogram performed on 10/23/2023 There is now a small pericardial effusion.    Summary only: There is now a small pericardial effusion.    < end of copied text >

## 2025-03-18 NOTE — PRE-ANESTHESIA EVALUATION ADULT - NSANTHOSAYNRD_GEN_A_CORE
No. RENE screening performed.  STOP BANG Legend: 0-2 = LOW Risk; 3-4 = INTERMEDIATE Risk; 5-8 = HIGH Risk

## 2025-03-18 NOTE — PRE-ANESTHESIA EVALUATION ADULT - NSANTHAIRWAYFT_ENT_ALL_CORE
neck FROM, Mallampati evaluated
full neck extension, no limits to neck ROM; denies h/o c-spine injury or surgery

## 2025-03-18 NOTE — BRIEF OPERATIVE NOTE - OPERATION/FINDINGS
Left Lower Extremity Guillotine Below Knee Amputation
left fem AT bypass with RSVG  completion angio
Under anesthesia, L AKA performed with bone saw and Vicryl closure  Hemostasis achieved

## 2025-03-18 NOTE — PRE-ANESTHESIA EVALUATION ADULT - NSPROPOSEDPROCEDFT_GEN_ALL_CORE
EGD
Left above knee amputation
Left guillotine Below the knee amputation
left femoral to anterior tibial artery bypass, possible angiogram

## 2025-03-18 NOTE — PROGRESS NOTE ADULT - SUBJECTIVE AND OBJECTIVE BOX
DATE OF SERVICE: 03-18-25      Patient is a 70y old  Male who presents with a chief complaint of S/p L BKA (17 Mar 2025 15:37)      INTERVAL HISTORY: feels ok    TELEMETRY Personally reviewed: no events  	  MEDICATIONS:  carvedilol 6.25 milliGRAM(s) Oral every 12 hours  losartan 100 milliGRAM(s) Oral daily  NIFEdipine XL 90 milliGRAM(s) Oral daily        PHYSICAL EXAM:  T(C): 37 (03-18-25 @ 21:30), Max: 37 (03-18-25 @ 04:45)  HR: 90 (03-18-25 @ 21:30) (86 - 97)  BP: 142/75 (03-18-25 @ 21:30) (117/59 - 165/76)  RR: 18 (03-18-25 @ 21:30) (15 - 18)  SpO2: 96% (03-18-25 @ 21:30) (96% - 99%)  Wt(kg): --  I&O's Summary    17 Mar 2025 07:01  -  18 Mar 2025 07:00  --------------------------------------------------------  IN: 1350 mL / OUT: 2950 mL / NET: -1600 mL    18 Mar 2025 07:01  -  18 Mar 2025 22:38  --------------------------------------------------------  IN: 150 mL / OUT: 0 mL / NET: 150 mL      Height (cm): 167.6 (03-18 @ 14:20)  Weight (kg): 70.3 (03-18 @ 14:20)  BMI (kg/m2): 25 (03-18 @ 14:20)  BSA (m2): 1.79 (03-18 @ 14:20)    Appearance: In no distress	  HEENT:    PERRL, EOMI	  Cardiovascular:  S1 S2, No JVD  Respiratory: Lungs clear to auscultation	  Gastrointestinal:  Soft, Non-tender, + BS	  Vascularature:  No edema of LE  Psychiatric: Appropriate affect   Neuro: no acute focal deficits                               9.0    10.53 )-----------( 575      ( 18 Mar 2025 05:08 )             28.5     03-18    136  |  102  |  11  ----------------------------<  159[H]  4.0   |  20[L]  |  0.69    Ca    9.3      18 Mar 2025 05:08  Phos  2.9     03-18  Mg     2.0     03-18          Labs personally reviewed      ASSESSMENT/PLAN: 	    69M PM of former smoker (1.5 PPD % 50 years; quit 2 years ago),  HTN, HLD, DM, PAD and aortoiliac occlusive disease, who previously underwent an outpatient angiography complicated by right iliac artery dissection. Now S/P Creation of bypass from left femoral artery to distal tibial artery with RSVG and completion angiogram on 2/7/25 s/p 2u pRBC 7.4 from 7.8l not responded to 2nd unit on 2/12/25. EGD completed 2/13 found to have bleeding gastric ulcer w/ hemostasis achieved with cautery. Hgb found to be 6.1 and s/p 2u pRBC. Patient now w/ ischemic changes to LLE.    1. Cardiac Risk Stratification   - No chest pain/SOB  - EKG Sinus tach 117 bpm no ischemic changes   - TTE reviewed with pericardial effusion, unchanged compared to 2023  - Not in decompensated HF   - No hx of tachy/timothy arrhythmias   - No valvular abnormalities  - Moderate risk for moderate risk vascular surgery  - Optimized from cardiac perspective to proceed  - s/p AKA, tolerated well    2. Hypertension- bp better   - Losartan 100 mg QD   - Nifedipine 60 mg QD   - Coreg to 6.25mg BID   - d/c hydralazine 25 mg BID  - 3/17 sbp 150s-170, will increase Nifedipine to 90mg qd    3. Hyperlipidemia   - Lipitor 40mg   - lipid profile     4. Diabetes Mellitus Type II   - FBG per protocol  - ISS   - A1C: 7.7%     5. CAD - TTE with Basal and mid inferior wall and basal inferoseptal segment are abnormal  - Discussed with pt, spouse and daughter in person, they do not know full name or number of his cardiologist to obtain his prior records from  - They deny he has any h/o MI, CP or SOB  - Advised OP follow up with his cards           Dayton Dawkins DO [facility]  Cardiovascular Medicine  [address] [place] [address], [address]  Office: [phone]  Available via Text/call on [employer] Teams

## 2025-03-19 LAB
ANION GAP SERPL CALC-SCNC: 16 MMOL/L — SIGNIFICANT CHANGE UP (ref 5–17)
BUN SERPL-MCNC: 9 MG/DL — SIGNIFICANT CHANGE UP (ref 7–23)
CALCIUM SERPL-MCNC: 9.1 MG/DL — SIGNIFICANT CHANGE UP (ref 8.4–10.5)
CHLORIDE SERPL-SCNC: 101 MMOL/L — SIGNIFICANT CHANGE UP (ref 96–108)
CO2 SERPL-SCNC: 20 MMOL/L — LOW (ref 22–31)
CREAT SERPL-MCNC: 0.7 MG/DL — SIGNIFICANT CHANGE UP (ref 0.5–1.3)
EGFR: 98 ML/MIN/1.73M2 — SIGNIFICANT CHANGE UP
EGFR: 98 ML/MIN/1.73M2 — SIGNIFICANT CHANGE UP
GLUCOSE BLDC GLUCOMTR-MCNC: 108 MG/DL — HIGH (ref 70–99)
GLUCOSE BLDC GLUCOMTR-MCNC: 140 MG/DL — HIGH (ref 70–99)
GLUCOSE BLDC GLUCOMTR-MCNC: 173 MG/DL — HIGH (ref 70–99)
GLUCOSE BLDC GLUCOMTR-MCNC: 211 MG/DL — HIGH (ref 70–99)
GLUCOSE SERPL-MCNC: 132 MG/DL — HIGH (ref 70–99)
HCT VFR BLD CALC: 20.9 % — CRITICAL LOW (ref 39–50)
HCT VFR BLD CALC: 25.8 % — LOW (ref 39–50)
HGB BLD-MCNC: 6.6 G/DL — CRITICAL LOW (ref 13–17)
HGB BLD-MCNC: 7.9 G/DL — LOW (ref 13–17)
MAGNESIUM SERPL-MCNC: 1.9 MG/DL — SIGNIFICANT CHANGE UP (ref 1.6–2.6)
MCHC RBC-ENTMCNC: 27.8 PG — SIGNIFICANT CHANGE UP (ref 27–34)
MCHC RBC-ENTMCNC: 28.6 PG — SIGNIFICANT CHANGE UP (ref 27–34)
MCHC RBC-ENTMCNC: 30.6 G/DL — LOW (ref 32–36)
MCHC RBC-ENTMCNC: 31.6 G/DL — LOW (ref 32–36)
MCV RBC AUTO: 90.5 FL — SIGNIFICANT CHANGE UP (ref 80–100)
MCV RBC AUTO: 90.8 FL — SIGNIFICANT CHANGE UP (ref 80–100)
NRBC BLD AUTO-RTO: 0 /100 WBCS — SIGNIFICANT CHANGE UP (ref 0–0)
NRBC BLD AUTO-RTO: 0 /100 WBCS — SIGNIFICANT CHANGE UP (ref 0–0)
PHOSPHATE SERPL-MCNC: 3.2 MG/DL — SIGNIFICANT CHANGE UP (ref 2.5–4.5)
PLATELET # BLD AUTO: 436 K/UL — HIGH (ref 150–400)
PLATELET # BLD AUTO: 533 K/UL — HIGH (ref 150–400)
POTASSIUM SERPL-MCNC: 3.9 MMOL/L — SIGNIFICANT CHANGE UP (ref 3.5–5.3)
POTASSIUM SERPL-SCNC: 3.9 MMOL/L — SIGNIFICANT CHANGE UP (ref 3.5–5.3)
RBC # BLD: 2.31 M/UL — LOW (ref 4.2–5.8)
RBC # BLD: 2.84 M/UL — LOW (ref 4.2–5.8)
RBC # FLD: 16.3 % — HIGH (ref 10.3–14.5)
RBC # FLD: 16.4 % — HIGH (ref 10.3–14.5)
SODIUM SERPL-SCNC: 137 MMOL/L — SIGNIFICANT CHANGE UP (ref 135–145)
WBC # BLD: 14.32 K/UL — HIGH (ref 3.8–10.5)
WBC # BLD: 14.71 K/UL — HIGH (ref 3.8–10.5)
WBC # FLD AUTO: 14.32 K/UL — HIGH (ref 3.8–10.5)
WBC # FLD AUTO: 14.71 K/UL — HIGH (ref 3.8–10.5)

## 2025-03-19 PROCEDURE — 99232 SBSQ HOSP IP/OBS MODERATE 35: CPT

## 2025-03-19 PROCEDURE — G0545: CPT

## 2025-03-19 RX ORDER — SODIUM CHLORIDE 9 G/1000ML
500 INJECTION, SOLUTION INTRAVENOUS ONCE
Refills: 0 | Status: COMPLETED | OUTPATIENT
Start: 2025-03-19 | End: 2025-03-19

## 2025-03-19 RX ADMIN — INSULIN LISPRO 8 UNIT(S): 100 INJECTION, SOLUTION INTRAVENOUS; SUBCUTANEOUS at 08:35

## 2025-03-19 RX ADMIN — CYANOCOBALAMIN 1000 MICROGRAM(S): 1000 INJECTION INTRAMUSCULAR; SUBCUTANEOUS at 11:40

## 2025-03-19 RX ADMIN — Medication 100 MILLIGRAM(S): at 18:05

## 2025-03-19 RX ADMIN — Medication 90 MILLIGRAM(S): at 07:03

## 2025-03-19 RX ADMIN — Medication 81 MILLIGRAM(S): at 11:40

## 2025-03-19 RX ADMIN — Medication 400 MILLIGRAM(S): at 07:02

## 2025-03-19 RX ADMIN — Medication 400 MILLIGRAM(S): at 11:25

## 2025-03-19 RX ADMIN — CARVEDILOL 6.25 MILLIGRAM(S): 3.12 TABLET, FILM COATED ORAL at 07:03

## 2025-03-19 RX ADMIN — Medication 30 MILLILITER(S): at 19:26

## 2025-03-19 RX ADMIN — Medication 40 MILLIGRAM(S): at 18:04

## 2025-03-19 RX ADMIN — Medication 100 MILLIGRAM(S): at 07:01

## 2025-03-19 RX ADMIN — INSULIN LISPRO 1: 100 INJECTION, SOLUTION INTRAVENOUS; SUBCUTANEOUS at 08:34

## 2025-03-19 RX ADMIN — Medication 1000 MILLIGRAM(S): at 11:55

## 2025-03-19 RX ADMIN — INSULIN LISPRO 2: 100 INJECTION, SOLUTION INTRAVENOUS; SUBCUTANEOUS at 11:42

## 2025-03-19 RX ADMIN — Medication 1 TABLET(S): at 18:04

## 2025-03-19 RX ADMIN — INSULIN LISPRO 8 UNIT(S): 100 INJECTION, SOLUTION INTRAVENOUS; SUBCUTANEOUS at 18:17

## 2025-03-19 RX ADMIN — LOSARTAN POTASSIUM 100 MILLIGRAM(S): 100 TABLET, FILM COATED ORAL at 07:03

## 2025-03-19 RX ADMIN — INSULIN LISPRO 8 UNIT(S): 100 INJECTION, SOLUTION INTRAVENOUS; SUBCUTANEOUS at 11:42

## 2025-03-19 RX ADMIN — Medication 1000 MILLIGRAM(S): at 07:32

## 2025-03-19 RX ADMIN — Medication 40 MILLIGRAM(S): at 07:02

## 2025-03-19 RX ADMIN — Medication 30 MILLILITER(S): at 07:18

## 2025-03-19 RX ADMIN — CARVEDILOL 6.25 MILLIGRAM(S): 3.12 TABLET, FILM COATED ORAL at 18:04

## 2025-03-19 RX ADMIN — ENOXAPARIN SODIUM 40 MILLIGRAM(S): 100 INJECTION SUBCUTANEOUS at 18:05

## 2025-03-19 RX ADMIN — Medication 1000 MILLIGRAM(S): at 00:00

## 2025-03-19 RX ADMIN — GABAPENTIN 100 MILLIGRAM(S): 400 CAPSULE ORAL at 11:40

## 2025-03-19 RX ADMIN — SODIUM CHLORIDE 500 MILLILITER(S): 9 INJECTION, SOLUTION INTRAVENOUS at 15:38

## 2025-03-19 NOTE — PROVIDER CONTACT NOTE (CRITICAL VALUE NOTIFICATION) - PERSON GIVING RESULT:
.3
Demetrio Woodard
Garrick Leo
Goran Newberry/ zahra
Demetrio Woodard, Lab
Demetrio Woodard
Jerrica Jackson [facility]
[place]
Demetrio Woodard, Lab
Demetrio Woodard, Lab
[place]
Faheem Gross

## 2025-03-19 NOTE — PROVIDER CONTACT NOTE (CRITICAL VALUE NOTIFICATION) - NAME OF MD/NP/PA/DO NOTIFIED:
MD Earnest Cook
MD Earnest Cook
MD Melanie Caro
MD Dacia Thompson
Ary Thompson MD
Feliciano Che MD
MD Cook
Markie MARTINES
rAy Thompson MD
BOBBI Benton
BOBBI Solano
Earnest Cook MD

## 2025-03-19 NOTE — PROGRESS NOTE ADULT - SUBJECTIVE AND OBJECTIVE BOX
Day 1 of Anesthesia Pain Management Service    SUBJECTIVE: I'm doing ok    Pain Scale Score:	[X] Refer to charted pain scores    THERAPY:    [ ] IV PCA Morphine		[ ] 5 mg/mL	[ ] 1 mg/mL  [X] IV PCA Hydromorphone	[ ] 5 mg/mL	[X] 1 mg/mL  [ ] IV PCA Fentanyl		[ ] 50 micrograms/mL    Demand dose: 0.2 mg     Lockout: 6 minutes   Continuous Rate: 0 mg/hr  4 Hour Limit: 4 mg    MEDICATIONS  (STANDING):  acetaminophen   IVPB .. 1000 milliGRAM(s) IV Intermittent every 6 hours  aspirin  chewable 81 milliGRAM(s) Oral daily  carvedilol 6.25 milliGRAM(s) Oral every 12 hours  ceFAZolin   IVPB 2000 milliGRAM(s) IV Intermittent every 12 hours  cyanocobalamin 1000 MICROGram(s) Oral daily  dextrose 5%. 1000 milliLiter(s) (50 mL/Hr) IV Continuous <Continuous>  dextrose 50% Injectable 25 Gram(s) IV Push once  dextrose 50% Injectable 12.5 Gram(s) IV Push once  enoxaparin Injectable 40 milliGRAM(s) SubCutaneous every 24 hours  gabapentin 100 milliGRAM(s) Oral daily  HYDROmorphone PCA (1 mG/mL) 30 milliLiter(s) PCA Continuous PCA Continuous  influenza  Vaccine (HIGH DOSE) 0.5 milliLiter(s) IntraMuscular once  insulin glargine Injectable (LANTUS) 14 Unit(s) SubCutaneous at bedtime  insulin lispro (ADMELOG) corrective regimen sliding scale   SubCutaneous three times a day before meals  insulin lispro (ADMELOG) corrective regimen sliding scale   SubCutaneous at bedtime  insulin lispro Injectable (ADMELOG) 8 Unit(s) SubCutaneous three times a day with meals  lactobacillus acidophilus 1 Tablet(s) Oral with dinner  losartan 100 milliGRAM(s) Oral daily  melatonin 6 milliGRAM(s) Oral at bedtime  NIFEdipine XL 90 milliGRAM(s) Oral daily  pantoprazole    Tablet 40 milliGRAM(s) Oral two times a day  polyethylene glycol 3350 17 Gram(s) Oral daily  senna 1 Tablet(s) Oral at bedtime    MEDICATIONS  (PRN):  bisacodyl 5 milliGRAM(s) Oral daily PRN Constipation  dextrose Oral Gel 15 Gram(s) Oral once PRN Blood Glucose LESS THAN 70 milliGRAM(s)/deciliter  HYDROmorphone PCA (1 mG/mL) Rescue Clinician Bolus 0.5 milliGRAM(s) IV Push every 15 minutes PRN for Pain Scale GREATER THAN 6  nalbuphine Injectable 2.5 milliGRAM(s) IV Push every 6 hours PRN Pruritus  naloxone Injectable 0.1 milliGRAM(s) IV Push every 3 minutes PRN For ANY of the following changes in patient status:  A. RR LESS THAN 10 breaths per minute, B. Oxygen saturation LESS THAN 90%, C. Sedation score of 6  ondansetron Injectable 4 milliGRAM(s) IV Push every 6 hours PRN Nausea      OBJECTIVE:    Sedation Score:	[ X] Alert 	[ ] Drowsy 	[ ] Arousable	[ ] Asleep	[ ] Unresponsive    Side Effects:	[X ] None	[ ] Nausea	[ ] Vomiting	[ ] Pruritus  		[ ] Other:    Vital Signs Last 24 Hrs  T(C): 37.1 (19 Mar 2025 05:06), Max: 37.2 (19 Mar 2025 01:45)  T(F): 98.7 (19 Mar 2025 05:06), Max: 98.9 (19 Mar 2025 01:45)  HR: 101 (19 Mar 2025 05:06) (86 - 101)  BP: 150/76 (19 Mar 2025 05:06) (117/59 - 154/75)  BP(mean): 95 (18 Mar 2025 18:00) (75 - 103)  RR: 18 (19 Mar 2025 05:06) (15 - 18)  SpO2: 98% (19 Mar 2025 05:06) (96% - 99%)    Parameters below as of 19 Mar 2025 05:06  Patient On (Oxygen Delivery Method): room air        ASSESSMENT/ PLAN    Therapy to  be:               [X] Continued   [ ] Discontinued   [ ] Changed to PRN Analgesics    Documentation and Verification of current medications:   [X] Done	[ ] Not done, not eligible    Comments: Endorsing good analgesics. Total PCA use 2.9mg / 16hr.

## 2025-03-19 NOTE — PROGRESS NOTE ADULT - ASSESSMENT
A 70-year-old male with a medical history of hypertension, hyperlipidemia, diabetes mellitus, peripheral artery disease, and aortoiliac occlusive disease, previously underwent outpatient angiography that resulted in a right iliac artery dissection. He is now status post for the creation of a bypass from the left femoral artery to the distal tibial artery using a reversed saphenous vein graft, with a completion angiogram completed on February 7, 2025.    The Endocrine team has been consulted due to his uncontrolled diabetes. Endocrine re-consulted for hyperglycemia. Now s/p L AKA done yesterday 3/18. Patient reports decreased appetite at times, variable oral intake but does drink Glucerna drink which has 27g CHO per serving if not eating full meal. FBG stable, no hypoglycemia. Noted low appetite post-op, did not eat breakfast but patient to try to eat lunch, will keep insulin reg as is for now. Endocrine will closely monitor BG and adjust insulin as needed for BG goal 100-180mg/dL inpatient.       #Type 2 diabetes mellitus   A1C with Estimated Average Glucose Result: 7.7 % (01-28-25 @ 10:05)  A1C with Estimated Average Glucose Result: 7.4 % (12-06-24 @ 09:06)  Home regimen: Jardiance 10mg once daily, Actos 30mg once daily, Jentadueto 2.5-2g BID

## 2025-03-19 NOTE — PROGRESS NOTE ADULT - SUBJECTIVE AND OBJECTIVE BOX
DATE OF SERVICE: 03-19-25 @ 13:53    Patient is a 70y old  Male who presents with a chief complaint of S/p L BKA (17 Mar 2025 15:37)      INTERVAL HISTORY: in no acute distress    REVIEW OF SYSTEMS:  CONSTITUTIONAL: No weakness  EYES/ENT: No visual changes;  No throat pain   NECK: No pain or stiffness  RESPIRATORY: No cough, wheezing; No shortness of breath  CARDIOVASCULAR: No chest pain or palpitations  GASTROINTESTINAL: No abdominal  pain. No nausea, vomiting, or hematemesis  GENITOURINARY: No dysuria, frequency or hematuria  NEUROLOGICAL: No stroke like symptoms  SKIN: No rashes      MEDICATIONS:  carvedilol 6.25 milliGRAM(s) Oral every 12 hours  losartan 100 milliGRAM(s) Oral daily  NIFEdipine XL 90 milliGRAM(s) Oral daily        PHYSICAL EXAM:  T(C): 37.2 (03-19-25 @ 12:43), Max: 37.2 (03-19-25 @ 01:45)  HR: 108 (03-19-25 @ 12:43) (86 - 114)  BP: 98/61 (03-19-25 @ 12:43) (98/61 - 154/75)  RR: 18 (03-19-25 @ 12:43) (15 - 18)  SpO2: 98% (03-19-25 @ 12:43) (96% - 99%)  Wt(kg): --  I&O's Summary    18 Mar 2025 07:01  -  19 Mar 2025 07:00  --------------------------------------------------------  IN: 500 mL / OUT: 1450 mL / NET: -950 mL    19 Mar 2025 07:01  -  19 Mar 2025 13:53  --------------------------------------------------------  IN: 240 mL / OUT: 500 mL / NET: -260 mL      Height (cm): 167.6 (03-18 @ 14:20)  Weight (kg): 70.3 (03-18 @ 14:20)  BMI (kg/m2): 25 (03-18 @ 14:20)  BSA (m2): 1.79 (03-18 @ 14:20)    Appearance: In no distress	  HEENT:    PERRL, EOMI	  Cardiovascular:  S1 S2, No JVD  Respiratory: Lungs clear to auscultation	  Gastrointestinal:  Soft, Non-tender, + BS	  Vascularature:  No edema of LE; s/p L AKA  Psychiatric: Appropriate affect   Neuro: no acute focal deficits                               7.9    14.71 )-----------( 533      ( 19 Mar 2025 07:25 )             25.8     03-19    137  |  101  |  9   ----------------------------<  132[H]  3.9   |  20[L]  |  0.70    Ca    9.1      19 Mar 2025 07:25  Phos  3.2     03-19  Mg     1.9     03-19          Labs personally reviewed      ASSESSMENT/PLAN: 	    69M PM of former smoker (1.5 PPD % 50 years; quit 2 years ago),  HTN, HLD, DM, PAD and aortoiliac occlusive disease, who previously underwent an outpatient angiography complicated by right iliac artery dissection. Now S/P Creation of bypass from left femoral artery to distal tibial artery with RSVG and completion angiogram on 2/7/25 s/p 2u pRBC 7.4 from 7.8l not responded to 2nd unit on 2/12/25. EGD completed 2/13 found to have bleeding gastric ulcer w/ hemostasis achieved with cautery. Hgb found to be 6.1 and s/p 2u pRBC. Patient now w/ ischemic changes to LLE.    1. Cardiac Risk Stratification   - No chest pain/SOB  - EKG Sinus tach 117 bpm no ischemic changes   - TTE reviewed with pericardial effusion, unchanged compared to 2023  - Not in decompensated HF   - No hx of tachy/timothy arrhythmias   - No valvular abnormalities  - Moderate risk for moderate risk vascular surgery  - Optimized from cardiac perspective to proceed  - s/p AKA, tolerated well    2. Hypertension- bp better   - Losartan 100 mg QD   - Coreg 6.25mg BID    - Nifedipine 90mg qd    3. Hyperlipidemia   - Lipitor 40mg   - lipid profile     4. Diabetes Mellitus Type II   - FBG per protocol  - ISS   - A1C: 7.7%     5. CAD - TTE with Basal and mid inferior wall and basal inferoseptal segment are abnormal  - Discussed with pt, spouse and daughter in person, they do not know full name or number of his cardiologist to obtain his prior records from  - They deny he has any h/o MI, CP or SOB  - Advised OP follow up with his cards         JAVIER Haas, DO [facility]  Cardiovascular Medicine  [address] [place] [address], [address]  Available through call or text on [employer]  Office: [phone]

## 2025-03-19 NOTE — PROGRESS NOTE ADULT - ASSESSMENT
69M PM of former smoker (1.5 PPD % 50 years; quit 2 years ago),  HTN, HLD, DM, PAD and aortoiliac occlusive disease, who previously underwent an outpatient angiography complicated by right iliac artery dissection. Now S/P Creation of bypass from left femoral artery to distal tibial artery with RSVG and completion angiogram on 2/7/25 s/p 2u pRBC 7.4 from 7.8l not responded to 2nd unit on 2/12/25. EGD completed 2/13 found to have bleeding gastric ulcer w/ hemostasis achieved with cautery. Hgb found to be 6.1 and s/p 2u pRBC. 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  Received 1uPRBC yesterday (hgb 6.7) with appropriate response now s/p L AKA on 318/25.      Plan:   - Dressing change on 3/21/24   - CC diet   - Antibx: Ancef x2 doses (ends tonight evening)     - Lovenox/Aspirin   - Diet: CC w Evening Snacks   - on insulin appreciate endocrine recs  - Pain regimen: Tylenol ATC,, PCA   - Bowel regimen  - PT recs -> YANELY    Vascular Surg  [id number]

## 2025-03-19 NOTE — PROGRESS NOTE ADULT - ASSESSMENT
69-year-old male with a past medical history of tobacco use, CVA, hypertension, hyperlipidemia, PAD who is presenting to the hospital for left femoral to anterior tibial bypass surgery and angiogram.  Patient previously described left ankle pain.  Patient underwent attempts for an office angiogram however this was unsuccessful.    Patient underwent left common femoral artery to anterior tibial artery bypass, angiogram and balloon angioplasty and stent placement within the bypass graft upon admission due to concern for chronic limb threatening ischemia and PAD.  Procedure was on 2/7/2025 shortly after admission.  Postoperative course was complicated by fevers, tachycardia.  Infectious workup at that time was negative.  Postoperatively also patient found to be anemic without response to red blood cell transfusions, GI was consulted and EGD was completed on 2/13/2025 with evidence for bleeding gastric ulcer status post hemostasis.  Hemoglobin stabilized over the course of the next few days.  Patient continues to have fevers.  Started on vancomycin and piperacillin/Tazo bacterium.  Patient underwent left lower extremity BKA on 2/27/2025.  Antibiotics discontinued on 3/5/2025.  Restarted on 3/6/2025 due to development of leukocytosis and evidence for necrosis at BKA stump.  Plan for formalization of BKA this week.    Patient currently afebrile.  Otherwise hemodynamically stable on room air.  Latest labs labs show leukocytosis of 12.5, anemia 8.5/26.6, thrombocytosis 676, renal function within normal limits.  Previous blood cultures obtained following admission and on 2/20/2025 remain negative.  MRSA nares PCR is negative on 2/28/2025.  Patient currently on piperacillin/tazobactam    #Leukocytosis  #Chronic L limb ischemia status post bypass surgery and eventual BKA, now with stump infection and necrosis s/p AKA on LLE  #PAD  #Fever, resolved    Recommendations  Patient now with L AKA, no fevers  Would complete short post operative of cefazolin - limit course to 48 hours if no further evidence for infection  If fever recurs–obtain blood cultures  Follow fever curve and WBC count    ID to sign off. Please contact as issues arise.    Zuhair Arthur MD  Division of Infectious Diseases

## 2025-03-19 NOTE — PROGRESS NOTE ADULT - SUBJECTIVE AND OBJECTIVE BOX
Follow Up:  LLE infection, AKA    Interval History/ROS:  Overnight: Patient to the OR for left AKA.  Patient has remained afebrile.  Otherwise hemodynamically stable.  Latest labs show leukocytosis of 14.7, anemia of 7.9/25.8, thrombocytosis 533.  BMP with renal function within normal limits.    Patient seen examined bedside.  Appears comfortable.  No new complaints.    Allergies  Advil (Rash)        ANTIMICROBIALS:  ceFAZolin   IVPB 2000 every 12 hours      OTHER MEDS:  MEDICATIONS  (STANDING):  aspirin  chewable 81 daily  bisacodyl 5 daily PRN  carvedilol 6.25 every 12 hours  dextrose 50% Injectable 25 once  dextrose 50% Injectable 12.5 once  dextrose Oral Gel 15 once PRN  enoxaparin Injectable 40 every 24 hours  gabapentin 100 daily  HYDROmorphone PCA (1 mG/mL) 30 PCA Continuous  HYDROmorphone PCA (1 mG/mL) Rescue Clinician Bolus 0.5 every 15 minutes PRN  influenza  Vaccine (HIGH DOSE) 0.5 once  insulin glargine Injectable (LANTUS) 14 at bedtime  insulin lispro (ADMELOG) corrective regimen sliding scale  three times a day before meals  insulin lispro (ADMELOG) corrective regimen sliding scale  at bedtime  insulin lispro Injectable (ADMELOG) 8 three times a day with meals  losartan 100 daily  melatonin 6 at bedtime  nalbuphine Injectable 2.5 every 6 hours PRN  NIFEdipine XL 90 daily  ondansetron Injectable 4 every 6 hours PRN  pantoprazole    Tablet 40 two times a day  polyethylene glycol 3350 17 daily  senna 1 at bedtime      Vital Signs Last 24 Hrs  T(C): 37.2 (19 Mar 2025 12:43), Max: 37.2 (19 Mar 2025 01:45)  T(F): 98.9 (19 Mar 2025 12:43), Max: 98.9 (19 Mar 2025 01:45)  HR: 105 (19 Mar 2025 14:25) (86 - 114)  BP: 98/60 (19 Mar 2025 14:25) (98/60 - 154/75)  BP(mean): 95 (18 Mar 2025 18:00) (75 - 101)  RR: 18 (19 Mar 2025 12:43) (15 - 18)  SpO2: 98% (19 Mar 2025 12:43) (96% - 99%)    Parameters below as of 19 Mar 2025 12:43  Patient On (Oxygen Delivery Method): room air        PHYSICAL EXAM:  GA: NAD, AOx3  HEENT: oral cavity no lesion  CV: nl S1/S2, no RMG  Lungs: CTAB, No distress  Abd: BS+, soft, nontender, no rebounding pain  Ext: no edema  Neuro: No focal deficits  Skin: L stump necrosis  IV: no phlebitis                                7.9    14.71 )-----------( 533      ( 19 Mar 2025 07:25 )             25.8       03-19    137  |  101  |  9   ----------------------------<  132[H]  3.9   |  20[L]  |  0.70    Ca    9.1      19 Mar 2025 07:25  Phos  3.2     03-19  Mg     1.9     03-19        Urinalysis Basic - ( 19 Mar 2025 07:25 )    Color: x / Appearance: x / SG: x / pH: x  Gluc: 132 mg/dL / Ketone: x  / Bili: x / Urobili: x   Blood: x / Protein: x / Nitrite: x   Leuk Esterase: x / RBC: x / WBC x   Sq Epi: x / Non Sq Epi: x / Bacteria: x        MICROBIOLOGY:  v                  RADIOLOGY:  Imaging reviewed

## 2025-03-19 NOTE — PROGRESS NOTE ADULT - SUBJECTIVE AND OBJECTIVE BOX
Patient seen today for follow up inpatient Diabetes Mellitus management.    Chief Complaint: Type 2 Diabetes Mellitus     INTERVAL HX:  Patient seen in [facility] 9MON 929 W1. Patient is alert and oriented, resting in bed with daughter at bedside. Patient reports feeling good, now post-op L AKA done yesterday 3/18. Patient reports post-op he does not have much of an appetite, drank some juice this am and glucemia drink for breakfast, no food. Will try to eat lunch -> did have intermittent PO intake and will drink Glucerna drink if not eating full meal. FBG stable this am 173mg/dL. No hypoglycemia. BGs have been mostly well controlled at goal. Blood glucose levels in the last 24hrs have been 143-211mg/dL.     Review of Systems:  General: As above.  Respiratory: Denies any SOB, BETH, or cough.  Gastrointestinal: Denies any n/v/d or abdominal pain.   Endocrine: Denies any polyuria, polydipsia, polyphagia, visual changes, or numbness in feet.     Allergies  Advil (Rash)      Intolerances  None.       MEDICATIONS  (STANDING):  aspirin  chewable 81 milliGRAM(s) Oral daily  bisacodyl 5 milliGRAM(s) Oral daily PRN  carvedilol 6.25 milliGRAM(s) Oral every 12 hours  ceFAZolin   IVPB 2000 milliGRAM(s) IV Intermittent every 12 hours  cyanocobalamin 1000 MICROGram(s) Oral daily  dextrose 5%. 1000 milliLiter(s) IV Continuous <Continuous>  dextrose 50% Injectable 25 Gram(s) IV Push once  dextrose 50% Injectable 12.5 Gram(s) IV Push once  dextrose Oral Gel 15 Gram(s) Oral once PRN  enoxaparin Injectable 40 milliGRAM(s) SubCutaneous every 24 hours  gabapentin 100 milliGRAM(s) Oral daily  HYDROmorphone PCA (1 mG/mL) 30 milliLiter(s) PCA Continuous PCA Continuous  HYDROmorphone PCA (1 mG/mL) Rescue Clinician Bolus 0.5 milliGRAM(s) IV Push every 15 minutes PRN  influenza  Vaccine (HIGH DOSE) 0.5 milliLiter(s) IntraMuscular once  insulin glargine Injectable (LANTUS) 14 Unit(s) SubCutaneous at bedtime  insulin lispro (ADMELOG) corrective regimen sliding scale   SubCutaneous three times a day before meals  insulin lispro (ADMELOG) corrective regimen sliding scale   SubCutaneous at bedtime  insulin lispro Injectable (ADMELOG) 8 Unit(s) SubCutaneous three times a day with meals  lactobacillus acidophilus 1 Tablet(s) Oral with dinner  losartan 100 milliGRAM(s) Oral daily  melatonin 6 milliGRAM(s) Oral at bedtime  nalbuphine Injectable 2.5 milliGRAM(s) IV Push every 6 hours PRN  naloxone Injectable 0.1 milliGRAM(s) IV Push every 3 minutes PRN  NIFEdipine XL 90 milliGRAM(s) Oral daily  ondansetron Injectable 4 milliGRAM(s) IV Push every 6 hours PRN  pantoprazole    Tablet 40 milliGRAM(s) Oral two times a day  polyethylene glycol 3350 17 Gram(s) Oral daily  senna 1 Tablet(s) Oral at bedtime      dextrose 50% Injectable 25 Gram(s) IV Push once  dextrose 50% Injectable 12.5 Gram(s) IV Push once  dextrose Oral Gel 15 Gram(s) Oral once PRN  insulin glargine Injectable (LANTUS) 14 Unit(s) SubCutaneous at bedtime  insulin lispro (ADMELOG) corrective regimen sliding scale   SubCutaneous three times a day before meals  insulin lispro (ADMELOG) corrective regimen sliding scale   SubCutaneous at bedtime  insulin lispro Injectable (ADMELOG) 8 Unit(s) SubCutaneous three times a day with meals      insulin lispro (ADMELOG) corrective regimen sliding scale   SubCutaneous three times a day before meals  insulin lispro (ADMELOG) corrective regimen sliding scale   SubCutaneous at bedtime  insulin lispro Injectable (ADMELOG) 8 Unit(s) SubCutaneous three times a day with meals      PHYSICAL EXAM:  VITALS:   T(C): 37.2 (03-19-25 @ 12:43), Max: 37.2 (03-19-25 @ 01:45)  HR: 108 (03-19-25 @ 12:43) (86 - 114)  BP: 98/61 (03-19-25 @ 12:43) (98/61 - 154/75)  RR: 18 (03-19-25 @ 12:43) (15 - 18)  SpO2: 98% (03-19-25 @ 12:43) (96% - 99%)    GENERAL: In no acute distress  Respiratory: Respirations unlabored  Extremities: Warm and dry, no edema  NEURO: Alert and oriented, appropriate     LABS:  POCT Blood Glucose.: 211 mg/dL (03-19-25 @ 11:37)  POCT Blood Glucose.: 173 mg/dL (03-19-25 @ 08:01)  POCT Blood Glucose.: 153 mg/dL (03-18-25 @ 23:21)  POCT Blood Glucose.: 127 mg/dL (03-18-25 @ 21:48)  POCT Blood Glucose.: 143 mg/dL (03-18-25 @ 16:53)  POCT Blood Glucose.: 150 mg/dL (03-18-25 @ 13:18)  POCT Blood Glucose.: 165 mg/dL (03-18-25 @ 05:05)  POCT Blood Glucose.: 193 mg/dL (03-17-25 @ 21:52)  POCT Blood Glucose.: 109 mg/dL (03-17-25 @ 17:37)  POCT Blood Glucose.: 136 mg/dL (03-17-25 @ 13:15)  POCT Blood Glucose.: 154 mg/dL (03-17-25 @ 08:38)  POCT Blood Glucose.: 106 mg/dL (03-16-25 @ 21:08)  POCT Blood Glucose.: 190 mg/dL (03-16-25 @ 17:20)                          7.9    14.71 )-----------( 533      ( 19 Mar 2025 07:25 )             25.8     03-19    137  |  101  |  9   ----------------------------<  132[H]  3.9   |  20[L]  |  0.70    Ca    9.1      19 Mar 2025 07:25  Phos  3.2     03-19  Mg     1.9     03-19        PT/INR - ( 18 Mar 2025 05:08 )   PT: 12.6 sec;   INR: 1.10 ratio         PTT - ( 18 Mar 2025 05:08 )  PTT:35.6 sec      Urinalysis Basic - ( 19 Mar 2025 07:25 )    Color: x / Appearance: x / SG: x / pH: x  Gluc: 132 mg/dL / Ketone: x  / Bili: x / Urobili: x   Blood: x / Protein: x / Nitrite: x   Leuk Esterase: x / RBC: x / WBC x   Sq Epi: x / Non Sq Epi: x / Bacteria: x        A1C with Estimated Average Glucose Result: A1C with Estimated Average Glucose Result: 7.7 % (01-28-25 @ 10:05)  A1C with Estimated Average Glucose Result: 7.4 % (12-06-24 @ 09:06)

## 2025-03-19 NOTE — PROGRESS NOTE ADULT - SUBJECTIVE AND OBJECTIVE BOX
SURGERY DAILY PROGRESS NOTE    24 Hour/Overnight Events: No acute events overnight    SUBJECTIVE: Patient seen and evaluated on AM rounds.    ------------------------------------------------------------------------------------------------------------  OBJECTIVE:  Vital Signs Last 24 Hrs  T(C): 37.1 (19 Mar 2025 05:06), Max: 37.2 (19 Mar 2025 01:45)  T(F): 98.7 (19 Mar 2025 05:06), Max: 98.9 (19 Mar 2025 01:45)  HR: 101 (19 Mar 2025 05:06) (86 - 101)  BP: 150/76 (19 Mar 2025 05:06) (117/59 - 154/75)  BP(mean): 95 (18 Mar 2025 18:00) (75 - 103)  RR: 18 (19 Mar 2025 05:06) (15 - 18)  SpO2: 98% (19 Mar 2025 05:06) (96% - 99%)    Parameters below as of 19 Mar 2025 05:06  Patient On (Oxygen Delivery Method): room air      I&O's Detail    18 Mar 2025 07:01  -  19 Mar 2025 07:00  --------------------------------------------------------  IN:    Oral Fluid: 300 mL  Total IN: 300 mL    OUT:    Voided (mL): 1450 mL  Total OUT: 1450 mL    Total NET: -1150 mL          LABS:                        7.9    14.71 )-----------( 533      ( 19 Mar 2025 07:25 )             25.8     03-18    136  |  102  |  11  ----------------------------<  159[H]  4.0   |  20[L]  |  0.69    Ca    9.3      18 Mar 2025 05:08  Phos  2.9     03-18  Mg     2.0     03-18        PT/INR - ( 18 Mar 2025 05:08 )   PT: 12.6 sec;   INR: 1.10 ratio         PTT - ( 18 Mar 2025 05:08 )  PTT:35.6 sec  Urinalysis Basic - ( 18 Mar 2025 05:08 )    Color: x / Appearance: x / SG: x / pH: x  Gluc: 159 mg/dL / Ketone: x  / Bili: x / Urobili: x   Blood: x / Protein: x / Nitrite: x   Leuk Esterase: x / RBC: x / WBC x   Sq Epi: x / Non Sq Epi: x / Bacteria: x    PHYSICAL EXAM:  Constitutional: sleepy but arouses to voice, appropriately responsive  Eyes: pupils 3mm and responsive to light  Chest: Symmetric chest rise bilaterally, normal work of breathing  Abdomen: Soft, nondistended  Extremities: left AKA stump is soft and appropriately tender to palpation without evidence of hematoma, dressing is clean and dry

## 2025-03-19 NOTE — PROVIDER CONTACT NOTE (CRITICAL VALUE NOTIFICATION) - TEST AND RESULT REPORTED:
aPTT 134.8
Hgb 7.0
Pt's Hg 6.7
Anoop Navarro, Lab
hemoglobin 6.6
Hgb 6.6 Hct 20.9
PTT: 173.5
aPTT 199.7
Hgb 7.0
Hgb 6.2/Hct 19.1
Hgb 7.0
Hemoglobin 6.1; Hematocrit 18.8

## 2025-03-19 NOTE — PROVIDER CONTACT NOTE (CRITICAL VALUE NOTIFICATION) - BACKGROUND
Pt admitted for PVD s/p L fem-tib artery bypass w/ graft and angiogram on 2/7.
Pt s/p L BKA
pt a&ox4 s/p L AKA 3/18
Pt s/p MARIZA WATKINS
PMH CVA, DM, HLD, HTN, PVD, former smoker
Pt admitted for PVD s/p L fen-tib artery bypass w/ graft and arteriogram on 2/7.
L femoral-tibial artery bypass w/ RSVG and angiogram
S/P L fem-tib bypass. Hx of DM II, HTN, HLD, PVD
Pt admitted for peripheral vascular disease s/p L fem-tibial artery bypass w/RSVG and angiogram.  PMH CVA, DM, HTN, HLD, HTN, PVD, former smoker
pt w/ PMH CVA, DM, HTN, HLD, PVD. currently admitted for L fem-tib bypass and angiogram
abdominal pain

## 2025-03-20 LAB
ANION GAP SERPL CALC-SCNC: 12 MMOL/L — SIGNIFICANT CHANGE UP (ref 5–17)
BUN SERPL-MCNC: 9 MG/DL — SIGNIFICANT CHANGE UP (ref 7–23)
CALCIUM SERPL-MCNC: 8.4 MG/DL — SIGNIFICANT CHANGE UP (ref 8.4–10.5)
CHLORIDE SERPL-SCNC: 103 MMOL/L — SIGNIFICANT CHANGE UP (ref 96–108)
CO2 SERPL-SCNC: 19 MMOL/L — LOW (ref 22–31)
CREAT SERPL-MCNC: 0.65 MG/DL — SIGNIFICANT CHANGE UP (ref 0.5–1.3)
EGFR: 100 ML/MIN/1.73M2 — SIGNIFICANT CHANGE UP
EGFR: 100 ML/MIN/1.73M2 — SIGNIFICANT CHANGE UP
GLUCOSE BLDC GLUCOMTR-MCNC: 123 MG/DL — HIGH (ref 70–99)
GLUCOSE BLDC GLUCOMTR-MCNC: 127 MG/DL — HIGH (ref 70–99)
GLUCOSE BLDC GLUCOMTR-MCNC: 143 MG/DL — HIGH (ref 70–99)
GLUCOSE BLDC GLUCOMTR-MCNC: 151 MG/DL — HIGH (ref 70–99)
GLUCOSE BLDC GLUCOMTR-MCNC: 177 MG/DL — HIGH (ref 70–99)
GLUCOSE BLDC GLUCOMTR-MCNC: 232 MG/DL — HIGH (ref 70–99)
GLUCOSE SERPL-MCNC: 112 MG/DL — HIGH (ref 70–99)
HCT VFR BLD CALC: 24.9 % — LOW (ref 39–50)
HGB BLD-MCNC: 8 G/DL — LOW (ref 13–17)
MAGNESIUM SERPL-MCNC: 1.7 MG/DL — SIGNIFICANT CHANGE UP (ref 1.6–2.6)
MCHC RBC-ENTMCNC: 28.5 PG — SIGNIFICANT CHANGE UP (ref 27–34)
MCHC RBC-ENTMCNC: 32.1 G/DL — SIGNIFICANT CHANGE UP (ref 32–36)
MCV RBC AUTO: 88.6 FL — SIGNIFICANT CHANGE UP (ref 80–100)
NRBC BLD AUTO-RTO: 0 /100 WBCS — SIGNIFICANT CHANGE UP (ref 0–0)
PHOSPHATE SERPL-MCNC: 2.4 MG/DL — LOW (ref 2.5–4.5)
PLATELET # BLD AUTO: 411 K/UL — HIGH (ref 150–400)
POTASSIUM SERPL-MCNC: 3.2 MMOL/L — LOW (ref 3.5–5.3)
POTASSIUM SERPL-SCNC: 3.2 MMOL/L — LOW (ref 3.5–5.3)
RBC # BLD: 2.81 M/UL — LOW (ref 4.2–5.8)
RBC # FLD: 15.7 % — HIGH (ref 10.3–14.5)
SODIUM SERPL-SCNC: 134 MMOL/L — LOW (ref 135–145)
WBC # BLD: 13.55 K/UL — HIGH (ref 3.8–10.5)
WBC # FLD AUTO: 13.55 K/UL — HIGH (ref 3.8–10.5)

## 2025-03-20 RX ORDER — HYDROMORPHONE/SOD CHLOR,ISO/PF 2 MG/10 ML
0.5 SYRINGE (ML) INJECTION EVERY 6 HOURS
Refills: 0 | Status: DISCONTINUED | OUTPATIENT
Start: 2025-03-20 | End: 2025-03-25

## 2025-03-20 RX ORDER — OXYCODONE HYDROCHLORIDE 30 MG/1
5 TABLET ORAL EVERY 6 HOURS
Refills: 0 | Status: DISCONTINUED | OUTPATIENT
Start: 2025-03-20 | End: 2025-03-25

## 2025-03-20 RX ORDER — OXYCODONE HYDROCHLORIDE 30 MG/1
10 TABLET ORAL EVERY 6 HOURS
Refills: 0 | Status: DISCONTINUED | OUTPATIENT
Start: 2025-03-20 | End: 2025-03-25

## 2025-03-20 RX ORDER — ACETAMINOPHEN 500 MG/5ML
975 LIQUID (ML) ORAL EVERY 6 HOURS
Refills: 0 | Status: DISCONTINUED | OUTPATIENT
Start: 2025-03-20 | End: 2025-03-25

## 2025-03-20 RX ADMIN — CARVEDILOL 6.25 MILLIGRAM(S): 3.12 TABLET, FILM COATED ORAL at 06:11

## 2025-03-20 RX ADMIN — Medication 30 MILLILITER(S): at 07:36

## 2025-03-20 RX ADMIN — Medication 100 MILLIEQUIVALENT(S): at 13:52

## 2025-03-20 RX ADMIN — GABAPENTIN 100 MILLIGRAM(S): 400 CAPSULE ORAL at 12:33

## 2025-03-20 RX ADMIN — OXYCODONE HYDROCHLORIDE 10 MILLIGRAM(S): 30 TABLET ORAL at 23:45

## 2025-03-20 RX ADMIN — INSULIN LISPRO 8 UNIT(S): 100 INJECTION, SOLUTION INTRAVENOUS; SUBCUTANEOUS at 09:58

## 2025-03-20 RX ADMIN — Medication 40 MILLIGRAM(S): at 17:50

## 2025-03-20 RX ADMIN — INSULIN LISPRO 8 UNIT(S): 100 INJECTION, SOLUTION INTRAVENOUS; SUBCUTANEOUS at 12:34

## 2025-03-20 RX ADMIN — Medication 975 MILLIGRAM(S): at 10:30

## 2025-03-20 RX ADMIN — ENOXAPARIN SODIUM 40 MILLIGRAM(S): 100 INJECTION SUBCUTANEOUS at 17:50

## 2025-03-20 RX ADMIN — CARVEDILOL 6.25 MILLIGRAM(S): 3.12 TABLET, FILM COATED ORAL at 17:51

## 2025-03-20 RX ADMIN — CYANOCOBALAMIN 1000 MICROGRAM(S): 1000 INJECTION INTRAMUSCULAR; SUBCUTANEOUS at 12:33

## 2025-03-20 RX ADMIN — Medication 63.75 MILLIMOLE(S): at 09:44

## 2025-03-20 RX ADMIN — Medication 975 MILLIGRAM(S): at 09:51

## 2025-03-20 RX ADMIN — INSULIN LISPRO 0: 100 INJECTION, SOLUTION INTRAVENOUS; SUBCUTANEOUS at 01:25

## 2025-03-20 RX ADMIN — INSULIN LISPRO 8 UNIT(S): 100 INJECTION, SOLUTION INTRAVENOUS; SUBCUTANEOUS at 17:51

## 2025-03-20 RX ADMIN — INSULIN LISPRO 1: 100 INJECTION, SOLUTION INTRAVENOUS; SUBCUTANEOUS at 09:57

## 2025-03-20 RX ADMIN — OXYCODONE HYDROCHLORIDE 10 MILLIGRAM(S): 30 TABLET ORAL at 23:15

## 2025-03-20 RX ADMIN — Medication 1 TABLET(S): at 01:26

## 2025-03-20 RX ADMIN — Medication 81 MILLIGRAM(S): at 12:33

## 2025-03-20 RX ADMIN — Medication 975 MILLIGRAM(S): at 21:11

## 2025-03-20 RX ADMIN — INSULIN LISPRO 1: 100 INJECTION, SOLUTION INTRAVENOUS; SUBCUTANEOUS at 12:33

## 2025-03-20 RX ADMIN — Medication 100 MILLIEQUIVALENT(S): at 11:27

## 2025-03-20 RX ADMIN — INSULIN LISPRO 0: 100 INJECTION, SOLUTION INTRAVENOUS; SUBCUTANEOUS at 22:42

## 2025-03-20 RX ADMIN — Medication 40 MILLIGRAM(S): at 06:11

## 2025-03-20 RX ADMIN — Medication 975 MILLIGRAM(S): at 16:17

## 2025-03-20 RX ADMIN — POLYETHYLENE GLYCOL 3350 17 GRAM(S): 17 POWDER, FOR SOLUTION ORAL at 12:34

## 2025-03-20 RX ADMIN — Medication 1 TABLET(S): at 17:50

## 2025-03-20 RX ADMIN — INSULIN GLARGINE-YFGN 14 UNIT(S): 100 INJECTION, SOLUTION SUBCUTANEOUS at 01:24

## 2025-03-20 RX ADMIN — Medication 975 MILLIGRAM(S): at 20:41

## 2025-03-20 RX ADMIN — INSULIN GLARGINE-YFGN 14 UNIT(S): 100 INJECTION, SOLUTION SUBCUTANEOUS at 22:44

## 2025-03-20 RX ADMIN — Medication 100 MILLIEQUIVALENT(S): at 09:51

## 2025-03-20 RX ADMIN — LOSARTAN POTASSIUM 100 MILLIGRAM(S): 100 TABLET, FILM COATED ORAL at 06:11

## 2025-03-20 RX ADMIN — Medication 90 MILLIGRAM(S): at 06:11

## 2025-03-20 RX ADMIN — Medication 975 MILLIGRAM(S): at 15:26

## 2025-03-20 NOTE — PROGRESS NOTE ADULT - SUBJECTIVE AND OBJECTIVE BOX
Day 2 of Anesthesia Pain Management Service    SUBJECTIVE: I'm doing ok    Pain Scale Score:	[X] Refer to charted pain scores    THERAPY:    [ ] IV PCA Morphine		[ ] 5 mg/mL	[ ] 1 mg/mL  [X] IV PCA Hydromorphone	[ ] 5 mg/mL	[X] 1 mg/mL  [ ] IV PCA Fentanyl		[ ] 50 micrograms/mL    Demand dose: 0.2 mg     Lockout: 6 minutes   Continuous Rate: 0 mg/hr  4 Hour Limit: 4 mg    MEDICATIONS  (STANDING):  acetaminophen     Tablet .. 975 milliGRAM(s) Oral every 6 hours  aspirin  chewable 81 milliGRAM(s) Oral daily  carvedilol 6.25 milliGRAM(s) Oral every 12 hours  cyanocobalamin 1000 MICROGram(s) Oral daily  dextrose 5%. 1000 milliLiter(s) (50 mL/Hr) IV Continuous <Continuous>  dextrose 50% Injectable 25 Gram(s) IV Push once  dextrose 50% Injectable 12.5 Gram(s) IV Push once  enoxaparin Injectable 40 milliGRAM(s) SubCutaneous every 24 hours  gabapentin 100 milliGRAM(s) Oral daily  HYDROmorphone PCA (1 mG/mL) 30 milliLiter(s) PCA Continuous PCA Continuous  influenza  Vaccine (HIGH DOSE) 0.5 milliLiter(s) IntraMuscular once  insulin glargine Injectable (LANTUS) 14 Unit(s) SubCutaneous at bedtime  insulin lispro (ADMELOG) corrective regimen sliding scale   SubCutaneous three times a day before meals  insulin lispro (ADMELOG) corrective regimen sliding scale   SubCutaneous at bedtime  insulin lispro Injectable (ADMELOG) 8 Unit(s) SubCutaneous three times a day with meals  lactobacillus acidophilus 1 Tablet(s) Oral with dinner  losartan 100 milliGRAM(s) Oral daily  melatonin 6 milliGRAM(s) Oral at bedtime  NIFEdipine XL 90 milliGRAM(s) Oral daily  pantoprazole    Tablet 40 milliGRAM(s) Oral two times a day  polyethylene glycol 3350 17 Gram(s) Oral daily  potassium chloride  10 mEq/100 mL IVPB 10 milliEquivalent(s) IV Intermittent every 1 hour  senna 1 Tablet(s) Oral at bedtime  sodium phosphate 15 milliMole(s)/250 mL IVPB 15 milliMole(s) IV Intermittent once    MEDICATIONS  (PRN):  bisacodyl 5 milliGRAM(s) Oral daily PRN Constipation  dextrose Oral Gel 15 Gram(s) Oral once PRN Blood Glucose LESS THAN 70 milliGRAM(s)/deciliter  HYDROmorphone PCA (1 mG/mL) Rescue Clinician Bolus 0.5 milliGRAM(s) IV Push every 15 minutes PRN for Pain Scale GREATER THAN 6  nalbuphine Injectable 2.5 milliGRAM(s) IV Push every 6 hours PRN Pruritus  naloxone Injectable 0.1 milliGRAM(s) IV Push every 3 minutes PRN For ANY of the following changes in patient status:  A. RR LESS THAN 10 breaths per minute, B. Oxygen saturation LESS THAN 90%, C. Sedation score of 6  ondansetron Injectable 4 milliGRAM(s) IV Push every 6 hours PRN Nausea      OBJECTIVE:    Sedation Score:	[ X] Alert 	[ ] Drowsy 	[ ] Arousable	[ ] Asleep	[ ] Unresponsive    Side Effects:	[X ] None	[ ] Nausea	[ ] Vomiting	[ ] Pruritus  		[ ] Other:    Vital Signs Last 24 Hrs  T(C): 37.5 (20 Mar 2025 05:27), Max: 37.6 (20 Mar 2025 01:26)  T(F): 99.5 (20 Mar 2025 05:27), Max: 99.7 (20 Mar 2025 01:26)  HR: 107 (20 Mar 2025 05:27) (102 - 114)  BP: 153/72 (20 Mar 2025 05:27) (98/60 - 153/72)  BP(mean): --  RR: 18 (20 Mar 2025 05:27) (18 - 18)  SpO2: 96% (20 Mar 2025 05:27) (96% - 99%)    Parameters below as of 20 Mar 2025 05:27  Patient On (Oxygen Delivery Method): room air        ASSESSMENT/ PLAN    Therapy to  be:               [X] Continued   [ ] Discontinued   [ ] Changed to PRN Analgesics    Documentation and Verification of current medications:   [X] Done	[ ] Not done, not eligible    Comments: Consider d\c PCA and transition to po prn analgesics

## 2025-03-20 NOTE — PROGRESS NOTE ADULT - SUBJECTIVE AND OBJECTIVE BOX
DATE OF SERVICE: 03-20-25 @ 13:00    Patient is a 70y old  Male who presents with a chief complaint of Iliac A dissection (19 Mar 2025 13:40)      INTERVAL HISTORY: In no acute distress.     REVIEW OF SYSTEMS:  CONSTITUTIONAL: No weakness  EYES/ENT: No visual changes;  No throat pain   NECK: No pain or stiffness  RESPIRATORY: No cough, wheezing; No shortness of breath  CARDIOVASCULAR: No chest pain or palpitations  GASTROINTESTINAL: No abdominal  pain. No nausea, vomiting, or hematemesis  GENITOURINARY: No dysuria, frequency or hematuria  NEUROLOGICAL: No stroke like symptoms  SKIN: No rashes    	  MEDICATIONS:  carvedilol 6.25 milliGRAM(s) Oral every 12 hours  losartan 100 milliGRAM(s) Oral daily  NIFEdipine XL 90 milliGRAM(s) Oral daily        PHYSICAL EXAM:  T(C): 36.8 (03-20-25 @ 10:13), Max: 37.6 (03-20-25 @ 01:26)  HR: 105 (03-20-25 @ 10:13) (102 - 108)  BP: 159/76 (03-20-25 @ 10:13) (98/60 - 159/76)  RR: 18 (03-20-25 @ 10:13) (18 - 18)  SpO2: 97% (03-20-25 @ 10:13) (96% - 99%)  Wt(kg): --  I&O's Summary    19 Mar 2025 07:01  -  20 Mar 2025 07:00  --------------------------------------------------------  IN: 1260 mL / OUT: 1200 mL / NET: 60 mL          Appearance: In no distress	  HEENT:    PERRL, EOMI	  Cardiovascular:  S1 S2, No JVD  Respiratory: Lungs clear to auscultation	  Gastrointestinal:  Soft, Non-tender, + BS	  Vascularature:  No edema of LE  Psychiatric: Appropriate affect   Neuro: no acute focal deficits                               8.0    13.55 )-----------( 411      ( 20 Mar 2025 06:58 )             24.9     03-20    134[L]  |  103  |  9   ----------------------------<  112[H]  3.2[L]   |  19[L]  |  0.65    Ca    8.4      20 Mar 2025 06:58  Phos  2.4     03-20  Mg     1.7     03-20          Labs personally reviewed      ASSESSMENT/PLAN: 	      69M PM of former smoker (1.5 PPD % 50 years; quit 2 years ago),  HTN, HLD, DM, PAD and aortoiliac occlusive disease, who previously underwent an outpatient angiography complicated by right iliac artery dissection. Now S/P Creation of bypass from left femoral artery to distal tibial artery with RSVG and completion angiogram on 2/7/25 s/p 2u pRBC 7.4 from 7.8l not responded to 2nd unit on 2/12/25. EGD completed 2/13 found to have bleeding gastric ulcer w/ hemostasis achieved with cautery. Hgb found to be 6.1 and s/p 2u pRBC. Patient now w/ ischemic changes to LLE.    1. Cardiac Risk Stratification   - No chest pain/SOB  - EKG Sinus tach 117 bpm no ischemic changes   - TTE reviewed with pericardial effusion, unchanged compared to 2023  - Not in decompensated HF   - No hx of tachy/timothy arrhythmias   - No valvular abnormalities  - Moderate risk for moderate risk vascular surgery  - Optimized from cardiac perspective to proceed  - s/p AKA, tolerated well    2. Hypertension- bp better   - Losartan 100 mg QD   - Coreg 6.25mg BID    - Nifedipine 90mg qd    3. Hyperlipidemia   - Lipitor 40mg   - lipid profile     4. Diabetes Mellitus Type II   - FBG per protocol  - ISS   - A1C: 7.7%     5. CAD - TTE with Basal and mid inferior wall and basal inferoseptal segment are abnormal  - Discussed with pt, spouse and daughter in person, they do not know full name or number of his cardiologist to obtain his prior records from  - They deny he has any h/o MI, CP or SOB  - Advised OP follow up with his cards         JUDE Hill-RINA Dawkins,  [facility]  Cardiovascular Medicine  [address]  Available through call or text on [employer]  Office: [phone]

## 2025-03-20 NOTE — PROGRESS NOTE ADULT - NS ATTEND OPT1A GEN_ALL_CORE
History/Exam/Medical decision making
Medical decision making

## 2025-03-20 NOTE — PROGRESS NOTE ADULT - SUBJECTIVE AND OBJECTIVE BOX
Anesthesia Pain Management Service    SUBJECTIVE: Patient is doing well with IV PCA and no significant problems reported.    Pain Scale Score	At rest: ___ 	With Activity: ___ 	[X ] Refer to charted pain scores    THERAPY:    [ ] IV PCA Morphine		[ ] 5 mg/mL	[ ] 1 mg/mL  [X ] IV PCA Hydromorphone	[ ] 5 mg/mL	[X ] 1 mg/mL  [ ] IV PCA Fentanyl		[ ] 50 micrograms/mL    Demand dose __0.2_ lockout __6_ (minutes) Continuous Rate _0__ Total: ___  mg used (in past 24 hours)      MEDICATIONS  (STANDING):  acetaminophen     Tablet .. 975 milliGRAM(s) Oral every 6 hours  aspirin  chewable 81 milliGRAM(s) Oral daily  carvedilol 6.25 milliGRAM(s) Oral every 12 hours  cyanocobalamin 1000 MICROGram(s) Oral daily  dextrose 5%. 1000 milliLiter(s) (50 mL/Hr) IV Continuous <Continuous>  dextrose 50% Injectable 25 Gram(s) IV Push once  dextrose 50% Injectable 12.5 Gram(s) IV Push once  enoxaparin Injectable 40 milliGRAM(s) SubCutaneous every 24 hours  gabapentin 100 milliGRAM(s) Oral daily  HYDROmorphone PCA (1 mG/mL) 30 milliLiter(s) PCA Continuous PCA Continuous  influenza  Vaccine (HIGH DOSE) 0.5 milliLiter(s) IntraMuscular once  insulin glargine Injectable (LANTUS) 14 Unit(s) SubCutaneous at bedtime  insulin lispro (ADMELOG) corrective regimen sliding scale   SubCutaneous three times a day before meals  insulin lispro (ADMELOG) corrective regimen sliding scale   SubCutaneous at bedtime  insulin lispro Injectable (ADMELOG) 8 Unit(s) SubCutaneous three times a day with meals  lactobacillus acidophilus 1 Tablet(s) Oral with dinner  losartan 100 milliGRAM(s) Oral daily  melatonin 6 milliGRAM(s) Oral at bedtime  NIFEdipine XL 90 milliGRAM(s) Oral daily  pantoprazole    Tablet 40 milliGRAM(s) Oral two times a day  polyethylene glycol 3350 17 Gram(s) Oral daily  potassium chloride  10 mEq/100 mL IVPB 10 milliEquivalent(s) IV Intermittent every 1 hour  senna 1 Tablet(s) Oral at bedtime    MEDICATIONS  (PRN):  bisacodyl 5 milliGRAM(s) Oral daily PRN Constipation  dextrose Oral Gel 15 Gram(s) Oral once PRN Blood Glucose LESS THAN 70 milliGRAM(s)/deciliter  HYDROmorphone PCA (1 mG/mL) Rescue Clinician Bolus 0.5 milliGRAM(s) IV Push every 15 minutes PRN for Pain Scale GREATER THAN 6  nalbuphine Injectable 2.5 milliGRAM(s) IV Push every 6 hours PRN Pruritus  naloxone Injectable 0.1 milliGRAM(s) IV Push every 3 minutes PRN For ANY of the following changes in patient status:  A. RR LESS THAN 10 breaths per minute, B. Oxygen saturation LESS THAN 90%, C. Sedation score of 6  ondansetron Injectable 4 milliGRAM(s) IV Push every 6 hours PRN Nausea      OBJECTIVE:    Sedation Score:	[ X] Alert	[ ] Drowsy 	[ ] Arousable	[ ] Asleep	[ ] Unresponsive    Side Effects:	[X ] None	[ ] Nausea	[ ] Vomiting	[ ] Pruritus  		[ ] Other:    Vital Signs Last 24 Hrs  T(C): 37.5 (20 Mar 2025 05:27), Max: 37.6 (20 Mar 2025 01:26)  T(F): 99.5 (20 Mar 2025 05:27), Max: 99.7 (20 Mar 2025 01:26)  HR: 107 (20 Mar 2025 05:27) (102 - 108)  BP: 153/72 (20 Mar 2025 05:27) (98/60 - 153/72)  BP(mean): --  RR: 18 (20 Mar 2025 05:27) (18 - 18)  SpO2: 96% (20 Mar 2025 05:27) (96% - 99%)    Parameters below as of 20 Mar 2025 05:27  Patient On (Oxygen Delivery Method): room air        ASSESSMENT/ PLAN    Therapy to  be:	[ X] Continue   [ ] Discontinued   [ ] Change to prn Analgesics    Documentation and Verification of current medications:   [X] Done	[ ] Not done, not elligible      Progress Note written now but Patient was seen earlier.

## 2025-03-20 NOTE — PROGRESS NOTE ADULT - ASSESSMENT
69M PM of former smoker (1.5 PPD % 50 years; quit 2 years ago),  HTN, HLD, DM, PAD and aortoiliac occlusive disease, who previously underwent an outpatient angiography complicated by right iliac artery dissection. Now S/P Creation of bypass from left femoral artery to distal tibial artery with RSVG and completion angiogram on 2/7/25 s/p 2u pRBC 7.4 from 7.8l not responded to 2nd unit on 2/12/25. EGD completed 2/13 found to have bleeding gastric ulcer w/ hemostasis achieved with cautery. Hgb found to be 6.1 and s/p 2u pRBC. 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  Received 1uPRBC yesterday (hgb 6.7) with appropriate response now s/p L AKA on 3/18/25.      Plan:   - Hgb 8 from 6.6 this AM, transfused 1 U on 3/19/25   - Dressing change on 3/21/24 (tmr)   - CC diet   - Antibx: Ancef x2 doses ended  - Lovenox/Aspirin   - Diet: CC w Evening Snacks   - on insulin appreciate endocrine recs  - Pain regimen: Tylenol ATC,, PCA (try to wean off PCA today)   - Bowel regimen  - PT recs -> YANELY    Vascular Surg  [id number]

## 2025-03-20 NOTE — PROGRESS NOTE ADULT - SUBJECTIVE AND OBJECTIVE BOX
SURGERY DAILY PROGRESS NOTE    24 Hour/Overnight Events:   - Got 1U for Hgb of 6.6 now 8     SUBJECTIVE: Patient seen and evaluated on AM rounds. Pt is resting comfortably in bed with no acute complaints. Tolerating diet.   Denies fevers/chills, chest pain, dyspnea, abdominal pain, nausea, vomiting, and diarrhea    ------------------------------------------------------------------------------------------------------------  OBJECTIVE:  Vital Signs Last 24 Hrs  T(C): 37.5 (20 Mar 2025 05:27), Max: 37.6 (20 Mar 2025 01:26)  T(F): 99.5 (20 Mar 2025 05:27), Max: 99.7 (20 Mar 2025 01:26)  HR: 107 (20 Mar 2025 05:27) (102 - 114)  BP: 153/72 (20 Mar 2025 05:27) (98/60 - 153/72)  BP(mean): --  RR: 18 (20 Mar 2025 05:27) (18 - 18)  SpO2: 96% (20 Mar 2025 05:27) (96% - 99%)    Parameters below as of 20 Mar 2025 05:27  Patient On (Oxygen Delivery Method): room air      I&O's Detail    19 Mar 2025 07:01  -  20 Mar 2025 07:00  --------------------------------------------------------  IN:    Lactated Ringers Bolus: 500 mL    Oral Fluid: 760 mL  Total IN: 1260 mL    OUT:    Voided (mL): 1200 mL  Total OUT: 1200 mL    Total NET: 60 mL          LABS:                        8.0    13.55 )-----------( 411      ( 20 Mar 2025 06:58 )             24.9     03-20    134[L]  |  103  |  9   ----------------------------<  112[H]  3.2[L]   |  19[L]  |  0.65    Ca    8.4      20 Mar 2025 06:58  Phos  2.4     03-20  Mg     1.7     03-20          Urinalysis Basic - ( 20 Mar 2025 06:58 )    Color: x / Appearance: x / SG: x / pH: x  Gluc: 112 mg/dL / Ketone: x  / Bili: x / Urobili: x   Blood: x / Protein: x / Nitrite: x   Leuk Esterase: x / RBC: x / WBC x   Sq Epi: x / Non Sq Epi: x / Bacteria: x    PHYSICAL EXAM:  Constitutional: sleepy but arouses to voice, appropriately responsive  Eyes: pupils 3mm and responsive to light  Chest: Symmetric chest rise bilaterally, normal work of breathing  Abdomen: Soft, nondistended  Extremities: left AKA stump is soft and appropriately tender to palpation without evidence of hematoma, dressing is clean and dry

## 2025-03-21 LAB
ANION GAP SERPL CALC-SCNC: 17 MMOL/L — SIGNIFICANT CHANGE UP (ref 5–17)
BUN SERPL-MCNC: 9 MG/DL — SIGNIFICANT CHANGE UP (ref 7–23)
CALCIUM SERPL-MCNC: 8.9 MG/DL — SIGNIFICANT CHANGE UP (ref 8.4–10.5)
CHLORIDE SERPL-SCNC: 98 MMOL/L — SIGNIFICANT CHANGE UP (ref 96–108)
CO2 SERPL-SCNC: 19 MMOL/L — LOW (ref 22–31)
CREAT SERPL-MCNC: 0.63 MG/DL — SIGNIFICANT CHANGE UP (ref 0.5–1.3)
EGFR: 101 ML/MIN/1.73M2 — SIGNIFICANT CHANGE UP
EGFR: 101 ML/MIN/1.73M2 — SIGNIFICANT CHANGE UP
GLUCOSE BLDC GLUCOMTR-MCNC: 178 MG/DL — HIGH (ref 70–99)
GLUCOSE BLDC GLUCOMTR-MCNC: 188 MG/DL — HIGH (ref 70–99)
GLUCOSE BLDC GLUCOMTR-MCNC: 234 MG/DL — HIGH (ref 70–99)
GLUCOSE BLDC GLUCOMTR-MCNC: 237 MG/DL — HIGH (ref 70–99)
GLUCOSE BLDC GLUCOMTR-MCNC: 250 MG/DL — HIGH (ref 70–99)
GLUCOSE SERPL-MCNC: 169 MG/DL — HIGH (ref 70–99)
HCT VFR BLD CALC: 24.3 % — LOW (ref 39–50)
HGB BLD-MCNC: 7.7 G/DL — LOW (ref 13–17)
MAGNESIUM SERPL-MCNC: 1.8 MG/DL — SIGNIFICANT CHANGE UP (ref 1.6–2.6)
MCHC RBC-ENTMCNC: 28.1 PG — SIGNIFICANT CHANGE UP (ref 27–34)
MCHC RBC-ENTMCNC: 31.7 G/DL — LOW (ref 32–36)
MCV RBC AUTO: 88.7 FL — SIGNIFICANT CHANGE UP (ref 80–100)
NRBC BLD AUTO-RTO: 0 /100 WBCS — SIGNIFICANT CHANGE UP (ref 0–0)
PHOSPHATE SERPL-MCNC: 2.1 MG/DL — LOW (ref 2.5–4.5)
PLATELET # BLD AUTO: 388 K/UL — SIGNIFICANT CHANGE UP (ref 150–400)
POTASSIUM SERPL-MCNC: 3.4 MMOL/L — LOW (ref 3.5–5.3)
POTASSIUM SERPL-SCNC: 3.4 MMOL/L — LOW (ref 3.5–5.3)
RBC # BLD: 2.74 M/UL — LOW (ref 4.2–5.8)
RBC # FLD: 15.8 % — HIGH (ref 10.3–14.5)
SODIUM SERPL-SCNC: 134 MMOL/L — LOW (ref 135–145)
WBC # BLD: 12.79 K/UL — HIGH (ref 3.8–10.5)
WBC # FLD AUTO: 12.79 K/UL — HIGH (ref 3.8–10.5)

## 2025-03-21 PROCEDURE — 99232 SBSQ HOSP IP/OBS MODERATE 35: CPT

## 2025-03-21 RX ADMIN — INSULIN GLARGINE-YFGN 14 UNIT(S): 100 INJECTION, SOLUTION SUBCUTANEOUS at 21:41

## 2025-03-21 RX ADMIN — Medication 975 MILLIGRAM(S): at 16:14

## 2025-03-21 RX ADMIN — ENOXAPARIN SODIUM 40 MILLIGRAM(S): 100 INJECTION SUBCUTANEOUS at 17:07

## 2025-03-21 RX ADMIN — Medication 90 MILLIGRAM(S): at 07:10

## 2025-03-21 RX ADMIN — Medication 975 MILLIGRAM(S): at 09:09

## 2025-03-21 RX ADMIN — Medication 100 MILLIEQUIVALENT(S): at 11:07

## 2025-03-21 RX ADMIN — Medication 975 MILLIGRAM(S): at 03:22

## 2025-03-21 RX ADMIN — INSULIN LISPRO 8 UNIT(S): 100 INJECTION, SOLUTION INTRAVENOUS; SUBCUTANEOUS at 18:14

## 2025-03-21 RX ADMIN — INSULIN LISPRO 1: 100 INJECTION, SOLUTION INTRAVENOUS; SUBCUTANEOUS at 08:40

## 2025-03-21 RX ADMIN — Medication 81 MILLIGRAM(S): at 11:23

## 2025-03-21 RX ADMIN — Medication 1 TABLET(S): at 21:42

## 2025-03-21 RX ADMIN — Medication 975 MILLIGRAM(S): at 21:16

## 2025-03-21 RX ADMIN — Medication 975 MILLIGRAM(S): at 02:52

## 2025-03-21 RX ADMIN — INSULIN LISPRO 8 UNIT(S): 100 INJECTION, SOLUTION INTRAVENOUS; SUBCUTANEOUS at 12:22

## 2025-03-21 RX ADMIN — INSULIN LISPRO 1: 100 INJECTION, SOLUTION INTRAVENOUS; SUBCUTANEOUS at 18:14

## 2025-03-21 RX ADMIN — POLYETHYLENE GLYCOL 3350 17 GRAM(S): 17 POWDER, FOR SOLUTION ORAL at 11:23

## 2025-03-21 RX ADMIN — Medication 1 TABLET(S): at 17:06

## 2025-03-21 RX ADMIN — LOSARTAN POTASSIUM 100 MILLIGRAM(S): 100 TABLET, FILM COATED ORAL at 07:11

## 2025-03-21 RX ADMIN — Medication 975 MILLIGRAM(S): at 13:07

## 2025-03-21 RX ADMIN — Medication 63.75 MILLIMOLE(S): at 11:07

## 2025-03-21 RX ADMIN — Medication 40 MILLIGRAM(S): at 06:43

## 2025-03-21 RX ADMIN — Medication 6 MILLIGRAM(S): at 21:42

## 2025-03-21 RX ADMIN — CARVEDILOL 6.25 MILLIGRAM(S): 3.12 TABLET, FILM COATED ORAL at 17:07

## 2025-03-21 RX ADMIN — Medication 975 MILLIGRAM(S): at 20:16

## 2025-03-21 RX ADMIN — Medication 40 MILLIGRAM(S): at 17:07

## 2025-03-21 RX ADMIN — Medication 100 MILLIEQUIVALENT(S): at 08:39

## 2025-03-21 RX ADMIN — CYANOCOBALAMIN 1000 MICROGRAM(S): 1000 INJECTION INTRAMUSCULAR; SUBCUTANEOUS at 11:23

## 2025-03-21 RX ADMIN — GABAPENTIN 100 MILLIGRAM(S): 400 CAPSULE ORAL at 11:23

## 2025-03-21 RX ADMIN — Medication 975 MILLIGRAM(S): at 08:39

## 2025-03-21 RX ADMIN — Medication 100 MILLIEQUIVALENT(S): at 13:21

## 2025-03-21 RX ADMIN — INSULIN LISPRO 2: 100 INJECTION, SOLUTION INTRAVENOUS; SUBCUTANEOUS at 12:22

## 2025-03-21 NOTE — PROGRESS NOTE ADULT - SUBJECTIVE AND OBJECTIVE BOX
Chief Complaint: Diabetes Mellitus follow up    INTERVAL HX:  Patient seen at bedside, resting comfortably. S/p L AKA.   Reports variable appetite, variable PO intake.  BG over the last 24 hrs have been moslty at goal range 100-180mg/dl. No hypoglycemia.     Review of Systems:  General: As above  GI: No nausea, vomiting  Endocrine: no  S&Sx of hypoglycemia    Allergies    Advil (Rash)    Intolerances      MEDICATIONS  (STANDING):  acetaminophen     Tablet .. 975 milliGRAM(s) Oral every 6 hours  aspirin  chewable 81 milliGRAM(s) Oral daily  carvedilol 6.25 milliGRAM(s) Oral every 12 hours  cyanocobalamin 1000 MICROGram(s) Oral daily  dextrose 5%. 1000 milliLiter(s) (50 mL/Hr) IV Continuous <Continuous>  dextrose 50% Injectable 25 Gram(s) IV Push once  dextrose 50% Injectable 12.5 Gram(s) IV Push once  enoxaparin Injectable 40 milliGRAM(s) SubCutaneous every 24 hours  gabapentin 100 milliGRAM(s) Oral daily  influenza  Vaccine (HIGH DOSE) 0.5 milliLiter(s) IntraMuscular once  insulin glargine Injectable (LANTUS) 14 Unit(s) SubCutaneous at bedtime  insulin lispro (ADMELOG) corrective regimen sliding scale   SubCutaneous three times a day before meals  insulin lispro (ADMELOG) corrective regimen sliding scale   SubCutaneous at bedtime  insulin lispro Injectable (ADMELOG) 8 Unit(s) SubCutaneous three times a day with meals  lactobacillus acidophilus 1 Tablet(s) Oral with dinner  losartan 100 milliGRAM(s) Oral daily  melatonin 6 milliGRAM(s) Oral at bedtime  NIFEdipine XL 90 milliGRAM(s) Oral daily  pantoprazole    Tablet 40 milliGRAM(s) Oral two times a day  polyethylene glycol 3350 17 Gram(s) Oral daily  senna 1 Tablet(s) Oral at bedtime        insulin glargine Injectable (LANTUS)   14 Unit(s) SubCutaneous (03-20-25 @ 22:44)    insulin lispro (ADMELOG) corrective regimen sliding scale   2 Unit(s) SubCutaneous (03-21-25 @ 12:22)   1 Unit(s) SubCutaneous (03-21-25 @ 08:40)    insulin lispro (ADMELOG) corrective regimen sliding scale   0 Unit(s) SubCutaneous (03-20-25 @ 22:42)    insulin lispro Injectable (ADMELOG)   8 Unit(s) SubCutaneous (03-21-25 @ 12:22)   8 Unit(s) SubCutaneous (03-20-25 @ 17:51)        PHYSICAL EXAM:  VITALS: T(C): 37.1 (03-21-25 @ 13:23)  T(F): 98.7 (03-21-25 @ 13:23), Max: 99.7 (03-21-25 @ 08:31)  HR: 101 (03-21-25 @ 13:23) (57 - 114)  BP: 111/62 (03-21-25 @ 13:23) (111/62 - 155/81)  RR:  (18 - 18)  SpO2:  (98% - 99%)  Wt(kg): --  GENERAL: NAD  Respiratory: Respirations unlabored   Extremities: Warm, no edema. Left AKA wrapped in bandage  NEURO: Alert , appropriate     LABS:  POCT Blood Glucose.: 237 mg/dL (03-21-25 @ 12:17)  POCT Blood Glucose.: 178 mg/dL (03-21-25 @ 08:20)  POCT Blood Glucose.: 234 mg/dL (03-21-25 @ 00:21)  POCT Blood Glucose.: 232 mg/dL (03-20-25 @ 22:20)  POCT Blood Glucose.: 123 mg/dL (03-20-25 @ 17:46)  POCT Blood Glucose.: 177 mg/dL (03-20-25 @ 12:28)  POCT Blood Glucose.: 151 mg/dL (03-20-25 @ 09:49)  POCT Blood Glucose.: 143 mg/dL (03-20-25 @ 08:41)  POCT Blood Glucose.: 127 mg/dL (03-20-25 @ 00:40)  POCT Blood Glucose.: 108 mg/dL (03-19-25 @ 22:01)  POCT Blood Glucose.: 140 mg/dL (03-19-25 @ 17:34)  POCT Blood Glucose.: 211 mg/dL (03-19-25 @ 11:37)  POCT Blood Glucose.: 173 mg/dL (03-19-25 @ 08:01)  POCT Blood Glucose.: 153 mg/dL (03-18-25 @ 23:21)  POCT Blood Glucose.: 127 mg/dL (03-18-25 @ 21:48)  POCT Blood Glucose.: 143 mg/dL (03-18-25 @ 16:53)                          7.7    12.79 )-----------( 388      ( 21 Mar 2025 06:41 )             24.3     03-21    134[L]  |  98  |  9   ----------------------------<  169[H]  3.4[L]   |  19[L]  |  0.63    Ca    8.9      21 Mar 2025 06:41  Phos  2.1     03-21  Mg     1.8     03-21      eGFR: 101 mL/min/1.73m2 (21 Mar 2025 06:41)      Thyroid Function Tests:      A1C with Estimated Average Glucose Result: 7.7 % (01-28-25 @ 10:05)    Estimated Average Glucose: 174 mg/dL (01-28-25 @ 10:05)        Diet, Consistent Carbohydrate w/Evening Snack:   Supplement Feeding Modality:  Oral  Glucerna Shake Cans or Servings Per Day:  3       Frequency:  Daily (03-18-25 @ 16:51) [Active]

## 2025-03-21 NOTE — PROGRESS NOTE ADULT - ASSESSMENT
A 70-year-old male with a medical history of hypertension, hyperlipidemia, diabetes mellitus, peripheral artery disease, and aortoiliac occlusive disease, previously underwent outpatient angiography that resulted in a right iliac artery dissection. He is now status post for the creation of a bypass from the left femoral artery to the distal tibial artery using a reversed saphenous vein graft, with a completion angiogram completed on February 7, 2025.    The Endocrine team has been consulted due to his uncontrolled diabetes. Endocrine re-consulted for hyperglycemia. Now s/p L AKA done  3/18. Patient reports decreased appetite at times, variable oral intake but does drink Glucerna drink which has 27g CHO per serving if not eating full meal. FBG stable, no hypoglycemia.  Endocrine will closely monitor BG and adjust insulin as needed for BG goal 100-180mg/dL inpatient.       #Type 2 diabetes mellitus   A1C with Estimated Average Glucose Result: 7.7 % (01-28-25 @ 10:05)  A1C with Estimated Average Glucose Result: 7.4 % (12-06-24 @ 09:06)  Home regimen: Jardiance 10mg once daily, Actos 30mg once daily, Jentadueto 2.5-2g BID

## 2025-03-21 NOTE — PROGRESS NOTE ADULT - SUBJECTIVE AND OBJECTIVE BOX
DATE OF SERVICE: 03-21-25 @ 16:32    Patient is a 70y old  Male who presents with a chief complaint of Iliac A dissection (21 Mar 2025 15:33)      INTERVAL HISTORY: in no acute distress    REVIEW OF SYSTEMS:  CONSTITUTIONAL: No weakness  EYES/ENT: No visual changes;  No throat pain   NECK: No pain or stiffness  RESPIRATORY: No cough, wheezing; No shortness of breath  CARDIOVASCULAR: No chest pain or palpitations  GASTROINTESTINAL: No abdominal  pain. No nausea, vomiting, or hematemesis  GENITOURINARY: No dysuria, frequency or hematuria  NEUROLOGICAL: No stroke like symptoms  SKIN: No rashes      MEDICATIONS:  carvedilol 6.25 milliGRAM(s) Oral every 12 hours  losartan 100 milliGRAM(s) Oral daily  NIFEdipine XL 90 milliGRAM(s) Oral daily        PHYSICAL EXAM:  T(C): 37.1 (03-21-25 @ 13:23), Max: 37.6 (03-21-25 @ 08:31)  HR: 101 (03-21-25 @ 13:23) (57 - 114)  BP: 111/62 (03-21-25 @ 13:23) (111/62 - 155/81)  RR: 18 (03-21-25 @ 13:23) (18 - 18)  SpO2: 98% (03-21-25 @ 13:23) (98% - 99%)  Wt(kg): --  I&O's Summary    20 Mar 2025 07:01  -  21 Mar 2025 07:00  --------------------------------------------------------  IN: 560 mL / OUT: 2725 mL / NET: -2165 mL    21 Mar 2025 07:01  -  21 Mar 2025 16:32  --------------------------------------------------------  IN: 440 mL / OUT: 0 mL / NET: 440 mL          Appearance: In no distress	  HEENT:    PERRL, EOMI	  Cardiovascular:  S1 S2, No JVD  Respiratory: Lungs clear to auscultation	  Gastrointestinal:  Soft, Non-tender, + BS	  Vascularature:  No edema of LE  Psychiatric: Appropriate affect   Neuro: no acute focal deficits                               7.7    12.79 )-----------( 388      ( 21 Mar 2025 06:41 )             24.3     03-21    134[L]  |  98  |  9   ----------------------------<  169[H]  3.4[L]   |  19[L]  |  0.63    Ca    8.9      21 Mar 2025 06:41  Phos  2.1     03-21  Mg     1.8     03-21          Labs personally reviewed      ASSESSMENT/PLAN: 	    69M PM of former smoker (1.5 PPD % 50 years; quit 2 years ago),  HTN, HLD, DM, PAD and aortoiliac occlusive disease, who previously underwent an outpatient angiography complicated by right iliac artery dissection. Now S/P Creation of bypass from left femoral artery to distal tibial artery with RSVG and completion angiogram on 2/7/25 s/p 2u pRBC 7.4 from 7.8l not responded to 2nd unit on 2/12/25. EGD completed 2/13 found to have bleeding gastric ulcer w/ hemostasis achieved with cautery. Hgb found to be 6.1 and s/p 2u pRBC. Patient now w/ ischemic changes to LLE.    1. Cardiac Risk Stratification   - No chest pain/SOB  - EKG Sinus tach 117 bpm no ischemic changes   - TTE reviewed with pericardial effusion, unchanged compared to 2023  - Not in decompensated HF   - No hx of tachy/timothy arrhythmias   - No valvular abnormalities  - Moderate risk for moderate risk vascular surgery  - Optimized from cardiac perspective to proceed  - s/p AKA, tolerated well    2. Hypertension- bp better   - Losartan 100 mg QD   - Coreg 6.25mg BID    - Nifedipine 90mg qd    3. Hyperlipidemia   - Lipitor 40mg   - lipid profile     4. Diabetes Mellitus Type II   - FBG per protocol  - ISS   - A1C: 7.7%     5. CAD - TTE with Basal and mid inferior wall and basal inferoseptal segment are abnormal  - Discussed with pt, spouse and daughter in person, they do not know full name or number of his cardiologist to obtain his prior records from  - They deny he has any h/o MI, CP or SOB  - Advised OP follow up with his cards       JAVIER Haas, DO [facility]  Cardiovascular Medicine  [address] [place] [address], [address]  Available through call or text on [employer]  Office: [phone]

## 2025-03-21 NOTE — PROGRESS NOTE ADULT - ASSESSMENT
69M PM of former smoker (1.5 PPD % 50 years; quit 2 years ago),  HTN, HLD, DM, PAD and aortoiliac occlusive disease, who previously underwent an outpatient angiography complicated by right iliac artery dissection. Now S/P Creation of bypass from left femoral artery to distal tibial artery with RSVG and completion angiogram on 2/7/25 s/p 2u pRBC 7.4 from 7.8l not responded to 2nd unit on 2/12/25. EGD completed 2/13 found to have bleeding gastric ulcer w/ hemostasis achieved with cautery. Hgb found to be 6.1 and s/p 2u pRBC. 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  Received 1uPRBC yesterday (hgb 6.7) with appropriate response now s/p L AKA on 3/18/25.      Plan:   - Monitor Hgb   - Dressing change today   - CC diet   - Antibx: dc ancef   - Lovenox/Aspirin   - Diet: CC w Evening Snacks   - on insulin appreciate endocrine recs  - Pain regimen: Tylenol ATC,, weaned off PCA, now on oral Pain meds,   - Bowel regimen  - PT recs -> [facility], [place]    Vascular Surg  [id number]

## 2025-03-21 NOTE — PROGRESS NOTE ADULT - SUBJECTIVE AND OBJECTIVE BOX
SURGERY DAILY PROGRESS NOTE    24 Hour/Overnight Events: No acute events overnight    SUBJECTIVE: Patient seen and evaluated on AM rounds. Pt is resting comfortably in bed with no acute complaints. Tolerating diet.   Denies fevers/chills, chest pain, dyspnea, abdominal pain, nausea, vomiting, and diarrhea    ------------------------------------------------------------------------------------------------------------  OBJECTIVE:  Vital Signs Last 24 Hrs  T(C): 36.7 (21 Mar 2025 07:05), Max: 37.3 (21 Mar 2025 06:25)  T(F): 98.1 (21 Mar 2025 07:05), Max: 99.1 (21 Mar 2025 06:25)  HR: 106 (21 Mar 2025 07:05) (57 - 110)  BP: 148/68 (21 Mar 2025 07:05) (113/64 - 159/76)  BP(mean): --  RR: 18 (21 Mar 2025 07:05) (18 - 18)  SpO2: 98% (21 Mar 2025 07:05) (97% - 99%)    Parameters below as of 21 Mar 2025 07:05  Patient On (Oxygen Delivery Method): room air      I&O's Detail    20 Mar 2025 07:01  -  21 Mar 2025 07:00  --------------------------------------------------------  IN:    Oral Fluid: 560 mL  Total IN: 560 mL    OUT:    Voided (mL): 2725 mL  Total OUT: 2725 mL    Total NET: -2165 mL          LABS:                        7.7    12.79 )-----------( 388      ( 21 Mar 2025 06:41 )             24.3     03-21    134[L]  |  98  |  9   ----------------------------<  169[H]  3.4[L]   |  19[L]  |  0.63    Ca    8.9      21 Mar 2025 06:41  Phos  2.1     03-21  Mg     1.8     03-21          Urinalysis Basic - ( 21 Mar 2025 06:41 )    Color: x / Appearance: x / SG: x / pH: x  Gluc: 169 mg/dL / Ketone: x  / Bili: x / Urobili: x   Blood: x / Protein: x / Nitrite: x   Leuk Esterase: x / RBC: x / WBC x   Sq Epi: x / Non Sq Epi: x / Bacteria: x      PHYSICAL EXAM:  Constitutional: sleepy but arouses to voice, appropriately responsive  Eyes: pupils 3mm and responsive to light  Chest: Symmetric chest rise bilaterally, normal work of breathing  Abdomen: Soft, nondistended  Extremities: left AKA stump is soft and appropriately tender to palpation without evidence of hematoma, dressing is clean and dry

## 2025-03-22 LAB
ANION GAP SERPL CALC-SCNC: 16 MMOL/L — SIGNIFICANT CHANGE UP (ref 5–17)
BUN SERPL-MCNC: 8 MG/DL — SIGNIFICANT CHANGE UP (ref 7–23)
CALCIUM SERPL-MCNC: 9 MG/DL — SIGNIFICANT CHANGE UP (ref 8.4–10.5)
CHLORIDE SERPL-SCNC: 100 MMOL/L — SIGNIFICANT CHANGE UP (ref 96–108)
CO2 SERPL-SCNC: 21 MMOL/L — LOW (ref 22–31)
CREAT SERPL-MCNC: 0.61 MG/DL — SIGNIFICANT CHANGE UP (ref 0.5–1.3)
EGFR: 102 ML/MIN/1.73M2 — SIGNIFICANT CHANGE UP
EGFR: 102 ML/MIN/1.73M2 — SIGNIFICANT CHANGE UP
GLUCOSE BLDC GLUCOMTR-MCNC: 106 MG/DL — HIGH (ref 70–99)
GLUCOSE BLDC GLUCOMTR-MCNC: 132 MG/DL — HIGH (ref 70–99)
GLUCOSE BLDC GLUCOMTR-MCNC: 171 MG/DL — HIGH (ref 70–99)
GLUCOSE BLDC GLUCOMTR-MCNC: 212 MG/DL — HIGH (ref 70–99)
GLUCOSE BLDC GLUCOMTR-MCNC: 520 MG/DL — CRITICAL HIGH (ref 70–99)
GLUCOSE SERPL-MCNC: 123 MG/DL — HIGH (ref 70–99)
HCT VFR BLD CALC: 25.6 % — LOW (ref 39–50)
HGB BLD-MCNC: 8 G/DL — LOW (ref 13–17)
MAGNESIUM SERPL-MCNC: 1.9 MG/DL — SIGNIFICANT CHANGE UP (ref 1.6–2.6)
MCHC RBC-ENTMCNC: 28.6 PG — SIGNIFICANT CHANGE UP (ref 27–34)
MCHC RBC-ENTMCNC: 31.3 G/DL — LOW (ref 32–36)
MCV RBC AUTO: 91.4 FL — SIGNIFICANT CHANGE UP (ref 80–100)
NRBC BLD AUTO-RTO: 0 /100 WBCS — SIGNIFICANT CHANGE UP (ref 0–0)
PHOSPHATE SERPL-MCNC: 2.4 MG/DL — LOW (ref 2.5–4.5)
PLATELET # BLD AUTO: 437 K/UL — HIGH (ref 150–400)
POTASSIUM SERPL-MCNC: 3 MMOL/L — LOW (ref 3.5–5.3)
POTASSIUM SERPL-SCNC: 3 MMOL/L — LOW (ref 3.5–5.3)
RBC # BLD: 2.8 M/UL — LOW (ref 4.2–5.8)
RBC # FLD: 15.6 % — HIGH (ref 10.3–14.5)
SODIUM SERPL-SCNC: 137 MMOL/L — SIGNIFICANT CHANGE UP (ref 135–145)
WBC # BLD: 10.07 K/UL — SIGNIFICANT CHANGE UP (ref 3.8–10.5)
WBC # FLD AUTO: 10.07 K/UL — SIGNIFICANT CHANGE UP (ref 3.8–10.5)

## 2025-03-22 RX ORDER — TOUCHLESS CARE ZINC OXIDE PROTECTANT 20; 25 G/100G; G/100G
1 SPRAY TOPICAL DAILY
Refills: 0 | Status: DISCONTINUED | OUTPATIENT
Start: 2025-03-22 | End: 2025-03-25

## 2025-03-22 RX ADMIN — Medication 63.75 MILLIMOLE(S): at 19:42

## 2025-03-22 RX ADMIN — CARVEDILOL 6.25 MILLIGRAM(S): 3.12 TABLET, FILM COATED ORAL at 06:25

## 2025-03-22 RX ADMIN — OXYCODONE HYDROCHLORIDE 10 MILLIGRAM(S): 30 TABLET ORAL at 11:26

## 2025-03-22 RX ADMIN — Medication 975 MILLIGRAM(S): at 18:24

## 2025-03-22 RX ADMIN — OXYCODONE HYDROCHLORIDE 10 MILLIGRAM(S): 30 TABLET ORAL at 22:40

## 2025-03-22 RX ADMIN — Medication 6 MILLIGRAM(S): at 23:29

## 2025-03-22 RX ADMIN — INSULIN LISPRO 8 UNIT(S): 100 INJECTION, SOLUTION INTRAVENOUS; SUBCUTANEOUS at 09:06

## 2025-03-22 RX ADMIN — Medication 100 MILLIEQUIVALENT(S): at 15:52

## 2025-03-22 RX ADMIN — Medication 81 MILLIGRAM(S): at 11:36

## 2025-03-22 RX ADMIN — Medication 975 MILLIGRAM(S): at 12:36

## 2025-03-22 RX ADMIN — LOSARTAN POTASSIUM 100 MILLIGRAM(S): 100 TABLET, FILM COATED ORAL at 06:25

## 2025-03-22 RX ADMIN — Medication 975 MILLIGRAM(S): at 17:53

## 2025-03-22 RX ADMIN — GABAPENTIN 100 MILLIGRAM(S): 400 CAPSULE ORAL at 11:36

## 2025-03-22 RX ADMIN — Medication 40 MILLIGRAM(S): at 17:52

## 2025-03-22 RX ADMIN — OXYCODONE HYDROCHLORIDE 10 MILLIGRAM(S): 30 TABLET ORAL at 12:36

## 2025-03-22 RX ADMIN — ENOXAPARIN SODIUM 40 MILLIGRAM(S): 100 INJECTION SUBCUTANEOUS at 17:53

## 2025-03-22 RX ADMIN — TOUCHLESS CARE ZINC OXIDE PROTECTANT 1 APPLICATION(S): 20; 25 SPRAY TOPICAL at 11:37

## 2025-03-22 RX ADMIN — Medication 975 MILLIGRAM(S): at 23:29

## 2025-03-22 RX ADMIN — INSULIN LISPRO 8 UNIT(S): 100 INJECTION, SOLUTION INTRAVENOUS; SUBCUTANEOUS at 15:12

## 2025-03-22 RX ADMIN — Medication 100 MILLIEQUIVALENT(S): at 17:51

## 2025-03-22 RX ADMIN — Medication 40 MILLIGRAM(S): at 06:26

## 2025-03-22 RX ADMIN — Medication 975 MILLIGRAM(S): at 11:36

## 2025-03-22 RX ADMIN — CARVEDILOL 6.25 MILLIGRAM(S): 3.12 TABLET, FILM COATED ORAL at 17:52

## 2025-03-22 RX ADMIN — INSULIN LISPRO 1: 100 INJECTION, SOLUTION INTRAVENOUS; SUBCUTANEOUS at 17:51

## 2025-03-22 RX ADMIN — OXYCODONE HYDROCHLORIDE 10 MILLIGRAM(S): 30 TABLET ORAL at 21:40

## 2025-03-22 RX ADMIN — Medication 975 MILLIGRAM(S): at 06:25

## 2025-03-22 RX ADMIN — OXYCODONE HYDROCHLORIDE 10 MILLIGRAM(S): 30 TABLET ORAL at 01:08

## 2025-03-22 RX ADMIN — Medication 90 MILLIGRAM(S): at 06:25

## 2025-03-22 RX ADMIN — INSULIN LISPRO 8 UNIT(S): 100 INJECTION, SOLUTION INTRAVENOUS; SUBCUTANEOUS at 17:51

## 2025-03-22 RX ADMIN — Medication 975 MILLIGRAM(S): at 07:25

## 2025-03-22 RX ADMIN — CYANOCOBALAMIN 1000 MICROGRAM(S): 1000 INJECTION INTRAMUSCULAR; SUBCUTANEOUS at 11:37

## 2025-03-22 RX ADMIN — INSULIN GLARGINE-YFGN 14 UNIT(S): 100 INJECTION, SOLUTION SUBCUTANEOUS at 21:38

## 2025-03-22 RX ADMIN — Medication 1 TABLET(S): at 17:52

## 2025-03-22 RX ADMIN — OXYCODONE HYDROCHLORIDE 10 MILLIGRAM(S): 30 TABLET ORAL at 00:08

## 2025-03-22 RX ADMIN — Medication 100 MILLIEQUIVALENT(S): at 14:26

## 2025-03-22 NOTE — PROGRESS NOTE ADULT - SUBJECTIVE AND OBJECTIVE BOX
DATE OF SERVICE: 03-22-25 @ 13:58    Patient is a 70y old  Male who presents with a chief complaint of Iliac A dissection (21 Mar 2025 15:33)      INTERVAL HISTORY: NAD    REVIEW OF SYSTEMS:  CONSTITUTIONAL: No weakness  EYES/ENT: No visual changes;  No throat pain   NECK: No pain or stiffness  RESPIRATORY: No cough, wheezing; No shortness of breath  CARDIOVASCULAR: No chest pain or palpitations  GASTROINTESTINAL: No abdominal  pain. No nausea, vomiting, or hematemesis  GENITOURINARY: No dysuria, frequency or hematuria  NEUROLOGICAL: No stroke like symptoms  SKIN: No rashes      	  MEDICATIONS:  carvedilol 6.25 milliGRAM(s) Oral every 12 hours  losartan 100 milliGRAM(s) Oral daily  NIFEdipine XL 90 milliGRAM(s) Oral daily        PHYSICAL EXAM:  T(C): 36.7 (03-22-25 @ 13:44), Max: 36.9 (03-22-25 @ 05:56)  HR: 88 (03-22-25 @ 13:44) (88 - 107)  BP: 125/67 (03-22-25 @ 13:44) (105/64 - 131/72)  RR: 18 (03-22-25 @ 13:44) (18 - 18)  SpO2: 98% (03-22-25 @ 13:44) (98% - 100%)  Wt(kg): --  I&O's Summary    21 Mar 2025 07:01  -  22 Mar 2025 07:00  --------------------------------------------------------  IN: 920 mL / OUT: 1300 mL / NET: -380 mL    22 Mar 2025 07:01  -  22 Mar 2025 13:58  --------------------------------------------------------  IN: 0 mL / OUT: 900 mL / NET: -900 mL          Appearance: In no distress	  HEENT:    PERRL, EOMI	  Cardiovascular:  S1 S2, No JVD  Respiratory: Lungs clear to auscultation	  Gastrointestinal:  Soft, Non-tender, + BS	  Vascularature:  No edema of LE  Psychiatric: Appropriate affect   Neuro: no acute focal deficits                               8.0    10.07 )-----------( 437      ( 22 Mar 2025 07:22 )             25.6     03-22    137  |  100  |  8   ----------------------------<  123[H]  3.0[L]   |  21[L]  |  0.61    Ca    9.0      22 Mar 2025 07:22  Phos  2.4     03-22  Mg     1.9     03-22          Labs personally reviewed      ASSESSMENT/PLAN: 	    69M PM of former smoker (1.5 PPD % 50 years; quit 2 years ago),  HTN, HLD, DM, PAD and aortoiliac occlusive disease, who previously underwent an outpatient angiography complicated by right iliac artery dissection. Now S/P Creation of bypass from left femoral artery to distal tibial artery with RSVG and completion angiogram on 2/7/25 s/p 2u pRBC 7.4 from 7.8l not responded to 2nd unit on 2/12/25. EGD completed 2/13 found to have bleeding gastric ulcer w/ hemostasis achieved with cautery. Hgb found to be 6.1 and s/p 2u pRBC. Patient now w/ ischemic changes to LLE.    1. Cardiac Risk Stratification   - No chest pain/SOB  - EKG Sinus tach 117 bpm no ischemic changes   - TTE reviewed with pericardial effusion, unchanged compared to 2023  - Not in decompensated HF   - No hx of tachy/timothy arrhythmias   - No valvular abnormalities  - Moderate risk for moderate risk vascular surgery  - Optimized from cardiac perspective to proceed  - s/p AKA, tolerated well    2. Hypertension- bp better   - Losartan 100 mg QD   - Coreg 6.25mg BID    - Nifedipine 90mg qd    3. Hyperlipidemia   - Lipitor 40mg   - lipid profile     4. Diabetes Mellitus Type II   - FBG per protocol  - ISS   - A1C: 7.7%     5. CAD - TTE with Basal and mid inferior wall and basal inferoseptal segment are abnormal  - Discussed with pt, spouse and daughter in person, they do not know full name or number of his cardiologist to obtain his prior records from  - They deny he has any h/o MI, CP or SOB  - Advised OP follow up with his cards         JAVIER Rodriguez DO [facility]  Cardiovascular Medicine  [address] [place] [address], [address]  Office: [phone]  Available via call/text on [employer] Teams

## 2025-03-22 NOTE — PROGRESS NOTE ADULT - SUBJECTIVE AND OBJECTIVE BOX
SURGERY DAILY PROGRESS NOTE    24 Hour/Overnight Events: No acute events overnight    SUBJECTIVE: Patient seen and evaluated on AM rounds. Pt is resting comfortably in bed with no acute complaints. Tolerating diet.  Denies fevers/chills, chest pain, dyspnea, abdominal pain, nausea, vomiting, and diarrhea    ------------------------------------------------------------------------------------------------------------  OBJECTIVE:  Vital Signs Last 24 Hrs  T(C): 36.7 (22 Mar 2025 09:43), Max: 36.9 (22 Mar 2025 05:56)  T(F): 98.1 (22 Mar 2025 09:43), Max: 98.5 (22 Mar 2025 05:56)  HR: 94 (22 Mar 2025 09:43) (94 - 107)  BP: 108/65 (22 Mar 2025 09:43) (105/64 - 131/72)  BP(mean): --  RR: 18 (22 Mar 2025 09:43) (18 - 18)  SpO2: 98% (22 Mar 2025 09:43) (98% - 100%)    Parameters below as of 22 Mar 2025 09:43  Patient On (Oxygen Delivery Method): room air      I&O's Detail    21 Mar 2025 07:01  -  22 Mar 2025 07:00  --------------------------------------------------------  IN:    Oral Fluid: 920 mL  Total IN: 920 mL    OUT:    Voided (mL): 1300 mL  Total OUT: 1300 mL    Total NET: -380 mL      22 Mar 2025 07:01  -  22 Mar 2025 13:43  --------------------------------------------------------  IN:  Total IN: 0 mL    OUT:    Voided (mL): 900 mL  Total OUT: 900 mL    Total NET: -900 mL          LABS:                        8.0    10.07 )-----------( 437      ( 22 Mar 2025 07:22 )             25.6     03-22    137  |  100  |  8   ----------------------------<  123[H]  3.0[L]   |  21[L]  |  0.61    Ca    9.0      22 Mar 2025 07:22  Phos  2.4     03-22  Mg     1.9     03-22          Urinalysis Basic - ( 22 Mar 2025 07:22 )    Color: x / Appearance: x / SG: x / pH: x  Gluc: 123 mg/dL / Ketone: x  / Bili: x / Urobili: x   Blood: x / Protein: x / Nitrite: x   Leuk Esterase: x / RBC: x / WBC x   Sq Epi: x / Non Sq Epi: x / Bacteria: x    PHYSICAL EXAM:  Constitutional: sleepy but arouses to voice, appropriately responsive  Eyes: pupils 3mm and responsive to light  Chest: Symmetric chest rise bilaterally, normal work of breathing  Abdomen: Soft, nondistended  Extremities: left AKA stump is soft and appropriately tender to palpation without evidence of hematoma, dressing is clean and dry

## 2025-03-22 NOTE — PROGRESS NOTE ADULT - ASSESSMENT
69M PM of former smoker (1.5 PPD % 50 years; quit 2 years ago),  HTN, HLD, DM, PAD and aortoiliac occlusive disease, who previously underwent an outpatient angiography complicated by right iliac artery dissection. Now S/P Creation of bypass from left femoral artery to distal tibial artery with RSVG and completion angiogram on 2/7/25 s/p 2u pRBC 7.4 from 7.8l not responded to 2nd unit on 2/12/25. EGD completed 2/13 found to have bleeding gastric ulcer w/ hemostasis achieved with cautery. Hgb found to be 6.1 and s/p 2u pRBC. 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  Received 1uPRBC yesterday (hgb 6.7) with appropriate response now s/p L AKA on 3/18/25.      Plan:   - Monitor Hgb   - Dressing changed today, daily dressing changes    - CC diet   - Lovenox/Aspirin   - Diet: CC w Evening Snacks   - on insulin appreciate endocrine recs  - Pain regimen: Tylenol ATC,, weaned off PCA, now on oral Pain meds,   - Bowel regimen  - PT recs -> [facility]

## 2025-03-23 LAB
ANION GAP SERPL CALC-SCNC: 14 MMOL/L — SIGNIFICANT CHANGE UP (ref 5–17)
BUN SERPL-MCNC: 8 MG/DL — SIGNIFICANT CHANGE UP (ref 7–23)
CALCIUM SERPL-MCNC: 9 MG/DL — SIGNIFICANT CHANGE UP (ref 8.4–10.5)
CHLORIDE SERPL-SCNC: 103 MMOL/L — SIGNIFICANT CHANGE UP (ref 96–108)
CO2 SERPL-SCNC: 21 MMOL/L — LOW (ref 22–31)
CREAT SERPL-MCNC: 0.58 MG/DL — SIGNIFICANT CHANGE UP (ref 0.5–1.3)
EGFR: 104 ML/MIN/1.73M2 — SIGNIFICANT CHANGE UP
EGFR: 104 ML/MIN/1.73M2 — SIGNIFICANT CHANGE UP
GLUCOSE BLDC GLUCOMTR-MCNC: 166 MG/DL — HIGH (ref 70–99)
GLUCOSE BLDC GLUCOMTR-MCNC: 193 MG/DL — HIGH (ref 70–99)
GLUCOSE BLDC GLUCOMTR-MCNC: 197 MG/DL — HIGH (ref 70–99)
GLUCOSE BLDC GLUCOMTR-MCNC: 213 MG/DL — HIGH (ref 70–99)
GLUCOSE BLDC GLUCOMTR-MCNC: 214 MG/DL — HIGH (ref 70–99)
GLUCOSE SERPL-MCNC: 128 MG/DL — HIGH (ref 70–99)
HCT VFR BLD CALC: 26.1 % — LOW (ref 39–50)
HGB BLD-MCNC: 8.2 G/DL — LOW (ref 13–17)
MAGNESIUM SERPL-MCNC: 2 MG/DL — SIGNIFICANT CHANGE UP (ref 1.6–2.6)
MCHC RBC-ENTMCNC: 28.8 PG — SIGNIFICANT CHANGE UP (ref 27–34)
MCHC RBC-ENTMCNC: 31.4 G/DL — LOW (ref 32–36)
MCV RBC AUTO: 91.6 FL — SIGNIFICANT CHANGE UP (ref 80–100)
NRBC BLD AUTO-RTO: 0 /100 WBCS — SIGNIFICANT CHANGE UP (ref 0–0)
PHOSPHATE SERPL-MCNC: 2.9 MG/DL — SIGNIFICANT CHANGE UP (ref 2.5–4.5)
PLATELET # BLD AUTO: 436 K/UL — HIGH (ref 150–400)
POTASSIUM SERPL-MCNC: 3.3 MMOL/L — LOW (ref 3.5–5.3)
POTASSIUM SERPL-SCNC: 3.3 MMOL/L — LOW (ref 3.5–5.3)
RBC # BLD: 2.85 M/UL — LOW (ref 4.2–5.8)
RBC # FLD: 15.7 % — HIGH (ref 10.3–14.5)
SODIUM SERPL-SCNC: 138 MMOL/L — SIGNIFICANT CHANGE UP (ref 135–145)
WBC # BLD: 6.49 K/UL — SIGNIFICANT CHANGE UP (ref 3.8–10.5)
WBC # FLD AUTO: 6.49 K/UL — SIGNIFICANT CHANGE UP (ref 3.8–10.5)

## 2025-03-23 RX ORDER — INSULIN GLARGINE-YFGN 100 [IU]/ML
14 INJECTION, SOLUTION SUBCUTANEOUS AT BEDTIME
Refills: 0 | Status: DISCONTINUED | OUTPATIENT
Start: 2025-03-23 | End: 2025-03-25

## 2025-03-23 RX ADMIN — Medication 40 MILLIEQUIVALENT(S): at 13:10

## 2025-03-23 RX ADMIN — INSULIN LISPRO 8 UNIT(S): 100 INJECTION, SOLUTION INTRAVENOUS; SUBCUTANEOUS at 11:48

## 2025-03-23 RX ADMIN — Medication 1 TABLET(S): at 18:02

## 2025-03-23 RX ADMIN — Medication 40 MILLIGRAM(S): at 06:12

## 2025-03-23 RX ADMIN — Medication 100 MILLIEQUIVALENT(S): at 11:49

## 2025-03-23 RX ADMIN — Medication 90 MILLIGRAM(S): at 06:12

## 2025-03-23 RX ADMIN — INSULIN LISPRO 1: 100 INJECTION, SOLUTION INTRAVENOUS; SUBCUTANEOUS at 11:47

## 2025-03-23 RX ADMIN — GABAPENTIN 100 MILLIGRAM(S): 400 CAPSULE ORAL at 13:09

## 2025-03-23 RX ADMIN — Medication 975 MILLIGRAM(S): at 11:46

## 2025-03-23 RX ADMIN — Medication 6 MILLIGRAM(S): at 23:27

## 2025-03-23 RX ADMIN — Medication 40 MILLIEQUIVALENT(S): at 09:54

## 2025-03-23 RX ADMIN — OXYCODONE HYDROCHLORIDE 10 MILLIGRAM(S): 30 TABLET ORAL at 12:46

## 2025-03-23 RX ADMIN — Medication 975 MILLIGRAM(S): at 00:29

## 2025-03-23 RX ADMIN — INSULIN LISPRO 1: 100 INJECTION, SOLUTION INTRAVENOUS; SUBCUTANEOUS at 10:51

## 2025-03-23 RX ADMIN — Medication 975 MILLIGRAM(S): at 12:46

## 2025-03-23 RX ADMIN — Medication 975 MILLIGRAM(S): at 18:55

## 2025-03-23 RX ADMIN — LOSARTAN POTASSIUM 100 MILLIGRAM(S): 100 TABLET, FILM COATED ORAL at 06:11

## 2025-03-23 RX ADMIN — INSULIN LISPRO 8 UNIT(S): 100 INJECTION, SOLUTION INTRAVENOUS; SUBCUTANEOUS at 18:09

## 2025-03-23 RX ADMIN — Medication 975 MILLIGRAM(S): at 07:12

## 2025-03-23 RX ADMIN — ENOXAPARIN SODIUM 40 MILLIGRAM(S): 100 INJECTION SUBCUTANEOUS at 18:01

## 2025-03-23 RX ADMIN — Medication 975 MILLIGRAM(S): at 18:03

## 2025-03-23 RX ADMIN — TOUCHLESS CARE ZINC OXIDE PROTECTANT 1 APPLICATION(S): 20; 25 SPRAY TOPICAL at 11:47

## 2025-03-23 RX ADMIN — CARVEDILOL 6.25 MILLIGRAM(S): 3.12 TABLET, FILM COATED ORAL at 18:02

## 2025-03-23 RX ADMIN — INSULIN LISPRO 2: 100 INJECTION, SOLUTION INTRAVENOUS; SUBCUTANEOUS at 18:09

## 2025-03-23 RX ADMIN — Medication 975 MILLIGRAM(S): at 23:27

## 2025-03-23 RX ADMIN — Medication 40 MILLIGRAM(S): at 18:08

## 2025-03-23 RX ADMIN — Medication 81 MILLIGRAM(S): at 11:47

## 2025-03-23 RX ADMIN — CARVEDILOL 6.25 MILLIGRAM(S): 3.12 TABLET, FILM COATED ORAL at 06:11

## 2025-03-23 RX ADMIN — INSULIN LISPRO 8 UNIT(S): 100 INJECTION, SOLUTION INTRAVENOUS; SUBCUTANEOUS at 10:50

## 2025-03-23 RX ADMIN — CYANOCOBALAMIN 1000 MICROGRAM(S): 1000 INJECTION INTRAMUSCULAR; SUBCUTANEOUS at 11:47

## 2025-03-23 RX ADMIN — INSULIN GLARGINE-YFGN 14 UNIT(S): 100 INJECTION, SOLUTION SUBCUTANEOUS at 21:08

## 2025-03-23 RX ADMIN — Medication 975 MILLIGRAM(S): at 06:12

## 2025-03-23 RX ADMIN — OXYCODONE HYDROCHLORIDE 10 MILLIGRAM(S): 30 TABLET ORAL at 11:46

## 2025-03-23 NOTE — PROGRESS NOTE ADULT - SUBJECTIVE AND OBJECTIVE BOX
OVERNIGHT EVENTS: NAEO    SUBJECTIVE: Pt seen and examined at bedside. Patient comfortable and in no-apparent distress. Pain is controlled.     MEDICATIONS  (STANDING):  acetaminophen     Tablet .. 975 milliGRAM(s) Oral every 6 hours  aspirin  chewable 81 milliGRAM(s) Oral daily  carvedilol 6.25 milliGRAM(s) Oral every 12 hours  cyanocobalamin 1000 MICROGram(s) Oral daily  dextrose 5%. 1000 milliLiter(s) (50 mL/Hr) IV Continuous <Continuous>  dextrose 50% Injectable 25 Gram(s) IV Push once  dextrose 50% Injectable 12.5 Gram(s) IV Push once  enoxaparin Injectable 40 milliGRAM(s) SubCutaneous every 24 hours  gabapentin 100 milliGRAM(s) Oral daily  influenza  Vaccine (HIGH DOSE) 0.5 milliLiter(s) IntraMuscular once  insulin glargine Injectable (LANTUS) 14 Unit(s) SubCutaneous at bedtime  insulin lispro (ADMELOG) corrective regimen sliding scale   SubCutaneous three times a day before meals  insulin lispro (ADMELOG) corrective regimen sliding scale   SubCutaneous at bedtime  insulin lispro Injectable (ADMELOG) 8 Unit(s) SubCutaneous three times a day with meals  lactobacillus acidophilus 1 Tablet(s) Oral with dinner  losartan 100 milliGRAM(s) Oral daily  melatonin 6 milliGRAM(s) Oral at bedtime  NIFEdipine XL 90 milliGRAM(s) Oral daily  pantoprazole    Tablet 40 milliGRAM(s) Oral two times a day  polyethylene glycol 3350 17 Gram(s) Oral daily  potassium chloride    Tablet ER 40 milliEquivalent(s) Oral every 4 hours  potassium chloride  10 mEq/100 mL IVPB 10 milliEquivalent(s) IV Intermittent once  senna 1 Tablet(s) Oral at bedtime  zinc oxide 40% Paste 1 Application(s) Topical daily    MEDICATIONS  (PRN):  bisacodyl 5 milliGRAM(s) Oral daily PRN Constipation  dextrose Oral Gel 15 Gram(s) Oral once PRN Blood Glucose LESS THAN 70 milliGRAM(s)/deciliter  HYDROmorphone  Injectable 0.5 milliGRAM(s) IV Push every 6 hours PRN for breakthrough pain  nalbuphine Injectable 2.5 milliGRAM(s) IV Push every 6 hours PRN Pruritus  naloxone Injectable 0.1 milliGRAM(s) IV Push every 3 minutes PRN For ANY of the following changes in patient status:  A. RR LESS THAN 10 breaths per minute, B. Oxygen saturation LESS THAN 90%, C. Sedation score of 6  ondansetron Injectable 4 milliGRAM(s) IV Push every 6 hours PRN Nausea  oxyCODONE    IR 5 milliGRAM(s) Oral every 6 hours PRN Moderate Pain (4 - 6)  oxyCODONE    IR 10 milliGRAM(s) Oral every 6 hours PRN Severe Pain (7 - 10)    T(C): 36.7 (03-23-25 @ 09:27), Max: 37 (03-22-25 @ 21:28)  HR: 83 (03-23-25 @ 09:27) (83 - 101)  BP: 131/71 (03-23-25 @ 09:27) (114/68 - 171/82)  RR: 18 (03-23-25 @ 09:27) (18 - 18)  SpO2: 98% (03-23-25 @ 09:27) (96% - 99%)    03-22-25 @ 07:01  -  03-23-25 @ 07:00  --------------------------------------------------------  IN: 720 mL / OUT: 2100 mL / NET: -1380 mL    03-23-25 @ 07:01  -  03-23-25 @ 10:56  --------------------------------------------------------  IN: 120 mL / OUT: 0 mL / NET: 120 mL      LABS:                        8.2    6.49  )-----------( 436      ( 23 Mar 2025 07:19 )             26.1     03-23    138  |  103  |  8   ----------------------------<  128[H]  3.3[L]   |  21[L]  |  0.58    Ca    9.0      23 Mar 2025 07:18  Phos  2.9     03-23  Mg     2.0     03-23        Urinalysis Basic - ( 23 Mar 2025 07:18 )    Color: x / Appearance: x / SG: x / pH: x  Gluc: 128 mg/dL / Ketone: x  / Bili: x / Urobili: x   Blood: x / Protein: x / Nitrite: x   Leuk Esterase: x / RBC: x / WBC x   Sq Epi: x / Non Sq Epi: x / Bacteria: x      PE:  Gen: NAD  CV: Pulse regular present  Resp: Nonlabored  Abdomen: Soft, nontender  Extremities: left AKA stump is soft and appropriately tender to palpation without evidence of hematoma, dressing is clean and dry

## 2025-03-23 NOTE — PROGRESS NOTE ADULT - ASSESSMENT
69M PM of former smoker (1.5 PPD % 50 years; quit 2 years ago),  HTN, HLD, DM, PAD and aortoiliac occlusive disease, who previously underwent an outpatient angiography complicated by right iliac artery dissection. Now S/P Creation of bypass from left femoral artery to distal tibial artery with RSVG and completion angiogram on 2/7/25 s/p 2u pRBC 7.4 from 7.8l not responded to 2nd unit on 2/12/25. EGD completed 2/13 found to have bleeding gastric ulcer w/ hemostasis achieved with cautery. Hgb found to be 6.1 and s/p 2u pRBC. 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  Received 1uPRBC 3/19 (hgb 6.6) with appropriate response now s/p L AKA on 3/18/25.      Plan:   - Monitor Hgb   - Dressing changed today, daily dressing changes    - CC diet   - Lovenox/Aspirin   - Diet: CC w Evening Snacks   - on insulin appreciate endocrine recs  - Pain regimen: Tylenol ATC,, weaned off PCA, now on oral Pain meds,   - Bowel regimen  - PT recs -> [facility]    Vascular Surgery   [id number]

## 2025-03-23 NOTE — PROGRESS NOTE ADULT - SUBJECTIVE AND OBJECTIVE BOX
DATE OF SERVICE: 03-23-25 @ 16:06    Patient is a 70y old  Male who presents with a chief complaint of Iliac A dissection (21 Mar 2025 15:33)      INTERVAL HISTORY: NAD    REVIEW OF SYSTEMS:  CONSTITUTIONAL: No weakness  EYES/ENT: No visual changes;  No throat pain   NECK: No pain or stiffness  RESPIRATORY: No cough, wheezing; No shortness of breath  CARDIOVASCULAR: No chest pain or palpitations  GASTROINTESTINAL: No abdominal  pain. No nausea, vomiting, or hematemesis  GENITOURINARY: No dysuria, frequency or hematuria  NEUROLOGICAL: No stroke like symptoms  SKIN: No rashes  	  MEDICATIONS:  carvedilol 6.25 milliGRAM(s) Oral every 12 hours  losartan 100 milliGRAM(s) Oral daily  NIFEdipine XL 90 milliGRAM(s) Oral daily        PHYSICAL EXAM:  T(C): 36.8 (03-23-25 @ 13:15), Max: 37 (03-22-25 @ 21:28)  HR: 94 (03-23-25 @ 13:15) (83 - 101)  BP: 122/55 (03-23-25 @ 13:15) (114/68 - 171/82)  RR: 18 (03-23-25 @ 13:15) (18 - 18)  SpO2: 99% (03-23-25 @ 13:15) (96% - 99%)  Wt(kg): --  I&O's Summary    22 Mar 2025 07:01  -  23 Mar 2025 07:00  --------------------------------------------------------  IN: 720 mL / OUT: 2100 mL / NET: -1380 mL    23 Mar 2025 07:01  -  23 Mar 2025 16:06  --------------------------------------------------------  IN: 280 mL / OUT: 400 mL / NET: -120 mL          Appearance: In no distress	  HEENT:    PERRL, EOMI	  Cardiovascular:  S1 S2, No JVD  Respiratory: Lungs clear to auscultation	  Gastrointestinal:  Soft, Non-tender, + BS	  Vascularature:  No edema of LE  Psychiatric: Appropriate affect   Neuro: no acute focal deficits                               8.2    6.49  )-----------( 436      ( 23 Mar 2025 07:19 )             26.1     03-23    138  |  103  |  8   ----------------------------<  128[H]  3.3[L]   |  21[L]  |  0.58    Ca    9.0      23 Mar 2025 07:18  Phos  2.9     03-23  Mg     2.0     03-23          Labs personally reviewed      ASSESSMENT/PLAN: 	      69M PM of former smoker (1.5 PPD % 50 years; quit 2 years ago),  HTN, HLD, DM, PAD and aortoiliac occlusive disease, who previously underwent an outpatient angiography complicated by right iliac artery dissection. Now S/P Creation of bypass from left femoral artery to distal tibial artery with RSVG and completion angiogram on 2/7/25 s/p 2u pRBC 7.4 from 7.8l not responded to 2nd unit on 2/12/25. EGD completed 2/13 found to have bleeding gastric ulcer w/ hemostasis achieved with cautery. Hgb found to be 6.1 and s/p 2u pRBC. Patient now w/ ischemic changes to LLE.    1. Cardiac Risk Stratification   - No chest pain/SOB  - EKG Sinus tach 117 bpm no ischemic changes   - TTE reviewed with pericardial effusion, unchanged compared to 2023  - Not in decompensated HF   - No hx of tachy/timothy arrhythmias   - No valvular abnormalities  - Moderate risk for moderate risk vascular surgery  - Optimized from cardiac perspective to proceed  - s/p AKA, tolerated well    2. Hypertension- bp better   - Losartan 100 mg QD   - Coreg 6.25mg BID    - Nifedipine 90mg qd    3. Hyperlipidemia   - Lipitor 40mg   - lipid profile     4. Diabetes Mellitus Type II   - FBG per protocol  - ISS   - A1C: 7.7%     5. CAD - TTE with Basal and mid inferior wall and basal inferoseptal segment are abnormal  - Discussed with pt, spouse and daughter in person, they do not know full name or number of his cardiologist to obtain his prior records from  - They deny he has any h/o MI, CP or SOB  - Advised OP follow up with his cards         JAVIER Rodriguez,  [facility]  Cardiovascular Medicine  [address] [place] [address], [address]  Office: [phone]  Available via call/text on [employer] Teams

## 2025-03-24 LAB
ANION GAP SERPL CALC-SCNC: 14 MMOL/L — SIGNIFICANT CHANGE UP (ref 5–17)
BUN SERPL-MCNC: 8 MG/DL — SIGNIFICANT CHANGE UP (ref 7–23)
CALCIUM SERPL-MCNC: 9.2 MG/DL — SIGNIFICANT CHANGE UP (ref 8.4–10.5)
CHLORIDE SERPL-SCNC: 102 MMOL/L — SIGNIFICANT CHANGE UP (ref 96–108)
CO2 SERPL-SCNC: 20 MMOL/L — LOW (ref 22–31)
CREAT SERPL-MCNC: 0.59 MG/DL — SIGNIFICANT CHANGE UP (ref 0.5–1.3)
EGFR: 103 ML/MIN/1.73M2 — SIGNIFICANT CHANGE UP
EGFR: 103 ML/MIN/1.73M2 — SIGNIFICANT CHANGE UP
FLUAV AG NPH QL: SIGNIFICANT CHANGE UP
FLUBV AG NPH QL: SIGNIFICANT CHANGE UP
GLUCOSE BLDC GLUCOMTR-MCNC: 105 MG/DL — HIGH (ref 70–99)
GLUCOSE BLDC GLUCOMTR-MCNC: 149 MG/DL — HIGH (ref 70–99)
GLUCOSE BLDC GLUCOMTR-MCNC: 171 MG/DL — HIGH (ref 70–99)
GLUCOSE BLDC GLUCOMTR-MCNC: 211 MG/DL — HIGH (ref 70–99)
GLUCOSE SERPL-MCNC: 139 MG/DL — HIGH (ref 70–99)
HCT VFR BLD CALC: 26.3 % — LOW (ref 39–50)
HGB BLD-MCNC: 8.2 G/DL — LOW (ref 13–17)
MAGNESIUM SERPL-MCNC: 1.9 MG/DL — SIGNIFICANT CHANGE UP (ref 1.6–2.6)
MCHC RBC-ENTMCNC: 28.3 PG — SIGNIFICANT CHANGE UP (ref 27–34)
MCHC RBC-ENTMCNC: 31.2 G/DL — LOW (ref 32–36)
MCV RBC AUTO: 90.7 FL — SIGNIFICANT CHANGE UP (ref 80–100)
NRBC BLD AUTO-RTO: 0 /100 WBCS — SIGNIFICANT CHANGE UP (ref 0–0)
PHOSPHATE SERPL-MCNC: 2.8 MG/DL — SIGNIFICANT CHANGE UP (ref 2.5–4.5)
PLATELET # BLD AUTO: 467 K/UL — HIGH (ref 150–400)
POTASSIUM SERPL-MCNC: 4.2 MMOL/L — SIGNIFICANT CHANGE UP (ref 3.5–5.3)
POTASSIUM SERPL-SCNC: 4.2 MMOL/L — SIGNIFICANT CHANGE UP (ref 3.5–5.3)
RBC # BLD: 2.9 M/UL — LOW (ref 4.2–5.8)
RBC # FLD: 15.7 % — HIGH (ref 10.3–14.5)
RSV RNA NPH QL NAA+NON-PROBE: SIGNIFICANT CHANGE UP
SARS-COV-2 RNA SPEC QL NAA+PROBE: SIGNIFICANT CHANGE UP
SODIUM SERPL-SCNC: 136 MMOL/L — SIGNIFICANT CHANGE UP (ref 135–145)
SOURCE RESPIRATORY: SIGNIFICANT CHANGE UP
SURGICAL PATHOLOGY STUDY: SIGNIFICANT CHANGE UP
WBC # BLD: 8.02 K/UL — SIGNIFICANT CHANGE UP (ref 3.8–10.5)
WBC # FLD AUTO: 8.02 K/UL — SIGNIFICANT CHANGE UP (ref 3.8–10.5)

## 2025-03-24 PROCEDURE — 99232 SBSQ HOSP IP/OBS MODERATE 35: CPT

## 2025-03-24 RX ORDER — HYDROMORPHONE/SOD CHLOR,ISO/PF 2 MG/10 ML
0.2 SYRINGE (ML) INJECTION ONCE
Refills: 0 | Status: DISCONTINUED | OUTPATIENT
Start: 2025-03-24 | End: 2025-03-24

## 2025-03-24 RX ADMIN — Medication 975 MILLIGRAM(S): at 12:49

## 2025-03-24 RX ADMIN — OXYCODONE HYDROCHLORIDE 10 MILLIGRAM(S): 30 TABLET ORAL at 06:23

## 2025-03-24 RX ADMIN — Medication 975 MILLIGRAM(S): at 05:09

## 2025-03-24 RX ADMIN — Medication 975 MILLIGRAM(S): at 17:02

## 2025-03-24 RX ADMIN — CARVEDILOL 6.25 MILLIGRAM(S): 3.12 TABLET, FILM COATED ORAL at 17:05

## 2025-03-24 RX ADMIN — INSULIN LISPRO 1: 100 INJECTION, SOLUTION INTRAVENOUS; SUBCUTANEOUS at 17:25

## 2025-03-24 RX ADMIN — Medication 0.2 MILLIGRAM(S): at 09:43

## 2025-03-24 RX ADMIN — Medication 975 MILLIGRAM(S): at 00:27

## 2025-03-24 RX ADMIN — INSULIN LISPRO 8 UNIT(S): 100 INJECTION, SOLUTION INTRAVENOUS; SUBCUTANEOUS at 09:09

## 2025-03-24 RX ADMIN — CARVEDILOL 6.25 MILLIGRAM(S): 3.12 TABLET, FILM COATED ORAL at 05:09

## 2025-03-24 RX ADMIN — Medication 975 MILLIGRAM(S): at 06:09

## 2025-03-24 RX ADMIN — CYANOCOBALAMIN 1000 MICROGRAM(S): 1000 INJECTION INTRAMUSCULAR; SUBCUTANEOUS at 12:50

## 2025-03-24 RX ADMIN — Medication 0.2 MILLIGRAM(S): at 10:13

## 2025-03-24 RX ADMIN — Medication 6 MILLIGRAM(S): at 21:48

## 2025-03-24 RX ADMIN — Medication 1 TABLET(S): at 17:03

## 2025-03-24 RX ADMIN — Medication 975 MILLIGRAM(S): at 13:49

## 2025-03-24 RX ADMIN — INSULIN GLARGINE-YFGN 14 UNIT(S): 100 INJECTION, SOLUTION SUBCUTANEOUS at 22:36

## 2025-03-24 RX ADMIN — INSULIN LISPRO 8 UNIT(S): 100 INJECTION, SOLUTION INTRAVENOUS; SUBCUTANEOUS at 13:35

## 2025-03-24 RX ADMIN — Medication 975 MILLIGRAM(S): at 23:05

## 2025-03-24 RX ADMIN — TOUCHLESS CARE ZINC OXIDE PROTECTANT 1 APPLICATION(S): 20; 25 SPRAY TOPICAL at 12:54

## 2025-03-24 RX ADMIN — Medication 81 MILLIGRAM(S): at 12:50

## 2025-03-24 RX ADMIN — Medication 40 MILLIGRAM(S): at 05:09

## 2025-03-24 RX ADMIN — Medication 40 MILLIGRAM(S): at 17:03

## 2025-03-24 RX ADMIN — INSULIN LISPRO 8 UNIT(S): 100 INJECTION, SOLUTION INTRAVENOUS; SUBCUTANEOUS at 17:25

## 2025-03-24 RX ADMIN — LOSARTAN POTASSIUM 100 MILLIGRAM(S): 100 TABLET, FILM COATED ORAL at 05:09

## 2025-03-24 RX ADMIN — Medication 90 MILLIGRAM(S): at 05:09

## 2025-03-24 RX ADMIN — OXYCODONE HYDROCHLORIDE 10 MILLIGRAM(S): 30 TABLET ORAL at 17:02

## 2025-03-24 RX ADMIN — ENOXAPARIN SODIUM 40 MILLIGRAM(S): 100 INJECTION SUBCUTANEOUS at 17:04

## 2025-03-24 RX ADMIN — GABAPENTIN 100 MILLIGRAM(S): 400 CAPSULE ORAL at 12:50

## 2025-03-24 NOTE — PROGRESS NOTE ADULT - REASON FOR ADMISSION
Vascular surgery
S/p L BKA
Iliac A dissection
S/p L BKA
Iliac A dissection
Iliac A dissection
S/p L BKA

## 2025-03-24 NOTE — PROGRESS NOTE ADULT - ASSESSMENT
69M PM of former smoker (1.5 PPD % 50 years; quit 2 years ago),  HTN, HLD, DM, PAD and aortoiliac occlusive disease, who previously underwent an outpatient angiography complicated by right iliac artery dissection. Now S/P Creation of bypass from left femoral artery to distal tibial artery with RSVG and completion angiogram on 2/7/25 s/p 2u pRBC 7.4 from 7.8l not responded to 2nd unit on 2/12/25. EGD completed 2/13 found to have bleeding gastric ulcer w/ hemostasis achieved with cautery. Hgb found to be 6.1 and s/p 2u pRBC. 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  Received 1uPRBC 3/19 (hgb 6.6) with appropriate response now s/p L AKA on 3/18/25.      Plan:   - Monitor Hgb   - Dressing changed today, daily dressing changes    - CC diet   - Lovenox/Aspirin   - Diet: CC w Evening Snacks   - on insulin appreciate endocrine recs  - Pain regimen: Tylenol ATC,, weaned off PCA, now on oral Pain meds,   - Bowel regimen  - PT recs -> [facility]    Vascular Surgery   [id number]

## 2025-03-24 NOTE — PROGRESS NOTE ADULT - NSPROGADDITIONALINFOA_GEN_ALL_CORE
Contact via Microsoft Teams during business hours  To reach covering provider access AMION via sunrise tools  For Urgent matters/after-hours/weekends/holidays please page endocrine fellow on call   For nonurgent matters please email [email or web address]    Please note that this patient may be followed by different provider tomorrow.  Notify endocrine 24 hours prior to discharge for final recommendations
Contact via Microsoft Teams during business hours  To reach covering provider access AMION via sunrise tools  For Urgent matters/after-hours/weekends/holidays please page endocrine fellow on call   For nonurgent matters please email [email or web address]    Please note that this patient may be followed by different provider tomorrow.  Notify endocrine 24 hours prior to discharge for final recommendations
Contact via Microsoft Teams during business hours  To reach covering provider access AMION via sunrise tools  For Urgent matters/after-hours/weekends/holidays please page endocrine fellow on call   For nonurgent matters please email [email or web address]    Please note that this patient may be followed by different provider tomorrow.  Notify endocrine 24 hours prior to discharge for final recommendations
Contact via Microsoft Teams during business hours  To reach covering provider access AMION via sunrise tools  For Urgent matters/after-hours/weekends/holidays please page endocrine fellow on call   For nonurgent matters please email [email or web address]    Please note that this patient may be followed by different provider tomorrow.  Notify endocrine 24 hours prior to discharge for final recommendations
Contact via Microsoft Teams during business hours  To reach covering provider access AMION via sunrise tools  For Urgent matters/after-hours/weekends/holidays please page endocrine fellow on call   For nonurgent matters please email [email or web address]    Please note that this patient may be followed by different provider tomorrow.  Notify endocrine 24 hours prior to discharge for final recommendations
Contact via Microsoft Teams during business hours  To reach covering provider access AMION via sunrise tools  For Urgent matters/after-hours/weekends/holidays please page endocrine fellow on call   For nonurgent matters please email [email or web address]    Please note that this patient may be followed by different provider tomorrow.  Notify endocrine 24 hours prior to discharge for final recommendations
Contact via Microsoft Teams during business hours  To reach covering provider access AMION via sunrise tools  For Urgent matters/after-hours/weekends/holidays please page endocrine fellow on call   For nonurgent matters please email [email or web address]    Please note that this patient may be followed by different provider tomorrow.  Notify endocrine 24 hours prior to discharge for final recommendations
Contact via Microsoft Teams during business hours  To reach covering provider access AMION via sunrise tools  For Urgent matters/after-hours/weekends/holidays please page endocrine fellow on call   For nonurgent matters please email [email or web address]    Please note that this patient may be followed by different provider tomorrow.  Notify endocrine 24 hours prior to discharge for final recommendations
Contact via Microsoft Teams during business hours  To reach covering provider access AMION via sunrise tools  For Urgent matters/after-hours/weekends/holidays please page endocrine fellow on call   For nonurgent matters please email [email or web address]    Please note that this patient may be followed by different provider tomorrow.  Notify endocrine 24 hours prior to discharge for final recommendations
Contact via Microsoft Teams during business hours  To reach covering provider access AMION via sunrise tools  For Urgent matters/after-hours/weekends/holidays please page endocrine fellow on call   For nonurgent matters please email [email or web address]    Please note that this patient may be followed by different provider tomorrow.  Notify endocrine 24 hours prior to discharge for final recommendations
Contact via Microsoft Teams during business hours  To reach covering provider access AMION via sunrise tools  For Urgent matters/after-hours/weekends/holidays please page endocrine fellow on call   For nonurgent matters please email [email or web address]    Please note that this patient may be followed by different provider tomorrow.  Notify endocrine 24 hours prior to discharge for final recommendations

## 2025-03-24 NOTE — PROGRESS NOTE ADULT - SUBJECTIVE AND OBJECTIVE BOX
Chief Complaint: Diabetes Mellitus follow up    INTERVAL HX:  Patient seen at bedside. He continues to have variable appetite with variable oral intake.  States he hasn't eaten breakfast yet today but does  plan on eating this morning. BG over the last 24 hrs have been mostly within goal range 100-180mg/dl. No hypoglycemia.     Review of Systems:  General: As above  GI: No nausea, vomiting  Endocrine: no  S&Sx of hypoglycemia    Allergies    Advil (Rash)    Intolerances      MEDICATIONS  (STANDING):  acetaminophen     Tablet .. 975 milliGRAM(s) Oral every 6 hours  aspirin  chewable 81 milliGRAM(s) Oral daily  carvedilol 6.25 milliGRAM(s) Oral every 12 hours  cyanocobalamin 1000 MICROGram(s) Oral daily  dextrose 5%. 1000 milliLiter(s) (50 mL/Hr) IV Continuous <Continuous>  dextrose 50% Injectable 25 Gram(s) IV Push once  dextrose 50% Injectable 12.5 Gram(s) IV Push once  enoxaparin Injectable 40 milliGRAM(s) SubCutaneous every 24 hours  gabapentin 100 milliGRAM(s) Oral daily  influenza  Vaccine (HIGH DOSE) 0.5 milliLiter(s) IntraMuscular once  insulin glargine Injectable (LANTUS) 14 Unit(s) SubCutaneous at bedtime  insulin lispro (ADMELOG) corrective regimen sliding scale   SubCutaneous three times a day before meals  insulin lispro (ADMELOG) corrective regimen sliding scale   SubCutaneous at bedtime  insulin lispro Injectable (ADMELOG) 8 Unit(s) SubCutaneous three times a day with meals  lactobacillus acidophilus 1 Tablet(s) Oral with dinner  losartan 100 milliGRAM(s) Oral daily  melatonin 6 milliGRAM(s) Oral at bedtime  NIFEdipine XL 90 milliGRAM(s) Oral daily  pantoprazole    Tablet 40 milliGRAM(s) Oral two times a day  polyethylene glycol 3350 17 Gram(s) Oral daily  senna 1 Tablet(s) Oral at bedtime  zinc oxide 40% Paste 1 Application(s) Topical daily        insulin glargine Injectable (LANTUS)   14 Unit(s) SubCutaneous (03-23-25 @ 21:08)    insulin lispro (ADMELOG) corrective regimen sliding scale   2 Unit(s) SubCutaneous (03-23-25 @ 18:09)    insulin lispro Injectable (ADMELOG)   8 Unit(s) SubCutaneous (03-24-25 @ 09:09)   8 Unit(s) SubCutaneous (03-23-25 @ 18:09)        PHYSICAL EXAM:  VITALS: T(C): 36.8 (03-24-25 @ 09:02)  T(F): 98.3 (03-24-25 @ 09:02), Max: 98.9 (03-24-25 @ 05:03)  HR: 89 (03-24-25 @ 09:02) (89 - 100)  BP: 146/74 (03-24-25 @ 09:02) (122/55 - 169/76)  RR:  (16 - 18)  SpO2:  (97% - 99%)  Wt(kg): --  GENERAL: NAD  Respiratory: Respirations unlabored   Extremities: Warm, no edema,L AKA  NEURO: Alert , appropriate     LABS:  POCT Blood Glucose.: 149 mg/dL (03-24-25 @ 08:28)  POCT Blood Glucose.: 197 mg/dL (03-23-25 @ 20:59)  POCT Blood Glucose.: 213 mg/dL (03-23-25 @ 17:32)  POCT Blood Glucose.: 193 mg/dL (03-23-25 @ 11:41)  POCT Blood Glucose.: 166 mg/dL (03-23-25 @ 10:49)  POCT Blood Glucose.: 214 mg/dL (03-23-25 @ 08:54)  POCT Blood Glucose.: 212 mg/dL (03-22-25 @ 21:25)  POCT Blood Glucose.: 520 mg/dL (03-22-25 @ 21:23)  POCT Blood Glucose.: 171 mg/dL (03-22-25 @ 17:32)  POCT Blood Glucose.: 106 mg/dL (03-22-25 @ 14:37)  POCT Blood Glucose.: 132 mg/dL (03-22-25 @ 08:34)  POCT Blood Glucose.: 250 mg/dL (03-21-25 @ 21:07)  POCT Blood Glucose.: 188 mg/dL (03-21-25 @ 17:24)                          8.2    8.02  )-----------( 467      ( 24 Mar 2025 07:07 )             26.3     03-24    136  |  102  |  8   ----------------------------<  139[H]  4.2   |  20[L]  |  0.59    Ca    9.2      24 Mar 2025 07:06  Phos  2.8     03-24  Mg     1.9     03-24      eGFR: 103 mL/min/1.73m2 (24 Mar 2025 07:06)      Thyroid Function Tests:      A1C with Estimated Average Glucose Result: 7.7 % (01-28-25 @ 10:05)    Estimated Average Glucose: 174 mg/dL (01-28-25 @ 10:05)        Diet, Consistent Carbohydrate w/Evening Snack:   Supplement Feeding Modality:  Oral  Glucerna Shake Cans or Servings Per Day:  3       Frequency:  Daily (03-18-25 @ 16:51) [Active]

## 2025-03-24 NOTE — PROGRESS NOTE ADULT - NUTRITIONAL ASSESSMENT
Diet, Consistent Carbohydrate Clear Liquid (02-13-25 @ 09:59) [Active]
Diet, Consistent Carbohydrate w/Evening Snack:   Supplement Feeding Modality:  Oral  Glucerna Shake Cans or Servings Per Day:  1       Frequency:  Daily (02-28-25 @ 09:51) [Active]
Diet, Consistent Carbohydrate w/Evening Snack:   Supplement Feeding Modality:  Oral  Glucerna Shake Cans or Servings Per Day:  1       Frequency:  Daily (02-28-25 @ 09:51) [Active]
Diet, Consistent Carbohydrate w/Evening Snack:   Supplement Feeding Modality:  Oral  Glucerna Shake Cans or Servings Per Day:  3       Frequency:  Daily (03-02-25 @ 12:58) [Active]
Diet, Consistent Carbohydrate w/Evening Snack:   Supplement Feeding Modality:  Oral  Glucerna Shake Cans or Servings Per Day:  3       Frequency:  Daily (03-18-25 @ 16:51) [Active]
Diet, Consistent Carbohydrate w/Evening Snack:   Supplement Feeding Modality:  Oral  Glucerna Shake Cans or Servings Per Day:  3       Frequency:  Daily (03-18-25 @ 16:51) [Active]
Diet, Consistent Carbohydrate w/Evening Snack:   Supplement Feeding Modality:  Oral  Glucerna Shake Cans or Servings Per Day:  3       Frequency:  Daily (03-02-25 @ 12:58) [Active]
Diet, Consistent Carbohydrate w/Evening Snack:   Supplement Feeding Modality:  Oral  Glucerna Shake Cans or Servings Per Day:  3       Frequency:  Daily (03-18-25 @ 16:51) [Active]
Diet, Consistent Carbohydrate w/Evening Snack:   Supplement Feeding Modality:  Oral  Glucerna Shake Cans or Servings Per Day:  1       Frequency:  Daily (02-28-25 @ 09:51) [Active]
Diet, Consistent Carbohydrate Clear Liquid (02-13-25 @ 09:59) [Active]
Diet, Consistent Carbohydrate w/Evening Snack:   Supplement Feeding Modality:  Oral  Glucerna Shake Cans or Servings Per Day:  3       Frequency:  Daily (03-02-25 @ 12:58) [Active]

## 2025-03-24 NOTE — PROGRESS NOTE ADULT - PROBLEM SELECTOR PLAN 2
- Goal BP <130/80  - Management as per primary team  - check urine microalbumin level as outpatient
Blood Pressure Goal: 130/80  To be managed by the primary care team
- Goal BP <130/80  - Management as per primary team  - check urine microalbumin level as outpatient

## 2025-03-24 NOTE — PROGRESS NOTE ADULT - SUBJECTIVE AND OBJECTIVE BOX
DATE OF SERVICE: 03-24-25 @ 16:18    Patient is a 70y old  Male who presents with a chief complaint of Iliac A dissection (24 Mar 2025 12:25)      INTERVAL HISTORY: no acute events noted    REVIEW OF SYSTEMS:  CONSTITUTIONAL: No weakness  EYES/ENT: No visual changes;  No throat pain   NECK: No pain or stiffness  RESPIRATORY: No cough, wheezing; No shortness of breath  CARDIOVASCULAR: No chest pain or palpitations  GASTROINTESTINAL: No abdominal  pain. No nausea, vomiting, or hematemesis  GENITOURINARY: No dysuria, frequency or hematuria  NEUROLOGICAL: No stroke like symptoms  SKIN: No rashes    TELEMETRY Personally reviewed:  	  MEDICATIONS:  carvedilol 6.25 milliGRAM(s) Oral every 12 hours  losartan 100 milliGRAM(s) Oral daily  NIFEdipine XL 90 milliGRAM(s) Oral daily        PHYSICAL EXAM:  T(C): 36.6 (03-24-25 @ 14:20), Max: 37.2 (03-24-25 @ 05:03)  HR: 95 (03-24-25 @ 14:20) (89 - 100)  BP: 138/74 (03-24-25 @ 14:20) (130/74 - 169/76)  RR: 18 (03-24-25 @ 14:20) (16 - 18)  SpO2: 100% (03-24-25 @ 14:20) (97% - 100%)  Wt(kg): --  I&O's Summary    23 Mar 2025 07:01  -  24 Mar 2025 07:00  --------------------------------------------------------  IN: 880 mL / OUT: 1250 mL / NET: -370 mL    24 Mar 2025 07:01  -  24 Mar 2025 16:18  --------------------------------------------------------  IN: 600 mL / OUT: 450 mL / NET: 150 mL          Appearance: In no distress	  HEENT:    PERRL, EOMI	  Cardiovascular:  S1 S2, No JVD  Respiratory: Lungs clear to auscultation	  Gastrointestinal:  Soft, Non-tender, + BS	  Vascularature:  No edema of LE  Psychiatric: Appropriate affect   Neuro: no acute focal deficits                               8.2    8.02  )-----------( 467      ( 24 Mar 2025 07:07 )             26.3     03-24    136  |  102  |  8   ----------------------------<  139[H]  4.2   |  20[L]  |  0.59    Ca    9.2      24 Mar 2025 07:06  Phos  2.8     03-24  Mg     1.9     03-24          Labs personally reviewed      ASSESSMENT/PLAN: 	    69M PM of former smoker (1.5 PPD % 50 years; quit 2 years ago),  HTN, HLD, DM, PAD and aortoiliac occlusive disease, who previously underwent an outpatient angiography complicated by right iliac artery dissection. Now S/P Creation of bypass from left femoral artery to distal tibial artery with RSVG and completion angiogram on 2/7/25 s/p 2u pRBC 7.4 from 7.8l not responded to 2nd unit on 2/12/25. EGD completed 2/13 found to have bleeding gastric ulcer w/ hemostasis achieved with cautery. Hgb found to be 6.1 and s/p 2u pRBC. Patient now w/ ischemic changes to LLE.    1. Cardiac Risk Stratification   - No chest pain/SOB  - EKG Sinus tach 117 bpm no ischemic changes   - TTE reviewed with pericardial effusion, unchanged compared to 2023  - Not in decompensated HF   - No hx of tachy/timothy arrhythmias   - No valvular abnormalities  - Moderate risk for moderate risk vascular surgery  - Optimized from cardiac perspective to proceed  - s/p AKA, tolerated well    2. Hypertension- bp better   - Losartan 100 mg QD   - Coreg 6.25mg BID    - Nifedipine 90mg qd    3. Hyperlipidemia   - Lipitor 40mg   - lipid profile     4. Diabetes Mellitus Type II   - FBG per protocol  - ISS   - A1C: 7.7%     5. CAD - TTE with Basal and mid inferior wall and basal inferoseptal segment are abnormal  - Discussed with pt, spouse and daughter in person, they do not know full name or number of his cardiologist to obtain his prior records from  - They deny he has any h/o MI, CP or SOB  - Advised OP follow up with his cards           Iolani Behrbom, AG-RINA Dawkins,  [facility]  Cardiovascular Medicine  [address] [place] [address], [address]  Available through call or text on [employer]  Office: [phone]

## 2025-03-24 NOTE — PROGRESS NOTE ADULT - PROBLEM SELECTOR PLAN 1
Inpatient Plan:  - Check BG TID AC and HS while on PO diet   - C/w Lantus 14u QHS  - C/w Admelog 8u TID AC (HOLD if NPO or eating <50% of meal)  - C/w low dose Admelog correctional scales TID AC and HS   Discharge Planning:   TO Rehab  Continue to check BGs AC/HS  Continue Lantus 14 QHS  Continue Admelog 8 units with each meal, hold if not eating.  doses may need to be adjusted in rehab.   Once home recommend:  STOP Jardiance due to PAD/Left LE amputation and Pioglitazone.   Switch Jentadueto to Metformin 1gram twice daily  Consider GLP-1 receptor agonist; the patient confirmed no personal or family history of pancreatitis or medullary thyroid cancer and is willing to perform weekly injection IF appetite improves prior to d/c. Otherwise may need basal insulin with metformin.  - Patient should check BG at least once daily, FBG. Please tell patient to contact Endocrinologist if BG <70mg/dL x1 or >200mg/dL consistently or >400mg/dL x1.   - Endocrine follow up: can establish care at [address] [place] [address] Endocrinology ([phone])- please provide in d/c paperwork for patient to make higinio. Will add to appt list closer to d/c.   - Recommend routine outpatient ophthalmology and podiatry follow up.  - Please send prescriptions for diabetes supplies if patient needs (glucometer, test strips, lancets, alcohol swabs).
Inpatient Plan:  - Check BG TID AC and HS while on PO diet   - C/w Lantus 14u QHS  - C/w Admelog 8u TID AC (HOLD if NPO or eating <50% of meal)  - C/w low dose Admelog correctional scales TID AC and HS   Discharge Planning:   TO Rehab  Continue to check BGs AC/HS  Continue Lantus 14 QHS  Continue Admelog 8 units with each meal, hold if not eating.  doses may need to be adjusted in rehab.   Once home recommend:  STOP Jardiance due to PAD/Left LE amputation and Pioglitazone.   Switch Jentadueto to Metformin 1gram twice daily  Consider GLP-1 receptor agonist; the patient confirmed no personal or family history of pancreatitis or medullary thyroid cancer and is willing to perform weekly injection IF appetite improves prior to d/c. Otherwise may need basal insulin with metformin.  - Patient should check BG at least once daily, FBG. Please tell patient to contact Endocrinologist if BG <70mg/dL x1 or >200mg/dL consistently or >400mg/dL x1.   - Endocrine follow up: can establish care at [address] [place] [address] Endocrinology ([phone])- please provide in d/c paperwork for patient to make higinio. Will add to appt list closer to d/c.   - Recommend routine outpatient ophthalmology and podiatry follow up.  - Please send prescriptions for diabetes supplies if patient needs (glucometer, test strips, lancets, alcohol swabs).
Inpatient Plan:  - Check BG TID AC and HS while on PO diet   - C/w Lantus 16u QHS  - Adjust Admelog to 14u TID AC (HOLD if NPO or eating <50% of meal)  - C/w low dose Admelog correctional scales TID AC and HS     Discharge Planning:   - Depending on inpatient clinical course, likely resume oral medications, specifically Jardiance and Pioglitazone. It is also worth considering switching from Jentadueto to Metformin with a GLP-1 receptor agonist; the patient confirmed no personal or family history of pancreatitis or medullary thyroid cancer and is willing to perform weekly injection -> as long as appetite improves prior to d/c.   - Patient should check BG at least once daily, FBG. Please tell patient to contact Endocrinologist if BG <70mg/dL x1 or >200mg/dL consistently or >400mg/dL x1.   - Endocrine follow up: can establish care at [address] [place] [address] Endocrinology ([phone])- please provide in d/c paperwork for patient to make higinio. Will add to appt list closer to d/c.   - Recommend routine outpatient ophthalmology and podiatry follow up.  - Please send prescriptions for diabetes supplies if patient needs (glucometer, test strips, lancets, alcohol swabs).
Inpatient Plan:  - Check BG TID AC and HS while on PO diet   - Adjust Lantus to 13u QHS  - Adjust Admelog to 5u TID AC (HOLD if NPO or eating <50% of meal)  - C/w low dose Admelog correctional scales TID AC and HS     Discharge Planning:   - Depending on inpatient clinical course, likely resume oral medications, however would stop Jardiance due to PAD/Left LE amputation,  Pioglitazone. and would suggest considering switching from Jentadueto to Metformin with a GLP-1 receptor agonist; the patient confirmed no personal or family history of pancreatitis or medullary thyroid cancer and is willing to perform weekly injection -> as long as appetite improves prior to d/c.   - Patient should check BG at least once daily, FBG. Please tell patient to contact Endocrinologist if BG <70mg/dL x1 or >200mg/dL consistently or >400mg/dL x1.   - Endocrine follow up: can establish care at [address] [place] [address] ([phone])- please provide in d/c paperwork for patient to make higinio. Will add to appt list closer to d/c.   - Recommend routine outpatient ophthalmology and podiatry follow up.  - Please send prescriptions for diabetes supplies if patient needs (glucometer, test strips, lancets, alcohol swabs).
Inpatient Plan:  - Check BG TID AC and HS while on PO diet   - C/w Lantus 14u QHS  - C/w Admelog 8u TID AC (HOLD if NPO or eating <50% of meal)  - C/w low dose Admelog correctional scales TID AC and HS   - Please keep all IV abx in NS solution if possible     Discharge Planning:   - Depending on inpatient clinical course, likely resume oral medications, however would stop Jardiance due to PAD/Left LE amputation,  Pioglitazone. and would suggest considering switching from Jentadueto to Metformin with a GLP-1 receptor agonist; the patient confirmed no personal or family history of pancreatitis or medullary thyroid cancer and is willing to perform weekly injection -> as long as appetite improves prior to d/c.   - Patient should check BG at least once daily, FBG. Please tell patient to contact Endocrinologist if BG <70mg/dL x1 or >200mg/dL consistently or >400mg/dL x1.   - Endocrine follow up: can establish care at [address] [place] [address] ([phone])- please provide in d/c paperwork for patient to make higinio. Will add to appt list closer to d/c.   - Recommend routine outpatient ophthalmology and podiatry follow up.  - Please send prescriptions for diabetes supplies if patient needs (glucometer, test strips, lancets, alcohol swabs).
Inpatient Plan:  - Check BG TID AC and HS while on PO diet   - C/w Lantus 16u QHS  - Adjust Admelog to 14u TID AC (HOLD if NPO or eating <50% of meal)  - C/w low dose Admelog correctional scales TID AC and HS     Discharge Planning:   - Depending on inpatient clinical course, likely resume oral medications, however would stop Jardiance due to PAD/Left LE amputation,  Pioglitazone. and would suggest considering switching from Jentadueto to Metformin with a GLP-1 receptor agonist; the patient confirmed no personal or family history of pancreatitis or medullary thyroid cancer and is willing to perform weekly injection -> as long as appetite improves prior to d/c.   - Patient should check BG at least once daily, FBG. Please tell patient to contact Endocrinologist if BG <70mg/dL x1 or >200mg/dL consistently or >400mg/dL x1.   - Endocrine follow up: can establish care at [address] [place] [address] Endocrinology ([phone])- please provide in d/c paperwork for patient to make higinio. Will add to appt list closer to d/c.   - Recommend routine outpatient ophthalmology and podiatry follow up.  - Please send prescriptions for diabetes supplies if patient needs (glucometer, test strips, lancets, alcohol swabs).
Inpatient Plan:  - Check BG TID AC and HS while on PO diet   - C/w Lantus 14u QHS  - Adjust Admelog to 8u TID AC (HOLD if NPO or eating <50% of meal)  - C/w low dose Admelog correctional scales TID AC and HS   - Please keep all IV abx in NS solution if possible     Discharge Planning:   - Depending on inpatient clinical course, likely resume oral medications, however would stop Jardiance due to PAD/Left LE amputation,  Pioglitazone. and would suggest considering switching from Jentadueto to Metformin with a GLP-1 receptor agonist; the patient confirmed no personal or family history of pancreatitis or medullary thyroid cancer and is willing to perform weekly injection -> as long as appetite improves prior to d/c.   - Patient should check BG at least once daily, FBG. Please tell patient to contact Endocrinologist if BG <70mg/dL x1 or >200mg/dL consistently or >400mg/dL x1.   - Endocrine follow up: can establish care at [address] [place] [address] ([phone])- please provide in d/c paperwork for patient to make higinio. Will add to appt list closer to d/c.   - Recommend routine outpatient ophthalmology and podiatry follow up.  - Please send prescriptions for diabetes supplies if patient needs (glucometer, test strips, lancets, alcohol swabs).
Inpatient Plan:  - Check BG TID AC and HS while on PO diet   - Increase Lantus to 14 units QHS  - Continue Admelog to 5 units TID AC (HOLD if NPO or eating <50% of meal)  - C/w low dose Admelog correctional scales TID AC and HS     Discharge Planning:   - Depending on inpatient clinical course, likely resume oral medications, however would stop Jardiance due to PAD/Left LE amputation,  Pioglitazone. and would suggest considering switching from Jentadueto to Metformin with a GLP-1 receptor agonist; the patient confirmed no personal or family history of pancreatitis or medullary thyroid cancer and is willing to perform weekly injection -> as long as appetite improves prior to d/c.   - Patient should check BG at least once daily, FBG. Please tell patient to contact Endocrinologist if BG <70mg/dL x1 or >200mg/dL consistently or >400mg/dL x1.   - Endocrine follow up: can establish care at [address] [place] [address] Endocrinology ([phone])- please provide in d/c paperwork for patient to make higinio. Will add to appt list closer to d/c.   - Recommend routine outpatient ophthalmology and podiatry follow up.  - Please send prescriptions for diabetes supplies if patient needs (glucometer, test strips, lancets, alcohol swabs).
-test BG AC/HS  -Decrease Lantus 15 units QHS. If BG tightly controlled all day today can reduce to 8 units tonight  -c/w Admelog 7 units AC meals for now, if trending down will adjust further  -Continue low correction scale Lispro at meal times and a separate low correction scale Lispro at bedtime and 2 am     Discharge Planning:   Likely resume oral medications, specifically Jardiance and Pioglitazone. It is also worth considering switching from Jentadueto to Metformin with a GLP-1 receptor agonist. Trulicity 0.75 mg weekly should be sent to the pharmacy to verify insurance coverage. The patient confirmed no personal or family history of pancreatitis or medullary thyroid cancer. pt is willing to do weekly injection   Will consider GLP1 only if patient tolerating POs well  Pt should monitor BGs at least BID while on oral medications. Contact PCP or endocrinologist if BG < 70 X1, > 400 X1 or consistently > 200  Can follow up with [facility] Endocrinology - [address] [place] [address], [address]. [place] [address]  Tel) [phone]   Outpatient routine ophthalmology and podiatry evaluation
Inpatient Plan:  - Check BG TID AC and HS while on PO diet   - C/w Lantus 14units QHS  - Adjust Admelog to 5u TID AC (HOLD if NPO or eating <50% of meal)  - C/w low dose Admelog correctional scales TID AC and HS     Discharge Planning:   - Depending on inpatient clinical course, likely resume oral medications, however would stop Jardiance due to PAD/Left LE amputation,  Pioglitazone. and would suggest considering switching from Jentadueto to Metformin with a GLP-1 receptor agonist; the patient confirmed no personal or family history of pancreatitis or medullary thyroid cancer and is willing to perform weekly injection -> as long as appetite improves prior to d/c.   - Patient should check BG at least once daily, FBG. Please tell patient to contact Endocrinologist if BG <70mg/dL x1 or >200mg/dL consistently or >400mg/dL x1.   - Endocrine follow up: can establish care at [address] [place] [address] Endocrinology ([phone])- please provide in d/c paperwork for patient to make higinio. Will add to appt list closer to d/c.   - Recommend routine outpatient ophthalmology and podiatry follow up.  - Please send prescriptions for diabetes supplies if patient needs (glucometer, test strips, lancets, alcohol swabs).
Inpatient Plan:  - Check BG TID AC and HS while on PO diet   - Continue Lantus to 13u QHS  - Continue Admelog to 5u TID AC (HOLD if NPO or eating <50% of meal)  - C/w low dose Admelog correctional scales TID AC and HS     Discharge Planning:   - Depending on inpatient clinical course, likely resume oral medications, however would stop Jardiance due to PAD/Left LE amputation,  Pioglitazone. and would suggest considering switching from Jentadueto to Metformin with a GLP-1 receptor agonist; the patient confirmed no personal or family history of pancreatitis or medullary thyroid cancer and is willing to perform weekly injection -> as long as appetite improves prior to d/c.   - Patient should check BG at least once daily, FBG. Please tell patient to contact Endocrinologist if BG <70mg/dL x1 or >200mg/dL consistently or >400mg/dL x1.   - Endocrine follow up: can establish care at [address] [place] [address] ([phone])- please provide in d/c paperwork for patient to make higinio. Will add to appt list closer to d/c.   - Recommend routine outpatient ophthalmology and podiatry follow up.  - Please send prescriptions for diabetes supplies if patient needs (glucometer, test strips, lancets, alcohol swabs).
Inpatient Plan:  - Check BG TID AC and HS while on PO diet   - Decrease Lantus to 10units for NPO after Midnight  - Continue Admelog to 8u TID AC (HOLD if NPO or eating <50% of meal)  - C/w low dose Admelog correctional scales TID AC and HS   - Please keep all IV abx in NS solution if possible     Discharge Planning:   - Depending on inpatient clinical course, likely resume oral medications, however would stop Jardiance due to PAD/Left LE amputation,  Pioglitazone. and would suggest considering switching from Jentadueto to Metformin with a GLP-1 receptor agonist; the patient confirmed no personal or family history of pancreatitis or medullary thyroid cancer and is willing to perform weekly injection -> as long as appetite improves prior to d/c.   - Patient should check BG at least once daily, FBG. Please tell patient to contact Endocrinologist if BG <70mg/dL x1 or >200mg/dL consistently or >400mg/dL x1.   - Endocrine follow up: can establish care at [address] [place] [address] Endocrinology ([phone])- please provide in d/c paperwork for patient to make higinio. Will add to appt list closer to d/c.   - Recommend routine outpatient ophthalmology and podiatry follow up.  - Please send prescriptions for diabetes supplies if patient needs (glucometer, test strips, lancets, alcohol swabs).

## 2025-03-24 NOTE — PROGRESS NOTE ADULT - PROBLEM SELECTOR PROBLEM 2
Benign essential HTN

## 2025-03-24 NOTE — PROGRESS NOTE ADULT - PROBLEM SELECTOR PLAN 3
Continue Atorvastatin 40 mg at bedtime  Goal LDL: <70 mg/dL  Management to continue as an outpatient
Continue Atorvastatin 40 mg at bedtime  Goal LDL: <70 mg/dL  Management to continue as an outpatient      Any inquiries please email [email or web address]   office:  [phone] (M-F 9a-5pm)               [phone] (nights/weekends)   Can access Amion coverage via sunrise/tools
Continue Atorvastatin 40 mg at bedtime  Goal LDL: <70 mg/dL  Management to continue as an outpatient

## 2025-03-24 NOTE — PROGRESS NOTE ADULT - PROBLEM SELECTOR PROBLEM 3
HLD (hyperlipidemia)
within normal limits

## 2025-03-24 NOTE — PROGRESS NOTE ADULT - SUBJECTIVE AND OBJECTIVE BOX
OVERNIGHT EVENTS: NAEO    SUBJECTIVE: Pt seen and examined at bedside. Patient comfortable and in no-apparent distress. Pain is controlled.     MEDICATIONS  (STANDING):  acetaminophen     Tablet .. 975 milliGRAM(s) Oral every 6 hours  aspirin  chewable 81 milliGRAM(s) Oral daily  carvedilol 6.25 milliGRAM(s) Oral every 12 hours  cyanocobalamin 1000 MICROGram(s) Oral daily  dextrose 5%. 1000 milliLiter(s) (50 mL/Hr) IV Continuous <Continuous>  dextrose 50% Injectable 25 Gram(s) IV Push once  dextrose 50% Injectable 12.5 Gram(s) IV Push once  enoxaparin Injectable 40 milliGRAM(s) SubCutaneous every 24 hours  gabapentin 100 milliGRAM(s) Oral daily  influenza  Vaccine (HIGH DOSE) 0.5 milliLiter(s) IntraMuscular once  insulin glargine Injectable (LANTUS) 14 Unit(s) SubCutaneous at bedtime  insulin lispro (ADMELOG) corrective regimen sliding scale   SubCutaneous three times a day before meals  insulin lispro (ADMELOG) corrective regimen sliding scale   SubCutaneous at bedtime  insulin lispro Injectable (ADMELOG) 8 Unit(s) SubCutaneous three times a day with meals  lactobacillus acidophilus 1 Tablet(s) Oral with dinner  losartan 100 milliGRAM(s) Oral daily  melatonin 6 milliGRAM(s) Oral at bedtime  NIFEdipine XL 90 milliGRAM(s) Oral daily  pantoprazole    Tablet 40 milliGRAM(s) Oral two times a day  polyethylene glycol 3350 17 Gram(s) Oral daily  senna 1 Tablet(s) Oral at bedtime  zinc oxide 40% Paste 1 Application(s) Topical daily    MEDICATIONS  (PRN):  bisacodyl 5 milliGRAM(s) Oral daily PRN Constipation  dextrose Oral Gel 15 Gram(s) Oral once PRN Blood Glucose LESS THAN 70 milliGRAM(s)/deciliter  HYDROmorphone  Injectable 0.5 milliGRAM(s) IV Push every 6 hours PRN for breakthrough pain  nalbuphine Injectable 2.5 milliGRAM(s) IV Push every 6 hours PRN Pruritus  naloxone Injectable 0.1 milliGRAM(s) IV Push every 3 minutes PRN For ANY of the following changes in patient status:  A. RR LESS THAN 10 breaths per minute, B. Oxygen saturation LESS THAN 90%, C. Sedation score of 6  ondansetron Injectable 4 milliGRAM(s) IV Push every 6 hours PRN Nausea  oxyCODONE    IR 10 milliGRAM(s) Oral every 6 hours PRN Severe Pain (7 - 10)  oxyCODONE    IR 5 milliGRAM(s) Oral every 6 hours PRN Moderate Pain (4 - 6)    T(C): 37.2 (03-24-25 @ 05:03), Max: 37.2 (03-24-25 @ 05:03)  HR: 98 (03-24-25 @ 05:03) (83 - 100)  BP: 169/76 (03-24-25 @ 05:03) (122/55 - 169/76)  RR: 16 (03-24-25 @ 05:03) (16 - 18)  SpO2: 99% (03-24-25 @ 05:03) (97% - 99%)    03-23-25 @ 07:01  -  03-24-25 @ 07:00  --------------------------------------------------------  IN: 880 mL / OUT: 1250 mL / NET: -370 mL      LABS:                        8.2    8.02  )-----------( 467      ( 24 Mar 2025 07:07 )             26.3     03-23    138  |  103  |  8   ----------------------------<  128[H]  3.3[L]   |  21[L]  |  0.58    Ca    9.0      23 Mar 2025 07:18  Phos  2.9     03-23  Mg     2.0     03-23        Urinalysis Basic - ( 23 Mar 2025 07:18 )    Color: x / Appearance: x / SG: x / pH: x  Gluc: 128 mg/dL / Ketone: x  / Bili: x / Urobili: x   Blood: x / Protein: x / Nitrite: x   Leuk Esterase: x / RBC: x / WBC x   Sq Epi: x / Non Sq Epi: x / Bacteria: x      PE:  Gen: NAD  CV: Pulse regular present  Resp: Nonlabored  Abdomen: Soft, nontender  Extremities: left AKA stump is soft and appropriately tender to palpation without evidence of hematoma, dressing is clean and dry

## 2025-03-25 ENCOUNTER — TRANSCRIPTION ENCOUNTER (OUTPATIENT)
Age: 70
End: 2025-03-25

## 2025-03-25 VITALS
HEART RATE: 105 BPM | TEMPERATURE: 98 F | DIASTOLIC BLOOD PRESSURE: 72 MMHG | RESPIRATION RATE: 18 BRPM | SYSTOLIC BLOOD PRESSURE: 119 MMHG | OXYGEN SATURATION: 99 %

## 2025-03-25 LAB
ANION GAP SERPL CALC-SCNC: 15 MMOL/L — SIGNIFICANT CHANGE UP (ref 5–17)
BUN SERPL-MCNC: 11 MG/DL — SIGNIFICANT CHANGE UP (ref 7–23)
CALCIUM SERPL-MCNC: 9.3 MG/DL — SIGNIFICANT CHANGE UP (ref 8.4–10.5)
CHLORIDE SERPL-SCNC: 102 MMOL/L — SIGNIFICANT CHANGE UP (ref 96–108)
CO2 SERPL-SCNC: 20 MMOL/L — LOW (ref 22–31)
CREAT SERPL-MCNC: 0.66 MG/DL — SIGNIFICANT CHANGE UP (ref 0.5–1.3)
EGFR: 100 ML/MIN/1.73M2 — SIGNIFICANT CHANGE UP
EGFR: 100 ML/MIN/1.73M2 — SIGNIFICANT CHANGE UP
GLUCOSE BLDC GLUCOMTR-MCNC: 106 MG/DL — HIGH (ref 70–99)
GLUCOSE BLDC GLUCOMTR-MCNC: 181 MG/DL — HIGH (ref 70–99)
GLUCOSE SERPL-MCNC: 100 MG/DL — HIGH (ref 70–99)
HCT VFR BLD CALC: 27 % — LOW (ref 39–50)
HGB BLD-MCNC: 8.5 G/DL — LOW (ref 13–17)
MAGNESIUM SERPL-MCNC: 1.9 MG/DL — SIGNIFICANT CHANGE UP (ref 1.6–2.6)
MCHC RBC-ENTMCNC: 28.5 PG — SIGNIFICANT CHANGE UP (ref 27–34)
MCHC RBC-ENTMCNC: 31.5 G/DL — LOW (ref 32–36)
MCV RBC AUTO: 90.6 FL — SIGNIFICANT CHANGE UP (ref 80–100)
NRBC BLD AUTO-RTO: 0 /100 WBCS — SIGNIFICANT CHANGE UP (ref 0–0)
PHOSPHATE SERPL-MCNC: 3.9 MG/DL — SIGNIFICANT CHANGE UP (ref 2.5–4.5)
PLATELET # BLD AUTO: 504 K/UL — HIGH (ref 150–400)
POTASSIUM SERPL-MCNC: 3.7 MMOL/L — SIGNIFICANT CHANGE UP (ref 3.5–5.3)
POTASSIUM SERPL-SCNC: 3.7 MMOL/L — SIGNIFICANT CHANGE UP (ref 3.5–5.3)
RBC # BLD: 2.98 M/UL — LOW (ref 4.2–5.8)
RBC # FLD: 15.8 % — HIGH (ref 10.3–14.5)
SODIUM SERPL-SCNC: 137 MMOL/L — SIGNIFICANT CHANGE UP (ref 135–145)
WBC # BLD: 8.32 K/UL — SIGNIFICANT CHANGE UP (ref 3.8–10.5)
WBC # FLD AUTO: 8.32 K/UL — SIGNIFICANT CHANGE UP (ref 3.8–10.5)

## 2025-03-25 PROCEDURE — 71045 X-RAY EXAM CHEST 1 VIEW: CPT

## 2025-03-25 PROCEDURE — 86900 BLOOD TYPING SEROLOGIC ABO: CPT

## 2025-03-25 PROCEDURE — 93356 MYOCRD STRAIN IMG SPCKL TRCK: CPT

## 2025-03-25 PROCEDURE — 83010 ASSAY OF HAPTOGLOBIN QUANT: CPT

## 2025-03-25 PROCEDURE — 86850 RBC ANTIBODY SCREEN: CPT

## 2025-03-25 PROCEDURE — C1876: CPT

## 2025-03-25 PROCEDURE — 87641 MR-STAPH DNA AMP PROBE: CPT

## 2025-03-25 PROCEDURE — 85018 HEMOGLOBIN: CPT

## 2025-03-25 PROCEDURE — 87507 IADNA-DNA/RNA PROBE TQ 12-25: CPT

## 2025-03-25 PROCEDURE — 84132 ASSAY OF SERUM POTASSIUM: CPT

## 2025-03-25 PROCEDURE — C1725: CPT

## 2025-03-25 PROCEDURE — 73590 X-RAY EXAM OF LOWER LEG: CPT

## 2025-03-25 PROCEDURE — 97161 PT EVAL LOW COMPLEX 20 MIN: CPT

## 2025-03-25 PROCEDURE — 82247 BILIRUBIN TOTAL: CPT

## 2025-03-25 PROCEDURE — 85027 COMPLETE CBC AUTOMATED: CPT

## 2025-03-25 PROCEDURE — 81001 URINALYSIS AUTO W/SCOPE: CPT

## 2025-03-25 PROCEDURE — 83735 ASSAY OF MAGNESIUM: CPT

## 2025-03-25 PROCEDURE — 84295 ASSAY OF SERUM SODIUM: CPT

## 2025-03-25 PROCEDURE — 93306 TTE W/DOPPLER COMPLETE: CPT

## 2025-03-25 PROCEDURE — 83605 ASSAY OF LACTIC ACID: CPT

## 2025-03-25 PROCEDURE — 80048 BASIC METABOLIC PNL TOTAL CA: CPT

## 2025-03-25 PROCEDURE — 87640 STAPH A DNA AMP PROBE: CPT

## 2025-03-25 PROCEDURE — 85610 PROTHROMBIN TIME: CPT

## 2025-03-25 PROCEDURE — 82435 ASSAY OF BLOOD CHLORIDE: CPT

## 2025-03-25 PROCEDURE — 36600 WITHDRAWAL OF ARTERIAL BLOOD: CPT

## 2025-03-25 PROCEDURE — 83540 ASSAY OF IRON: CPT

## 2025-03-25 PROCEDURE — C1894: CPT

## 2025-03-25 PROCEDURE — 76000 FLUOROSCOPY <1 HR PHYS/QHP: CPT

## 2025-03-25 PROCEDURE — P9016: CPT

## 2025-03-25 PROCEDURE — 97112 NEUROMUSCULAR REEDUCATION: CPT

## 2025-03-25 PROCEDURE — 88307 TISSUE EXAM BY PATHOLOGIST: CPT

## 2025-03-25 PROCEDURE — 82274 ASSAY TEST FOR BLOOD FECAL: CPT

## 2025-03-25 PROCEDURE — C9399: CPT

## 2025-03-25 PROCEDURE — 85730 THROMBOPLASTIN TIME PARTIAL: CPT

## 2025-03-25 PROCEDURE — 82550 ASSAY OF CK (CPK): CPT

## 2025-03-25 PROCEDURE — 86923 COMPATIBILITY TEST ELECTRIC: CPT

## 2025-03-25 PROCEDURE — P9040: CPT

## 2025-03-25 PROCEDURE — C1874: CPT

## 2025-03-25 PROCEDURE — 80202 ASSAY OF VANCOMYCIN: CPT

## 2025-03-25 PROCEDURE — 97116 GAIT TRAINING THERAPY: CPT

## 2025-03-25 PROCEDURE — 97110 THERAPEUTIC EXERCISES: CPT

## 2025-03-25 PROCEDURE — 84100 ASSAY OF PHOSPHORUS: CPT

## 2025-03-25 PROCEDURE — C1889: CPT

## 2025-03-25 PROCEDURE — 87637 SARSCOV2&INF A&B&RSV AMP PRB: CPT

## 2025-03-25 PROCEDURE — C1769: CPT

## 2025-03-25 PROCEDURE — 82248 BILIRUBIN DIRECT: CPT

## 2025-03-25 PROCEDURE — 82947 ASSAY GLUCOSE BLOOD QUANT: CPT

## 2025-03-25 PROCEDURE — 82330 ASSAY OF CALCIUM: CPT

## 2025-03-25 PROCEDURE — 82803 BLOOD GASES ANY COMBINATION: CPT

## 2025-03-25 PROCEDURE — 36430 TRANSFUSION BLD/BLD COMPNT: CPT

## 2025-03-25 PROCEDURE — 82962 GLUCOSE BLOOD TEST: CPT

## 2025-03-25 PROCEDURE — 87040 BLOOD CULTURE FOR BACTERIA: CPT

## 2025-03-25 PROCEDURE — 83550 IRON BINDING TEST: CPT

## 2025-03-25 PROCEDURE — 73552 X-RAY EXAM OF FEMUR 2/>: CPT

## 2025-03-25 PROCEDURE — 82272 OCCULT BLD FECES 1-3 TESTS: CPT

## 2025-03-25 PROCEDURE — 86901 BLOOD TYPING SEROLOGIC RH(D): CPT

## 2025-03-25 PROCEDURE — 83615 LACTATE (LD) (LDH) ENZYME: CPT

## 2025-03-25 PROCEDURE — 85014 HEMATOCRIT: CPT

## 2025-03-25 PROCEDURE — 87338 HPYLORI STOOL AG IA: CPT

## 2025-03-25 PROCEDURE — 36415 COLL VENOUS BLD VENIPUNCTURE: CPT

## 2025-03-25 PROCEDURE — 93005 ELECTROCARDIOGRAM TRACING: CPT

## 2025-03-25 RX ORDER — ATORVASTATIN CALCIUM 80 MG/1
1 TABLET, FILM COATED ORAL
Qty: 0 | Refills: 0 | DISCHARGE

## 2025-03-25 RX ORDER — INSULIN GLARGINE-YFGN 100 [IU]/ML
14 INJECTION, SOLUTION SUBCUTANEOUS
Qty: 0 | Refills: 0 | DISCHARGE
Start: 2025-03-25

## 2025-03-25 RX ORDER — OXYCODONE HYDROCHLORIDE 30 MG/1
1 TABLET ORAL
Qty: 0 | Refills: 0 | DISCHARGE
Start: 2025-03-25

## 2025-03-25 RX ORDER — METFORMIN HYDROCHLORIDE 850 MG/1
1 TABLET ORAL
Qty: 0 | Refills: 0 | DISCHARGE

## 2025-03-25 RX ORDER — CARVEDILOL 3.12 MG/1
1 TABLET, FILM COATED ORAL
Qty: 0 | Refills: 0 | DISCHARGE

## 2025-03-25 RX ORDER — INSULIN LISPRO 100 U/ML
8 INJECTION, SOLUTION INTRAVENOUS; SUBCUTANEOUS
Qty: 0 | Refills: 0 | DISCHARGE

## 2025-03-25 RX ORDER — LOSARTAN POTASSIUM 100 MG/1
1 TABLET, FILM COATED ORAL
Qty: 0 | Refills: 0 | DISCHARGE

## 2025-03-25 RX ORDER — NIFEDIPINE 30 MG
1 TABLET, EXTENDED RELEASE 24 HR ORAL
Qty: 0 | Refills: 0 | DISCHARGE

## 2025-03-25 RX ADMIN — INSULIN LISPRO 8 UNIT(S): 100 INJECTION, SOLUTION INTRAVENOUS; SUBCUTANEOUS at 12:50

## 2025-03-25 RX ADMIN — OXYCODONE HYDROCHLORIDE 10 MILLIGRAM(S): 30 TABLET ORAL at 07:44

## 2025-03-25 RX ADMIN — GABAPENTIN 100 MILLIGRAM(S): 400 CAPSULE ORAL at 12:43

## 2025-03-25 RX ADMIN — Medication 975 MILLIGRAM(S): at 00:05

## 2025-03-25 RX ADMIN — LOSARTAN POTASSIUM 100 MILLIGRAM(S): 100 TABLET, FILM COATED ORAL at 05:07

## 2025-03-25 RX ADMIN — INSULIN LISPRO 1: 100 INJECTION, SOLUTION INTRAVENOUS; SUBCUTANEOUS at 12:50

## 2025-03-25 RX ADMIN — Medication 975 MILLIGRAM(S): at 06:06

## 2025-03-25 RX ADMIN — TOUCHLESS CARE ZINC OXIDE PROTECTANT 1 APPLICATION(S): 20; 25 SPRAY TOPICAL at 12:45

## 2025-03-25 RX ADMIN — Medication 40 MILLIGRAM(S): at 05:07

## 2025-03-25 RX ADMIN — Medication 975 MILLIGRAM(S): at 05:06

## 2025-03-25 RX ADMIN — Medication 90 MILLIGRAM(S): at 05:07

## 2025-03-25 RX ADMIN — Medication 975 MILLIGRAM(S): at 12:43

## 2025-03-25 RX ADMIN — CYANOCOBALAMIN 1000 MICROGRAM(S): 1000 INJECTION INTRAMUSCULAR; SUBCUTANEOUS at 12:43

## 2025-03-25 RX ADMIN — Medication 81 MILLIGRAM(S): at 12:43

## 2025-03-25 RX ADMIN — CARVEDILOL 6.25 MILLIGRAM(S): 3.12 TABLET, FILM COATED ORAL at 05:07

## 2025-03-25 RX ADMIN — OXYCODONE HYDROCHLORIDE 10 MILLIGRAM(S): 30 TABLET ORAL at 08:44

## 2025-03-25 NOTE — DISCHARGE NOTE PROVIDER - NSDCFUSCHEDAPPT_GEN_ALL_CORE_FT
Mahad Trujillo Physician [facility]  VASCULAR [address]  Scheduled Appointment: 04/23/2025

## 2025-03-25 NOTE — DISCHARGE NOTE PROVIDER - CARE PROVIDER_API CALL
Mahad Trujillo  Vascular Surgery  [address] [place] [address]  Phone: [phone]  Fax: [phone]  Follow Up Time:

## 2025-03-25 NOTE — CHART NOTE - NSCHARTNOTEFT_GEN_A_CORE
NUTRITION FOLLOW UP NOTE    PATIENT SEEN FOR: follow up    SOURCE: [x] Patient  [x] Current Medical Record  [] RN  [] Family/support person at bedside  [] Patient unavailable/inappropriate  [] Other:    CHART REVIEWED/EVENTS NOTED.  [] No changes to nutrition care plan to note  [x] Nutrition Status:  -s/p left AKA 3/18    DIET ORDER:   Diet, Consistent Carbohydrate w/Evening Snack:   Supplement Feeding Modality:  Oral  Glucerna Shake Cans or Servings Per Day:  3       Frequency:  Daily (03-18-25)    CURRENT DIET ORDER IS:  [x] Appropriate:  [] Inadequate:  [] Other:    NUTRITION INTAKE/PROVISION:  [x] PO: Pt reports eating ok, intake varies depending on meal/foods - declined to offer any specific food preferences  -states daughter brings food usually at night  -reports 3 Glucernas/day are too much and has stopped drinking them since yesterday  [] Enteral Nutrition:  [] Parenteral Nutrition:    ANTHROPOMETRICS:  Drug Dosing Weight  Height (cm): 167.6 (18 Mar 2025 14:20)  Weight (kg): 70.3 (18 Mar 2025 14:20)  BMI (kg/m2): 25 (18 Mar 2025 14:20)  BSA (m2): 1.79 (18 Mar 2025 14:20)  Weights: 64.3 kg (3/25 bed wt taken by RD); 68.5 kg (3/5) *wt loss expected s/p amputation - 6% x 3 weeks  Adjusted IBW for AKA = 57 kg    MEDICATIONS:  MEDICATIONS  (STANDING):  acetaminophen     Tablet .. 975 milliGRAM(s) Oral every 6 hours  aspirin  chewable 81 milliGRAM(s) Oral daily  carvedilol 6.25 milliGRAM(s) Oral every 12 hours  cyanocobalamin 1000 MICROGram(s) Oral daily  dextrose 5%. 1000 milliLiter(s) (50 mL/Hr) IV Continuous <Continuous>  dextrose 50% Injectable 25 Gram(s) IV Push once  dextrose 50% Injectable 12.5 Gram(s) IV Push once  enoxaparin Injectable 40 milliGRAM(s) SubCutaneous every 24 hours  gabapentin 100 milliGRAM(s) Oral daily  influenza  Vaccine (HIGH DOSE) 0.5 milliLiter(s) IntraMuscular once  insulin glargine Injectable (LANTUS) 14 Unit(s) SubCutaneous at bedtime  insulin lispro (ADMELOG) corrective regimen sliding scale   SubCutaneous three times a day before meals  insulin lispro (ADMELOG) corrective regimen sliding scale   SubCutaneous at bedtime  insulin lispro Injectable (ADMELOG) 8 Unit(s) SubCutaneous three times a day with meals  lactobacillus acidophilus 1 Tablet(s) Oral with dinner  losartan 100 milliGRAM(s) Oral daily  melatonin 6 milliGRAM(s) Oral at bedtime  NIFEdipine XL 90 milliGRAM(s) Oral daily  pantoprazole    Tablet 40 milliGRAM(s) Oral two times a day  polyethylene glycol 3350 17 Gram(s) Oral daily  senna 1 Tablet(s) Oral at bedtime  zinc oxide 40% Paste 1 Application(s) Topical daily    MEDICATIONS  (PRN):  bisacodyl 5 milliGRAM(s) Oral daily PRN Constipation  dextrose Oral Gel 15 Gram(s) Oral once PRN Blood Glucose LESS THAN 70 milliGRAM(s)/deciliter  HYDROmorphone  Injectable 0.5 milliGRAM(s) IV Push every 6 hours PRN for breakthrough pain  nalbuphine Injectable 2.5 milliGRAM(s) IV Push every 6 hours PRN Pruritus  naloxone Injectable 0.1 milliGRAM(s) IV Push every 3 minutes PRN For ANY of the following changes in patient status:  A. RR LESS THAN 10 breaths per minute, B. Oxygen saturation LESS THAN 90%, C. Sedation score of 6  ondansetron Injectable 4 milliGRAM(s) IV Push every 6 hours PRN Nausea  oxyCODONE    IR 5 milliGRAM(s) Oral every 6 hours PRN Moderate Pain (4 - 6)  oxyCODONE    IR 10 milliGRAM(s) Oral every 6 hours PRN Severe Pain (7 - 10)    NUTRITIONALLY PERTINENT LABS:  03-25 Na137 mmol/L Glu 100 mg/dL[H] K+ 3.7 mmol/L Cr  0.66 mg/dL BUN 11 mg/dL 03-25 Phos 3.9 mg/dL    A1C with Estimated Average Glucose Result: 7.7 % (01-28-25 @ 10:05)  A1C with Estimated Average Glucose Result: 7.4 % (12-06-24 @ 09:06)    Finger Sticks:  POCT Blood Glucose.: 106 mg/dL (03-25 @ 09:11)  POCT Blood Glucose.: 211 mg/dL (03-24 @ 22:33)  POCT Blood Glucose.: 171 mg/dL (03-24 @ 17:18)  POCT Blood Glucose.: 105 mg/dL (03-24 @ 13:32)    NUTRITIONALLY PERTINENT MEDICATIONS/LABS:  [x] Reviewed  [x] Relevant notes on medications/labs:  -fingersticks fluctuating, Endocrine following - ordered for lantus, lispro, and ISS    EDEMA:  [x] Reviewed  [x] Relevant notes: right ankle/foot 1+ edema (RN flowsheets 3/23)    GI/ I&O:  [x] Reviewed  [x] Relevant notes: liquid BM 3/23 per RN flowsheets; ordered for senna, Miralax, and lactobacillus  [] Other:    SKIN:   [x] No pressure injuries documented, per nursing flowsheet  [] Pressure injury previously noted  [] Change in pressure injury documentation:  [x] Other: left AKA    ESTIMATED NEEDS:  [] No change:  [x] Updated: s/p amputation  Energy: 7370-9781  kcal/day (28-32 kcal/kg)  Protein: 68.4-79.8 g/day (1.2-1.4 g/kg)  Fluid:   ml/day or [x] defer to team  Based on: adj IBW 57 kg    NUTRITION DIAGNOSIS:  [x] Prior Dx: 1) inadequate protein/energy intake - resolving and 2) increased nutrient needs  [] New Dx:    EDUCATION:  [x] Yes: consistent carbohydrate diet, increased protein intake for healing  [] Not appropriate/warranted    NUTRITION CARE PLAN:  1. Diet: continue consistent carbohydrate with snack diet  2. Supplements: decrease Glucerna to 1x/day (220 kcal, 10 g Pro/8oz)  3. Multivitamin/mineral supplementation: recommend MVI and vit C for wound healing    [] Achieved - Continue current nutrition intervention(s)  [] Current medical condition precludes nutrition intervention at this time.    MONITORING AND EVALUATION:   RD remains available upon request and will follow up per protocol.    Saima Rubin MPH, RD, CDN  Available on [employer] TEAMS

## 2025-03-25 NOTE — DISCHARGE NOTE NURSING/CASE MANAGEMENT/SOCIAL WORK - PATIENT PORTAL LINK FT
You can access the FollowMyHealth Patient Portal offered by [facility] by registering at the following website: [email or web address]. By joining [employer]’s FollowMyHealth portal, you will also be able to view your health information using other applications (apps) compatible with our system.

## 2025-03-25 NOTE — PROGRESS NOTE ADULT - PROVIDER SPECIALTY LIST ADULT
Anesthesia
Cardiology
Endocrinology
Surgery
Surgery
Vascular Surgery
Anesthesia
Cardiology
Endocrinology
Infectious Disease
Infectious Disease
Surgery
Surgery
Vascular Surgery
Cardiology
Endocrinology
Endocrinology
Gastroenterology
Gastroenterology
Infectious Disease
Surgery
Vascular Surgery
Endocrinology
Electrophysiology
Endocrinology

## 2025-03-25 NOTE — DISCHARGE NOTE PROVIDER - NSDCMRMEDTOKEN_GEN_ALL_CORE_FT
aspirin 81 mg oral tablet: orally once a day  atenolol 25 mg oral tablet: 1 tab(s) orally once a day  atorvastatin 40 mg oral tablet: 1 tab(s) orally once a day (at bedtime)  gabapentin 100 mg oral capsule: 1 cap(s) orally once a day  Jardiance 10 mg oral tablet: 1 tab(s) orally once a day  Jentadueto 2.5 mg-1000 mg oral tablet: 1 tab(s) orally 2 times a day  pioglitazone 30 mg oral tablet: 1 tab(s) orally once a day  Vitamin B12 1000 mcg oral tablet: 1 tab(s) orally once a day

## 2025-03-25 NOTE — PROGRESS NOTE ADULT - SUBJECTIVE AND OBJECTIVE BOX
SURGERY DAILY PROGRESS NOTE    STATUS POST:  Creation of bypass from left femoral artery to distal tibial artery    AKA (above-knee amputation), left        SUBJECTIVE: Pt seen and examined at bedside. No acute events overnight. Pt feeling well.       OBJECTIVE:  Vital Signs Last 24 Hrs  T(C): 36.9 (25 Mar 2025 05:00), Max: 36.9 (24 Mar 2025 21:10)  T(F): 98.4 (25 Mar 2025 05:00), Max: 98.5 (24 Mar 2025 21:10)  HR: 94 (25 Mar 2025 06:18) (89 - 102)  BP: 163/82 (25 Mar 2025 06:18) (138/74 - 180/82)  BP(mean): --  RR: 18 (25 Mar 2025 05:00) (18 - 18)  SpO2: 96% (25 Mar 2025 05:00) (96% - 100%)    Parameters below as of 25 Mar 2025 05:00  Patient On (Oxygen Delivery Method): room air        I&O's Summary    24 Mar 2025 07:01  -  25 Mar 2025 07:00  --------------------------------------------------------  IN: 1040 mL / OUT: 1820 mL / NET: -780 mL        Physical Exam:  Gen: NAD  CV: Pulse regular present  Resp: Nonlabored  Abdomen: Soft, nontender  Extremities: left AKA stump is soft and appropriately tender to palpation without evidence of hematoma, dressing is clean and dry    LABS:                        8.2    8.02  )-----------( 467      ( 24 Mar 2025 07:07 )             26.3     03-24    136  |  102  |  8   ----------------------------<  139[H]  4.2   |  20[L]  |  0.59    Ca    9.2      24 Mar 2025 07:06  Phos  2.8     03-24  Mg     1.9     03-24        Urinalysis Basic - ( 24 Mar 2025 07:06 )    Color: x / Appearance: x / SG: x / pH: x  Gluc: 139 mg/dL / Ketone: x  / Bili: x / Urobili: x   Blood: x / Protein: x / Nitrite: x   Leuk Esterase: x / RBC: x / WBC x   Sq Epi: x / Non Sq Epi: x / Bacteria: x        RADIOLOGY & ADDITIONAL STUDIES:

## 2025-03-25 NOTE — DISCHARGE NOTE PROVIDER - HOSPITAL COURSE
69M PM of former smoker (1.5 PPD % 50 years; quit 2 years ago),  HTN, HLD, DM, PAD and aortoiliac occlusive disease, who previously underwent an outpatient angiography complicated by right iliac artery dissection. Now S/P Creation of bypass from left femoral artery to distal tibial artery with RSVG and completion angiogram on 2/7/25. Pt recovered appropriately in PACU and then transferred to surgical floor.   POD1 2/8 No acute events  POD2 2/9 hgb 9.6 to 7.9 received 1u pRBC with appropriate response to 8.3. Art discontinued and passed TOV. Evening, febrile to 102F, tachy to 107, WBC >12. Fever workup initiated. CBC showed 7.0 hgb, received another 1u pRBC   POD3 2/10 post transfusion CBC on AM labs with appropriate response 8.4. Heparin gtt adjusted   POD4 2/11 fever work up WNL, AM labs showed hgb 6.2 received 1 units pRBC with hgb to 7.8, transfused in evening 1 unit pRBC  POD5 2/12 Rising blood glucose (369 to 386 overnight), endocrine consulted for diabetic with hyperglycemia management. GI consulted for acute on chronic anemia, with diarrhea (GI PCR negative)    POD6 2/13 AM hgb 6.1, received 2 units with response to 9.2. s/p endoscopy and video capsule enteroscopy with GI, tolerated well.   POD7 2/14 69M PM of former smoker (1.5 PPD % 50 years; quit 2 years ago),  HTN, HLD, DM, PAD and aortoiliac occlusive disease, who previously underwent an outpatient angiography complicated by right iliac artery dissection. Now S/P Creation of bypass from left femoral artery to distal tibial artery with RSVG and completion angiogram on 2/7/25. Pt recovered appropriately in PACU and then transferred to surgical floor.   POD1 2/8 No acute events  POD2 2/9 hgb 9.6 to 7.9 received 1u pRBC with appropriate response to 8.3. Art discontinued and passed TOV. Evening, febrile to 102F, tachy to 107, WBC >12. Fever workup initiated. CBC showed 7.0 hgb, received another 1u pRBC   POD3 2/10 post transfusion CBC on AM labs with appropriate response 8.4. Heparin gtt adjusted   POD4 2/11 fever work up WNL, AM labs showed hgb 6.2 received 1 units pRBC with hgb to 7.8, transfused in evening 1 unit pRBC  POD5 2/12 Rising blood glucose (369 to 386 overnight), endocrine consulted for diabetic with hyperglycemia management. GI consulted for acute on chronic anemia, with diarrhea (GI PCR negative)    POD6 2/13 AM hgb 6.1, received 2 units with response to 9.2. s/p endoscopy and video capsule enteroscopy with GI, tolerated well.   POD7 2/14 overnight LLE cool and dusky and increased pain.   POD8 2/15 heparin restarted  POD9-12 2/16-19 LLE with ischemic changes   POD13 2/20 overnight pt was febrile to 101.1 and tachy to 110, restarted on vanco and zosyn.  POD14 2/21 Heparin adjusted for elevated PTT   POD15 2/22 AM febrile 101.1F and hypertensive   POD16 2/23 Planning for LT BKA, cardiology consulted for clearance   [id number] 2/24-26 Planning for LT BKA    2/27 s/p LT BKA, tolerated well, BKA stump in knee immobilizer   POD1 2/28 PM hgb 6.6, 1 unit pRBC to 7.9  POD2 3/1 Amp monitored   POD3 3/2 hgb 7.0 1 unit pRBC to 9.0, melanic stool, GI reconsulted, Dressing takedown - healing well  POD4 3/3 GI saw pt for melanic stool, increased PPI BID and EGD outpt in 2 mopnths   POD5-6 3/4-5 notified by pharm recs dc zosyn and vanco (on since 2/16). Abx discontinued  POD7-8 3/6-8 Amp monitored  POD9 3/9 restarted given increase WBC and evidence of necrosis at stump  POD10 3/10 hgb 6.7 1 unit pRBC given to 8.5  POD11-5 3/11-15 monitor amp site, LT AKA planning   POD16 hgb 7.0 1 unit pRBC to 9.4  POD17 3/17 monitor amp site, LT AKA planning     3/18 s/p LT AKA tolerated well.   POD1 3/19 PM hgb 6.6 received 1unit pRBC to 8.0, monitor amp dressing site   POD2-3 3/20-21 Dressing taken down, healing appropriately  POD4-7 3/22-25 Daily dressing changes, monitor amp site, healing well     PT evaluated pt and recommend [facility]    At the time of discharge, the patient was hemodynamically stable, was tolerating PO diet, was voiding urine and passing stool, and was comfortable with adequate pain control. The patient was instructed to follow up with Dr. Trujillo within 1-2 weeks after discharge from the hospital. The patient and family felt comfortable with discharge. The patient was discharged to [facility]. The patient had no other issues.

## 2025-03-25 NOTE — PROGRESS NOTE ADULT - ASSESSMENT
69M PM of former smoker (1.5 PPD % 50 years; quit 2 years ago),  HTN, HLD, DM, PAD and aortoiliac occlusive disease, who previously underwent an outpatient angiography complicated by right iliac artery dissection. Now S/P Creation of bypass from left femoral artery to distal tibial artery with RSVG and completion angiogram on 2/7/25 s/p 2u pRBC 7.4 from 7.8l not responded to 2nd unit on 2/12/25. EGD completed 2/13 found to have bleeding gastric ulcer w/ hemostasis achieved with cautery. Hgb found to be 6.1 and s/p 2u pRBC. 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  Received 1uPRBC 3/19 (hgb 6.6) with appropriate response now s/p L AKA on 3/18/25.      Plan:   - Dressing change today, daily dressing changes    - CC diet   - Lovenox/Aspirin   - Diet: CC w Evening Snacks   - on insulin appreciate endocrine recs  - Pain regimen: Tylenol ATC,, weaned off PCA, now on oral Pain meds,   - Bowel regimen  - PT recs -> [facility]    Vascular Surgery   [id number]

## 2025-03-25 NOTE — DISCHARGE NOTE PROVIDER - NSDCCPTREATMENT_GEN_ALL_CORE_FT
PRINCIPAL PROCEDURE  Procedure: AKA (above-knee amputation), left  Findings and Treatment: WOUND CARE: Stapleswill be removed at follow up office visit. Keep your incision(s) dry and covered with gauze and skin tape and change daily to monitor surrounding/expanding redness or pus drainage   BATHING: Please do not submerge wound underwater. You may shower and/or sponge bathe.  ACTIVITY: No heavy lifting anything more than 10-15lbs or straining. Otherwise, you may return to your usual level of physical activity.   DIET: Diabetic diet  NOTIFY YOUR SURGEON IF: You have any bleeding that does not stop, any pus draining from your wound, any fever (over 100.4 F) or chills, persistent nausea/vomiting with inability to tolerate food or liquids, persistent diarrhea, or if your pain is not controlled on your discharge pain medications.  FOLLOW-UP:  1. Please call to make a follow-up appointment with Dr. Trujillo on your scheduled appointment on 4/23   2. Please follow up with your primary care physician in one week regarding your hospitalization.

## 2025-03-25 NOTE — PROGRESS NOTE ADULT - SUBJECTIVE AND OBJECTIVE BOX
DATE OF SERVICE: 03-25-25 @ 14:27    Patient is a 70y old  Male who presents with a chief complaint of Iliac A dissection (24 Mar 2025 12:25)      INTERVAL HISTORY: Feels ok.     REVIEW OF SYSTEMS:  CONSTITUTIONAL: No weakness  EYES/ENT: No visual changes;  No throat pain   NECK: No pain or stiffness  RESPIRATORY: No cough, wheezing; No shortness of breath  CARDIOVASCULAR: No chest pain or palpitations  GASTROINTESTINAL: No abdominal  pain. No nausea, vomiting, or hematemesis  GENITOURINARY: No dysuria, frequency or hematuria  NEUROLOGICAL: No stroke like symptoms  SKIN: No rashes    	  MEDICATIONS:  carvedilol 6.25 milliGRAM(s) Oral every 12 hours  losartan 100 milliGRAM(s) Oral daily  NIFEdipine XL 90 milliGRAM(s) Oral daily        PHYSICAL EXAM:  T(C): 36.4 (03-25-25 @ 13:10), Max: 36.9 (03-24-25 @ 21:10)  HR: 105 (03-25-25 @ 13:10) (90 - 105)  BP: 119/72 (03-25-25 @ 13:10) (119/72 - 180/82)  RR: 18 (03-25-25 @ 13:10) (18 - 18)  SpO2: 99% (03-25-25 @ 13:10) (96% - 100%)  Wt(kg): --  I&O's Summary    24 Mar 2025 07:01  -  25 Mar 2025 07:00  --------------------------------------------------------  IN: 1040 mL / OUT: 1820 mL / NET: -780 mL    25 Mar 2025 07:01  -  25 Mar 2025 14:27  --------------------------------------------------------  IN: 0 mL / OUT: 600 mL / NET: -600 mL          Appearance: In no distress	  HEENT:    PERRL, EOMI	  Cardiovascular:  S1 S2, No JVD  Respiratory: Lungs clear to auscultation	  Gastrointestinal:  Soft, Non-tender, + BS	  Vascularature:  No edema of LE  Psychiatric: Appropriate affect   Neuro: no acute focal deficits                               8.5    8.32  )-----------( 504      ( 25 Mar 2025 07:22 )             27.0     03-25    137  |  102  |  11  ----------------------------<  100[H]  3.7   |  20[L]  |  0.66    Ca    9.3      25 Mar 2025 07:22  Phos  3.9     03-25  Mg     1.9     03-25          Labs personally reviewed      ASSESSMENT/PLAN: 	      69M PM of former smoker (1.5 PPD % 50 years; quit 2 years ago),  HTN, HLD, DM, PAD and aortoiliac occlusive disease, who previously underwent an outpatient angiography complicated by right iliac artery dissection. Now S/P Creation of bypass from left femoral artery to distal tibial artery with RSVG and completion angiogram on 2/7/25 s/p 2u pRBC 7.4 from 7.8l not responded to 2nd unit on 2/12/25. EGD completed 2/13 found to have bleeding gastric ulcer w/ hemostasis achieved with cautery. Hgb found to be 6.1 and s/p 2u pRBC. Patient now w/ ischemic changes to LLE.    1. Cardiac Risk Stratification   - No chest pain/SOB  - EKG Sinus tach 117 bpm no ischemic changes   - TTE reviewed with pericardial effusion, unchanged compared to 2023  - Not in decompensated HF   - No hx of tachy/timothy arrhythmias   - No valvular abnormalities  - Moderate risk for moderate risk vascular surgery  - Optimized from cardiac perspective to proceed  - s/p AKA, tolerated well    2. Hypertension- bp better   - Losartan 100 mg QD   - Coreg 6.25mg BID    - Nifedipine 90mg qd    3. Hyperlipidemia   - Lipitor 40mg     4. Diabetes Mellitus Type II   - FBG per protocol  - ISS   - A1C: 7.7%     5. CAD - TTE with Basal and mid inferior wall and basal inferoseptal segment are abnormal  - Discussed with pt, spouse and daughter in person, they do not know full name or number of his cardiologist to obtain his prior records from  - They deny he has any h/o MI, CP or SOB  - Advised OP follow up with his cards         JUDE Hill-RINA Dawkins,  [facility]  Cardiovascular Medicine  [address] [place] [address], [address]  Available through call or text on [employer]  Office: [phone]

## 2025-03-25 NOTE — DISCHARGE NOTE NURSING/CASE MANAGEMENT/SOCIAL WORK - NSDCPEFALRISK_GEN_ALL_CORE
For information on Fall & Injury Prevention, visit: [email or web address] OR  [email or web address] OR  https://www.cdc.gov/steadi/patient.html

## 2025-03-25 NOTE — DISCHARGE NOTE NURSING/CASE MANAGEMENT/SOCIAL WORK - FINANCIAL ASSISTANCE
[facility] provides services at a reduced cost to those who are determined to be eligible through [facility]’s financial assistance program. Information regarding [facility]’s financial assistance program can be found by going to [email or web address] or by calling [phone].

## 2025-03-25 NOTE — CHART NOTE - NSCHARTNOTESELECT_GEN_ALL_CORE
Endocrinology/Event Note
Endocrinology/Event Note
Event Note
Nutrition Services
POC
Post-op check
Endocrinology/Event Note
Event Note
Nutrition Services
Post Procedure Check
post op check/Event Note

## 2025-03-31 NOTE — PROGRESS NOTE ADULT - PROBLEM SELECTOR PROBLEM 1
March 31, 2025      [facility]  [address], [address]  [facility] [address]  Phone: [phone]  Fax: [phone]       Patient: Santos Moncada   YOB: 2007  Date of Visit: 03/31/2025    To Whom It May Concern:    JAVIER Moncada  was at [facility] on 03/31/2025. The patient may return to work/school on 4/1/2025 with no restrictions. If you have any questions or concerns, or if I can be of further assistance, please do not hesitate to contact me.    Sincerely,        Shweta Robles PA-C     
Diabetes type 2
Type 2 diabetes mellitus with hyperglycemia, without long-term current use of insulin
Diabetes type 2
Type 2 diabetes mellitus with hyperglycemia, without long-term current use of insulin
Diabetes type 2
Type 2 diabetes mellitus with hyperglycemia
Type 2 diabetes mellitus with hyperglycemia, without long-term current use of insulin

## 2025-04-02 ENCOUNTER — TRANSCRIPTION ENCOUNTER (OUTPATIENT)
Age: 70
End: 2025-04-02

## 2025-04-23 ENCOUNTER — APPOINTMENT (OUTPATIENT)
Dept: VASCULAR SURGERY | Facility: CLINIC | Age: 70
End: 2025-04-23

## 2025-04-23 VITALS
TEMPERATURE: 99.2 F | BODY MASS INDEX: 21.66 KG/M2 | DIASTOLIC BLOOD PRESSURE: 82 MMHG | WEIGHT: 138 LBS | HEART RATE: 99 BPM | SYSTOLIC BLOOD PRESSURE: 169 MMHG | HEIGHT: 67 IN

## 2025-04-23 PROCEDURE — 99024 POSTOP FOLLOW-UP VISIT: CPT

## 2025-07-22 ENCOUNTER — APPOINTMENT (OUTPATIENT)
Dept: VASCULAR SURGERY | Facility: CLINIC | Age: 70
End: 2025-07-22
Payer: COMMERCIAL

## 2025-07-22 ENCOUNTER — NON-APPOINTMENT (OUTPATIENT)
Age: 70
End: 2025-07-22

## 2025-07-22 VITALS
DIASTOLIC BLOOD PRESSURE: 64 MMHG | BODY MASS INDEX: 20.88 KG/M2 | WEIGHT: 133 LBS | TEMPERATURE: 97.7 F | HEIGHT: 67 IN | HEART RATE: 76 BPM | SYSTOLIC BLOOD PRESSURE: 110 MMHG

## 2025-07-22 DIAGNOSIS — I73.9 PERIPHERAL VASCULAR DISEASE, UNSPECIFIED: ICD-10-CM

## 2025-07-22 PROCEDURE — 93922 UPR/L XTREMITY ART 2 LEVELS: CPT

## 2025-07-22 PROCEDURE — 99213 OFFICE O/P EST LOW 20 MIN: CPT

## (undated) DEVICE — VENODYNE/SCD SLEEVE CALF MEDIUM

## (undated) DEVICE — TUBING HIGH POWER CONTRAST INJ

## (undated) DEVICE — DRAPE FEMORAL ANGIOGRAPHY W TROUGH

## (undated) DEVICE — TORQUE DEVICE FOR GUIDEWIRE 0.0100.038"

## (undated) DEVICE — BLADE SURGICAL #11 CARBON

## (undated) DEVICE — PACK MINOR WITH LAP

## (undated) DEVICE — GOWN XL

## (undated) DEVICE — DRAPE ULTRASOUND PROBE COVER W ULTRA GEL 18X120CM

## (undated) DEVICE — DRSG TEGADERM 4X4.75"

## (undated) DEVICE — SYR LUER LOK 5CC

## (undated) DEVICE — DRAPE 3/4 SHEET 52X76"

## (undated) DEVICE — SYR LUER LOK 20CC

## (undated) DEVICE — Device

## (undated) DEVICE — GLV 8 PROTEXIS (CREAM) MICRO

## (undated) DEVICE — STAPLER SKIN MULTI DIRECTION W35

## (undated) DEVICE — SOL IRR BAG NS 0.9% 1000ML

## (undated) DEVICE — POSITIONER STRAP ARMBOARD VELCRO TS-30

## (undated) DEVICE — SYR LUER LOK 10CC

## (undated) DEVICE — TAPE SILK 3"

## (undated) DEVICE — SOL IRR POUR NS 0.9% 500ML

## (undated) DEVICE — DRAPE COVER SNAP 36X30"

## (undated) DEVICE — GLV 7.5 PROTEXIS (CREAM) MICRO

## (undated) DEVICE — WARMING BLANKET FULL ADULT